# Patient Record
Sex: FEMALE | Race: WHITE | NOT HISPANIC OR LATINO | Employment: UNEMPLOYED
[De-identification: names, ages, dates, MRNs, and addresses within clinical notes are randomized per-mention and may not be internally consistent; named-entity substitution may affect disease eponyms.]

---

## 2017-06-17 ENCOUNTER — HEALTH MAINTENANCE LETTER (OUTPATIENT)
Age: 37
End: 2017-06-17

## 2018-09-19 ENCOUNTER — HOSPITAL ENCOUNTER (EMERGENCY)
Facility: CLINIC | Age: 38
Discharge: HOME | End: 2018-09-19
Attending: EMERGENCY MEDICINE | Admitting: EMERGENCY MEDICINE
Payer: COMMERCIAL

## 2018-09-19 VITALS
WEIGHT: 147 LBS | SYSTOLIC BLOOD PRESSURE: 110 MMHG | HEART RATE: 117 BPM | RESPIRATION RATE: 18 BRPM | BODY MASS INDEX: 26.04 KG/M2 | OXYGEN SATURATION: 99 % | DIASTOLIC BLOOD PRESSURE: 64 MMHG | TEMPERATURE: 98.8 F

## 2018-09-19 DIAGNOSIS — R30.0 DYSURIA: ICD-10-CM

## 2018-09-19 LAB
ALBUMIN UR-MCNC: NEGATIVE MG/DL
APPEARANCE UR: CLEAR
BILIRUB UR QL STRIP: NEGATIVE
COLOR UR AUTO: ABNORMAL
GLUCOSE UR STRIP-MCNC: NEGATIVE MG/DL
HCG UR QL: NEGATIVE
HGB UR QL STRIP: NEGATIVE
HYALINE CASTS #/AREA URNS LPF: 1 /LPF (ref 0–2)
KETONES UR STRIP-MCNC: NEGATIVE MG/DL
LEUKOCYTE ESTERASE UR QL STRIP: ABNORMAL
MUCOUS THREADS #/AREA URNS LPF: PRESENT /LPF
NITRATE UR QL: NEGATIVE
PH UR STRIP: 5.5 PH (ref 5–7)
RBC #/AREA URNS AUTO: 1 /HPF (ref 0–2)
SOURCE: ABNORMAL
SP GR UR STRIP: 1.01 (ref 1–1.03)
SQUAMOUS #/AREA URNS AUTO: 1 /HPF (ref 0–1)
UROBILINOGEN UR STRIP-MCNC: NORMAL MG/DL (ref 0–2)
WBC #/AREA URNS AUTO: <1 /HPF (ref 0–5)

## 2018-09-19 PROCEDURE — 81025 URINE PREGNANCY TEST: CPT | Performed by: EMERGENCY MEDICINE

## 2018-09-19 PROCEDURE — 81001 URINALYSIS AUTO W/SCOPE: CPT | Performed by: EMERGENCY MEDICINE

## 2018-09-19 PROCEDURE — 99282 EMERGENCY DEPT VISIT SF MDM: CPT | Mod: Z6 | Performed by: EMERGENCY MEDICINE

## 2018-09-19 PROCEDURE — 99283 EMERGENCY DEPT VISIT LOW MDM: CPT | Performed by: EMERGENCY MEDICINE

## 2018-09-19 ASSESSMENT — ENCOUNTER SYMPTOMS
FEVER: 0
FREQUENCY: 1
GASTROINTESTINAL NEGATIVE: 1
FLANK PAIN: 0
DIFFICULTY URINATING: 1
DYSURIA: 1
HEMATURIA: 0

## 2018-09-19 NOTE — ED AVS SNAPSHOT
[facility], Emergency Department    [address]    [place] [address]    Phone:  [phone]    Fax:  [phone]                                       Kamilah Goldberg   MRN: [id number]    Department:  [facility], Emergency Department   Date of Visit:  9/19/2018           Patient Information     Date Of Birth          1980        Your diagnoses for this visit were:     Dysuria        You were seen by Colton Sanches MD.        Discharge Instructions       No evidence for a urinary tract infection at this time    24 Hour Appointment Hotline       To make an appointment at any [facility] clinic, call [phone] ([phone]). If you don't have a family doctor or clinic, we will help you find one. [facility] clinics are conveniently located to serve the needs of you and your family.             Review of your medicines      Our records show that you are taking the medicines listed below. If these are incorrect, please call your family doctor or clinic.        Dose / Directions Last dose taken    calcium citrate-vitamin D 315-200 MG-UNIT Tabs per tablet   Commonly known as:  CITRACAL   Dose:  1 tablet        Take 1 tablet by mouth 6 times daily.   Refills:  0        chlorhexidine 0.12 % solution   Commonly known as:  PERIDEX   Dose:  15 mL   Quantity:  118 mL        Swish and spit 15 mLs in mouth 2 times daily   Refills:  0        citalopram 20 MG tablet   Commonly known as:  celeXA   Dose:  20 mg        Take 20 mg by mouth daily.   Refills:  0        cyclobenzaprine 10 MG tablet   Commonly known as:  FLEXERIL   Quantity:  60 tablet        One tablet at bedtime as needed for muscle spasms   Refills:  0        TYLENOL 500 MG tablet   Dose:  500-1000 mg   Generic drug:  acetaminophen        Take 500-1,000 mg by mouth every 6 hours as needed.   Refills:  0        VITAMIN D (CHOLECALCIFEROL) PO        Take by mouth daily   Refills:  0                Procedures and tests performed during  "your visit     HCG qualitative urine    UA with Microscopic      Orders Needing Specimen Collection     None      Pending Results     No orders found from 2018 to 2018.            Pending Culture Results     No orders found from 2018 to 2018.            Pending Results Instructions     If you had any lab results that were not finalized at the time of your Discharge, you can call the ED Lab Result RN at [phone]. You will be contacted by this team for any positive Lab results or changes in treatment. The nurses are available 7 days a week from 10A to 6:30P.  You can leave a message 24 hours per day and they will return your call.        Thank you for choosing [facility]       Thank you for choosing [facility] for your care. Our goal is always to provide you with excellent care. Hearing back from our patients is one way we can continue to improve our services. Please take a few minutes to complete the written survey that you may receive in the mail after you visit with us. Thank you!        [facility] Information     [employer] lets you send messages to your doctor, view your test results, renew your prescriptions, schedule appointments and more. To sign up, go to [email or web address] . Click on \"Log in\" on the left side of the screen, which will take you to the Welcome page. Then click on \"Sign up Now\" on the right side of the page.     You will be asked to enter the access code listed below, as well as some personal information. Please follow the directions to create your username and password.     Your access code is: [email or web address]  Expires: 2018 10:40 PM     Your access code will  in 90 days. If you need help or a new code, please call your [facility] clinic or [phone].        Care EveryWhere ID     This is your Care EveryWhere ID. This could be used by other organizations to access your [facility] medical records  [id number]        Equal Access to Services     OSCAR RICHMOND AH: Hadii " zuleima Coulter, quinton murdock, sushma dubois. So [facility] [phone].    ATENCIÓN: Si habla español, tiene a bowden disposición servicios gratuitos de asistencia lingüística. Llame al [phone].    We comply with applicable federal civil rights laws and [place] laws. We do not discriminate on the basis of race, color, national origin, age, disability, sex, sexual orientation, or gender identity.            After Visit Summary       This is your record. Keep this with you and show to your community pharmacist(s) and doctor(s) at your next visit.

## 2018-09-19 NOTE — ED AVS SNAPSHOT
[facility], [place], Emergency Department    [address]    [place] [address]    Phone:  [phone]    Fax:  [phone]                                       Kamilah Goldberg   MRN: [id number]    Department:  [facility], Emergency Department   Date of Visit:  9/19/2018           After Visit Summary Signature Page     I have received my discharge instructions, and my questions have been answered. I have discussed any challenges I see with this plan with the nurse or doctor.    ..........................................................................................................................................  Patient/Patient Representative Signature      ..........................................................................................................................................  Patient Representative Print Name and Relationship to Patient    ..................................................               ................................................  Date                                   Time    ..........................................................................................................................................  Reviewed by Signature/Title    ...................................................              ..............................................  Date                                               Time          22EPIC Rev 08/18
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
## 2019-04-09 ENCOUNTER — HOSPITAL ENCOUNTER (EMERGENCY)
Facility: CLINIC | Age: 39
Discharge: HOME | End: 2019-04-10
Attending: EMERGENCY MEDICINE | Admitting: EMERGENCY MEDICINE

## 2019-04-09 DIAGNOSIS — R10.12 ABDOMINAL PAIN, LEFT UPPER QUADRANT: ICD-10-CM

## 2019-04-09 PROCEDURE — 99285 EMERGENCY DEPT VISIT HI MDM: CPT | Mod: 25

## 2019-04-09 PROCEDURE — 83690 ASSAY OF LIPASE: CPT | Performed by: EMERGENCY MEDICINE

## 2019-04-09 PROCEDURE — 84702 CHORIONIC GONADOTROPIN TEST: CPT

## 2019-04-09 RX ORDER — HYDROMORPHONE HYDROCHLORIDE 1 MG/ML
0.5 INJECTION, SOLUTION INTRAMUSCULAR; INTRAVENOUS; SUBCUTANEOUS
Status: DISCONTINUED | OUTPATIENT
Start: 2019-04-09 | End: 2019-04-10 | Stop reason: HOSPADM

## 2019-04-09 RX ORDER — ONDANSETRON 2 MG/ML
4 INJECTION INTRAMUSCULAR; INTRAVENOUS ONCE
Status: COMPLETED | OUTPATIENT
Start: 2019-04-09 | End: 2019-04-10

## 2019-04-09 NOTE — ED AVS SNAPSHOT
[facility] Emergency Department  [address]  [facility] [address]  Phone:  [phone]  Fax:  [phone]                                    Kamilah Goldberg   MRN: [id number]    Department:  [facility] Emergency Department   Date of Visit:  4/9/2019           After Visit Summary Signature Page    I have received my discharge instructions, and my questions have been answered. I have discussed any challenges I see with this plan with the nurse or doctor.    ..........................................................................................................................................  Patient/Patient Representative Signature      ..........................................................................................................................................  Patient Representative Print Name and Relationship to Patient    ..................................................               ................................................  Date                                   Time    ..........................................................................................................................................  Reviewed by Signature/Title    ...................................................              ..............................................  Date                                               Time          22EPIC Rev 08/18

## 2019-04-10 ENCOUNTER — APPOINTMENT (OUTPATIENT)
Dept: CT IMAGING | Facility: CLINIC | Age: 39
End: 2019-04-10
Attending: EMERGENCY MEDICINE

## 2019-04-10 VITALS
SYSTOLIC BLOOD PRESSURE: 117 MMHG | DIASTOLIC BLOOD PRESSURE: 79 MMHG | OXYGEN SATURATION: 100 % | BODY MASS INDEX: 27.63 KG/M2 | TEMPERATURE: 98.7 F | RESPIRATION RATE: 18 BRPM | HEART RATE: 92 BPM | WEIGHT: 156 LBS

## 2019-04-10 LAB
ALBUMIN SERPL-MCNC: 3.1 G/DL (ref 3.4–5)
ALP SERPL-CCNC: 102 U/L (ref 40–150)
ALT SERPL W P-5'-P-CCNC: 20 U/L (ref 0–50)
ANION GAP SERPL CALCULATED.3IONS-SCNC: 2 MMOL/L (ref 3–14)
ANISOCYTOSIS BLD QL SMEAR: ABNORMAL
AST SERPL W P-5'-P-CCNC: 27 U/L (ref 0–45)
B-HCG FREE SERPL-ACNC: <5 IU/L
BASOPHILS # BLD AUTO: 0 10E9/L (ref 0–0.2)
BASOPHILS NFR BLD AUTO: 1 %
BILIRUB DIRECT SERPL-MCNC: <0.1 MG/DL (ref 0–0.2)
BILIRUB SERPL-MCNC: 0.2 MG/DL (ref 0.2–1.3)
BUN SERPL-MCNC: 6 MG/DL (ref 7–30)
CALCIUM SERPL-MCNC: 8.3 MG/DL (ref 8.5–10.1)
CHLORIDE SERPL-SCNC: 106 MMOL/L (ref 94–109)
CO2 SERPL-SCNC: 29 MMOL/L (ref 20–32)
CREAT SERPL-MCNC: 0.47 MG/DL (ref 0.52–1.04)
DIFFERENTIAL METHOD BLD: ABNORMAL
ELLIPTOCYTES BLD QL SMEAR: SLIGHT
EOSINOPHIL # BLD AUTO: 0.2 10E9/L (ref 0–0.7)
EOSINOPHIL NFR BLD AUTO: 3.9 %
ERYTHROCYTE [DISTWIDTH] IN BLOOD BY AUTOMATED COUNT: 19 % (ref 10–15)
GFR SERPL CREATININE-BSD FRML MDRD: >90 ML/MIN/{1.73_M2}
GLUCOSE SERPL-MCNC: 83 MG/DL (ref 70–99)
HCT VFR BLD AUTO: 27.4 % (ref 35–47)
HGB BLD-MCNC: 7.7 G/DL (ref 11.7–15.7)
HYPOCHROMIA BLD QL: PRESENT
IMM GRANULOCYTES # BLD: 0 10E9/L (ref 0–0.4)
IMM GRANULOCYTES NFR BLD: 0.2 %
LIPASE SERPL-CCNC: 162 U/L (ref 73–393)
LYMPHOCYTES # BLD AUTO: 1.4 10E9/L (ref 0.8–5.3)
LYMPHOCYTES NFR BLD AUTO: 35 %
MACROCYTES BLD QL SMEAR: PRESENT
MCH RBC QN AUTO: 17.9 PG (ref 26.5–33)
MCHC RBC AUTO-ENTMCNC: 28.1 G/DL (ref 31.5–36.5)
MCV RBC AUTO: 64 FL (ref 78–100)
MICROCYTES BLD QL SMEAR: PRESENT
MONOCYTES # BLD AUTO: 0.5 10E9/L (ref 0–1.3)
MONOCYTES NFR BLD AUTO: 11.2 %
NEUTROPHILS # BLD AUTO: 2 10E9/L (ref 1.6–8.3)
NEUTROPHILS NFR BLD AUTO: 48.7 %
NRBC # BLD AUTO: 0 10*3/UL
NRBC BLD AUTO-RTO: 0 /100
PLATELET # BLD AUTO: 470 10E9/L (ref 150–450)
PLATELET # BLD EST: ABNORMAL 10*3/UL
POTASSIUM SERPL-SCNC: 3.5 MMOL/L (ref 3.4–5.3)
PROT SERPL-MCNC: 7.7 G/DL (ref 6.8–8.8)
RBC # BLD AUTO: 4.3 10E12/L (ref 3.8–5.2)
SODIUM SERPL-SCNC: 137 MMOL/L (ref 133–144)
WBC # BLD AUTO: 4.1 10E9/L (ref 4–11)

## 2019-04-10 PROCEDURE — 85025 COMPLETE CBC W/AUTO DIFF WBC: CPT | Performed by: EMERGENCY MEDICINE

## 2019-04-10 PROCEDURE — 96361 HYDRATE IV INFUSION ADD-ON: CPT

## 2019-04-10 PROCEDURE — 80048 BASIC METABOLIC PNL TOTAL CA: CPT | Performed by: EMERGENCY MEDICINE

## 2019-04-10 PROCEDURE — 96375 TX/PRO/DX INJ NEW DRUG ADDON: CPT

## 2019-04-10 PROCEDURE — 25000128 H RX IP 250 OP 636: Performed by: EMERGENCY MEDICINE

## 2019-04-10 PROCEDURE — 96374 THER/PROPH/DIAG INJ IV PUSH: CPT

## 2019-04-10 PROCEDURE — 74177 CT ABD & PELVIS W/CONTRAST: CPT

## 2019-04-10 PROCEDURE — 25000125 ZZHC RX 250: Performed by: EMERGENCY MEDICINE

## 2019-04-10 PROCEDURE — 25000132 ZZH RX MED GY IP 250 OP 250 PS 637: Performed by: EMERGENCY MEDICINE

## 2019-04-10 PROCEDURE — 83690 ASSAY OF LIPASE: CPT | Performed by: EMERGENCY MEDICINE

## 2019-04-10 PROCEDURE — 96376 TX/PRO/DX INJ SAME DRUG ADON: CPT

## 2019-04-10 PROCEDURE — 80076 HEPATIC FUNCTION PANEL: CPT | Performed by: EMERGENCY MEDICINE

## 2019-04-10 RX ORDER — ONDANSETRON 4 MG/1
4 TABLET, ORALLY DISINTEGRATING ORAL EVERY 8 HOURS PRN
Qty: 10 TABLET | Refills: 0 | Status: SHIPPED | OUTPATIENT
Start: 2019-04-10 | End: 2019-04-13

## 2019-04-10 RX ORDER — IOPAMIDOL 755 MG/ML
500 INJECTION, SOLUTION INTRAVASCULAR ONCE
Status: COMPLETED | OUTPATIENT
Start: 2019-04-10 | End: 2019-04-10

## 2019-04-10 RX ORDER — PANTOPRAZOLE SODIUM 40 MG/1
40 TABLET, DELAYED RELEASE ORAL DAILY
Qty: 30 TABLET | Refills: 0 | Status: SHIPPED | OUTPATIENT
Start: 2019-04-10 | End: 2019-05-10

## 2019-04-10 RX ADMIN — Medication 0.5 MG: at 00:36

## 2019-04-10 RX ADMIN — SODIUM CHLORIDE 500 ML: 9 INJECTION, SOLUTION INTRAVENOUS at 00:02

## 2019-04-10 RX ADMIN — IOPAMIDOL 79 ML: 755 INJECTION, SOLUTION INTRAVENOUS at 00:17

## 2019-04-10 RX ADMIN — Medication 0.5 MG: at 00:02

## 2019-04-10 RX ADMIN — SODIUM CHLORIDE 1000 ML: 9 INJECTION, SOLUTION INTRAVENOUS at 01:31

## 2019-04-10 RX ADMIN — LIDOCAINE HYDROCHLORIDE 30 ML: 20 SOLUTION ORAL; TOPICAL at 01:31

## 2019-04-10 RX ADMIN — ONDANSETRON 4 MG: 2 INJECTION INTRAMUSCULAR; INTRAVENOUS at 00:02

## 2019-04-10 RX ADMIN — SODIUM CHLORIDE 60 ML: 9 INJECTION, SOLUTION INTRAVENOUS at 00:17

## 2019-04-10 ASSESSMENT — ENCOUNTER SYMPTOMS
HEMATURIA: 0
COUGH: 1
DYSURIA: 0
FREQUENCY: 0
BLOOD IN STOOL: 0
NAUSEA: 1
ABDOMINAL PAIN: 1
VOMITING: 0

## 2019-04-10 NOTE — ED TRIAGE NOTES
Pt in with a C/O L sided abdominal pain, and nausea onset yesterday. Pt reports hx of gastric bypass in 2011

## 2019-04-10 NOTE — ED NOTES
Pt provided with discharge paperwork and educated on recommended follow-up with PCP. Pt educated on how to manage symptoms at home and when to seek medical attention. Pt educated on how to take prescriptions for zofran and protonix. Pt voiced understanding and denied any questions at discharge.

## 2019-04-10 NOTE — ED PROVIDER NOTES
"  History     Chief Complaint:  Abdominal Pain      HPI   Kamilah Goldberg is a 38 year old female with a past medical history significant for cholecystectomy and gastric bypass surgery who presents with two days of left sided abdominal pain. Per the patients report, she has sharp left lower periumbilical tenderness that began yesterday. She mentions she is able to feel a mass in her abdomen which \"moves around\". Patient also reports that she has had congestion and a cough for the last week and a low grade fever yesterday.  She reports that these symptoms are improving.The patient reports that she felt nauseous yesterday but did not vomit. She also denies dysuria, frequency, bloody or black stool, hematuria, pelvic pain, or chest pain.   No vaginal symptoms.  No trauma.      Allergies:  Naproxen   Codeine Sulfate   Hydrocodone     Medications:    acetaminophen (TYLENOL) 500 MG tablet  calcium citrate-vitamin D (CITRACAL) 315-200 MG-UNIT TABS  chlorhexidine (CHLORHEXIDINE) 0.12 % solution  citalopram (CELEXA) 20 MG tablet  cyclobenzaprine (FLEXERIL) 10 MG tablet  VITAMIN D, CHOLECALCIFEROL, PO    Past Medical History:    Back pain   Depression   UTI (lower urinary tract infection)    Past Surgical History:    BYPASS GASTRIC CARA-EN-Y, LIVER BIOPSY, COMBINED   CHOLECYSTECTOMY   CRYOCAUTERY OF CERVIX   DISCECTOMY LUMBAR MINIMALLY INVASIVE ONE LEVEL   TUBAL LIGATION    Family History:    Migraines  CAD    Social History:  Smoking status: Never smoker  Alcohol use: Yes  Marital Status:  Single [1]       Review of Systems   HENT: Positive for congestion.    Respiratory: Positive for cough.    Cardiovascular: Negative for chest pain.   Gastrointestinal: Positive for abdominal pain and nausea. Negative for blood in stool and vomiting.   Genitourinary: Negative for dysuria, frequency, hematuria and pelvic pain.   All other systems reviewed and are negative.        Physical Exam     Patient Vitals for the past 24 hrs:   BP " Temp Temp src Pulse Resp SpO2 Weight   04/10/19 0105 -- -- -- -- -- 100 % --   04/10/19 0030 117/79 -- -- 92 18 97 % --   04/09/19 2304 123/85 98.7  F (37.1  C) Temporal 105 18 100 % 70.8 kg (156 lb)         Physical Exam  Gen: alert  HEENT: PERRL, oropharynx clear  Neck: normal ROM  CV: RRR, no murmurs  Pulm: breath sounds equal, lungs clear  Abd: Soft, bowl sounds karin, left sided abdominal tenderness, prominence at belly button that is tender. Patient uncooperative on exam so it is difficult to definitively discern location of her pain.    Back: no evidence of injury, no cva tenderness  MSK: no deformity, moves all extremities  Skin: no rash  Neuro: alert, appropriate conversation and interaction    Emergency Department Course     Imaging:  Radiographic findings were communicated with the patient who voiced understanding of the findings.    CT abdomen pelvis w contrast   IMPRESSION: No convincing cause of acute pain identified in the  abdomen or pelvis  As read by radiology     Laboratory:  CBC: WBC 4.1, HGB 7.7,   BMP: Anion gap 2, urea nitrogen 6, Creatinine 0.47, Calcium 8.3  ISTAT HCG quantitative pregnancy POCT: <5.0  Lipase: 162  Hepatic panel: Albumin 3.1, o/w WNL    Interventions:  0131: Xylocaine 30 ml   0002: Zofran 4 mg IV  0036: Dilaudid 0.5 mg IV  0131: NaCl bolus 100 ml IV    Emergency Department Course:  Past medical records, nursing notes, and vitals reviewed.  0006: I performed an exam of the patient and obtained history, as documented above.    IV inserted and blood drawn.     The patient was sent for a CT abdomen pelvis while in the emergency department, findings above.    0215: I rechecked the patient. Explained findings to the patient. Patient does not wish to give urine and would like to go home. Patient discharged home with instructions regarding supportive care, medications, and reasons to return. The importance of close follow-up was reviewed.       Impression & Plan      Medical  Decision Making:  The patient presents for left upper quadrant pain. Patient is not cooperative with history or exam. Complicated past surgical history noted. Laboratory studies unremarkable. Patient refused to give urine sample. CT of the abdomen was unremarkable. I reexamined the patient and she does appear to be more comfortable but again not cooperative with history or exam. I suspect that this may be due to gastritis given location of her pain.  Patient reports a bulge but none appreciated on my exam, though again patient not cooperative.  Patient does not endorse being able to reduce a bulge and no current exam or CT evidence of hernia.  Given location of pain, possible gastritis. Protonix and Zofran. Recommended one day follow up with primary care, discussed limitations of evaluation today from lack of Urinalysis and lack of cooperation.  Discussed possibility of missed UTI and serious infection could result.  Patient expressed understanding.  I feel she is competent to make this decision. Patient desires discharge home.     Diagnosis:    ICD-10-CM    1. Abdominal pain, left upper quadrant R10.12        Disposition:  discharged to home    Discharge Medications:     Medication List      Started    pantoprazole 40 MG EC tablet  Commonly known as:  PROTONIX  40 mg, Oral, DAILY              Atul Smith  4/9/2019   [facility] EMERGENCY DEPARTMENT    Scribe Disclosure:  I, Atul Smith, am serving as a scribe at 12:06 AM on 4/10/2019 to document services personally performed by Torrie Reyez MD based on my observations and the provider's statements to me.          Torrie Reyez MD  04/10/19 0646

## 2022-02-24 ENCOUNTER — APPOINTMENT (OUTPATIENT)
Dept: GENERAL RADIOLOGY | Facility: CLINIC | Age: 42
End: 2022-02-24
Attending: PHYSICIAN ASSISTANT
Payer: MEDICAID

## 2022-02-24 ENCOUNTER — HOSPITAL ENCOUNTER (EMERGENCY)
Facility: CLINIC | Age: 42
Discharge: HOME | End: 2022-02-24
Attending: PHYSICIAN ASSISTANT | Admitting: PHYSICIAN ASSISTANT
Payer: MEDICAID

## 2022-02-24 VITALS
WEIGHT: 137 LBS | DIASTOLIC BLOOD PRESSURE: 60 MMHG | HEART RATE: 107 BPM | TEMPERATURE: 99.1 F | SYSTOLIC BLOOD PRESSURE: 109 MMHG | RESPIRATION RATE: 21 BRPM | OXYGEN SATURATION: 98 % | BODY MASS INDEX: 24.27 KG/M2

## 2022-02-24 DIAGNOSIS — E87.6 HYPOKALEMIA: ICD-10-CM

## 2022-02-24 DIAGNOSIS — E86.0 DEHYDRATION: ICD-10-CM

## 2022-02-24 DIAGNOSIS — J18.9 PNEUMONIA OF RIGHT LOWER LOBE DUE TO INFECTIOUS ORGANISM: ICD-10-CM

## 2022-02-24 LAB
ALBUMIN SERPL-MCNC: 2.8 G/DL (ref 3.4–5)
ALP SERPL-CCNC: 81 U/L (ref 40–150)
ALT SERPL W P-5'-P-CCNC: 22 U/L (ref 0–50)
ANION GAP SERPL CALCULATED.3IONS-SCNC: 10 MMOL/L (ref 3–14)
AST SERPL W P-5'-P-CCNC: 24 U/L (ref 0–45)
BASOPHILS # BLD AUTO: 0 10E3/UL (ref 0–0.2)
BASOPHILS NFR BLD AUTO: 0 %
BILIRUB SERPL-MCNC: 0.3 MG/DL (ref 0.2–1.3)
BUN SERPL-MCNC: 11 MG/DL (ref 7–30)
CALCIUM SERPL-MCNC: 8.8 MG/DL (ref 8.5–10.1)
CHLORIDE BLD-SCNC: 95 MMOL/L (ref 94–109)
CO2 SERPL-SCNC: 26 MMOL/L (ref 20–32)
CREAT SERPL-MCNC: 0.5 MG/DL (ref 0.52–1.04)
EOSINOPHIL # BLD AUTO: 0 10E3/UL (ref 0–0.7)
EOSINOPHIL NFR BLD AUTO: 0 %
ERYTHROCYTE [DISTWIDTH] IN BLOOD BY AUTOMATED COUNT: 22.3 % (ref 10–15)
FLUAV RNA SPEC QL NAA+PROBE: NEGATIVE
FLUBV RNA RESP QL NAA+PROBE: NEGATIVE
GFR SERPL CREATININE-BSD FRML MDRD: >90 ML/MIN/1.73M2
GLUCOSE BLD-MCNC: 119 MG/DL (ref 70–99)
HCT VFR BLD AUTO: 28 % (ref 35–47)
HGB BLD-MCNC: 8.4 G/DL (ref 11.7–15.7)
HOLD SPECIMEN: NORMAL
HOLD SPECIMEN: NORMAL
IMM GRANULOCYTES # BLD: 0.1 10E3/UL
IMM GRANULOCYTES NFR BLD: 1 %
LACTATE SERPL-SCNC: 0.9 MMOL/L (ref 0.7–2)
LACTATE SERPL-SCNC: 4.8 MMOL/L (ref 0.7–2)
LYMPHOCYTES # BLD AUTO: 0.8 10E3/UL (ref 0.8–5.3)
LYMPHOCYTES NFR BLD AUTO: 7 %
MAGNESIUM SERPL-MCNC: 1.8 MG/DL (ref 1.6–2.3)
MCH RBC QN AUTO: 20.3 PG (ref 26.5–33)
MCHC RBC AUTO-ENTMCNC: 30 G/DL (ref 31.5–36.5)
MCV RBC AUTO: 68 FL (ref 78–100)
MONOCYTES # BLD AUTO: 0.6 10E3/UL (ref 0–1.3)
MONOCYTES NFR BLD AUTO: 5 %
NEUTROPHILS # BLD AUTO: 9.9 10E3/UL (ref 1.6–8.3)
NEUTROPHILS NFR BLD AUTO: 87 %
NRBC # BLD AUTO: 0 10E3/UL
NRBC BLD AUTO-RTO: 0 /100
PLATELET # BLD AUTO: 376 10E3/UL (ref 150–450)
POTASSIUM BLD-SCNC: 2.6 MMOL/L (ref 3.4–5.3)
POTASSIUM BLD-SCNC: 3.2 MMOL/L (ref 3.4–5.3)
PROT SERPL-MCNC: 8.6 G/DL (ref 6.8–8.8)
RBC # BLD AUTO: 4.13 10E6/UL (ref 3.8–5.2)
SARS-COV-2 RNA RESP QL NAA+PROBE: NEGATIVE
SODIUM SERPL-SCNC: 131 MMOL/L (ref 133–144)
WBC # BLD AUTO: 11.4 10E3/UL (ref 4–11)

## 2022-02-24 PROCEDURE — 93005 ELECTROCARDIOGRAM TRACING: CPT

## 2022-02-24 PROCEDURE — 83605 ASSAY OF LACTIC ACID: CPT | Performed by: PHYSICIAN ASSISTANT

## 2022-02-24 PROCEDURE — 87040 BLOOD CULTURE FOR BACTERIA: CPT | Performed by: PHYSICIAN ASSISTANT

## 2022-02-24 PROCEDURE — C9803 HOPD COVID-19 SPEC COLLECT: HCPCS

## 2022-02-24 PROCEDURE — 87636 SARSCOV2 & INF A&B AMP PRB: CPT | Performed by: PHYSICIAN ASSISTANT

## 2022-02-24 PROCEDURE — 85025 COMPLETE CBC W/AUTO DIFF WBC: CPT | Performed by: PHYSICIAN ASSISTANT

## 2022-02-24 PROCEDURE — 258N000003 HC RX IP 258 OP 636: Performed by: PHYSICIAN ASSISTANT

## 2022-02-24 PROCEDURE — 36415 COLL VENOUS BLD VENIPUNCTURE: CPT | Performed by: PHYSICIAN ASSISTANT

## 2022-02-24 PROCEDURE — 96365 THER/PROPH/DIAG IV INF INIT: CPT

## 2022-02-24 PROCEDURE — 99285 EMERGENCY DEPT VISIT HI MDM: CPT | Mod: 25

## 2022-02-24 PROCEDURE — 71045 X-RAY EXAM CHEST 1 VIEW: CPT

## 2022-02-24 PROCEDURE — 83735 ASSAY OF MAGNESIUM: CPT | Performed by: PHYSICIAN ASSISTANT

## 2022-02-24 PROCEDURE — 96367 TX/PROPH/DG ADDL SEQ IV INF: CPT

## 2022-02-24 PROCEDURE — 99284 EMERGENCY DEPT VISIT MOD MDM: CPT | Mod: 25 | Performed by: PHYSICIAN ASSISTANT

## 2022-02-24 PROCEDURE — 250N000013 HC RX MED GY IP 250 OP 250 PS 637: Performed by: PHYSICIAN ASSISTANT

## 2022-02-24 PROCEDURE — 80053 COMPREHEN METABOLIC PANEL: CPT | Performed by: PHYSICIAN ASSISTANT

## 2022-02-24 PROCEDURE — 93010 ELECTROCARDIOGRAM REPORT: CPT | Performed by: PHYSICIAN ASSISTANT

## 2022-02-24 PROCEDURE — 250N000011 HC RX IP 250 OP 636: Performed by: PHYSICIAN ASSISTANT

## 2022-02-24 PROCEDURE — 96361 HYDRATE IV INFUSION ADD-ON: CPT

## 2022-02-24 PROCEDURE — 84132 ASSAY OF SERUM POTASSIUM: CPT | Mod: 91 | Performed by: PHYSICIAN ASSISTANT

## 2022-02-24 RX ORDER — CEFTRIAXONE 2 G/1
2 INJECTION, POWDER, FOR SOLUTION INTRAMUSCULAR; INTRAVENOUS EVERY 24 HOURS
Status: DISCONTINUED | OUTPATIENT
Start: 2022-02-24 | End: 2022-02-24 | Stop reason: HOSPADM

## 2022-02-24 RX ORDER — POTASSIUM CHLORIDE 1500 MG/1
20 TABLET, EXTENDED RELEASE ORAL ONCE
Status: COMPLETED | OUTPATIENT
Start: 2022-02-24 | End: 2022-02-24

## 2022-02-24 RX ORDER — ACETAMINOPHEN 325 MG/1
975 TABLET ORAL ONCE
Status: COMPLETED | OUTPATIENT
Start: 2022-02-24 | End: 2022-02-24

## 2022-02-24 RX ORDER — AZITHROMYCIN 500 MG/1
500 INJECTION, POWDER, LYOPHILIZED, FOR SOLUTION INTRAVENOUS EVERY 24 HOURS
Status: COMPLETED | OUTPATIENT
Start: 2022-02-24 | End: 2022-02-24

## 2022-02-24 RX ORDER — POTASSIUM CHLORIDE 1500 MG/1
20 TABLET, EXTENDED RELEASE ORAL 2 TIMES DAILY
Qty: 10 TABLET | Refills: 0 | Status: SHIPPED | OUTPATIENT
Start: 2022-02-24 | End: 2022-03-01

## 2022-02-24 RX ORDER — SODIUM CHLORIDE 9 MG/ML
INJECTION, SOLUTION INTRAVENOUS CONTINUOUS
Status: DISCONTINUED | OUTPATIENT
Start: 2022-02-24 | End: 2022-02-24 | Stop reason: HOSPADM

## 2022-02-24 RX ORDER — AZITHROMYCIN 250 MG/1
250 TABLET, FILM COATED ORAL DAILY
Qty: 6 TABLET | Refills: 0 | Status: SHIPPED | OUTPATIENT
Start: 2022-02-24 | End: 2023-01-16

## 2022-02-24 RX ORDER — POTASSIUM CHLORIDE 1500 MG/1
40 TABLET, EXTENDED RELEASE ORAL ONCE
Status: COMPLETED | OUTPATIENT
Start: 2022-02-24 | End: 2022-02-24

## 2022-02-24 RX ADMIN — ACETAMINOPHEN 975 MG: 325 TABLET, FILM COATED ORAL at 15:09

## 2022-02-24 RX ADMIN — POTASSIUM CHLORIDE 20 MEQ: 1500 TABLET, EXTENDED RELEASE ORAL at 17:10

## 2022-02-24 RX ADMIN — CEFTRIAXONE SODIUM 2 G: 2 INJECTION, POWDER, FOR SOLUTION INTRAMUSCULAR; INTRAVENOUS at 15:35

## 2022-02-24 RX ADMIN — POTASSIUM CHLORIDE 40 MEQ: 1500 TABLET, EXTENDED RELEASE ORAL at 15:42

## 2022-02-24 RX ADMIN — SODIUM CHLORIDE 1000 ML: 9 INJECTION, SOLUTION INTRAVENOUS at 18:21

## 2022-02-24 RX ADMIN — SODIUM CHLORIDE 1000 ML: 9 INJECTION, SOLUTION INTRAVENOUS at 15:36

## 2022-02-24 RX ADMIN — AZITHROMYCIN MONOHYDRATE 500 MG: 500 INJECTION, POWDER, LYOPHILIZED, FOR SOLUTION INTRAVENOUS at 15:56

## 2022-02-24 NOTE — LETTER
February 24, 2022      To Whom It May Concern:      Kamilah Goldberg was seen in our Emergency Department today, 02/24/22.  I expect her condition to improve over the next 4-5 days.  She may return to work when improved.  Please excuse her for any time that she misses due to her current illness.  She did test negative for COVID-19.      Sincerely,            Nicolas Shelley PA-C

## 2022-02-24 NOTE — ED TRIAGE NOTES
Pt presents with concern for flu like symptoms. Pt states they started after work Sunday and pt getting worse. Pt has hx of pneumonia in August 2021 that she feels like never got better

## 2022-02-25 NOTE — DISCHARGE INSTRUCTIONS
It was a pleasure working with you today!  I hope your condition improves rapidly!     You did end up having pneumonia in your right lower lobe.  Your first dose of antibiotic was given through your IV here in the emergency department.  Please start the pill antibiotics tomorrow.  Please also take potassium starting tomorrow.  Try and drink electrolyte solution such as Gatorade and Powerade.  Try and consume a minimum of 80 ounces daily to ensure adequate hydration.  Please return to the emergency department if your symptoms are worsening in any way.  Contact the clinic tomorrow to schedule an ED follow-up appointment for Monday of this upcoming week.  Please also avoid taking your child's Adderall, as this likely contributes to your elevated heart rate.

## 2022-02-25 NOTE — ED NOTES
"This nurse attempted to re-check temp and draw a potassium level. Pt anxious and asking about plan \"cause I am not staying here\". Provider notified and going to bedside.   "

## 2022-02-25 NOTE — ED PROVIDER NOTES
History     Chief Complaint   Patient presents with     Flu Symptoms     HPI  Kamilah Goldberg is a 41 year old female who presents for evaluation of a cough.  States that she gets recurrent bronchitis in the winter months.  She did have an episode of pneumonia 8/2021 but states that she has been coughing ever since then.  Cough much worse in the past 1.5 weeks to the point that she is producing green phlegm.  Having some mid chest discomfort with coughing episodes.  Some left eye blurry vision and left otalgia.  Fever up to 103 5 days ago but no fever in the last couple days.  Noting some chills as well.  Denies any change in her taste or smell sensation.  She quit smoking 2 months ago and previously smoked 1/2 pack/day for about 6 years per her report.  She took Tylenol for her fever 5 days ago, but did not take anything today.  She has attempted treatment with OTC medications as well.  Denies any recent travel.  No ill family members.  Denies prior history of asthma or COPD.      Most Recent Immunizations   Administered Date(s) Administered     DTAP (<7y) 08/01/1987     Influenza (H1N1) 12/31/2009     Influenza (IIV3) PF 12/31/2009     MMR 07/01/1994     OPV, trivalent, live 08/01/1987     TD (ADULT, 7+) 08/28/1998     Tdap (Adacel,Boostrix) 01/19/2011             Allergies:  Allergies   Allergen Reactions     Naproxen Hives     Codeine Sulfate GI Disturbance     Hydrocodone Nausea and Vomiting       Problem List:    Patient Active Problem List    Diagnosis Date Noted     Low back pain 12/15/2009     Priority: High     Acute onset of LBP.  Rx'd Vicodin #30 12/15/09.  Will refer to [facility] if persists.  Also encouraged massage/rest/heat.    Called on 1/1/10 reporting continued pain and ran out of Vicodin two days ago--tried to come in for an appt yesterday but could not get a ride.  Massage, heat, ice, warm baths all help some but having trouble sleeping.  Suggestions for the rest of the weekend given VS ED visit  "and urged to come in next week for evaluation and [facility] referral and possible chiropractic adjustment.    Went into the ED 1/1/10 and was given similar instructions and also a small number of Vicodin.  (#12)    1/5/10:  Clinic visit:  Referred to [facility] and #30 Vicodin given to hold her over until appt.    1/20/10 requesting more Vicodin (\"forgot to ask yesterday at appt\").  [facility] appt T, 1/26.  Given #15 more Vicodin 1/20/10 and told that she will not be able to get any more Vicodin from us.  Referral made to the pain clinic.     Suspicious of drug seeking behavior.    Referred to Pain Clinic and 3/8/10 Pain Clinic wrote us a letter saying that the pt had not responded to the confirmation letter and so could not treat her until she did so.       Lower urinary tract infectious disease 10/12/2012     Priority: Medium     Diagnosis updated by automated process. Provider to review and confirm.       S/P gastric bypass 06/26/2012     Priority: Medium     CARDIOVASCULAR SCREENING; LDL GOAL LESS THAN 160 10/31/2010     Priority: Medium     Depression      Priority: Medium     On Wellbutrin 100 mg BID--started taking meds at age 16, was on Paxil for several years and then switched to Wellbutrin in 2000 -- Stable but looking for another therapist through [place]          Past Medical History:    Past Medical History:   Diagnosis Date     Back pain      Depression since age 16     UTI (lower urinary tract infection)        Past Surgical History:    Past Surgical History:   Procedure Laterality Date     BYPASS GASTRIC CARA-EN-Y, LIVER BIOPSY, COMBINED       CHOLECYSTECTOMY       CRYOCAUTERY OF CERVIX  2001 2001     DISCECTOMY LUMBAR MINIMALLY INVASIVE ONE LEVEL  5/24/2013    Procedure: DISCECTOMY LUMBAR MINIMALLY INVASIVE ONE LEVEL;  MIS Right side L5-S1 Gonzalo Lami Microdiscectomy, ;  Surgeon: Alba Anderson MD;  Location: [facility] OR     TUBAL LIGATION      2012       Family History:    Family History   Problem Relation Age of Onset "     Neurologic Disorder Mother         migraines     C.A.D. Mother         MI age 49     Neurologic Disorder Maternal Grandmother         migraines     Cancer Maternal Grandfather         ? type     Diabetes Maternal Aunt      Cerebrovascular Disease Other         Maternal Great Grandmother     C.A.D. Maternal Uncle         MI     Cancer Maternal Uncle         bladder       Social History:  Marital Status:  Single [1]  Social History     Tobacco Use     Smoking status: Never Smoker     Smokeless tobacco: Never Used   Substance Use Topics     Alcohol use: Yes     Comment: occasional     Drug use: No        Medications:    amoxicillin-clavulanate (AUGMENTIN) 875-125 MG tablet  azithromycin (ZITHROMAX) 250 MG tablet  potassium chloride ER (KLOR-CON M) 20 MEQ CR tablet  acetaminophen (TYLENOL) 500 MG tablet  calcium citrate-vitamin D (CITRACAL) 315-200 MG-UNIT TABS  chlorhexidine (CHLORHEXIDINE) 0.12 % solution  citalopram (CELEXA) 20 MG tablet  cyclobenzaprine (FLEXERIL) 10 MG tablet  VITAMIN D, CHOLECALCIFEROL, PO          Review of Systems   All other systems reviewed and are negative.      Physical Exam   BP: 139/85  Pulse: (!) 132  Temp: (!) 103  F (39.4  C)  Resp: 22  Weight: 62.1 kg (137 lb)  SpO2: 98 %      Physical Exam  Vitals and nursing note reviewed.   Constitutional:       General: She is not in acute distress.     Appearance: She is not diaphoretic.   HENT:      Head: Normocephalic and atraumatic.      Right Ear: Tympanic membrane, ear canal and external ear normal. There is no impacted cerumen.      Left Ear: Tympanic membrane, ear canal and external ear normal. There is no impacted cerumen.      Nose: Nose normal. No congestion or rhinorrhea.      Mouth/Throat:      Mouth: Mucous membranes are dry.      Pharynx: No oropharyngeal exudate or posterior oropharyngeal erythema.   Eyes:      General: No scleral icterus.        Right eye: No discharge.         Left eye: No discharge.      Extraocular  Movements: Extraocular movements intact.      Conjunctiva/sclera: Conjunctivae normal.      Pupils: Pupils are equal, round, and reactive to light.   Neck:      Thyroid: No thyromegaly.   Cardiovascular:      Rate and Rhythm: Normal rate and regular rhythm.      Heart sounds: Normal heart sounds. No murmur heard.  Pulmonary:      Effort: Pulmonary effort is normal. No respiratory distress.      Breath sounds: Normal breath sounds. No wheezing or rales.   Chest:      Chest wall: No tenderness.   Abdominal:      General: Bowel sounds are normal. There is no distension.      Palpations: Abdomen is soft. There is no mass.      Tenderness: There is no abdominal tenderness. There is no right CVA tenderness, left CVA tenderness, guarding or rebound.   Musculoskeletal:         General: No swelling, tenderness or deformity. Normal range of motion.      Cervical back: Normal range of motion and neck supple. No rigidity or tenderness.      Right lower leg: No edema.      Left lower leg: No edema.   Lymphadenopathy:      Cervical: No cervical adenopathy.   Skin:     General: Skin is warm and dry.      Capillary Refill: Capillary refill takes less than 2 seconds.      Coloration: Skin is not jaundiced.      Findings: No bruising, erythema, lesion or rash.   Neurological:      General: No focal deficit present.      Mental Status: She is alert and oriented to person, place, and time.      Cranial Nerves: No cranial nerve deficit.   Psychiatric:         Mood and Affect: Mood normal.         Behavior: Behavior normal.         Thought Content: Thought content normal.         ED Course                 Procedures              EKG Interpretation:      Interpreted by Nicolas Shelley PA-C  Time reviewed: 1600  Symptoms at time of EKG: coughing, hypokalemia   Rhythm: Sinus tachycardia at a rate of 120.  Rate: 120.  Axis: normal  Ectopy: none  Conduction: normal  ST Segments/ T Waves: No ST-T wave changes  Q Waves: none  Comparison to  prior: No old EKG available    Clinical Impression: Sinus tachycardia.  No acute ST or T wave elevation.        Critical Care time:  none     The Lactic acid level is elevated due to dehydration, at this time there is no sign of severe sepsis or septic shock.         Results for orders placed or performed during the hospital encounter of 02/24/22 (from the past 24 hour(s))   Symptomatic; Unknown Influenza A/B & SARS-CoV2 (COVID-19) Virus PCR Multiplex Nose    Specimen: Nose; Swab   Result Value Ref Range    Influenza A PCR Negative Negative    Influenza B PCR Negative Negative    SARS CoV2 PCR Negative Negative    Narrative    Testing was performed using the marine SARS-CoV-2 & Influenza A/B Assay on the marine [employer] System. This test should be ordered for the detection of SARS-CoV-2 and influenza viruses in individuals who meet clinical and/or epidemiological criteria. Test performance is unknown in asymptomatic patients. This test is for in vitro diagnostic use under the FDA EUA for laboratories certified under CLIA to perform moderate and/or high complexity testing. This test has not been FDA cleared or approved. A negative result does not rule out the presence of PCR inhibitors in the specimen or target RNA in concentration below the limit of detection for the assay. If only one viral target is positive but coinfection with multiple targets is suspected, the sample should be re-tested with another FDA cleared, approved or authorized test, if coinfection would change clinical management. [facility] are certified under the Clinical Laboratory Improvement Amendments of 1988 (CLIA-88) as  qualified to perform moderate and/or high complexity laboratory testing.   Comprehensive metabolic panel   Result Value Ref Range    Sodium 131 (L) 133 - 144 mmol/L    Potassium 2.6 (LL) 3.4 - 5.3 mmol/L    Chloride 95 94 - 109 mmol/L    Carbon Dioxide (CO2) 26 20 - 32 mmol/L    Anion Gap 10 3 - 14 mmol/L    Urea  Nitrogen 11 7 - 30 mg/dL    Creatinine 0.50 (L) 0.52 - 1.04 mg/dL    Calcium 8.8 8.5 - 10.1 mg/dL    Glucose 119 (H) 70 - 99 mg/dL    Alkaline Phosphatase 81 40 - 150 U/L    AST 24 0 - 45 U/L    ALT 22 0 - 50 U/L    Protein Total 8.6 6.8 - 8.8 g/dL    Albumin 2.8 (L) 3.4 - 5.0 g/dL    Bilirubin Total 0.3 0.2 - 1.3 mg/dL    GFR Estimate >90 >60 mL/min/1.73m2   CBC with platelets differential    Narrative    The following orders were created for panel order CBC with platelets differential.  Procedure                               Abnormality         Status                     ---------                               -----------         ------                     CBC with platelets and d...[[id number]]  Abnormal            Final result                 Please view results for these tests on the individual orders.   Lactic acid whole blood STAT   Result Value Ref Range    Lactic Acid 4.8 (HH) 0.7 - 2.0 mmol/L   CBC with platelets and differential   Result Value Ref Range    WBC Count 11.4 (H) 4.0 - 11.0 10e3/uL    RBC Count 4.13 3.80 - 5.20 10e6/uL    Hemoglobin 8.4 (L) 11.7 - 15.7 g/dL    Hematocrit 28.0 (L) 35.0 - 47.0 %    MCV 68 (L) 78 - 100 fL    MCH 20.3 (L) 26.5 - 33.0 pg    MCHC 30.0 (L) 31.5 - 36.5 g/dL    RDW 22.3 (H) 10.0 - 15.0 %    Platelet Count 376 150 - 450 10e3/uL    % Neutrophils 87 %    % Lymphocytes 7 %    % Monocytes 5 %    % Eosinophils 0 %    % Basophils 0 %    % Immature Granulocytes 1 %    NRBCs per 100 WBC 0 <1 /100    Absolute Neutrophils 9.9 (H) 1.6 - 8.3 10e3/uL    Absolute Lymphocytes 0.8 0.8 - 5.3 10e3/uL    Absolute Monocytes 0.6 0.0 - 1.3 10e3/uL    Absolute Eosinophils 0.0 0.0 - 0.7 10e3/uL    Absolute Basophils 0.0 0.0 - 0.2 10e3/uL    Absolute Immature Granulocytes 0.1 <=0.4 10e3/uL    Absolute NRBCs 0.0 10e3/uL   Magnesium   Result Value Ref Range    Magnesium 1.8 1.6 - 2.3 mg/dL   XR Chest Port 1 View    Narrative    CHEST ONE VIEW PORTABLE   2/24/2022 4:39 PM     HISTORY: Cough,  fever, right mid/lower rhonchi.    COMPARISON: None.      Impression    IMPRESSION: There are lower lobe predominant coarsened interstitial  markings with some superimposed airspace opacity in the right lower  lobe concerning for pneumonia superimposed on interstitial lung  disease or bronchiectasis. No significant pleural effusion. No  pneumothorax. Heart size normal.    SHARITA SU MD         SYSTEM ID:  [id number]   Lactic acid whole blood   Result Value Ref Range    Lactic Acid 0.9 0.7 - 2.0 mmol/L   Potassium   Result Value Ref Range    Potassium 3.2 (L) 3.4 - 5.3 mmol/L       Medications   sodium chloride (PF) 0.9% PF flush 3 mL (has no administration in time range)   sodium chloride (PF) 0.9% PF flush 3 mL (3 mLs Intravenous Not Given 2/24/22 1937)   0.9% sodium chloride BOLUS (0 mLs Intravenous Stopped 2/24/22 1821)     Followed by   0.9% sodium chloride BOLUS (0 mLs Intravenous Stopped 2/24/22 1921)     Followed by   sodium chloride 0.9% infusion (has no administration in time range)   cefTRIAXone (ROCEPHIN) 2 g vial to attach to  ml bag for ADULTS or NS 50 ml bag for PEDS (0 g Intravenous Stopped 2/24/22 1556)   acetaminophen (TYLENOL) tablet 975 mg (975 mg Oral Given 2/24/22 1509)   azithromycin (ZITHROMAX) 500 mg vial to attach to  mL bag (0 mg Intravenous Stopped 2/24/22 1702)   potassium chloride ER (KLOR-CON M) CR tablet 40 mEq (40 mEq Oral Given 2/24/22 1542)   potassium chloride ER (KLOR-CON M) CR tablet 20 mEq (20 mEq Oral Given 2/24/22 1710)       Assessments & Plan (with Medical Decision Making)     Pneumonia of right lower lobe due to infectious organism  Dehydration  Hypokalemia     41 year old female with a history of chronic cough who presents for evaluation of increasing symptoms over the last 1.5 weeks.  Productive cough of green phlegm.  Left otalgia, chest discomfort with coughing, fever, and chills.  No change in taste/smell sensation.  No recent travel or ill contacts.   Reports that she gets bronchitis at least on a yearly basis in the winter months.  No prior history of asthma or COPD.  3-pack-year smoking history with discontinuance of tobacco 2 months ago per her report.  On exam blood pressure 109/60, temperature 103, pulse 130, respiration 21, oxygen saturation 98% on room air.  Patient with a very deep sounding cough.  She has rhonchi in the right mid and lower chest area.  No wheezing.  Very dry oral mucous membranes.  Remainder the exam is otherwise reassuring.  No lower extremity edema.  Negative Homans' sign.  IV was established.  Sepsis triggers fired given her tachycardia and febrile state.  30 mL/kg fluid bolus initiated with 2 L of IV normal saline.  Blood cultures obtained.  Lactic acid level returned significantly elevated right away at 4.8.  Therefore, she was given respiratory broad-spectrum antibiotic therapy in the form of IV Rocephin and IV azithromycin while we sorted things out.    Laboratory levels returned with a mild elevation in her white blood cell count at 11,400.  Hemoglobin is low at 8.4 but she does not have any symptoms of active bleeding.  She is a gastric bypass patient.  Potassium was low at 2.6.  EKG performed without any acute ST or T wave changes.  Sinus tachycardia.  Magnesium normal at 1.8.  She was given 40 mEq of potassium followed by 20 mEq dose 2 hours later.  Sodium mildly low at 131.  Renal function normal.  LFTs normal.  Influenza and Covid swab negative.  Chest x-ray with interstitial markings increased in the right lower lobe and airspace opacities concerning for pneumonia.  Patient followed the potassium replacement protocol.  She tolerated oral potassium quite well, therefore we did not have to proceed with IV potassium.  The potassium is likely low given her lack of significant oral intake recently with her increasing illness.  A repeat potassium performed 2 hours after her last oral dose was much improved to 3.2.  Repeat lactic  "acid level was much improved at 0.9 after IV fluid rehydration.  Lactic acid level likely related to dehydration.  Blood cultures are still pending.  She does have a source for infection with a pneumonia.    Patient was very adamant about leaving the ED.  She did not want to even stay for her repeat potassium.  After discussing the situation with her in detail, she ultimately did agree to stay for the potassium recheck.  Patient continued to be tachycardic, albeit much improved.  Her last heart rate was 104 when I was last discussing her results with her.  She denies using any street drugs.  She does admit to taking her son's Adderall when she is feeling tired, and she did take it this morning.  She also states, \"my heart rate is always high when I am in the hospital.  \"\"It will be just fine when I go home.  \"Patient was not interested in an inpatient evaluation for her pneumonia.  Thankfully she is not hypoxic.  It appears that her lactic acid elevation was most likely related to dehydration.  Blood culture still pending.  We agreed to an outpatient course, but she was instructed on very strict return instructions.  We will start her on Augmentin and azithromycin therapy as an outpatient.  Potassium supplementation twice daily for the next 5 days.  Oral hydration measures reviewed.  Balanced diet discussed.  Return if symptoms worsening.  Patient was in agreement this plan was suitable for discharge.  I recommended a recheck appointment with the primary care clinic in 4 days, which is Monday.     I have reviewed the nursing notes.    I have reviewed the findings, diagnosis, plan and need for follow up with the patient.       New Prescriptions    AMOXICILLIN-CLAVULANATE (AUGMENTIN) 875-125 MG TABLET    Take 1 tablet by mouth 2 times daily    AZITHROMYCIN (ZITHROMAX) 250 MG TABLET    Take 1 tablet (250 mg) by mouth daily Take 2 tablets (500 mg) today, then take 1 tablet daily on days 2 through 5.    POTASSIUM " CHLORIDE ER (KLOR-CON M) 20 MEQ CR TABLET    Take 1 tablet (20 mEq) by mouth 2 times daily for 5 days       Final diagnoses:   Pneumonia of right lower lobe due to infectious organism   Dehydration   Hypokalemia     Disclaimer: This note consists of symbols derived from keyboarding, dictation and/or voice recognition software. As a result, there may be errors in the script that have gone undetected. Please consider this when interpreting information found in this chart.      2/24/2022   [facility] EMERGENCY DEPT     Nicolas Shelley PA-C  02/24/22 6421

## 2022-03-01 LAB — BACTERIA BLD CULT: NO GROWTH

## 2022-12-04 ENCOUNTER — HOSPITAL ENCOUNTER (EMERGENCY)
Facility: CLINIC | Age: 42
Discharge: HOME | End: 2022-12-04
Attending: FAMILY MEDICINE | Admitting: FAMILY MEDICINE
Payer: COMMERCIAL

## 2022-12-04 ENCOUNTER — APPOINTMENT (OUTPATIENT)
Dept: GENERAL RADIOLOGY | Facility: CLINIC | Age: 42
End: 2022-12-04
Attending: FAMILY MEDICINE
Payer: COMMERCIAL

## 2022-12-04 VITALS
SYSTOLIC BLOOD PRESSURE: 99 MMHG | HEART RATE: 103 BPM | BODY MASS INDEX: 24.09 KG/M2 | RESPIRATION RATE: 16 BRPM | WEIGHT: 136 LBS | TEMPERATURE: 99.9 F | OXYGEN SATURATION: 94 % | DIASTOLIC BLOOD PRESSURE: 58 MMHG

## 2022-12-04 DIAGNOSIS — J10.1 INFLUENZA A: ICD-10-CM

## 2022-12-04 DIAGNOSIS — R05.1 ACUTE COUGH: ICD-10-CM

## 2022-12-04 DIAGNOSIS — R50.9 FEVER, UNSPECIFIED FEVER CAUSE: ICD-10-CM

## 2022-12-04 LAB
ALBUMIN SERPL-MCNC: 2.9 G/DL (ref 3.4–5)
ALP SERPL-CCNC: 84 U/L (ref 40–150)
ALT SERPL W P-5'-P-CCNC: 25 U/L (ref 0–50)
ANION GAP SERPL CALCULATED.3IONS-SCNC: 7 MMOL/L (ref 3–14)
AST SERPL W P-5'-P-CCNC: 38 U/L (ref 0–45)
BASE EXCESS BLDV CALC-SCNC: 4.2 MMOL/L (ref -7.7–1.9)
BASOPHILS # BLD AUTO: 0 10E3/UL (ref 0–0.2)
BASOPHILS NFR BLD AUTO: 0 %
BILIRUB SERPL-MCNC: 0.3 MG/DL (ref 0.2–1.3)
BUN SERPL-MCNC: 12 MG/DL (ref 7–30)
CALCIUM SERPL-MCNC: 7.9 MG/DL (ref 8.5–10.1)
CHLORIDE BLD-SCNC: 96 MMOL/L (ref 94–109)
CO2 SERPL-SCNC: 26 MMOL/L (ref 20–32)
CREAT SERPL-MCNC: 0.7 MG/DL (ref 0.52–1.04)
D DIMER PPP FEU-MCNC: 3.42 UG/ML FEU (ref 0–0.5)
EOSINOPHIL # BLD AUTO: 0 10E3/UL (ref 0–0.7)
EOSINOPHIL NFR BLD AUTO: 0 %
ERYTHROCYTE [DISTWIDTH] IN BLOOD BY AUTOMATED COUNT: 18.6 % (ref 10–15)
FLUAV RNA SPEC QL NAA+PROBE: POSITIVE
FLUBV RNA RESP QL NAA+PROBE: NEGATIVE
GFR SERPL CREATININE-BSD FRML MDRD: >90 ML/MIN/1.73M2
GLUCOSE BLD-MCNC: 104 MG/DL (ref 70–99)
HCO3 BLDV-SCNC: 28 MMOL/L (ref 21–28)
HCT VFR BLD AUTO: 32.5 % (ref 35–47)
HGB BLD-MCNC: 10.2 G/DL (ref 11.7–15.7)
IMM GRANULOCYTES # BLD: 0.1 10E3/UL
IMM GRANULOCYTES NFR BLD: 1 %
LACTATE SERPL-SCNC: 1.2 MMOL/L (ref 0.7–2)
LYMPHOCYTES # BLD AUTO: 1.7 10E3/UL (ref 0.8–5.3)
LYMPHOCYTES NFR BLD AUTO: 17 %
MCH RBC QN AUTO: 22 PG (ref 26.5–33)
MCHC RBC AUTO-ENTMCNC: 31.4 G/DL (ref 31.5–36.5)
MCV RBC AUTO: 70 FL (ref 78–100)
MONOCYTES # BLD AUTO: 0.5 10E3/UL (ref 0–1.3)
MONOCYTES NFR BLD AUTO: 5 %
NEUTROPHILS # BLD AUTO: 7.6 10E3/UL (ref 1.6–8.3)
NEUTROPHILS NFR BLD AUTO: 77 %
NRBC # BLD AUTO: 0 10E3/UL
NRBC BLD AUTO-RTO: 0 /100
O2/TOTAL GAS SETTING VFR VENT: 21 %
PCO2 BLDV: 39 MM HG (ref 40–50)
PH BLDV: 7.47 [PH] (ref 7.32–7.43)
PLATELET # BLD AUTO: 276 10E3/UL (ref 150–450)
PO2 BLDV: 18 MM HG (ref 25–47)
POTASSIUM BLD-SCNC: 4 MMOL/L (ref 3.4–5.3)
PROT SERPL-MCNC: 9.2 G/DL (ref 6.8–8.8)
RBC # BLD AUTO: 4.63 10E6/UL (ref 3.8–5.2)
RSV RNA SPEC NAA+PROBE: NEGATIVE
SARS-COV-2 RNA RESP QL NAA+PROBE: NEGATIVE
SODIUM SERPL-SCNC: 129 MMOL/L (ref 133–144)
WBC # BLD AUTO: 9.9 10E3/UL (ref 4–11)

## 2022-12-04 PROCEDURE — 258N000003 HC RX IP 258 OP 636: Performed by: FAMILY MEDICINE

## 2022-12-04 PROCEDURE — 250N000013 HC RX MED GY IP 250 OP 250 PS 637: Performed by: FAMILY MEDICINE

## 2022-12-04 PROCEDURE — C9803 HOPD COVID-19 SPEC COLLECT: HCPCS

## 2022-12-04 PROCEDURE — 71045 X-RAY EXAM CHEST 1 VIEW: CPT

## 2022-12-04 PROCEDURE — 96360 HYDRATION IV INFUSION INIT: CPT

## 2022-12-04 PROCEDURE — 80053 COMPREHEN METABOLIC PANEL: CPT | Performed by: FAMILY MEDICINE

## 2022-12-04 PROCEDURE — 36415 COLL VENOUS BLD VENIPUNCTURE: CPT | Performed by: FAMILY MEDICINE

## 2022-12-04 PROCEDURE — 85379 FIBRIN DEGRADATION QUANT: CPT | Performed by: FAMILY MEDICINE

## 2022-12-04 PROCEDURE — 99284 EMERGENCY DEPT VISIT MOD MDM: CPT | Mod: CS | Performed by: FAMILY MEDICINE

## 2022-12-04 PROCEDURE — 87637 SARSCOV2&INF A&B&RSV AMP PRB: CPT | Performed by: FAMILY MEDICINE

## 2022-12-04 PROCEDURE — 99284 EMERGENCY DEPT VISIT MOD MDM: CPT | Mod: 25

## 2022-12-04 PROCEDURE — 82803 BLOOD GASES ANY COMBINATION: CPT | Performed by: FAMILY MEDICINE

## 2022-12-04 PROCEDURE — 85025 COMPLETE CBC W/AUTO DIFF WBC: CPT | Performed by: FAMILY MEDICINE

## 2022-12-04 PROCEDURE — 83605 ASSAY OF LACTIC ACID: CPT | Performed by: FAMILY MEDICINE

## 2022-12-04 PROCEDURE — 87205 SMEAR GRAM STAIN: CPT | Performed by: FAMILY MEDICINE

## 2022-12-04 PROCEDURE — 87040 BLOOD CULTURE FOR BACTERIA: CPT | Performed by: FAMILY MEDICINE

## 2022-12-04 RX ORDER — SODIUM CHLORIDE 9 MG/ML
INJECTION, SOLUTION INTRAVENOUS CONTINUOUS
Status: DISCONTINUED | OUTPATIENT
Start: 2022-12-04 | End: 2022-12-05 | Stop reason: HOSPADM

## 2022-12-04 RX ORDER — ACETAMINOPHEN 500 MG
1000 TABLET ORAL ONCE
Status: COMPLETED | OUTPATIENT
Start: 2022-12-04 | End: 2022-12-04

## 2022-12-04 RX ADMIN — SODIUM CHLORIDE 1000 ML: 9 INJECTION, SOLUTION INTRAVENOUS at 21:36

## 2022-12-04 RX ADMIN — ACETAMINOPHEN 1000 MG: 500 TABLET ORAL at 21:20

## 2022-12-04 RX ADMIN — SODIUM CHLORIDE 1000 ML: 9 INJECTION, SOLUTION INTRAVENOUS at 21:12

## 2022-12-04 ASSESSMENT — ENCOUNTER SYMPTOMS
MYALGIAS: 1
ENDOCRINE NEGATIVE: 1
COUGH: 1
VOMITING: 0
HEMATOLOGIC/LYMPHATIC NEGATIVE: 1
HEADACHES: 0
SHORTNESS OF BREATH: 1
DYSURIA: 0
FEVER: 1
FATIGUE: 1
PSYCHIATRIC NEGATIVE: 1
DIFFICULTY URINATING: 0
PALPITATIONS: 0
LIGHT-HEADEDNESS: 1
GASTROINTESTINAL NEGATIVE: 1
CARDIOVASCULAR NEGATIVE: 1
EYES NEGATIVE: 1
DIARRHEA: 0
NAUSEA: 0
CHILLS: 1

## 2022-12-04 ASSESSMENT — ACTIVITIES OF DAILY LIVING (ADL): ADLS_ACUITY_SCORE: 33

## 2022-12-05 NOTE — RESULT ENCOUNTER NOTE
[facility] Emergency Dept discharge antibiotic prescribed: None  Recommendations in treatment per [facility] ED Lab Result culture protocol

## 2022-12-05 NOTE — ED TRIAGE NOTES
Pt presents with flu-like symptoms 5 days ago.      Triage Assessment     Row Name 12/04/22 2033       Triage Assessment (Adult)    Airway WDL WDL       Respiratory WDL    Respiratory WDL WDL       Cardiac WDL    Cardiac WDL WDL               No

## 2022-12-05 NOTE — DISCHARGE INSTRUCTIONS
Please read and follow the handout(s) instructions. Return, if needed, for increased or worsening symptoms and as directed by the handout(s).    You can use the acetaminophen 1000mg every 6 hours as needed to control the fever and body aches. Increase your fluid intake. Drinking small amounts often is the best way to take in more fluids when you are feeling nauseated.

## 2022-12-05 NOTE — ED PROVIDER NOTES
History     Chief Complaint   Patient presents with     Flu Symptoms     HPI  Kamilah Goldberg is a 42 year old female who into the emergency room today secondary to concerns of feeling ill for several days.  Patient states that she started developing body aches and chills along with cough about 5 days ago.  She has had difficulty tolerating eating just because she feels so weak today.  She denies any actual nausea vomiting or diarrhea symptoms.  She states that she tends to get bronchitis at least once or twice a year.  She wonders if that is what is going on.  She admits that her cough is productive of some thick sputum at times.  She states that she is getting occasional chunks up from her lungs.  Asked what color the trunks are and she states that her great and then she has noticed occasional red chunks.  She stated to be that she is currently on no medications.  She states that she has not taken anything for fever or chills today.  She does admit to occasional sense of feeling lightheaded.    Allergies:  Allergies   Allergen Reactions     Naproxen Hives     Codeine Sulfate GI Disturbance     Hydrocodone Nausea and Vomiting       Problem List:    Patient Active Problem List    Diagnosis Date Noted     Low back pain 12/15/2009     Priority: High     Acute onset of LBP.  Rx'd Vicodin #30 12/15/09.  Will refer to [facility] if persists.  Also encouraged massage/rest/heat.    Called on 1/1/10 reporting continued pain and ran out of Vicodin two days ago--tried to come in for an appt yesterday but could not get a ride.  Massage, heat, ice, warm baths all help some but having trouble sleeping.  Suggestions for the rest of the weekend given VS ED visit and urged to come in next week for evaluation and [facility] referral and possible chiropractic adjustment.    Went into the ED 1/1/10 and was given similar instructions and also a small number of Vicodin.  (#12)    1/5/10:  Clinic visit:  Referred to [facility] and #30 Vicodin given to  "hold her over until appt.    1/20/10 requesting more Vicodin (\"forgot to ask yesterday at appt\").  [facility] appt T, 1/26.  Given #15 more Vicodin 1/20/10 and told that she will not be able to get any more Vicodin from us.  Referral made to the pain clinic.     Suspicious of drug seeking behavior.    Referred to Pain Clinic and 3/8/10 Pain Clinic wrote us a letter saying that the pt had not responded to the confirmation letter and so could not treat her until she did so.       Lower urinary tract infectious disease 10/12/2012     Priority: Medium     Diagnosis updated by automated process. Provider to review and confirm.       S/P gastric bypass 06/26/2012     Priority: Medium     CARDIOVASCULAR SCREENING; LDL GOAL LESS THAN 160 10/31/2010     Priority: Medium     Depression      Priority: Medium     On Wellbutrin 100 mg BID--started taking meds at age 16, was on Paxil for several years and then switched to Wellbutrin in 2000 -- Stable but looking for another therapist through [place]          Past Medical History:    Past Medical History:   Diagnosis Date     Back pain      Depression since age 16     UTI (lower urinary tract infection)        Past Surgical History:    Past Surgical History:   Procedure Laterality Date     BYPASS GASTRIC CARA-EN-Y, LIVER BIOPSY, COMBINED       CHOLECYSTECTOMY       CRYOCAUTERY OF CERVIX  2001 2001     DISCECTOMY LUMBAR MINIMALLY INVASIVE ONE LEVEL  5/24/2013    Procedure: DISCECTOMY LUMBAR MINIMALLY INVASIVE ONE LEVEL;  MIS Right side L5-S1 Gonzalo Lami Microdiscectomy, ;  Surgeon: Alba Anderson MD;  Location: [facility] OR     TUBAL LIGATION      2012       Family History:    Family History   Problem Relation Age of Onset     Neurologic Disorder Mother         migraines     C.A.D. Mother         MI age 49     Neurologic Disorder Maternal Grandmother         migraines     Cancer Maternal Grandfather         ? type     Diabetes Maternal Aunt      Cerebrovascular Disease Other         Maternal " Great Grandmother     POPEYE. Maternal Uncle         MI     Cancer Maternal Uncle         bladder       Social History:  Marital Status:  Single [1]  Social History     Tobacco Use     Smoking status: Never     Smokeless tobacco: Never   Substance Use Topics     Alcohol use: Yes     Comment: occasional     Drug use: No        Medications:    acetaminophen (TYLENOL) 500 MG tablet  amoxicillin-clavulanate (AUGMENTIN) 875-125 MG tablet  azithromycin (ZITHROMAX) 250 MG tablet  calcium citrate-vitamin D (CITRACAL) 315-200 MG-UNIT TABS  chlorhexidine (CHLORHEXIDINE) 0.12 % solution  citalopram (CELEXA) 20 MG tablet  cyclobenzaprine (FLEXERIL) 10 MG tablet  VITAMIN D, CHOLECALCIFEROL, PO          Review of Systems   Constitutional: Positive for chills, fatigue and fever.   HENT: Positive for congestion.    Eyes: Negative.    Respiratory: Positive for cough and shortness of breath.    Cardiovascular: Negative.  Negative for chest pain, palpitations and leg swelling.   Gastrointestinal: Negative.  Negative for diarrhea, nausea and vomiting.   Endocrine: Negative.    Genitourinary: Positive for decreased urine volume. Negative for difficulty urinating and dysuria.   Musculoskeletal: Positive for myalgias.   Skin: Negative.  Negative for rash.   Neurological: Positive for light-headedness. Negative for headaches.   Hematological: Negative.    Psychiatric/Behavioral: Negative.    All other systems reviewed and are negative.      Physical Exam   BP: 91/57  Pulse: 120  Temp: (!) 103  F (39.4  C)  Resp: 24  Weight: 61.7 kg (136 lb)  SpO2: 95 %      Physical Exam  Vitals and nursing note reviewed.   Constitutional:       General: She is in acute distress.      Appearance: She is ill-appearing and diaphoretic.      Comments: Patient is thin and almost cachectic in appearance.   HENT:      Head: Normocephalic and atraumatic.      Nose: Congestion present.      Mouth/Throat:      Mouth: Mucous membranes are dry.      Pharynx: No  oropharyngeal exudate or posterior oropharyngeal erythema.   Eyes:      General: No scleral icterus.        Right eye: No discharge.         Left eye: No discharge.      Extraocular Movements: Extraocular movements intact.      Conjunctiva/sclera: Conjunctivae normal.      Pupils: Pupils are equal, round, and reactive to light.   Cardiovascular:      Rate and Rhythm: Tachycardia present.      Pulses: Normal pulses.   Pulmonary:      Effort: Respiratory distress present.      Breath sounds: Rhonchi present. No rales.   Abdominal:      Tenderness: There is no abdominal tenderness.   Musculoskeletal:         General: No swelling or signs of injury.      Cervical back: Normal range of motion and neck supple. No rigidity or tenderness.      Right lower leg: No edema.      Left lower leg: No edema.   Skin:     General: Skin is warm.      Capillary Refill: Capillary refill takes less than 2 seconds.      Findings: No rash.      Comments: Multiple tattoos on her skin.  No signs of open wound.   Neurological:      General: No focal deficit present.      Mental Status: She is alert and oriented to person, place, and time.   Psychiatric:         Mood and Affect: Mood normal.         Behavior: Behavior normal.         ED Course                 Procedures              Critical Care time:  none               Results for orders placed or performed during the hospital encounter of 12/04/22 (from the past 24 hour(s))   Symptomatic; Yes; 11/29/2022 Influenza A/B & SARS-CoV2 (COVID-19) Virus PCR Multiplex Nose    Specimen: Nose; Swab   Result Value Ref Range    Influenza A PCR Positive (A) Negative    Influenza B PCR Negative Negative    RSV PCR Negative Negative    SARS CoV2 PCR Negative Negative    Narrative    Testing was performed using the Xpert Xpress CoV2/Flu/RSV Assay on the [employer] GeneXpert Instrument. This test should be ordered for the detection of SARS-CoV-2 and influenza viruses in individuals who meet clinical and/or  epidemiological criteria. Test performance is unknown in asymptomatic patients. This test is for in vitro diagnostic use under the FDA EUA for laboratories certified under CLIA to perform high or moderate complexity testing. This test has not been FDA cleared or approved. A negative result does not rule out the presence of PCR inhibitors in the specimen or target RNA in concentration below the limit of detection for the assay. If only one viral target is positive but coinfection with multiple targets is suspected, the sample should be re-tested with another FDA cleared, approved, or authorized test, if coinfection would change clinical management. This test was validated by the [facility]. These laboratories are certified under the Clinical Laboratory Improvement Amendments of 1988 (CLIA-88) as qualified to perform high complexity laboratory testing.   CBC with platelets differential    Narrative    The following orders were created for panel order CBC with platelets differential.  Procedure                               Abnormality         Status                     ---------                               -----------         ------                     CBC with platelets and d...[[id number]]  Abnormal            Final result                 Please view results for these tests on the individual orders.   Comprehensive metabolic panel   Result Value Ref Range    Sodium 129 (L) 133 - 144 mmol/L    Potassium 4.0 3.4 - 5.3 mmol/L    Chloride 96 94 - 109 mmol/L    Carbon Dioxide (CO2) 26 20 - 32 mmol/L    Anion Gap 7 3 - 14 mmol/L    Urea Nitrogen 12 7 - 30 mg/dL    Creatinine 0.70 0.52 - 1.04 mg/dL    Calcium 7.9 (L) 8.5 - 10.1 mg/dL    Glucose 104 (H) 70 - 99 mg/dL    Alkaline Phosphatase 84 40 - 150 U/L    AST 38 0 - 45 U/L    ALT 25 0 - 50 U/L    Protein Total 9.2 (H) 6.8 - 8.8 g/dL    Albumin 2.9 (L) 3.4 - 5.0 g/dL    Bilirubin Total 0.3 0.2 - 1.3 mg/dL    GFR Estimate >90 >60 mL/min/1.73m2    Lactic acid whole blood   Result Value Ref Range    Lactic Acid 1.2 0.7 - 2.0 mmol/L   Blood gas venous   Result Value Ref Range    pH Venous 7.47 (H) 7.32 - 7.43    pCO2 Venous 39 (L) 40 - 50 mm Hg    pO2 Venous 18 (L) 25 - 47 mm Hg    Bicarbonate Venous 28 21 - 28 mmol/L    Base Excess/Deficit (+/-) 4.2 (H) -7.7 - 1.9 mmol/L    FIO2 21    D dimer quantitative   Result Value Ref Range    D-Dimer Quantitative 3.42 (H) 0.00 - 0.50 ug/mL FEU    Narrative    This D-dimer assay is intended for use in conjunction with a clinical pretest probability assessment model to exclude pulmonary embolism (PE) and deep venous thrombosis (DVT) in outpatients suspected of PE or DVT. The cut-off value is 0.50 ug/mL FEU.   CBC with platelets and differential   Result Value Ref Range    WBC Count 9.9 4.0 - 11.0 10e3/uL    RBC Count 4.63 3.80 - 5.20 10e6/uL    Hemoglobin 10.2 (L) 11.7 - 15.7 g/dL    Hematocrit 32.5 (L) 35.0 - 47.0 %    MCV 70 (L) 78 - 100 fL    MCH 22.0 (L) 26.5 - 33.0 pg    MCHC 31.4 (L) 31.5 - 36.5 g/dL    RDW 18.6 (H) 10.0 - 15.0 %    Platelet Count 276 150 - 450 10e3/uL    % Neutrophils 77 %    % Lymphocytes 17 %    % Monocytes 5 %    % Eosinophils 0 %    % Basophils 0 %    % Immature Granulocytes 1 %    NRBCs per 100 WBC 0 <1 /100    Absolute Neutrophils 7.6 1.6 - 8.3 10e3/uL    Absolute Lymphocytes 1.7 0.8 - 5.3 10e3/uL    Absolute Monocytes 0.5 0.0 - 1.3 10e3/uL    Absolute Eosinophils 0.0 0.0 - 0.7 10e3/uL    Absolute Basophils 0.0 0.0 - 0.2 10e3/uL    Absolute Immature Granulocytes 0.1 <=0.4 10e3/uL    Absolute NRBCs 0.0 10e3/uL   XR Chest Port 1 View    Narrative    EXAM: XR CHEST PORT 1 VIEW  LOCATION: [facility]  DATE/TIME: 12/4/2022 9:34 PM    INDICATION: Fever  COMPARISON: 02/24/2022      Impression    IMPRESSION: Previously seen infiltrates have resolved. No evidence of pneumonia.       Medications   0.9% sodium chloride BOLUS (0 mLs Intravenous Stopped 12/4/22 2136)      Followed by   0.9% sodium chloride BOLUS (0 mLs Intravenous Stopped 12/4/22 2232)   acetaminophen (TYLENOL) tablet 1,000 mg (1,000 mg Oral Given 12/4/22 2120)       Assessments & Plan (with Medical Decision Making)  Patient with exam findings consistent with influenza A infection.  Chest x-ray was reassuring without evidence of pneumonia.  Patient had improvement in her symptoms during the ER stay with the IV fluids, and antipyretics given.  Once her fever improved significantly she desired to return home. Patient states that she does have a nebulizer at home and has a supply of nebulized solution that she can use as needed if she feels like she starts to wheeze.  Since her symptoms have been present for over 5 days she is not a candidate for Tamiflu initiation.  We discussed the potential risk of influenza and discussed signs and symptoms that would be of concern and when to return to the ER if noted.  I also sent her home with a handout discussing influenza infection.  We discussed the use of acetaminophen up to 2000 mg every 6 hours to help control fever and body aches.  Encourage increased fluid intake.     I have reviewed the nursing notes.    I have reviewed the findings, diagnosis, plan and need for follow up with the patient.       Final diagnoses:   Influenza A   Acute cough   Fever, unspecified fever cause       12/4/2022   [facility] EMERGENCY DEPT     Jonas Nascimento,   12/05/22 [id number]

## 2022-12-07 LAB
BACTERIA SPT CULT: NORMAL
GRAM STAIN RESULT: NORMAL

## 2022-12-07 NOTE — RESULT ENCOUNTER NOTE
Final Respiratory Aerobic Bacterial Culture report is NEGATIVE.    No treatment or change in treatment per [facility] Lab Result culture protocol.

## 2022-12-10 LAB
BACTERIA BLD CULT: NO GROWTH
BACTERIA BLD CULT: NO GROWTH

## 2023-01-01 ENCOUNTER — NURSE TRIAGE (OUTPATIENT)
Dept: NURSING | Facility: CLINIC | Age: 43
End: 2023-01-01

## 2023-01-01 ENCOUNTER — APPOINTMENT (OUTPATIENT)
Dept: GENERAL RADIOLOGY | Facility: CLINIC | Age: 43
End: 2023-01-01
Attending: EMERGENCY MEDICINE
Payer: MEDICAID

## 2023-01-01 ENCOUNTER — HOSPITAL ENCOUNTER (EMERGENCY)
Facility: CLINIC | Age: 43
Discharge: HOME | End: 2023-11-28
Attending: EMERGENCY MEDICINE | Admitting: EMERGENCY MEDICINE
Payer: MEDICAID

## 2023-01-01 VITALS
DIASTOLIC BLOOD PRESSURE: 72 MMHG | HEART RATE: 88 BPM | OXYGEN SATURATION: 98 % | TEMPERATURE: 99.8 F | SYSTOLIC BLOOD PRESSURE: 122 MMHG | RESPIRATION RATE: 16 BRPM

## 2023-01-01 DIAGNOSIS — J18.9 PNEUMONIA OF RIGHT LOWER LOBE DUE TO INFECTIOUS ORGANISM: ICD-10-CM

## 2023-01-01 LAB
ANION GAP SERPL CALCULATED.3IONS-SCNC: 7 MMOL/L (ref 7–15)
BASOPHILS # BLD AUTO: 0 10E3/UL (ref 0–0.2)
BASOPHILS NFR BLD AUTO: 1 %
BUN SERPL-MCNC: 6.9 MG/DL (ref 6–20)
CALCIUM SERPL-MCNC: 8 MG/DL (ref 8.6–10)
CHLORIDE SERPL-SCNC: 102 MMOL/L (ref 98–107)
CREAT SERPL-MCNC: 0.49 MG/DL (ref 0.51–0.95)
DEPRECATED HCO3 PLAS-SCNC: 28 MMOL/L (ref 22–29)
EGFRCR SERPLBLD CKD-EPI 2021: >90 ML/MIN/1.73M2
EOSINOPHIL # BLD AUTO: 0 10E3/UL (ref 0–0.7)
EOSINOPHIL NFR BLD AUTO: 1 %
ERYTHROCYTE [DISTWIDTH] IN BLOOD BY AUTOMATED COUNT: 17.3 % (ref 10–15)
FLUAV RNA SPEC QL NAA+PROBE: NEGATIVE
FLUBV RNA RESP QL NAA+PROBE: NEGATIVE
GLUCOSE SERPL-MCNC: 78 MG/DL (ref 70–99)
HCT VFR BLD AUTO: 31.7 % (ref 35–47)
HGB BLD-MCNC: 10.2 G/DL (ref 11.7–15.7)
IMM GRANULOCYTES # BLD: 0 10E3/UL
IMM GRANULOCYTES NFR BLD: 1 %
LYMPHOCYTES # BLD AUTO: 0.8 10E3/UL (ref 0.8–5.3)
LYMPHOCYTES NFR BLD AUTO: 13 %
MCH RBC QN AUTO: 25.2 PG (ref 26.5–33)
MCHC RBC AUTO-ENTMCNC: 32.2 G/DL (ref 31.5–36.5)
MCV RBC AUTO: 78 FL (ref 78–100)
MONOCYTES # BLD AUTO: 0.4 10E3/UL (ref 0–1.3)
MONOCYTES NFR BLD AUTO: 6 %
NEUTROPHILS # BLD AUTO: 5 10E3/UL (ref 1.6–8.3)
NEUTROPHILS NFR BLD AUTO: 78 %
NRBC # BLD AUTO: 0 10E3/UL
NRBC BLD AUTO-RTO: 0 /100
PLATELET # BLD AUTO: 191 10E3/UL (ref 150–450)
POTASSIUM SERPL-SCNC: 3.5 MMOL/L (ref 3.4–5.3)
RBC # BLD AUTO: 4.05 10E6/UL (ref 3.8–5.2)
RSV RNA SPEC NAA+PROBE: NEGATIVE
SARS-COV-2 RNA RESP QL NAA+PROBE: NEGATIVE
SODIUM SERPL-SCNC: 137 MMOL/L (ref 135–145)
WBC # BLD AUTO: 6.3 10E3/UL (ref 4–11)

## 2023-01-01 PROCEDURE — 99284 EMERGENCY DEPT VISIT MOD MDM: CPT | Mod: 25 | Performed by: EMERGENCY MEDICINE

## 2023-01-01 PROCEDURE — 71046 X-RAY EXAM CHEST 2 VIEWS: CPT

## 2023-01-01 PROCEDURE — 87637 SARSCOV2&INF A&B&RSV AMP PRB: CPT | Performed by: EMERGENCY MEDICINE

## 2023-01-01 PROCEDURE — 80048 BASIC METABOLIC PNL TOTAL CA: CPT | Performed by: EMERGENCY MEDICINE

## 2023-01-01 PROCEDURE — 36415 COLL VENOUS BLD VENIPUNCTURE: CPT | Performed by: EMERGENCY MEDICINE

## 2023-01-01 PROCEDURE — 85025 COMPLETE CBC W/AUTO DIFF WBC: CPT | Performed by: EMERGENCY MEDICINE

## 2023-01-01 PROCEDURE — 99283 EMERGENCY DEPT VISIT LOW MDM: CPT | Performed by: EMERGENCY MEDICINE

## 2023-01-01 RX ORDER — AZITHROMYCIN 250 MG/1
TABLET, FILM COATED ORAL
Qty: 6 TABLET | Refills: 0 | Status: SHIPPED | OUTPATIENT
Start: 2023-01-01 | End: 2023-01-01

## 2023-01-01 RX ORDER — CEFDINIR 300 MG/1
300 CAPSULE ORAL 2 TIMES DAILY
Qty: 20 CAPSULE | Refills: 0 | Status: SHIPPED | OUTPATIENT
Start: 2023-01-01 | End: 2023-01-01

## 2023-01-01 ASSESSMENT — ACTIVITIES OF DAILY LIVING (ADL): ADLS_ACUITY_SCORE: 35

## 2023-01-02 NOTE — TELEPHONE ENCOUNTER
"Kamilah reports persistent symptoms after being diagnosed with Influenza ~1 mo ago:    - Coughing a lot  - Shortness of breath  - Chest pain    During the call, Kamilah is speaking rapidly in phrases.   She is breathing rapidly.   She uses profanity repeatedly.    At times she sounds to be away from the phone.  Another person states, \"She can't talk right now. She's in a lot of pain.\"  Kamilah comes back on the phone.    ER advised  Kamilah states that she was in the ER yesterday and they told her to \"Go home and take Ibuprofen\"    Call ended, (by caller)    Elizabeth Bains RN  [facility]      Reason for Disposition    Nursing judgment    Additional Information    Negative: SEVERE difficulty breathing (e.g., struggling for each breath, speaks in single words)    Negative: [1] Breathing stopped AND [2] hasn't returned    Protocols used: NO GUIDELINE OR REFERENCE [id number], BREATHING DIFFICULTY-A-AH      "

## 2023-01-16 ENCOUNTER — HOSPITAL ENCOUNTER (EMERGENCY)
Facility: CLINIC | Age: 43
Discharge: ANOTHER HOSPITAL | End: 2023-01-17
Attending: PHYSICIAN ASSISTANT | Admitting: PHYSICIAN ASSISTANT
Payer: COMMERCIAL

## 2023-01-16 ENCOUNTER — APPOINTMENT (OUTPATIENT)
Dept: CT IMAGING | Facility: CLINIC | Age: 43
End: 2023-01-16
Attending: PHYSICIAN ASSISTANT
Payer: COMMERCIAL

## 2023-01-16 DIAGNOSIS — J90 PLEURAL EFFUSION: ICD-10-CM

## 2023-01-16 DIAGNOSIS — D64.9 ANEMIA: ICD-10-CM

## 2023-01-16 DIAGNOSIS — R05.9 COUGH: ICD-10-CM

## 2023-01-16 DIAGNOSIS — R79.89 ABNORMAL LFTS: ICD-10-CM

## 2023-01-16 DIAGNOSIS — E87.6 HYPOKALEMIA: ICD-10-CM

## 2023-01-16 LAB
ABO/RH(D): NORMAL
ALBUMIN SERPL-MCNC: 1.5 G/DL (ref 3.4–5)
ALBUMIN UR-MCNC: NEGATIVE MG/DL
ALP SERPL-CCNC: 55 U/L (ref 40–150)
ALT SERPL W P-5'-P-CCNC: 61 U/L (ref 0–50)
ANION GAP SERPL CALCULATED.3IONS-SCNC: 7 MMOL/L (ref 3–14)
ANTIBODY SCREEN: NEGATIVE
APPEARANCE UR: CLEAR
AST SERPL W P-5'-P-CCNC: 98 U/L (ref 0–45)
BACTERIA #/AREA URNS HPF: ABNORMAL /HPF
BASOPHILS # BLD AUTO: 0 10E3/UL (ref 0–0.2)
BASOPHILS NFR BLD AUTO: 1 %
BILIRUB SERPL-MCNC: 0.1 MG/DL (ref 0.2–1.3)
BILIRUB UR QL STRIP: NEGATIVE
BLD PROD TYP BPU: NORMAL
BLD PROD TYP BPU: NORMAL
BLOOD COMPONENT TYPE: NORMAL
BLOOD COMPONENT TYPE: NORMAL
BUN SERPL-MCNC: 12 MG/DL (ref 7–30)
CALCIUM SERPL-MCNC: 7.8 MG/DL (ref 8.5–10.1)
CHLORIDE BLD-SCNC: 96 MMOL/L (ref 94–109)
CO2 SERPL-SCNC: 29 MMOL/L (ref 20–32)
CODING SYSTEM: NORMAL
CODING SYSTEM: NORMAL
COLOR UR AUTO: YELLOW
CREAT SERPL-MCNC: 0.4 MG/DL (ref 0.52–1.04)
CROSSMATCH: NORMAL
CROSSMATCH: NORMAL
D DIMER PPP FEU-MCNC: 8.5 UG/ML FEU (ref 0–0.5)
EOSINOPHIL # BLD AUTO: 0 10E3/UL (ref 0–0.7)
EOSINOPHIL NFR BLD AUTO: 1 %
ERYTHROCYTE [DISTWIDTH] IN BLOOD BY AUTOMATED COUNT: 18.5 % (ref 10–15)
ETHANOL SERPL-MCNC: <0.01 G/DL
FLUAV RNA SPEC QL NAA+PROBE: NEGATIVE
FLUBV RNA RESP QL NAA+PROBE: NEGATIVE
GFR SERPL CREATININE-BSD FRML MDRD: >90 ML/MIN/1.73M2
GLUCOSE BLD-MCNC: 104 MG/DL (ref 70–99)
GLUCOSE UR STRIP-MCNC: NEGATIVE MG/DL
HCT VFR BLD AUTO: 20.6 % (ref 35–47)
HEMOCCULT STL QL: NEGATIVE
HGB BLD-MCNC: 6.5 G/DL (ref 11.7–15.7)
HGB UR QL STRIP: NEGATIVE
IMM GRANULOCYTES # BLD: 0 10E3/UL
IMM GRANULOCYTES NFR BLD: 0 %
ISSUE DATE AND TIME: NORMAL
KETONES UR STRIP-MCNC: NEGATIVE MG/DL
LACTATE SERPL-SCNC: 1.1 MMOL/L (ref 0.7–2)
LEUKOCYTE ESTERASE UR QL STRIP: ABNORMAL
LYMPHOCYTES # BLD AUTO: 1 10E3/UL (ref 0.8–5.3)
LYMPHOCYTES NFR BLD AUTO: 19 %
MAGNESIUM SERPL-MCNC: 1.9 MG/DL (ref 1.6–2.3)
MCH RBC QN AUTO: 23 PG (ref 26.5–33)
MCHC RBC AUTO-ENTMCNC: 31.6 G/DL (ref 31.5–36.5)
MCV RBC AUTO: 73 FL (ref 78–100)
MONOCYTES # BLD AUTO: 0.5 10E3/UL (ref 0–1.3)
MONOCYTES NFR BLD AUTO: 10 %
MUCOUS THREADS #/AREA URNS LPF: PRESENT /LPF
NEUTROPHILS # BLD AUTO: 3.5 10E3/UL (ref 1.6–8.3)
NEUTROPHILS NFR BLD AUTO: 69 %
NITRATE UR QL: NEGATIVE
NRBC # BLD AUTO: 0 10E3/UL
NRBC BLD AUTO-RTO: 0 /100
NT-PROBNP SERPL-MCNC: 236 PG/ML (ref 0–450)
PH UR STRIP: 6 [PH] (ref 5–7)
PLATELET # BLD AUTO: 571 10E3/UL (ref 150–450)
POTASSIUM BLD-SCNC: 2.8 MMOL/L (ref 3.4–5.3)
PROT SERPL-MCNC: 6.9 G/DL (ref 6.8–8.8)
RBC # BLD AUTO: 2.83 10E6/UL (ref 3.8–5.2)
RBC URINE: 0 /HPF
RSV RNA SPEC NAA+PROBE: NEGATIVE
SARS-COV-2 RNA RESP QL NAA+PROBE: NEGATIVE
SODIUM SERPL-SCNC: 132 MMOL/L (ref 133–144)
SP GR UR STRIP: <=1.005 (ref 1–1.03)
SPECIMEN EXPIRATION DATE: NORMAL
SQUAMOUS EPITHELIAL: 3 /HPF
TROPONIN I SERPL HS-MCNC: 5 NG/L
UNIT ABO/RH: NORMAL
UNIT ABO/RH: NORMAL
UNIT NUMBER: NORMAL
UNIT NUMBER: NORMAL
UNIT STATUS: NORMAL
UNIT STATUS: NORMAL
UNIT TYPE ISBT: 6200
UNIT TYPE ISBT: 6200
UROBILINOGEN UR STRIP-MCNC: NORMAL MG/DL
WBC # BLD AUTO: 5.1 10E3/UL (ref 4–11)
WBC URINE: <1 /HPF

## 2023-01-16 PROCEDURE — 96365 THER/PROPH/DIAG IV INF INIT: CPT | Performed by: PHYSICIAN ASSISTANT

## 2023-01-16 PROCEDURE — C9803 HOPD COVID-19 SPEC COLLECT: HCPCS | Performed by: PHYSICIAN ASSISTANT

## 2023-01-16 PROCEDURE — 36430 TRANSFUSION BLD/BLD COMPNT: CPT | Performed by: PHYSICIAN ASSISTANT

## 2023-01-16 PROCEDURE — 83735 ASSAY OF MAGNESIUM: CPT | Performed by: PHYSICIAN ASSISTANT

## 2023-01-16 PROCEDURE — 81001 URINALYSIS AUTO W/SCOPE: CPT | Performed by: PHYSICIAN ASSISTANT

## 2023-01-16 PROCEDURE — 250N000009 HC RX 250: Performed by: PHYSICIAN ASSISTANT

## 2023-01-16 PROCEDURE — 93005 ELECTROCARDIOGRAM TRACING: CPT | Performed by: PHYSICIAN ASSISTANT

## 2023-01-16 PROCEDURE — 86901 BLOOD TYPING SEROLOGIC RH(D): CPT | Performed by: PHYSICIAN ASSISTANT

## 2023-01-16 PROCEDURE — 86920 COMPATIBILITY TEST SPIN: CPT | Performed by: PHYSICIAN ASSISTANT

## 2023-01-16 PROCEDURE — 85025 COMPLETE CBC W/AUTO DIFF WBC: CPT | Performed by: PHYSICIAN ASSISTANT

## 2023-01-16 PROCEDURE — 82272 OCCULT BLD FECES 1-3 TESTS: CPT | Performed by: PHYSICIAN ASSISTANT

## 2023-01-16 PROCEDURE — 71275 CT ANGIOGRAPHY CHEST: CPT

## 2023-01-16 PROCEDURE — 93010 ELECTROCARDIOGRAM REPORT: CPT | Performed by: PHYSICIAN ASSISTANT

## 2023-01-16 PROCEDURE — 250N000011 HC RX IP 250 OP 636: Performed by: PHYSICIAN ASSISTANT

## 2023-01-16 PROCEDURE — 85379 FIBRIN DEGRADATION QUANT: CPT | Performed by: PHYSICIAN ASSISTANT

## 2023-01-16 PROCEDURE — 82077 ASSAY SPEC XCP UR&BREATH IA: CPT | Performed by: PHYSICIAN ASSISTANT

## 2023-01-16 PROCEDURE — 84484 ASSAY OF TROPONIN QUANT: CPT | Performed by: PHYSICIAN ASSISTANT

## 2023-01-16 PROCEDURE — 99291 CRITICAL CARE FIRST HOUR: CPT | Mod: 25,CS | Performed by: PHYSICIAN ASSISTANT

## 2023-01-16 PROCEDURE — 83605 ASSAY OF LACTIC ACID: CPT | Performed by: PHYSICIAN ASSISTANT

## 2023-01-16 PROCEDURE — 80053 COMPREHEN METABOLIC PANEL: CPT | Performed by: PHYSICIAN ASSISTANT

## 2023-01-16 PROCEDURE — 80306 DRUG TEST PRSMV INSTRMNT: CPT | Performed by: PHYSICIAN ASSISTANT

## 2023-01-16 PROCEDURE — 99285 EMERGENCY DEPT VISIT HI MDM: CPT | Mod: CS | Performed by: PHYSICIAN ASSISTANT

## 2023-01-16 PROCEDURE — 96361 HYDRATE IV INFUSION ADD-ON: CPT | Performed by: PHYSICIAN ASSISTANT

## 2023-01-16 PROCEDURE — 83880 ASSAY OF NATRIURETIC PEPTIDE: CPT | Performed by: PHYSICIAN ASSISTANT

## 2023-01-16 PROCEDURE — 36415 COLL VENOUS BLD VENIPUNCTURE: CPT | Performed by: PHYSICIAN ASSISTANT

## 2023-01-16 PROCEDURE — 87637 SARSCOV2&INF A&B&RSV AMP PRB: CPT | Performed by: PHYSICIAN ASSISTANT

## 2023-01-16 RX ORDER — IOPAMIDOL 755 MG/ML
500 INJECTION, SOLUTION INTRAVASCULAR ONCE
Status: COMPLETED | OUTPATIENT
Start: 2023-01-16 | End: 2023-01-16

## 2023-01-16 RX ORDER — POTASSIUM CHLORIDE 7.45 MG/ML
10 INJECTION INTRAVENOUS
Status: DISCONTINUED | OUTPATIENT
Start: 2023-01-16 | End: 2023-01-17

## 2023-01-16 RX ORDER — AMPICILLIN AND SULBACTAM 2; 1 G/1; G/1
3 INJECTION, POWDER, FOR SOLUTION INTRAMUSCULAR; INTRAVENOUS ONCE
Status: COMPLETED | OUTPATIENT
Start: 2023-01-16 | End: 2023-01-17

## 2023-01-16 RX ORDER — FENTANYL 12.5 UG/1
12 PATCH TRANSDERMAL
Status: DISCONTINUED | OUTPATIENT
Start: 2023-01-16 | End: 2023-01-16

## 2023-01-16 RX ORDER — IBUPROFEN 200 MG
800 TABLET ORAL 2 TIMES DAILY
Status: ON HOLD | COMMUNITY
End: 2023-01-27

## 2023-01-16 RX ADMIN — AMPICILLIN SODIUM AND SULBACTAM SODIUM 3 G: 2; 1 INJECTION, POWDER, FOR SOLUTION INTRAMUSCULAR; INTRAVENOUS at 23:30

## 2023-01-16 RX ADMIN — SODIUM CHLORIDE 60 ML: 9 INJECTION, SOLUTION INTRAVENOUS at 22:17

## 2023-01-16 RX ADMIN — POTASSIUM CHLORIDE 10 MEQ: 7.46 INJECTION, SOLUTION INTRAVENOUS at 22:27

## 2023-01-16 RX ADMIN — IOPAMIDOL 60 ML: 755 INJECTION, SOLUTION INTRAVENOUS at 22:18

## 2023-01-16 RX ADMIN — POTASSIUM CHLORIDE 10 MEQ: 7.46 INJECTION, SOLUTION INTRAVENOUS at 23:36

## 2023-01-16 ASSESSMENT — ACTIVITIES OF DAILY LIVING (ADL)
ADLS_ACUITY_SCORE: 35
ADLS_ACUITY_SCORE: 35

## 2023-01-17 ENCOUNTER — HOSPITAL ENCOUNTER (INPATIENT)
Facility: CLINIC | Age: 43
LOS: 10 days | Discharge: HOME | End: 2023-01-27
Attending: INTERNAL MEDICINE | Admitting: STUDENT IN AN ORGANIZED HEALTH CARE EDUCATION/TRAINING PROGRAM
Payer: COMMERCIAL

## 2023-01-17 ENCOUNTER — APPOINTMENT (OUTPATIENT)
Dept: ULTRASOUND IMAGING | Facility: CLINIC | Age: 43
End: 2023-01-17
Attending: EMERGENCY MEDICINE
Payer: COMMERCIAL

## 2023-01-17 ENCOUNTER — APPOINTMENT (OUTPATIENT)
Dept: GENERAL RADIOLOGY | Facility: CLINIC | Age: 43
End: 2023-01-17
Attending: EMERGENCY MEDICINE
Payer: COMMERCIAL

## 2023-01-17 VITALS
RESPIRATION RATE: 20 BRPM | HEART RATE: 97 BPM | WEIGHT: 125.5 LBS | DIASTOLIC BLOOD PRESSURE: 67 MMHG | BODY MASS INDEX: 22.23 KG/M2 | OXYGEN SATURATION: 96 % | TEMPERATURE: 97.7 F | SYSTOLIC BLOOD PRESSURE: 112 MMHG

## 2023-01-17 DIAGNOSIS — K59.03 DRUG-INDUCED CONSTIPATION: ICD-10-CM

## 2023-01-17 DIAGNOSIS — I82.90 ACUTE DEEP VEIN THROMBOSIS (DVT) OF NON-EXTREMITY VEIN: ICD-10-CM

## 2023-01-17 DIAGNOSIS — J18.9 COMMUNITY ACQUIRED PNEUMONIA OF RIGHT LOWER LOBE OF LUNG: ICD-10-CM

## 2023-01-17 DIAGNOSIS — J86.0: ICD-10-CM

## 2023-01-17 DIAGNOSIS — R21 RASH: Primary | ICD-10-CM

## 2023-01-17 DIAGNOSIS — J86.9 EMPYEMA (H): ICD-10-CM

## 2023-01-17 DIAGNOSIS — D50.8 IRON DEFICIENCY ANEMIA SECONDARY TO INADEQUATE DIETARY IRON INTAKE: ICD-10-CM

## 2023-01-17 LAB
AMPHETAMINES UR QL: NOT DETECTED
APPEARANCE FLD: ABNORMAL
BARBITURATES UR QL SCN: NOT DETECTED
BASOPHILS # BLD AUTO: 0 10E3/UL (ref 0–0.2)
BASOPHILS NFR BLD AUTO: 1 %
BASOPHILS NFR FLD MANUAL: 1 %
BENZODIAZ UR QL SCN: NOT DETECTED
BUPRENORPHINE UR QL: NOT DETECTED
CANNABINOIDS UR QL: NOT DETECTED
CELL COUNT BODY FLUID SOURCE: ABNORMAL
COCAINE UR QL SCN: NOT DETECTED
COLOR FLD: YELLOW
D-METHAMPHET UR QL: NOT DETECTED
EOSINOPHIL # BLD AUTO: 0.1 10E3/UL (ref 0–0.7)
EOSINOPHIL NFR BLD AUTO: 1 %
ERYTHROCYTE [DISTWIDTH] IN BLOOD BY AUTOMATED COUNT: 19 % (ref 10–15)
HCT VFR BLD AUTO: 25.5 % (ref 35–47)
HGB BLD-MCNC: 8 G/DL (ref 11.7–15.7)
HOLD SPECIMEN: NORMAL
IMM GRANULOCYTES # BLD: 0 10E3/UL
IMM GRANULOCYTES NFR BLD: 1 %
INR PPP: 1.41 (ref 0.85–1.15)
LACTATE SERPL-SCNC: 0.8 MMOL/L (ref 0.7–2)
LDH SERPL L TO P-CCNC: 263 U/L (ref 0–250)
LYMPHOCYTES # BLD AUTO: 0.7 10E3/UL (ref 0.8–5.3)
LYMPHOCYTES NFR BLD AUTO: 13 %
LYMPHOCYTES NFR FLD MANUAL: 16 %
MCH RBC QN AUTO: 23.5 PG (ref 26.5–33)
MCHC RBC AUTO-ENTMCNC: 31.4 G/DL (ref 31.5–36.5)
MCV RBC AUTO: 75 FL (ref 78–100)
METHADONE UR QL SCN: NOT DETECTED
MONOCYTES # BLD AUTO: 0.5 10E3/UL (ref 0–1.3)
MONOCYTES NFR BLD AUTO: 10 %
MONOS+MACROS NFR FLD MANUAL: 5 %
NEUTROPHILS # BLD AUTO: 4 10E3/UL (ref 1.6–8.3)
NEUTROPHILS NFR BLD AUTO: 74 %
NEUTS BAND NFR FLD MANUAL: 78 %
NRBC # BLD AUTO: 0 10E3/UL
NRBC BLD AUTO-RTO: 0 /100
OPIATES UR QL SCN: NOT DETECTED
OXYCODONE UR QL SCN: NOT DETECTED
PCP UR QL SCN: NOT DETECTED
PLATELET # BLD AUTO: 518 10E3/UL (ref 150–450)
POTASSIUM BLD-SCNC: 2.9 MMOL/L (ref 3.4–5.3)
POTASSIUM SERPL-SCNC: 4.2 MMOL/L (ref 3.4–5.3)
PROPOXYPH UR QL: NOT DETECTED
PROT SERPL-MCNC: 6.3 G/DL (ref 6.4–8.3)
RBC # BLD AUTO: 3.41 10E6/UL (ref 3.8–5.2)
TRICYCLICS UR QL SCN: NOT DETECTED
WBC # BLD AUTO: 5.3 10E3/UL (ref 4–11)
WBC # FLD AUTO: 5760 /UL

## 2023-01-17 PROCEDURE — 84157 ASSAY OF PROTEIN OTHER: CPT | Performed by: EMERGENCY MEDICINE

## 2023-01-17 PROCEDURE — 88305 TISSUE EXAM BY PATHOLOGIST: CPT | Mod: TC | Performed by: EMERGENCY MEDICINE

## 2023-01-17 PROCEDURE — 84132 ASSAY OF SERUM POTASSIUM: CPT | Performed by: EMERGENCY MEDICINE

## 2023-01-17 PROCEDURE — 96361 HYDRATE IV INFUSION ADD-ON: CPT | Mod: 59 | Performed by: PHYSICIAN ASSISTANT

## 2023-01-17 PROCEDURE — 250N000011 HC RX IP 250 OP 636: Performed by: EMERGENCY MEDICINE

## 2023-01-17 PROCEDURE — 36415 COLL VENOUS BLD VENIPUNCTURE: CPT | Performed by: FAMILY MEDICINE

## 2023-01-17 PROCEDURE — 87077 CULTURE AEROBIC IDENTIFY: CPT | Performed by: EMERGENCY MEDICINE

## 2023-01-17 PROCEDURE — 250N000013 HC RX MED GY IP 250 OP 250 PS 637: Performed by: FAMILY MEDICINE

## 2023-01-17 PROCEDURE — P9016 RBC LEUKOCYTES REDUCED: HCPCS | Performed by: PHYSICIAN ASSISTANT

## 2023-01-17 PROCEDURE — 87205 SMEAR GRAM STAIN: CPT | Performed by: EMERGENCY MEDICINE

## 2023-01-17 PROCEDURE — 83615 LACTATE (LD) (LDH) ENZYME: CPT | Performed by: EMERGENCY MEDICINE

## 2023-01-17 PROCEDURE — 96375 TX/PRO/DX INJ NEW DRUG ADDON: CPT | Performed by: PHYSICIAN ASSISTANT

## 2023-01-17 PROCEDURE — 36415 COLL VENOUS BLD VENIPUNCTURE: CPT | Performed by: EMERGENCY MEDICINE

## 2023-01-17 PROCEDURE — 89050 BODY FLUID CELL COUNT: CPT | Performed by: EMERGENCY MEDICINE

## 2023-01-17 PROCEDURE — 85025 COMPLETE CBC W/AUTO DIFF WBC: CPT | Performed by: FAMILY MEDICINE

## 2023-01-17 PROCEDURE — 85610 PROTHROMBIN TIME: CPT | Performed by: EMERGENCY MEDICINE

## 2023-01-17 PROCEDURE — 87040 BLOOD CULTURE FOR BACTERIA: CPT | Performed by: FAMILY MEDICINE

## 2023-01-17 PROCEDURE — 250N000009 HC RX 250: Performed by: EMERGENCY MEDICINE

## 2023-01-17 PROCEDURE — 99223 1ST HOSP IP/OBS HIGH 75: CPT | Mod: AI | Performed by: STUDENT IN AN ORGANIZED HEALTH CARE EDUCATION/TRAINING PROGRAM

## 2023-01-17 PROCEDURE — 272N000706 US THORACENTESIS

## 2023-01-17 PROCEDURE — 83605 ASSAY OF LACTIC ACID: CPT | Performed by: FAMILY MEDICINE

## 2023-01-17 PROCEDURE — 84132 ASSAY OF SERUM POTASSIUM: CPT | Performed by: FAMILY MEDICINE

## 2023-01-17 PROCEDURE — 96366 THER/PROPH/DIAG IV INF ADDON: CPT | Mod: 59 | Performed by: PHYSICIAN ASSISTANT

## 2023-01-17 PROCEDURE — 250N000013 HC RX MED GY IP 250 OP 250 PS 637: Performed by: EMERGENCY MEDICINE

## 2023-01-17 PROCEDURE — 88305 TISSUE EXAM BY PATHOLOGIST: CPT | Mod: 26

## 2023-01-17 PROCEDURE — 84155 ASSAY OF PROTEIN SERUM: CPT | Performed by: EMERGENCY MEDICINE

## 2023-01-17 PROCEDURE — 999N000065 XR CHEST 1 VIEW

## 2023-01-17 PROCEDURE — 88112 CYTOPATH CELL ENHANCE TECH: CPT | Mod: 26

## 2023-01-17 PROCEDURE — 258N000003 HC RX IP 258 OP 636: Performed by: FAMILY MEDICINE

## 2023-01-17 PROCEDURE — 120N000001 HC R&B MED SURG/OB

## 2023-01-17 PROCEDURE — 82945 GLUCOSE OTHER FLUID: CPT | Performed by: EMERGENCY MEDICINE

## 2023-01-17 RX ORDER — ONDANSETRON 4 MG/1
4 TABLET, ORALLY DISINTEGRATING ORAL EVERY 6 HOURS PRN
Status: DISCONTINUED | OUTPATIENT
Start: 2023-01-17 | End: 2023-01-20

## 2023-01-17 RX ORDER — OXYCODONE HYDROCHLORIDE 5 MG/1
5 TABLET ORAL EVERY 4 HOURS PRN
Status: DISCONTINUED | OUTPATIENT
Start: 2023-01-17 | End: 2023-01-22

## 2023-01-17 RX ORDER — ACETAMINOPHEN 500 MG
1000 TABLET ORAL EVERY 6 HOURS PRN
Status: DISCONTINUED | OUTPATIENT
Start: 2023-01-17 | End: 2023-01-22

## 2023-01-17 RX ORDER — LORAZEPAM 2 MG/ML
1 INJECTION INTRAMUSCULAR ONCE
Status: COMPLETED | OUTPATIENT
Start: 2023-01-17 | End: 2023-01-17

## 2023-01-17 RX ORDER — NALOXONE HYDROCHLORIDE 0.4 MG/ML
0.4 INJECTION, SOLUTION INTRAMUSCULAR; INTRAVENOUS; SUBCUTANEOUS
Status: DISCONTINUED | OUTPATIENT
Start: 2023-01-17 | End: 2023-01-22

## 2023-01-17 RX ORDER — ACETAMINOPHEN 325 MG/1
650 TABLET ORAL ONCE
Status: COMPLETED | OUTPATIENT
Start: 2023-01-17 | End: 2023-01-17

## 2023-01-17 RX ORDER — NALOXONE HYDROCHLORIDE 0.4 MG/ML
0.2 INJECTION, SOLUTION INTRAMUSCULAR; INTRAVENOUS; SUBCUTANEOUS
Status: DISCONTINUED | OUTPATIENT
Start: 2023-01-17 | End: 2023-01-22

## 2023-01-17 RX ORDER — HYDROMORPHONE HYDROCHLORIDE 1 MG/ML
0.5 INJECTION, SOLUTION INTRAMUSCULAR; INTRAVENOUS; SUBCUTANEOUS
Status: DISCONTINUED | OUTPATIENT
Start: 2023-01-17 | End: 2023-01-17 | Stop reason: HOSPADM

## 2023-01-17 RX ORDER — POTASSIUM CHLORIDE 1500 MG/1
20 TABLET, EXTENDED RELEASE ORAL ONCE
Status: COMPLETED | OUTPATIENT
Start: 2023-01-17 | End: 2023-01-17

## 2023-01-17 RX ORDER — AMPICILLIN AND SULBACTAM 2; 1 G/1; G/1
3 INJECTION, POWDER, FOR SOLUTION INTRAMUSCULAR; INTRAVENOUS EVERY 6 HOURS
Status: DISCONTINUED | OUTPATIENT
Start: 2023-01-18 | End: 2023-01-18

## 2023-01-17 RX ORDER — LIDOCAINE 40 MG/G
CREAM TOPICAL
Status: DISCONTINUED | OUTPATIENT
Start: 2023-01-17 | End: 2023-01-20

## 2023-01-17 RX ORDER — ACETAMINOPHEN 500 MG
1000 TABLET ORAL ONCE
Status: COMPLETED | OUTPATIENT
Start: 2023-01-17 | End: 2023-01-17

## 2023-01-17 RX ORDER — AMPICILLIN AND SULBACTAM 2; 1 G/1; G/1
3 INJECTION, POWDER, FOR SOLUTION INTRAMUSCULAR; INTRAVENOUS EVERY 6 HOURS
Status: DISCONTINUED | OUTPATIENT
Start: 2023-01-17 | End: 2023-01-17 | Stop reason: HOSPADM

## 2023-01-17 RX ORDER — LIDOCAINE HYDROCHLORIDE 10 MG/ML
10 INJECTION, SOLUTION EPIDURAL; INFILTRATION; INTRACAUDAL; PERINEURAL ONCE
Status: COMPLETED | OUTPATIENT
Start: 2023-01-17 | End: 2023-01-17

## 2023-01-17 RX ORDER — ONDANSETRON 2 MG/ML
4 INJECTION INTRAMUSCULAR; INTRAVENOUS EVERY 6 HOURS PRN
Status: DISCONTINUED | OUTPATIENT
Start: 2023-01-17 | End: 2023-01-20

## 2023-01-17 RX ORDER — POTASSIUM CHLORIDE 1.5 G/1.58G
40 POWDER, FOR SOLUTION ORAL 2 TIMES DAILY
Status: DISCONTINUED | OUTPATIENT
Start: 2023-01-17 | End: 2023-01-17 | Stop reason: HOSPADM

## 2023-01-17 RX ADMIN — AMPICILLIN SODIUM AND SULBACTAM SODIUM 3 G: 2; 1 INJECTION, POWDER, FOR SOLUTION INTRAMUSCULAR; INTRAVENOUS at 14:01

## 2023-01-17 RX ADMIN — HYDROMORPHONE HYDROCHLORIDE 0.5 MG: 1 INJECTION, SOLUTION INTRAMUSCULAR; INTRAVENOUS; SUBCUTANEOUS at 14:15

## 2023-01-17 RX ADMIN — ACETAMINOPHEN 1000 MG: 500 TABLET ORAL at 04:13

## 2023-01-17 RX ADMIN — ACETAMINOPHEN 650 MG: 325 TABLET, FILM COATED ORAL at 18:18

## 2023-01-17 RX ADMIN — AMPICILLIN SODIUM AND SULBACTAM SODIUM 3 G: 2; 1 INJECTION, POWDER, FOR SOLUTION INTRAMUSCULAR; INTRAVENOUS at 08:39

## 2023-01-17 RX ADMIN — POTASSIUM CHLORIDE FOR ORAL SOLUTION 40 MEQ: 1.5 POWDER, FOR SOLUTION ORAL at 09:57

## 2023-01-17 RX ADMIN — SODIUM CHLORIDE 1000 ML: 9 INJECTION, SOLUTION INTRAVENOUS at 04:16

## 2023-01-17 RX ADMIN — LIDOCAINE HYDROCHLORIDE 5 ML: 10 INJECTION, SOLUTION EPIDURAL; INFILTRATION; INTRACAUDAL; PERINEURAL at 14:45

## 2023-01-17 RX ADMIN — LORAZEPAM 1 MG: 2 INJECTION INTRAMUSCULAR; INTRAVENOUS at 14:17

## 2023-01-17 RX ADMIN — POTASSIUM CHLORIDE 20 MEQ: 1500 TABLET, EXTENDED RELEASE ORAL at 02:48

## 2023-01-17 RX ADMIN — POTASSIUM CHLORIDE FOR ORAL SOLUTION 40 MEQ: 1.5 POWDER, FOR SOLUTION ORAL at 20:31

## 2023-01-17 RX ADMIN — AMPICILLIN SODIUM AND SULBACTAM SODIUM 3 G: 2; 1 INJECTION, POWDER, FOR SOLUTION INTRAMUSCULAR; INTRAVENOUS at 19:53

## 2023-01-17 ASSESSMENT — ACTIVITIES OF DAILY LIVING (ADL)
ADLS_ACUITY_SCORE: 35

## 2023-01-17 NOTE — ED PROVIDER NOTES
History     Chief Complaint   Patient presents with     Chest Pain     Cough     Headache     Fatigue     Shortness of Breath     HPI  Kamilah Goldberg is a 42 year old female who presents for evaluation of symptoms ongoing for the past 5-6 weeks.  She reports a cough productive of chunky/slimy/brown mucus off and on.  Also reports generalized chest discomfort and pain with a deep breath.  Dyspnea and dyspnea on exertion.  Nausea without vomiting.  Headache and she just feels weak.  She does not smoke currently, but smoked about 2 cigarettes/day on average for years.  Denies any prior history of asthma.  Has not experienced any fevers or chills.  Denies any lower extremity edema or calf discomfort.  She is here with her significant other.  She denies any hematochezia, melena, hematemesis.  She does have a history of gastric bypass.  Has not taken any medication to treat her symptoms.  This is her first medical evaluation for this concern.  She denies any chest trauma in the last 6-8 weeks.  No MVA.  She has not fallen.  Nothing has fallen on her chest.        Allergies:  Allergies   Allergen Reactions     Naproxen Hives     Codeine Sulfate GI Disturbance     Hydrocodone Nausea and Vomiting       Problem List:    Patient Active Problem List    Diagnosis Date Noted     Low back pain 12/15/2009     Priority: High     Acute onset of LBP.  Rx'd Vicodin #30 12/15/09.  Will refer to [facility] if persists.  Also encouraged massage/rest/heat.    Called on 1/1/10 reporting continued pain and ran out of Vicodin two days ago--tried to come in for an appt yesterday but could not get a ride.  Massage, heat, ice, warm baths all help some but having trouble sleeping.  Suggestions for the rest of the weekend given VS ED visit and urged to come in next week for evaluation and [facility] referral and possible chiropractic adjustment.    Went into the ED 1/1/10 and was given similar instructions and also a small number of Vicodin.   "(#12)    1/5/10:  Clinic visit:  Referred to [facility] and #30 Vicodin given to hold her over until appt.    1/20/10 requesting more Vicodin (\"forgot to ask yesterday at appt\").  [facility] appt T, 1/26.  Given #15 more Vicodin 1/20/10 and told that she will not be able to get any more Vicodin from us.  Referral made to the pain clinic.     Suspicious of drug seeking behavior.    Referred to Pain Clinic and 3/8/10 Pain Clinic wrote us a letter saying that the pt had not responded to the confirmation letter and so could not treat her until she did so.       Lower urinary tract infectious disease 10/12/2012     Priority: Medium     Diagnosis updated by automated process. Provider to review and confirm.       S/P gastric bypass 06/26/2012     Priority: Medium     CARDIOVASCULAR SCREENING; LDL GOAL LESS THAN 160 10/31/2010     Priority: Medium     Depression      Priority: Medium     On Wellbutrin 100 mg BID--started taking meds at age 16, was on Paxil for several years and then switched to Wellbutrin in 2000 -- Stable but looking for another therapist through [place]          Past Medical History:    Past Medical History:   Diagnosis Date     Back pain      Depression since age 16     UTI (lower urinary tract infection)        Past Surgical History:    Past Surgical History:   Procedure Laterality Date     BYPASS GASTRIC CARA-EN-Y, LIVER BIOPSY, COMBINED       CHOLECYSTECTOMY       CRYOCAUTERY OF CERVIX  2001 2001     DISCECTOMY LUMBAR MINIMALLY INVASIVE ONE LEVEL  5/24/2013    Procedure: DISCECTOMY LUMBAR MINIMALLY INVASIVE ONE LEVEL;  MIS Right side L5-S1 Gonzalo Lami Microdiscectomy, ;  Surgeon: Alba Anderson MD;  Location: [facility] OR     TUBAL LIGATION      2012       Family History:    Family History   Problem Relation Age of Onset     Neurologic Disorder Mother         migraines     C.A.D. Mother         MI age 49     Neurologic Disorder Maternal Grandmother         migraines     Cancer Maternal Grandfather         ? type     " Diabetes Maternal Aunt      Cerebrovascular Disease Other         Maternal Great Grandmother     JOSE EDUARDO.AKalinaD. Maternal Uncle         MI     Cancer Maternal Uncle         bladder       Social History:  Marital Status:  Single [1]  Social History     Tobacco Use     Smoking status: Never     Smokeless tobacco: Never   Substance Use Topics     Alcohol use: Yes     Comment: occasional     Drug use: No        Medications:    ibuprofen (ADVIL/MOTRIN) 200 MG tablet          Review of Systems   All other systems reviewed and are negative.      Physical Exam   BP: 104/62  Pulse: 106  Temp: 97.9  F (36.6  C)  Resp: 20  Weight: 56.9 kg (125 lb 8 oz)  SpO2: 95 %      Physical Exam  Vitals and nursing note reviewed.   Constitutional:       General: She is not in acute distress.     Appearance: She is not diaphoretic.   HENT:      Head: Normocephalic and atraumatic.      Right Ear: External ear normal.      Left Ear: External ear normal.      Nose: Nose normal.      Mouth/Throat:      Mouth: Mucous membranes are moist.      Pharynx: Oropharynx is clear. No pharyngeal swelling or oropharyngeal exudate.   Eyes:      General: No scleral icterus.        Right eye: No discharge.         Left eye: No discharge.      Conjunctiva/sclera: Conjunctivae normal.      Pupils: Pupils are equal, round, and reactive to light.   Neck:      Thyroid: No thyromegaly.   Cardiovascular:      Rate and Rhythm: Normal rate and regular rhythm.      Heart sounds: Normal heart sounds. No murmur heard.  Pulmonary:      Effort: Pulmonary effort is normal. No tachypnea or respiratory distress.      Breath sounds: Examination of the left-middle field reveals rales. Examination of the left-lower field reveals rales. Rales present. No wheezing or rhonchi.   Chest:      Chest wall: No mass, deformity, tenderness, crepitus or edema. There is no dullness to percussion.   Abdominal:      General: Bowel sounds are normal. There is no distension.      Palpations: Abdomen is  soft. There is no mass.      Tenderness: There is no abdominal tenderness. There is no guarding or rebound.   Genitourinary:     Rectum: Guaiac result negative.   Musculoskeletal:         General: No tenderness or deformity. Normal range of motion.      Cervical back: Normal range of motion and neck supple.      Right lower leg: No tenderness. No edema.      Left lower leg: No tenderness. No edema.      Comments: Negative Homans' sign.   Lymphadenopathy:      Cervical: No cervical adenopathy.   Skin:     General: Skin is warm and dry.      Capillary Refill: Capillary refill takes less than 2 seconds.      Findings: No erythema or rash.   Neurological:      General: No focal deficit present.      Mental Status: She is alert and oriented to person, place, and time.      Cranial Nerves: No cranial nerve deficit.      Motor: No weakness.   Psychiatric:         Mood and Affect: Mood normal.         Behavior: Behavior normal.         Thought Content: Thought content normal.         ED Course                 Procedures              EKG Interpretation:      Interpreted by Nicolas Shelley PA-C  Symptoms at time of EKG: dyspnea on exertion.   Rhythm: normal sinus   Rate: normal  Axis: normal  Ectopy: none  Conduction: normal  ST Segments/ T Waves: No ST-T wave changes  Q Waves: none  Comparison to prior: No old EKG available    Clinical Impression: normal EKG          Critical Care time:  none               Results for orders placed or performed during the hospital encounter of 01/16/23 (from the past 24 hour(s))   Symptomatic Influenza A/B & SARS-CoV2 (COVID-19) Virus PCR Multiplex Nose    Specimen: Nose; Swab   Result Value Ref Range    Influenza A PCR Negative Negative    Influenza B PCR Negative Negative    RSV PCR Negative Negative    SARS CoV2 PCR Negative Negative    Narrative    Testing was performed using the Xpert Xpress CoV2/Flu/RSV Assay on the Cepheid GeneXpert Instrument. This test should be ordered for the  detection of SARS-CoV-2 and influenza viruses in individuals who meet clinical and/or epidemiological criteria. Test performance is unknown in asymptomatic patients. This test is for in vitro diagnostic use under the FDA EUA for laboratories certified under CLIA to perform high or moderate complexity testing. This test has not been FDA cleared or approved. A negative result does not rule out the presence of PCR inhibitors in the specimen or target RNA in concentration below the limit of detection for the assay. If only one viral target is positive but coinfection with multiple targets is suspected, the sample should be re-tested with another FDA cleared, approved, or authorized test, if coinfection would change clinical management. This test was validated by the [facility]. These laboratories are certified under the Clinical Laboratory Improvement Amendments of 1988 (CLIA-88) as qualified to perform high complexity laboratory testing.   CBC with platelets differential    Narrative    The following orders were created for panel order CBC with platelets differential.  Procedure                               Abnormality         Status                     ---------                               -----------         ------                     CBC with platelets and d...[[id number]]  Abnormal            Final result                 Please view results for these tests on the individual orders.   D dimer quantitative   Result Value Ref Range    D-Dimer Quantitative 8.50 (H) 0.00 - 0.50 ug/mL FEU    Narrative    This D-dimer assay is intended for use in conjunction with a clinical pretest probability assessment model to exclude pulmonary embolism (PE) and deep venous thrombosis (DVT) in outpatients suspected of PE or DVT. The cut-off value is 0.50 ug/mL FEU.   Comprehensive metabolic panel   Result Value Ref Range    Sodium 132 (L) 133 - 144 mmol/L    Potassium 2.8 (L) 3.4 - 5.3 mmol/L    Chloride 96 94 -  109 mmol/L    Carbon Dioxide (CO2) 29 20 - 32 mmol/L    Anion Gap 7 3 - 14 mmol/L    Urea Nitrogen 12 7 - 30 mg/dL    Creatinine 0.40 (L) 0.52 - 1.04 mg/dL    Calcium 7.8 (L) 8.5 - 10.1 mg/dL    Glucose 104 (H) 70 - 99 mg/dL    Alkaline Phosphatase 55 40 - 150 U/L    AST 98 (H) 0 - 45 U/L    ALT 61 (H) 0 - 50 U/L    Protein Total 6.9 6.8 - 8.8 g/dL    Albumin 1.5 (L) 3.4 - 5.0 g/dL    Bilirubin Total 0.1 (L) 0.2 - 1.3 mg/dL    GFR Estimate >90 >60 mL/min/1.73m2   Lactic acid whole blood   Result Value Ref Range    Lactic Acid 1.1 0.7 - 2.0 mmol/L   Troponin I   Result Value Ref Range    Troponin I High Sensitivity 5 <54 ng/L   Nt probnp inpatient (BNP)   Result Value Ref Range    N terminal Pro BNP Inpatient 236 0 - 450 pg/mL   Ethyl Alcohol Level   Result Value Ref Range    Alcohol ethyl <0.01 <=0.01 g/dL   CBC with platelets and differential   Result Value Ref Range    WBC Count 5.1 4.0 - 11.0 10e3/uL    RBC Count 2.83 (L) 3.80 - 5.20 10e6/uL    Hemoglobin 6.5 (LL) 11.7 - 15.7 g/dL    Hematocrit 20.6 (L) 35.0 - 47.0 %    MCV 73 (L) 78 - 100 fL    MCH 23.0 (L) 26.5 - 33.0 pg    MCHC 31.6 31.5 - 36.5 g/dL    RDW 18.5 (H) 10.0 - 15.0 %    Platelet Count 571 (H) 150 - 450 10e3/uL    % Neutrophils 69 %    % Lymphocytes 19 %    % Monocytes 10 %    % Eosinophils 1 %    % Basophils 1 %    % Immature Granulocytes 0 %    NRBCs per 100 WBC 0 <1 /100    Absolute Neutrophils 3.5 1.6 - 8.3 10e3/uL    Absolute Lymphocytes 1.0 0.8 - 5.3 10e3/uL    Absolute Monocytes 0.5 0.0 - 1.3 10e3/uL    Absolute Eosinophils 0.0 0.0 - 0.7 10e3/uL    Absolute Basophils 0.0 0.0 - 0.2 10e3/uL    Absolute Immature Granulocytes 0.0 <=0.4 10e3/uL    Absolute NRBCs 0.0 10e3/uL   Magnesium   Result Value Ref Range    Magnesium 1.9 1.6 - 2.3 mg/dL   ABO/Rh type and screen    Narrative    The following orders were created for panel order ABO/Rh type and screen.  Procedure                               Abnormality         Status                      ---------                               -----------         ------                     Adult Type and Screen[[id number]]                            In process                   Please view results for these tests on the individual orders.   UA with Microscopic reflex to Culture    Specimen: Urine, Clean Catch   Result Value Ref Range    Color Urine Yellow Colorless, Straw, Light Yellow, Yellow    Appearance Urine Clear Clear    Glucose Urine Negative Negative mg/dL    Bilirubin Urine Negative Negative    Ketones Urine Negative Negative mg/dL    Specific Gravity Urine <=1.005 1.003 - 1.035    Blood Urine Negative Negative    pH Urine 6.0 5.0 - 7.0    Protein Albumin Urine Negative Negative mg/dL    Urobilinogen Urine Normal Normal, 2.0 mg/dL    Nitrite Urine Negative Negative    Leukocyte Esterase Urine Small (A) Negative    Bacteria Urine Few (A) None Seen /HPF    Mucus Urine Present (A) None Seen /LPF    RBC Urine 0 <=2 /HPF    WBC Urine <1 <=5 /HPF    Squamous Epithelials Urine 3 (H) <=1 /HPF    Narrative    Urine Culture not indicated   Occult blood stool   Result Value Ref Range    Occult Blood Negative Negative   CT Chest Pulmonary Embolism w Contrast    Narrative    EXAM: CT CHEST PULMONARY EMBOLISM W CONTRAST  LOCATION: [facility]  DATE/TIME: 1/16/2023 10:24 PM    INDICATION: Chest pain, dyspnea, left-sided rales, elevated D-dimer.  COMPARISON: None.  TECHNIQUE: CT chest pulmonary angiogram during arterial phase injection of IV contrast. Multiplanar reformats and MIP reconstructions were performed. Dose reduction techniques were used.   CONTRAST: Isovue 370 60    FINDINGS:  ANGIOGRAM CHEST: Pulmonary arteries are normal caliber and negative for pulmonary emboli. Thoracic aorta is negative for dissection. No CT evidence of right heart strain.    LUNGS AND PLEURA: There is a large loculated right pleural effusion and associated atelectasis. Mild left lower lobe infiltrate. No  pneumothorax.    MEDIASTINUM/AXILLAE: There are enlarged axillary lymph nodes bilaterally. A left axillary node measures 1.8 x 1.3 cm.    CORONARY ARTERY CALCIFICATION: Mild.    UPPER ABDOMEN: There is no acute upper abdominal abnormality.    MUSCULOSKELETAL: Normal.      Impression    IMPRESSION:  1.  There is no pulmonary embolus, aortic aneurysm or dissection.  2.  Large loculated right pleural effusion and associated atelectasis.  3.  Mild left lower lobe infiltrate may be pneumonia.  4.  Bilateral axillary lymph node enlargement.       Medications   potassium chloride 10 mEq in 100 mL sterile water infusion (10 mEq Intravenous New Bag 1/16/23 3169)   ampicillin-sulbactam (UNASYN) 3 g vial to attach to  mL bag (3 g Intravenous New Bag 1/16/23 2166)   iopamidol (ISOVUE-370) solution 500 mL (60 mLs Intravenous Given 1/16/23 7203)   Sodium Chloride 0.9 % bag 100mL for CT scan (60 mLs Intravenous Given 1/16/23 2217)       Assessments & Plan (with Medical Decision Making)     Pleural effusion  Anemia  Hypokalemia  Abnormal LFTs  Cough     42 year old female presents for evaluation of a productive cough for the past 5-6 weeks associated with chest discomfort, nausea, dyspnea, dyspnea on exertion, generalized headache, and generalized weakness.  This is her first medical evaluation for this concern.  No fevers.  Denies any abdominal pain.  See HPI above for details.  No recent history of chest trauma.  No injury.  She does not take anticoagulant medication.  On exam blood pressure 104/62, temperature 97.9, pulse 106, respiration 20, oxygen saturation 95% on room air.  Patient is in no acute distress.  No acute skin rashes.  ENT exam negative.  She does have Rales in the left mid/inferior posterior.  No rhonchi.  Good breath sounds otherwise heard throughout.  Abdomen soft and nontender.  No lower extremity edema.  No calf tenderness.  Negative Homans' sign.  EKG without acute ST or T wave elevation.  No  arrhythmia.  Interpreted by myself.  IV was established.  Laboratory levels display low hemoglobin at 6.5.  Microcytic hypochromic anemia.  White blood cell count stable at 5100 with normal differential.  Comprehensive metabolic panel with hypokalemia down to 2.8.  Magnesium normal.  Renal function stable.  Minor LFT elevation with an AST of 98 and ALT of 61.  Total bilirubin on the lower end of normal at 0.1.  D-dimer elevated at 8.5.  Lactic acid, troponin, BNP normal.  EtOH undetectable.  Urinalysis with small leukocyte esterase but no significant WBC or RBC with microscopic exam.  CT PE study with no pulmonary embolus.  Large loculated right pleural effusion and associated atelectasis.  Mid left lower lobe infiltrate may represent underlying pneumonia.  Discussed with the patient the results.  She once again denies any chest trauma.  She does not have any evidence to suggest chest trauma.  Her hemoglobin has dropped about 3.5 g/dL over the past 5-6 weeks.  Negative stool occult.  She does not have any symptoms of bleeding.  Patient did have gastric bypass.  She denies a history of GI bleed.  We discussed potential risks with blood transfusion.  She wants to proceed.  2 units of packed RBCs ordered.  Potassium replacement initiated via IV.  EKG without acute ST or T wave changes.  I am concerned about possible empyema with the CT findings and loculated pleural effusion.  Antibiotic coverage provided with Unasyn IV.  Influenza, COVID, and RSV swab negative.  I consulted with our general surgeon, Dr. Smith, and he did not feel comfortable keeping this patient in our facility.  We did not have any open beds in the [facility] system.  We contacted [facility] and there were no open beds.  We are still looking for bed options.  At shift change, Dr. Mcdonald has kindly agreed to accept this patient's care.  Please see his note for further details.     I have reviewed the nursing notes.    I  have reviewed the findings, diagnosis, plan and need for follow up with the patient.       Medical Decision Making  The patient presented with a problem that is an acute illness with systemic symptoms.    The patient's evaluation involved:  ordering and review of 3+ test(s) (see separate area of note for details)    The patient's management involved prescription drug management, drug therapy requiring intensive monitoring and a decision regarding hospitalization.        New Prescriptions    No medications on file       Final diagnoses:   Pleural effusion - possible empyema   Anemia   Hypokalemia   Abnormal LFTs   Cough     Disclaimer: This note consists of symbols derived from keyboarding, dictation and/or voice recognition software. As a result, there may be errors in the script that have gone undetected. Please consider this when interpreting information found in this chart.      1/16/2023   [facility] EMERGENCY DEPT     Nicolas Shelley PA-C  01/17/23 0009

## 2023-01-17 NOTE — ED PROVIDER NOTES
Patient was signed out to me at change of shift by Syed Nelson, please see his note for details on initial presentation and initial work-up  Patient was signed out to me that this is a 42-year-old female who has had symptoms of a cough, chest pain, shortness of breath, dyspnea on exertion and fatigue for the past 6 weeks.  On work-up here today they found she had a very large right pleural effusion and with those symptoms they were concerned about a possible empyema.  Patient's hemoglobin also was low at 6.5 but were not sure exactly where the hemoglobin drop came from, her stool guaiac was negative.  Patient was ordered 2 units of blood.  Patient was given IV Unasyn and patient was also found to be hypokalemic and started on the IV potassium replacement protocol  During my shift the following issues did not happen  1.  She refused any more IV potassium as he says it hurt too much.  I had nursing change the protocol over to the oral formulation since she cannot take oral and is still RN managed.  Repeat potassium though this morning only shows improvement from 2.8-2.9.  We will need to continue to monitor this and continue oral replacement  2.  Patient received 1 unit of blood but right when that unit finished, patient started getting hot and spiked a temp.  Patient had no signs of a rash or difficulty breathing or any other signs of a possible reaction.  I am not sure if this was from the blood or if this could be more from the patient just getting sicker from this empyema.  Patient was given Tylenol and fever did go away.  Patient's blood pressures though have trended down.  Patient was given another liter bolus that she had only received 1 L in total and blood pressures have remained stable.  I did not give the second unit of the blood because when I recheck the hemoglobin this morning it is now up to 8.  Patient will be signed over at change of shift trying to find bed placement for this patient.     Regan Mcdonald  MD [place]  01/17/23 0725

## 2023-01-17 NOTE — ED PROVIDER NOTES
"42 yr old female  6 weeks cough, cp, sob, brown, fatigue.  Large right pleural effusion, loculated.  Concern for empyema - unasyn ordered.  Surgery here recommended transfer.  Hg low as well - it was 6.5 on admission to the ER then up to 8.0 after one unit.. Stool guiac neg. - patient received one unit of PRBC.  After first unit, shivering and concern for transfusion reaction.  Did not give second unit.  Hg now 8.  K was low at 2.8 - on K replacement protocol but during the night refused iv K.  Oral K given instead but only 20 MEQ.  An additional 40 meq of K given orally and on re-check it is 4.2    Bps soft - 2 L of fluids given.  Lactate negative. Wbc 5.3    Awaiting bed for transfer.   Still no beds per [facility].  Yesterday tried [facility] and the [facility] system.   CT showed:  IMPRESSION:  1.  There is no pulmonary embolus, aortic aneurysm or dissection.  2.  Large loculated right pleural effusion and associated atelectasis.  3.  Mild left lower lobe infiltrate may be pneumonia.  4.  Bilateral axillary lymph node enlargement.  inr 1.42    D/w Dr. Burden for thoracentesis.  Discussed  Risks and benefits with her and she wanted to proceed.  Apparently it was difficult to get much out but he was able to get 60 mls out and it looked \"infected\".  Labs ordered.  Patient on unasyn q 6 hrs.         A: large right pleural effusion concerning for empyema - 60 mls withdrawn on thoracentesis, unasyn given.       Anemia - one unit of prbcs given      Hypokalemia - repleted    P:  Transfer to [facility]        D/w hospitalist Dr. Nobles who accepts the patient in [facility] to [facility]            Atul Chong MD  01/17/23 4143    "

## 2023-01-17 NOTE — ED NOTES
After completing the first unit of blood pt developed a fever. Dr Mcdonald updated. Dr Mcdonald requested that we hold off on the second unit of blood until further notice. Pt updated.

## 2023-01-18 LAB
ANION GAP SERPL CALCULATED.3IONS-SCNC: 7 MMOL/L (ref 7–15)
BUN SERPL-MCNC: 3.5 MG/DL (ref 6–20)
CALCIUM SERPL-MCNC: 8 MG/DL (ref 8.6–10)
CHLORIDE SERPL-SCNC: 100 MMOL/L (ref 98–107)
CREAT SERPL-MCNC: 0.39 MG/DL (ref 0.51–0.95)
DEPRECATED HCO3 PLAS-SCNC: 24 MMOL/L (ref 22–29)
ERYTHROCYTE [DISTWIDTH] IN BLOOD BY AUTOMATED COUNT: 19.2 % (ref 10–15)
GFR SERPL CREATININE-BSD FRML MDRD: >90 ML/MIN/1.73M2
GLUCOSE BODY FLUID SOURCE: NORMAL
GLUCOSE FLD-MCNC: <2 MG/DL
GLUCOSE SERPL-MCNC: 80 MG/DL (ref 70–99)
HCT VFR BLD AUTO: 23.4 % (ref 35–47)
HGB BLD-MCNC: 7.6 G/DL (ref 11.7–15.7)
LD BODY BODY FLUID SOURCE: NORMAL
LDH FLD L TO P-CCNC: 1318 U/L
MCH RBC QN AUTO: 23.8 PG (ref 26.5–33)
MCHC RBC AUTO-ENTMCNC: 32.5 G/DL (ref 31.5–36.5)
MCV RBC AUTO: 73 FL (ref 78–100)
PLATELET # BLD AUTO: 543 10E3/UL (ref 150–450)
POTASSIUM SERPL-SCNC: 4.1 MMOL/L (ref 3.4–5.3)
PROT FLD-MCNC: 5.6 G/DL
PROTEIN BODY FLUID SOURCE: NORMAL
RBC # BLD AUTO: 3.2 10E6/UL (ref 3.8–5.2)
SODIUM SERPL-SCNC: 131 MMOL/L (ref 136–145)
WBC # BLD AUTO: 4.6 10E3/UL (ref 4–11)

## 2023-01-18 PROCEDURE — 250N000011 HC RX IP 250 OP 636: Performed by: STUDENT IN AN ORGANIZED HEALTH CARE EDUCATION/TRAINING PROGRAM

## 2023-01-18 PROCEDURE — 258N000003 HC RX IP 258 OP 636: Performed by: INTERNAL MEDICINE

## 2023-01-18 PROCEDURE — 80048 BASIC METABOLIC PNL TOTAL CA: CPT | Performed by: STUDENT IN AN ORGANIZED HEALTH CARE EDUCATION/TRAINING PROGRAM

## 2023-01-18 PROCEDURE — 36415 COLL VENOUS BLD VENIPUNCTURE: CPT | Performed by: STUDENT IN AN ORGANIZED HEALTH CARE EDUCATION/TRAINING PROGRAM

## 2023-01-18 PROCEDURE — 250N000011 HC RX IP 250 OP 636: Performed by: INTERNAL MEDICINE

## 2023-01-18 PROCEDURE — 258N000003 HC RX IP 258 OP 636: Performed by: HOSPITALIST

## 2023-01-18 PROCEDURE — 120N000001 HC R&B MED SURG/OB

## 2023-01-18 PROCEDURE — 85027 COMPLETE CBC AUTOMATED: CPT | Performed by: STUDENT IN AN ORGANIZED HEALTH CARE EDUCATION/TRAINING PROGRAM

## 2023-01-18 PROCEDURE — 99254 IP/OBS CNSLTJ NEW/EST MOD 60: CPT | Performed by: INTERNAL MEDICINE

## 2023-01-18 PROCEDURE — 99232 SBSQ HOSP IP/OBS MODERATE 35: CPT | Performed by: HOSPITALIST

## 2023-01-18 PROCEDURE — 250N000013 HC RX MED GY IP 250 OP 250 PS 637: Performed by: STUDENT IN AN ORGANIZED HEALTH CARE EDUCATION/TRAINING PROGRAM

## 2023-01-18 RX ORDER — CEFTRIAXONE 2 G/1
2 INJECTION, POWDER, FOR SOLUTION INTRAMUSCULAR; INTRAVENOUS EVERY 24 HOURS
Status: DISCONTINUED | OUTPATIENT
Start: 2023-01-18 | End: 2023-01-27 | Stop reason: HOSPADM

## 2023-01-18 RX ORDER — SODIUM CHLORIDE 9 MG/ML
INJECTION, SOLUTION INTRAVENOUS CONTINUOUS
Status: DISCONTINUED | OUTPATIENT
Start: 2023-01-18 | End: 2023-01-20

## 2023-01-18 RX ADMIN — AMPICILLIN SODIUM AND SULBACTAM SODIUM 3 G: 2; 1 INJECTION, POWDER, FOR SOLUTION INTRAMUSCULAR; INTRAVENOUS at 08:53

## 2023-01-18 RX ADMIN — OXYCODONE HYDROCHLORIDE 5 MG: 5 TABLET ORAL at 10:45

## 2023-01-18 RX ADMIN — OXYCODONE HYDROCHLORIDE 5 MG: 5 TABLET ORAL at 23:41

## 2023-01-18 RX ADMIN — ACETAMINOPHEN 1000 MG: 500 TABLET ORAL at 10:45

## 2023-01-18 RX ADMIN — ACETAMINOPHEN 1000 MG: 500 TABLET ORAL at 17:09

## 2023-01-18 RX ADMIN — OXYCODONE HYDROCHLORIDE 5 MG: 5 TABLET ORAL at 17:09

## 2023-01-18 RX ADMIN — CEFTRIAXONE SODIUM 2 G: 2 INJECTION, POWDER, FOR SOLUTION INTRAMUSCULAR; INTRAVENOUS at 14:31

## 2023-01-18 RX ADMIN — SODIUM CHLORIDE 500 ML: 9 INJECTION, SOLUTION INTRAVENOUS at 07:37

## 2023-01-18 RX ADMIN — ACETAMINOPHEN 1000 MG: 500 TABLET ORAL at 23:41

## 2023-01-18 RX ADMIN — SODIUM CHLORIDE: 9 INJECTION, SOLUTION INTRAVENOUS at 14:33

## 2023-01-18 RX ADMIN — Medication 1250 MG: at 15:18

## 2023-01-18 ASSESSMENT — ACTIVITIES OF DAILY LIVING (ADL)
ADLS_ACUITY_SCORE: 20
ADLS_ACUITY_SCORE: 37
ADLS_ACUITY_SCORE: 20
DRESSING/BATHING_DIFFICULTY: NO
FALL_HISTORY_WITHIN_LAST_SIX_MONTHS: NO
DIFFICULTY_EATING/SWALLOWING: NO
ADLS_ACUITY_SCORE: 37
ADLS_ACUITY_SCORE: 20
ADLS_ACUITY_SCORE: 20
CHANGE_IN_FUNCTIONAL_STATUS_SINCE_ONSET_OF_CURRENT_ILLNESS/INJURY: NO
ADLS_ACUITY_SCORE: 37
CONCENTRATING,_REMEMBERING_OR_MAKING_DECISIONS_DIFFICULTY: NO
DOING_ERRANDS_INDEPENDENTLY_DIFFICULTY: NO
ADLS_ACUITY_SCORE: 20
ADLS_ACUITY_SCORE: 37
WEAR_GLASSES_OR_BLIND: NO
ADLS_ACUITY_SCORE: 20
TOILETING_ISSUES: NO
ADLS_ACUITY_SCORE: 20
WALKING_OR_CLIMBING_STAIRS_DIFFICULTY: NO
ADLS_ACUITY_SCORE: 37

## 2023-01-18 NOTE — CONSULTS
Consult Date: 01/18/2023    INFECTIOUS DISEASE CONSULTATION    LOCATION:  Room 821.    REFERRING PHYSICIAN:  Landon Marin MD.    IMPRESSION:  1.  A 42-year-old female with a several-week illness of shortness of breath, cough, feverish symptoms, found to have a large loculated right pleural effusion and empyema, aspiration of the fluid just coming back now with Streptococcus pneumoniae, so has classic community-acquired pneumonia-related empyema of a prior untreated pneumonia.  2.  Chronic anemia.  3.  Depression.    RECOMMENDATIONS:  1.  Dr. Murcia to see.  Plan is VATS intervention and agree with this.  2.  Change antibiotics to vancomycin and ceftriaxone until sensitivities back.  Unasyn not reliably covering this organism.  3.  Amount and type of antibiotics depends on how well we were able to get drained.    HISTORY OF PRESENT ILLNESS:  This 42-year-old female is seen in consultation due to a loculated right pleural effusion with strep pneumonia infection.  The patient has been feeling poorly for about 6 weeks.  Never really noticed any particular initial impressive illness, more of a gradual onset problem, but it has been ongoing to the point of 20-pound weight loss, malaise, fatigue, shaking chills, sweats.  At presentation, has an obvious loculated pleural effusion on the right and empyema.  Aspiration was grossly purulent and culture now growing Streptococcus pneumoniae.    PAST MEDICAL HISTORY:  MRSA listed in the chart from 2007.  No recent infections.  Otherwise without major medical problems.    ALLERGIES:  NO ANTIMICROBIAL ALLERGIES.    MEDICATIONS:  As listed.    REVIEW OF SYSTEMS:  Largely as listed above.  No particular other focal symptoms.  Feels miserable.  Is quite happy currently because she is n.p.o.    SOCIAL AND FAMILY HISTORY:  No recent travels or exposures.  Has the MRSA history as noted.  No relevant family history.    PHYSICAL EXAMINATION:    GENERAL:  The patient appears her stated  age, does not look particularly toxic or ill.  Is quite unhappy with being n.p.o., does not want to talk much.  VITAL SIGNS:  Currently include earlier temperature of 101, tachycardic at 116.  HEENT:  No thrush or oropharyngeal lesions.  Pupils reactive.  NECK:  Supple and nontender.  HEART:  Regular rhythm, tachycardic.  LUNGS:  Decreased breath sounds on the right.  ABDOMEN:  Nontender.  EXTREMITIES:  No rash or skin lesions.    LABORATORY DATA:  Aspiration of the pleural fluid looks infected and cultures now coming back with Streptococcus pneumoniae and thus diagnostic of empyema.    Thank you very much for the consultation.  I will follow the patient with you.    Ravinder Duran MD        D: 2023   T: 2023   MT: [id number]    Name:     NATASHA CASEY  MRN:      [id number]        Account:      [id number]   :      1980           Consult Date: 2023     Document: [id number]

## 2023-01-18 NOTE — PROGRESS NOTES
[facility]    Medicine Progress Note - Hospitalist Service    Date of Admission:  1/17/2023    Assessment & Plan   Kamilah Goldberg is a 42 year old female admitted on 1/17/2023. She presents with fever / cough / SOB.      Large loculated right pleural effusion and associated atelectasis.  Community acquired pneumonia of right lower lobe of lung  Empyema (H) ??  Presents with 6-week history of shortness of breath/cough/fevers.  She also reports of over 20 pound weight loss over the past several weeks.  Denies any IV drug use. On admission labs are pertinent for a hemoglobin of 8.0, WBC is within normal limits.  CT chest with no pulmonary embolus, aortic aneurysm or dissection. Large loculated right pleural effusion and associated atelectasis. Mild left lower lobe infiltrate may be pneumonia. Bilateral axillary lymph node enlargement.  She is status post thoracentesis at outside hospital, 60 mL was removed and fluid did appear to be purulent.  Currently hemodynamically stable, satting well on room air and in no respiratory distress at this time.  - IV Unasyn, switched to vancomycin/ceftriaxone given step pneumo in culture 1/18  - Infectious disease appreciated  -Thoracic surgery appreciated-- recommends R VATS possible thoracotomy decortication on 1/19 (if OR available)  - NPO at midnight  - Thoracentesis with strep pneumoniae in pleural fluid  - IVF  - PRN oxycodone and tylenol for pain     Acute on chronic anemia  Iron deficiency anemia  Patient was given 1 unit of RBCs at outside hospital for hemoglobin 6.5.  Repeat hemoglobin of 8.0.  No current evidence of active bleeding at this time.  -CBC daily  - may need additional unit of blood after VATS    Hyponatremia  Na near 130 at baseline (consistent over past year)  - BMP daily    Hypokalemia  Repleted on admit     Low back pain  Treat symptomatically as needed     Depression  Stable, follow as an outpatient       Diet: Regular Diet  "Adult  NPO per Anesthesia Guidelines for Procedure/Surgery Except for: Meds    DVT Prophylaxis: Pneumatic Compression Devices  Roy Catheter: Not present  Lines: None     Cardiac Monitoring: None  Code Status: Full Code      Clinically Significant Risk Factors Present on Admission        # Hypokalemia: Lowest K = 2.8 mmol/L in last 2 days, will replace as needed        # Coagulation Defect: INR = 1.41 (Ref range: 0.85 - 1.15) and/or PTT = N/A, will monitor for bleeding                 Disposition Plan      Expected Discharge Date: 01/21/2023                  Fadumo Sadler  [facility]  Securely message with [employer] (more info)  Text page via [facility] Paging/Directory   ______________________________________________________________________    Interval History   Patient seen and examined. She is complaining of pain with deep breaths and any movement. She wants something to eat and drink (had been NPO this morning). States it is hard to cough because she hasn't had anything to eat or drink. Annoyed when I try to explain care plan to her, as she has \"heard it all before\". Discussed plan with RN.    Physical Exam   Vital Signs: Temp: 98.6  F (37  C) Temp src: Oral BP: 93/60 Pulse: 81   Resp: 16 SpO2: 97 % O2 Device: None (Room air)    Weight: 129 lbs 0 oz    Constitutional: Awake, alert, cooperative, annoyed  Respiratory: clear on left, decreased on right  Cardiovascular: Regular rhythm, HR near 100, normal S1 and S2, and no murmur noted  GI: Normal bowel sounds, soft, non-distended, non-tender  Skin/Integumen: No rashes, no cyanosis, no edema  Other:     Medical Decision Making       35 MINUTES SPENT BY ME on the date of service doing chart review, history, exam, documentation & further activities per the note.      Data     I have personally reviewed the following data over the past 24 hrs:    4.6  \   7.6 (L)   / 543 (H)     131 (L) 100 3.5 (L) /  80   4.1 24 0.39 (L) \     "   Imaging results reviewed over the past 24 hrs:   No results found for this or any previous visit (from the past 24 hour(s)).

## 2023-01-18 NOTE — PHARMACY-ADMISSION MEDICATION HISTORY
Pharmacy Medication History  Admission medication history interview status for the 1/17/2023 admission is complete. See EPIC admission navigator for prior to admission medications     Location of Interview: Patient room  Medication history sources: Patient        Additional medication history information:   Most recent Ibuprofen prior to [facility] ED visit    Medication reconciliation completed by provider prior to medication history? No    Time spent in this activity: 10 minutes    Prior to Admission medications    Medication Sig Last Dose Taking? Auth Provider Long Term End Date   ibuprofen (ADVIL/MOTRIN) 200 MG tablet Take 800 mg by mouth 2 times daily 1/15/2023 Yes Reported, Patient         The information provided in this note is only as accurate as the sources available at the time of update(s)

## 2023-01-18 NOTE — H&P
[facility]    History and Physical - Hospitalist Service       Date of Admission:  1/17/2023    Assessment & Plan      Kamilah Goldberg is a 42 year old female admitted on 1/17/2023. She presents with fever / cough / SOB.       Large loculated right pleural effusion and associated atelectasis.    Community acquired pneumonia of right lower lobe of lung    Empyema (H) ??    Assessment: Presents with 6-week history of shortness of breath/cough/fevers.  She also reports of over 20 pound weight loss over the past several weeks.  Denies any IV drug use. On admission labs are pertinent for a hemoglobin of 8.0, WBC is within normal limits.  CT chest with no pulmonary embolus, aortic aneurysm or dissection. Large loculated right pleural effusion and associated atelectasis. Mild left lower lobe infiltrate may be pneumonia. Bilateral axillary lymph node enlargement.  She is status post thoracentesis at outside hospital, 60 mL was removed and fluid did appear to be purulent.  Currently hemodynamically stable, satting well on room air and in no respiratory distress at this time.    Plan:   -Admit inpatient  -IV Unasyn  -Infectious disease consultation  -Thoracic surgery consultation  -Follow results of thoracentesis  -Follow vitals/temp  -Oximetry  -N.p.o. at midnight    Acute on chronic anemia  Iron deficiency anemia  Assessment: Patient was given 1 unit of RBCs at outside hospital for hemoglobin 6.5.  Repeat hemoglobin of 8.0.  No current evidence of active bleeding at this time.  Plan:  -CBC daily  -Watch for evidence of acute blood loss      Low back pain    Assessment/Plan: Treat symptomatically as needed      Depression    Assessment/Plan: Stable, follow as an outpatient         Diet: Combination Diet Low Saturated Fat Na <2400mg Diet, No Caffeine Diet  DVT Prophylaxis: Pneumatic Compression Devices  Ory Catheter: Not present  Lines: None     Cardiac Monitoring: None  Code Status: Full  Code    Clinically Significant Risk Factors Present on Admission        # Hypokalemia: Lowest K = 2.8 mmol/L in last 2 days, will replace as needed        # Coagulation Defect: INR = 1.41 (Ref range: 0.85 - 1.15) and/or PTT = N/A, will monitor for bleeding                 Disposition Plan      Expected Discharge Date: 01/19/2023                  Landon Marin MD  Hospitalist Service  [facility]  Securely message with [employer] (more info)  Text page via [facility] Paging/Directory     ______________________________________________________________________    Chief Complaint     Cough/shortness of breath/fever    History is obtained from the patient    History of Present Illness      Kamilah Goldberg is a 42 year old female with past medical history of iron deficiency anemia, low back pain, major depression who presents for evaluation of 6 weeks history of shortness of breath/cough/fever.    Patient ports that for the past 6 weeks, she is at ongoing symptoms of cough that is productive of slimy brown mucus  along with intermittent blood, generalized chest discomfort and dyspnea with exertion.  She has some nausea but no vomiting.  She reports a fever of 103  F over the past several days.  She denies any recent travel, no exposures to any respiratory illnesses or sick contacts.  She is a former smoker, she denies any IV drug abuse.  She has no bloody stools, no diarrhea.  She has a history of gastric bypass.  Denies any recent chest wall trauma.  She has no prior history of asthma/COPD or recurrent pulmonary infections.  She otherwise does report that she has lost over 20 pounds over the past several weeks.  This is mainly due to poor appetite and dyspnea with exertion.  She denies any PND/orthopnea, no calf pain or leg swelling.  No recent surgeries or periods of prolonged immobility.  She otherwise has no other complaints this time.  At baseline she is not on supplemental oxygen      Past Medical  History    Past Medical History:   Diagnosis Date     Back pain      Depression since age 16    On Wellbutrin 100 mg BID--started taking meds at age 16, was on Paxil for several years and then switched to Wellbutrin in [id number]     UTI (lower urinary tract infection)     X 3 as of 12/8/09       Past Surgical History   Past Surgical History:   Procedure Laterality Date     BYPASS GASTRIC CARA-EN-Y, LIVER BIOPSY, COMBINED       CHOLECYSTECTOMY       CRYOCAUTERY OF CERVIX  2001 2001     DISCECTOMY LUMBAR MINIMALLY INVASIVE ONE LEVEL  5/24/2013    Procedure: DISCECTOMY LUMBAR MINIMALLY INVASIVE ONE LEVEL;  MIS Right side L5-S1 Gonzalo Lami Microdiscectomy, ;  Surgeon: Alba Anderson MD;  Location: [facility] OR     TUBAL LIGATION      2012       Prior to Admission Medications   Prior to Admission Medications   Prescriptions Last Dose Informant Patient Reported? Taking?   ibuprofen (ADVIL/MOTRIN) 200 MG tablet 1/15/2023 Self Yes Yes   Sig: Take 800 mg by mouth 2 times daily      Facility-Administered Medications: None        Review of Systems    The 10 point Review of Systems is negative other than noted in the HPI or here.     Social History   I have reviewed this patient's social history and updated it with pertinent information if needed.  Social History     Tobacco Use     Smoking status: Never     Smokeless tobacco: Never   Substance Use Topics     Alcohol use: Yes     Comment: occasional     Drug use: No       Family History   I have reviewed this patient's family history and updated it with pertinent information if needed.  Family History   Problem Relation Age of Onset     Neurologic Disorder Mother         migraines     C.A.D. Mother         MI age 49     Neurologic Disorder Maternal Grandmother         migraines     Cancer Maternal Grandfather         ? type     Diabetes Maternal Aunt      Cerebrovascular Disease Other         Maternal Great Grandmother     C.A.D. Maternal Uncle         MI     Cancer Maternal Uncle          bladder       Allergies   Allergies   Allergen Reactions     Naproxen Hives     Codeine Sulfate GI Disturbance     Hydrocodone Nausea and Vomiting        Physical Exam   Vital Signs: Temp: 97.7  F (36.5  C) Temp src: Oral BP: 94/63 Pulse: 83   Resp: 16 SpO2: 100 % O2 Device: None (Room air)    Weight: 0 lbs 0 oz    Constitutional: awake, alert, cooperative, no apparent distress.   Eyes: Lids and lashes normal, pupils equal, round and reactive to light   ENT: Normocephalic, without obvious abnormality, atraumatic, sinuses nontender on palpation   Hematologic / Lymphatic: no cervical lymphadenopathy   Respiratory: Pulmonary effort is normal. No tachypnea or respiratory distress.  Breath sounds: Examination of the left-middle field reveals rales. Examination of the left-lower field reveals rales. Rales present. No wheezing or rhonchi.   Cardiovascular: RRR with no m/r/g   GI: Normal bowel sounds, soft, non-distended, non-tender. Skin: normal skin color, texture, turgor   Musculoskeletal: There is no redness, warmth, or swelling of the joints. Full range of motion noted.   Neurologic: Awake, alert, oriented to name, place and time. Cranial nerves II-XII are grossly intact. Motor is 5 out of 5 bilaterally. Sensory is intact.   Neuropsychiatric: normal mood and affect    ----------------------------------------------------------------------------------------------------------------------------------    Medical Decision Making       75 MINUTES SPENT BY ME on the date of service doing chart review, history, exam, documentation & further activities per the note.      Data     I have personally reviewed the following data over the past 24 hrs:    5.3  \   8.0 (L)   / 518 (H)     N/A N/A N/A /  N/A   4.2 N/A N/A \       ALT: N/A AST: N/A AP: N/A TBILI: N/A   ALB: N/A TOT PROTEIN: 6.3 (L) LIPASE: N/A       Procal: N/A CRP: N/A Lactic Acid: 0.8       INR:  1.41 (H) PTT:  N/A   D-dimer:  N/A Fibrinogen:  N/A        Ferritin:  N/A % Retic:  N/A LDH:  263 (H)       Imaging results reviewed over the past 24 hrs:   Recent Results (from the past 24 hour(s))   XR Chest 1 View    Narrative    CHEST ONE VIEW January 17, 2023 3:15 PM     HISTORY: Large right pleural fluid collection.    COMPARISON: CT chest dated 1/16/2023.    FINDINGS: There is a large loculated appearing right pleural fluid  collection with adjacent compressive atelectasis in the right lung.  Left lung is grossly clear. No pneumothorax is identified in either  hemithorax. Heart is not completely seen due to the right basilar  opacity from the pleural fluid collection and atelectasis. Right  basilar infiltrate is not excluded. No acute osseous fracture.       Impression    IMPRESSION: Large loculated appearing right pleural fluid collection  is again noted with no evidence for pneumothorax.    NAVARRO BURKETT MD         SYSTEM ID:  [id number]   US Thoracentesis    Narrative    US THORACENTESIS   1/17/2023 3:19 PM     HISTORY: Large right pleural fluid collection.    COMPARISON: CT chest dated 1/16/2023.    FINDINGS:  There is a loculated complex right hemithorax fluid  collection.    PROCEDURE:  The risks and benefits including bleeding, infection and  pneumothorax were discussed and the patient wished to continue.   Written informed consent was obtained from the patient prior to the  procedure. Appropriate time-out procedure was followed. Initial  ultrasound images demonstrate a large complex fluid collection in the  right hemithorax. Using sonographic guidance an appropriate skin  puncture site was selected.  The skin was then prepped, draped and  anesthetized in usual sterile fashion utilizing approximately 5 mL of  1% lidocaine. An 5.0 Fr One-Step Centesis Catheter was placed into the  right, left thoracic cavity over a posterior rib using the included  needle. Approximately 70 mL of cloudy yellowish fluid was then  aspirated. I could not aspirate more fluid as  the tube appeared to be  plugged even with manipulations. The large amount of pleural fluid  remains on postprocedural imaging. Postprocedural chest x-ray  demonstrated no evidence for pneumothorax. A large right pleural fluid  collection persists on postprocedural chest x-ray. The patient  tolerated the procedure well and there were no immediate  complications.     PHYSICIAN:  Dr. Navarro Burkett  ASSISTANTS:  Ultrasound technologists  PROCEDURE:  Ultrasound guided right thoracentesis  FINDINGS:  Large complex right pleural fluid collection is noted.  Cloudy yellowish fluid was aspirated. The pleural fluid collection  persists post thoracentesis.  ESTIMATED BLOOD LOSS:  Less than 5 mL.  SPECIMENS REMOVED:  Approximately 70 mL of cloudy yellowish fluid.  POST OP DIAGNOSIS:  Successful diagnostic thoracentesis right  hemithorax. Therapeutic thoracentesis was unsuccessful due to the  loculations of the fluid. Fluid was sent to the laboratory for  appropriate evaluation and final diagnosis is awaiting final  pathologic and laboratory evaluation.      Impression    IMPRESSION: Technically successful ultrasound-guided thoracentesis. 70  mL of cloudy yellowish fluid were aspirated and sent to lab for  appropriate analysis.    NAVARRO BURKETT MD         SYSTEM ID:  [id number]     Most Recent 3 CBC's:  Recent Labs   Lab Test 01/17/23  0641 01/16/23 2050 12/04/22 2111   WBC 5.3 5.1 9.9   HGB 8.0* 6.5* 10.2*   MCV 75* 73* 70*   * 571* 276     Most Recent 3 BMP's:  Recent Labs   Lab Test 01/17/23  1231 01/17/23  0641 01/16/23 2050 12/04/22 2111 02/24/22 2030 02/24/22  1458   NA  --   --  132* 129*  --  131*   POTASSIUM 4.2 2.9* 2.8* 4.0   < > 2.6*   CHLORIDE  --   --  96 96  --  95   CO2  --   --  29 26  --  26   BUN  --   --  12 12  --  11   CR  --   --  0.40* 0.70  --  0.50*   ANIONGAP  --   --  7 7  --  10   MAUREEN  --   --  7.8* 7.9*  --  8.8   GLC  --   --  104* 104*  --  119*    < > = values in this interval not  displayed.     Most Recent 2 LFT's:  Recent Labs   Lab Test 01/16/23 2050 12/04/22  2111   AST 98* 38   ALT 61* 25   ALKPHOS 55 84   BILITOTAL 0.1* 0.3     Most Recent 3 INR's:  Recent Labs   Lab Test 01/17/23  1231   INR 1.41*     Most Recent 3 Troponin's:No lab results found.  Most Recent 3 BNP's:  Recent Labs   Lab Test 01/16/23 2050   [id number] 236

## 2023-01-18 NOTE — PROGRESS NOTES
THORACIC SURGERY    43 yo woman  Months of respiratory illness    Admitted with large right pleural effusion  Thoracentesis  Low glucose, exudate  Gram stain - so far   4+WBC    Reviewed findings    parapneumonic effusion/ empyema    Options of treatment discussed    Recommend r VATS possible right thoracotomy decortication    Options, operation, goals and risks discussed    Will try to add to OR schedule tomorrow    OLI PONCE MD [facility] ONCOLOGY THORACIC SURGERY  CELL:  [phone]  OFFICE: [phone]

## 2023-01-18 NOTE — CONSULTS
ID consult dictated IMP 1 41 yo female several weeks ill, R empyema    REC unasun covering await cxs and Thoracic plan

## 2023-01-18 NOTE — PROVIDER NOTIFICATION
MD Notification    Notified Person: MD    Notified Person Name: Dr. Sadler     Notification Date/Time: 1/18/23 0708    Notification Interaction: Web Based Paging    Purpose of Notification: Pt with soft bps all night. Last recheck 82/55. Please advise    Orders Received:    Comments:

## 2023-01-18 NOTE — PROVIDER NOTIFICATION
MD Notification    Notified Person: MD    Notified Person Name: Renee    Notification Date/Time: 1/18/23    Notification Interaction: Paged/telephone    Purpose of Notification: Positive culture results from pleural fluid. Streptococcus Pneumoniae.     Orders Received: MD entered new orders. See new antibiotic orders.     Comments:

## 2023-01-18 NOTE — PROVIDER NOTIFICATION
MD Notification    Notified Person: MD    Notified Person Name:     Notification Date/Time: 1/17/23 9165    Notification Interaction: [employer]    Purpose of Notification:  Pt is a direct admit, arrived from [facility]. No current orders.    Orders Received: orders placed    Comments:

## 2023-01-18 NOTE — PHARMACY-VANCOMYCIN DOSING SERVICE
Pharmacy Vancomycin Initial Note  Date of Service 2023  Patient's  1980  42 year old, female    Indication: Empyema    Current estimated CrCl = Estimated Creatinine Clearance: 177.1 mL/min (A) (based on SCr of 0.39 mg/dL (L)).    Creatinine for last 3 days  2023:  8:50 PM Creatinine 0.40 mg/dL  2023:  7:21 AM Creatinine 0.39 mg/dL    Recent Vancomycin Level(s) for last 3 days  No results found for requested labs within last 72 hours.      Vancomycin IV Administrations (past 72 hours)      No vancomycin orders with administrations in past 72 hours.                Nephrotoxins and other renal medications (From now, onward)    None          Contrast Orders - past 72 hours (72h ago, onward)    None          [employer] Prediction of Planned Initial Vancomycin Regimen  Loading dose: N/A  Regimen: 1250 mg IV every 12 hours.  Start time: 14:09 on 2023  Exposure target: AUC24 (range)400-600 mg/L.hr   AUC24,ss: 462 mg/L.hr  Probability of AUC24 > 400: 65 %  Ctrough,ss: 12 mg/L  Probability of Ctrough,ss > 20: 17 %  Probability of nephrotoxicity (Lodise BRIA ): 7 %        Plan:  1. Start vancomycin  1250 mg IV q12h.   2. Vancomycin monitoring method: AUC  3. Vancomycin therapeutic monitoring goal: 400-600 mg*h/L  4. Pharmacy will check vancomycin levels as appropriate in 1-3 Days.    5. Serum creatinine levels will be ordered daily for the first week of therapy and at least twice weekly for subsequent weeks.      Florina Merino RPH

## 2023-01-18 NOTE — PLAN OF CARE
Goal Outcome Evaluation:       Shift Note: [id number]  VSS on RA ex soft bp, will page MD about soft BP (82/55). Pt denies pain, nausea. Aox4. New PIV placed during shift, SL with int abx. No bm during shift. Pt ind In room. Denies dizziness, DAS at times. Right back band aid from thoracentesis CDI. Pt NPO since midnight. Thoracic surgery consult placed. Discharge pending.

## 2023-01-19 ENCOUNTER — ANESTHESIA EVENT (OUTPATIENT)
Dept: SURGERY | Facility: CLINIC | Age: 43
End: 2023-01-19
Payer: COMMERCIAL

## 2023-01-19 LAB
ABO/RH(D): NORMAL
ANION GAP SERPL CALCULATED.3IONS-SCNC: 10 MMOL/L (ref 7–15)
ANTIBODY SCREEN: NEGATIVE
BUN SERPL-MCNC: 3.2 MG/DL (ref 6–20)
CALCIUM SERPL-MCNC: 7.9 MG/DL (ref 8.6–10)
CHLORIDE SERPL-SCNC: 101 MMOL/L (ref 98–107)
CREAT SERPL-MCNC: 0.4 MG/DL (ref 0.51–0.95)
DEPRECATED HCO3 PLAS-SCNC: 26 MMOL/L (ref 22–29)
ERYTHROCYTE [DISTWIDTH] IN BLOOD BY AUTOMATED COUNT: 19.4 % (ref 10–15)
GFR SERPL CREATININE-BSD FRML MDRD: >90 ML/MIN/1.73M2
GLUCOSE SERPL-MCNC: 79 MG/DL (ref 70–99)
HCG UR QL: NEGATIVE
HCT VFR BLD AUTO: 27.4 % (ref 35–47)
HGB BLD-MCNC: 8.5 G/DL (ref 11.7–15.7)
INR PPP: 1.27 (ref 0.85–1.15)
MAGNESIUM SERPL-MCNC: 1.5 MG/DL (ref 1.7–2.3)
MAGNESIUM SERPL-MCNC: 2.3 MG/DL (ref 1.7–2.3)
MCH RBC QN AUTO: 23.7 PG (ref 26.5–33)
MCHC RBC AUTO-ENTMCNC: 31 G/DL (ref 31.5–36.5)
MCV RBC AUTO: 76 FL (ref 78–100)
PATH REPORT.COMMENTS IMP SPEC: NORMAL
PATH REPORT.FINAL DX SPEC: NORMAL
PATH REPORT.GROSS SPEC: NORMAL
PATH REPORT.MICROSCOPIC SPEC OTHER STN: NORMAL
PATH REPORT.RELEVANT HX SPEC: NORMAL
PHOSPHATE SERPL-MCNC: 3.9 MG/DL (ref 2.5–4.5)
PLATELET # BLD AUTO: 572 10E3/UL (ref 150–450)
POTASSIUM SERPL-SCNC: 3.9 MMOL/L (ref 3.4–5.3)
RBC # BLD AUTO: 3.59 10E6/UL (ref 3.8–5.2)
SODIUM SERPL-SCNC: 137 MMOL/L (ref 136–145)
SPECIMEN EXPIRATION DATE: NORMAL
WBC # BLD AUTO: 3.6 10E3/UL (ref 4–11)

## 2023-01-19 PROCEDURE — 85610 PROTHROMBIN TIME: CPT | Performed by: ANESTHESIOLOGY

## 2023-01-19 PROCEDURE — 80048 BASIC METABOLIC PNL TOTAL CA: CPT | Performed by: HOSPITALIST

## 2023-01-19 PROCEDURE — 258N000003 HC RX IP 258 OP 636: Performed by: INTERNAL MEDICINE

## 2023-01-19 PROCEDURE — 81025 URINE PREGNANCY TEST: CPT | Performed by: ANESTHESIOLOGY

## 2023-01-19 PROCEDURE — 94640 AIRWAY INHALATION TREATMENT: CPT

## 2023-01-19 PROCEDURE — 999N000040 HC STATISTIC CONSULT NO CHARGE VASC ACCESS

## 2023-01-19 PROCEDURE — 99232 SBSQ HOSP IP/OBS MODERATE 35: CPT | Performed by: INTERNAL MEDICINE

## 2023-01-19 PROCEDURE — 250N000011 HC RX IP 250 OP 636: Performed by: HOSPITALIST

## 2023-01-19 PROCEDURE — 999N000157 HC STATISTIC RCP TIME EA 10 MIN

## 2023-01-19 PROCEDURE — 120N000001 HC R&B MED SURG/OB

## 2023-01-19 PROCEDURE — 250N000013 HC RX MED GY IP 250 OP 250 PS 637: Performed by: STUDENT IN AN ORGANIZED HEALTH CARE EDUCATION/TRAINING PROGRAM

## 2023-01-19 PROCEDURE — 85027 COMPLETE CBC AUTOMATED: CPT | Performed by: HOSPITALIST

## 2023-01-19 PROCEDURE — 86901 BLOOD TYPING SEROLOGIC RH(D): CPT | Performed by: ANESTHESIOLOGY

## 2023-01-19 PROCEDURE — 999N000127 HC STATISTIC PERIPHERAL IV START W US GUIDANCE

## 2023-01-19 PROCEDURE — 250N000011 HC RX IP 250 OP 636: Performed by: INTERNAL MEDICINE

## 2023-01-19 PROCEDURE — 36415 COLL VENOUS BLD VENIPUNCTURE: CPT | Performed by: ANESTHESIOLOGY

## 2023-01-19 PROCEDURE — 36415 COLL VENOUS BLD VENIPUNCTURE: CPT | Performed by: HOSPITALIST

## 2023-01-19 PROCEDURE — 258N000003 HC RX IP 258 OP 636: Performed by: HOSPITALIST

## 2023-01-19 PROCEDURE — 99232 SBSQ HOSP IP/OBS MODERATE 35: CPT | Performed by: HOSPITALIST

## 2023-01-19 PROCEDURE — 250N000009 HC RX 250: Performed by: HOSPITALIST

## 2023-01-19 PROCEDURE — 84100 ASSAY OF PHOSPHORUS: CPT | Performed by: HOSPITALIST

## 2023-01-19 PROCEDURE — 83735 ASSAY OF MAGNESIUM: CPT | Performed by: HOSPITALIST

## 2023-01-19 RX ORDER — MAGNESIUM SULFATE HEPTAHYDRATE 40 MG/ML
4 INJECTION, SOLUTION INTRAVENOUS ONCE
Status: COMPLETED | OUTPATIENT
Start: 2023-01-19 | End: 2023-01-19

## 2023-01-19 RX ORDER — ALBUTEROL SULFATE 0.83 MG/ML
2.5 SOLUTION RESPIRATORY (INHALATION)
Status: DISCONTINUED | OUTPATIENT
Start: 2023-01-19 | End: 2023-01-27 | Stop reason: HOSPADM

## 2023-01-19 RX ADMIN — ACETAMINOPHEN 1000 MG: 500 TABLET ORAL at 13:12

## 2023-01-19 RX ADMIN — Medication 1250 MG: at 02:28

## 2023-01-19 RX ADMIN — ACETAMINOPHEN 1000 MG: 500 TABLET ORAL at 19:49

## 2023-01-19 RX ADMIN — SODIUM CHLORIDE: 9 INJECTION, SOLUTION INTRAVENOUS at 23:58

## 2023-01-19 RX ADMIN — OXYCODONE HYDROCHLORIDE 5 MG: 5 TABLET ORAL at 05:44

## 2023-01-19 RX ADMIN — CEFTRIAXONE SODIUM 2 G: 2 INJECTION, POWDER, FOR SOLUTION INTRAMUSCULAR; INTRAVENOUS at 13:12

## 2023-01-19 RX ADMIN — OXYCODONE HYDROCHLORIDE 5 MG: 5 TABLET ORAL at 19:49

## 2023-01-19 RX ADMIN — OXYCODONE HYDROCHLORIDE 5 MG: 5 TABLET ORAL at 23:55

## 2023-01-19 RX ADMIN — OXYCODONE HYDROCHLORIDE 5 MG: 5 TABLET ORAL at 14:34

## 2023-01-19 RX ADMIN — MAGNESIUM SULFATE HEPTAHYDRATE 4 G: 40 INJECTION, SOLUTION INTRAVENOUS at 09:06

## 2023-01-19 RX ADMIN — ALBUTEROL SULFATE 2.5 MG: 2.5 SOLUTION RESPIRATORY (INHALATION) at 14:45

## 2023-01-19 RX ADMIN — Medication 1250 MG: at 14:38

## 2023-01-19 RX ADMIN — OXYCODONE HYDROCHLORIDE 5 MG: 5 TABLET ORAL at 09:49

## 2023-01-19 RX ADMIN — ACETAMINOPHEN 1000 MG: 500 TABLET ORAL at 05:44

## 2023-01-19 ASSESSMENT — ACTIVITIES OF DAILY LIVING (ADL)
ADLS_ACUITY_SCORE: 20
ADLS_ACUITY_SCORE: 22
ADLS_ACUITY_SCORE: 20
ADLS_ACUITY_SCORE: 20

## 2023-01-19 NOTE — PLAN OF CARE
Hypotensive this AM. BP improved after fluid bolus but still soft. TMAX 100.3. All other VSS on room air. PRN tylenol and oxycodone given x2 for c/o sharp pain to R side/back. Provided some relief. Thora site to R upper back, dressing CDI. Nonproductive cough. DAS. LS coarse on R. ID following. Positive culture on pleural fluid (see note). Antibiotics changed to IV rocephin and vanco. Thoracic surgery recommending R VATS and possible thoracotomy. Plan for tentative surgery tomorrow. NPO at midnight. Patient transferred to [facility] floor at about 1815. Report given to VICENTA Maxwell.

## 2023-01-19 NOTE — PROGRESS NOTES
[facility]    Medicine Progress Note - Hospitalist Service    Date of Admission:  1/17/2023    Assessment & Plan   Kamilah Goldberg is a 42 year old female admitted on 1/17/2023. She presents with fever / cough / SOB.      Large loculated right pleural effusion and associated atelectasis.  Community acquired pneumonia of right lower lobe of lung  Empyema  Presents with 6-week history of shortness of breath/cough/fevers.  She also reports of over 20 pound weight loss over the past several weeks.  Denies any IV drug use. On admission labs are pertinent for a hemoglobin of 8.0, WBC is within normal limits. CT chest with no pulmonary embolus, aortic aneurysm or dissection. Large loculated right pleural effusion and associated atelectasis. Mild left lower lobe infiltrate may be pneumonia. Bilateral axillary lymph node enlargement.  1/17 Underwent thoracentesis at outside hospital, 60 mL was removed and fluid did appear to be purulent. Growing strep pneumoniae.   - IV Unasyn, switched to vancomycin/ceftriaxone given step pneumo in culture 1/18, follow up pleural fluid culture sensitivities  - Infectious disease appreciated  -Thoracic surgery appreciated-- recommends R VATS possible thoracotomy decortication on 1/20  - NPO at midnight  - IVF to continue (with frequent NPO)  - PRN oxycodone and tylenol for pain     Acute on chronic anemia  Iron deficiency anemia  Patient was given 1 unit of RBCs at outside hospital for hemoglobin 6.5.  Repeat hemoglobin of 8.0.  No current evidence of active bleeding at this time.  - CBC daily  - may need additional unit of blood after VATS    Hyponatremia, chronic  Na near 130 at baseline (consistent over past year)  - BMP daily    Hypokalemia  Hypophosphatemia  Hypomagnesemia  Replete per protocol     Low back pain  Treat symptomatically as needed     Depression  Stable, follow as an outpatient       Diet: NPO for Medical/Clinical Reasons Except for:  Meds  Regular Diet Adult    DVT Prophylaxis: Pneumatic Compression Devices  Roy Catheter: Not present  Lines: None     Cardiac Monitoring: None  Code Status: Full Code      Clinically Significant Risk Factors            # Hypomagnesemia: Lowest Mg = 1.5 mg/dL in last 2 days, will replace as needed                      Disposition Plan      Expected Discharge Date: 01/22/2023,  3:00 PM                Fadumo Sadler  Hospitalist Service  [facility]  Securely message with [employer] (more info)  Text page via [employer] Paging/Directory   ______________________________________________________________________    Interval History   Patient seen and examined. NPO again this morning, waiting to find out if space on the OR schedule (later found out, procedure will be tomorrow 1/20). Has pain with breathing, coughing, moving. Pain meds help. IV blew on left arm while getting magnesium this morning. Discussed care plan with RN    Physical Exam   Vital Signs: Temp: 98.1  F (36.7  C) Temp src: Oral BP: 100/62 Pulse: 86   Resp: 16 SpO2: 91 % O2 Device: None (Room air)    Weight: 129 lbs 0 oz    Constitutional: Awake, alert, cooperative, annoyed  Respiratory: clear on left, decreased on right  Cardiovascular: Regular rhythm, HR near 90, normal S1 and S2, and no murmur noted  GI: Normal bowel sounds, soft, non-distended, non-tender  Skin/Integumen: No rashes, no cyanosis, no edema  Other:     Medical Decision Making       35 MINUTES SPENT BY ME on the date of service doing chart review, history, exam, documentation & further activities per the note.      Data     I have personally reviewed the following data over the past 24 hrs:    3.6 (L)  \   8.5 (L)   / 572 (H)     137 101 3.2 (L) /  79   3.9 26 0.40 (L) \       INR:  1.27 (H) PTT:  N/A   D-dimer:  N/A Fibrinogen:  N/A       Imaging results reviewed over the past 24 hrs:   No results found for this or any previous visit (from the past 24 hour(s)).

## 2023-01-19 NOTE — PLAN OF CARE
A/Ox4. VSS max temp of 99.4. LS clear. +bs, +flatus, -bm. Voiding adequately. Managing pain with prn oxycodone and tylenol. Up independently. NPO since midnight. IVF infusing. Intermittent abx. Plan for OR today with Dr. Murcia.

## 2023-01-19 NOTE — PLAN OF CARE
Goal Outcome Evaluation:    Pt arrived to unit ~1815. A&Ox4, slightly lethargic from pain meds given prior to transfer to unit. C/o R sided back pain, controlled w/PRN tylenol & oxy. VSS ex hypotensive, on RA. DAS. LS dim w/posterior course crackles. Up independent. Continent of B&B. PIV infusing NS @75 ml/hr, int abx. R upper back dressing CDI. Tolerates regular diet, NPO after MN for R VATS & possible R thoracotomy tomorrow with Dr. Murcia.

## 2023-01-19 NOTE — PROGRESS NOTES
"[facility]  Infectious Disease Progress Note          Assessment and Plan:   IMPRESSION:  1.  A 42-year-old female with a several-week illness of shortness of breath, cough, feverish symptoms, found to have a large loculated right pleural effusion and empyema, aspiration of the fluid just coming back now with Streptococcus pneumoniae, so has classic community-acquired pneumonia-related empyema of a prior untreated pneumonia.  2.  Chronic anemia.  3.  Depression.     RECOMMENDATIONS:  1.  Dr. Murcia  Plan is VATS intervention today  2.  Change antibiotics to vancomycin and ceftriaxone until sensitivities back.  If sens discontinue vanco  3.  Amount and type of antibiotics depends on how well we were able to get drained. But if fully sens and well drained po likely eventual OK        Interval History:   no new complaints T down strep pneumo in cx BC neg               Medications:       cefTRIAXone  2 g Intravenous Q24H     magnesium sulfate  4 g Intravenous Once     sodium chloride (PF)  3 mL Intracatheter Q8H     vancomycin  1,250 mg Intravenous Q12H                  Physical Exam:   Blood pressure 100/62, pulse 86, temperature 98.1  F (36.7  C), temperature source Oral, resp. rate 16, height 1.6 m (5' 3\"), weight 58.5 kg (129 lb), last menstrual period 12/01/2022, SpO2 91 %.  Wt Readings from Last 2 Encounters:   01/18/23 58.5 kg (129 lb)   01/16/23 56.9 kg (125 lb 8 oz)     Vital Signs with Ranges  Temp:  [97.1  F (36.2  C)-99.4  F (37.4  C)] 98.1  F (36.7  C)  Pulse:  [81-96] 86  Resp:  [14-20] 16  BP: ()/(60-81) 100/62  SpO2:  [91 %-99 %] 91 %    Constitutional: Awake, alert, cooperative, no apparent distress   Lungs: Clear to auscultation bilaterally, no crackles or wheezing   Cardiovascular: Regular rate and rhythm, normal S1 and S2, and no murmur noted   Abdomen: Normal bowel sounds, soft, non-distended, non-tender   Skin: No rashes, no cyanosis, no edema   Other:           Data: "   All microbiology laboratory data reviewed.  Recent Labs   Lab Test 01/19/23  0533 01/18/23  0721 01/17/23  0641   WBC 3.6* 4.6 5.3   HGB 8.5* 7.6* 8.0*   HCT 27.4* 23.4* 25.5*   MCV 76* 73* 75*   * 543* 518*     Recent Labs   Lab Test 01/19/23  0533 01/18/23  0721 01/16/23 2050   CR 0.40* 0.39* 0.40*     No lab results found.  No lab results found.    Invalid input(s): UC

## 2023-01-20 ENCOUNTER — ANESTHESIA (OUTPATIENT)
Dept: SURGERY | Facility: CLINIC | Age: 43
End: 2023-01-20
Payer: COMMERCIAL

## 2023-01-20 ENCOUNTER — APPOINTMENT (OUTPATIENT)
Dept: GENERAL RADIOLOGY | Facility: CLINIC | Age: 43
End: 2023-01-20
Attending: THORACIC SURGERY (CARDIOTHORACIC VASCULAR SURGERY)
Payer: COMMERCIAL

## 2023-01-20 LAB
ANION GAP SERPL CALCULATED.3IONS-SCNC: 9 MMOL/L (ref 7–15)
BUN SERPL-MCNC: 3.3 MG/DL (ref 6–20)
CALCIUM SERPL-MCNC: 7.4 MG/DL (ref 8.6–10)
CHLORIDE SERPL-SCNC: 102 MMOL/L (ref 98–107)
CREAT SERPL-MCNC: 0.36 MG/DL (ref 0.51–0.95)
DEPRECATED HCO3 PLAS-SCNC: 24 MMOL/L (ref 22–29)
ERYTHROCYTE [DISTWIDTH] IN BLOOD BY AUTOMATED COUNT: 19.7 % (ref 10–15)
GFR SERPL CREATININE-BSD FRML MDRD: >90 ML/MIN/1.73M2
GLUCOSE SERPL-MCNC: 86 MG/DL (ref 70–99)
GRAM STAIN RESULT: NORMAL
GRAM STAIN RESULT: NORMAL
HCT VFR BLD AUTO: 25.9 % (ref 35–47)
HGB BLD-MCNC: 8 G/DL (ref 11.7–15.7)
KOH PREPARATION: NORMAL
KOH PREPARATION: NORMAL
MAGNESIUM SERPL-MCNC: 2 MG/DL (ref 1.7–2.3)
MCH RBC QN AUTO: 23.5 PG (ref 26.5–33)
MCHC RBC AUTO-ENTMCNC: 30.9 G/DL (ref 31.5–36.5)
MCV RBC AUTO: 76 FL (ref 78–100)
PHOSPHATE SERPL-MCNC: 3.5 MG/DL (ref 2.5–4.5)
PLATELET # BLD AUTO: 554 10E3/UL (ref 150–450)
POTASSIUM SERPL-SCNC: 3.6 MMOL/L (ref 3.4–5.3)
RBC # BLD AUTO: 3.41 10E6/UL (ref 3.8–5.2)
SODIUM SERPL-SCNC: 135 MMOL/L (ref 136–145)
VANCOMYCIN SERPL-MCNC: 7.6 UG/ML
WBC # BLD AUTO: 3.2 10E3/UL (ref 4–11)

## 2023-01-20 PROCEDURE — 258N000003 HC RX IP 258 OP 636: Performed by: NURSE ANESTHETIST, CERTIFIED REGISTERED

## 2023-01-20 PROCEDURE — 258N000003 HC RX IP 258 OP 636: Performed by: INTERNAL MEDICINE

## 2023-01-20 PROCEDURE — 80048 BASIC METABOLIC PNL TOTAL CA: CPT | Performed by: HOSPITALIST

## 2023-01-20 PROCEDURE — 250N000009 HC RX 250: Performed by: THORACIC SURGERY (CARDIOTHORACIC VASCULAR SURGERY)

## 2023-01-20 PROCEDURE — 710N000009 HC RECOVERY PHASE 1, LEVEL 1, PER MIN: Performed by: THORACIC SURGERY (CARDIOTHORACIC VASCULAR SURGERY)

## 2023-01-20 PROCEDURE — 0BNK4ZZ RELEASE RIGHT LUNG, PERCUTANEOUS ENDOSCOPIC APPROACH: ICD-10-PCS | Performed by: THORACIC SURGERY (CARDIOTHORACIC VASCULAR SURGERY)

## 2023-01-20 PROCEDURE — 99232 SBSQ HOSP IP/OBS MODERATE 35: CPT | Performed by: HOSPITALIST

## 2023-01-20 PROCEDURE — 0W9930Z DRAINAGE OF RIGHT PLEURAL CAVITY WITH DRAINAGE DEVICE, PERCUTANEOUS APPROACH: ICD-10-PCS | Performed by: THORACIC SURGERY (CARDIOTHORACIC VASCULAR SURGERY)

## 2023-01-20 PROCEDURE — 250N000009 HC RX 250: Performed by: NURSE ANESTHETIST, CERTIFIED REGISTERED

## 2023-01-20 PROCEDURE — 999N000141 HC STATISTIC PRE-PROCEDURE NURSING ASSESSMENT: Performed by: THORACIC SURGERY (CARDIOTHORACIC VASCULAR SURGERY)

## 2023-01-20 PROCEDURE — 370N000017 HC ANESTHESIA TECHNICAL FEE, PER MIN: Performed by: THORACIC SURGERY (CARDIOTHORACIC VASCULAR SURGERY)

## 2023-01-20 PROCEDURE — 0BBN4ZZ EXCISION OF RIGHT PLEURA, PERCUTANEOUS ENDOSCOPIC APPROACH: ICD-10-PCS | Performed by: THORACIC SURGERY (CARDIOTHORACIC VASCULAR SURGERY)

## 2023-01-20 PROCEDURE — 71045 X-RAY EXAM CHEST 1 VIEW: CPT

## 2023-01-20 PROCEDURE — 272N000001 HC OR GENERAL SUPPLY STERILE: Performed by: THORACIC SURGERY (CARDIOTHORACIC VASCULAR SURGERY)

## 2023-01-20 PROCEDURE — 360N000077 HC SURGERY LEVEL 4, PER MIN: Performed by: THORACIC SURGERY (CARDIOTHORACIC VASCULAR SURGERY)

## 2023-01-20 PROCEDURE — 83735 ASSAY OF MAGNESIUM: CPT | Performed by: HOSPITALIST

## 2023-01-20 PROCEDURE — 250N000025 HC SEVOFLURANE, PER MIN: Performed by: THORACIC SURGERY (CARDIOTHORACIC VASCULAR SURGERY)

## 2023-01-20 PROCEDURE — 120N000001 HC R&B MED SURG/OB

## 2023-01-20 PROCEDURE — 84100 ASSAY OF PHOSPHORUS: CPT | Performed by: HOSPITALIST

## 2023-01-20 PROCEDURE — 87101 SKIN FUNGI CULTURE: CPT | Performed by: THORACIC SURGERY (CARDIOTHORACIC VASCULAR SURGERY)

## 2023-01-20 PROCEDURE — 258N000003 HC RX IP 258 OP 636: Performed by: PHYSICIAN ASSISTANT

## 2023-01-20 PROCEDURE — 250N000011 HC RX IP 250 OP 636: Performed by: INTERNAL MEDICINE

## 2023-01-20 PROCEDURE — 258N000003 HC RX IP 258 OP 636: Performed by: ANESTHESIOLOGY

## 2023-01-20 PROCEDURE — 250N000011 HC RX IP 250 OP 636: Performed by: ANESTHESIOLOGY

## 2023-01-20 PROCEDURE — 250N000011 HC RX IP 250 OP 636: Performed by: NURSE ANESTHETIST, CERTIFIED REGISTERED

## 2023-01-20 PROCEDURE — 87205 SMEAR GRAM STAIN: CPT | Performed by: THORACIC SURGERY (CARDIOTHORACIC VASCULAR SURGERY)

## 2023-01-20 PROCEDURE — 85027 COMPLETE CBC AUTOMATED: CPT | Performed by: HOSPITALIST

## 2023-01-20 PROCEDURE — 36415 COLL VENOUS BLD VENIPUNCTURE: CPT | Performed by: HOSPITALIST

## 2023-01-20 PROCEDURE — 87176 TISSUE HOMOGENIZATION CULTR: CPT | Performed by: THORACIC SURGERY (CARDIOTHORACIC VASCULAR SURGERY)

## 2023-01-20 PROCEDURE — 87075 CULTR BACTERIA EXCEPT BLOOD: CPT | Performed by: THORACIC SURGERY (CARDIOTHORACIC VASCULAR SURGERY)

## 2023-01-20 PROCEDURE — P9045 ALBUMIN (HUMAN), 5%, 250 ML: HCPCS | Performed by: ANESTHESIOLOGY

## 2023-01-20 PROCEDURE — 87210 SMEAR WET MOUNT SALINE/INK: CPT | Performed by: THORACIC SURGERY (CARDIOTHORACIC VASCULAR SURGERY)

## 2023-01-20 PROCEDURE — 80202 ASSAY OF VANCOMYCIN: CPT

## 2023-01-20 PROCEDURE — 36415 COLL VENOUS BLD VENIPUNCTURE: CPT

## 2023-01-20 PROCEDURE — 88307 TISSUE EXAM BY PATHOLOGIST: CPT | Mod: TC | Performed by: THORACIC SURGERY (CARDIOTHORACIC VASCULAR SURGERY)

## 2023-01-20 PROCEDURE — 250N000013 HC RX MED GY IP 250 OP 250 PS 637: Performed by: PHYSICIAN ASSISTANT

## 2023-01-20 PROCEDURE — 250N000013 HC RX MED GY IP 250 OP 250 PS 637: Performed by: STUDENT IN AN ORGANIZED HEALTH CARE EDUCATION/TRAINING PROGRAM

## 2023-01-20 PROCEDURE — P9045 ALBUMIN (HUMAN), 5%, 250 ML: HCPCS | Performed by: NURSE ANESTHETIST, CERTIFIED REGISTERED

## 2023-01-20 PROCEDURE — 250N000011 HC RX IP 250 OP 636: Performed by: THORACIC SURGERY (CARDIOTHORACIC VASCULAR SURGERY)

## 2023-01-20 RX ORDER — POLYETHYLENE GLYCOL 3350 17 G/17G
17 POWDER, FOR SOLUTION ORAL DAILY
Status: DISCONTINUED | OUTPATIENT
Start: 2023-01-21 | End: 2023-01-27 | Stop reason: HOSPADM

## 2023-01-20 RX ORDER — SODIUM CHLORIDE, SODIUM LACTATE, POTASSIUM CHLORIDE, CALCIUM CHLORIDE 600; 310; 30; 20 MG/100ML; MG/100ML; MG/100ML; MG/100ML
INJECTION, SOLUTION INTRAVENOUS CONTINUOUS
Status: DISCONTINUED | OUTPATIENT
Start: 2023-01-20 | End: 2023-01-20 | Stop reason: HOSPADM

## 2023-01-20 RX ORDER — DEXAMETHASONE SODIUM PHOSPHATE 4 MG/ML
INJECTION, SOLUTION INTRA-ARTICULAR; INTRALESIONAL; INTRAMUSCULAR; INTRAVENOUS; SOFT TISSUE PRN
Status: DISCONTINUED | OUTPATIENT
Start: 2023-01-20 | End: 2023-01-20

## 2023-01-20 RX ORDER — MAGNESIUM HYDROXIDE 1200 MG/15ML
LIQUID ORAL PRN
Status: DISCONTINUED | OUTPATIENT
Start: 2023-01-20 | End: 2023-01-20 | Stop reason: HOSPADM

## 2023-01-20 RX ORDER — LIDOCAINE 40 MG/G
CREAM TOPICAL
Status: DISCONTINUED | OUTPATIENT
Start: 2023-01-20 | End: 2023-01-22

## 2023-01-20 RX ORDER — ONDANSETRON 2 MG/ML
4 INJECTION INTRAMUSCULAR; INTRAVENOUS EVERY 6 HOURS PRN
Status: DISCONTINUED | OUTPATIENT
Start: 2023-01-20 | End: 2023-01-27 | Stop reason: HOSPADM

## 2023-01-20 RX ORDER — HYDROMORPHONE HCL IN WATER/PF 6 MG/30 ML
0.2 PATIENT CONTROLLED ANALGESIA SYRINGE INTRAVENOUS EVERY 5 MIN PRN
Status: DISCONTINUED | OUTPATIENT
Start: 2023-01-20 | End: 2023-01-20 | Stop reason: HOSPADM

## 2023-01-20 RX ORDER — ACETAMINOPHEN 325 MG/1
975 TABLET ORAL EVERY 8 HOURS
Status: DISPENSED | OUTPATIENT
Start: 2023-01-20 | End: 2023-01-23

## 2023-01-20 RX ORDER — ONDANSETRON 4 MG/1
4 TABLET, ORALLY DISINTEGRATING ORAL EVERY 6 HOURS PRN
Status: DISCONTINUED | OUTPATIENT
Start: 2023-01-20 | End: 2023-01-27 | Stop reason: HOSPADM

## 2023-01-20 RX ORDER — FENTANYL CITRATE 50 UG/ML
25 INJECTION, SOLUTION INTRAMUSCULAR; INTRAVENOUS EVERY 5 MIN PRN
Status: DISCONTINUED | OUTPATIENT
Start: 2023-01-20 | End: 2023-01-20 | Stop reason: HOSPADM

## 2023-01-20 RX ORDER — AMOXICILLIN 250 MG
1 CAPSULE ORAL 2 TIMES DAILY
Status: DISCONTINUED | OUTPATIENT
Start: 2023-01-20 | End: 2023-01-23

## 2023-01-20 RX ORDER — PROPOFOL 10 MG/ML
INJECTION, EMULSION INTRAVENOUS CONTINUOUS PRN
Status: DISCONTINUED | OUTPATIENT
Start: 2023-01-20 | End: 2023-01-20

## 2023-01-20 RX ORDER — BUPIVACAINE HYDROCHLORIDE 5 MG/ML
INJECTION, SOLUTION PERINEURAL PRN
Status: DISCONTINUED | OUTPATIENT
Start: 2023-01-20 | End: 2023-01-20 | Stop reason: HOSPADM

## 2023-01-20 RX ORDER — CEFAZOLIN SODIUM/WATER 2 G/20 ML
2 SYRINGE (ML) INTRAVENOUS SEE ADMIN INSTRUCTIONS
Status: DISCONTINUED | OUTPATIENT
Start: 2023-01-20 | End: 2023-01-20 | Stop reason: HOSPADM

## 2023-01-20 RX ORDER — ONDANSETRON 2 MG/ML
4 INJECTION INTRAMUSCULAR; INTRAVENOUS EVERY 30 MIN PRN
Status: DISCONTINUED | OUTPATIENT
Start: 2023-01-20 | End: 2023-01-20 | Stop reason: HOSPADM

## 2023-01-20 RX ORDER — ONDANSETRON 4 MG/1
4 TABLET, ORALLY DISINTEGRATING ORAL EVERY 30 MIN PRN
Status: DISCONTINUED | OUTPATIENT
Start: 2023-01-20 | End: 2023-01-20 | Stop reason: HOSPADM

## 2023-01-20 RX ORDER — PROPOFOL 10 MG/ML
INJECTION, EMULSION INTRAVENOUS PRN
Status: DISCONTINUED | OUTPATIENT
Start: 2023-01-20 | End: 2023-01-20

## 2023-01-20 RX ORDER — ONDANSETRON 2 MG/ML
INJECTION INTRAMUSCULAR; INTRAVENOUS PRN
Status: DISCONTINUED | OUTPATIENT
Start: 2023-01-20 | End: 2023-01-20

## 2023-01-20 RX ORDER — HYDROMORPHONE HCL IN WATER/PF 6 MG/30 ML
0.2 PATIENT CONTROLLED ANALGESIA SYRINGE INTRAVENOUS
Status: DISCONTINUED | OUTPATIENT
Start: 2023-01-20 | End: 2023-01-27 | Stop reason: HOSPADM

## 2023-01-20 RX ORDER — PROCHLORPERAZINE MALEATE 10 MG
10 TABLET ORAL EVERY 6 HOURS PRN
Status: DISCONTINUED | OUTPATIENT
Start: 2023-01-20 | End: 2023-01-27 | Stop reason: HOSPADM

## 2023-01-20 RX ORDER — HYDROMORPHONE HYDROCHLORIDE 4 MG/1
4 TABLET ORAL
Status: DISCONTINUED | OUTPATIENT
Start: 2023-01-20 | End: 2023-01-27 | Stop reason: HOSPADM

## 2023-01-20 RX ORDER — VECURONIUM BROMIDE 1 MG/ML
INJECTION, POWDER, LYOPHILIZED, FOR SOLUTION INTRAVENOUS PRN
Status: DISCONTINUED | OUTPATIENT
Start: 2023-01-20 | End: 2023-01-20

## 2023-01-20 RX ORDER — LIDOCAINE HYDROCHLORIDE 20 MG/ML
INJECTION, SOLUTION INFILTRATION; PERINEURAL PRN
Status: DISCONTINUED | OUTPATIENT
Start: 2023-01-20 | End: 2023-01-20

## 2023-01-20 RX ORDER — ACETAMINOPHEN 325 MG/1
650 TABLET ORAL EVERY 4 HOURS PRN
Status: DISCONTINUED | OUTPATIENT
Start: 2023-01-23 | End: 2023-01-20

## 2023-01-20 RX ORDER — FENTANYL CITRATE 50 UG/ML
50 INJECTION, SOLUTION INTRAMUSCULAR; INTRAVENOUS EVERY 5 MIN PRN
Status: DISCONTINUED | OUTPATIENT
Start: 2023-01-20 | End: 2023-01-20 | Stop reason: HOSPADM

## 2023-01-20 RX ORDER — CEFAZOLIN SODIUM/WATER 2 G/20 ML
2 SYRINGE (ML) INTRAVENOUS
Status: COMPLETED | OUTPATIENT
Start: 2023-01-20 | End: 2023-01-20

## 2023-01-20 RX ORDER — HYDROMORPHONE HYDROCHLORIDE 2 MG/1
2 TABLET ORAL
Status: DISCONTINUED | OUTPATIENT
Start: 2023-01-20 | End: 2023-01-27 | Stop reason: HOSPADM

## 2023-01-20 RX ORDER — SODIUM CHLORIDE 9 MG/ML
INJECTION, SOLUTION INTRAVENOUS CONTINUOUS
Status: DISCONTINUED | OUTPATIENT
Start: 2023-01-20 | End: 2023-01-22

## 2023-01-20 RX ORDER — GINSENG 100 MG
CAPSULE ORAL
Status: DISCONTINUED | OUTPATIENT
Start: 2023-01-20 | End: 2023-01-27 | Stop reason: HOSPADM

## 2023-01-20 RX ORDER — HYDROMORPHONE HCL IN WATER/PF 6 MG/30 ML
0.4 PATIENT CONTROLLED ANALGESIA SYRINGE INTRAVENOUS EVERY 5 MIN PRN
Status: DISCONTINUED | OUTPATIENT
Start: 2023-01-20 | End: 2023-01-20 | Stop reason: HOSPADM

## 2023-01-20 RX ORDER — HYDROMORPHONE HCL IN WATER/PF 6 MG/30 ML
0.4 PATIENT CONTROLLED ANALGESIA SYRINGE INTRAVENOUS
Status: DISCONTINUED | OUTPATIENT
Start: 2023-01-20 | End: 2023-01-27 | Stop reason: HOSPADM

## 2023-01-20 RX ORDER — BISACODYL 10 MG
10 SUPPOSITORY, RECTAL RECTAL DAILY PRN
Status: DISCONTINUED | OUTPATIENT
Start: 2023-01-20 | End: 2023-01-27 | Stop reason: HOSPADM

## 2023-01-20 RX ORDER — IBUPROFEN 600 MG/1
600 TABLET, FILM COATED ORAL EVERY 6 HOURS PRN
Status: DISCONTINUED | OUTPATIENT
Start: 2023-01-20 | End: 2023-01-27

## 2023-01-20 RX ORDER — FENTANYL CITRATE 50 UG/ML
INJECTION, SOLUTION INTRAMUSCULAR; INTRAVENOUS PRN
Status: DISCONTINUED | OUTPATIENT
Start: 2023-01-20 | End: 2023-01-20

## 2023-01-20 RX ORDER — FAMOTIDINE 20 MG/1
20 TABLET, FILM COATED ORAL 2 TIMES DAILY
Status: DISCONTINUED | OUTPATIENT
Start: 2023-01-20 | End: 2023-01-27 | Stop reason: HOSPADM

## 2023-01-20 RX ORDER — CALCIUM CARBONATE 500 MG/1
500 TABLET, CHEWABLE ORAL 4 TIMES DAILY PRN
Status: DISCONTINUED | OUTPATIENT
Start: 2023-01-20 | End: 2023-01-27 | Stop reason: HOSPADM

## 2023-01-20 RX ADMIN — SODIUM CHLORIDE, POTASSIUM CHLORIDE, SODIUM LACTATE AND CALCIUM CHLORIDE: 600; 310; 30; 20 INJECTION, SOLUTION INTRAVENOUS at 11:57

## 2023-01-20 RX ADMIN — PHENYLEPHRINE HYDROCHLORIDE 100 MCG: 10 INJECTION INTRAVENOUS at 14:05

## 2023-01-20 RX ADMIN — LIDOCAINE HYDROCHLORIDE 20 MG: 20 INJECTION, SOLUTION INFILTRATION; PERINEURAL at 13:21

## 2023-01-20 RX ADMIN — SODIUM CHLORIDE: 9 INJECTION, SOLUTION INTRAVENOUS at 18:11

## 2023-01-20 RX ADMIN — ACETAMINOPHEN 1000 MG: 500 TABLET ORAL at 08:34

## 2023-01-20 RX ADMIN — SENNOSIDES AND DOCUSATE SODIUM 1 TABLET: 50; 8.6 TABLET ORAL at 19:46

## 2023-01-20 RX ADMIN — ROCURONIUM BROMIDE 40 MG: 50 INJECTION, SOLUTION INTRAVENOUS at 13:21

## 2023-01-20 RX ADMIN — MIDAZOLAM 2 MG: 1 INJECTION INTRAMUSCULAR; INTRAVENOUS at 13:14

## 2023-01-20 RX ADMIN — PHENYLEPHRINE HYDROCHLORIDE 100 MCG: 10 INJECTION INTRAVENOUS at 14:54

## 2023-01-20 RX ADMIN — OXYCODONE HYDROCHLORIDE 5 MG: 5 TABLET ORAL at 23:45

## 2023-01-20 RX ADMIN — CEFTRIAXONE SODIUM 2 G: 2 INJECTION, POWDER, FOR SOLUTION INTRAMUSCULAR; INTRAVENOUS at 13:38

## 2023-01-20 RX ADMIN — Medication 1250 MG: at 01:59

## 2023-01-20 RX ADMIN — HYDROMORPHONE HYDROCHLORIDE 0.5 MG: 1 INJECTION, SOLUTION INTRAMUSCULAR; INTRAVENOUS; SUBCUTANEOUS at 15:44

## 2023-01-20 RX ADMIN — FENTANYL CITRATE 100 MCG: 50 INJECTION, SOLUTION INTRAMUSCULAR; INTRAVENOUS at 13:21

## 2023-01-20 RX ADMIN — PROPOFOL 200 MG: 10 INJECTION, EMULSION INTRAVENOUS at 13:21

## 2023-01-20 RX ADMIN — VECURONIUM BROMIDE 1 MG: 1 INJECTION, POWDER, LYOPHILIZED, FOR SOLUTION INTRAVENOUS at 14:33

## 2023-01-20 RX ADMIN — PHENYLEPHRINE HYDROCHLORIDE 0.5 MCG/KG/MIN: 10 INJECTION INTRAVENOUS at 14:22

## 2023-01-20 RX ADMIN — ALBUMIN HUMAN: 0.05 INJECTION, SOLUTION INTRAVENOUS at 14:29

## 2023-01-20 RX ADMIN — PHENYLEPHRINE HYDROCHLORIDE 200 MCG: 10 INJECTION INTRAVENOUS at 14:08

## 2023-01-20 RX ADMIN — OXYCODONE HYDROCHLORIDE 5 MG: 5 TABLET ORAL at 04:07

## 2023-01-20 RX ADMIN — ONDANSETRON 4 MG: 2 INJECTION INTRAMUSCULAR; INTRAVENOUS at 15:09

## 2023-01-20 RX ADMIN — PHENYLEPHRINE HYDROCHLORIDE 200 MCG: 10 INJECTION INTRAVENOUS at 14:20

## 2023-01-20 RX ADMIN — PHENYLEPHRINE HYDROCHLORIDE 100 MCG: 10 INJECTION INTRAVENOUS at 14:29

## 2023-01-20 RX ADMIN — PROPOFOL 25 MCG/KG/MIN: 10 INJECTION, EMULSION INTRAVENOUS at 13:33

## 2023-01-20 RX ADMIN — ROCURONIUM BROMIDE 10 MG: 50 INJECTION, SOLUTION INTRAVENOUS at 14:05

## 2023-01-20 RX ADMIN — ACETAMINOPHEN 1000 MG: 500 TABLET ORAL at 19:46

## 2023-01-20 RX ADMIN — Medication 2 G: at 13:20

## 2023-01-20 RX ADMIN — PHENYLEPHRINE HYDROCHLORIDE 200 MCG: 10 INJECTION INTRAVENOUS at 14:15

## 2023-01-20 RX ADMIN — FAMOTIDINE 20 MG: 20 TABLET ORAL at 19:47

## 2023-01-20 RX ADMIN — PHENYLEPHRINE HYDROCHLORIDE 100 MCG: 10 INJECTION INTRAVENOUS at 14:02

## 2023-01-20 RX ADMIN — PROPOFOL 30 MG: 10 INJECTION, EMULSION INTRAVENOUS at 15:15

## 2023-01-20 RX ADMIN — PHENYLEPHRINE HYDROCHLORIDE 100 MCG: 10 INJECTION INTRAVENOUS at 13:36

## 2023-01-20 RX ADMIN — HYDROMORPHONE HYDROCHLORIDE 0.5 MG: 1 INJECTION, SOLUTION INTRAMUSCULAR; INTRAVENOUS; SUBCUTANEOUS at 15:10

## 2023-01-20 RX ADMIN — ALBUMIN HUMAN 12.5 G: 0.05 INJECTION, SOLUTION INTRAVENOUS at 16:30

## 2023-01-20 RX ADMIN — PHENYLEPHRINE HYDROCHLORIDE 100 MCG: 10 INJECTION INTRAVENOUS at 13:59

## 2023-01-20 RX ADMIN — ONDANSETRON 4 MG: 2 INJECTION INTRAMUSCULAR; INTRAVENOUS at 13:36

## 2023-01-20 RX ADMIN — PHENYLEPHRINE HYDROCHLORIDE 100 MCG: 10 INJECTION INTRAVENOUS at 14:11

## 2023-01-20 RX ADMIN — PHENYLEPHRINE HYDROCHLORIDE 150 MCG: 10 INJECTION INTRAVENOUS at 14:44

## 2023-01-20 RX ADMIN — DEXAMETHASONE SODIUM PHOSPHATE 8 MG: 4 INJECTION, SOLUTION INTRA-ARTICULAR; INTRALESIONAL; INTRAMUSCULAR; INTRAVENOUS; SOFT TISSUE at 13:36

## 2023-01-20 RX ADMIN — PHENYLEPHRINE HYDROCHLORIDE 100 MCG: 10 INJECTION INTRAVENOUS at 14:35

## 2023-01-20 RX ADMIN — Medication 1250 MG: at 14:12

## 2023-01-20 RX ADMIN — IBUPROFEN 600 MG: 600 TABLET ORAL at 23:04

## 2023-01-20 RX ADMIN — PHENYLEPHRINE HYDROCHLORIDE 100 MCG: 10 INJECTION INTRAVENOUS at 13:30

## 2023-01-20 RX ADMIN — OXYCODONE HYDROCHLORIDE 5 MG: 5 TABLET ORAL at 19:46

## 2023-01-20 RX ADMIN — SUGAMMADEX 200 MG: 100 INJECTION, SOLUTION INTRAVENOUS at 15:22

## 2023-01-20 RX ADMIN — ACETAMINOPHEN 1000 MG: 500 TABLET ORAL at 01:59

## 2023-01-20 RX ADMIN — OXYCODONE HYDROCHLORIDE 5 MG: 5 TABLET ORAL at 08:34

## 2023-01-20 ASSESSMENT — ACTIVITIES OF DAILY LIVING (ADL)
ADLS_ACUITY_SCORE: 22
ADLS_ACUITY_SCORE: 20
ADLS_ACUITY_SCORE: 25
ADLS_ACUITY_SCORE: 20
ADLS_ACUITY_SCORE: 20
ADLS_ACUITY_SCORE: 30
ADLS_ACUITY_SCORE: 20

## 2023-01-20 ASSESSMENT — ENCOUNTER SYMPTOMS: SEIZURES: 0

## 2023-01-20 ASSESSMENT — LIFESTYLE VARIABLES: TOBACCO_USE: 0

## 2023-01-20 NOTE — PROGRESS NOTES
[facility]    Medicine Progress Note - Hospitalist Service    Date of Admission:  1/17/2023    Assessment & Plan   Kamilah Goldberg is a 42 year old female admitted on 1/17/2023. She presents with fever / cough / SOB.      Large loculated right pleural effusion and associated atelectasis.  Community acquired pneumonia of right lower lobe of lung  Right Empyema s/p VATS  Presents with 6-week history of shortness of breath/cough/fevers.  She also reports of over 20 pound weight loss over the past several weeks.  Denies any IV drug use. On admission labs are pertinent for a hemoglobin of 8.0, WBC is within normal limits. CT chest with no pulmonary embolus, aortic aneurysm or dissection. Large loculated right pleural effusion and associated atelectasis. Mild left lower lobe infiltrate may be pneumonia. Bilateral axillary lymph node enlargement.  1/17 Underwent thoracentesis at outside hospital, 60 mL was removed and fluid did appear to be purulent. Growing strep pneumoniae sensitive to ceftriaxone.   * 1/20 R VATS with total decortication right lung and parietal pleurectomy. EBL 100ml.   - IV Unasyn initially, then vanc/rocephin, now just rocephin  - Infectious disease appreciated  -Thoracic surgery appreciated, chest tube management per them  - follow up OR cultures 1/20  - IVF  - On-Q pain pump placed during procedure  - PRN oxycodone and tylenol for pain     Acute on chronic anemia  Iron deficiency anemia  Patient was given 1 unit of RBCs at outside hospital for hemoglobin 6.5.  Repeat hemoglobin of 8.0.  EBL 100ml during VATS  - CBC in AM    Hyponatremia, chronic  Na near 130 at baseline (consistent over past year)  - BMP daily    Hypokalemia  Hypophosphatemia  Hypomagnesemia  Replete per protocol    Thrombocytosis  Secondary to inflammation. Monitor with CBC     Low back pain  Treat symptomatically as needed     Depression  Stable, follow as an outpatient    Right neck discomfort  Noted  since early AM 1/20. Tender to touch just above clavicle--no fluctuance or induration noted.  - Check right neck duplex US to rule out clot       Diet:      DVT Prophylaxis: Pneumatic Compression Devices  Roy Catheter: Not present  Lines: None     Cardiac Monitoring: ACTIVE order. Indication: Procedural area  Code Status: Full Code      Clinically Significant Risk Factors            # Hypomagnesemia: Lowest Mg = 1.5 mg/dL in last 2 days, will replace as needed                      Disposition Plan      Expected Discharge Date: 01/23/2023,  3:00 PM    Destination: home  Discharge Comments: 1/19 OR tomorrow.          Fadumo Sadler  Hospitalist Service  [facility]  Securely message with [employer] (more info)  Text page via [employer] Paging/Directory   ______________________________________________________________________    Interval History   Patient seen and examined. NPO this morning, in a better mood knowing that OR is planned for early afternoon. Has some right neck discomfort that started this morning. Tender to light touch just above clavicle. Still with chest discomfort with breathing. Did not sleep well overnight. Issues with IVs infiltrating yesterday, seemed better today.    Physical Exam   Vital Signs: Temp: 98.8  F (37.1  C) Temp src: Temporal BP: 97/64 Pulse: 90   Resp: 14 SpO2: 100 % O2 Device: Nasal cannula Oxygen Delivery: 2 LPM  Weight: 136 lbs 10.96 oz    Constitutional: Awake, alert, cooperative  Respiratory: clear on left, decreased on right  Cardiovascular: Regular rhythm, HR near 90, normal S1 and S2, and no murmur noted  GI: Normal bowel sounds, soft, non-distended, non-tender  Skin/Integumen: No rashes, no cyanosis, no edema  Other: Right neck just above clavicle closer to midline is tender to touch, no induration/erythema noted    Medical Decision Making       35 MINUTES SPENT BY ME on the date of service doing chart review, history, exam, documentation & further  activities per the note.      Data     I have personally reviewed the following data over the past 24 hrs:    3.2 (L)  \   8.0 (L)   / 554 (H)     135 (L) 102 3.3 (L) /  86   3.6 24 0.36 (L) \       Imaging results reviewed over the past 24 hrs:   No results found for this or any previous visit (from the past 24 hour(s)).

## 2023-01-20 NOTE — PROVIDER NOTIFICATION
MD Notification    Notified Person: MD    Notified Person Name: Fadumo Sadler    Notification Date/Time: 1/19/23 @1514    Notification Interaction: [employer] webpage    Purpose of Notification: Pt had IV extravasation, please place the order set.     Orders Received: MD placed the extravasation order set.        Patient Name (Optional- Will Render 'the Patient' If Blank): lindsey gupta Mohs Case Number: [id number] Date Of Previous Biopsy (Optional): 11/10/22 Previous Accession (Optional): [id number] Biopsy Photograph Reviewed: Yes (report and photograph incongruent) Consent Type: Consent 1 (Standard) Eye Shield Used: No Initial Size Of Lesion: 1 X Size Of Lesion In Cm (Optional): 1.1 Primary Defect Length In Cm (Final Defect Size - Required For Flaps/Grafts): 1.3 Repair Type: Complex Repair Oculoplastic Surgeon Procedure Text (A): After obtaining clear surgical margins the patient was sent to oculoplastics for surgical repair.  The patient understands they will receive post-surgical care and follow-up from the referring physician's office. Otolaryngologist Procedure Text (A): After obtaining clear surgical margins the patient was sent to otolaryngology for surgical repair.  The patient understands they will receive post-surgical care and follow-up from the referring physician's office. Plastic Surgeon Procedure Text (A): After obtaining clear surgical margins the patient was sent to plastics for surgical repair.  The patient understands they will receive post-surgical care and follow-up from the referring physician's office. Mid-Level Procedure Text (A): After obtaining clear surgical margins the patient was sent to a mid-level provider for surgical repair.  The patient understands they will receive post-surgical care and follow-up from the mid-level provider. Provider Procedure Text (A): After obtaining clear surgical margins the defect was repaired by another provider. Asc Procedure Text (A): After obtaining clear surgical margins the patient was sent to an ASC for surgical repair.  The patient understands they will receive post-surgical care and follow-up from the ASC physician. Simple / Intermediate / Complex Repair - Final Wound Length In Cm: 0.8 Suturegard Retention Suture: 2-0 Nylon Retention Suture Bite Size: 3 mm Length To Time In Minutes Device Was In Place: 10 Complex Requirements: Extensive Undermining Performed?: Yes Undermining Type: Entire Wound Debridement Text: The wound edges were debrided prior to proceeding with the closure to facilitate wound healing. Helical Rim Text: The closure involved the helical rim. Vermilion Border Text: The closure involved the vermilion border. Nostril Rim Text: The closure involved the nostril rim. Retention Suture Text: Retention sutures were placed to support the closure and prevent dehiscence. Secondary Defect Length In Cm (Required For Flaps): 0 Location Indication Override (Is Already Calculated Based On Selected Body Location): Area L Area H Indication Text: Tumors in this location are included in Area H (eyelids, eyebrows, nose, lips, chin, ear, pre-auricular, post-auricular, temple, genitalia, hands, feet, ankles and areola).  Tissue conservation is critical in these anatomic locations. Area M Indication Text: Tumors in this location are included in Area M (cheek, forehead, scalp, neck, jawline and pretibial skin).  Mohs surgery is indicated for tumors in these anatomic locations. Area L Indication Text: Tumors in this location are included in Area L (trunk and extremities).  Mohs surgery is indicated for larger tumors, or tumors with aggressive histologic features, in these anatomic locations. Tumor Debulked?: not debulked Depth Of Tumor Invasion (For Histology): tumor not visualized Perineural Invasion (For Histology - Be Specific If Possible): absent Special Stains Stage 1 - Results: Base On Clearance Noted Above Stage 2: Additional Anesthesia Type: 1% lidocaine with epinephrine Staging Info: By selecting yes to the question above you will include information on AJCC 8 tumor staging in your Mohs note. Information on tumor staging will be automatically added for SCCs on the head and neck. AJCC 8 includes tumor size, tumor depth, perineural involvement and bone invasion. Tumor Depth: Less than 6mm from granular layer and no invasion beyond the subcutaneous fat Was The Patient On Physician Recommended Anticoagulation Therapy?: Please Select the Appropriate Response Medical Necessity Statement: Based on my medical judgement, Mohs surgery is the most appropriate treatment for this cancer compared to other treatments. Alternatives Discussed Intro (Do Not Add Period): I discussed alternative treatments to Mohs surgery and specifically discussed the risks and benefits of Consent 1/Introductory Paragraph: The rationale for Mohs was explained to the patient and consent was obtained. The risks, benefits and alternatives to therapy were discussed in detail. Specifically, the risks of infection, scarring, bleeding, prolonged wound healing, incomplete removal, allergy to anesthesia, nerve injury and recurrence were addressed. Prior to the procedure, the treatment site was clearly identified and confirmed by the patient. All components of Universal Protocol/PAUSE Rule completed. Consent 2/Introductory Paragraph: Mohs surgery was explained to the patient and consent was obtained. The risks, benefits and alternatives to therapy were discussed in detail. Specifically, the risks of infection, scarring, bleeding, prolonged wound healing, incomplete removal, allergy to anesthesia, nerve injury and recurrence were addressed. Prior to the procedure, the treatment site was clearly identified and confirmed by the patient. All components of Universal Protocol/PAUSE Rule completed. Consent 3/Introductory Paragraph: I gave the patient a chance to ask questions they had about the procedure.  Following this I explained the Mohs procedure and consent was obtained. The risks, benefits and alternatives to therapy were discussed in detail. Specifically, the risks of infection, scarring, bleeding, prolonged wound healing, incomplete removal, allergy to anesthesia, nerve injury and recurrence were addressed. Prior to the procedure, the treatment site was clearly identified and confirmed by the patient. All components of Universal Protocol/PAUSE Rule completed. Consent (Temporal Branch)/Introductory Paragraph: The rationale for Mohs was explained to the patient and consent was obtained. The risks, benefits and alternatives to therapy were discussed in detail. Specifically, the risks of damage to the temporal branch of the facial nerve, infection, scarring, bleeding, prolonged wound healing, incomplete removal, allergy to anesthesia, and recurrence were addressed. Prior to the procedure, the treatment site was clearly identified and confirmed by the patient. All components of Universal Protocol/PAUSE Rule completed. Consent (Marginal Mandibular)/Introductory Paragraph: The rationale for Mohs was explained to the patient and consent was obtained. The risks, benefits and alternatives to therapy were discussed in detail. Specifically, the risks of damage to the marginal mandibular branch of the facial nerve, infection, scarring, bleeding, prolonged wound healing, incomplete removal, allergy to anesthesia, and recurrence were addressed. Prior to the procedure, the treatment site was clearly identified and confirmed by the patient. All components of Universal Protocol/PAUSE Rule completed. Consent (Spinal Accessory)/Introductory Paragraph: The rationale for Mohs was explained to the patient and consent was obtained. The risks, benefits and alternatives to therapy were discussed in detail. Specifically, the risks of damage to the spinal accessory nerve, infection, scarring, bleeding, prolonged wound healing, incomplete removal, allergy to anesthesia, and recurrence were addressed. Prior to the procedure, the treatment site was clearly identified and confirmed by the patient. All components of Universal Protocol/PAUSE Rule completed. Consent (Near Eyelid Margin)/Introductory Paragraph: The rationale for Mohs was explained to the patient and consent was obtained. The risks, benefits and alternatives to therapy were discussed in detail. Specifically, the risks of ectropion or eyelid deformity, infection, scarring, bleeding, prolonged wound healing, incomplete removal, allergy to anesthesia, nerve injury and recurrence were addressed. Prior to the procedure, the treatment site was clearly identified and confirmed by the patient. All components of Universal Protocol/PAUSE Rule completed. Consent (Ear)/Introductory Paragraph: The rationale for Mohs was explained to the patient and consent was obtained. The risks, benefits and alternatives to therapy were discussed in detail. Specifically, the risks of ear deformity, infection, scarring, bleeding, prolonged wound healing, incomplete removal, allergy to anesthesia, nerve injury and recurrence were addressed. Prior to the procedure, the treatment site was clearly identified and confirmed by the patient. All components of Universal Protocol/PAUSE Rule completed. Consent (Nose)/Introductory Paragraph: The rationale for Mohs was explained to the patient and consent was obtained. The risks, benefits and alternatives to therapy were discussed in detail. Specifically, the risks of nasal deformity, changes in the flow of air through the nose, infection, scarring, bleeding, prolonged wound healing, incomplete removal, allergy to anesthesia, nerve injury and recurrence were addressed. Prior to the procedure, the treatment site was clearly identified and confirmed by the patient. All components of Universal Protocol/PAUSE Rule completed. Consent (Lip)/Introductory Paragraph: The rationale for Mohs was explained to the patient and consent was obtained. The risks, benefits and alternatives to therapy were discussed in detail. Specifically, the risks of lip deformity, changes in the oral aperture, infection, scarring, bleeding, prolonged wound healing, incomplete removal, allergy to anesthesia, nerve injury and recurrence were addressed. Prior to the procedure, the treatment site was clearly identified and confirmed by the patient. All components of Universal Protocol/PAUSE Rule completed. Consent (Scalp)/Introductory Paragraph: The rationale for Mohs was explained to the patient and consent was obtained. The risks, benefits and alternatives to therapy were discussed in detail. Specifically, the risks of changes in hair growth pattern secondary to repair, infection, scarring, bleeding, prolonged wound healing, incomplete removal, allergy to anesthesia, nerve injury and recurrence were addressed. Prior to the procedure, the treatment site was clearly identified and confirmed by the patient. All components of Universal Protocol/PAUSE Rule completed. Detail Level: Detailed Postop Diagnosis: same Anesthesia Volume In Cc: 3 Hemostasis: Electrocautery Estimated Blood Loss (Cc): minimal Brow Lift Text: A midfrontal incision was made medially to the defect to allow access to the tissues just superior to the left eyebrow. Following careful dissection inferiorly in a supraperiosteal plane to the level of the left eyebrow, several 3-0 monocryl sutures were used to resuspend the eyebrow orbicularis oculi muscular unit to the superior frontal bone periosteum. This resulted in an appropriate reapproximation of static eyebrow symmetry and correction of the left brow ptosis. Deep Sutures: 4-0 Monocryl Epidermal Sutures: 4-0 Prolene Epidermal Closure: horizontal mattress Suturegard Intro: Intraoperative tissue expansion was performed, utilizing the SUTUREGARD device, in order to reduce wound tension. Suturegard Body: The suture ends were repeatedly re-tightened and re-clamped to achieve the desired tissue expansion. Hemigard Intro: Due to skin fragility and wound tension, it was decided to use HEMIGARD adhesive retention suture devices to permit a linear closure. The skin was cleaned and dried for a 6cm distance away from the wound. Excessive hair, if present, was removed to allow for adhesion. Hemigard Postcare Instructions: The HEMIGARD strips are to remain completely dry for at least 5-7 days. Donor Site Anesthesia Type: same as repair anesthesia Epidermal Closure Graft Donor Site (Optional): simple interrupted Graft Donor Site Bandage (Optional-Leave Blank If You Don't Want In Note): Steri-strips and a pressure bandage were applied to the donor site. Closure 2 Information: This tab is for additional flaps and grafts, including complex repair and grafts and complex repair and flaps. You can also specify a different location for the additional defect, if the location is the same you do not need to select a new one. We will insert the automated text for the repair you select below just as we do for solitary flaps and grafts. Please note that at this time if you select a location with a different insurance zone you will need to override the ICD10 and CPT if appropriate. Closure 3 Information: This tab is for additional flaps and grafts above and beyond our usual structured repairs.  Please note if you enter information here it will not currently bill and you will need to add the billing information manually. Wound Care: EltaMD Silvergel Dressing: steri-strips and pressure dressing Wound Care (No Sutures): Petrolatum Dressing (No Sutures): dry sterile dressing Unna Boot Text: An Unna boot was placed to help immobilize the limb and facilitate more rapid healing. Home Suture Removal Text: Patient was provided instructions on removing sutures and will remove their sutures at home.  If they have any questions or difficulties they will call the office. Post-Care Instructions: I reviewed with the patient in detail post-care instructions. Patient is not to engage in any heavy lifting, exercise, or swimming for the next 14 days. Should the patient develop any fevers, chills, bleeding, severe pain patient will contact the office immediately. Pain Refusal Text: I offered to prescribe pain medication but the patient refused to take this medication. Mauc Instructions: By selecting yes to the question below the MAUC number will be added into the note.  This will be calculated automatically based on the diagnosis chosen, the size entered, the body zone selected (H,M,L) and the specific indications you chose. You will also have the option to override the Mohs AUC if you disagree with the automatically calculated number and this option is found in the Case Summary tab. Where Do You Want The Question To Include Opioid Counseling Located?: Case Summary Tab Eye Protection Verbiage: Before proceeding with the stage, a plastic scleral shield was inserted. The globe was anesthetized with a few drops of 1% lidocaine with 1:100,000 epinephrine. Then, an appropriate sized scleral shield was chosen and coated with lacrilube ointment. The shield was gently inserted and left in place for the duration of each stage. After the stage was completed, the shield was gently removed. Mohs Method Verbiage: An incision at a 45 degree angle following the standard Mohs approach was done and the specimen was harvested as a microscopic controlled layer. Surgeon/Pathologist Verbiage (Will Incorporate Name Of Surgeon From Intro If Not Blank): operated in two distinct and integrated capacities as the surgeon and pathologist. Mohs Histo Method Verbiage: Each section was then chromacoded and processed in the Mohs lab using the Mohs protocol and submitted for frozen section. Subsequent Stages Histo Method Verbiage: Using a similar technique to that described above, a thin layer of tissue was removed from all areas where tumor was visible on the previous stage.  The tissue was again oriented, mapped, dyed, and processed as above. Mohs Rapid Report Verbiage: The area of clinically evident tumor was marked with skin marking ink and appropriately hatched.  The initial incision was made following the Mohs approach through the skin.  The specimen was taken to the lab, divided into the necessary number of pieces, chromacoded and processed according to the Mohs protocol.  This was repeated in successive stages until a tumor free defect was achieved. Complex Repair Preamble Text (Leave Blank If You Do Not Want): Extensive wide undermining was performed. Intermediate Repair Preamble Text (Leave Blank If You Do Not Want): Undermining was performed with blunt dissection. Non-Graft Cartilage Fenestration Text: The cartilage was fenestrated with a 2mm punch biopsy to help facilitate healing. Graft Cartilage Fenestration Text: The cartilage was fenestrated with a 2mm punch biopsy to help facilitate graft survival and healing. Secondary Intention Text (Leave Blank If You Do Not Want): The defect will heal with secondary intention. No Repair - Repaired With Adjacent Surgical Defect Text (Leave Blank If You Do Not Want): After obtaining clear surgical margins the defect was repaired concurrently with another surgical defect which was in close approximation. Adjacent Tissue Transfer Text: The defect edges were debeveled with a #15 scalpel blade.  Given the location of the defect and the proximity to free margins an adjacent tissue transfer was deemed most appropriate.  Using a sterile surgical marker, an appropriate flap was drawn incorporating the defect and placing the expected incisions within the relaxed skin tension lines where possible.    The area thus outlined was incised deep to adipose tissue with a #15 scalpel blade.  The skin margins were undermined to an appropriate distance in all directions utilizing iris scissors. Advancement Flap (Single) Text: The defect edges were debeveled with a #15 scalpel blade.  Given the location of the defect and the proximity to free margins a single advancement flap was deemed most appropriate.  Using a sterile surgical marker, an appropriate advancement flap was drawn incorporating the defect and placing the expected incisions within the relaxed skin tension lines where possible.    The area thus outlined was incised deep to adipose tissue with a #15 scalpel blade.  The skin margins were undermined to an appropriate distance in all directions utilizing iris scissors. Advancement Flap (Double) Text: The defect edges were debeveled with a #15 scalpel blade.  Given the location of the defect and the proximity to free margins a double advancement flap was deemed most appropriate.  Using a sterile surgical marker, the appropriate advancement flaps were drawn incorporating the defect and placing the expected incisions within the relaxed skin tension lines where possible.    The area thus outlined was incised deep to adipose tissue with a #15 scalpel blade.  The skin margins were undermined to an appropriate distance in all directions utilizing iris scissors. Burow's Advancement Flap Text: The defect edges were debeveled with a #15 scalpel blade.  Given the location of the defect and the proximity to free margins a Burow's advancement flap was deemed most appropriate.  Using a sterile surgical marker, the appropriate advancement flap was drawn incorporating the defect and placing the expected incisions within the relaxed skin tension lines where possible.    The area thus outlined was incised deep to adipose tissue with a #15 scalpel blade.  The skin margins were undermined to an appropriate distance in all directions utilizing iris scissors. Chonodrocutaneous Helical Advancement Flap Text: The defect edges were debeveled with a #15 scalpel blade.  Given the location of the defect and the proximity to free margins a chondrocutaneous helical advancement flap was deemed most appropriate.  Using a sterile surgical marker, the appropriate advancement flap was drawn incorporating the defect and placing the expected incisions within the relaxed skin tension lines where possible.    The area thus outlined was incised deep to adipose tissue with a #15 scalpel blade.  The skin margins were undermined to an appropriate distance in all directions utilizing iris scissors. Crescentic Advancement Flap Text: The defect edges were debeveled with a #15 scalpel blade.  Given the location of the defect and the proximity to free margins a crescentic advancement flap was deemed most appropriate.  Using a sterile surgical marker, the appropriate advancement flap was drawn incorporating the defect and placing the expected incisions within the relaxed skin tension lines where possible.    The area thus outlined was incised deep to adipose tissue with a #15 scalpel blade.  The skin margins were undermined to an appropriate distance in all directions utilizing iris scissors. A-T Advancement Flap Text: The defect edges were debeveled with a #15 scalpel blade.  Given the location of the defect, shape of the defect and the proximity to free margins an A-T advancement flap was deemed most appropriate.  Using a sterile surgical marker, an appropriate advancement flap was drawn incorporating the defect and placing the expected incisions within the relaxed skin tension lines where possible.    The area thus outlined was incised deep to adipose tissue with a #15 scalpel blade.  The skin margins were undermined to an appropriate distance in all directions utilizing iris scissors. O-T Advancement Flap Text: The defect edges were debeveled with a #15 scalpel blade.  Given the location of the defect, shape of the defect and the proximity to free margins an O-T advancement flap was deemed most appropriate.  Using a sterile surgical marker, an appropriate advancement flap was drawn incorporating the defect and placing the expected incisions within the relaxed skin tension lines where possible.    The area thus outlined was incised deep to adipose tissue with a #15 scalpel blade.  The skin margins were undermined to an appropriate distance in all directions utilizing iris scissors. O-L Flap Text: The defect edges were debeveled with a #15 scalpel blade.  Given the location of the defect, shape of the defect and the proximity to free margins an O-L flap was deemed most appropriate.  Using a sterile surgical marker, an appropriate advancement flap was drawn incorporating the defect and placing the expected incisions within the relaxed skin tension lines where possible.    The area thus outlined was incised deep to adipose tissue with a #15 scalpel blade.  The skin margins were undermined to an appropriate distance in all directions utilizing iris scissors. O-Z Flap Text: The defect edges were debeveled with a #15 scalpel blade.  Given the location of the defect, shape of the defect and the proximity to free margins an O-Z flap was deemed most appropriate.  Using a sterile surgical marker, an appropriate transposition flap was drawn incorporating the defect and placing the expected incisions within the relaxed skin tension lines where possible. The area thus outlined was incised deep to adipose tissue with a #15 scalpel blade.  The skin margins were undermined to an appropriate distance in all directions utilizing iris scissors. Double O-Z Flap Text: The defect edges were debeveled with a #15 scalpel blade.  Given the location of the defect, shape of the defect and the proximity to free margins a Double O-Z flap was deemed most appropriate.  Using a sterile surgical marker, an appropriate transposition flap was drawn incorporating the defect and placing the expected incisions within the relaxed skin tension lines where possible. The area thus outlined was incised deep to adipose tissue with a #15 scalpel blade.  The skin margins were undermined to an appropriate distance in all directions utilizing iris scissors. V-Y Flap Text: The defect edges were debeveled with a #15 scalpel blade.  Given the location of the defect, shape of the defect and the proximity to free margins a V-Y flap was deemed most appropriate.  Using a sterile surgical marker, an appropriate advancement flap was drawn incorporating the defect and placing the expected incisions within the relaxed skin tension lines where possible.    The area thus outlined was incised deep to adipose tissue with a #15 scalpel blade.  The skin margins were undermined to an appropriate distance in all directions utilizing iris scissors. Advancement-Rotation Flap Text: The defect edges were debeveled with a #15 scalpel blade.  Given the location of the defect, shape of the defect and the proximity to free margins an advancement-rotation flap was deemed most appropriate.  Using a sterile surgical marker, an appropriate flap was drawn incorporating the defect and placing the expected incisions within the relaxed skin tension lines where possible. The area thus outlined was incised deep to adipose tissue with a #15 scalpel blade.  The skin margins were undermined to an appropriate distance in all directions utilizing iris scissors. Mercedes Flap Text: The defect edges were debeveled with a #15 scalpel blade.  Given the location of the defect, shape of the defect and the proximity to free margins a Mercedes flap was deemed most appropriate.  Using a sterile surgical marker, an appropriate advancement flap was drawn incorporating the defect and placing the expected incisions within the relaxed skin tension lines where possible. The area thus outlined was incised deep to adipose tissue with a #15 scalpel blade.  The skin margins were undermined to an appropriate distance in all directions utilizing iris scissors. Modified Advancement Flap Text: The defect edges were debeveled with a #15 scalpel blade.  Given the location of the defect, shape of the defect and the proximity to free margins a modified advancement flap was deemed most appropriate.  Using a sterile surgical marker, an appropriate advancement flap was drawn incorporating the defect and placing the expected incisions within the relaxed skin tension lines where possible.    The area thus outlined was incised deep to adipose tissue with a #15 scalpel blade.  The skin margins were undermined to an appropriate distance in all directions utilizing iris scissors. Mucosal Advancement Flap Text: Given the location of the defect, shape of the defect and the proximity to free margins a mucosal advancement flap was deemed most appropriate. Incisions were made with a 15 blade scalpel in the appropriate fashion along the cutaneous vermilion border and the mucosal lip. The remaining actinically damaged mucosal tissue was excised.  The mucosal advancement flap was then elevated to the gingival sulcus with care taken to preserve the neurovascular structures and advanced into the primary defect. Care was taken to ensure that precise realignment of the vermilion border was achieved. Peng Advancement Flap Text: The defect edges were debeveled with a #15 scalpel blade.  Given the location of the defect, shape of the defect and the proximity to free margins a Peng advancement flap was deemed most appropriate.  Using a sterile surgical marker, an appropriate advancement flap was drawn incorporating the defect and placing the expected incisions within the relaxed skin tension lines where possible. The area thus outlined was incised deep to adipose tissue with a #15 scalpel blade.  The skin margins were undermined to an appropriate distance in all directions utilizing iris scissors. Hatchet Flap Text: The defect edges were debeveled with a #15 scalpel blade.  Given the location of the defect, shape of the defect and the proximity to free margins a hatchet flap was deemed most appropriate.  Using a sterile surgical marker, an appropriate hatchet flap was drawn incorporating the defect and placing the expected incisions within the relaxed skin tension lines where possible.    The area thus outlined was incised deep to adipose tissue with a #15 scalpel blade.  The skin margins were undermined to an appropriate distance in all directions utilizing iris scissors. Rotation Flap Text: The defect edges were debeveled with a #15 scalpel blade.  Given the location of the defect, shape of the defect and the proximity to free margins a rotation flap was deemed most appropriate.  Using a sterile surgical marker, an appropriate rotation flap was drawn incorporating the defect and placing the expected incisions within the relaxed skin tension lines where possible.    The area thus outlined was incised deep to adipose tissue with a #15 scalpel blade.  The skin margins were undermined to an appropriate distance in all directions utilizing iris scissors. Spiral Flap Text: The defect edges were debeveled with a #15 scalpel blade.  Given the location of the defect, shape of the defect and the proximity to free margins a spiral flap was deemed most appropriate.  Using a sterile surgical marker, an appropriate rotation flap was drawn incorporating the defect and placing the expected incisions within the relaxed skin tension lines where possible. The area thus outlined was incised deep to adipose tissue with a #15 scalpel blade.  The skin margins were undermined to an appropriate distance in all directions utilizing iris scissors. Staged Advancement Flap Text: The defect edges were debeveled with a #15 scalpel blade.  Given the location of the defect, shape of the defect and the proximity to free margins a staged advancement flap was deemed most appropriate.  Using a sterile surgical marker, an appropriate advancement flap was drawn incorporating the defect and placing the expected incisions within the relaxed skin tension lines where possible. The area thus outlined was incised deep to adipose tissue with a #15 scalpel blade.  The skin margins were undermined to an appropriate distance in all directions utilizing iris scissors. Star Wedge Flap Text: The defect edges were debeveled with a #15 scalpel blade.  Given the location of the defect, shape of the defect and the proximity to free margins a star wedge flap was deemed most appropriate.  Using a sterile surgical marker, an appropriate rotation flap was drawn incorporating the defect and placing the expected incisions within the relaxed skin tension lines where possible. The area thus outlined was incised deep to adipose tissue with a #15 scalpel blade.  The skin margins were undermined to an appropriate distance in all directions utilizing iris scissors. Transposition Flap Text: The defect edges were debeveled with a #15 scalpel blade.  Given the location of the defect and the proximity to free margins a transposition flap was deemed most appropriate.  Using a sterile surgical marker, an appropriate transposition flap was drawn incorporating the defect.    The area thus outlined was incised deep to adipose tissue with a #15 scalpel blade.  The skin margins were undermined to an appropriate distance in all directions utilizing iris scissors. Muscle Hinge Flap Text: The defect edges were debeveled with a #15 scalpel blade.  Given the size, depth and location of the defect and the proximity to free margins a muscle hinge flap was deemed most appropriate.  Using a sterile surgical marker, an appropriate hinge flap was drawn incorporating the defect. The area thus outlined was incised with a #15 scalpel blade.  The skin margins were undermined to an appropriate distance in all directions utilizing iris scissors. Mustarde Flap Text: The defect edges were debeveled with a #15 scalpel blade.  Given the size, depth and location of the defect and the proximity to free margins a Mustarde flap was deemed most appropriate.  Using a sterile surgical marker, an appropriate flap was drawn incorporating the defect. The area thus outlined was incised with a #15 scalpel blade.  The skin margins were undermined to an appropriate distance in all directions utilizing iris scissors. Nasal Turnover Hinge Flap Text: The defect edges were debeveled with a #15 scalpel blade.  Given the size, depth, location of the defect and the defect being full thickness a nasal turnover hinge flap was deemed most appropriate.  Using a sterile surgical marker, an appropriate hinge flap was drawn incorporating the defect. The area thus outlined was incised with a #15 scalpel blade. The flap was designed to recreate the nasal mucosal lining and the alar rim. The skin margins were undermined to an appropriate distance in all directions utilizing iris scissors. Nasalis-Muscle-Based Myocutaneous Island Pedicle Flap Text: Using a #15 blade, an incision was made around the donor flap to the level of the nasalis muscle. Wide lateral undermining was then performed in both the subcutaneous plane above the nasalis muscle, and in a submuscular plane just above periosteum. This allowed the formation of a free nasalis muscle axial pedicle (based on the angular artery) which was still attached to the actual cutaneous flap, increasing its mobility and vascular viability. Hemostasis was obtained with pinpoint electrocoagulation. The flap was mobilized into position and the pivotal anchor points positioned and stabilized with buried interrupted sutures. Subcutaneous and dermal tissues were closed in a multilayered fashion with sutures. Tissue redundancies were excised, and the epidermal edges were apposed without significant tension and sutured with sutures. Orbicularis Oris Muscle Flap Text: The defect edges were debeveled with a #15 scalpel blade.  Given that the defect affected the competency of the oral sphincter an orbicularis oris muscle flap was deemed most appropriate to restore this competency and normal muscle function.  Using a sterile surgical marker, an appropriate flap was drawn incorporating the defect. The area thus outlined was incised with a #15 scalpel blade. Melolabial Transposition Flap Text: The defect edges were debeveled with a #15 scalpel blade.  Given the location of the defect and the proximity to free margins a melolabial flap was deemed most appropriate.  Using a sterile surgical marker, an appropriate melolabial transposition flap was drawn incorporating the defect.    The area thus outlined was incised deep to adipose tissue with a #15 scalpel blade.  The skin margins were undermined to an appropriate distance in all directions utilizing iris scissors. Rhombic Flap Text: The defect edges were debeveled with a #15 scalpel blade.  Given the location of the defect and the proximity to free margins a rhombic flap was deemed most appropriate.  Using a sterile surgical marker, an appropriate rhombic flap was drawn incorporating the defect.    The area thus outlined was incised deep to adipose tissue with a #15 scalpel blade.  The skin margins were undermined to an appropriate distance in all directions utilizing iris scissors. Rhomboid Transposition Flap Text: The defect edges were debeveled with a #15 scalpel blade.  Given the location of the defect and the proximity to free margins a rhomboid transposition flap was deemed most appropriate.  Using a sterile surgical marker, an appropriate rhomboid flap was drawn incorporating the defect.    The area thus outlined was incised deep to adipose tissue with a #15 scalpel blade.  The skin margins were undermined to an appropriate distance in all directions utilizing iris scissors. Bi-Rhombic Flap Text: The defect edges were debeveled with a #15 scalpel blade.  Given the location of the defect and the proximity to free margins a bi-rhombic flap was deemed most appropriate.  Using a sterile surgical marker, an appropriate rhombic flap was drawn incorporating the defect. The area thus outlined was incised deep to adipose tissue with a #15 scalpel blade.  The skin margins were undermined to an appropriate distance in all directions utilizing iris scissors. Helical Rim Advancement Flap Text: The defect edges were debeveled with a #15 blade scalpel.  Given the location of the defect and the proximity to free margins (helical rim) a double helical rim advancement flap was deemed most appropriate.  Using a sterile surgical marker, the appropriate advancement flaps were drawn incorporating the defect and placing the expected incisions between the helical rim and antihelix where possible.  The area thus outlined was incised through and through with a #15 scalpel blade.  With a skin hook and iris scissors, the flaps were gently and sharply undermined and freed up. Bilateral Helical Rim Advancement Flap Text: The defect edges were debeveled with a #15 blade scalpel.  Given the location of the defect and the proximity to free margins (helical rim) a bilateral helical rim advancement flap was deemed most appropriate.  Using a sterile surgical marker, the appropriate advancement flaps were drawn incorporating the defect and placing the expected incisions between the helical rim and antihelix where possible.  The area thus outlined was incised through and through with a #15 scalpel blade.  With a skin hook and iris scissors, the flaps were gently and sharply undermined and freed up. Ear Star Wedge Flap Text: The defect edges were debeveled with a #15 blade scalpel.  Given the location of the defect and the proximity to free margins (helical rim) an ear star wedge flap was deemed most appropriate.  Using a sterile surgical marker, the appropriate flap was drawn incorporating the defect and placing the expected incisions between the helical rim and antihelix where possible.  The area thus outlined was incised through and through with a #15 scalpel blade. Banner Transposition Flap Text: The defect edges were debeveled with a #15 scalpel blade.  Given the location of the defect and the proximity to free margins a Banner transposition flap was deemed most appropriate.  Using a sterile surgical marker, an appropriate flap drawn around the defect. The area thus outlined was incised deep to adipose tissue with a #15 scalpel blade.  The skin margins were undermined to an appropriate distance in all directions utilizing iris scissors. Bilobed Flap Text: The defect edges were debeveled with a #15 scalpel blade.  Given the location of the defect and the proximity to free margins a bilobe flap was deemed most appropriate.  Using a sterile surgical marker, an appropriate bilobe flap drawn around the defect.    The area thus outlined was incised deep to adipose tissue with a #15 scalpel blade.  The skin margins were undermined to an appropriate distance in all directions utilizing iris scissors. Bilobed Transposition Flap Text: The defect edges were debeveled with a #15 scalpel blade.  Given the location of the defect and the proximity to free margins a bilobed transposition flap was deemed most appropriate.  Using a sterile surgical marker, an appropriate bilobe flap drawn around the defect.    The area thus outlined was incised deep to adipose tissue with a #15 scalpel blade.  The skin margins were undermined to an appropriate distance in all directions utilizing iris scissors. Trilobed Flap Text: The defect edges were debeveled with a #15 scalpel blade.  Given the location of the defect and the proximity to free margins a trilobed flap was deemed most appropriate.  Using a sterile surgical marker, an appropriate trilobed flap drawn around the defect.    The area thus outlined was incised deep to adipose tissue with a #15 scalpel blade.  The skin margins were undermined to an appropriate distance in all directions utilizing iris scissors. Dorsal Nasal Flap Text: The defect edges were debeveled with a #15 scalpel blade.  Given the location of the defect and the proximity to free margins a dorsal nasal flap was deemed most appropriate.  Using a sterile surgical marker, an appropriate dorsal nasal flap was drawn around the defect.    The area thus outlined was incised deep to adipose tissue with a #15 scalpel blade.  The skin margins were undermined to an appropriate distance in all directions utilizing iris scissors. Island Pedicle Flap Text: The defect edges were debeveled with a #15 scalpel blade.  Given the location of the defect, shape of the defect and the proximity to free margins an island pedicle advancement flap was deemed most appropriate.  Using a sterile surgical marker, an appropriate advancement flap was drawn incorporating the defect, outlining the appropriate donor tissue and placing the expected incisions within the relaxed skin tension lines where possible.    The area thus outlined was incised deep to adipose tissue with a #15 scalpel blade.  The skin margins were undermined to an appropriate distance in all directions around the primary defect and laterally outward around the island pedicle utilizing iris scissors.  There was minimal undermining beneath the pedicle flap. Island Pedicle Flap With Canthal Suspension Text: The defect edges were debeveled with a #15 scalpel blade.  Given the location of the defect, shape of the defect and the proximity to free margins an island pedicle advancement flap was deemed most appropriate.  Using a sterile surgical marker, an appropriate advancement flap was drawn incorporating the defect, outlining the appropriate donor tissue and placing the expected incisions within the relaxed skin tension lines where possible. The area thus outlined was incised deep to adipose tissue with a #15 scalpel blade.  The skin margins were undermined to an appropriate distance in all directions around the primary defect and laterally outward around the island pedicle utilizing iris scissors.  There was minimal undermining beneath the pedicle flap. A suspension suture was placed in the canthal tendon to prevent tension and prevent ectropion. Alar Island Pedicle Flap Text: The defect edges were debeveled with a #15 scalpel blade.  Given the location of the defect, shape of the defect and the proximity to the alar rim an island pedicle advancement flap was deemed most appropriate.  Using a sterile surgical marker, an appropriate advancement flap was drawn incorporating the defect, outlining the appropriate donor tissue and placing the expected incisions within the nasal ala running parallel to the alar rim. The area thus outlined was incised with a #15 scalpel blade.  The skin margins were undermined minimally to an appropriate distance in all directions around the primary defect and laterally outward around the island pedicle utilizing iris scissors.  There was minimal undermining beneath the pedicle flap. Double Island Pedicle Flap Text: The defect edges were debeveled with a #15 scalpel blade.  Given the location of the defect, shape of the defect and the proximity to free margins a double island pedicle advancement flap was deemed most appropriate.  Using a sterile surgical marker, an appropriate advancement flap was drawn incorporating the defect, outlining the appropriate donor tissue and placing the expected incisions within the relaxed skin tension lines where possible.    The area thus outlined was incised deep to adipose tissue with a #15 scalpel blade.  The skin margins were undermined to an appropriate distance in all directions around the primary defect and laterally outward around the island pedicle utilizing iris scissors.  There was minimal undermining beneath the pedicle flap. Island Pedicle Flap-Requiring Vessel Identification Text: The defect edges were debeveled with a #15 scalpel blade.  Given the location of the defect, shape of the defect and the proximity to free margins an island pedicle advancement flap was deemed most appropriate.  Using a sterile surgical marker, an appropriate advancement flap was drawn, based on the axial vessel mentioned above, incorporating the defect, outlining the appropriate donor tissue and placing the expected incisions within the relaxed skin tension lines where possible.    The area thus outlined was incised deep to adipose tissue with a #15 scalpel blade.  The skin margins were undermined to an appropriate distance in all directions around the primary defect and laterally outward around the island pedicle utilizing iris scissors.  There was minimal undermining beneath the pedicle flap. Keystone Flap Text: The defect edges were debeveled with a #15 scalpel blade.  Given the location of the defect, shape of the defect a keystone flap was deemed most appropriate.  Using a sterile surgical marker, an appropriate keystone flap was drawn incorporating the defect, outlining the appropriate donor tissue and placing the expected incisions within the relaxed skin tension lines where possible. The area thus outlined was incised deep to adipose tissue with a #15 scalpel blade.  The skin margins were undermined to an appropriate distance in all directions around the primary defect and laterally outward around the flap utilizing iris scissors. O-T Plasty Text: The defect edges were debeveled with a #15 scalpel blade.  Given the location of the defect, shape of the defect and the proximity to free margins an O-T plasty was deemed most appropriate.  Using a sterile surgical marker, an appropriate O-T plasty was drawn incorporating the defect and placing the expected incisions within the relaxed skin tension lines where possible.    The area thus outlined was incised deep to adipose tissue with a #15 scalpel blade.  The skin margins were undermined to an appropriate distance in all directions utilizing iris scissors. O-Z Plasty Text: The defect edges were debeveled with a #15 scalpel blade.  Given the location of the defect, shape of the defect and the proximity to free margins an O-Z plasty (double transposition flap) was deemed most appropriate.  Using a sterile surgical marker, the appropriate transposition flaps were drawn incorporating the defect and placing the expected incisions within the relaxed skin tension lines where possible.    The area thus outlined was incised deep to adipose tissue with a #15 scalpel blade.  The skin margins were undermined to an appropriate distance in all directions utilizing iris scissors.  Hemostasis was achieved with electrocautery.  The flaps were then transposed into place, one clockwise and the other counterclockwise, and anchored with interrupted buried subcutaneous sutures. Double O-Z Plasty Text: The defect edges were debeveled with a #15 scalpel blade.  Given the location of the defect, shape of the defect and the proximity to free margins a Double O-Z plasty (double transposition flap) was deemed most appropriate.  Using a sterile surgical marker, the appropriate transposition flaps were drawn incorporating the defect and placing the expected incisions within the relaxed skin tension lines where possible. The area thus outlined was incised deep to adipose tissue with a #15 scalpel blade.  The skin margins were undermined to an appropriate distance in all directions utilizing iris scissors.  Hemostasis was achieved with electrocautery.  The flaps were then transposed into place, one clockwise and the other counterclockwise, and anchored with interrupted buried subcutaneous sutures. V-Y Plasty Text: The defect edges were debeveled with a #15 scalpel blade.  Given the location of the defect, shape of the defect and the proximity to free margins an V-Y advancement flap was deemed most appropriate.  Using a sterile surgical marker, an appropriate advancement flap was drawn incorporating the defect and placing the expected incisions within the relaxed skin tension lines where possible.    The area thus outlined was incised deep to adipose tissue with a #15 scalpel blade.  The skin margins were undermined to an appropriate distance in all directions utilizing iris scissors. H Plasty Text: Given the location of the defect, shape of the defect and the proximity to free margins a H-plasty was deemed most appropriate for repair.  Using a sterile surgical marker, the appropriate advancement arms of the H-plasty were drawn incorporating the defect and placing the expected incisions within the relaxed skin tension lines where possible. The area thus outlined was incised deep to adipose tissue with a #15 scalpel blade. The skin margins were undermined to an appropriate distance in all directions utilizing iris scissors.  The opposing advancement arms were then advanced into place in opposite direction and anchored with interrupted buried subcutaneous sutures. W Plasty Text: The lesion was extirpated to the level of the fat with a #15 scalpel blade.  Given the location of the defect, shape of the defect and the proximity to free margins a W-plasty was deemed most appropriate for repair.  Using a sterile surgical marker, the appropriate transposition arms of the W-plasty were drawn incorporating the defect and placing the expected incisions within the relaxed skin tension lines where possible.    The area thus outlined was incised deep to adipose tissue with a #15 scalpel blade.  The skin margins were undermined to an appropriate distance in all directions utilizing iris scissors.  The opposing transposition arms were then transposed into place in opposite direction and anchored with interrupted buried subcutaneous sutures. Z Plasty Text: The lesion was extirpated to the level of the fat with a #15 scalpel blade.  Given the location of the defect, shape of the defect and the proximity to free margins a Z-plasty was deemed most appropriate for repair.  Using a sterile surgical marker, the appropriate transposition arms of the Z-plasty were drawn incorporating the defect and placing the expected incisions within the relaxed skin tension lines where possible.    The area thus outlined was incised deep to adipose tissue with a #15 scalpel blade.  The skin margins were undermined to an appropriate distance in all directions utilizing iris scissors.  The opposing transposition arms were then transposed into place in opposite direction and anchored with interrupted buried subcutaneous sutures. Zygomaticofacial Flap Text: Given the location of the defect, shape of the defect and the proximity to free margins a zygomaticofacial flap was deemed most appropriate for repair.  Using a sterile surgical marker, the appropriate flap was drawn incorporating the defect and placing the expected incisions within the relaxed skin tension lines where possible. The area thus outlined was incised deep to adipose tissue with a #15 scalpel blade with preservation of a vascular pedicle.  The skin margins were undermined to an appropriate distance in all directions utilizing iris scissors.  The flap was then placed into the defect and anchored with interrupted buried subcutaneous sutures. Cheek Interpolation Flap Text: A decision was made to reconstruct the defect utilizing an interpolation axial flap and a staged reconstruction.  A telfa template was made of the defect.  This telfa template was then used to outline the Cheek Interpolation flap.  The donor area for the pedicle flap was then injected with anesthesia.  The flap was excised through the skin and subcutaneous tissue down to the layer of the underlying musculature.  The interpolation flap was carefully excised within this deep plane to maintain its blood supply.  The edges of the donor site were undermined.   The donor site was closed in a primary fashion.  The pedicle was then rotated into position and sutured.  Once the tube was sutured into place, adequate blood supply was confirmed with blanching and refill.  The pedicle was then wrapped with xeroform gauze and dressed appropriately with a telfa and gauze bandage to ensure continued blood supply and protect the attached pedicle. Cheek-To-Nose Interpolation Flap Text: A decision was made to reconstruct the defect utilizing an interpolation axial flap and a staged reconstruction.  A telfa template was made of the defect.  This telfa template was then used to outline the Cheek-To-Nose Interpolation flap.  The donor area for the pedicle flap was then injected with anesthesia.  The flap was excised through the skin and subcutaneous tissue down to the layer of the underlying musculature.  The interpolation flap was carefully excised within this deep plane to maintain its blood supply.  The edges of the donor site were undermined.   The donor site was closed in a primary fashion.  The pedicle was then rotated into position and sutured.  Once the tube was sutured into place, adequate blood supply was confirmed with blanching and refill.  The pedicle was then wrapped with xeroform gauze and dressed appropriately with a telfa and gauze bandage to ensure continued blood supply and protect the attached pedicle. Interpolation Flap Text: A decision was made to reconstruct the defect utilizing an interpolation axial flap and a staged reconstruction.  A telfa template was made of the defect.  This telfa template was then used to outline the interpolation flap.  The donor area for the pedicle flap was then injected with anesthesia.  The flap was excised through the skin and subcutaneous tissue down to the layer of the underlying musculature.  The interpolation flap was carefully excised within this deep plane to maintain its blood supply.  The edges of the donor site were undermined.   The donor site was closed in a primary fashion.  The pedicle was then rotated into position and sutured.  Once the tube was sutured into place, adequate blood supply was confirmed with blanching and refill.  The pedicle was then wrapped with xeroform gauze and dressed appropriately with a telfa and gauze bandage to ensure continued blood supply and protect the attached pedicle. Melolabial Interpolation Flap Text: A decision was made to reconstruct the defect utilizing an interpolation axial flap and a staged reconstruction.  A telfa template was made of the defect.  This telfa template was then used to outline the melolabial interpolation flap.  The donor area for the pedicle flap was then injected with anesthesia.  The flap was excised through the skin and subcutaneous tissue down to the layer of the underlying musculature.  The pedicle flap was carefully excised within this deep plane to maintain its blood supply.  The edges of the donor site were undermined.   The donor site was closed in a primary fashion.  The pedicle was then rotated into position and sutured.  Once the tube was sutured into place, adequate blood supply was confirmed with blanching and refill.  The pedicle was then wrapped with xeroform gauze and dressed appropriately with a telfa and gauze bandage to ensure continued blood supply and protect the attached pedicle. Mastoid Interpolation Flap Text: A decision was made to reconstruct the defect utilizing an interpolation axial flap and a staged reconstruction.  A telfa template was made of the defect.  This telfa template was then used to outline the mastoid interpolation flap.  The donor area for the pedicle flap was then injected with anesthesia.  The flap was excised through the skin and subcutaneous tissue down to the layer of the underlying musculature.  The pedicle flap was carefully excised within this deep plane to maintain its blood supply.  The edges of the donor site were undermined.   The donor site was closed in a primary fashion.  The pedicle was then rotated into position and sutured.  Once the tube was sutured into place, adequate blood supply was confirmed with blanching and refill.  The pedicle was then wrapped with xeroform gauze and dressed appropriately with a telfa and gauze bandage to ensure continued blood supply and protect the attached pedicle. Posterior Auricular Interpolation Flap Text: A decision was made to reconstruct the defect utilizing an interpolation axial flap and a staged reconstruction.  A telfa template was made of the defect.  This telfa template was then used to outline the posterior auricular interpolation flap.  The donor area for the pedicle flap was then injected with anesthesia.  The flap was excised through the skin and subcutaneous tissue down to the layer of the underlying musculature.  The pedicle flap was carefully excised within this deep plane to maintain its blood supply.  The edges of the donor site were undermined.   The donor site was closed in a primary fashion.  The pedicle was then rotated into position and sutured.  Once the tube was sutured into place, adequate blood supply was confirmed with blanching and refill.  The pedicle was then wrapped with xeroform gauze and dressed appropriately with a telfa and gauze bandage to ensure continued blood supply and protect the attached pedicle. Paramedian Forehead Flap Text: A decision was made to reconstruct the defect utilizing an interpolation axial flap and a staged reconstruction.  A telfa template was made of the defect.  This telfa template was then used to outline the paramedian forehead pedicle flap.  The donor area for the pedicle flap was then injected with anesthesia.  The flap was excised through the skin and subcutaneous tissue down to the layer of the underlying musculature.  The pedicle flap was carefully excised within this deep plane to maintain its blood supply.  The edges of the donor site were undermined.   The donor site was closed in a primary fashion.  The pedicle was then rotated into position and sutured.  Once the tube was sutured into place, adequate blood supply was confirmed with blanching and refill.  The pedicle was then wrapped with xeroform gauze and dressed appropriately with a telfa and gauze bandage to ensure continued blood supply and protect the attached pedicle. Abbe Flap (Upper To Lower Lip) Text: The defect of the lower lip was assessed and measured.  Given the location and size of the defect, an Abbe flap was deemed most appropriate.  Using a sterile surgical marker, an appropriate Abbe flap was measured and drawn on the upper lip. Local anesthesia was then infiltrated.  A scalpel was then used to incise the upper lip through and through the skin, vermilion, muscle and mucosa, leaving the flap pedicled on the opposite side.  The flap was then rotated and transferred to the lower lip defect.  The flap was then sutured into place with a three layer technique, closing the orbicularis oris muscle layer with subcutaneous buried sutures, followed by a mucosal layer and an epidermal layer. Abbe Flap (Lower To Upper Lip) Text: The defect of the upper lip was assessed and measured.  Given the location and size of the defect, an Abbe flap was deemed most appropriate.  Using a sterile surgical marker, an appropriate Abbe flap was measured and drawn on the lower lip. Local anesthesia was then infiltrated. A scalpel was then used to incise the upper lip through and through the skin, vermilion, muscle and mucosa, leaving the flap pedicled on the opposite side.  The flap was then rotated and transferred to the lower lip defect.  The flap was then sutured into place with a three layer technique, closing the orbicularis oris muscle layer with subcutaneous buried sutures, followed by a mucosal layer and an epidermal layer. Estlander Flap (Upper To Lower Lip) Text: The defect of the lower lip was assessed and measured.  Given the location and size of the defect, an Estlander flap was deemed most appropriate.  Using a sterile surgical marker, an appropriate Estlander flap was measured and drawn on the upper lip. Local anesthesia was then infiltrated. A scalpel was then used to incise the lateral aspect of the flap, through skin, muscle and mucosa, leaving the flap pedicled medially.  The flap was then rotated and positioned to fill the lower lip defect.  The flap was then sutured into place with a three layer technique, closing the orbicularis oris muscle layer with subcutaneous buried sutures, followed by a mucosal layer and an epidermal layer. Cheiloplasty (Less Than 50%) Text: A decision was made to reconstruct the defect with a  cheiloplasty.  The defect was undermined extensively.  Additional orbicularis oris muscle was excised with a 15 blade scalpel.  The defect was converted into a full thickness wedge, of less than 50% of the vertical height of the lip, to facilite a better cosmetic result.  Small vessels were then tied off with 5-0 monocyrl. The orbicularis oris, superficial fascia, adipose and dermis were then reapproximated.  After the deeper layers were approximated the epidermis was reapproximated with particular care given to realign the vermilion border. Cheiloplasty (Complex) Text: A decision was made to reconstruct the defect with a  cheiloplasty.  The defect was undermined extensively.  Additional orbicularis oris muscle was excised with a 15 blade scalpel.  The defect was converted into a full thickness wedge to facilite a better cosmetic result.  Small vessels were then tied off with 5-0 monocyrl. The orbicularis oris, superficial fascia, adipose and dermis were then reapproximated.  After the deeper layers were approximated the epidermis was reapproximated with particular care given to realign the vermilion border. Ear Wedge Repair Text: A wedge excision was completed by carrying down an excision through the full thickness of the ear and cartilage with an inward facing Burow's triangle. The wound was then closed in a layered fashion. Full Thickness Lip Wedge Repair (Flap) Text: Given the location of the defect and the proximity to free margins a full thickness wedge repair was deemed most appropriate.  Using a sterile surgical marker, the appropriate repair was drawn incorporating the defect and placing the expected incisions perpendicular to the vermilion border.  The vermilion border was also meticulously outlined to ensure appropriate reapproximation during the repair.  The area thus outlined was incised through and through with a #15 scalpel blade.  The muscularis and dermis were reaproximated with deep sutures following hemostasis. Care was taken to realign the vermilion border before proceeding with the superficial closure.  Once the vermilion was realigned the superfical and mucosal closure was finished. Ftsg Text: The defect edges were debeveled with a #15 scalpel blade.  Given the location of the defect, shape of the defect and the proximity to free margins a full thickness skin graft was deemed most appropriate.  Using a sterile surgical marker, the primary defect shape was transferred to the donor site. The area thus outlined was incised deep to adipose tissue with a #15 scalpel blade.  The harvested graft was then trimmed of adipose tissue until only dermis and epidermis was left.  The skin margins of the secondary defect were undermined to an appropriate distance in all directions utilizing iris scissors.  The secondary defect was closed with interrupted buried subcutaneous sutures.  The skin edges were then re-apposed with running  sutures.  The skin graft was then placed in the primary defect and oriented appropriately. Split-Thickness Skin Graft Text: The defect edges were debeveled with a #15 scalpel blade.  Given the location of the defect, shape of the defect and the proximity to free margins a split thickness skin graft was deemed most appropriate.  Using a sterile surgical marker, the primary defect shape was transferred to the donor site. The split thickness graft was then harvested.  The skin graft was then placed in the primary defect and oriented appropriately. Burow's Graft Text: The defect edges were debeveled with a #15 scalpel blade.  Given the location of the defect, shape of the defect, the proximity to free margins and the presence of a standing cone deformity a Burow's skin graft was deemed most appropriate. The standing cone was removed and this tissue was then trimmed to the shape of the primary defect. The adipose tissue was also removed until only dermis and epidermis were left.  The skin margins of the secondary defect were undermined to an appropriate distance in all directions utilizing iris scissors.  The secondary defect was closed with interrupted buried subcutaneous sutures.  The skin edges were then re-apposed with running  sutures.  The skin graft was then placed in the primary defect and oriented appropriately. Cartilage Graft Text: The defect edges were debeveled with a #15 scalpel blade.  Given the location of the defect, shape of the defect, the fact the defect involved a full thickness cartilage defect a cartilage graft was deemed most appropriate.  An appropriate donor site was identified, cleansed, and anesthetized. The cartilage graft was then harvested and transferred to the recipient site, oriented appropriately and then sutured into place.  The secondary defect was then repaired using a primary closure. Composite Graft Text: The defect edges were debeveled with a #15 scalpel blade.  Given the location of the defect, shape of the defect, the proximity to free margins and the fact the defect was full thickness a composite graft was deemed most appropriate.  The defect was outline and then transferred to the donor site.  A full thickness graft was then excised from the donor site. The graft was then placed in the primary defect, oriented appropriately and then sutured into place.  The secondary defect was then repaired using a primary closure. Epidermal Autograft Text: The defect edges were debeveled with a #15 scalpel blade.  Given the location of the defect, shape of the defect and the proximity to free margins an epidermal autograft was deemed most appropriate.  Using a sterile surgical marker, the primary defect shape was transferred to the donor site. The epidermal graft was then harvested.  The skin graft was then placed in the primary defect and oriented appropriately. Dermal Autograft Text: The defect edges were debeveled with a #15 scalpel blade.  Given the location of the defect, shape of the defect and the proximity to free margins a dermal autograft was deemed most appropriate.  Using a sterile surgical marker, the primary defect shape was transferred to the donor site. The area thus outlined was incised deep to adipose tissue with a #15 scalpel blade.  The harvested graft was then trimmed of adipose and epidermal tissue until only dermis was left.  The skin graft was then placed in the primary defect and oriented appropriately. Skin Substitute Text: The defect edges were debeveled with a #15 scalpel blade.  Given the location of the defect, shape of the defect and the proximity to free margins a skin substitute graft was deemed most appropriate.  The graft material was trimmed to fit the size of the defect. The graft was then placed in the primary defect and oriented appropriately. Tissue Cultured Epidermal Autograft Text: The defect edges were debeveled with a #15 scalpel blade.  Given the location of the defect, shape of the defect and the proximity to free margins a tissue cultured epidermal autograft was deemed most appropriate.  The graft was then trimmed to fit the size of the defect.  The graft was then placed in the primary defect and oriented appropriately. Xenograft Text: The defect edges were debeveled with a #15 scalpel blade.  Given the location of the defect, shape of the defect and the proximity to free margins a xenograft was deemed most appropriate.  The graft was then trimmed to fit the size of the defect.  The graft was then placed in the primary defect and oriented appropriately. Purse String (Simple) Text: Given the location of the defect and the characteristics of the surrounding skin a purse string closure was deemed most appropriate.  Undermining was performed circumferentially around the surgical defect.  A purse string suture was then placed and tightened. Purse String (Intermediate) Text: Given the location of the defect and the characteristics of the surrounding skin a purse string intermediate closure was deemed most appropriate.  Undermining was performed circumferentially around the surgical defect.  A purse string suture was then placed and tightened. Partial Purse String (Simple) Text: Given the location of the defect and the characteristics of the surrounding skin a simple purse string closure was deemed most appropriate.  Undermining was performed circumferentially around the surgical defect.  A purse string suture was then placed and tightened. Wound tension only allowed a partial closure of the circular defect. Partial Purse String (Intermediate) Text: Given the location of the defect and the characteristics of the surrounding skin an intermediate purse string closure was deemed most appropriate.  Undermining was performed circumferentially around the surgical defect.  A purse string suture was then placed and tightened. Wound tension only allowed a partial closure of the circular defect. Localized Dermabrasion With Wire Brush Text: The patient was draped in routine manner.  Localized dermabrasion using 3 x 17 mm wire brush was performed in routine manner to papillary dermis. This spot dermabrasion is being performed to complete skin cancer reconstruction. It also will eliminate the other sun damaged precancerous cells that are known to be part of the regional effect of a lifetime's worth of sun exposure. This localized dermabrasion is therapeutic and should not be considered cosmetic in any regard. Tarsorrhaphy Text: A tarsorrhaphy was performed using Frost sutures. Complex Repair And Flap Additional Text (Will Appearing After The Standard Complex Repair Text): The complex repair was not sufficient to completely close the primary defect. The remaining additional defect was repaired with the flap mentioned below. Complex Repair And Graft Additional Text (Will Appearing After The Standard Complex Repair Text): The complex repair was not sufficient to completely close the primary defect. The remaining additional defect was repaired with the graft mentioned below. Manual Repair Warning Statement: We plan on removing the manually selected variable below in favor of our much easier automatic structured text blocks found in the previous tab. We decided to do this to help make the flow better and give you the full power of structured data. Manual selection is never going to be ideal in our platform and I would encourage you to avoid using manual selection from this point on, especially since I will be sunsetting this feature. It is important that you do one of two things with the customized text below. First, you can save all of the text in a word file so you can have it for future reference. Second, transfer the text to the appropriate area in the Library tab. Lastly, if there is a flap or graft type which we do not have you need to let us know right away so I can add it in before the variable is hidden. No need to panic, we plan to give you roughly 6 months to make the change. Same Histology In Subsequent Stages Text: The pattern and morphology of the tumor is as described in the first stage. No Residual Tumor Seen Histology Text: There were no malignant cells seen in the sections examined. Inflammation Suggestive Of Cancer Camouflage Histology Text: There was a dense lymphocytic infiltrate which prevented adequate histologic evaluation of adjacent structures. Bcc Histology Text: There were numerous aggregates of basaloid cells. Bcc Infiltrative Histology Text: There were numerous aggregates of basaloid cells demonstrating an infiltrative pattern. Mart-1 - Positive Histology Text: MART-1 staining demonstrates areas of higher density and clustering of melanocytes with Pagetoid spread upwards within the epidermis. The surgical margins are positive for tumor cells. Mart-1 - Negative Histology Text: MART-1 staining demonstrates a normal density and pattern of melanocytes along the dermal-epidermal junction. The surgical margins are negative for tumor cells. Information: Selecting Yes will display possible errors in your note based on the variables you have selected. This validation is only offered as a suggestion for you. PLEASE NOTE THAT THE VALIDATION TEXT WILL BE REMOVED WHEN YOU FINALIZE YOUR NOTE. IF YOU WANT TO FAX A PRELIMINARY NOTE YOU WILL NEED TO TOGGLE THIS TO 'NO' IF YOU DO NOT WANT IT IN YOUR FAXED NOTE.

## 2023-01-20 NOTE — PLAN OF CARE
Goal Outcome Evaluation:    A&Ox4. VSS ex hypotensive, on RA. DAS. C/o R sided back pain, controlled w/PRN oxy & tylenol. Up independent. Continent of B&B. New PIV placed in R AC had extravasation this afternoon, see IV charting. 2nd new PIV infusing NS @75 ml/hr, int abx. Replaced magnesium. R upper back dressing CDI. NPO at [facility] for R VATS & possible R thoracotomy around noon tomorrow with Dr. Murcia.

## 2023-01-20 NOTE — PLAN OF CARE
[id number]  Orientation: A/Ox4   Vitals: VSS on RA   Mobility: pt ind in room  Diet: regular diet; NPO food at 0500, liquids at 1000  GI/: continent BB; no BM  Pain: pt received PRN oxy x2 and tylenol x1 for pain  Drains/Devices: PIV x1 infusing NS @75mL/hr  Skin: R upper back puncture site, bandaid in place CDI

## 2023-01-20 NOTE — BRIEF OP NOTE
[facility]    Brief Operative Note    Pre-operative diagnosis: Empyema of pleura (H) [J86.9]  Post-operative diagnosis right empyema    Procedure: Procedure(s):  RIGHT THORACOTOMY,  TOTAL DECORTICATION OF THE RIGHT LUNG AND PARIETAL PLEURECTOMY  Surgeon: Surgeon(s) and Role:     * Tani Murcia MD - Primary     * Sierra Henry PA-C - Assisting  Anesthesia: General   Estimated Blood Loss: 100 mL from 1/20/2023  1:14 PM to 1/20/2023  3:44 PM      Drains: One 28 straight chest tube to right anterior apex, one 28 angled to right base, one 28 straight chest tube to right posterior apex  Specimens:   ID Type Source Tests Collected by Time Destination   1 : right parietal pluera Tissue Pleural Cavity, Right SURGICAL PATHOLOGY EXAM Tani Murcia MD 1/20/2023  2:00 PM    A : pleural debris Tissue Pleural Cavity, Right ANAEROBIC BACTERIAL CULTURE ROUTINE, GRAM STAIN, KOH PREP, FUNGAL OR YEAST CULTURE ROUTINE, AEROBIC BACTERIAL CULTURE ROUTINE Tani Murcia MD 1/20/2023  1:50 PM      Findings:   Markedly thickened parietal pleura, no rosy pus, no abscess, good re-expansion right lung at conclusion of procedure  Complications: None.  Implants: * No implants in log *

## 2023-01-20 NOTE — ANESTHESIA CARE TRANSFER NOTE
Patient: Kamilah Goldberg    Procedure: Procedure(s):  RIGHT VIDEO ASSISTED THORACOSCOPY,  RIGHT THORACOTOMY, DECORTICATION       Diagnosis: Empyema of pleura (H) [J86.9]  Diagnosis Additional Information: No value filed.    Anesthesia Type:   No value filed.     Note:    Oropharynx: oropharynx clear of all foreign objects and spontaneously breathing  Level of Consciousness: drowsy  Oxygen Supplementation: face mask  Level of Supplemental Oxygen (L/min / FiO2): 8  Independent Airway: airway patency satisfactory and stable  Dentition: dentition unchanged  Vital Signs Stable: post-procedure vital signs reviewed and stable  Report to RN Given: handoff report given  Patient transferred to: PACU  Comments: Neuromuscular blockade reversed with sugammadex, spontaneous respirations, adequate tidal volumes, followed commands to voice, oropharynx suctioned with soft flexible catheter, extubated atraumatically, extubated with suction, airway patent after extubation.  Oxygen via facemask at 8 liters per minute to PACU. Oxygen tubing connected to wall O2 in PACU, SpO2, NiBP, and EKG monitors and alarms on and functioning, report on patient's clinical status given to PACU RN, RN questions answered.     Handoff Report: Identifed the Patient, Identified the Reponsible Provider, Reviewed the pertinent medical history, Discussed the surgical course, Reviewed Intra-OP anesthesia mangement and issues during anesthesia, Set expectations for post-procedure period and Allowed opportunity for questions and acknowledgement of understanding      Vitals:  Vitals Value Taken Time   /70 01/20/23 1209   Temp     Pulse 75 01/20/23 1209   Resp 16 01/20/23 1209   SpO2 95 % 01/20/23 1209       Electronically Signed By: VEE Edmondson CRNA  January 20, 2023  12:16 PM

## 2023-01-20 NOTE — PROGRESS NOTES
"[facility]  Infectious Disease Progress Note          Assessment and Plan:   IMPRESSION:  1.  A 42-year-old female with a several-week illness of shortness of breath, cough, feverish symptoms, found to have a large loculated right pleural effusion and empyema, aspiration of the fluid just coming back now with Streptococcus pneumoniae, so has classic community-acquired pneumonia-related empyema of a prior untreated pneumonia.  2.  Chronic anemia.  3.  Depression.     RECOMMENDATIONS:  1.  Dr. Murcia  Plan is VATS intervention today  2.  Based on BETHANY ceftriaxone sensitive alone, can stop vanco   3.  Amount and type of antibiotics depends on how well we were able to get drained. But if fully sens and well drained po likely eventual OK        Interval History:   In OR today did not examine               Medications:       ceFAZolin  2 g Intravenous Pre-Op/Pre-procedure x 1 dose     ceFAZolin  2 g Intravenous See Admin Instructions     [Auto Hold] cefTRIAXone  2 g Intravenous Q24H     [Auto Hold] sodium chloride (PF)  3 mL Intracatheter Q8H                  Physical Exam:   Blood pressure 111/70, pulse 75, temperature 98.8  F (37.1  C), temperature source Oral, resp. rate 16, height 1.6 m (5' 3\"), weight 62 kg (136 lb 11 oz), last menstrual period 12/01/2022, SpO2 95 %.  Wt Readings from Last 2 Encounters:   01/20/23 62 kg (136 lb 11 oz)   01/16/23 56.9 kg (125 lb 8 oz)     Vital Signs with Ranges  Temp:  [97.6  F (36.4  C)-98.8  F (37.1  C)] 98.8  F (37.1  C)  Pulse:  [] 75  Resp:  [16] 16  BP: ()/(50-70) 111/70  SpO2:  [93 %-99 %] 95 %         Data:   All microbiology laboratory data reviewed.  Recent Labs   Lab Test 01/20/23  0602 01/19/23  0533 01/18/23  0721   WBC 3.2* 3.6* 4.6   HGB 8.0* 8.5* 7.6*   HCT 25.9* 27.4* 23.4*   MCV 76* 76* 73*   * 572* 543*     Recent Labs   Lab Test 01/20/23  0602 01/19/23  0533 01/18/23  0721   CR 0.36* 0.40* 0.39*     No lab results " found.  No lab results found.    Invalid input(s): ADILIA

## 2023-01-20 NOTE — ANESTHESIA PREPROCEDURE EVALUATION
Anesthesia Pre-Procedure Evaluation    Patient: Kamilah Goldberg   MRN: [id number] : 1980        Procedure : Procedure(s):  RIGHT VIDEO ASSISTED THORACOSCOPY,  RIGHT THORACOTOMY, DECORTICATION          Past Medical History:   Diagnosis Date     Back pain      Depression since age 16    On Wellbutrin 100 mg BID--started taking meds at age 16, was on Paxil for several years and then switched to Wellbutrin in [id number]     UTI (lower urinary tract infection)     X 3 as of 09      Past Surgical History:   Procedure Laterality Date     BYPASS GASTRIC CARA-EN-Y, LIVER BIOPSY, COMBINED       CHOLECYSTECTOMY       CRYOCAUTERY OF CERVIX  2001     DISCECTOMY LUMBAR MINIMALLY INVASIVE ONE LEVEL  2013    Procedure: DISCECTOMY LUMBAR MINIMALLY INVASIVE ONE LEVEL;  MIS Right side L5-S1 Gonzalo Lami Microdiscectomy, ;  Surgeon: Alba Anderson MD;  Location: [facility] OR     TUBAL LIGATION            Allergies   Allergen Reactions     Naproxen Hives     Codeine Sulfate GI Disturbance     Hydrocodone Nausea and Vomiting      Social History     Tobacco Use     Smoking status: Never     Smokeless tobacco: Never   Substance Use Topics     Alcohol use: Yes     Comment: occasional      Wt Readings from Last 1 Encounters:   23 62 kg (136 lb 11 oz)        Anesthesia Evaluation   Pt has had prior anesthetic.     No history of anesthetic complications       ROS/MED HX  ENT/Pulmonary:     (+) recent URI,  (-) tobacco use and sleep apnea   Neurologic:    (-) no seizures and no CVA   Cardiovascular:    (-) hypertension   METS/Exercise Tolerance:     Hematologic:       Musculoskeletal:       GI/Hepatic:    (-) GERD and liver disease   Renal/Genitourinary:    (-) renal disease   Endo:    (-) Type II DM and thyroid disease   Psychiatric/Substance Use:     (+) psychiatric history depression     Infectious Disease:       Malignancy:       Other:      (+) , H/O Chronic Pain,        Physical Exam    Airway         Mallampati: II   TM distance: > 3 FB   Neck ROM: full   Mouth opening: > 3 cm    Respiratory Devices and Support         Dental       (+) Minor Abnormalities - some fillings, tiny chips      Cardiovascular   cardiovascular exam normal          Pulmonary   pulmonary exam normal                OUTSIDE LABS:  CBC:   Lab Results   Component Value Date    WBC 3.2 (L) 01/20/2023    WBC 3.6 (L) 01/19/2023    HGB 8.0 (L) 01/20/2023    HGB 8.5 (L) 01/19/2023    HCT 25.9 (L) 01/20/2023    HCT 27.4 (L) 01/19/2023     (H) 01/20/2023     (H) 01/19/2023     BMP:   Lab Results   Component Value Date     (L) 01/20/2023     01/19/2023    POTASSIUM 3.6 01/20/2023    POTASSIUM 3.9 01/19/2023    CHLORIDE 102 01/20/2023    CHLORIDE 101 01/19/2023    CO2 24 01/20/2023    CO2 26 01/19/2023    BUN 3.3 (L) 01/20/2023    BUN 3.2 (L) 01/19/2023    CR 0.36 (L) 01/20/2023    CR 0.40 (L) 01/19/2023    GLC 86 01/20/2023    GLC 79 01/19/2023     COAGS:   Lab Results   Component Value Date    INR 1.27 (H) 01/19/2023     POC:   Lab Results   Component Value Date    HCG Negative 01/19/2023     HEPATIC:   Lab Results   Component Value Date    ALBUMIN 1.5 (L) 01/16/2023    PROTTOTAL 6.3 (L) 01/17/2023    ALT 61 (H) 01/16/2023    AST 98 (H) 01/16/2023    ALKPHOS 55 01/16/2023    BILITOTAL 0.1 (L) 01/16/2023     OTHER:   Lab Results   Component Value Date    LACT 0.8 01/17/2023    MAUREEN 7.4 (L) 01/20/2023    PHOS 3.5 01/20/2023    MAG 2.0 01/20/2023    LIPASE 162 04/10/2019       Anesthesia Plan    ASA Status:  2   NPO Status:  NPO Appropriate    Anesthesia Type: General.     - Airway: ETT   Induction: Intravenous.   Maintenance: Balanced.   Techniques and Equipment:     - Airway: Double lumen ETT, Fiberoptic Bronchoscope         Consents    Anesthesia Plan(s) and associated risks, benefits, and realistic alternatives discussed. Questions answered and patient/representative(s) expressed understanding.    - Discussed:     -  Discussed with:  Patient         Postoperative Care    Pain management: Multi-modal analgesia.   PONV prophylaxis: Ondansetron (or other 5HT-3), Dexamethasone or Solumedrol, Background Propofol Infusion     Comments:                Ramesh Aponte MD

## 2023-01-20 NOTE — PROGRESS NOTES
Pt had requested that writer call her aunt Noemy once surgery was done.  Writer called Noemy to let her know pt is in the PACU and will be back to her room within an hour or so.

## 2023-01-20 NOTE — ANESTHESIA CARE TRANSFER NOTE
Patient: Kamilah Goldberg    Procedure: Procedure(s):  RIGHT THORACOTOMY,  TOTAL DECORTICATION OF THE RIGHT LUNG AND PARIETAL PLEURECTOMY       Diagnosis: Empyema of pleura (H) [J86.9]  Diagnosis Additional Information: No value filed.    Anesthesia Type:   General     Note:    Oropharynx: oropharynx clear of all foreign objects and spontaneously breathing  Level of Consciousness: drowsy  Oxygen Supplementation: face mask  Level of Supplemental Oxygen (L/min / FiO2): 8  Independent Airway: airway patency satisfactory and stable  Dentition: dentition unchanged  Vital Signs Stable: post-procedure vital signs reviewed and stable  Report to RN Given: handoff report given  Patient transferred to: PACU  Comments: Neuromuscular blockade reversed with sugammadex, spontaneous respirations, adequate tidal volumes, followed commands to voice, oropharynx suctioned with soft flexible catheter, extubated atraumatically, extubated with suction, airway patent after extubation.  Oxygen via facemask at 8 liters per minute to PACU. Oxygen tubing connected to wall O2 in PACU, SpO2, NiBP, and EKG monitors and alarms on and functioning, report on patient's clinical status given to PACU RN, RN questions answered.     Handoff Report: Identifed the Patient, Identified the Reponsible Provider, Reviewed the pertinent medical history, Discussed the surgical course, Reviewed Intra-OP anesthesia mangement and issues during anesthesia, Set expectations for post-procedure period and Allowed opportunity for questions and acknowledgement of understanding      Vitals:  Vitals Value Taken Time   BP 96/63 01/20/23 1549   Temp     Pulse 93 01/20/23 1550   Resp 14 01/20/23 1550   SpO2 84 % 01/20/23 1550   Vitals shown include unvalidated device data.    Electronically Signed By: VEE Edmondson CRNA  January 20, 2023  3:52 PM

## 2023-01-20 NOTE — PROGRESS NOTES
3 body piercing pieces in plastic bag, labeled and placed in Chart. Patient has visualized this and is comfortable with this.

## 2023-01-20 NOTE — OP NOTE
Procedure Date: 01/20/2023    SURGEON:  Tani Murcia MD    ASSISTANT:  Sierra Henry PA-C    PREOPERATIVE DIAGNOSIS:  Right empyema.    POSTOPERATIVE DIAGNOSIS:  Right empyema.    PROCEDURE:    1.  Right video-assisted thoracoscopy.  2.  Right thoracotomy.  3.  Total decortication, right lung.  4.  Parietal pleurectomy.    ANESTHESIA:  General with double-lumen endotracheal tube.    INDICATIONS:  A 42-year-old woman with a week's history of respiratory illness.  She developed an empyema. Based on the findings and surgical drainage is indicated with decortication and parietal pleurectomy.    DESCRIPTION OF PROCEDURE:  The patient was brought to the operating room and placed in supine position.  General anesthesia with double endotracheal tube.  The patient was placed in the left lateral decubitus position.  The right chest was prepared and draped in usual fashion using ChloraPrep and ventilation of the right lung was discontinued.  A 1 cm thoracoscopic incision was made at the level of the 8th intercostal space.  The pleural space entered.  A large amount of somewhat purulent fluid, by exploration thoracoscopic decortication is not an option.  A posterolateral thoracotomy was made, sparing the serratus anterior muscle.  Pleural space entered in the sixth intercostal space, preserving the integrity of the rib.  The parietal pleura was thickened and there was elevated from the undersurface of the rib superiorly and inferiorly.  Then, the pleural space was entered.  The lung was freed up circumferentially and the surface of diaphragm was freed up also.  Then, the lung was dissected down to the hilum.  The fissures were entered.  Then, the parietal pleurectomy was done excising all of the thickened pleura.  Some of this was submitted for permanent section.  The pleural debris was sent for stains and cultures.  Then, a total decortication of the lung was performed, removing all the visceral pleural thickening  entrapping the lung.  At the completion of the procedure there is an excellent reexpansion of all 3 lobes.  Hemostasis was excellent.  The pleural cavity was thoroughly irrigated with multiple liters of normal saline.  Then, through separate stab wounds, two 28 straight chest tubes were placed anteriorly and posteriorly towards the apex and 28 right angle chest tube was placed over the base posteriorly.  On-Q catheter was placed and 30 mL of Marcaine 0.5% without epinephrine was injected as costal blocks.  Incision was closed with pericostal suture Vicryl #1, running #1 Vicryl muscular layer, running 2-0 Vicryl for subcutaneous tissue and the skin closed with Insorb staples.    ESTIMATED BLOOD LOSS:  100 mL    COUNTS:  Needle, sponge count is correct.    Sierra Henry PA-C, was the first assistant.  Her role as first assistant was essential and necessary in accomplishing the steps of the procedure as described above, providing exposure, retraction and handling of the scope.    Tani Murcia MD        D: 2023   T: 2023   MT: DONTA    Name:     NATASHA CASEY  MRN:      [id number]        Account:        [id number]   :      1980           Procedure Date: 2023     Document: [id number]

## 2023-01-21 ENCOUNTER — APPOINTMENT (OUTPATIENT)
Dept: ULTRASOUND IMAGING | Facility: CLINIC | Age: 43
End: 2023-01-21
Attending: HOSPITALIST
Payer: COMMERCIAL

## 2023-01-21 ENCOUNTER — APPOINTMENT (OUTPATIENT)
Dept: GENERAL RADIOLOGY | Facility: CLINIC | Age: 43
End: 2023-01-21
Attending: PHYSICIAN ASSISTANT
Payer: COMMERCIAL

## 2023-01-21 LAB
ANION GAP SERPL CALCULATED.3IONS-SCNC: 10 MMOL/L (ref 7–15)
BUN SERPL-MCNC: 5.4 MG/DL (ref 6–20)
CALCIUM SERPL-MCNC: 7.8 MG/DL (ref 8.6–10)
CHLORIDE SERPL-SCNC: 101 MMOL/L (ref 98–107)
CREAT SERPL-MCNC: 0.33 MG/DL (ref 0.51–0.95)
DEPRECATED HCO3 PLAS-SCNC: 23 MMOL/L (ref 22–29)
ERYTHROCYTE [DISTWIDTH] IN BLOOD BY AUTOMATED COUNT: 19.9 % (ref 10–15)
ERYTHROCYTE [DISTWIDTH] IN BLOOD BY AUTOMATED COUNT: 20.1 % (ref 10–15)
GFR SERPL CREATININE-BSD FRML MDRD: >90 ML/MIN/1.73M2
GLUCOSE BLDC GLUCOMTR-MCNC: 144 MG/DL (ref 70–99)
GLUCOSE SERPL-MCNC: 240 MG/DL (ref 70–99)
HCT VFR BLD AUTO: 25.8 % (ref 35–47)
HCT VFR BLD AUTO: 26.7 % (ref 35–47)
HGB BLD-MCNC: 7.9 G/DL (ref 11.7–15.7)
HGB BLD-MCNC: 8.4 G/DL (ref 11.7–15.7)
HOLD SPECIMEN: NORMAL
MAGNESIUM SERPL-MCNC: 1.8 MG/DL (ref 1.7–2.3)
MCH RBC QN AUTO: 23.4 PG (ref 26.5–33)
MCH RBC QN AUTO: 24.1 PG (ref 26.5–33)
MCHC RBC AUTO-ENTMCNC: 30.6 G/DL (ref 31.5–36.5)
MCHC RBC AUTO-ENTMCNC: 31.5 G/DL (ref 31.5–36.5)
MCV RBC AUTO: 77 FL (ref 78–100)
MCV RBC AUTO: 77 FL (ref 78–100)
PLATELET # BLD AUTO: 569 10E3/UL (ref 150–450)
PLATELET # BLD AUTO: 595 10E3/UL (ref 150–450)
POTASSIUM SERPL-SCNC: 3.7 MMOL/L (ref 3.4–5.3)
RADIOLOGIST FLAGS: ABNORMAL
RBC # BLD AUTO: 3.37 10E6/UL (ref 3.8–5.2)
RBC # BLD AUTO: 3.49 10E6/UL (ref 3.8–5.2)
SODIUM SERPL-SCNC: 134 MMOL/L (ref 136–145)
UFH PPP CHRO-ACNC: 0.1 IU/ML
WBC # BLD AUTO: 5.9 10E3/UL (ref 4–11)
WBC # BLD AUTO: 8.1 10E3/UL (ref 4–11)

## 2023-01-21 PROCEDURE — 99232 SBSQ HOSP IP/OBS MODERATE 35: CPT | Performed by: HOSPITALIST

## 2023-01-21 PROCEDURE — 71045 X-RAY EXAM CHEST 1 VIEW: CPT

## 2023-01-21 PROCEDURE — 80048 BASIC METABOLIC PNL TOTAL CA: CPT | Performed by: HOSPITALIST

## 2023-01-21 PROCEDURE — 250N000011 HC RX IP 250 OP 636: Performed by: HOSPITALIST

## 2023-01-21 PROCEDURE — 250N000013 HC RX MED GY IP 250 OP 250 PS 637: Performed by: PHYSICIAN ASSISTANT

## 2023-01-21 PROCEDURE — 120N000001 HC R&B MED SURG/OB

## 2023-01-21 PROCEDURE — 83735 ASSAY OF MAGNESIUM: CPT | Performed by: HOSPITALIST

## 2023-01-21 PROCEDURE — 85520 HEPARIN ASSAY: CPT | Performed by: HOSPITALIST

## 2023-01-21 PROCEDURE — 250N000013 HC RX MED GY IP 250 OP 250 PS 637: Performed by: STUDENT IN AN ORGANIZED HEALTH CARE EDUCATION/TRAINING PROGRAM

## 2023-01-21 PROCEDURE — 36415 COLL VENOUS BLD VENIPUNCTURE: CPT | Performed by: HOSPITALIST

## 2023-01-21 PROCEDURE — 250N000011 HC RX IP 250 OP 636: Performed by: INTERNAL MEDICINE

## 2023-01-21 PROCEDURE — 85027 COMPLETE CBC AUTOMATED: CPT | Performed by: HOSPITALIST

## 2023-01-21 PROCEDURE — 93971 EXTREMITY STUDY: CPT | Mod: RT

## 2023-01-21 RX ORDER — HEPARIN SODIUM 10000 [USP'U]/100ML
0-5000 INJECTION, SOLUTION INTRAVENOUS CONTINUOUS
Status: DISPENSED | OUTPATIENT
Start: 2023-01-21 | End: 2023-01-27

## 2023-01-21 RX ADMIN — POLYETHYLENE GLYCOL 3350 17 G: 17 POWDER, FOR SOLUTION ORAL at 10:08

## 2023-01-21 RX ADMIN — OXYCODONE HYDROCHLORIDE 5 MG: 5 TABLET ORAL at 12:03

## 2023-01-21 RX ADMIN — SENNOSIDES AND DOCUSATE SODIUM 1 TABLET: 50; 8.6 TABLET ORAL at 21:28

## 2023-01-21 RX ADMIN — OXYCODONE HYDROCHLORIDE 5 MG: 5 TABLET ORAL at 21:28

## 2023-01-21 RX ADMIN — OXYCODONE HYDROCHLORIDE 5 MG: 5 TABLET ORAL at 16:38

## 2023-01-21 RX ADMIN — OXYCODONE HYDROCHLORIDE 5 MG: 5 TABLET ORAL at 03:48

## 2023-01-21 RX ADMIN — SENNOSIDES AND DOCUSATE SODIUM 1 TABLET: 50; 8.6 TABLET ORAL at 08:14

## 2023-01-21 RX ADMIN — ACETAMINOPHEN 975 MG: 325 TABLET, FILM COATED ORAL at 11:49

## 2023-01-21 RX ADMIN — FAMOTIDINE 20 MG: 20 TABLET ORAL at 08:14

## 2023-01-21 RX ADMIN — OXYCODONE HYDROCHLORIDE 5 MG: 5 TABLET ORAL at 08:21

## 2023-01-21 RX ADMIN — IBUPROFEN 600 MG: 600 TABLET ORAL at 06:07

## 2023-01-21 RX ADMIN — ACETAMINOPHEN 975 MG: 325 TABLET, FILM COATED ORAL at 03:48

## 2023-01-21 RX ADMIN — FAMOTIDINE 20 MG: 20 TABLET ORAL at 21:28

## 2023-01-21 RX ADMIN — HYDROMORPHONE HYDROCHLORIDE 2 MG: 2 TABLET ORAL at 02:14

## 2023-01-21 RX ADMIN — ACETAMINOPHEN 1000 MG: 500 TABLET ORAL at 18:52

## 2023-01-21 RX ADMIN — CEFTRIAXONE SODIUM 2 G: 2 INJECTION, POWDER, FOR SOLUTION INTRAMUSCULAR; INTRAVENOUS at 14:39

## 2023-01-21 RX ADMIN — HEPARIN SODIUM 1050 UNITS/HR: 10000 INJECTION, SOLUTION INTRAVENOUS at 11:46

## 2023-01-21 ASSESSMENT — ACTIVITIES OF DAILY LIVING (ADL)
ADLS_ACUITY_SCORE: 25

## 2023-01-21 NOTE — PROGRESS NOTES
THORACIC SURGERY POD # 1    Doing well  AVSS on RA  Good U/O  No air leak  No bleeding  Serous CT output  CXR looks good.  r pleural space well drained    CT suction  resp care ++  Ambulate  Antibiotics as per ID    OLI PONCE MD [facility] ONCOLOGY THORACIC SURGERY  CELL:  [phone]  OFFICE: [phone]

## 2023-01-21 NOTE — ANESTHESIA POSTPROCEDURE EVALUATION
Patient: Kamilah Goldberg    Procedure: Procedure(s):  RIGHT THORACOTOMY,  TOTAL DECORTICATION OF THE RIGHT LUNG AND PARIETAL PLEURECTOMY       Anesthesia Type:  General    Note:  Disposition: Inpatient   Postop Pain Control: Uneventful            Sign Out: Well controlled pain   PONV: No   Neuro/Psych: Uneventful            Sign Out: Acceptable/Baseline neuro status   Airway/Respiratory: Uneventful            Sign Out: Acceptable/Baseline resp. status   CV/Hemodynamics: Uneventful            Sign Out: Acceptable CV status; No obvious hypovolemia; No obvious fluid overload   Other NRE: NONE   DID A NON-ROUTINE EVENT OCCUR? No           Last vitals:  Vitals Value Taken Time   BP 93/69 01/20/23 1710   Temp 37.1  C (98.8  F) 01/20/23 1700   Pulse 91 01/20/23 1711   Resp 13 01/20/23 1711   SpO2 98 % 01/20/23 1718   Vitals shown include unvalidated device data.    Electronically Signed By: Dl Beaulieu MD  January 20, 2023  6:19 PM

## 2023-01-21 NOTE — PROGRESS NOTES
[facility]    Medicine Progress Note - Hospitalist Service    Date of Admission:  1/17/2023    Assessment & Plan   Kamilah Goldberg is a 42 year old female admitted on 1/17/2023. She presents with fever / cough / SOB.      Large loculated right pleural effusion and associated atelectasis.  Community acquired pneumonia of right lower lobe of lung  Right Empyema s/p VATS  Presents with 6-week history of shortness of breath/cough/fevers.  She also reports of over 20 pound weight loss over the past several weeks.  Denies any IV drug use. On admission labs are pertinent for a hemoglobin of 8.0, WBC is within normal limits. CT chest with no pulmonary embolus, aortic aneurysm or dissection. Large loculated right pleural effusion and associated atelectasis. Mild left lower lobe infiltrate may be pneumonia. Bilateral axillary lymph node enlargement.  1/17 Underwent thoracentesis at outside hospital, 60 mL was removed and fluid did appear to be purulent. Growing strep pneumoniae sensitive to ceftriaxone.   * 1/20 R VATS with total decortication right lung and parietal pleurectomy. EBL 100ml.   - IV Unasyn initially, then vanc/rocephin, now just rocephin  - Infectious disease appreciated  -Thoracic surgery appreciated, chest tube management per them  - follow up OR cultures from 1/20  - IVF  - On-Q pain pump placed during procedure  - PRN oxycodone and tylenol for pain  - encourage IS     Acute on chronic anemia  Iron deficiency anemia  Patient was given 1 unit of RBCs at outside hospital for hemoglobin 6.5.  Repeat hemoglobin of 8.0.  EBL 100ml during VATS  - CBC in AM and hgb q8h (given on heparin gtt)    Right internal jugular DVT  Noted since early AM 1/20. Tender to touch just above clavicle--no fluctuance or induration noted.   * 1/21 US right neck: small non-occlusive DVT in right IJ  - heparin gtt without bolus  - monitor hgb q8h    Hyponatremia, chronic  Na near 130 at baseline (consistent  over past year)  - BMP daily    Hypokalemia  Hypophosphatemia  Hypomagnesemia  Replete per protocol    Thrombocytosis  Secondary to inflammation. Monitor with CBC     Low back pain  Treat symptomatically as needed     Depression  Stable, follow as an outpatient       Diet: Advance Diet as Tolerated: Regular Diet Adult    DVT Prophylaxis: Pneumatic Compression Devices  Roy Catheter: Not present  Lines: None     Cardiac Monitoring: None  Code Status: Full Code      Clinically Significant Risk Factors                                 Disposition Plan     Expected Discharge Date: 01/23/2023,  3:00 PM    Destination: home  Discharge Comments: 1/19 OR tomorrow.          Fadumo Sadler  Hospitalist Service  [facility]  Securely message with [employer] (more info)  Text page via [employer] Paging/Directory   ______________________________________________________________________    Interval History   Patient seen and examined. She had ultrasound this morning which showed non-occlusive DVT in right internal jugular explaining her pain. Discussed with thoracic, will trial heparin gtt with close monitoring (no bolus). Discussed result of ultrasound with patient and further plan of care. She has pain, but it is different than before. She seems to be doing well and I answered a few questions she had about her current medications. Encouraged IS    Physical Exam   Vital Signs: Temp: 97.5  F (36.4  C) Temp src: Oral BP: 100/72 Pulse: 72   Resp: 22 SpO2: 99 % O2 Device: None (Room air) Oxygen Delivery: 2 LPM  Weight: 130 lbs 11.72 oz    Constitutional: Awake, alert, cooperative  Respiratory: clear on left, crackles at right base, taking small breaths due to discomfort. Incision right upper back, CT drains right lower lung  Cardiovascular: Regular rate and rhythm, normal S1 and S2, and no murmur noted  GI: Normal bowel sounds, soft, non-distended, non-tender  Skin/Integumen: No rashes, no cyanosis, no  edema  Other: Remains tender at right internal jugular.    Medical Decision Making       45 MINUTES SPENT BY ME on the date of service doing chart review, history, exam, documentation & further activities per the note.      Data     I have personally reviewed the following data over the past 24 hrs:    8.1  \   7.9 (L)   / 595 (H)     134 (L) 101 5.4 (L) /  144 (H)   3.7 23 0.33 (L) \       Imaging results reviewed over the past 24 hrs:   Recent Results (from the past 24 hour(s))   XR Chest Port 1 View    Narrative    EXAM: XR CHEST PORT 1 VIEW  LOCATION: [facility]  DATE/TIME: 1/21/2023 5:57 AM    INDICATION: s p right thoracotomy, decortication right lung  COMPARISON: 1/17/2023.    FINDINGS: Interval postoperative changes in the right hemithorax with placement of chest tubes. There is no pneumothorax. Decrease in the loculated right pleural effusion. Mild right basilar infiltrate. There is mild left basilar probable atelectasis.   Left lungs otherwise clear. The heart size is normal. Subcutaneous emphysema over the right chest wall.      Impression    IMPRESSION: No pneumothorax.   US Upper Extremity Venous Duplex Right   Result Value    Radiologist flags New diagnosis of pulmonary embolism (AA)    Narrative    EXAM: US UPPER EXTREMITY VENOUS DUPLEX RIGHT  LOCATION: [facility]  DATE/TIME: 1/21/2023 9:32 AM    INDICATION: right neck pain discomfort (evaluate IVC)  COMPARISON: None.  TECHNIQUE: Venous Duplex ultrasound of the right upper extremity with (when possible) and without compression, augmentation, and duplex. Color flow and spectral Doppler with waveform analysis performed.    FINDINGS: Ultrasound includes evaluation of the internal jugular vein, innominate vein, subclavian vein, axillary vein, and brachial vein. The superficial cephalic and basilic veins were also evaluated where seen.     RIGHT: A small nonocclusive deep vein thrombosis in the internal  jugular vein, image 8:1. The remaining deep venous structures are patent. Superficial thrombophlebitis of the cephalic vein from the distal arm through the antecubital fossa. The total   length of the superficial thrombophlebitis was not measured.      Impression    IMPRESSION:  1.  A small nonocclusive deep vein thrombosis in the internal jugular vein.  2.  Superficial thrombophlebitis of the cephalic vein.      [Critical Result: New diagnosis of pulmonary embolism]    Finding was identified on 1/21/2023 10:28 AM.     VICENTA Cole was contacted by me on 1/21/2023 10:28 AM and verbalized understanding of the critical result.

## 2023-01-21 NOTE — PLAN OF CARE
Goal Outcome Evaluation:       C  A&O x's 4. VSS on 2 L O2 and Capno. Lungs sounds -clear. Bowel Sounds - normoactive, last BM 1/19/23. Urine Output - adequate, due to void with bedpan. Incisions - 3 Chest tubes, CDI with dressing, rt back thora site, CDI covered with dressing. Ambulation - Assist of 1 with gait belt, ind before thora.. Diet - clear liquids. Pain controlled by - PRN oxy and Tylenol  Thoracotomy 1/20/22, back from PACU at 1730.

## 2023-01-21 NOTE — PROVIDER NOTIFICATION
MD Notification    Notified Person: MD    Notified Person Name: Fadumo Sadler    Notification Date/Time:1/21/23 - 1025    Notification Interaction: [employer] message and page sent    Purpose of Notification:   Delivered - 10:25 am  Rec'd call from radiologist, Dr. Townsend. Pt has a DVT in her right internal jugular vein.      Orders Received:    Comments:

## 2023-01-21 NOTE — PLAN OF CARE
Goal Outcome Evaluation:    Orientations: A/Ox4  Vitals/Pain: Occasional soft BP's, all other VSS. Initially on 2L NC post surgery, weaned off to RA in the morning. Surgical site pain, managed with PRN Ibuprofen, tylenol, oxy and single dose of dilaudid  Tele: SR  Lines/Drains: L forearm PIV and R hand PIV SL. 3 R chest tubes to -20 continuous suction CDI, sanguinous output and 2 On-Q catheters.   Skin/Wounds: R back thoracotomy site CDI. R forearm extravasation, 24 hr documentation completed at 2200 d/t pt being in surgery  GI/: Adequate UO via bedpan, BM-, BS+. Tolerated clears well overnight, advanced to regular diet it AM- tolerating well.   Labs: Mag protocol recheck in AM  Ambulation/Assist: Bedrest, pt refused getting out of bed due to pain.   Sleep Quality: Poor, awake majority of the night  Plan: Encourage pulmonary toileting as tolerated, pain management, up in chair with meals today.

## 2023-01-22 ENCOUNTER — APPOINTMENT (OUTPATIENT)
Dept: GENERAL RADIOLOGY | Facility: CLINIC | Age: 43
End: 2023-01-22
Attending: PHYSICIAN ASSISTANT
Payer: COMMERCIAL

## 2023-01-22 LAB
ANION GAP SERPL CALCULATED.3IONS-SCNC: 8 MMOL/L (ref 7–15)
BACTERIA BLD CULT: NO GROWTH
BUN SERPL-MCNC: 4.3 MG/DL (ref 6–20)
CALCIUM SERPL-MCNC: 8.2 MG/DL (ref 8.6–10)
CHLORIDE SERPL-SCNC: 99 MMOL/L (ref 98–107)
CREAT SERPL-MCNC: 0.44 MG/DL (ref 0.51–0.95)
DEPRECATED HCO3 PLAS-SCNC: 27 MMOL/L (ref 22–29)
ERYTHROCYTE [DISTWIDTH] IN BLOOD BY AUTOMATED COUNT: 20.6 % (ref 10–15)
GFR SERPL CREATININE-BSD FRML MDRD: >90 ML/MIN/1.73M2
GLUCOSE SERPL-MCNC: 74 MG/DL (ref 70–99)
HCT VFR BLD AUTO: 26.2 % (ref 35–47)
HGB BLD-MCNC: 8.1 G/DL (ref 11.7–15.7)
HGB BLD-MCNC: 8.9 G/DL (ref 11.7–15.7)
MAGNESIUM SERPL-MCNC: 1.7 MG/DL (ref 1.7–2.3)
MCH RBC QN AUTO: 23.4 PG (ref 26.5–33)
MCHC RBC AUTO-ENTMCNC: 30.9 G/DL (ref 31.5–36.5)
MCV RBC AUTO: 76 FL (ref 78–100)
PHOSPHATE SERPL-MCNC: 3.4 MG/DL (ref 2.5–4.5)
PLATELET # BLD AUTO: 735 10E3/UL (ref 150–450)
POTASSIUM SERPL-SCNC: 3.7 MMOL/L (ref 3.4–5.3)
RBC # BLD AUTO: 3.46 10E6/UL (ref 3.8–5.2)
SODIUM SERPL-SCNC: 134 MMOL/L (ref 136–145)
UFH PPP CHRO-ACNC: 0.17 IU/ML
UFH PPP CHRO-ACNC: 0.33 IU/ML
UFH PPP CHRO-ACNC: 0.51 IU/ML
WBC # BLD AUTO: 6.3 10E3/UL (ref 4–11)

## 2023-01-22 PROCEDURE — 250N000013 HC RX MED GY IP 250 OP 250 PS 637: Performed by: PHYSICIAN ASSISTANT

## 2023-01-22 PROCEDURE — 85520 HEPARIN ASSAY: CPT | Performed by: HOSPITALIST

## 2023-01-22 PROCEDURE — 85018 HEMOGLOBIN: CPT | Performed by: HOSPITALIST

## 2023-01-22 PROCEDURE — 36415 COLL VENOUS BLD VENIPUNCTURE: CPT | Performed by: HOSPITALIST

## 2023-01-22 PROCEDURE — 83735 ASSAY OF MAGNESIUM: CPT | Performed by: HOSPITALIST

## 2023-01-22 PROCEDURE — 99232 SBSQ HOSP IP/OBS MODERATE 35: CPT | Performed by: HOSPITALIST

## 2023-01-22 PROCEDURE — 250N000011 HC RX IP 250 OP 636: Performed by: PHYSICIAN ASSISTANT

## 2023-01-22 PROCEDURE — 71045 X-RAY EXAM CHEST 1 VIEW: CPT

## 2023-01-22 PROCEDURE — 85520 HEPARIN ASSAY: CPT | Performed by: INTERNAL MEDICINE

## 2023-01-22 PROCEDURE — 80048 BASIC METABOLIC PNL TOTAL CA: CPT | Performed by: HOSPITALIST

## 2023-01-22 PROCEDURE — 250N000011 HC RX IP 250 OP 636: Performed by: HOSPITALIST

## 2023-01-22 PROCEDURE — 250N000013 HC RX MED GY IP 250 OP 250 PS 637: Performed by: STUDENT IN AN ORGANIZED HEALTH CARE EDUCATION/TRAINING PROGRAM

## 2023-01-22 PROCEDURE — 36415 COLL VENOUS BLD VENIPUNCTURE: CPT | Performed by: INTERNAL MEDICINE

## 2023-01-22 PROCEDURE — 120N000001 HC R&B MED SURG/OB

## 2023-01-22 PROCEDURE — 84100 ASSAY OF PHOSPHORUS: CPT | Performed by: HOSPITALIST

## 2023-01-22 RX ORDER — LIDOCAINE 40 MG/G
CREAM TOPICAL
Status: DISCONTINUED | OUTPATIENT
Start: 2023-01-22 | End: 2023-01-27 | Stop reason: HOSPADM

## 2023-01-22 RX ORDER — NITROGLYCERIN 0.4 MG/1
0.4 TABLET SUBLINGUAL EVERY 5 MIN PRN
Status: DISCONTINUED | OUTPATIENT
Start: 2023-01-22 | End: 2023-01-27 | Stop reason: HOSPADM

## 2023-01-22 RX ADMIN — POLYETHYLENE GLYCOL 3350 17 G: 17 POWDER, FOR SOLUTION ORAL at 09:42

## 2023-01-22 RX ADMIN — HYDROMORPHONE HYDROCHLORIDE 4 MG: 4 TABLET ORAL at 13:32

## 2023-01-22 RX ADMIN — ACETAMINOPHEN 975 MG: 325 TABLET, FILM COATED ORAL at 12:50

## 2023-01-22 RX ADMIN — SENNOSIDES AND DOCUSATE SODIUM 1 TABLET: 50; 8.6 TABLET ORAL at 09:42

## 2023-01-22 RX ADMIN — FAMOTIDINE 20 MG: 20 TABLET ORAL at 09:28

## 2023-01-22 RX ADMIN — HYDROMORPHONE HYDROCHLORIDE 4 MG: 4 TABLET ORAL at 04:03

## 2023-01-22 RX ADMIN — HEPARIN SODIUM 1650 UNITS/HR: 10000 INJECTION, SOLUTION INTRAVENOUS at 22:39

## 2023-01-22 RX ADMIN — ACETAMINOPHEN 975 MG: 325 TABLET, FILM COATED ORAL at 04:04

## 2023-01-22 RX ADMIN — FAMOTIDINE 20 MG: 20 TABLET ORAL at 20:10

## 2023-01-22 RX ADMIN — OXYCODONE HYDROCHLORIDE 5 MG: 5 TABLET ORAL at 01:34

## 2023-01-22 RX ADMIN — HYDROMORPHONE HYDROCHLORIDE 4 MG: 4 TABLET ORAL at 22:35

## 2023-01-22 RX ADMIN — SENNOSIDES AND DOCUSATE SODIUM 1 TABLET: 50; 8.6 TABLET ORAL at 20:10

## 2023-01-22 RX ADMIN — HYDROMORPHONE HYDROCHLORIDE 4 MG: 4 TABLET ORAL at 17:38

## 2023-01-22 RX ADMIN — CEFTRIAXONE SODIUM 2 G: 2 INJECTION, POWDER, FOR SOLUTION INTRAMUSCULAR; INTRAVENOUS at 13:37

## 2023-01-22 RX ADMIN — HEPARIN SODIUM 1650 UNITS/HR: 10000 INJECTION, SOLUTION INTRAVENOUS at 07:02

## 2023-01-22 RX ADMIN — HYDROMORPHONE HYDROCHLORIDE 4 MG: 4 TABLET ORAL at 09:28

## 2023-01-22 RX ADMIN — ACETAMINOPHEN 975 MG: 325 TABLET, FILM COATED ORAL at 20:10

## 2023-01-22 ASSESSMENT — ACTIVITIES OF DAILY LIVING (ADL)
ADLS_ACUITY_SCORE: 27
ADLS_ACUITY_SCORE: 25
ADLS_ACUITY_SCORE: 25
ADLS_ACUITY_SCORE: 27
ADLS_ACUITY_SCORE: 25
ADLS_ACUITY_SCORE: 28
ADLS_ACUITY_SCORE: 25
ADLS_ACUITY_SCORE: 27
ADLS_ACUITY_SCORE: 25
ADLS_ACUITY_SCORE: 28
ADLS_ACUITY_SCORE: 27
ADLS_ACUITY_SCORE: 28

## 2023-01-22 NOTE — PROGRESS NOTES
[facility]    Medicine Progress Note - Hospitalist Service    Date of Admission:  1/17/2023    Assessment & Plan   Kamilah Goldberg is a 42 year old female admitted on 1/17/2023. She presents with fever / cough / SOB.      Large loculated right pleural effusion and associated atelectasis.  Community acquired pneumonia of right lower lobe of lung  Right Empyema s/p VATS  Presents with 6-week history of shortness of breath/cough/fevers.  She also reports of over 20 pound weight loss over the past several weeks.  Denies any IV drug use. On admission labs are pertinent for a hemoglobin of 8.0, WBC is within normal limits. CT chest with no pulmonary embolus, aortic aneurysm or dissection. Large loculated right pleural effusion and associated atelectasis. Mild left lower lobe infiltrate may be pneumonia. Bilateral axillary lymph node enlargement.  1/17 Underwent thoracentesis at outside hospital, 60 mL was removed and fluid did appear to be purulent. Growing strep pneumoniae sensitive to ceftriaxone.   * 1/20 R VATS with total decortication right lung and parietal pleurectomy. EBL 100ml.   - IV Unasyn initially, then vanc/rocephin, now just rocephin  - Infectious disease appreciated  - Thoracic surgery appreciated, chest tube management per them  - follow up OR cultures from 1/20 (so far without growth)  - On-Q pain pump  - PRN oxycodone and tylenol for pain  - encourage IS, ambulation     Acute on chronic anemia  Iron deficiency anemia  Patient was given 1 unit of RBCs at outside hospital for hemoglobin 6.5.  Repeat hemoglobin of 8.0.  EBL 100ml during VATS  - CBC in AM    Right internal jugular DVT  Noted since early AM 1/20. Tender to touch just above clavicle--no fluctuance or induration noted.   * 1/21 US right neck: small non-occlusive DVT in right IJ  - heparin gtt without bolus, Hgb stable near 8  - improving discomfort on 1/22  - pharmacy liaison consult for DOAC    Hyponatremia,  chronic  Na near 130 at baseline (consistent over past year)  - BMP daily    Hypokalemia  Hypophosphatemia  Hypomagnesemia  Replete per protocol    Thrombocytosis  Secondary to inflammation. Monitor with CBC     Low back pain  Treat symptomatically as needed     Depression  Stable, follow as an outpatient       Diet: Advance Diet as Tolerated: Regular Diet Adult    DVT Prophylaxis: Pneumatic Compression Devices  Roy Catheter: Not present  Lines: None     Cardiac Monitoring: ACTIVE order. Indication: s/p thoracotomy  Code Status: Full Code      Clinically Significant Risk Factors                                 Disposition Plan      Expected Discharge Date: 01/25/2023,  3:00 PM    Destination: home  Discharge Comments: pending timing of chest tube removal          Fadumo Sadler  Hospitalist Service  [facility]  Securely message with [employer] (more info)  Text page via [facility] [facility] Paging/Directory   ______________________________________________________________________    Interval History   Patient seen and examined. She is up in the chair. Looks like she is feeling pretty good. She is a little woozy currently having just gotten pain medication and gotten poor sleep last night. Encouraged her to go for walks, she seems determined today. Asks good questions about her care plan. Hopeful for discharge later this week once chest tubes removed.    Physical Exam   Vital Signs: Temp: 99.5  F (37.5  C) Temp src: Oral BP: 97/60 Pulse: 115   Resp: 18 SpO2: 98 % O2 Device: None (Room air)    Weight: 130 lbs 11.72 oz    Constitutional: Awake, alert, cooperative  Respiratory: clear on left, decreased on right with some crackles, better inspiratory effort. Incision right upper back, CT drains right lower lung  Cardiovascular: Regular rate and rhythm, normal S1 and S2, and no murmur noted  GI: Normal bowel sounds, soft, non-distended, non-tender  Skin/Integumen: No rashes, no cyanosis, no  edema  Other:    Medical Decision Making       35 MINUTES SPENT BY ME on the date of service doing chart review, history, exam, documentation & further activities per the note.      Data     I have personally reviewed the following data over the past 24 hrs:    6.3  \   8.1 (L)   / 735 (H)     134 (L) 99 4.3 (L) /  74   3.7 27 0.44 (L) \       Imaging results reviewed over the past 24 hrs:   Recent Results (from the past 24 hour(s))   XR Chest Port 1 View    Narrative    EXAM: XR CHEST PORT 1 VIEW  LOCATION: [facility]  DATE/TIME: 1/22/2023 5:50 AM    INDICATION: s p right thoracotomy, decortication right lung  COMPARISON: 01/21/2013 at 5:10 AM      Impression    IMPRESSION: Two right apically directed and single right basilar chest tube, stable.     No pneumothorax identified. Small moderate residual pleural effusion, loculated laterally. Bibasilar airspace opacities right greater than left, likely atelectasis.    Stable normal cardiomediastinal silhouette. Unchanged emphysema right chest wall.

## 2023-01-22 NOTE — PROGRESS NOTES
THORACIC SURGERY POD #2    Doing well  AVSS on RA    Decreasing CT output  No air leak    CXR r pleural space well drained    Strep pneumonia on pleural fluid    CT suction  resp care ++  Ambulate  Antibiotics as per ID    Discussed    OLI PONCE MD [facility] ONCOLOGY THORACIC SURGERY  CELL:  [phone]  OFFICE: [phone]

## 2023-01-22 NOTE — PLAN OF CARE
Goal Outcome Evaluation:      Orientations: A/Ox4  Vitals/Pain: VSS on RA. R side pain at incision site managed w/ tylenol and Dilaudid  Lines/Drains: Heparin running at 1650 units/hr in L forearm PIV. R hand PIV removed due to pain.   Skin/Wounds: Chest tube site CDI. Thoracotomy dressing removed, site open to air. On-Q pumps CDI  GI/: Adequate UO via commode x3, BM- passing gas  Labs: HepXa at 0100 0.17, protocol increased dose by 300 units, new rate 1650 units/hr from 1350--recheck 0800.   Ambulation/Assist: A1 GB  Sleep Quality: Poor, awake majority of the night  Plan: Encourage mobility --increase number of walks, up with meals. Manage pain.

## 2023-01-22 NOTE — PLAN OF CARE
Goal Outcome Evaluation:        Orientations: A/Ox4  Vitals/Pain: VSS on RA. R side pain at incision site managed w/ tylenol and Dilaudid  Lines/Drains: Heparin running at 1650 units/hr in L forearm PIV. 1st Xa within desired. 2nd scheduled.   Pulm: Freq demos of IS & flutter valve. Encouragement provided. Pt verbalizes importance.  Skin/Wounds: Chest tube site CDI. site open to air. On-Q pumps CDI. Small section crepitus palpated encircling site.  GI/: Adequate UO via commode x3, 0 BM- passing lg amt gas  Ambulation/Assist: A1 GB. Gait slow, steady. Up in chair most of day.   Plan: Chest tubes may be removed Tues/Wed. Increase ambulation/mobility. Pt verbalizes understanding.

## 2023-01-22 NOTE — PLAN OF CARE
Goal Outcome Evaluation:       A&O x's 4. VSS on RA. Lungs sounds -clear except crackles in RLL. Bowel Sounds - normoactive, last BM 1/19/23, passing flatus. Urine Output - adequate, ambulates to commode. Incisions - 3 Chest tubes, CDI with dressing, rt back thora site, CDI covered with dressing. Ambulation - Assist of 1 with gait belt. Diet - regular, tolerating well. Pain controlled by - PRN oxy and Tylenol  Ultrasound today showed DVT in jugular, started heparin at 1050 ml/hr, increased to 130 after lab done, due to run again at 0038.

## 2023-01-23 ENCOUNTER — APPOINTMENT (OUTPATIENT)
Dept: GENERAL RADIOLOGY | Facility: CLINIC | Age: 43
End: 2023-01-23
Attending: PHYSICIAN ASSISTANT
Payer: COMMERCIAL

## 2023-01-23 LAB
ANION GAP SERPL CALCULATED.3IONS-SCNC: 9 MMOL/L (ref 7–15)
BUN SERPL-MCNC: 6.5 MG/DL (ref 6–20)
CALCIUM SERPL-MCNC: 8.1 MG/DL (ref 8.6–10)
CHLORIDE SERPL-SCNC: 94 MMOL/L (ref 98–107)
CREAT SERPL-MCNC: 0.39 MG/DL (ref 0.51–0.95)
DEPRECATED HCO3 PLAS-SCNC: 28 MMOL/L (ref 22–29)
ERYTHROCYTE [DISTWIDTH] IN BLOOD BY AUTOMATED COUNT: 20 % (ref 10–15)
GFR SERPL CREATININE-BSD FRML MDRD: >90 ML/MIN/1.73M2
GLUCOSE SERPL-MCNC: 87 MG/DL (ref 70–99)
HCT VFR BLD AUTO: 27.3 % (ref 35–47)
HGB BLD-MCNC: 8.3 G/DL (ref 11.7–15.7)
MCH RBC QN AUTO: 23.3 PG (ref 26.5–33)
MCHC RBC AUTO-ENTMCNC: 30.4 G/DL (ref 31.5–36.5)
MCV RBC AUTO: 77 FL (ref 78–100)
PLATELET # BLD AUTO: 654 10E3/UL (ref 150–450)
POTASSIUM SERPL-SCNC: 3.9 MMOL/L (ref 3.4–5.3)
RBC # BLD AUTO: 3.56 10E6/UL (ref 3.8–5.2)
SODIUM SERPL-SCNC: 131 MMOL/L (ref 136–145)
UFH PPP CHRO-ACNC: 0.52 IU/ML
UFH PPP CHRO-ACNC: 0.63 IU/ML
WBC # BLD AUTO: 4.5 10E3/UL (ref 4–11)

## 2023-01-23 PROCEDURE — 85027 COMPLETE CBC AUTOMATED: CPT | Performed by: HOSPITALIST

## 2023-01-23 PROCEDURE — 250N000011 HC RX IP 250 OP 636: Performed by: HOSPITALIST

## 2023-01-23 PROCEDURE — 99232 SBSQ HOSP IP/OBS MODERATE 35: CPT | Performed by: HOSPITALIST

## 2023-01-23 PROCEDURE — 80048 BASIC METABOLIC PNL TOTAL CA: CPT | Performed by: HOSPITALIST

## 2023-01-23 PROCEDURE — 250N000011 HC RX IP 250 OP 636: Performed by: PHYSICIAN ASSISTANT

## 2023-01-23 PROCEDURE — 120N000001 HC R&B MED SURG/OB

## 2023-01-23 PROCEDURE — 85520 HEPARIN ASSAY: CPT | Performed by: INTERNAL MEDICINE

## 2023-01-23 PROCEDURE — 85520 HEPARIN ASSAY: CPT | Performed by: HOSPITALIST

## 2023-01-23 PROCEDURE — 36415 COLL VENOUS BLD VENIPUNCTURE: CPT | Performed by: HOSPITALIST

## 2023-01-23 PROCEDURE — 71045 X-RAY EXAM CHEST 1 VIEW: CPT

## 2023-01-23 PROCEDURE — 250N000013 HC RX MED GY IP 250 OP 250 PS 637: Performed by: PHYSICIAN ASSISTANT

## 2023-01-23 PROCEDURE — 99233 SBSQ HOSP IP/OBS HIGH 50: CPT | Performed by: INTERNAL MEDICINE

## 2023-01-23 PROCEDURE — 250N000013 HC RX MED GY IP 250 OP 250 PS 637: Performed by: HOSPITALIST

## 2023-01-23 RX ORDER — ACETAMINOPHEN 650 MG/1
650 SUPPOSITORY RECTAL EVERY 4 HOURS PRN
Status: DISCONTINUED | OUTPATIENT
Start: 2023-01-23 | End: 2023-01-27 | Stop reason: HOSPADM

## 2023-01-23 RX ORDER — HYDROXYZINE HYDROCHLORIDE 25 MG/1
25 TABLET, FILM COATED ORAL EVERY 6 HOURS PRN
Status: DISCONTINUED | OUTPATIENT
Start: 2023-01-23 | End: 2023-01-27 | Stop reason: HOSPADM

## 2023-01-23 RX ORDER — NALOXONE HYDROCHLORIDE 0.4 MG/ML
0.4 INJECTION, SOLUTION INTRAMUSCULAR; INTRAVENOUS; SUBCUTANEOUS
Status: DISCONTINUED | OUTPATIENT
Start: 2023-01-23 | End: 2023-01-27 | Stop reason: HOSPADM

## 2023-01-23 RX ORDER — NALOXONE HYDROCHLORIDE 0.4 MG/ML
0.2 INJECTION, SOLUTION INTRAMUSCULAR; INTRAVENOUS; SUBCUTANEOUS
Status: DISCONTINUED | OUTPATIENT
Start: 2023-01-23 | End: 2023-01-27 | Stop reason: HOSPADM

## 2023-01-23 RX ORDER — ACETAMINOPHEN 325 MG/1
650 TABLET ORAL EVERY 4 HOURS PRN
Status: DISCONTINUED | OUTPATIENT
Start: 2023-01-23 | End: 2023-01-27 | Stop reason: HOSPADM

## 2023-01-23 RX ORDER — AMOXICILLIN 250 MG
1-2 CAPSULE ORAL 2 TIMES DAILY
Status: DISCONTINUED | OUTPATIENT
Start: 2023-01-23 | End: 2023-01-27 | Stop reason: HOSPADM

## 2023-01-23 RX ADMIN — IBUPROFEN 600 MG: 600 TABLET ORAL at 23:33

## 2023-01-23 RX ADMIN — SENNOSIDES AND DOCUSATE SODIUM 2 TABLET: 50; 8.6 TABLET ORAL at 08:40

## 2023-01-23 RX ADMIN — ACETAMINOPHEN 975 MG: 325 TABLET, FILM COATED ORAL at 13:06

## 2023-01-23 RX ADMIN — ACETAMINOPHEN 975 MG: 325 TABLET, FILM COATED ORAL at 05:44

## 2023-01-23 RX ADMIN — HYDROMORPHONE HYDROCHLORIDE 4 MG: 4 TABLET ORAL at 23:06

## 2023-01-23 RX ADMIN — HYDROMORPHONE HYDROCHLORIDE 4 MG: 4 TABLET ORAL at 05:45

## 2023-01-23 RX ADMIN — SENNOSIDES AND DOCUSATE SODIUM 2 TABLET: 50; 8.6 TABLET ORAL at 20:20

## 2023-01-23 RX ADMIN — HEPARIN SODIUM 1650 UNITS/HR: 10000 INJECTION, SOLUTION INTRAVENOUS at 13:07

## 2023-01-23 RX ADMIN — HYDROMORPHONE HYDROCHLORIDE 2 MG: 2 TABLET ORAL at 17:57

## 2023-01-23 RX ADMIN — CEFTRIAXONE SODIUM 2 G: 2 INJECTION, POWDER, FOR SOLUTION INTRAMUSCULAR; INTRAVENOUS at 13:09

## 2023-01-23 RX ADMIN — HYDROMORPHONE HYDROCHLORIDE 2 MG: 2 TABLET ORAL at 12:58

## 2023-01-23 RX ADMIN — POLYETHYLENE GLYCOL 3350 17 G: 17 POWDER, FOR SOLUTION ORAL at 08:38

## 2023-01-23 RX ADMIN — FAMOTIDINE 20 MG: 20 TABLET ORAL at 08:40

## 2023-01-23 RX ADMIN — HYDROXYZINE HYDROCHLORIDE 25 MG: 25 TABLET, FILM COATED ORAL at 12:58

## 2023-01-23 RX ADMIN — FAMOTIDINE 20 MG: 20 TABLET ORAL at 20:20

## 2023-01-23 ASSESSMENT — ACTIVITIES OF DAILY LIVING (ADL)
ADLS_ACUITY_SCORE: 24
ADLS_ACUITY_SCORE: 28
ADLS_ACUITY_SCORE: 24
ADLS_ACUITY_SCORE: 28
ADLS_ACUITY_SCORE: 24
ADLS_ACUITY_SCORE: 24
ADLS_ACUITY_SCORE: 28
ADLS_ACUITY_SCORE: 24
ADLS_ACUITY_SCORE: 23
ADLS_ACUITY_SCORE: 23
ADLS_ACUITY_SCORE: 28
ADLS_ACUITY_SCORE: 28

## 2023-01-23 NOTE — PROGRESS NOTES
"Thoracic Surgery POD #3:  /66 (BP Location: Right arm)   Pulse 114   Temp 98.1  F (36.7  C) (Oral)   Resp 15   Ht 1.6 m (5' 3\")   Wt 59.3 kg (130 lb 11.7 oz)   LMP 12/01/2022 (Exact Date)   SpO2 96%   BMI 23.16 kg/m    CXR: right pleural space well-cleared  CT: 175 ml over 24 hours    S: Doing well- walked 8 times yesterday- using IS. Discussed CXR and chest tubes.  O: Inc: dry, steris intact  CT: no air leak with cough, no bleeding  P: Keep chest tubes in 2-3 more days and then OK to start Eliquis  Rocephin   IS, ambulate    Sierra Henry PA-C with Dr. Tani Murcia  [facility]  Cell [phone]          "

## 2023-01-23 NOTE — PROGRESS NOTES
"[facility]  Infectious Disease Progress Note          Assessment and Plan:   IMPRESSION:  1.  A 42-year-old female with a several-week illness of shortness of breath, cough, feverish symptoms, found to have a large loculated right pleural effusion and empyema, aspiration of the fluid just coming back now with Streptococcus pneumoniae, so has classic community-acquired pneumonia-related empyema of a prior untreated pneumonia.  2.  Chronic anemia.  3.  Depression.     RECOMMENDATIONS:  1.  S/P VATS   2.  Based on BETHANY ceftriaxone sensitive so continue IV ceftriaxone while admitted.   3.  once ready for discharge can be switched to PO Augmentin for 3 weeks     I will sign off please call if ques         Interval History:   Starting to feel better   Sore from the surgery           Medications:       acetaminophen  975 mg Oral Q8H     cefTRIAXone  2 g Intravenous Q24H     famotidine  20 mg Oral BID    Or     famotidine  20 mg Intravenous BID     polyethylene glycol  17 g Oral Daily     senna-docusate  1-2 tablet Oral BID     sodium chloride (PF)  3 mL Intracatheter Q8H                  Physical Exam:   Blood pressure 117/66, pulse 114, temperature 98.1  F (36.7  C), temperature source Oral, resp. rate 15, height 1.6 m (5' 3\"), weight 59.3 kg (130 lb 11.7 oz), last menstrual period 12/01/2022, SpO2 96 %.  Wt Readings from Last 2 Encounters:   01/21/23 59.3 kg (130 lb 11.7 oz)   01/16/23 56.9 kg (125 lb 8 oz)     Vital Signs with Ranges  Temp:  [98  F (36.7  C)-98.3  F (36.8  C)] 98.1  F (36.7  C)  Pulse:  [] 114  Resp:  [14-18] 15  BP: ()/(64-71) 117/66  SpO2:  [96 %-100 %] 96 %    Constitutional: Awake, alert, cooperative, no apparent distress   Lungs: Clear chest tubes in place    Cardiovascular: Regular rate and rhythm, normal S1 and S2, and no murmur noted   Abdomen: Normal bowel sounds, soft, non-distended, non-tender   Skin: No rashes, no cyanosis, no edema   Other:                " Data:   All microbiology laboratory data reviewed.  Recent Labs   Lab Test 01/23/23  0617 01/22/23  0930 01/22/23  0121 01/21/23  1212   WBC 4.5 6.3  --  8.1   HGB 8.3* 8.1* 8.9* 7.9*   HCT 27.3* 26.2*  --  25.8*   MCV 77* 76*  --  77*   * 735*  --  595*     Recent Labs   Lab Test 01/23/23  0617 01/22/23  0930 01/21/23  0558   CR 0.39* 0.44* 0.33*     No lab results found.  No lab results found.    Invalid input(s): UC

## 2023-01-23 NOTE — PLAN OF CARE
Pt A&O X4, VSS on RA. Lungs clear. R thora site covered/CDI. On-Q pumps taped and infusing. 3 CT to suction w/ minimal output. Ambulating SBA, up in chair most of day. Tolerating regular diet. Up to bathroom for adequate UO. BS+, BM-, passing gas- miralax and senna given. PIV L w/ hep gtt running at 1650units/hr, recheck in AM. +1 LE edema on feet/ankles. Dilaudid and hydroxyzine given X1 per provider recommendation for pain.

## 2023-01-23 NOTE — PLAN OF CARE
Goal Outcome Evaluation:     Orientations: A/Ox4  Vitals/Pain: VSS on RA. R side pain at incision site managed w/ tylenol and Dilaudid  Lines/Drains: Heparin running at 1650 units/hr in L forearm PIV.  Skin/Wounds: Chest tube site CDI. Thoracotomy incision open to air. On-Q pumps CDI  GI/: Adequate UO via commode, BM- passing gas  Labs: HepXa at 0600 0.52 (within range), recheck in AM.   Ambulation/Assist: A1 GB, walk x2 in the evening  Sleep Quality: Good, asleep majority of the night  Plan: Potentially remove a chest tube in AM. Encourage mobility and manage pain.

## 2023-01-23 NOTE — PLAN OF CARE
[id number]: Pt A&O X4, VSS on RA. R Chest tube site CDI, minimal output during shift. Incisions approximated and covered w/ steri-strips. Incisional pain managed w/ PRN dilaudid PO X1. Hep gtt infusing at 1650 units/hr, 2000 hep Xa recheck. Ambulating SBA X2 in caputo, up in chair most of shift. Tolerating regular diet. BS+, BM-, passing gas frequently. Electrolyte recheck in AM.

## 2023-01-23 NOTE — PROGRESS NOTES
[facility]    Medicine Progress Note - Hospitalist Service    Date of Admission:  1/17/2023    Assessment & Plan   Kamilah Goldberg is a 42 year old female admitted on 1/17/2023. She presents with fever / cough / SOB.      Large loculated right pleural effusion and associated atelectasis.  Community acquired pneumonia of right lower lobe of lung  Right Empyema s/p VATS  Presents with 6-week history of shortness of breath/cough/fevers.  She also reports of over 20 pound weight loss over the past several weeks.  Denies any IV drug use. On admission labs are pertinent for a hemoglobin of 8.0, WBC is within normal limits. CT chest with no pulmonary embolus, aortic aneurysm or dissection. Large loculated right pleural effusion and associated atelectasis. Mild left lower lobe infiltrate may be pneumonia. Bilateral axillary lymph node enlargement.  1/17 Underwent thoracentesis at outside hospital, 60 mL was removed and fluid did appear to be purulent. Growing strep pneumoniae sensitive to ceftriaxone. Initially on IV unasyn, then vanc/rocephin.  * 1/20 R VATS with total decortication right lung and parietal pleurectomy. EBL 100ml.   - Continue IV rocephin while admitted, then change to PO augmentin for 3 weeks on discharge  - Infectious disease appreciated  - Thoracic surgery appreciated, chest tube management per them  - follow up OR cultures from 1/20 (so far without growth)  - On-Q pain pump  - PRN dilaudid and tylenol for pain  - added PRN hydroxyzine for adjuvant pain/itching  - encourage IS, ambulation     Acute on chronic anemia  Iron deficiency anemia  Patient was given 1 unit of RBCs at outside hospital for hemoglobin 6.5.  Repeat hemoglobin of 8.0. EBL 100ml during VATS  - hgb stable near 8    Right internal jugular DVT  Noted since early AM 1/20. Tender to touch just above clavicle--no fluctuance or induration noted.   * 1/21 US right neck: small non-occlusive DVT in right IJ  - heparin  gtt without bolus, Hgb stable near 8  - improving discomfort at right neck  - pharmacy liaison consult for DOAC, 3$ per month---plan to switch to eliquis BID once chest tubes removed    Hyponatremia, chronic  Na near 130 at baseline (consistent over past year)  - encourage oral intake  - BMP daily    Hypokalemia  Hypophosphatemia  Hypomagnesemia  Replete per protocol    Thrombocytosis  Secondary to inflammation/infection. Monitor with CBC     Low back pain  Treat symptomatically as needed     Depression  Stable, follow as an outpatient    Constipation  Senna-docusate BID and miralax daily  - encouraged PRN suppository if no BM by afternoon 1/23     Diet: Advance Diet as Tolerated: Regular Diet Adult    DVT Prophylaxis: Pneumatic Compression Devices  Roy Catheter: Not present  Lines: None     Cardiac Monitoring: ACTIVE order. Indication: s/p thoracotomy  Code Status: Full Code      Clinically Significant Risk Factors                                 Disposition Plan     Expected Discharge Date: 01/25/2023,  3:00 PM    Destination: home  Discharge Comments: pending timing of chest tube removal          Fadumo Sadler  Hospitalist Service  [facility]  Securely message with [employer] (more info)  Text page via [employer] Paging/Directory   ______________________________________________________________________    Interval History   Patient seen and examined. She is sitting in chair again. A little woozy after morning pain meds again today. Reports some itching from tape/surgical incisions, wants to shower but knows she cannot. Seems quite sleepy from 4mg dilaudid, hoping to switch up to lower dose dilaudid with maybe hydroxyzine for adjuvant pain.     Physical Exam   Vital Signs: Temp: 98.1  F (36.7  C) Temp src: Oral BP: 117/66 Pulse: 114   Resp: 15 SpO2: 96 % O2 Device: None (Room air)    Weight: 130 lbs 11.72 oz    Constitutional: Awake, alert, cooperative  Respiratory: clear on left, decreased on  right up to mid lung with some crackles, good inspiratory effort. Incision right upper back, CT drains right lower lung  Cardiovascular: Regular rate and rhythm, normal S1 and S2, and no murmur noted  GI: Normal bowel sounds, soft, non-distended, non-tender  Skin/Integumen: No rashes, no cyanosis, trace to +1 lower extremity edema  Other:    Medical Decision Making       35 MINUTES SPENT BY ME on the date of service doing chart review, history, exam, documentation & further activities per the note.      Data     I have personally reviewed the following data over the past 24 hrs:    4.5  \   8.3 (L)   / 654 (H)     131 (L) 94 (L) 6.5 /  87   3.9 28 0.39 (L) \       Imaging results reviewed over the past 24 hrs:   Recent Results (from the past 24 hour(s))   XR Chest Port 1 View    Narrative    EXAM: XR CHEST PORT 1 VIEW  LOCATION: [facility]  DATE/TIME: 1/23/2023 5:35 AM    INDICATION: s p right thoracotomy, decortication right lung  COMPARISON: 01/22/2023.      Impression    IMPRESSION: Stable postoperative changes of the right hemithorax. Dual apical and single right basilar chest tubes remain in position. No pneumothorax visible. No change in pleural fluid/thickening on the right with atelectasis of the mid and lower right   lung. Mild atelectasis of the left base. Stable cardiac silhouette.

## 2023-01-23 NOTE — CONSULTS
Patient has [facility] Medical Assistance.    Xarelto/Eliquis:  $3/mo.     Randi Irvin  Pharmacy Technician/Liaison, Discharge Pharmacy   [phone] (voice or text)  [email or web address]

## 2023-01-24 ENCOUNTER — APPOINTMENT (OUTPATIENT)
Dept: GENERAL RADIOLOGY | Facility: CLINIC | Age: 43
End: 2023-01-24
Attending: PHYSICIAN ASSISTANT
Payer: COMMERCIAL

## 2023-01-24 LAB
ANION GAP SERPL CALCULATED.3IONS-SCNC: 8 MMOL/L (ref 7–15)
BUN SERPL-MCNC: 5.7 MG/DL (ref 6–20)
CALCIUM SERPL-MCNC: 8.1 MG/DL (ref 8.6–10)
CHLORIDE SERPL-SCNC: 97 MMOL/L (ref 98–107)
CREAT SERPL-MCNC: 0.34 MG/DL (ref 0.51–0.95)
DEPRECATED HCO3 PLAS-SCNC: 31 MMOL/L (ref 22–29)
ERYTHROCYTE [DISTWIDTH] IN BLOOD BY AUTOMATED COUNT: 19.9 % (ref 10–15)
GFR SERPL CREATININE-BSD FRML MDRD: >90 ML/MIN/1.73M2
GLUCOSE SERPL-MCNC: 90 MG/DL (ref 70–99)
HCT VFR BLD AUTO: 24.2 % (ref 35–47)
HGB BLD-MCNC: 7.4 G/DL (ref 11.7–15.7)
LACTATE SERPL-SCNC: 1.4 MMOL/L (ref 0.7–2)
MAGNESIUM SERPL-MCNC: 1.9 MG/DL (ref 1.7–2.3)
MCH RBC QN AUTO: 23.5 PG (ref 26.5–33)
MCHC RBC AUTO-ENTMCNC: 30.6 G/DL (ref 31.5–36.5)
MCV RBC AUTO: 77 FL (ref 78–100)
PATH REPORT.COMMENTS IMP SPEC: NORMAL
PATH REPORT.FINAL DX SPEC: NORMAL
PATH REPORT.GROSS SPEC: NORMAL
PATH REPORT.MICROSCOPIC SPEC OTHER STN: NORMAL
PATH REPORT.RELEVANT HX SPEC: NORMAL
PHOSPHATE SERPL-MCNC: 3.8 MG/DL (ref 2.5–4.5)
PHOTO IMAGE: NORMAL
PLATELET # BLD AUTO: 534 10E3/UL (ref 150–450)
POTASSIUM SERPL-SCNC: 3.8 MMOL/L (ref 3.4–5.3)
RBC # BLD AUTO: 3.15 10E6/UL (ref 3.8–5.2)
SODIUM SERPL-SCNC: 136 MMOL/L (ref 136–145)
UFH PPP CHRO-ACNC: 0.4 IU/ML
UFH PPP CHRO-ACNC: 0.73 IU/ML
WBC # BLD AUTO: 3.9 10E3/UL (ref 4–11)

## 2023-01-24 PROCEDURE — 71045 X-RAY EXAM CHEST 1 VIEW: CPT

## 2023-01-24 PROCEDURE — 83735 ASSAY OF MAGNESIUM: CPT | Performed by: HOSPITALIST

## 2023-01-24 PROCEDURE — 250N000013 HC RX MED GY IP 250 OP 250 PS 637: Performed by: PHYSICIAN ASSISTANT

## 2023-01-24 PROCEDURE — 36415 COLL VENOUS BLD VENIPUNCTURE: CPT | Performed by: INTERNAL MEDICINE

## 2023-01-24 PROCEDURE — 80048 BASIC METABOLIC PNL TOTAL CA: CPT | Performed by: HOSPITALIST

## 2023-01-24 PROCEDURE — 85520 HEPARIN ASSAY: CPT | Performed by: INTERNAL MEDICINE

## 2023-01-24 PROCEDURE — 85520 HEPARIN ASSAY: CPT | Performed by: HOSPITALIST

## 2023-01-24 PROCEDURE — 84100 ASSAY OF PHOSPHORUS: CPT | Performed by: HOSPITALIST

## 2023-01-24 PROCEDURE — 250N000013 HC RX MED GY IP 250 OP 250 PS 637: Performed by: INTERNAL MEDICINE

## 2023-01-24 PROCEDURE — 36415 COLL VENOUS BLD VENIPUNCTURE: CPT | Performed by: HOSPITALIST

## 2023-01-24 PROCEDURE — 85027 COMPLETE CBC AUTOMATED: CPT | Performed by: HOSPITALIST

## 2023-01-24 PROCEDURE — 99233 SBSQ HOSP IP/OBS HIGH 50: CPT | Performed by: INTERNAL MEDICINE

## 2023-01-24 PROCEDURE — 250N000011 HC RX IP 250 OP 636: Performed by: PHYSICIAN ASSISTANT

## 2023-01-24 PROCEDURE — 120N000001 HC R&B MED SURG/OB

## 2023-01-24 PROCEDURE — 87040 BLOOD CULTURE FOR BACTERIA: CPT | Performed by: INTERNAL MEDICINE

## 2023-01-24 PROCEDURE — 83605 ASSAY OF LACTIC ACID: CPT | Performed by: INTERNAL MEDICINE

## 2023-01-24 PROCEDURE — 88307 TISSUE EXAM BY PATHOLOGIST: CPT | Mod: 26 | Performed by: PATHOLOGY

## 2023-01-24 PROCEDURE — 250N000011 HC RX IP 250 OP 636: Performed by: HOSPITALIST

## 2023-01-24 PROCEDURE — 250N000013 HC RX MED GY IP 250 OP 250 PS 637: Performed by: HOSPITALIST

## 2023-01-24 RX ADMIN — FAMOTIDINE 20 MG: 20 TABLET ORAL at 21:35

## 2023-01-24 RX ADMIN — ACETAMINOPHEN 650 MG: 325 TABLET, FILM COATED ORAL at 10:02

## 2023-01-24 RX ADMIN — HEPARIN SODIUM 1550 UNITS/HR: 10000 INJECTION, SOLUTION INTRAVENOUS at 21:50

## 2023-01-24 RX ADMIN — HEPARIN SODIUM 1650 UNITS/HR: 10000 INJECTION, SOLUTION INTRAVENOUS at 04:00

## 2023-01-24 RX ADMIN — HYDROMORPHONE HYDROCHLORIDE 4 MG: 4 TABLET ORAL at 13:07

## 2023-01-24 RX ADMIN — HYDROMORPHONE HYDROCHLORIDE 4 MG: 4 TABLET ORAL at 10:02

## 2023-01-24 RX ADMIN — FAMOTIDINE 20 MG: 20 TABLET ORAL at 09:54

## 2023-01-24 RX ADMIN — HYDROMORPHONE HYDROCHLORIDE 4 MG: 4 TABLET ORAL at 04:04

## 2023-01-24 RX ADMIN — CEFTRIAXONE SODIUM 2 G: 2 INJECTION, POWDER, FOR SOLUTION INTRAMUSCULAR; INTRAVENOUS at 14:47

## 2023-01-24 RX ADMIN — HYDROMORPHONE HYDROCHLORIDE 4 MG: 4 TABLET ORAL at 21:37

## 2023-01-24 RX ADMIN — ACETAMINOPHEN 650 MG: 325 TABLET, FILM COATED ORAL at 18:52

## 2023-01-24 RX ADMIN — ACETAMINOPHEN 650 MG: 325 TABLET, FILM COATED ORAL at 00:05

## 2023-01-24 RX ADMIN — HYDROMORPHONE HYDROCHLORIDE 4 MG: 4 TABLET ORAL at 17:38

## 2023-01-24 RX ADMIN — POLYETHYLENE GLYCOL 3350 17 G: 17 POWDER, FOR SOLUTION ORAL at 09:54

## 2023-01-24 RX ADMIN — SENNOSIDES AND DOCUSATE SODIUM 2 TABLET: 50; 8.6 TABLET ORAL at 09:54

## 2023-01-24 RX ADMIN — SENNOSIDES AND DOCUSATE SODIUM 2 TABLET: 50; 8.6 TABLET ORAL at 21:35

## 2023-01-24 ASSESSMENT — ACTIVITIES OF DAILY LIVING (ADL)
ADLS_ACUITY_SCORE: 23

## 2023-01-24 NOTE — PROGRESS NOTES
[facility]    Hospitalist Progress Note    Assessment & Plan   Kamilah OLIVER Goldberg is a 42 year old female with PMHx of back pain and depression who was admitted on 1/17/2023 from [facility] ED for evaluation of fever, cough and shortness of breath secondary to a R sided empyema.      Right Empyema s/p VATS on 1/20/23  Right lower low CAP dt strep pneumoniae  * Presented to ED with 6wk hx of cough, shortness of breath and fever with associated 20 lb wt loss. No hx of IV drug use.   * On presentation, was afebrile and VSS. O2 sats stable on RA. WBC nl. CT chest showed a large loculated right pleural effusion with associated atelectasis and a mild left lower lobe infiltrate as well as bilateral axillary lymph node enlargement. No PE. In ED, she underwent a thoracentesis while in the ED with 70ml of cloudy yellowish fluid removed. Initially treated with Unasyn, then changed to Vanco and ceftriaxone on admission. Cultures ultimately grew step pneumoniae. Abx narrowed to ceftriaxone alone per ID recs.   * Seen by thoracic surgery this stay and underwent a R sided VATS with total decortication right lung and parietal pleurectomy per Dr. Murcia on 1/20/23.   -- routine postop cares per thoracic surgery, including chest tube mgmt and postop pain control  -- cont IV ceftriaxone while hospitalized, then transition to 3wk course of Augmentin at discharge  -- had isolated fever on 1/24, repeat blood cultures drawn and are pending; intraop cultures remain neg  -- O2 sats stable on RA, cont pulmonary toilet with IS/OOB     Acute on chronic normocytic anemia  Iron deficiency anemia  * Hgb 6.5 on presentation. Was transfused 1U PRBCs in ED. Hgb stable at 8.  * Minimal EBL with VATS (reported as 100ml)  -- hgb stable at 7-8  -- monitor CBC daily    Thrombocytosis: Improving  * Secondary to inflammation/infection.   * Platelet count peaked at 735 on 1/22 and now trending down.   -- monitor  CBC     Right internal jugular DVT  * On 1/20, noted to have tenderness to touch above the clavicle. No fluctuance/induration.   * US R neck on 1/21 showed a small non-occlusive DVT in right internal jugular.  * Was started on a heparin gtt. Hgb stable at 8. Neck discomfort improved.   -- cont heparin gtt for now  -- pharmacy liaison consult for DOAC, $3 per month---plan to switch to eliquis BID once chest tubes removed and stable hgb     Chronic hyponatremia  Hypokalemia  Hypophosphatemia  Hypomagnesemia  * Na near 130 at baseline (consistent over past year).  -- Na stable, replete other lytes as needed per protocols  -- encourage po intake     Low back pain  * Treat symptomatically as needed     Depression  * Chronic and stable. No longer on meds.      Constipation  * Senna-docusate BID and miralax daily, also has suppositories available    FEN: no IVFs, lytes stable, regular diet  DVT Prophylaxis: PCDs, heparin gtt as above  Code Status: Full Code    Disposition: Anticipate discharge home pending removal of chest tubes, stable hgb and no recurrence of fever -- suspect 2-3d still.    Mara Price, DO    Medical Decision Making       -------------------------- BILLING ON TIME ------------------------------------------------------------------------------------------------------------  55 MINUTES SPENT BY ME on the date of service doing chart review, history, exam, documentation & further activities per the note.           Interval History   Overnight events noted -- had fever with TMax 102. Blood cultures ordered. Fever resolved after a dose of Tylenol. O2 sats remain stable. Seen this afternoon. Sleeping comfortably, easily wakes to name. A little groggy but no specific complaints at present. 2 chest tubes removed earlier today, only 1 remains. Feeling more comfortable. No sob/cough. No abd pain/n/v. Small BM earlier today.    -Data reviewed today: I reviewed all new labs and imaging results over the  last 24 hours. I personally reviewed no images or EKG's today.    Physical Exam   Temp: 97.7  F (36.5  C) Temp src: Oral BP: 103/66 Pulse: 102   Resp: 20 SpO2: 99 % O2 Device: None (Room air)    Vitals:    01/20/23 0516 01/21/23 0546 01/24/23 0600   Weight: 62 kg (136 lb 11 oz) 59.3 kg (130 lb 11.7 oz) 60 kg (132 lb 4.8 oz)     Vital Signs with Ranges  Temp:  [97.5  F (36.4  C)-102.4  F (39.1  C)] 97.7  F (36.5  C)  Pulse:  [] 102  Resp:  [16-20] 20  BP: ()/(55-72) 103/66  SpO2:  [90 %-99 %] 99 %  I/O last 3 completed shifts:  In: 400 [P.O.:400]  Out: 625 [Urine:400; Chest Tube:225]    Constitutional: Resting comfortably, alert and conversing appropriately, NAD  Respiratory: CTA thru anterior fields, no wheeze/rales/rhonchi, no increased work of breathing  Cardiovascular: HRRR, no MGR, no LE edema  GI: S, NT, ND, +BS  Skin/Integumen: warm/dry  Other:      Medications     bupivacaine 0.5% in ON-Q  pump 550 mL (01/20/23 1823)     heparin 1,550 Units/hr (01/24/23 0745)       cefTRIAXone  2 g Intravenous Q24H     famotidine  20 mg Oral BID    Or     famotidine  20 mg Intravenous BID     polyethylene glycol  17 g Oral Daily     senna-docusate  1-2 tablet Oral BID     sodium chloride (PF)  3 mL Intracatheter Q8H       Data   Recent Labs   Lab 01/24/23  0608 01/23/23  0617 01/22/23  0930 01/20/23  0602 01/19/23  0533   WBC 3.9* 4.5 6.3   < > 3.6*   HGB 7.4* 8.3* 8.1*   < > 8.5*   MCV 77* 77* 76*   < > 76*   * 654* 735*   < > 572*   INR  --   --   --   --  1.27*    131* 134*   < > 137   POTASSIUM 3.8 3.9 3.7   < > 3.9   CHLORIDE 97* 94* 99   < > 101   CO2 31* 28 27   < > 26   BUN 5.7* 6.5 4.3*   < > 3.2*   CR 0.34* 0.39* 0.44*   < > 0.40*   ANIONGAP 8 9 8   < > 10   MAUREEN 8.1* 8.1* 8.2*   < > 7.9*   GLC 90 87 74   < > 79    < > = values in this interval not displayed.       Recent Results (from the past 24 hour(s))   XR Chest Port 1 View    Narrative    EXAM: XR CHEST PORT 1 VIEW  LOCATION: [facility]  DATE/TIME: 1/24/2023 6:11 AM    INDICATION: s p right thoracotomy, decortication right lung  COMPARISON: 01/23/2023      Impression    IMPRESSION: Heart size within normal limits. Minimal left basilar atelectasis. Similar right basilar opacification with chest tubes in place. No visible residual pneumothorax. Small amount of peripheral pleural fluid on the right.

## 2023-01-24 NOTE — PLAN OF CARE
A/Ox4. Beginning of shift patient was febrile t-max of 102.4, and tachycardic (130's). MD notified. Please see provider notification.Temp is down to 97.5 after tylenol was given. LS coarse in upper lobes bilaterally. Using IS independently. Productive cough. Chest tubes x3 with serosanguinous output. No crepitus. Chest tube 1 has an intermittent air leak with cough. On-q infusing, sensors taped clamps open. +bs, +flatus, +bm. Voiding adequately. Managing pain with prn oxycodone, and ibuprofen. Hep gtt infusing at 1650units/hr.    Addendum 0640- Heparin was out of range. Gtt is currently paused, and dose needs to be subtracted by 100. New rate will be 1550units/hr and to be restarted at 0740.

## 2023-01-24 NOTE — PLAN OF CARE
Summary: [id number] 1/23/23  Orientation: A/Ox4  Activity Level: Ax1/ stand by  Fall Risk: yes  Behavior & Aggression Tool Color: green  Pain Management: PRN Dilaudid given  ABNL VS/O2: VSS  ABNL Lab/BG: n/a  Diet: reg  Bowel/Bladder: continent  Drains/Devices: L PIV and subcu cath  Tests/Procedures for next shift: n/a  Anticipated DC date: TBD  Other Important Info:

## 2023-01-24 NOTE — PROGRESS NOTES
"Thoracic Surgery POD #4:  /66   Pulse 102   Temp 97.7  F (36.5  C) (Oral)   Resp 20   Ht 1.6 m (5' 3\")   Wt 60 kg (132 lb 4.8 oz)   LMP 12/01/2022 (Exact Date)   SpO2 99%   BMI 23.44 kg/m    CXR: no PTX, three chest tubes in good position  CT: serous output, 225 ml over 24 hours, no air leak seen but patient sleeping    Febrile to 102.4 at midnight    S: Sleeping so did not awaken.  O: CTs: no air leak seen at rest  P: Cont CT suction, Pulm toilet  COnsider removing CTs starting tomorrow and then can start Eliquis once all three chest tubes out    Sierra Henry PA-C with Dr. [facility]  Cell [phone]    Addendum:    CTs: no air leak with multiple coughs  Patient went on a long walk just now- not SOB.   CTs: anterior and posterior apical chest tubes DCed without complication.  Occlusive dressing applied.  PLAN: Probable removal of final basilar chest tube tomorrow    Sierra Henry PA-C with Dr. Tani Murcia  [facility]  Cell [phone]          "

## 2023-01-24 NOTE — PROVIDER NOTIFICATION
MD Notification    Notified Person: MD    Notified Person Name: Dr. Turner    Notification Date/Time: 1/23/23 at 2354    Notification Interaction: amcom    Purpose of Notification: Pt currently has a new temp of 102.4, also tachycardic in the 120's-130's. Scheduled Tylenol is finished, does not have prn Tylenol.    Orders Received: See new orders, and note from Dr. Turner    Comments:

## 2023-01-25 ENCOUNTER — APPOINTMENT (OUTPATIENT)
Dept: GENERAL RADIOLOGY | Facility: CLINIC | Age: 43
End: 2023-01-25
Attending: PHYSICIAN ASSISTANT
Payer: COMMERCIAL

## 2023-01-25 LAB
ABO/RH(D): ABNORMAL
ANTIBODY SCREEN: POSITIVE
ANTIBODY UNIDENTIFIED: NORMAL
BACTERIA TISS BX CULT: NO GROWTH
BLD PROD TYP BPU: NORMAL
BLD PROD TYP BPU: NORMAL
BLOOD COMPONENT TYPE: NORMAL
BLOOD COMPONENT TYPE: NORMAL
CODING SYSTEM: NORMAL
CODING SYSTEM: NORMAL
CROSSMATCH: NORMAL
CROSSMATCH: NORMAL
ERYTHROCYTE [DISTWIDTH] IN BLOOD BY AUTOMATED COUNT: 20.3 % (ref 10–15)
HCT VFR BLD AUTO: 21.6 % (ref 35–47)
HGB BLD-MCNC: 6.7 G/DL (ref 11.7–15.7)
ISSUE DATE AND TIME: NORMAL
MCH RBC QN AUTO: 23.2 PG (ref 26.5–33)
MCHC RBC AUTO-ENTMCNC: 31 G/DL (ref 31.5–36.5)
MCV RBC AUTO: 75 FL (ref 78–100)
PLATELET # BLD AUTO: 553 10E3/UL (ref 150–450)
RBC # BLD AUTO: 2.89 10E6/UL (ref 3.8–5.2)
SPECIMEN EXPIRATION DATE: ABNORMAL
SPECIMEN EXPIRATION DATE: NORMAL
UFH PPP CHRO-ACNC: 0.46 IU/ML
UNIT ABO/RH: NORMAL
UNIT ABO/RH: NORMAL
UNIT NUMBER: NORMAL
UNIT NUMBER: NORMAL
UNIT STATUS: NORMAL
UNIT STATUS: NORMAL
UNIT TYPE ISBT: 6200
UNIT TYPE ISBT: 6200
WBC # BLD AUTO: 4.2 10E3/UL (ref 4–11)

## 2023-01-25 PROCEDURE — 36415 COLL VENOUS BLD VENIPUNCTURE: CPT | Performed by: INTERNAL MEDICINE

## 2023-01-25 PROCEDURE — 999N000040 HC STATISTIC CONSULT NO CHARGE VASC ACCESS

## 2023-01-25 PROCEDURE — 85520 HEPARIN ASSAY: CPT | Performed by: THORACIC SURGERY (CARDIOTHORACIC VASCULAR SURGERY)

## 2023-01-25 PROCEDURE — 86880 COOMBS TEST DIRECT: CPT | Performed by: INTERNAL MEDICINE

## 2023-01-25 PROCEDURE — 250N000013 HC RX MED GY IP 250 OP 250 PS 637: Performed by: PHYSICIAN ASSISTANT

## 2023-01-25 PROCEDURE — 86870 RBC ANTIBODY IDENTIFICATION: CPT | Performed by: INTERNAL MEDICINE

## 2023-01-25 PROCEDURE — 250N000011 HC RX IP 250 OP 636: Performed by: PHYSICIAN ASSISTANT

## 2023-01-25 PROCEDURE — 86850 RBC ANTIBODY SCREEN: CPT | Performed by: INTERNAL MEDICINE

## 2023-01-25 PROCEDURE — 120N000001 HC R&B MED SURG/OB

## 2023-01-25 PROCEDURE — 84999 UNLISTED CHEMISTRY PROCEDURE: CPT | Performed by: INTERNAL MEDICINE

## 2023-01-25 PROCEDURE — 86860 RBC ANTIBODY ELUTION: CPT | Performed by: INTERNAL MEDICINE

## 2023-01-25 PROCEDURE — 86901 BLOOD TYPING SEROLOGIC RH(D): CPT | Performed by: INTERNAL MEDICINE

## 2023-01-25 PROCEDURE — 999N000127 HC STATISTIC PERIPHERAL IV START W US GUIDANCE

## 2023-01-25 PROCEDURE — 99233 SBSQ HOSP IP/OBS HIGH 50: CPT | Performed by: INTERNAL MEDICINE

## 2023-01-25 PROCEDURE — 250N000013 HC RX MED GY IP 250 OP 250 PS 637: Performed by: INTERNAL MEDICINE

## 2023-01-25 PROCEDURE — P9016 RBC LEUKOCYTES REDUCED: HCPCS | Performed by: INTERNAL MEDICINE

## 2023-01-25 PROCEDURE — 36415 COLL VENOUS BLD VENIPUNCTURE: CPT | Performed by: THORACIC SURGERY (CARDIOTHORACIC VASCULAR SURGERY)

## 2023-01-25 PROCEDURE — 250N000011 HC RX IP 250 OP 636: Performed by: HOSPITALIST

## 2023-01-25 PROCEDURE — 86920 COMPATIBILITY TEST SPIN: CPT | Performed by: INTERNAL MEDICINE

## 2023-01-25 PROCEDURE — 86900 BLOOD TYPING SEROLOGIC ABO: CPT | Performed by: INTERNAL MEDICINE

## 2023-01-25 PROCEDURE — 85027 COMPLETE CBC AUTOMATED: CPT | Performed by: INTERNAL MEDICINE

## 2023-01-25 PROCEDURE — 86922 COMPATIBILITY TEST ANTIGLOB: CPT | Performed by: INTERNAL MEDICINE

## 2023-01-25 PROCEDURE — 86921 COMPATIBILITY TEST INCUBATE: CPT | Performed by: INTERNAL MEDICINE

## 2023-01-25 PROCEDURE — 71045 X-RAY EXAM CHEST 1 VIEW: CPT

## 2023-01-25 RX ADMIN — CEFTRIAXONE SODIUM 2 G: 2 INJECTION, POWDER, FOR SOLUTION INTRAMUSCULAR; INTRAVENOUS at 13:01

## 2023-01-25 RX ADMIN — CALCIUM CARBONATE (ANTACID) CHEW TAB 500 MG 500 MG: 500 CHEW TAB at 13:00

## 2023-01-25 RX ADMIN — HYDROMORPHONE HYDROCHLORIDE 2 MG: 2 TABLET ORAL at 05:37

## 2023-01-25 RX ADMIN — MAGNESIUM HYDROXIDE 30 ML: 400 SUSPENSION ORAL at 03:43

## 2023-01-25 RX ADMIN — HYDROMORPHONE HYDROCHLORIDE 4 MG: 4 TABLET ORAL at 01:44

## 2023-01-25 RX ADMIN — HYDROMORPHONE HYDROCHLORIDE 2 MG: 2 TABLET ORAL at 15:52

## 2023-01-25 RX ADMIN — HYDROMORPHONE HYDROCHLORIDE 2 MG: 2 TABLET ORAL at 20:13

## 2023-01-25 RX ADMIN — FAMOTIDINE 20 MG: 20 TABLET ORAL at 19:59

## 2023-01-25 RX ADMIN — HEPARIN SODIUM 1550 UNITS/HR: 10000 INJECTION, SOLUTION INTRAVENOUS at 12:56

## 2023-01-25 RX ADMIN — ACETAMINOPHEN 650 MG: 325 TABLET, FILM COATED ORAL at 00:02

## 2023-01-25 RX ADMIN — HYDROMORPHONE HYDROCHLORIDE 2 MG: 2 TABLET ORAL at 08:43

## 2023-01-25 RX ADMIN — IBUPROFEN 600 MG: 600 TABLET ORAL at 05:37

## 2023-01-25 RX ADMIN — HEPARIN SODIUM 1550 UNITS/HR: 10000 INJECTION, SOLUTION INTRAVENOUS at 13:00

## 2023-01-25 RX ADMIN — FAMOTIDINE 20 MG: 20 TABLET ORAL at 08:43

## 2023-01-25 RX ADMIN — HYDROMORPHONE HYDROCHLORIDE 2 MG: 2 TABLET ORAL at 12:40

## 2023-01-25 ASSESSMENT — ACTIVITIES OF DAILY LIVING (ADL)
ADLS_ACUITY_SCORE: 21
ADLS_ACUITY_SCORE: 23
ADLS_ACUITY_SCORE: 21
ADLS_ACUITY_SCORE: 23
ADLS_ACUITY_SCORE: 21
ADLS_ACUITY_SCORE: 21
ADLS_ACUITY_SCORE: 23
ADLS_ACUITY_SCORE: 21

## 2023-01-25 NOTE — PLAN OF CARE
Goal Outcome Evaluation:    [id number]  POD #5 R thoracotomy   COGNITION/MENTATION: A/O x 4   VITALS: Afebrile , tachycardic low 100's  NEURO/CMS: intact  CARDIAC/TELE: n/a  RESPIRATORY: LS diminished, on RA  GI: BS+, pt. C/o constipation, MOM admin, not effective yet   : AUO per pt   PAIN: Surgical incision site pain, moderate to severe, managed w/PRN tylenol, PO dilaudid, and iburpofen  SKIN: R thoracotomy  DRAINS/LINES: 1 CT, -20 suction, no crepitus, no air leak ; onQ pump, PIV SL.   ACTIVITY: Up SBA  DIET: Regular diet     Heparin infusing at 1,500 units/hr. Hep 10a within range, next check 1/26 at 0600.

## 2023-01-25 NOTE — PROVIDER NOTIFICATION
While on cross cover this evening I was paged by RN regarding patient's new onset fever. It appears this happened last night too. New blood cultures were drawn today. Intra-op cultures have been negative. Proceed with antipyretic therapy and monitor.    Christofer Garcia MD, MPH  Internal Medicine

## 2023-01-25 NOTE — PROVIDER NOTIFICATION
"MD Notification    Notified Person: MD    Notified Person Name: Mara Price    Notification Date/Time: 1/25/23 @0753    Notification Interaction: [facility] webpage    Purpose of Notification: \"Critical lab: hgb 6.7. Did you want to order blood? Thanks.\"    Orders Received: MD ordered 1 PRBC      Addendum: Notified MD @1034 that pt has a positive screen showing allergy with the blood, so there will be a delay for it to be ready.         "

## 2023-01-25 NOTE — PROGRESS NOTES
[facility]    Hospitalist Progress Note    Assessment & Plan   Kamilah OLIVER Goldberg is a 42 year old female with PMHx of back pain and depression who was admitted on 1/17/2023 from [facility] ED for evaluation of fever, cough and shortness of breath secondary to a R sided empyema.      Right Empyema s/p VATS on 1/20/23  Right lower low CAP dt strep pneumoniae  * Presented to ED with 6wk hx of cough, shortness of breath and fever with associated 20 lb wt loss. No hx of IV drug use.   * On presentation, was afebrile and VSS. O2 sats stable on RA. WBC nl. CT chest showed a large loculated right pleural effusion with associated atelectasis and a mild left lower lobe infiltrate as well as bilateral axillary lymph node enlargement. No PE. In ED, she underwent a thoracentesis while in the ED with 70ml of cloudy yellowish fluid removed. Initially treated with Unasyn, then changed to Vanco and ceftriaxone on admission. Pleural fluid culture obtained during thoracentesis ultimately grew step pneumoniae. Abx narrowed to ceftriaxone alone per ID recs on 1/21.   * Seen by thoracic surgery this stay and underwent a R sided VATS with total decortication right lung and parietal pleurectomy per Dr. Murcia on 1/20/23.   * Had isolated fever on 1/23 PM (Tmax 102.4), repeat blood cultures drawn; intraop cultures remain neg.  * Had another isolated fever on 1/24 PM (Tmax 101.1). Lactate nl. All culture date remained neg. WBC remains stable.  -- routine postop cares per thoracic surgery including chest tube mgmt and postop pain control  -- cont IV ceftriaxone while hospitalized, then transition to 3wk course of Augmentin at discharge  -- intraop cultures from 1/20 remain neg, repeat blood cultures from 1/24 remain neg  -- if patient continues to spike fevers will ask ID to re-evaluate  -- O2 sats remain stable on RA, cont pulmonary toilet with IS/OOB     Acute on chronic normocytic anemia  Iron deficiency  anemia  * Hgb 6.5 on presentation. Was transfused 1U PRBCs in ED. Hgb stable at 8.  * Minimal EBL with VATS (reported as 100ml)  -- hgb had been stable at 7-8, dropped to 6.7 on 1/25 AM  -- will transfuse 1U PRBC today for hgb 6.7 -- recheck hgb tomorrow  -- monitor CBC daily    Thrombocytosis: Improving  * Secondary to inflammation/infection.   * Platelet count peaked at 735 on 1/22 and now trending down.   -- platelet counts stable in 500s  -- monitor CBC     Right internal jugular DVT  * On 1/20, noted to have tenderness to touch above the clavicle. No fluctuance/induration.   * US R neck on 1/21 showed a small non-occlusive DVT in right internal jugular.  * Was started on a heparin gtt. Hgb stable at 8. Neck discomfort improved.   -- cont heparin gtt for now  -- pharmacy liaison consult for DOAC, $3 per month---plan to switch to eliquis BID once chest tubes removed and stable hgb     Chronic hyponatremia  Hypokalemia  Hypophosphatemia  Hypomagnesemia  * Na near 130 at baseline (consistent over past year).  -- Na stable, replete other lytes as needed per protocols  -- encourage po intake     Low back pain  * Treat symptomatically as needed     Depression  * Chronic and stable. No longer on meds.      Constipation: Improved  * Senna-docusate BID and miralax daily, also has suppositories available    FEN: no IVFs, lytes stable, regular diet  DVT Prophylaxis: PCDs, heparin gtt as above  Code Status: Full Code    Disposition: Anticipate discharge home pending removal of chest tubes, stable hgb and no recurrence of fever -- suspect 2-3d still.    Mara Price, DO    Medical Decision Making       -------------------------- BILLING ON TIME ------------------------------------------------------------------------------------------------------------  55 MINUTES SPENT BY ME on the date of service doing chart review, history, exam, documentation & further activities per the note.           Interval History     Overnight events noted -- had recurrence fever with Tmax 101 (improved from Tmax 102 the night prior). Lactate nl. Fever improved with use of Tylenol. Seen this morning. Feeling okay. Hgb low at 6.7 and will need transfusion. Denies overt dizziness/lightheadedness. A little more fatigued. No cp/sob/cough, abd pain/n/v. ++BMs today. 1 chest tube remains in place.    -Data reviewed today: I reviewed all new labs and imaging results over the last 24 hours. I personally reviewed no images or EKG's today.    Physical Exam   Temp: 98  F (36.7  C) Temp src: Oral BP: 103/57 Pulse: 103   Resp: 18 SpO2: 99 % O2 Device: None (Room air)    Vitals:    01/21/23 0546 01/24/23 0600 01/25/23 0513   Weight: 59.3 kg (130 lb 11.7 oz) 60 kg (132 lb 4.8 oz) 60.2 kg (132 lb 11.2 oz)     Vital Signs with Ranges  Temp:  [98  F (36.7  C)-101.2  F (38.4  C)] 98  F (36.7  C)  Pulse:  [] 103  Resp:  [18] 18  BP: (100-116)/(53-74) 103/57  SpO2:  [94 %-99 %] 99 %  I/O last 3 completed shifts:  In: -   Out: 500 [Urine:400; Chest Tube:100]    Constitutional: Resting comfortably, alert and conversing appropriately, NAD  Respiratory: coarse BS in R fields, CTA on L, no wheeze/rales/rhonchi, no increased work of breathing  Cardiovascular: HRRR, no MGR, no LE edema  GI: S, NT, ND, +BS  Skin/Integumen: warm/dry  Other:    Medications     bupivacaine 0.5% in ON-Q  pump 4 mL/hr at 01/25/23 0850     heparin 1,550 Units/hr (01/25/23 0850)       cefTRIAXone  2 g Intravenous Q24H     famotidine  20 mg Oral BID    Or     famotidine  20 mg Intravenous BID     polyethylene glycol  17 g Oral Daily     senna-docusate  1-2 tablet Oral BID     sodium chloride (PF)  3 mL Intracatheter Q8H       Data   Recent Labs   Lab 01/25/23  0643 01/24/23  0608 01/23/23  0617 01/22/23  0930 01/20/23  0602 01/19/23  0533   WBC 4.2 3.9* 4.5 6.3   < > 3.6*   HGB 6.7* 7.4* 8.3* 8.1*   < > 8.5*   MCV 75* 77* 77* 76*   < > 76*   * 534* 654* 735*   < > 572*   INR  --    --   --   --   --  1.27*   NA  --  136 131* 134*   < > 137   POTASSIUM  --  3.8 3.9 3.7   < > 3.9   CHLORIDE  --  97* 94* 99   < > 101   CO2  --  31* 28 27   < > 26   BUN  --  5.7* 6.5 4.3*   < > 3.2*   CR  --  0.34* 0.39* 0.44*   < > 0.40*   ANIONGAP  --  8 9 8   < > 10   MAUREEN  --  8.1* 8.1* 8.2*   < > 7.9*   GLC  --  90 87 74   < > 79    < > = values in this interval not displayed.       Recent Results (from the past 24 hour(s))   XR Chest Port 1 View    Narrative    EXAM: XR CHEST PORT 1 VIEW  LOCATION: [facility]  DATE/TIME: 1/25/2023 5:16 AM    INDICATION: s p right thoracotomy, decortication right lung  COMPARISON: 1/24/2023      Impression    IMPRESSION: The heart is unchanged in size and contour. The 2 apical right chest tubes have been removed in the inferior right chest tube remains. There is a persistent right-sided pleural effusion which is unchanged no pneumothorax is seen on the   current exam, the remainder the study is unchanged

## 2023-01-25 NOTE — PROGRESS NOTES
"THORACIC SURGERY POD#5    S: This afternoon she is feeling okay. Fever overnight but she states she always wakes up with night sweats and chills. Pain managed and mostly from chest tube site now. Productive cough but with clear to green sputum, not as much as prior to surgery.     O: /89 (BP Location: Right arm)   Pulse 76   Temp 97.7  F (36.5  C) (Oral)   Resp 18   Ht 1.6 m (5' 3\")   Wt 60.2 kg (132 lb 11.2 oz)   LMP 12/01/2022 (Exact Date)   SpO2 96%   BMI 23.51 kg/m    Gen: Sitting up in bed, alert, answering questions appropriately  Resp: Regular, no distress, on room air  Incision: Steri-strips in place, no bleeding, no surrounding erythema or induration.    CT: Remaining CT #3 at right lung base with mostly serous output now. No air leak present. Occlusive dressing intact.    CXR: No pneumothorax, adequate pleural drainage, right CT in appropriate position.    Plan:  - Continue CT #3 to suction today given fever  - pCXR in AM  - On IV Rocephin now, if continued fevers may need to broaden/discuss with ID  - Pulm toilet/ out of bed/ ambulation  - Heparin drip for right IJ DVT, wait to start DOAC until final CT removed  - Hgb 6.7 this AM, received a unit today, recheck tomorrow    Leilani Ocasio PA-C with Dr. [facility] Thoracic Surgery  Office: [phone]  Cell: [phone]    "

## 2023-01-25 NOTE — PLAN OF CARE
A/OX4. VSS except tachy at times and spiked fever of 101.2 this evening. Regular diet tolerating well. Up with SBA. Walked the halls frequently. 2 chest tubes removed. 1 chest tube left on the right side to -20H20 suction. No air leak or crepitus. Minimal serosanguinous drainage. On Q pump infusing, clamps open sensors taped. Thoracotomy site WDL. Incisional pain managed with tylenol, dilaudid and ibuprofen. Hep gtt running at 1,550 units/ hr. IV abx continued. Sepsis protocol fired, so lactic was ordered. Lab draw pending.

## 2023-01-26 ENCOUNTER — APPOINTMENT (OUTPATIENT)
Dept: GENERAL RADIOLOGY | Facility: CLINIC | Age: 43
End: 2023-01-26
Attending: PHYSICIAN ASSISTANT
Payer: COMMERCIAL

## 2023-01-26 LAB
ANION GAP SERPL CALCULATED.3IONS-SCNC: 7 MMOL/L (ref 7–15)
BUN SERPL-MCNC: 4.1 MG/DL (ref 6–20)
CALCIUM SERPL-MCNC: 8.4 MG/DL (ref 8.6–10)
CHLORIDE SERPL-SCNC: 96 MMOL/L (ref 98–107)
CREAT SERPL-MCNC: 0.39 MG/DL (ref 0.51–0.95)
DEPRECATED HCO3 PLAS-SCNC: 30 MMOL/L (ref 22–29)
ERYTHROCYTE [DISTWIDTH] IN BLOOD BY AUTOMATED COUNT: 20.2 % (ref 10–15)
ERYTHROCYTE [DISTWIDTH] IN BLOOD BY AUTOMATED COUNT: 20.5 % (ref 10–15)
GFR SERPL CREATININE-BSD FRML MDRD: >90 ML/MIN/1.73M2
GLUCOSE SERPL-MCNC: 84 MG/DL (ref 70–99)
HCT VFR BLD AUTO: 25.8 % (ref 35–47)
HCT VFR BLD AUTO: 28.7 % (ref 35–47)
HGB BLD-MCNC: 8.1 G/DL (ref 11.7–15.7)
HGB BLD-MCNC: 9.1 G/DL (ref 11.7–15.7)
MAGNESIUM SERPL-MCNC: 1.8 MG/DL (ref 1.7–2.3)
MCH RBC QN AUTO: 23.5 PG (ref 26.5–33)
MCH RBC QN AUTO: 24 PG (ref 26.5–33)
MCHC RBC AUTO-ENTMCNC: 31.4 G/DL (ref 31.5–36.5)
MCHC RBC AUTO-ENTMCNC: 31.7 G/DL (ref 31.5–36.5)
MCV RBC AUTO: 75 FL (ref 78–100)
MCV RBC AUTO: 76 FL (ref 78–100)
PLATELET # BLD AUTO: 603 10E3/UL (ref 150–450)
PLATELET # BLD AUTO: 637 10E3/UL (ref 150–450)
POTASSIUM SERPL-SCNC: 3.8 MMOL/L (ref 3.4–5.3)
RBC # BLD AUTO: 3.45 10E6/UL (ref 3.8–5.2)
RBC # BLD AUTO: 3.79 10E6/UL (ref 3.8–5.2)
SODIUM SERPL-SCNC: 133 MMOL/L (ref 136–145)
UFH PPP CHRO-ACNC: 0.49 IU/ML
WBC # BLD AUTO: 4.1 10E3/UL (ref 4–11)
WBC # BLD AUTO: 4.7 10E3/UL (ref 4–11)

## 2023-01-26 PROCEDURE — 250N000013 HC RX MED GY IP 250 OP 250 PS 637: Performed by: INTERNAL MEDICINE

## 2023-01-26 PROCEDURE — 83735 ASSAY OF MAGNESIUM: CPT | Performed by: THORACIC SURGERY (CARDIOTHORACIC VASCULAR SURGERY)

## 2023-01-26 PROCEDURE — 36415 COLL VENOUS BLD VENIPUNCTURE: CPT | Performed by: INTERNAL MEDICINE

## 2023-01-26 PROCEDURE — 250N000011 HC RX IP 250 OP 636: Performed by: PHYSICIAN ASSISTANT

## 2023-01-26 PROCEDURE — 250N000013 HC RX MED GY IP 250 OP 250 PS 637: Performed by: PHYSICIAN ASSISTANT

## 2023-01-26 PROCEDURE — 120N000001 HC R&B MED SURG/OB

## 2023-01-26 PROCEDURE — 99233 SBSQ HOSP IP/OBS HIGH 50: CPT | Performed by: INTERNAL MEDICINE

## 2023-01-26 PROCEDURE — 85520 HEPARIN ASSAY: CPT | Performed by: THORACIC SURGERY (CARDIOTHORACIC VASCULAR SURGERY)

## 2023-01-26 PROCEDURE — 85027 COMPLETE CBC AUTOMATED: CPT | Performed by: INTERNAL MEDICINE

## 2023-01-26 PROCEDURE — 80048 BASIC METABOLIC PNL TOTAL CA: CPT | Performed by: INTERNAL MEDICINE

## 2023-01-26 PROCEDURE — 71045 X-RAY EXAM CHEST 1 VIEW: CPT

## 2023-01-26 PROCEDURE — 250N000011 HC RX IP 250 OP 636: Performed by: HOSPITALIST

## 2023-01-26 RX ORDER — MICONAZOLE NITRATE 20 MG/G
CREAM TOPICAL 2 TIMES DAILY
Status: DISCONTINUED | OUTPATIENT
Start: 2023-01-26 | End: 2023-01-27 | Stop reason: HOSPADM

## 2023-01-26 RX ORDER — DIPHENHYDRAMINE HYDROCHLORIDE, ZINC ACETATE 2; .1 G/100G; G/100G
CREAM TOPICAL 3 TIMES DAILY PRN
Status: DISCONTINUED | OUTPATIENT
Start: 2023-01-26 | End: 2023-01-27 | Stop reason: HOSPADM

## 2023-01-26 RX ADMIN — MICONAZOLE NITRATE: 20 CREAM TOPICAL at 20:09

## 2023-01-26 RX ADMIN — HEPARIN SODIUM 1550 UNITS/HR: 10000 INJECTION, SOLUTION INTRAVENOUS at 20:15

## 2023-01-26 RX ADMIN — IBUPROFEN 600 MG: 600 TABLET ORAL at 12:10

## 2023-01-26 RX ADMIN — HEPARIN SODIUM 1550 UNITS/HR: 10000 INJECTION, SOLUTION INTRAVENOUS at 04:41

## 2023-01-26 RX ADMIN — ACETAMINOPHEN 650 MG: 325 TABLET, FILM COATED ORAL at 03:10

## 2023-01-26 RX ADMIN — HYDROMORPHONE HYDROCHLORIDE 2 MG: 2 TABLET ORAL at 15:31

## 2023-01-26 RX ADMIN — FAMOTIDINE 20 MG: 20 TABLET ORAL at 09:00

## 2023-01-26 RX ADMIN — HYDROMORPHONE HYDROCHLORIDE 4 MG: 4 TABLET ORAL at 23:09

## 2023-01-26 RX ADMIN — HYDROMORPHONE HYDROCHLORIDE 2 MG: 2 TABLET ORAL at 00:16

## 2023-01-26 RX ADMIN — HYDROMORPHONE HYDROCHLORIDE 2 MG: 2 TABLET ORAL at 12:10

## 2023-01-26 RX ADMIN — CEFTRIAXONE SODIUM 2 G: 2 INJECTION, POWDER, FOR SOLUTION INTRAMUSCULAR; INTRAVENOUS at 14:04

## 2023-01-26 RX ADMIN — HYDROMORPHONE HYDROCHLORIDE 2 MG: 2 TABLET ORAL at 20:07

## 2023-01-26 RX ADMIN — FAMOTIDINE 20 MG: 20 TABLET ORAL at 20:07

## 2023-01-26 RX ADMIN — HYDROMORPHONE HYDROCHLORIDE 2 MG: 2 TABLET ORAL at 09:04

## 2023-01-26 RX ADMIN — HYDROMORPHONE HYDROCHLORIDE 2 MG: 2 TABLET ORAL at 03:10

## 2023-01-26 ASSESSMENT — ACTIVITIES OF DAILY LIVING (ADL)
ADLS_ACUITY_SCORE: 21

## 2023-01-26 NOTE — PROGRESS NOTES
"[facility]  Infectious Disease Progress Note          Assessment and Plan:   IMPRESSION:  1.  A 42-year-old female with a several-week illness of shortness of breath, cough, feverish symptoms, found to have a large loculated right pleural effusion and empyema, aspiration of the fluid just coming back now with Streptococcus pneumoniae, so has classic community-acquired pneumonia-related empyema of a prior untreated pneumonia.  2.  Chronic anemia.  3.  Depression.     RECOMMENDATIONS:  1.  S/P VATS all chest tubes out   2.  Based on BETHANY ceftriaxone sensitive so continue IV ceftriaxone while admitted.   3.  once ready for discharge can be switched to PO Augmentin for now 2 weeks   4. A rash on the abdomen appears to be fungal in nature recommend antifungal cream ( order placed)   5. Low grade fever last couple nights but no new symptoms, over all improved.         Interval History:   Starting to feel better   Sore from the surgery           Medications:       cefTRIAXone  2 g Intravenous Q24H     famotidine  20 mg Oral BID    Or     famotidine  20 mg Intravenous BID     polyethylene glycol  17 g Oral Daily     senna-docusate  1-2 tablet Oral BID     sodium chloride (PF)  3 mL Intracatheter Q8H                  Physical Exam:   Blood pressure 111/65, pulse 73, temperature 97.2  F (36.2  C), temperature source Axillary, resp. rate 18, height 1.6 m (5' 3\"), weight 60.2 kg (132 lb 11.2 oz), last menstrual period 12/01/2022, SpO2 96 %.  Wt Readings from Last 2 Encounters:   01/25/23 60.2 kg (132 lb 11.2 oz)   01/16/23 56.9 kg (125 lb 8 oz)     Vital Signs with Ranges  Temp:  [97.2  F (36.2  C)-100.1  F (37.8  C)] 97.2  F (36.2  C)  Pulse:  [] 73  Resp:  [18] 18  BP: (104-134)/(57-89) 111/65  SpO2:  [94 %-97 %] 96 %    Constitutional: Awake, alert, cooperative, no apparent distress   Lungs: Clear chest tubes in place    Cardiovascular: Regular rate and rhythm, normal S1 and S2, and no murmur " noted   Abdomen: Normal bowel sounds, soft, non-distended, non-tender   Skin: No rashes, no cyanosis, no edema   Other:                Data:   All microbiology laboratory data reviewed.  Recent Labs   Lab Test 01/26/23  0541 01/25/23  0643 01/24/23  0608   WBC 4.7 4.2 3.9*   HGB 8.1* 6.7* 7.4*   HCT 25.8* 21.6* 24.2*   MCV 75* 75* 77*   * 553* 534*     Recent Labs   Lab Test 01/26/23  0541 01/24/23  0608 01/23/23  0617   CR 0.39* 0.34* 0.39*     No lab results found.  No lab results found.    Invalid input(s): UC

## 2023-01-26 NOTE — PROGRESS NOTES
"THORACIC SURGERY POD#6    S: Feeling better this morning, each day getting easier. Pain well managed. Still with intermittent cough and less productive. No shortness of breath.    O: /65 (BP Location: Left arm)   Pulse 73   Temp 97.2  F (36.2  C) (Axillary)   Resp 18   Ht 1.6 m (5' 3\")   Wt 60.2 kg (132 lb 11.2 oz)   LMP 12/01/2022 (Exact Date)   SpO2 96%   BMI 23.51 kg/m    Gen: Sitting up in bed eating, alert  Resp: Regular, no distress, on room air  Incision: Steri-strips intact, no SSI    CT: No air leak, only 75 ml/24hr. CT removed without complication. Occlusive dressing applied.    CXR: No pneumothorax, right pleural space adequately drained.    Plan:  - Final CT removed this morning, occlusive dressing applied, keep in place for 2 days  - IV Rocephin for now  - Pulm toilet/OOB/ambulation  - Hgb improved this morning  - Heparin drip for right internal jugular DVT, okay to start DOAC tomorrow AM  - Follow up CXR and appt with us in about 2 weeks, will arrange prior to discharge    Leilani Ocasio PA-C with Dr. [facility] Thoracic Surgery  Office: [phone]  Cell: [phone]      "

## 2023-01-26 NOTE — PLAN OF CARE
Goal Outcome Evaluation:    A&Ox4. VSS ex tachycardic, on RA. Encouraging IS use. C/o incisional pain, controlled w/PRN PO dilaudid. CT to suction, dressing CDI, serosanguinous output, no crepitus or air leak. Thoracotomy site ANIA & WNL. On-q pump removed, wires intact upon removal. Up SBA+gb d/t low hgb: 6.7; delay in receiving blood this AM d/t +screen result for allergy, blood bank will call when PRBC is ready. Ambulated in hallway. +BM. AUO. L PIV infusing heparin gtt @1550 units/hr, lab recheck in AM. R PIV SL. Need to address if pt needs long term IV abx, if so will need PICC placement. Continue to monitor.

## 2023-01-26 NOTE — PROGRESS NOTES
[facility]    Hospitalist Progress Note    Assessment & Plan   Kamilah OLIVER Goldberg is a 42 year old female with PMHx of back pain and depression who was admitted on 1/17/2023 from [facility] ED for evaluation of fever, cough and shortness of breath secondary to a R sided empyema.      Right Empyema s/p VATS on 1/20/23  Right lower lobe CAP dt strep pneumoniae  * Presented to ED with 6wk hx of cough, shortness of breath and fever with associated 20 lb wt loss. No hx of IV drug use.   * On presentation, was afebrile and VSS. O2 sats stable on RA. WBC nl. CT chest showed a large loculated right pleural effusion with associated atelectasis and a mild left lower lobe infiltrate as well as bilateral axillary lymph node enlargement. No PE. In ED, she underwent a thoracentesis while in the ED with 70ml of cloudy yellowish fluid removed. Initially treated with Unasyn, then changed to Vanco and ceftriaxone on admission. Pleural fluid culture obtained during thoracentesis ultimately grew step pneumoniae. Abx narrowed to ceftriaxone alone per ID recs on 1/21.   * Seen by thoracic surgery this stay and underwent a R sided VATS with total decortication right lung and parietal pleurectomy per Dr. Murcia on 1/20/23.   * Had isolated fever on 1/23 PM (Tmax 102.4), repeat blood cultures drawn; intraop cultures remain neg.  * Had another isolated fever on 1/24 PM (Tmax 101.1). Lactate nl. All culture date remained neg. WBC remains stable.  * Had low grade temp on 1/25 PM (Tmax 100.1). Unclear if this was around the time that she received her blood transfusion.  -- routine postop cares per thoracic surgery including postop pain control; final remaining chest tube was removed on 1/26  -- cont IV ceftriaxone while hospitalized, then transition to 3wk course of Augmentin at discharge  -- intraop cultures from 1/20 remain neg, repeat blood cultures from 1/24 remain neg  -- will discuss recent night-time  fevers with ID to evaluate whether abx plan needs to be adjusted  -- O2 sats remain stable on RA, cont pulmonary toilet with IS/OOB     Acute on chronic normocytic anemia  Iron deficiency anemia  * Hgb 6.5 on presentation. Was transfused 1U PRBCs in ED. Hgb stable at 8.  * Minimal EBL with VATS (reported as 100ml)  * Hgb had been stable at 7-8. Was down to 6.7 on 1/25, transfused 1U PRBC.   -- hgb improved to 8 after transfusion yesterday   -- monitor CBC daily    Thrombocytosis  * Secondary to inflammation/infection.   * Platelet count peaked at 735 on 1/22 and now trending down.   -- platelet counts variable this stay  -- monitor CBC     Right internal jugular DVT  * On 1/20, noted to have tenderness to touch above the clavicle. No fluctuance/induration.   * US R neck on 1/21 showed a small non-occlusive DVT in right internal jugular.  * Was started on a heparin gtt. Hgb stable at 8. Neck discomfort improved.   -- cont heparin gtt today, if hgb stable in AM can transition to DOAC (Eliquis BID)  -- pharmacy liaison consult for DOAC, will cost $3 per month     Chronic hyponatremia  Hypokalemia  Hypophosphatemia  Hypomagnesemia  * Na near 130 at baseline (consistent over past year).  -- Na remains at baseline this stay, replete other lytes as needed per protocols  -- encourage po intake     Low back pain  * Treat symptomatically as needed     Depression  * Chronic and stable. No longer on meds.      Constipation: Improved  * Senna-docusate BID and miralax daily, also has suppositories available    FEN: no IVFs, lytes stable, regular diet  DVT Prophylaxis: PCDs, heparin gtt as above  Code Status: Full Code    Disposition: Anticipate discharge home pending stable hgb and no recurrence of fever -- suspect 1-2d. Discussed with thoracic surgery, will plan for follow up in 2 wks with CXR.     Mara Price, DO    Medical Decision Making       -------------------------- BILLING ON TIME  ------------------------------------------------------------------------------------------------------------  50 MINUTES SPENT BY ME on the date of service doing chart review, history, exam, documentation & further activities per the note.           Interval History    Had low grade temp overnight (Tmax 100.1), improved from night prior. Received blood transfusion overnight. Seen this morning. Final chest tube just removed. Feeling good. More perky this AM. Pain presently controlled. No sob/cough, abd pain/n/v. Tolerating po intake. ++BMs. Wondering about when she will be able to discharge.    -Data reviewed today: I reviewed all new labs and imaging results over the last 24 hours. I personally reviewed no images or EKG's today.    Physical Exam   Temp: 97.2  F (36.2  C) Temp src: Axillary BP: 111/65 Pulse: 73   Resp: 18 SpO2: 96 % O2 Device: None (Room air)    Vitals:    01/21/23 0546 01/24/23 0600 01/25/23 0513   Weight: 59.3 kg (130 lb 11.7 oz) 60 kg (132 lb 4.8 oz) 60.2 kg (132 lb 11.2 oz)     Vital Signs with Ranges  Temp:  [97.2  F (36.2  C)-100.1  F (37.8  C)] 97.2  F (36.2  C)  Pulse:  [] 73  Resp:  [18] 18  BP: (104-134)/(57-89) 111/65  SpO2:  [94 %-97 %] 96 %  I/O last 3 completed shifts:  In: 536 [P.O.:236]  Out: 370 [Urine:300; Chest Tube:70]    Constitutional: Resting comfortably, alert and conversing appropriately, NAD  Respiratory: improved BS in R fields with fewer coarse sounds today, CTA on L, no wheeze/rales/rhonchi, no increased work of breathing  Cardiovascular: HRRR, no MGR, no LE edema  GI: S, NT, ND, +BS  Skin/Integumen: warm/dry, incision along R posterior chest wall well healed  Other:    Medications     bupivacaine 0.5% in ON-Q  pump 4 mL/hr at 01/25/23 0850     heparin 1,550 Units/hr (01/26/23 0441)       cefTRIAXone  2 g Intravenous Q24H     famotidine  20 mg Oral BID    Or     famotidine  20 mg Intravenous BID     polyethylene glycol  17 g Oral Daily     senna-docusate  1-2  tablet Oral BID     sodium chloride (PF)  3 mL Intracatheter Q8H       Data   Recent Labs   Lab 01/26/23  0541 01/25/23  0643 01/24/23  0608 01/23/23  0617   WBC 4.7 4.2 3.9* 4.5   HGB 8.1* 6.7* 7.4* 8.3*   MCV 75* 75* 77* 77*   * 553* 534* 654*   *  --  136 131*   POTASSIUM 3.8  --  3.8 3.9   CHLORIDE 96*  --  97* 94*   CO2 30*  --  31* 28   BUN 4.1*  --  5.7* 6.5   CR 0.39*  --  0.34* 0.39*   ANIONGAP 7  --  8 9   MAUREEN 8.4*  --  8.1* 8.1*   GLC 84  --  90 87       Recent Results (from the past 24 hour(s))   XR Chest Port 1 View    Narrative    EXAM: XR CHEST PORT 1 VIEW  LOCATION: [facility]  DATE/TIME: 1/26/2023 5:53 AM    INDICATION: s p right thoracotomy, decortication right lung  COMPARISON: 1/25/2023      Impression    IMPRESSION: The cardiac slots unchanged size and contour. The right-sided chest tube is unchanged in position. There is persistent right pleural thickening and patchy airspace opacity in the right lung base, slightly worsened from prior exam. No   pneumothorax seen on the current exam.

## 2023-01-26 NOTE — PROVIDER NOTIFICATION
MD Notification    Notified Person: MD    Notified Person Name: cait    Notification Date/Time: 12;22    Notification Interaction: vocera    Purpose of Notification: pt has a rash on her stomach, she is requesting a cream for itching can you please order.       Orders Received: benadryl cream ordered    Comments: paged ID dr. [email or web address] on [facility]

## 2023-01-26 NOTE — PLAN OF CARE
Goal Outcome Evaluation:    1930-0730  POD # 6 Right Thoracotomy w/ total decortication     COGNITION/MENTATION: A/O x 4   NEURO/CMS: intact  ABNL. VITALS: T max 100.1   CARDIAC/TELE: Tachycardic at times  RESPIRATORY: On RA, Upper lobe Clear, lower lobes diminished. Infrequent productive cough  GI: BS+, hyperactive, reports multiple BM's today   : AUO per pt, voiding in BR  PAIN: Incisional/CT pain, managed w/PO dilaudid and tylenol.   SKIN: R thoracotomy site. Trace ankle edema.   DRAINS/LINES: CT -20 suction, no crepitus/no air leak.   ACTIVITY: Up SBA  DIET: Regular     Heparin infusing at 1500u/hr, hep 10a recheck 1/27 at 0600   antibody +, delay in obtaining blood, pt received 1u PRBC hgb 6.7 --> 8.1  Night sweats continued, baseline for pt.

## 2023-01-27 ENCOUNTER — APPOINTMENT (OUTPATIENT)
Dept: GENERAL RADIOLOGY | Facility: CLINIC | Age: 43
End: 2023-01-27
Attending: PHYSICIAN ASSISTANT
Payer: COMMERCIAL

## 2023-01-27 VITALS
HEIGHT: 63 IN | TEMPERATURE: 97.7 F | BODY MASS INDEX: 23.51 KG/M2 | SYSTOLIC BLOOD PRESSURE: 112 MMHG | WEIGHT: 132.7 LBS | DIASTOLIC BLOOD PRESSURE: 63 MMHG | HEART RATE: 101 BPM | OXYGEN SATURATION: 99 % | RESPIRATION RATE: 16 BRPM

## 2023-01-27 LAB
BACTERIA TISS BX CULT: NORMAL
ERYTHROCYTE [DISTWIDTH] IN BLOOD BY AUTOMATED COUNT: 20.5 % (ref 10–15)
HCT VFR BLD AUTO: 25.8 % (ref 35–47)
HGB BLD-MCNC: 8 G/DL (ref 11.7–15.7)
MAGNESIUM SERPL-MCNC: 1.7 MG/DL (ref 1.7–2.3)
MCH RBC QN AUTO: 23.8 PG (ref 26.5–33)
MCHC RBC AUTO-ENTMCNC: 31 G/DL (ref 31.5–36.5)
MCV RBC AUTO: 77 FL (ref 78–100)
PLATELET # BLD AUTO: 590 10E3/UL (ref 150–450)
RBC # BLD AUTO: 3.36 10E6/UL (ref 3.8–5.2)
UFH PPP CHRO-ACNC: 0.29 IU/ML
WBC # BLD AUTO: 4.3 10E3/UL (ref 4–11)

## 2023-01-27 PROCEDURE — 83735 ASSAY OF MAGNESIUM: CPT | Performed by: INTERNAL MEDICINE

## 2023-01-27 PROCEDURE — 85520 HEPARIN ASSAY: CPT | Performed by: THORACIC SURGERY (CARDIOTHORACIC VASCULAR SURGERY)

## 2023-01-27 PROCEDURE — 71045 X-RAY EXAM CHEST 1 VIEW: CPT

## 2023-01-27 PROCEDURE — 85027 COMPLETE CBC AUTOMATED: CPT | Performed by: HOSPITALIST

## 2023-01-27 PROCEDURE — 99239 HOSP IP/OBS DSCHRG MGMT >30: CPT | Performed by: INTERNAL MEDICINE

## 2023-01-27 PROCEDURE — 36415 COLL VENOUS BLD VENIPUNCTURE: CPT | Performed by: INTERNAL MEDICINE

## 2023-01-27 PROCEDURE — 250N000013 HC RX MED GY IP 250 OP 250 PS 637: Performed by: INTERNAL MEDICINE

## 2023-01-27 PROCEDURE — 250N000013 HC RX MED GY IP 250 OP 250 PS 637: Performed by: PHYSICIAN ASSISTANT

## 2023-01-27 RX ORDER — ACETAMINOPHEN 325 MG/1
650 TABLET ORAL EVERY 4 HOURS PRN
Status: ON HOLD | COMMUNITY
Start: 2023-01-27 | End: 2024-01-01

## 2023-01-27 RX ORDER — APIXABAN 5 MG (74)
KIT ORAL
Qty: 74 EACH | Refills: 0 | Status: SHIPPED | OUTPATIENT
Start: 2023-01-27 | End: 2023-02-26

## 2023-01-27 RX ORDER — FERROUS SULFATE 325(65) MG
325 TABLET ORAL
Qty: 30 TABLET | Refills: 0 | Status: SHIPPED | OUTPATIENT
Start: 2023-01-27 | End: 2023-02-26

## 2023-01-27 RX ORDER — HYDROMORPHONE HYDROCHLORIDE 2 MG/1
2-4 TABLET ORAL EVERY 6 HOURS PRN
Qty: 25 TABLET | Refills: 0 | Status: ON HOLD | OUTPATIENT
Start: 2023-01-27 | End: 2024-01-01

## 2023-01-27 RX ORDER — AMOXICILLIN 250 MG
1-2 CAPSULE ORAL 2 TIMES DAILY
Qty: 20 TABLET | Refills: 0 | Status: ON HOLD | OUTPATIENT
Start: 2023-01-27 | End: 2024-01-01

## 2023-01-27 RX ORDER — MICONAZOLE NITRATE 20 MG/G
CREAM TOPICAL 2 TIMES DAILY
Qty: 14 G | Refills: 0 | Status: SHIPPED | OUTPATIENT
Start: 2023-01-27 | End: 2023-02-03

## 2023-01-27 RX ADMIN — HYDROMORPHONE HYDROCHLORIDE 4 MG: 4 TABLET ORAL at 05:57

## 2023-01-27 RX ADMIN — HYDROMORPHONE HYDROCHLORIDE 4 MG: 4 TABLET ORAL at 09:06

## 2023-01-27 RX ADMIN — HYDROMORPHONE HYDROCHLORIDE 2 MG: 2 TABLET ORAL at 02:40

## 2023-01-27 RX ADMIN — APIXABAN 10 MG: 5 TABLET, FILM COATED ORAL at 09:03

## 2023-01-27 RX ADMIN — MICONAZOLE NITRATE: 20 CREAM TOPICAL at 07:54

## 2023-01-27 RX ADMIN — FAMOTIDINE 20 MG: 20 TABLET ORAL at 07:51

## 2023-01-27 RX ADMIN — MICONAZOLE NITRATE: 20 CREAM TOPICAL at 01:38

## 2023-01-27 ASSESSMENT — ACTIVITIES OF DAILY LIVING (ADL)
ADLS_ACUITY_SCORE: 21

## 2023-01-27 NOTE — PLAN OF CARE
Pt A&O x4, able to make needs known. VSS on RA.  Lung sounds clear. PRN dilaudid given X1 for pain. Discharge instructions discussed with pt. All belongs and medications sent with pt. Pt discharged with transport aid at 1050.

## 2023-01-27 NOTE — PROGRESS NOTES
0330h: AOx4. O2Sat 98% on RA, clear bilateral breath sound in UL, diminished in LL. CT discontinued 01/26. Tolerates Regular diet. Voiding adequately. Normoactive BS x4. Last BM 01/26. Ambulates independently.     On Heparin infusion 1550 unit(s)/hr. Hep Xa re-check at 6am.  If Hgb stable in AM will transition to Eliquis BID as per MD notes.

## 2023-01-27 NOTE — PROGRESS NOTES
AxOx4. VSS on RA. LS clear but diminished throughout. IS encouraged. Infrequent, productive coughing. Independent. Voids spontaneously, pt reported occurrence of urine and stool early this AM 1/27/2023. BS +. Umbilical rash and waistline rash, given ointment see MAR. L PIV infusing with heparin at 1550u/hr and R PIV SL. Pain managed with PRN 2mg oral dilaudid q3h, average score of 8 throughout shift. Reg diet, advance as tolerated.

## 2023-01-27 NOTE — DISCHARGE SUMMARY
[facility]    Discharge Summary  Hospitalist    Date of Admission:  1/17/2023  Date of Discharge:  1/27/2023  Discharging Provider: Mara Price DO    Discharge Diagnoses   Right Empyema s/p VATS on 1/20/23  Right lower lobe CAP dt strep pneumoniae  Acute on chronic normocytic anemia  Iron deficiency anemia  Thrombocytosis  Right internal jugular DVT  Chronic hyponatremia  Hypokalemia  Hypophosphatemia  Hypomagnesemia  Low back pain  Depression  Constipation: Improved  Rash on abd    History of Present Illness   Kamilah Goldberg is a 42 year old female with PMHx of back pain and depression who was admitted on 1/17/2023 from [facility] ED for evaluation of fever, cough and shortness of breath secondary to a R sided empyema.     Hospital Course   Kamilah Goldberg was admitted on 1/17/2023.  The following problems were addressed during her hospitalization:    Right Empyema s/p VATS on 1/20/23  Right lower lobe CAP dt strep pneumoniae  * Presented to ED with 6wk hx of cough, shortness of breath and fever with associated 20 lb wt loss. No hx of IV drug use.   * On presentation, was afebrile and VSS. O2 sats stable on RA. WBC nl. CT chest showed a large loculated right pleural effusion with associated atelectasis and a mild left lower lobe infiltrate as well as bilateral axillary lymph node enlargement. No PE. In ED, she underwent a thoracentesis while in the ED with 70ml of cloudy yellowish fluid removed. Initially treated with Unasyn, then changed to Vanco and ceftriaxone on admission. Pleural fluid culture obtained during thoracentesis ultimately grew step pneumoniae. Abx narrowed to ceftriaxone alone per ID recs on 1/21.   * Seen by thoracic surgery this stay and underwent a R sided VATS with total decortication right lung and parietal pleurectomy per Dr. Murcia on 1/20/23.   * Had isolated fever on 1/23 PM (Tmax 102.4), repeat blood cultures drawn and were neg; intraop  cultures remained neg.  * Had another isolated fever on 1/24 PM (Tmax 101.1). Lactate nl. All culture date remained neg. WBC remains stable.  * Had low grade temp on 1/25 PM (Tmax 100.1). Unclear if this was around the time that she received her blood transfusion. All culture data remained neg.  * Given recurrence of fevers, was reassessed per ID on 1/26. Advised to cont with abx plan as previously advised (IV ceftriaxone while inpatient, Augmentin x3wks at discharge)  *  Chest tubes x3 removed postop. All tubes removed as of 1/26. Postop pain well managed with oral dilaudid as needed. O2 sats stable on RA.   * Discharged on 3 wk course of Augmentin.  * Follow up with Dr. Murcia in clinic in 2 wks with repeat CXR.      Acute on chronic normocytic anemia  Iron deficiency anemia  * Hgb 6.5 on presentation. Was transfused 1U PRBCs in ED. Hgb stable at 8.  * Minimal EBL with VATS (reported as 100ml)  * Hgb had been stable at 7-8. Was down to 6.7 on 1/25, transfused 1U PRBC.   * Hgb stable at 8 at time of discharge.   * Follow up with PCP in the next week with repeat CBC for ongoing monitoring     Thrombocytosis  * Secondary to inflammation/infection.   * Platelet count peaked at 735 on 1/22 and now trending down.   * Follow up with PCP for ongoing monitoring     Right internal jugular DVT  * On 1/20, noted to have tenderness to touch above the clavicle. No fluctuance/induration.   * US R neck on 1/21 showed a small non-occlusive DVT in right internal jugular.  * Was started on a heparin gtt. Hgb stable at 8. Neck discomfort improved.   * Remained on heparin gtt during stay.   * Transitioned t DOAC (Eliquis BID) on day of discharge. Per pharm liaison DOAC will cost $3 per month    Chronic hyponatremia  Hypokalemia  Hypophosphatemia  Hypomagnesemia  * Na near 130 at baseline (consistent over past year).  * Na remained at baseline this stay, repleted other lytes as needed per protocols     Low back pain  * Treated  symptomatically as needed     Depression  * Chronic and stable. No longer on meds.      Constipation: Improved  * Cont senna/docusate prn    Rash on abd  * Developed on 1/26, mostly around umbilicus and in abd skin fold.   * Appeared fungal per ID assessment. Topical miconazole ordered and rash improved. Can cont at discharge.     Mara Price DO    Significant Results and Procedures   Procedure Date: 01/20/2023     SURGEON:  Tani Murcia MD      PREOPERATIVE DIAGNOSIS:  Right empyema.  POSTOPERATIVE DIAGNOSIS:  Right empyema.     PROCEDURE:    1.  Right video-assisted thoracoscopy.  2.  Right thoracotomy.  3.  Total decortication, right lung.  4.  Parietal pleurectomy.    Pending Results   These results will be followed up by PCP  Unresulted Labs Ordered in the Past 30 Days of this Admission     Date and Time Order Name Status Description    1/25/2023 11:48 PM Prepare red blood cells (unit) Preliminary     1/25/2023 10:16 AM Other Laboratory; [facility]; REFERENCE LABORATORY WORKUP (Laboratory Miscellaneous Order) In process     1/23/2023 11:58 PM Blood Culture Arm, Right Preliminary     1/23/2023 11:58 PM Blood Culture Peripheral Blood Preliminary     1/20/2023  1:52 PM Fungal or Yeast Culture Routine Preliminary     1/20/2023  1:52 PM Anaerobic Bacterial Culture Routine Preliminary           Code Status   Full Code       Primary Care Physician   Physician No Ref-Primary    Physical Exam   Temp: 97.7  F (36.5  C) Temp src: Oral BP: 112/63 Pulse: 101   Resp: 16 SpO2: 99 % O2 Device: None (Room air)    Vitals:    01/21/23 0546 01/24/23 0600 01/25/23 0513   Weight: 59.3 kg (130 lb 11.7 oz) 60 kg (132 lb 4.8 oz) 60.2 kg (132 lb 11.2 oz)     Vital Signs with Ranges  Temp:  [97.5  F (36.4  C)-97.8  F (36.6  C)] 97.7  F (36.5  C)  Pulse:  [] 101  Resp:  [16-18] 16  BP: (106-120)/(60-83) 112/63  SpO2:  [98 %-99 %] 99 %  I/O last 3 completed shifts:  In: 130 [P.O.:120;  I.V.:10]  Out: -     General: Resting comfortably, alert, conversive, NAD  CVS: HRRR, no MGR, no LE edema  Respiratory: improved BS in R fields with fewer coarse sounds today, CTA on L, no wheeze/rales/rhonchi, no increased work of breathing  GI: S, NT, ND, +BS  Skin: warm/dry, incision along R posterior chest wall well healed, mild erythema surrounding umbilicus and in abd skin fold  Neuro: CNs 2-12, no focal motor/sensory deficits    Discharge Disposition   Discharged to home  Condition at discharge: Stable    Consultations This Hospital Stay   INFECTIOUS DISEASES IP CONSULT  THORACIC SURGERY IP CONSULT  ----------------------------------  PHARMACY TO DOSE VANCO  VASCULAR ACCESS ADULT IP CONSULT  PHARMACY LIAISON FOR MEDICATION COVERAGE CONSULT    Time Spent on this Encounter   IMara DO, personally saw the patient today and spent greater than 30 minutes discharging this patient.    Discharge Orders      Reason for your hospital stay    Management of the infection in your lung (called an empyema), for which you required IV antibiotics and surgery to clean out the infection. You will continue oral antibiotics for the next 3 weeks. Additionally, you were found to have blood clot in your right internal jugular vein for which you were started on blood thinners.     Follow-up and recommended labs and tests     Follow up with your PCP in the next week with basic labs to check your hemoglobin  Follow up with Dr. Murcia in thoracic surgery clinic in 2 weeks with repeat chest xray.     Activity    Your activity upon discharge: activity as tolerated     Diet    Follow this diet upon discharge: Regular     Discharge Medications   Current Discharge Medication List      START taking these medications    Details   acetaminophen (TYLENOL) 325 MG tablet Take 2 tablets (650 mg) by mouth every 4 hours as needed for mild pain, fever or headaches    Associated Diagnoses: Empyema (H)      amoxicillin-clavulanate  (AUGMENTIN) 875-125 MG tablet Take 1 tablet by mouth 2 times daily for 21 days  Qty: 42 tablet, Refills: 0    Associated Diagnoses: Empyema (H); Community acquired pneumonia of right lower lobe of lung      Apixaban Starter Pack (ELIQUIS DVT/PE STARTER PACK) 5 MG TBPK Take 10 mg by mouth 2 times daily for 7 days, THEN 5 mg 2 times daily for 23 days.  Qty: 74 each, Refills: 0    Associated Diagnoses: Acute deep vein thrombosis (DVT) of non-extremity vein      ferrous sulfate (FEROSUL) 325 (65 Fe) MG tablet Take 1 tablet (325 mg) by mouth daily (with breakfast) for 30 days  Qty: 30 tablet, Refills: 0    Associated Diagnoses: Iron deficiency anemia secondary to inadequate dietary iron intake      HYDROmorphone (DILAUDID) 2 MG tablet Take 1-2 tablets (2-4 mg) by mouth every 6 hours as needed for moderate pain (4-6)  Qty: 25 tablet, Refills: 0    Associated Diagnoses: Empyema (H)      miconazole (MICATIN) 2 % external cream Apply topically 2 times daily for 7 days  Qty: 14 g, Refills: 0    Associated Diagnoses: Rash      senna-docusate (SENOKOT-S/PERICOLACE) 8.6-50 MG tablet Take 1-2 tablets by mouth 2 times daily  Qty: 20 tablet, Refills: 0    Associated Diagnoses: Drug-induced constipation         STOP taking these medications       ibuprofen (ADVIL/MOTRIN) 200 MG tablet Comments:   Reason for Stopping:             Allergies   Allergies   Allergen Reactions     Blood Transfusion Related (Informational Only) Other (See Comments)     Patient has a history of a clinically significant antibody against RBC antigens.  A delay in compatible RBCs may occur.      Naproxen Hives     Codeine Sulfate GI Disturbance     Hydrocodone Nausea and Vomiting     Data   Most Recent 3 CBC's:Recent Labs   Lab Test 01/27/23  0603 01/26/23  1059 01/26/23  0541   WBC 4.3 4.1 4.7   HGB 8.0* 9.1* 8.1*   MCV 77* 76* 75*   * 637* 603*      Most Recent 3 BMP's:  Recent Labs   Lab Test 01/26/23  0541 01/24/23  0608 01/23/23  0617   * 136  131*   POTASSIUM 3.8 3.8 3.9   CHLORIDE 96* 97* 94*   CO2 30* 31* 28   BUN 4.1* 5.7* 6.5   CR 0.39* 0.34* 0.39*   ANIONGAP 7 8 9   MAUREEN 8.4* 8.1* 8.1*   GLC 84 90 87     Most Recent 2 LFT's:  Recent Labs   Lab Test 01/16/23 2050 12/04/22  2111   AST 98* 38   ALT 61* 25   ALKPHOS 55 84   BILITOTAL 0.1* 0.3     Most Recent INR's and Anticoagulation Dosing History:  Anticoagulation Dose History     Recent Dosing and Labs Latest Ref Rng & Units 1/17/2023 1/19/2023    INR 0.85 - 1.15 1.41(H) 1.27(H)          Results for orders placed or performed during the hospital encounter of 01/17/23   US Upper Extremity Venous Duplex Right     Value    Radiologist flags New diagnosis of pulmonary embolism (AA)    Narrative    EXAM: US UPPER EXTREMITY VENOUS DUPLEX RIGHT  LOCATION: [facility]  DATE/TIME: 1/21/2023 9:32 AM    INDICATION: right neck pain discomfort (evaluate IVC)  COMPARISON: None.  TECHNIQUE: Venous Duplex ultrasound of the right upper extremity with (when possible) and without compression, augmentation, and duplex. Color flow and spectral Doppler with waveform analysis performed.    FINDINGS: Ultrasound includes evaluation of the internal jugular vein, innominate vein, subclavian vein, axillary vein, and brachial vein. The superficial cephalic and basilic veins were also evaluated where seen.     RIGHT: A small nonocclusive deep vein thrombosis in the internal jugular vein, image 8:1. The remaining deep venous structures are patent. Superficial thrombophlebitis of the cephalic vein from the distal arm through the antecubital fossa. The total   length of the superficial thrombophlebitis was not measured.      Impression    IMPRESSION:  1.  A small nonocclusive deep vein thrombosis in the internal jugular vein.  2.  Superficial thrombophlebitis of the cephalic vein.      [Critical Result: New diagnosis of pulmonary embolism]    Finding was identified on 1/21/2023 10:28 AM.     VICENTA Cole was  contacted by me on 1/21/2023 10:28 AM and verbalized understanding of the critical result.    XR Chest Port 1 View    Narrative    CHEST ONE VIEW  1/20/2023 4:18 PM     HISTORY: Postop.    COMPARISON: January 17, 2023      Impression    IMPRESSION: Marked improvement in pleural effusion with trace residual  fluid on the right, two chest tubes in place. No pneumothorax  demonstrated on this unlabeled view. Some mild persistent infiltrates  are noted on the right. Left lung clear.    ELMER GAO MD         SYSTEM ID:  [id number]   XR Chest Port 1 View    Narrative    EXAM: XR CHEST PORT 1 VIEW  LOCATION: [facility]  DATE/TIME: 1/21/2023 5:57 AM    INDICATION: s p right thoracotomy, decortication right lung  COMPARISON: 1/17/2023.    FINDINGS: Interval postoperative changes in the right hemithorax with placement of chest tubes. There is no pneumothorax. Decrease in the loculated right pleural effusion. Mild right basilar infiltrate. There is mild left basilar probable atelectasis.   Left lungs otherwise clear. The heart size is normal. Subcutaneous emphysema over the right chest wall.      Impression    IMPRESSION: No pneumothorax.   XR Chest Port 1 View    Narrative    EXAM: XR CHEST PORT 1 VIEW  LOCATION: [facility]  DATE/TIME: 1/22/2023 5:50 AM    INDICATION: s p right thoracotomy, decortication right lung  COMPARISON: 01/21/2013 at 5:10 AM      Impression    IMPRESSION: Two right apically directed and single right basilar chest tube, stable.     No pneumothorax identified. Small moderate residual pleural effusion, loculated laterally. Bibasilar airspace opacities right greater than left, likely atelectasis.    Stable normal cardiomediastinal silhouette. Unchanged emphysema right chest wall.   XR Chest Port 1 View    Narrative    EXAM: XR CHEST PORT 1 VIEW  LOCATION: [facility]  DATE/TIME: 1/23/2023 5:35 AM    INDICATION: s p right  thoracotomy, decortication right lung  COMPARISON: 01/22/2023.      Impression    IMPRESSION: Stable postoperative changes of the right hemithorax. Dual apical and single right basilar chest tubes remain in position. No pneumothorax visible. No change in pleural fluid/thickening on the right with atelectasis of the mid and lower right   lung. Mild atelectasis of the left base. Stable cardiac silhouette.   XR Chest Port 1 View    Narrative    EXAM: XR CHEST PORT 1 VIEW  LOCATION: [facility]  DATE/TIME: 1/24/2023 6:11 AM    INDICATION: s p right thoracotomy, decortication right lung  COMPARISON: 01/23/2023      Impression    IMPRESSION: Heart size within normal limits. Minimal left basilar atelectasis. Similar right basilar opacification with chest tubes in place. No visible residual pneumothorax. Small amount of peripheral pleural fluid on the right.   XR Chest Port 1 View    Narrative    EXAM: XR CHEST PORT 1 VIEW  LOCATION: [facility]  DATE/TIME: 1/25/2023 5:16 AM    INDICATION: s p right thoracotomy, decortication right lung  COMPARISON: 1/24/2023      Impression    IMPRESSION: The heart is unchanged in size and contour. The 2 apical right chest tubes have been removed in the inferior right chest tube remains. There is a persistent right-sided pleural effusion which is unchanged no pneumothorax is seen on the   current exam, the remainder the study is unchanged   XR Chest Port 1 View    Narrative    EXAM: XR CHEST PORT 1 VIEW  LOCATION: [facility]  DATE/TIME: 1/26/2023 5:53 AM    INDICATION: s p right thoracotomy, decortication right lung  COMPARISON: 1/25/2023      Impression    IMPRESSION: The cardiac slots unchanged size and contour. The right-sided chest tube is unchanged in position. There is persistent right pleural thickening and patchy airspace opacity in the right lung base, slightly worsened from prior exam. No   pneumothorax  seen on the current exam.   XR Chest Port 1 View    Narrative    EXAM: XR CHEST PORT 1 VIEW  LOCATION: [facility]  DATE/TIME: 1/27/2023 5:41 AM    INDICATION: s p right thoracotomy, decortication right lung  COMPARISON: 01/26/2023      Impression    IMPRESSION: Stable cardiomediastinal silhouette. Interval removal of right chest tube. Consolidation right mid to lower lung and small right effusion similar. Left basilar atelectasis or infiltrate. Soft tissue emphysema right chest.

## 2023-01-27 NOTE — PLAN OF CARE
A&O X4.VSS on RA.Lung Sounds clear.Bowel Sounds active 3 BM today.Voiding adequately.R thoracotomy site, wdl, CT removed today, rash on stomach miconazole ordered.Per hospitalist ok for pt to be up IND.Pain Controlled with prn dilaudid and ibuprofen. Tolerating regular diet. Heparin infusing at 1550 u/hr, next hep10a in am.

## 2023-01-27 NOTE — DISCHARGE INSTRUCTIONS
"[facility]   Discharge instructions and Follow-Up Care for Dr. Murcia' Thoracoscopy and Thoracotomy patients:    You already have a post-op chest x-ray and post-op appointment scheduled as follows:  Go to [facility] ([address] [place] [address], [address], [place] [address]) on Wednesday, February 8th at 1:25 PM check in for a 1:40 PM post-op chest x-ray.  Then go upstairs to the [facility] office in [address] at 2:00 PM the same day for your post-op appointment. You will see either Leilani Ocasio PA-C or Dr. Murcia for your post-op appointment.     You need to call to schedule your post-op chest x-ray and appointment: [place] [phone]    Patient care:  Call Dr. Murcia' office @[phone] if you experience:   *Severe chills or fever of 101 F or guzman on two occasions   *Severely increased incisional pain that cannot be relieved with rest or pain medications   *Presence of unusual incisional or chest tube site drainage that is odorous, green or yellow in color, rosy bright red blood or if your incision is warm/red/swollen   *Coughing up bright red blood or greenish-yellow secretions   *Inability to urinate or have a bowel movement   *New pain or swelling in your legs    In an emergency, call [phone] or have someone drive you to an Emergency Department    Pain Relief:  You may take ibuprofen and acetaminophen according to the directions on the medication bottle. (Common commercial names for ibuprofen at Advil or Motrin). The common commercial name for acetaminophen is Tylenol.) Typically, patients can take Tylenol up to 1000 mg every 6 hours and ibuprofen 400-600 mg every 6-8 hours. You may also have been prescribed a narcotic pain medicine. Most people get good pain relief by \"staggering\" these medications. Many patients keep a log of when they take the medications including the date, time, medication name and dose. Lastly, it is important to take these " medications with food.    No driving while on narcotic pain medicines.    Activity:  No lifting greater than 10 pounds with your operative side arm for the next 4 weeks    Wound Care:   *Please look at your incisions daily and keep them clean while they heal   *Do not apply creams, salves such as Bacitracin, or ointments on the incisions while they heal   *Steri strips (thin white pieces of tape) will be present on the incision(s) and will peel off as your incision heals-- otherwise, they will be removed at your post-op appointment   *Remove the dressing covering your chest tube site on Saturday morning 1/28/23. You may then shower. Wash the incision and chest tube sites daily with soap and water. No bathing/immersing incisions under water for two weeks or until the chest tube sites are completely healed.   *After your shower, pat the chest tube sites dry and place a dry gauze dressing (and tape) over the sites. It is normal to have some drainage from the sites for a few days. Do not be alarmed if a large amount of fluid drains (should be pink or yellow) either spontaneously or with coughing or exertion. Should this happen, just place a larger, thicker gauze dressing over the chest tube sites. In about a week, drainage should stop and a scab will form. Once drainage stops, you can stop covering the sites. Call our office if the drainage is milky or green in color or foul-smelling.    Respiratory Care:  Utilize the incentive spirometer and flutter valve/acapella (if you received one) several times in a row every few hours while awake for a few weeks after discharging home from the hospital.    Activity:  It may take a few weeks to regain your normal energy level/stamina. It is important during your recovery to get regular physical activity such as walking each day, climbing stairs as tolerated, and avoiding prolonged daytime naps    Return to Work:  Time away from work will depend on your specific situation. In  general, you will need between 4-6 weeks to fully recover from surgery. Specific dates for retuning to work can be discussed at your post-op appointment.

## 2023-01-27 NOTE — PROGRESS NOTES
THORACIC SURGERY POD#7    Ready to go home today. Understands plan for follow up on 2/8. Discussed her wound care again and put instructions in her discharge orders. Augmentin x 2 additional weeks per ID. She will call our office with any questions or concerns after discharge.     Leilani Ocasio PA-C with Dr. [facility] Thoracic Surgery  Office: [phone]  Cell: [phone]

## 2023-01-28 ENCOUNTER — PATIENT OUTREACH (OUTPATIENT)
Dept: CARE COORDINATION | Facility: CLINIC | Age: 43
End: 2023-01-28
Payer: COMMERCIAL

## 2023-01-28 NOTE — PROGRESS NOTES
[facility] offered Clinic Care Coordination to an established [facility] eligible patient and patient declined CCC at this time.    Clinic Care Coordination Contact  [facility]: Post-Discharge Note  SITUATION                                                      Admission:    Admission Date: 01/17/23   Reason for Admission: Fever, Cough, Shortness of breath secondary to a R sided empyema.  Discharge:   Discharge Date: 01/27/23  Discharge Diagnosis: Right Empyema s/p VATS on 1/20/23, Right lower lobe CAP dt strep pneumoniae    BACKGROUND                                                      Per hospital discharge summary and inpatient provider notes:    Kamilah Goldberg is a 42 year old female with past medical history of iron deficiency anemia, low back pain, major depression who presents for evaluation of 6 weeks history of shortness of breath/cough/fever.     Patient ports that for the past 6 weeks, she is at ongoing symptoms of cough that is productive of slimy brown mucus  along with intermittent blood, generalized chest discomfort and dyspnea with exertion.  She has some nausea but no vomiting.  She reports a fever of 103  F over the past several days.  She denies any recent travel, no exposures to any respiratory illnesses or sick contacts.  She is a former smoker, she denies any IV drug abuse.  She has no bloody stools, no diarrhea.  She has a history of gastric bypass.  Denies any recent chest wall trauma.  She has no prior history of asthma/COPD or recurrent pulmonary infections.  She otherwise does report that she has lost over 20 pounds over the past several weeks.  This is mainly due to poor appetite and dyspnea with exertion.  She denies any PND/orthopnea, no calf pain or leg swelling.  No recent surgeries or periods of prolonged immobility.  She otherwise has no other complaints this time.  At baseline she is not on supplemental oxygen    ASSESSMENT      Discharge Assessment  How are you doing now that you are  "home?: \"I'm good I'm just a little sore.\"  How are your symptoms? (Red Flag symptoms escalate to triage hotline per guidelines): Improved  Do you feel your condition is stable enough to be safe at home until your provider visit?: Yes  Does the patient have their discharge instructions? : Yes  Does the patient have questions regarding their discharge instructions? : No  Were you started on any new medications or were there changes to any of your previous medications? : Yes  Does the patient have all of their medications?: Yes  Do you have questions regarding any of your medications? : No  Do you have all of your needed medical supplies or equipment (DME)?  (i.e. oxygen tank, CPAP, cane, etc.): Yes  Discharge follow-up appointment scheduled within 14 calendar days? : Yes  Discharge Follow Up Appointment Date: 02/07/23  Discharge Follow Up Appointment Scheduled with?: Primary Care Provider    Post-op ([facility] CTA Only)  If the patient had a surgery or procedure, do they have any questions for a nurse?: No    PLAN                                                      Outpatient Plan:      Follow-up and recommended labs and tests  Follow up with your PCP in the next week with basic labs to check your  hemoglobin  Follow up with Dr. Murcia in thoracic surgery clinic in 2 weeks with  repeat chest xray.    Future Appointments   Date Time Provider Department Center   2/7/2023  3:40 PM Kodi Edwards PA [facility]   2/8/2023  1:20 PM [facility]         For any urgent concerns, please contact our 24 hour nurse triage line: [phone] ([phone])         LAWANDA Souza  [phone]  [facility]              "

## 2023-01-29 LAB
BACTERIA BLD CULT: NO GROWTH
BACTERIA BLD CULT: NO GROWTH

## 2023-01-30 LAB
Lab: NORMAL
PERFORMING LABORATORY: NORMAL
SCANNED LAB RESULT: NORMAL
TEST NAME: NORMAL

## 2023-02-17 LAB — BACTERIA TISS BX CULT: NO GROWTH

## 2023-03-03 LAB
BACTERIA PLR CULT: ABNORMAL
GRAM STAIN RESULT: ABNORMAL
GRAM STAIN RESULT: ABNORMAL

## 2023-11-28 NOTE — ED TRIAGE NOTES
Patient states she been sick for about a week and now coughing up some blood. Hurts to cough and her lungs hurt.

## 2023-11-28 NOTE — DISCHARGE INSTRUCTIONS
Looks like you have pneumonia on the x-ray today.  Will treat with antibiotics.  This prescription was sent to [facility] here in [place].  Return to the emergency department if worsening symptoms.  Your white blood cell count look normal.  Take Tylenol and ibuprofen.

## 2023-11-28 NOTE — ED PROVIDER NOTES
"  History     Chief Complaint   Patient presents with    Cough     HPI  Kamilah Goldberg is a 43 year old female with a history of pneumonia, empyema and thoracotomy last year who presents emergency department secondary to cough, hemoptysis, mild shortness of breath, generalized bodyaches, headache, exposure to COVID-19.  Symptoms started couple of days ago and seems to be worsening.  No vomiting.  No abdominal pain.  She has felt febrile.    Allergies:  Allergies   Allergen Reactions    Blood Transfusion Related (Informational Only) Other (See Comments)     Patient has a history of a clinically significant antibody against RBC antigens.  A delay in compatible RBCs may occur.     Naproxen Hives    Codeine Sulfate GI Disturbance    Hydrocodone Nausea and Vomiting       Problem List:    Patient Active Problem List    Diagnosis Date Noted    Low back pain 12/15/2009     Priority: High     Acute onset of LBP.  Rx'd Vicodin #30 12/15/09.  Will refer to [facility] if persists.  Also encouraged massage/rest/heat.    Called on 1/1/10 reporting continued pain and ran out of Vicodin two days ago--tried to come in for an appt yesterday but could not get a ride.  Massage, heat, ice, warm baths all help some but having trouble sleeping.  Suggestions for the rest of the weekend given VS ED visit and urged to come in next week for evaluation and [facility] referral and possible chiropractic adjustment.    Went into the ED 1/1/10 and was given similar instructions and also a small number of Vicodin.  (#12)    1/5/10:  Clinic visit:  Referred to [facility] and #30 Vicodin given to hold her over until appt.    1/20/10 requesting more Vicodin (\"forgot to ask yesterday at appt\").  [facility] appt T, 1/26.  Given #15 more Vicodin 1/20/10 and told that she will not be able to get any more Vicodin from us.  Referral made to the pain clinic.     Suspicious of drug seeking behavior.    Referred to Pain Clinic and 3/8/10 Pain Clinic wrote us a letter saying that the pt " had not responded to the confirmation letter and so could not treat her until she did so.      Empyema (H) 01/17/2023     Priority: Medium    Community acquired pneumonia of right lower lobe of lung 01/17/2023     Priority: Medium    Iron deficiency anemia secondary to inadequate dietary iron intake 08/10/2021     Priority: Medium    Lower urinary tract infectious disease 10/12/2012     Priority: Medium     Diagnosis updated by automated process. Provider to review and confirm.      Achlorhydria 10/04/2012     Priority: Medium    S/P gastric bypass 06/26/2012     Priority: Medium    Morbid obesity (H) 01/23/2012     Priority: Medium    CARDIOVASCULAR SCREENING; LDL GOAL LESS THAN 160 10/31/2010     Priority: Medium    Abnormal liver function test 07/21/2010     Priority: Medium    Anemia, unspecified 07/15/2010     Priority: Medium    Major depression, recurrent (H24) 07/15/2010     Priority: Medium     Formatting of this note might be different from the original.  See a therapist -  [facility]      Depression      Priority: Medium     On Wellbutrin 100 mg BID--started taking meds at age 16, was on Paxil for several years and then switched to Wellbutrin in 2000 -- Stable but looking for another therapist through [place]          Past Medical History:    Past Medical History:   Diagnosis Date    Back pain     Depression since age 16    UTI (lower urinary tract infection)        Past Surgical History:    Past Surgical History:   Procedure Laterality Date    BYPASS GASTRIC CARA-EN-Y, LIVER BIOPSY, COMBINED      CHOLECYSTECTOMY      CRYOCAUTERY OF CERVIX  2001 2001    DISCECTOMY LUMBAR MINIMALLY INVASIVE ONE LEVEL  5/24/2013    Procedure: DISCECTOMY LUMBAR MINIMALLY INVASIVE ONE LEVEL;  MIS Right side L5-S1 Gonzalo Lami Microdiscectomy, ;  Surgeon: Alba Anderson MD;  Location: [facility] OR    THORACOTOMY Right 1/20/2023    Procedure: RIGHT THORACOTOMY,  TOTAL DECORTICATION OF THE RIGHT LUNG AND PARIETAL  PLEURECTOMY;  Surgeon: Tani Murcia MD;  Location: [facility] OR    TUBAL LIGATION      2012       Family History:    Family History   Problem Relation Age of Onset    Neurologic Disorder Mother         migraines    C.A.D. Mother         MI age 49    Neurologic Disorder Maternal Grandmother         migraines    Cancer Maternal Grandfather         ? type    Diabetes Maternal Aunt     Cerebrovascular Disease Other         Maternal Great Grandmother    C.A.D. Maternal Uncle         MI    Cancer Maternal Uncle         bladder       Social History:  Marital Status:  Single [1]  Social History     Tobacco Use    Smoking status: Never    Smokeless tobacco: Never   Substance Use Topics    Alcohol use: Yes     Comment: occasional    Drug use: No        Medications:    acetaminophen (TYLENOL) 325 MG tablet  HYDROmorphone (DILAUDID) 2 MG tablet  senna-docusate (SENOKOT-S/PERICOLACE) 8.6-50 MG tablet          Review of Systems   All other systems reviewed and are negative.      Physical Exam   BP: 138/78  Pulse: 97  Temp: 99.8  F (37.7  C)  Resp: 18  SpO2: 99 %      Physical Exam  Vitals and nursing note reviewed.   Constitutional:       General: She is not in acute distress.     Appearance: Normal appearance. She is well-developed.      Comments: She appears tired   HENT:      Head: Normocephalic and atraumatic.      Nose: Nose normal.      Mouth/Throat:      Mouth: Mucous membranes are moist.   Eyes:      General: No scleral icterus.     Extraocular Movements: Extraocular movements intact.      Conjunctiva/sclera: Conjunctivae normal.   Cardiovascular:      Rate and Rhythm: Normal rate.   Pulmonary:      Effort: Pulmonary effort is normal. No respiratory distress.   Abdominal:      General: Abdomen is flat.   Musculoskeletal:         General: No swelling.      Cervical back: Normal range of motion and neck supple.      Right lower leg: No edema.      Left lower leg: No edema.   Skin:     General: Skin is warm and dry.       Findings: No rash.   Neurological:      General: No focal deficit present.      Mental Status: She is alert and oriented to person, place, and time.   Psychiatric:         Mood and Affect: Mood normal.         ED Course                 Procedures                  No results found for this or any previous visit (from the past 24 hour(s)).    Medications - No data to display    Assessments & Plan (with Medical Decision Making)  43-year-old female with a history of community-acquired pneumonia, empyema, thoracotomy who presents emergency department secondary to 2 days of a coughing illness with subjective fevers, generalized malaise, body aches, headache.  She also exposure to COVID.  Vital signs are reviewed.  Blood pressure is 138/78 temperature 99.8 pulse 97, oxygen saturation 98%.  Overall she appears fatigued but nontoxic.  Chest x-ray blood work and COVID/influenza swab ordered.  No wheezing on exam.  X-ray shows pneumonia on the right side.  White blood cell count is normal.  Blood pressure is 138/78 temp is 99.8 pulse of 97.  Oxygen saturation are 99%.  Viral swabs are negative.  Patient be discharged home in stable condition with antibiotics Omnicef and Z-Aime.  Return to ER precautions and fall precautions discussed.  All questions answered prior to discharge.     I have reviewed the nursing notes.    I have reviewed the findings, diagnosis, plan and need for follow up with the patient.          New Prescriptions    No medications on file       Final diagnoses:   None       11/28/2023   [facility] EMERGENCY DEPT       Atul Chong MD  11/28/23 [id number]

## 2024-01-01 ENCOUNTER — APPOINTMENT (OUTPATIENT)
Dept: ULTRASOUND IMAGING | Facility: CLINIC | Age: 44
End: 2024-01-01
Attending: PHYSICIAN ASSISTANT
Payer: MEDICAID

## 2024-01-01 ENCOUNTER — APPOINTMENT (OUTPATIENT)
Dept: GENERAL RADIOLOGY | Facility: CLINIC | Age: 44
End: 2024-01-01
Attending: STUDENT IN AN ORGANIZED HEALTH CARE EDUCATION/TRAINING PROGRAM
Payer: MEDICAID

## 2024-01-01 ENCOUNTER — APPOINTMENT (OUTPATIENT)
Dept: CT IMAGING | Facility: CLINIC | Age: 44
End: 2024-01-01
Attending: INTERNAL MEDICINE
Payer: MEDICAID

## 2024-01-01 ENCOUNTER — APPOINTMENT (OUTPATIENT)
Dept: MRI IMAGING | Facility: CLINIC | Age: 44
End: 2024-01-01
Attending: PHYSICIAN ASSISTANT
Payer: MEDICAID

## 2024-01-01 ENCOUNTER — APPOINTMENT (OUTPATIENT)
Dept: CT IMAGING | Facility: CLINIC | Age: 44
End: 2024-01-01
Payer: MEDICAID

## 2024-01-01 ENCOUNTER — APPOINTMENT (OUTPATIENT)
Dept: CARDIOLOGY | Facility: CLINIC | Age: 44
End: 2024-01-01
Attending: STUDENT IN AN ORGANIZED HEALTH CARE EDUCATION/TRAINING PROGRAM
Payer: MEDICAID

## 2024-01-01 ENCOUNTER — DOCUMENTATION ONLY (OUTPATIENT)
Dept: CARE COORDINATION | Facility: CLINIC | Age: 44
End: 2024-01-01
Payer: MEDICAID

## 2024-01-01 ENCOUNTER — APPOINTMENT (OUTPATIENT)
Dept: GENERAL RADIOLOGY | Facility: CLINIC | Age: 44
End: 2024-01-01
Attending: PHYSICIAN ASSISTANT
Payer: MEDICAID

## 2024-01-01 ENCOUNTER — HOSPITAL ENCOUNTER (INPATIENT)
Facility: CLINIC | Age: 44
LOS: 23 days | Discharge: HOSPICE | End: 2024-05-17
Attending: NEUROLOGICAL SURGERY | Admitting: STUDENT IN AN ORGANIZED HEALTH CARE EDUCATION/TRAINING PROGRAM
Payer: MEDICAID

## 2024-01-01 ENCOUNTER — APPOINTMENT (OUTPATIENT)
Dept: OCCUPATIONAL THERAPY | Facility: CLINIC | Age: 44
End: 2024-01-01
Attending: NEUROLOGICAL SURGERY
Payer: MEDICAID

## 2024-01-01 ENCOUNTER — APPOINTMENT (OUTPATIENT)
Dept: GENERAL RADIOLOGY | Facility: CLINIC | Age: 44
End: 2024-01-01
Payer: MEDICAID

## 2024-01-01 ENCOUNTER — APPOINTMENT (OUTPATIENT)
Dept: SPEECH THERAPY | Facility: CLINIC | Age: 44
End: 2024-01-01
Payer: MEDICAID

## 2024-01-01 ENCOUNTER — APPOINTMENT (OUTPATIENT)
Dept: MRI IMAGING | Facility: CLINIC | Age: 44
End: 2024-01-01
Payer: MEDICAID

## 2024-01-01 ENCOUNTER — APPOINTMENT (OUTPATIENT)
Dept: INTERVENTIONAL RADIOLOGY/VASCULAR | Facility: CLINIC | Age: 44
End: 2024-01-01
Attending: STUDENT IN AN ORGANIZED HEALTH CARE EDUCATION/TRAINING PROGRAM
Payer: MEDICAID

## 2024-01-01 ENCOUNTER — APPOINTMENT (OUTPATIENT)
Dept: NEUROLOGY | Facility: CLINIC | Age: 44
End: 2024-01-01
Attending: PHYSICIAN ASSISTANT
Payer: MEDICAID

## 2024-01-01 ENCOUNTER — APPOINTMENT (OUTPATIENT)
Dept: CT IMAGING | Facility: CLINIC | Age: 44
End: 2024-01-01
Attending: NEUROLOGICAL SURGERY
Payer: MEDICAID

## 2024-01-01 ENCOUNTER — APPOINTMENT (OUTPATIENT)
Dept: OCCUPATIONAL THERAPY | Facility: CLINIC | Age: 44
End: 2024-01-01
Payer: MEDICAID

## 2024-01-01 ENCOUNTER — HOSPITAL ENCOUNTER (INPATIENT)
Facility: CLINIC | Age: 44
LOS: 7 days | End: 2024-05-24
Attending: NEUROLOGICAL SURGERY | Admitting: INTERNAL MEDICINE
Payer: MEDICAID

## 2024-01-01 VITALS
BODY MASS INDEX: 24.95 KG/M2 | HEART RATE: 93 BPM | OXYGEN SATURATION: 97 % | WEIGHT: 146.16 LBS | TEMPERATURE: 98.6 F | HEIGHT: 64 IN | SYSTOLIC BLOOD PRESSURE: 104 MMHG | DIASTOLIC BLOOD PRESSURE: 77 MMHG | RESPIRATION RATE: 18 BRPM

## 2024-01-01 DIAGNOSIS — G93.89 BRAIN MASS: ICD-10-CM

## 2024-01-01 DIAGNOSIS — G93.89 BRAIN MASS: Primary | ICD-10-CM

## 2024-01-01 LAB
ABO/RH(D): NORMAL
ALBUMIN SERPL BCG-MCNC: 3 G/DL (ref 3.5–5.2)
ALBUMIN SERPL BCG-MCNC: 3.1 G/DL (ref 3.5–5.2)
ALBUMIN SERPL BCG-MCNC: 3.2 G/DL (ref 3.5–5.2)
ALBUMIN SERPL BCG-MCNC: 3.3 G/DL (ref 3.5–5.2)
ALBUMIN SERPL BCG-MCNC: 3.4 G/DL (ref 3.5–5.2)
ALBUMIN SERPL BCG-MCNC: 3.5 G/DL (ref 3.5–5.2)
ALBUMIN SERPL BCG-MCNC: 3.5 G/DL (ref 3.5–5.2)
ALBUMIN SERPL BCG-MCNC: 3.7 G/DL (ref 3.5–5.2)
ALBUMIN UR-MCNC: 20 MG/DL
ALP SERPL-CCNC: 60 U/L (ref 40–150)
ALP SERPL-CCNC: 60 U/L (ref 40–150)
ALP SERPL-CCNC: 63 U/L (ref 40–150)
ALP SERPL-CCNC: 64 U/L (ref 40–150)
ALP SERPL-CCNC: 64 U/L (ref 40–150)
ALP SERPL-CCNC: 66 U/L (ref 40–150)
ALP SERPL-CCNC: 66 U/L (ref 40–150)
ALP SERPL-CCNC: 67 U/L (ref 40–150)
ALP SERPL-CCNC: 68 U/L (ref 40–150)
ALP SERPL-CCNC: 68 U/L (ref 40–150)
ALP SERPL-CCNC: 69 U/L (ref 40–150)
ALP SERPL-CCNC: 69 U/L (ref 40–150)
ALP SERPL-CCNC: 70 U/L (ref 40–150)
ALP SERPL-CCNC: 70 U/L (ref 40–150)
ALP SERPL-CCNC: 71 U/L (ref 40–150)
ALP SERPL-CCNC: 72 U/L (ref 40–150)
ALP SERPL-CCNC: 74 U/L (ref 40–150)
ALP SERPL-CCNC: 98 U/L (ref 40–150)
ALT SERPL W P-5'-P-CCNC: 17 U/L (ref 0–50)
ALT SERPL W P-5'-P-CCNC: 18 U/L (ref 0–50)
ALT SERPL W P-5'-P-CCNC: 21 U/L (ref 0–50)
ALT SERPL W P-5'-P-CCNC: 23 U/L (ref 0–50)
ALT SERPL W P-5'-P-CCNC: 25 U/L (ref 0–50)
ALT SERPL W P-5'-P-CCNC: 32 U/L (ref 0–50)
ALT SERPL W P-5'-P-CCNC: 35 U/L (ref 0–50)
ALT SERPL W P-5'-P-CCNC: 38 U/L (ref 0–50)
ALT SERPL W P-5'-P-CCNC: 41 U/L (ref 0–50)
ALT SERPL W P-5'-P-CCNC: 43 U/L (ref 0–50)
ALT SERPL W P-5'-P-CCNC: 44 U/L (ref 0–50)
ALT SERPL W P-5'-P-CCNC: 45 U/L (ref 0–50)
ALT SERPL W P-5'-P-CCNC: 45 U/L (ref 0–50)
ALT SERPL W P-5'-P-CCNC: 49 U/L (ref 0–50)
ALT SERPL W P-5'-P-CCNC: 58 U/L (ref 0–50)
ALT SERPL W P-5'-P-CCNC: 62 U/L (ref 0–50)
ANION GAP SERPL CALCULATED.3IONS-SCNC: 10 MMOL/L (ref 7–15)
ANION GAP SERPL CALCULATED.3IONS-SCNC: 10 MMOL/L (ref 7–15)
ANION GAP SERPL CALCULATED.3IONS-SCNC: 11 MMOL/L (ref 7–15)
ANION GAP SERPL CALCULATED.3IONS-SCNC: 12 MMOL/L (ref 7–15)
ANION GAP SERPL CALCULATED.3IONS-SCNC: 7 MMOL/L (ref 7–15)
ANION GAP SERPL CALCULATED.3IONS-SCNC: 7 MMOL/L (ref 7–15)
ANION GAP SERPL CALCULATED.3IONS-SCNC: 8 MMOL/L (ref 7–15)
ANION GAP SERPL CALCULATED.3IONS-SCNC: 9 MMOL/L (ref 7–15)
ANTIBODY SCREEN: NEGATIVE
APPEARANCE CSF: CLEAR
APPEARANCE UR: ABNORMAL
APTT PPP: 23 SECONDS (ref 22–38)
APTT PPP: 24 SECONDS (ref 22–38)
APTT PPP: 25 SECONDS (ref 22–38)
APTT PPP: 26 SECONDS (ref 22–38)
APTT PPP: 27 SECONDS (ref 22–38)
APTT PPP: 28 SECONDS (ref 22–38)
APTT PPP: 29 SECONDS (ref 22–38)
AST SERPL W P-5'-P-CCNC: 20 U/L (ref 0–45)
AST SERPL W P-5'-P-CCNC: 21 U/L (ref 0–45)
AST SERPL W P-5'-P-CCNC: 22 U/L (ref 0–45)
AST SERPL W P-5'-P-CCNC: 22 U/L (ref 0–45)
AST SERPL W P-5'-P-CCNC: 23 U/L (ref 0–45)
AST SERPL W P-5'-P-CCNC: 23 U/L (ref 0–45)
AST SERPL W P-5'-P-CCNC: 24 U/L (ref 0–45)
AST SERPL W P-5'-P-CCNC: 26 U/L (ref 0–45)
AST SERPL W P-5'-P-CCNC: 27 U/L (ref 0–45)
AST SERPL W P-5'-P-CCNC: 28 U/L (ref 0–45)
AST SERPL W P-5'-P-CCNC: 28 U/L (ref 0–45)
AST SERPL W P-5'-P-CCNC: 30 U/L (ref 0–45)
AST SERPL W P-5'-P-CCNC: 37 U/L (ref 0–45)
AST SERPL W P-5'-P-CCNC: 40 U/L (ref 0–45)
ATRIAL RATE - MUSE: 54 BPM
ATRIAL RATE - MUSE: 62 BPM
B HENSELAE IGG TITR SER IF: NORMAL {TITER}
B HENSELAE IGM TITR SER IF: NORMAL {TITER}
BACTERIA #/AREA URNS HPF: ABNORMAL /HPF
BACTERIA BLD CULT: NO GROWTH
BACTERIA BLD CULT: NO GROWTH
BACTERIA CSF CULT: NO GROWTH
BACTERIA CSF CULT: NO GROWTH
BACTERIA CSF CULT: NORMAL
BACTERIA UR CULT: ABNORMAL
BACTERIA UR CULT: ABNORMAL
BASE EXCESS BLDV CALC-SCNC: 3.3 MMOL/L (ref -3–3)
BASE EXCESS BLDV CALC-SCNC: 7.1 MMOL/L (ref -3–3)
BASOPHILS # BLD AUTO: 0 10E3/UL (ref 0–0.2)
BASOPHILS # BLD AUTO: ABNORMAL 10*3/UL
BASOPHILS # BLD MANUAL: 0.1 10E3/UL (ref 0–0.2)
BASOPHILS NFR BLD AUTO: 0 %
BASOPHILS NFR BLD AUTO: 1 %
BASOPHILS NFR BLD AUTO: ABNORMAL %
BASOPHILS NFR BLD MANUAL: 1 %
BILIRUB DIRECT SERPL-MCNC: <0.2 MG/DL (ref 0–0.3)
BILIRUB DIRECT SERPL-MCNC: <0.2 MG/DL (ref 0–0.3)
BILIRUB SERPL-MCNC: 0.3 MG/DL
BILIRUB SERPL-MCNC: 0.4 MG/DL
BILIRUB UR QL STRIP: NEGATIVE
BUN SERPL-MCNC: 10.7 MG/DL (ref 6–20)
BUN SERPL-MCNC: 12.7 MG/DL (ref 6–20)
BUN SERPL-MCNC: 12.9 MG/DL (ref 6–20)
BUN SERPL-MCNC: 15.1 MG/DL (ref 6–20)
BUN SERPL-MCNC: 15.1 MG/DL (ref 6–20)
BUN SERPL-MCNC: 15.4 MG/DL (ref 6–20)
BUN SERPL-MCNC: 15.6 MG/DL (ref 6–20)
BUN SERPL-MCNC: 15.7 MG/DL (ref 6–20)
BUN SERPL-MCNC: 15.8 MG/DL (ref 6–20)
BUN SERPL-MCNC: 16.1 MG/DL (ref 6–20)
BUN SERPL-MCNC: 16.1 MG/DL (ref 6–20)
BUN SERPL-MCNC: 16.3 MG/DL (ref 6–20)
BUN SERPL-MCNC: 17.1 MG/DL (ref 6–20)
BUN SERPL-MCNC: 17.2 MG/DL (ref 6–20)
BUN SERPL-MCNC: 17.3 MG/DL (ref 6–20)
BUN SERPL-MCNC: 17.4 MG/DL (ref 6–20)
BUN SERPL-MCNC: 18.3 MG/DL (ref 6–20)
BUN SERPL-MCNC: 19.3 MG/DL (ref 6–20)
BUN SERPL-MCNC: 7.4 MG/DL (ref 6–20)
BUN SERPL-MCNC: 9.4 MG/DL (ref 6–20)
C GATTII+NEOFOR DNA CSF QL NAA+NON-PROBE: NEGATIVE
CALCIUM SERPL-MCNC: 8 MG/DL (ref 8.6–10)
CALCIUM SERPL-MCNC: 8.2 MG/DL (ref 8.6–10)
CALCIUM SERPL-MCNC: 8.3 MG/DL (ref 8.6–10)
CALCIUM SERPL-MCNC: 8.4 MG/DL (ref 8.6–10)
CALCIUM SERPL-MCNC: 8.4 MG/DL (ref 8.6–10)
CALCIUM SERPL-MCNC: 8.5 MG/DL (ref 8.6–10)
CALCIUM SERPL-MCNC: 8.5 MG/DL (ref 8.6–10)
CALCIUM SERPL-MCNC: 8.6 MG/DL (ref 8.6–10)
CALCIUM SERPL-MCNC: 8.7 MG/DL (ref 8.6–10)
CALCIUM SERPL-MCNC: 8.8 MG/DL (ref 8.6–10)
CALCIUM SERPL-MCNC: 8.9 MG/DL (ref 8.6–10)
CALCIUM SERPL-MCNC: 9.1 MG/DL (ref 8.6–10)
CALCIUM SERPL-MCNC: 9.1 MG/DL (ref 8.6–10)
CALCIUM SERPL-MCNC: 9.2 MG/DL (ref 8.6–10)
CALCIUM SERPL-MCNC: 9.2 MG/DL (ref 8.6–10)
CALCIUM SERPL-MCNC: 9.3 MG/DL (ref 8.6–10)
CALCIUM SERPL-MCNC: 9.3 MG/DL (ref 8.6–10)
CAOX CRY #/AREA URNS HPF: ABNORMAL /HPF
CD3 CELLS # BLD: 140 CELLS/UL (ref 603–2990)
CD3 CELLS NFR BLD: 62 % (ref 49–84)
CD3+CD4+ CELLS # BLD: 15 CELLS/UL (ref 441–2156)
CD3+CD4+ CELLS NFR BLD: 6 % (ref 28–63)
CD3+CD4+ CELLS/CD3+CD8+ CLL BLD: 0.12 % (ref 1.4–2.6)
CD3+CD8+ CELLS # BLD: 121 CELLS/UL (ref 125–1312)
CD3+CD8+ CELLS NFR BLD: 54 % (ref 10–40)
CHLORIDE SERPL-SCNC: 100 MMOL/L (ref 98–107)
CHLORIDE SERPL-SCNC: 101 MMOL/L (ref 98–107)
CHLORIDE SERPL-SCNC: 102 MMOL/L (ref 98–107)
CHLORIDE SERPL-SCNC: 103 MMOL/L (ref 98–107)
CHLORIDE SERPL-SCNC: 104 MMOL/L (ref 98–107)
CHLORIDE SERPL-SCNC: 104 MMOL/L (ref 98–107)
CHLORIDE SERPL-SCNC: 105 MMOL/L (ref 98–107)
CHLORIDE SERPL-SCNC: 105 MMOL/L (ref 98–107)
CHLORIDE SERPL-SCNC: 106 MMOL/L (ref 98–107)
CHLORIDE SERPL-SCNC: 108 MMOL/L (ref 98–107)
CHLORIDE SERPL-SCNC: 109 MMOL/L (ref 98–107)
CHLORIDE SERPL-SCNC: 109 MMOL/L (ref 98–107)
CHLORIDE SERPL-SCNC: 110 MMOL/L (ref 98–107)
CHLORIDE SERPL-SCNC: 99 MMOL/L (ref 98–107)
CMV DNA CSF QL NAA+NON-PROBE: NEGATIVE
CMV DNA SPEC NAA+PROBE-ACNC: 1010 IU/ML
CMV DNA SPEC NAA+PROBE-ACNC: ABNORMAL IU/ML
CMV DNA SPEC NAA+PROBE-ACNC: ABNORMAL IU/ML
CMV DNA SPEC NAA+PROBE-LOG#: 3 {LOG_COPIES}/ML
CMV DNA SPEC NAA+PROBE-LOG#: 4.8 {LOG_COPIES}/ML
CMV DNA SPEC NAA+PROBE-LOG#: 5 {LOG_COPIES}/ML
COLOR CSF: COLORLESS
COLOR UR AUTO: YELLOW
CREAT SERPL-MCNC: 0.34 MG/DL (ref 0.51–0.95)
CREAT SERPL-MCNC: 0.36 MG/DL (ref 0.51–0.95)
CREAT SERPL-MCNC: 0.38 MG/DL (ref 0.51–0.95)
CREAT SERPL-MCNC: 0.39 MG/DL (ref 0.51–0.95)
CREAT SERPL-MCNC: 0.39 MG/DL (ref 0.51–0.95)
CREAT SERPL-MCNC: 0.41 MG/DL (ref 0.51–0.95)
CREAT SERPL-MCNC: 0.42 MG/DL (ref 0.51–0.95)
CREAT SERPL-MCNC: 0.45 MG/DL (ref 0.51–0.95)
CREAT SERPL-MCNC: 0.46 MG/DL (ref 0.51–0.95)
CREAT SERPL-MCNC: 0.47 MG/DL (ref 0.51–0.95)
CREAT SERPL-MCNC: 0.48 MG/DL (ref 0.51–0.95)
CREAT SERPL-MCNC: 0.49 MG/DL (ref 0.51–0.95)
CREAT SERPL-MCNC: 0.54 MG/DL (ref 0.51–0.95)
CRYPTOC AG SPEC QL: NEGATIVE
CYCLE THRESHOLD (CT): 26.9
CYCLE THRESHOLD (CT): 32.2
DEPRECATED HCO3 PLAS-SCNC: 20 MMOL/L (ref 22–29)
DEPRECATED HCO3 PLAS-SCNC: 20 MMOL/L (ref 22–29)
DEPRECATED HCO3 PLAS-SCNC: 21 MMOL/L (ref 22–29)
DEPRECATED HCO3 PLAS-SCNC: 21 MMOL/L (ref 22–29)
DEPRECATED HCO3 PLAS-SCNC: 23 MMOL/L (ref 22–29)
DEPRECATED HCO3 PLAS-SCNC: 25 MMOL/L (ref 22–29)
DEPRECATED HCO3 PLAS-SCNC: 26 MMOL/L (ref 22–29)
DEPRECATED HCO3 PLAS-SCNC: 27 MMOL/L (ref 22–29)
DEPRECATED HCO3 PLAS-SCNC: 28 MMOL/L (ref 22–29)
DEPRECATED HCO3 PLAS-SCNC: 29 MMOL/L (ref 22–29)
DEPRECATED HCO3 PLAS-SCNC: 29 MMOL/L (ref 22–29)
DIASTOLIC BLOOD PRESSURE - MUSE: NORMAL MMHG
DIASTOLIC BLOOD PRESSURE - MUSE: NORMAL MMHG
E COLI K1 AG CSF QL: NEGATIVE
EBV DNA SPEC QL NAA+PROBE: DETECTED
EGFRCR SERPLBLD CKD-EPI 2021: >90 ML/MIN/1.73M2
EOSINOPHIL # BLD AUTO: 0 10E3/UL (ref 0–0.7)
EOSINOPHIL # BLD AUTO: 0.1 10E3/UL (ref 0–0.7)
EOSINOPHIL # BLD AUTO: 0.2 10E3/UL (ref 0–0.7)
EOSINOPHIL # BLD AUTO: ABNORMAL 10*3/UL
EOSINOPHIL # BLD MANUAL: 0 10E3/UL (ref 0–0.7)
EOSINOPHIL NFR BLD AUTO: 0 %
EOSINOPHIL NFR BLD AUTO: 1 %
EOSINOPHIL NFR BLD AUTO: 2 %
EOSINOPHIL NFR BLD AUTO: 2 %
EOSINOPHIL NFR BLD AUTO: 5 %
EOSINOPHIL NFR BLD AUTO: ABNORMAL %
EOSINOPHIL NFR BLD MANUAL: 0 %
ERYTHROCYTE [DISTWIDTH] IN BLOOD BY AUTOMATED COUNT: 13.2 % (ref 10–15)
ERYTHROCYTE [DISTWIDTH] IN BLOOD BY AUTOMATED COUNT: 13.3 % (ref 10–15)
ERYTHROCYTE [DISTWIDTH] IN BLOOD BY AUTOMATED COUNT: 13.4 % (ref 10–15)
ERYTHROCYTE [DISTWIDTH] IN BLOOD BY AUTOMATED COUNT: 13.5 % (ref 10–15)
ERYTHROCYTE [DISTWIDTH] IN BLOOD BY AUTOMATED COUNT: 13.7 % (ref 10–15)
ERYTHROCYTE [DISTWIDTH] IN BLOOD BY AUTOMATED COUNT: 13.8 % (ref 10–15)
ERYTHROCYTE [DISTWIDTH] IN BLOOD BY AUTOMATED COUNT: 13.9 % (ref 10–15)
ERYTHROCYTE [DISTWIDTH] IN BLOOD BY AUTOMATED COUNT: 14 % (ref 10–15)
ERYTHROCYTE [DISTWIDTH] IN BLOOD BY AUTOMATED COUNT: 14.2 % (ref 10–15)
ERYTHROCYTE [DISTWIDTH] IN BLOOD BY AUTOMATED COUNT: 14.3 % (ref 10–15)
ERYTHROCYTE [DISTWIDTH] IN BLOOD BY AUTOMATED COUNT: 14.5 % (ref 10–15)
ERYTHROCYTE [DISTWIDTH] IN BLOOD BY AUTOMATED COUNT: 14.6 % (ref 10–15)
ERYTHROCYTE [DISTWIDTH] IN BLOOD BY AUTOMATED COUNT: 14.6 % (ref 10–15)
ERYTHROCYTE [DISTWIDTH] IN BLOOD BY AUTOMATED COUNT: 14.7 % (ref 10–15)
ERYTHROCYTE [DISTWIDTH] IN BLOOD BY AUTOMATED COUNT: 14.8 % (ref 10–15)
ERYTHROCYTE [DISTWIDTH] IN BLOOD BY AUTOMATED COUNT: 15.3 % (ref 10–15)
ERYTHROCYTE [DISTWIDTH] IN BLOOD BY AUTOMATED COUNT: 15.3 % (ref 10–15)
EV RNA SPEC QL NAA+PROBE: NEGATIVE
FERRITIN SERPL-MCNC: 76 NG/ML (ref 6–175)
FIBRINOGEN PPP-MCNC: 257 MG/DL (ref 170–490)
FIBRINOGEN PPP-MCNC: 272 MG/DL (ref 170–490)
FIBRINOGEN PPP-MCNC: 283 MG/DL (ref 170–490)
FIBRINOGEN PPP-MCNC: 291 MG/DL (ref 170–490)
FIBRINOGEN PPP-MCNC: 292 MG/DL (ref 170–490)
FIBRINOGEN PPP-MCNC: 302 MG/DL (ref 170–490)
FIBRINOGEN PPP-MCNC: 302 MG/DL (ref 170–490)
FIBRINOGEN PPP-MCNC: 304 MG/DL (ref 170–490)
FIBRINOGEN PPP-MCNC: 312 MG/DL (ref 170–490)
FIBRINOGEN PPP-MCNC: 312 MG/DL (ref 170–490)
FIBRINOGEN PPP-MCNC: 320 MG/DL (ref 170–490)
FIBRINOGEN PPP-MCNC: 329 MG/DL (ref 170–490)
FIBRINOGEN PPP-MCNC: 355 MG/DL (ref 170–490)
FIBRINOGEN PPP-MCNC: 358 MG/DL (ref 170–490)
FOLATE SERPL-MCNC: 15.7 NG/ML (ref 4.6–34.8)
G6PD RBC-CCNT: 13 U/G HB
GLUCOSE BLDC GLUCOMTR-MCNC: 100 MG/DL (ref 70–99)
GLUCOSE BLDC GLUCOMTR-MCNC: 102 MG/DL (ref 70–99)
GLUCOSE BLDC GLUCOMTR-MCNC: 102 MG/DL (ref 70–99)
GLUCOSE BLDC GLUCOMTR-MCNC: 103 MG/DL (ref 70–99)
GLUCOSE BLDC GLUCOMTR-MCNC: 104 MG/DL (ref 70–99)
GLUCOSE BLDC GLUCOMTR-MCNC: 104 MG/DL (ref 70–99)
GLUCOSE BLDC GLUCOMTR-MCNC: 105 MG/DL (ref 70–99)
GLUCOSE BLDC GLUCOMTR-MCNC: 107 MG/DL (ref 70–99)
GLUCOSE BLDC GLUCOMTR-MCNC: 108 MG/DL (ref 70–99)
GLUCOSE BLDC GLUCOMTR-MCNC: 109 MG/DL (ref 70–99)
GLUCOSE BLDC GLUCOMTR-MCNC: 110 MG/DL (ref 70–99)
GLUCOSE BLDC GLUCOMTR-MCNC: 110 MG/DL (ref 70–99)
GLUCOSE BLDC GLUCOMTR-MCNC: 111 MG/DL (ref 70–99)
GLUCOSE BLDC GLUCOMTR-MCNC: 111 MG/DL (ref 70–99)
GLUCOSE BLDC GLUCOMTR-MCNC: 112 MG/DL (ref 70–99)
GLUCOSE BLDC GLUCOMTR-MCNC: 115 MG/DL (ref 70–99)
GLUCOSE BLDC GLUCOMTR-MCNC: 116 MG/DL (ref 70–99)
GLUCOSE BLDC GLUCOMTR-MCNC: 116 MG/DL (ref 70–99)
GLUCOSE BLDC GLUCOMTR-MCNC: 117 MG/DL (ref 70–99)
GLUCOSE BLDC GLUCOMTR-MCNC: 117 MG/DL (ref 70–99)
GLUCOSE BLDC GLUCOMTR-MCNC: 120 MG/DL (ref 70–99)
GLUCOSE BLDC GLUCOMTR-MCNC: 120 MG/DL (ref 70–99)
GLUCOSE BLDC GLUCOMTR-MCNC: 121 MG/DL (ref 70–99)
GLUCOSE BLDC GLUCOMTR-MCNC: 121 MG/DL (ref 70–99)
GLUCOSE BLDC GLUCOMTR-MCNC: 122 MG/DL (ref 70–99)
GLUCOSE BLDC GLUCOMTR-MCNC: 122 MG/DL (ref 70–99)
GLUCOSE BLDC GLUCOMTR-MCNC: 124 MG/DL (ref 70–99)
GLUCOSE BLDC GLUCOMTR-MCNC: 124 MG/DL (ref 70–99)
GLUCOSE BLDC GLUCOMTR-MCNC: 126 MG/DL (ref 70–99)
GLUCOSE BLDC GLUCOMTR-MCNC: 126 MG/DL (ref 70–99)
GLUCOSE BLDC GLUCOMTR-MCNC: 127 MG/DL (ref 70–99)
GLUCOSE BLDC GLUCOMTR-MCNC: 128 MG/DL (ref 70–99)
GLUCOSE BLDC GLUCOMTR-MCNC: 128 MG/DL (ref 70–99)
GLUCOSE BLDC GLUCOMTR-MCNC: 129 MG/DL (ref 70–99)
GLUCOSE BLDC GLUCOMTR-MCNC: 130 MG/DL (ref 70–99)
GLUCOSE BLDC GLUCOMTR-MCNC: 130 MG/DL (ref 70–99)
GLUCOSE BLDC GLUCOMTR-MCNC: 133 MG/DL (ref 70–99)
GLUCOSE BLDC GLUCOMTR-MCNC: 134 MG/DL (ref 70–99)
GLUCOSE BLDC GLUCOMTR-MCNC: 135 MG/DL (ref 70–99)
GLUCOSE BLDC GLUCOMTR-MCNC: 135 MG/DL (ref 70–99)
GLUCOSE BLDC GLUCOMTR-MCNC: 136 MG/DL (ref 70–99)
GLUCOSE BLDC GLUCOMTR-MCNC: 136 MG/DL (ref 70–99)
GLUCOSE BLDC GLUCOMTR-MCNC: 137 MG/DL (ref 70–99)
GLUCOSE BLDC GLUCOMTR-MCNC: 138 MG/DL (ref 70–99)
GLUCOSE BLDC GLUCOMTR-MCNC: 142 MG/DL (ref 70–99)
GLUCOSE BLDC GLUCOMTR-MCNC: 142 MG/DL (ref 70–99)
GLUCOSE BLDC GLUCOMTR-MCNC: 143 MG/DL (ref 70–99)
GLUCOSE BLDC GLUCOMTR-MCNC: 144 MG/DL (ref 70–99)
GLUCOSE BLDC GLUCOMTR-MCNC: 147 MG/DL (ref 70–99)
GLUCOSE BLDC GLUCOMTR-MCNC: 148 MG/DL (ref 70–99)
GLUCOSE BLDC GLUCOMTR-MCNC: 150 MG/DL (ref 70–99)
GLUCOSE BLDC GLUCOMTR-MCNC: 151 MG/DL (ref 70–99)
GLUCOSE BLDC GLUCOMTR-MCNC: 153 MG/DL (ref 70–99)
GLUCOSE BLDC GLUCOMTR-MCNC: 154 MG/DL (ref 70–99)
GLUCOSE BLDC GLUCOMTR-MCNC: 155 MG/DL (ref 70–99)
GLUCOSE BLDC GLUCOMTR-MCNC: 155 MG/DL (ref 70–99)
GLUCOSE BLDC GLUCOMTR-MCNC: 157 MG/DL (ref 70–99)
GLUCOSE BLDC GLUCOMTR-MCNC: 157 MG/DL (ref 70–99)
GLUCOSE BLDC GLUCOMTR-MCNC: 158 MG/DL (ref 70–99)
GLUCOSE BLDC GLUCOMTR-MCNC: 159 MG/DL (ref 70–99)
GLUCOSE BLDC GLUCOMTR-MCNC: 160 MG/DL (ref 70–99)
GLUCOSE BLDC GLUCOMTR-MCNC: 167 MG/DL (ref 70–99)
GLUCOSE BLDC GLUCOMTR-MCNC: 168 MG/DL (ref 70–99)
GLUCOSE BLDC GLUCOMTR-MCNC: 175 MG/DL (ref 70–99)
GLUCOSE BLDC GLUCOMTR-MCNC: 178 MG/DL (ref 70–99)
GLUCOSE BLDC GLUCOMTR-MCNC: 222 MG/DL (ref 70–99)
GLUCOSE BLDC GLUCOMTR-MCNC: 68 MG/DL (ref 70–99)
GLUCOSE BLDC GLUCOMTR-MCNC: 77 MG/DL (ref 70–99)
GLUCOSE BLDC GLUCOMTR-MCNC: 84 MG/DL (ref 70–99)
GLUCOSE BLDC GLUCOMTR-MCNC: 85 MG/DL (ref 70–99)
GLUCOSE BLDC GLUCOMTR-MCNC: 85 MG/DL (ref 70–99)
GLUCOSE BLDC GLUCOMTR-MCNC: 86 MG/DL (ref 70–99)
GLUCOSE BLDC GLUCOMTR-MCNC: 88 MG/DL (ref 70–99)
GLUCOSE BLDC GLUCOMTR-MCNC: 91 MG/DL (ref 70–99)
GLUCOSE BLDC GLUCOMTR-MCNC: 94 MG/DL (ref 70–99)
GLUCOSE BLDC GLUCOMTR-MCNC: 94 MG/DL (ref 70–99)
GLUCOSE BLDC GLUCOMTR-MCNC: 95 MG/DL (ref 70–99)
GLUCOSE BLDC GLUCOMTR-MCNC: 96 MG/DL (ref 70–99)
GLUCOSE BLDC GLUCOMTR-MCNC: 97 MG/DL (ref 70–99)
GLUCOSE BLDC GLUCOMTR-MCNC: 97 MG/DL (ref 70–99)
GLUCOSE BLDC GLUCOMTR-MCNC: 98 MG/DL (ref 70–99)
GLUCOSE BLDC GLUCOMTR-MCNC: 99 MG/DL (ref 70–99)
GLUCOSE CSF-MCNC: 52 MG/DL (ref 40–70)
GLUCOSE SERPL-MCNC: 104 MG/DL (ref 70–99)
GLUCOSE SERPL-MCNC: 112 MG/DL (ref 70–99)
GLUCOSE SERPL-MCNC: 112 MG/DL (ref 70–99)
GLUCOSE SERPL-MCNC: 114 MG/DL (ref 70–99)
GLUCOSE SERPL-MCNC: 114 MG/DL (ref 70–99)
GLUCOSE SERPL-MCNC: 116 MG/DL (ref 70–99)
GLUCOSE SERPL-MCNC: 117 MG/DL (ref 70–99)
GLUCOSE SERPL-MCNC: 118 MG/DL (ref 70–99)
GLUCOSE SERPL-MCNC: 123 MG/DL (ref 70–99)
GLUCOSE SERPL-MCNC: 125 MG/DL (ref 70–99)
GLUCOSE SERPL-MCNC: 127 MG/DL (ref 70–99)
GLUCOSE SERPL-MCNC: 131 MG/DL (ref 70–99)
GLUCOSE SERPL-MCNC: 133 MG/DL (ref 70–99)
GLUCOSE SERPL-MCNC: 136 MG/DL (ref 70–99)
GLUCOSE SERPL-MCNC: 142 MG/DL (ref 70–99)
GLUCOSE SERPL-MCNC: 142 MG/DL (ref 70–99)
GLUCOSE SERPL-MCNC: 143 MG/DL (ref 70–99)
GLUCOSE SERPL-MCNC: 149 MG/DL (ref 70–99)
GLUCOSE SERPL-MCNC: 149 MG/DL (ref 70–99)
GLUCOSE SERPL-MCNC: 154 MG/DL (ref 70–99)
GLUCOSE SERPL-MCNC: 176 MG/DL (ref 70–99)
GLUCOSE SERPL-MCNC: 178 MG/DL (ref 70–99)
GLUCOSE SERPL-MCNC: 86 MG/DL (ref 70–99)
GLUCOSE SERPL-MCNC: 94 MG/DL (ref 70–99)
GLUCOSE UR STRIP-MCNC: NEGATIVE MG/DL
GP B STREP DNA CSF QL NAA+NON-PROBE: NEGATIVE
GRAM STAIN RESULT: NORMAL
GRAM STAIN RESULT: NORMAL
H CAPSUL AG UR QL IA: NOT DETECTED
H CAPSUL AG UR-MCNC: NOT DETECTED NG/ML
HAEM INFLU DNA CSF QL NAA+NON-PROBE: NEGATIVE
HAV AB SER QL IA: NONREACTIVE
HBA1C MFR BLD: 5.7 %
HBV CORE AB SERPL QL IA: NONREACTIVE
HBV SURFACE AB SERPL IA-ACNC: <3.5 M[IU]/ML
HBV SURFACE AB SERPL IA-ACNC: NONREACTIVE M[IU]/ML
HCG SERPL QL: NEGATIVE
HCO3 BLDV-SCNC: 28 MMOL/L (ref 21–28)
HCO3 BLDV-SCNC: 32 MMOL/L (ref 21–28)
HCT VFR BLD AUTO: 29.4 % (ref 35–47)
HCT VFR BLD AUTO: 29.6 % (ref 35–47)
HCT VFR BLD AUTO: 29.7 % (ref 35–47)
HCT VFR BLD AUTO: 29.8 % (ref 35–47)
HCT VFR BLD AUTO: 29.9 % (ref 35–47)
HCT VFR BLD AUTO: 30.9 % (ref 35–47)
HCT VFR BLD AUTO: 31 % (ref 35–47)
HCT VFR BLD AUTO: 31.2 % (ref 35–47)
HCT VFR BLD AUTO: 31.6 % (ref 35–47)
HCT VFR BLD AUTO: 31.8 % (ref 35–47)
HCT VFR BLD AUTO: 32.1 % (ref 35–47)
HCT VFR BLD AUTO: 32.1 % (ref 35–47)
HCT VFR BLD AUTO: 32.3 % (ref 35–47)
HCT VFR BLD AUTO: 32.6 % (ref 35–47)
HCT VFR BLD AUTO: 32.7 % (ref 35–47)
HCT VFR BLD AUTO: 33.1 % (ref 35–47)
HCT VFR BLD AUTO: 33.6 % (ref 35–47)
HCT VFR BLD AUTO: 34.2 % (ref 35–47)
HCT VFR BLD AUTO: 34.3 % (ref 35–47)
HCT VFR BLD AUTO: 34.3 % (ref 35–47)
HCV AB SERPL QL IA: NONREACTIVE
HGB BLD-MCNC: 10.1 G/DL (ref 11.7–15.7)
HGB BLD-MCNC: 10.1 G/DL (ref 11.7–15.7)
HGB BLD-MCNC: 10.2 G/DL (ref 11.7–15.7)
HGB BLD-MCNC: 10.4 G/DL (ref 11.7–15.7)
HGB BLD-MCNC: 10.5 G/DL (ref 11.7–15.7)
HGB BLD-MCNC: 10.6 G/DL (ref 11.7–15.7)
HGB BLD-MCNC: 10.6 G/DL (ref 11.7–15.7)
HGB BLD-MCNC: 10.9 G/DL (ref 11.7–15.7)
HGB BLD-MCNC: 11 G/DL (ref 11.7–15.7)
HGB BLD-MCNC: 11.1 G/DL (ref 11.7–15.7)
HGB BLD-MCNC: 11.3 G/DL (ref 11.7–15.7)
HGB BLD-MCNC: 11.3 G/DL (ref 11.7–15.7)
HGB BLD-MCNC: 11.5 G/DL (ref 11.7–15.7)
HGB BLD-MCNC: 11.5 G/DL (ref 11.7–15.7)
HGB BLD-MCNC: 9.4 G/DL (ref 11.7–15.7)
HGB BLD-MCNC: 9.4 G/DL (ref 11.7–15.7)
HGB BLD-MCNC: 9.5 G/DL (ref 11.7–15.7)
HGB BLD-MCNC: 9.6 G/DL (ref 11.7–15.7)
HGB BLD-MCNC: 9.8 G/DL (ref 11.7–15.7)
HGB UR QL STRIP: NEGATIVE
HHV6 DNA CSF QL NAA+NON-PROBE: NEGATIVE
HIV1 RNA # PLAS NAA DL=20: 427 COPIES/ML
HIV1 RNA SERPL NAA+PROBE-LOG#: 2.6 {LOG_COPIES}/ML
HSV1 DNA CSF QL NAA+NON-PROBE: NEGATIVE
HSV2 DNA CSF QL NAA+NON-PROBE: NEGATIVE
IMM GRANULOCYTES # BLD: 0 10E3/UL
IMM GRANULOCYTES # BLD: 0.1 10E3/UL
IMM GRANULOCYTES # BLD: ABNORMAL 10*3/UL
IMM GRANULOCYTES NFR BLD: 0 %
IMM GRANULOCYTES NFR BLD: 1 %
IMM GRANULOCYTES NFR BLD: 2 %
IMM GRANULOCYTES NFR BLD: ABNORMAL %
INR PPP: 0.95 (ref 0.85–1.15)
INR PPP: 0.98 (ref 0.85–1.15)
INR PPP: 1 (ref 0.85–1.15)
INR PPP: 1 (ref 0.85–1.15)
INR PPP: 1.01 (ref 0.85–1.15)
INR PPP: 1.02 (ref 0.85–1.15)
INR PPP: 1.04 (ref 0.85–1.15)
INR PPP: 1.04 (ref 0.85–1.15)
INR PPP: 1.06 (ref 0.85–1.15)
INR PPP: 1.07 (ref 0.85–1.15)
INR PPP: 1.12 (ref 0.85–1.15)
INR PPP: 1.13 (ref 0.85–1.15)
INR PPP: 1.18 (ref 0.85–1.15)
INTERPRETATION ECG - MUSE: NORMAL
INTERPRETATION ECG - MUSE: NORMAL
IRON BINDING CAPACITY (ROCHE): 305 UG/DL (ref 240–430)
IRON SATN MFR SERPL: 12 % (ref 15–46)
IRON SERPL-MCNC: 37 UG/DL (ref 37–145)
JCPYV DNA SPEC QL NAA+PROBE: NOT DETECTED
KETONES UR STRIP-MCNC: NEGATIVE MG/DL
L MONOCYTOG DNA CSF QL NAA+NON-PROBE: NEGATIVE
LAB DIRECTOR COMMENTS: NORMAL
LAB DIRECTOR DISCLAIMER: NORMAL
LAB DIRECTOR INTERPRETATION: NORMAL
LAB DIRECTOR METHODOLOGY: NORMAL
LAB DIRECTOR RESULTS: NORMAL
LDH SERPL L TO P-CCNC: 220 U/L (ref 0–250)
LEUKOCYTE ESTERASE UR QL STRIP: ABNORMAL
LVEF ECHO: NORMAL
LYMPHOCYTES # BLD AUTO: 0.1 10E3/UL (ref 0.8–5.3)
LYMPHOCYTES # BLD AUTO: 0.1 10E3/UL (ref 0.8–5.3)
LYMPHOCYTES # BLD AUTO: 0.2 10E3/UL (ref 0.8–5.3)
LYMPHOCYTES # BLD AUTO: 0.3 10E3/UL (ref 0.8–5.3)
LYMPHOCYTES # BLD AUTO: 0.4 10E3/UL (ref 0.8–5.3)
LYMPHOCYTES # BLD AUTO: 0.5 10E3/UL (ref 0.8–5.3)
LYMPHOCYTES # BLD AUTO: ABNORMAL 10*3/UL
LYMPHOCYTES # BLD MANUAL: 0.5 10E3/UL (ref 0.8–5.3)
LYMPHOCYTES NFR BLD AUTO: 1 %
LYMPHOCYTES NFR BLD AUTO: 2 %
LYMPHOCYTES NFR BLD AUTO: 3 %
LYMPHOCYTES NFR BLD AUTO: 4 %
LYMPHOCYTES NFR BLD AUTO: 5 %
LYMPHOCYTES NFR BLD AUTO: 6 %
LYMPHOCYTES NFR BLD AUTO: 8 %
LYMPHOCYTES NFR BLD AUTO: ABNORMAL %
LYMPHOCYTES NFR BLD MANUAL: 8 %
Lab: NORMAL
MAGNESIUM SERPL-MCNC: 1.9 MG/DL (ref 1.7–2.3)
MAGNESIUM SERPL-MCNC: 2 MG/DL (ref 1.7–2.3)
MAGNESIUM SERPL-MCNC: 2.1 MG/DL (ref 1.7–2.3)
MAGNESIUM SERPL-MCNC: 2.2 MG/DL (ref 1.7–2.3)
MAGNESIUM SERPL-MCNC: 2.3 MG/DL (ref 1.7–2.3)
MAYO MISC RESULT: NORMAL
MCH RBC QN AUTO: 27.7 PG (ref 26.5–33)
MCH RBC QN AUTO: 28.1 PG (ref 26.5–33)
MCH RBC QN AUTO: 28.1 PG (ref 26.5–33)
MCH RBC QN AUTO: 28.4 PG (ref 26.5–33)
MCH RBC QN AUTO: 28.5 PG (ref 26.5–33)
MCH RBC QN AUTO: 28.5 PG (ref 26.5–33)
MCH RBC QN AUTO: 28.6 PG (ref 26.5–33)
MCH RBC QN AUTO: 28.7 PG (ref 26.5–33)
MCH RBC QN AUTO: 28.7 PG (ref 26.5–33)
MCH RBC QN AUTO: 28.8 PG (ref 26.5–33)
MCH RBC QN AUTO: 28.8 PG (ref 26.5–33)
MCH RBC QN AUTO: 28.9 PG (ref 26.5–33)
MCH RBC QN AUTO: 29 PG (ref 26.5–33)
MCH RBC QN AUTO: 29 PG (ref 26.5–33)
MCH RBC QN AUTO: 29.1 PG (ref 26.5–33)
MCH RBC QN AUTO: 29.1 PG (ref 26.5–33)
MCH RBC QN AUTO: 29.2 PG (ref 26.5–33)
MCH RBC QN AUTO: 29.3 PG (ref 26.5–33)
MCH RBC QN AUTO: 29.6 PG (ref 26.5–33)
MCHC RBC AUTO-ENTMCNC: 31.5 G/DL (ref 31.5–36.5)
MCHC RBC AUTO-ENTMCNC: 32 G/DL (ref 31.5–36.5)
MCHC RBC AUTO-ENTMCNC: 32 G/DL (ref 31.5–36.5)
MCHC RBC AUTO-ENTMCNC: 32.1 G/DL (ref 31.5–36.5)
MCHC RBC AUTO-ENTMCNC: 32.2 G/DL (ref 31.5–36.5)
MCHC RBC AUTO-ENTMCNC: 32.4 G/DL (ref 31.5–36.5)
MCHC RBC AUTO-ENTMCNC: 32.6 G/DL (ref 31.5–36.5)
MCHC RBC AUTO-ENTMCNC: 32.7 G/DL (ref 31.5–36.5)
MCHC RBC AUTO-ENTMCNC: 32.8 G/DL (ref 31.5–36.5)
MCHC RBC AUTO-ENTMCNC: 32.9 G/DL (ref 31.5–36.5)
MCHC RBC AUTO-ENTMCNC: 33 G/DL (ref 31.5–36.5)
MCHC RBC AUTO-ENTMCNC: 33.4 G/DL (ref 31.5–36.5)
MCHC RBC AUTO-ENTMCNC: 33.4 G/DL (ref 31.5–36.5)
MCHC RBC AUTO-ENTMCNC: 33.5 G/DL (ref 31.5–36.5)
MCHC RBC AUTO-ENTMCNC: 33.6 G/DL (ref 31.5–36.5)
MCHC RBC AUTO-ENTMCNC: 34 G/DL (ref 31.5–36.5)
MCV RBC AUTO: 86 FL (ref 78–100)
MCV RBC AUTO: 87 FL (ref 78–100)
MCV RBC AUTO: 88 FL (ref 78–100)
MCV RBC AUTO: 89 FL (ref 78–100)
MCV RBC AUTO: 91 FL (ref 78–100)
MONOCYTES # BLD AUTO: 0 10E3/UL (ref 0–1.3)
MONOCYTES # BLD AUTO: 0.1 10E3/UL (ref 0–1.3)
MONOCYTES # BLD AUTO: 0.2 10E3/UL (ref 0–1.3)
MONOCYTES # BLD AUTO: 0.3 10E3/UL (ref 0–1.3)
MONOCYTES # BLD AUTO: 0.4 10E3/UL (ref 0–1.3)
MONOCYTES # BLD AUTO: ABNORMAL 10*3/UL
MONOCYTES # BLD MANUAL: 0 10E3/UL (ref 0–1.3)
MONOCYTES NFR BLD AUTO: 0 %
MONOCYTES NFR BLD AUTO: 1 %
MONOCYTES NFR BLD AUTO: 1 %
MONOCYTES NFR BLD AUTO: 2 %
MONOCYTES NFR BLD AUTO: 3 %
MONOCYTES NFR BLD AUTO: 4 %
MONOCYTES NFR BLD AUTO: 5 %
MONOCYTES NFR BLD AUTO: 6 %
MONOCYTES NFR BLD AUTO: ABNORMAL %
MONOCYTES NFR BLD MANUAL: 0 %
MTX SERPL-SCNC: 0.07 UMOL/L
MTX SERPL-SCNC: 0.31 UMOL/L
MTX SERPL-SCNC: <0.04 UMOL/L
MUCOUS THREADS #/AREA URNS LPF: PRESENT /LPF
N MEN DNA CSF QL NAA+NON-PROBE: NEGATIVE
NEUTROPHILS # BLD AUTO: 16.4 10E3/UL (ref 1.6–8.3)
NEUTROPHILS # BLD AUTO: 2.9 10E3/UL (ref 1.6–8.3)
NEUTROPHILS # BLD AUTO: 4.5 10E3/UL (ref 1.6–8.3)
NEUTROPHILS # BLD AUTO: 4.6 10E3/UL (ref 1.6–8.3)
NEUTROPHILS # BLD AUTO: 4.6 10E3/UL (ref 1.6–8.3)
NEUTROPHILS # BLD AUTO: 4.7 10E3/UL (ref 1.6–8.3)
NEUTROPHILS # BLD AUTO: 5.5 10E3/UL (ref 1.6–8.3)
NEUTROPHILS # BLD AUTO: 6.3 10E3/UL (ref 1.6–8.3)
NEUTROPHILS # BLD AUTO: 7 10E3/UL (ref 1.6–8.3)
NEUTROPHILS # BLD AUTO: 7.2 10E3/UL (ref 1.6–8.3)
NEUTROPHILS # BLD AUTO: 7.4 10E3/UL (ref 1.6–8.3)
NEUTROPHILS # BLD AUTO: 7.6 10E3/UL (ref 1.6–8.3)
NEUTROPHILS # BLD AUTO: 8.1 10E3/UL (ref 1.6–8.3)
NEUTROPHILS # BLD AUTO: 8.4 10E3/UL (ref 1.6–8.3)
NEUTROPHILS # BLD AUTO: 8.4 10E3/UL (ref 1.6–8.3)
NEUTROPHILS # BLD AUTO: 8.8 10E3/UL (ref 1.6–8.3)
NEUTROPHILS # BLD AUTO: ABNORMAL 10*3/UL
NEUTROPHILS # BLD MANUAL: 6.1 10E3/UL (ref 1.6–8.3)
NEUTROPHILS NFR BLD AUTO: 85 %
NEUTROPHILS NFR BLD AUTO: 85 %
NEUTROPHILS NFR BLD AUTO: 87 %
NEUTROPHILS NFR BLD AUTO: 88 %
NEUTROPHILS NFR BLD AUTO: 89 %
NEUTROPHILS NFR BLD AUTO: 90 %
NEUTROPHILS NFR BLD AUTO: 91 %
NEUTROPHILS NFR BLD AUTO: 92 %
NEUTROPHILS NFR BLD AUTO: 93 %
NEUTROPHILS NFR BLD AUTO: 93 %
NEUTROPHILS NFR BLD AUTO: 94 %
NEUTROPHILS NFR BLD AUTO: 95 %
NEUTROPHILS NFR BLD AUTO: 96 %
NEUTROPHILS NFR BLD AUTO: 96 %
NEUTROPHILS NFR BLD AUTO: ABNORMAL %
NEUTROPHILS NFR BLD MANUAL: 91 %
NITRATE UR QL: POSITIVE
NRBC # BLD AUTO: 0 10E3/UL
NRBC BLD AUTO-RTO: 0 /100
O2/TOTAL GAS SETTING VFR VENT: 2 %
O2/TOTAL GAS SETTING VFR VENT: 21 %
OXYHGB MFR BLDV: 63 % (ref 70–75)
OXYHGB MFR BLDV: 72 % (ref 70–75)
P AXIS - MUSE: 12 DEGREES
P AXIS - MUSE: 37 DEGREES
PARECHOVIRUS A RNA CSF QL NAA+NON-PROBE: NEGATIVE
PATH INTERP SPEC-IMP: NORMAL
PATH REPORT.COMMENTS IMP SPEC: ABNORMAL
PATH REPORT.COMMENTS IMP SPEC: ABNORMAL
PATH REPORT.COMMENTS IMP SPEC: NORMAL
PATH REPORT.COMMENTS IMP SPEC: YES
PATH REPORT.FINAL DX SPEC: ABNORMAL
PATH REPORT.FINAL DX SPEC: NORMAL
PATH REPORT.GROSS SPEC: ABNORMAL
PATH REPORT.MICROSCOPIC SPEC OTHER STN: NORMAL
PATH REPORT.RELEVANT HX SPEC: ABNORMAL
PATH REPORT.RELEVANT HX SPEC: NORMAL
PCO2 BLDV: 40 MM HG (ref 40–50)
PCO2 BLDV: 45 MM HG (ref 40–50)
PERFORMING LABORATORY: NORMAL
PH BLDV: 7.45 [PH] (ref 7.32–7.43)
PH BLDV: 7.46 [PH] (ref 7.32–7.43)
PH UR STRIP: 5.5 [PH] (ref 5–7)
PH UR STRIP: 7 PH (ref 5–7)
PH UR STRIP: 7.5 PH (ref 5–7)
PH UR STRIP: 8 PH (ref 5–7)
PHOSPHATE SERPL-MCNC: 2.6 MG/DL (ref 2.5–4.5)
PHOSPHATE SERPL-MCNC: 2.9 MG/DL (ref 2.5–4.5)
PHOSPHATE SERPL-MCNC: 3 MG/DL (ref 2.5–4.5)
PHOSPHATE SERPL-MCNC: 3.2 MG/DL (ref 2.5–4.5)
PHOSPHATE SERPL-MCNC: 3.3 MG/DL (ref 2.5–4.5)
PHOSPHATE SERPL-MCNC: 3.3 MG/DL (ref 2.5–4.5)
PHOSPHATE SERPL-MCNC: 3.4 MG/DL (ref 2.5–4.5)
PHOSPHATE SERPL-MCNC: 3.5 MG/DL (ref 2.5–4.5)
PHOSPHATE SERPL-MCNC: 3.6 MG/DL (ref 2.5–4.5)
PHOSPHATE SERPL-MCNC: 3.7 MG/DL (ref 2.5–4.5)
PHOSPHATE SERPL-MCNC: 3.8 MG/DL (ref 2.5–4.5)
PHOSPHATE SERPL-MCNC: 4 MG/DL (ref 2.5–4.5)
PHOSPHATE SERPL-MCNC: 4.1 MG/DL (ref 2.5–4.5)
PHOSPHATE SERPL-MCNC: 4.1 MG/DL (ref 2.5–4.5)
PHOSPHATE SERPL-MCNC: 4.2 MG/DL (ref 2.5–4.5)
PHOSPHATE SERPL-MCNC: 4.6 MG/DL (ref 2.5–4.5)
PHOSPHATE SERPL-MCNC: 5 MG/DL (ref 2.5–4.5)
PLAT MORPH BLD: ABNORMAL
PLATELET # BLD AUTO: 177 10E3/UL (ref 150–450)
PLATELET # BLD AUTO: 184 10E3/UL (ref 150–450)
PLATELET # BLD AUTO: 185 10E3/UL (ref 150–450)
PLATELET # BLD AUTO: 185 10E3/UL (ref 150–450)
PLATELET # BLD AUTO: 191 10E3/UL (ref 150–450)
PLATELET # BLD AUTO: 198 10E3/UL (ref 150–450)
PLATELET # BLD AUTO: 202 10E3/UL (ref 150–450)
PLATELET # BLD AUTO: 203 10E3/UL (ref 150–450)
PLATELET # BLD AUTO: 217 10E3/UL (ref 150–450)
PLATELET # BLD AUTO: 217 10E3/UL (ref 150–450)
PLATELET # BLD AUTO: 293 10E3/UL (ref 150–450)
PLATELET # BLD AUTO: 319 10E3/UL (ref 150–450)
PLATELET # BLD AUTO: 349 10E3/UL (ref 150–450)
PLATELET # BLD AUTO: 377 10E3/UL (ref 150–450)
PLATELET # BLD AUTO: 432 10E3/UL (ref 150–450)
PLATELET # BLD AUTO: 432 10E3/UL (ref 150–450)
PLATELET # BLD AUTO: 450 10E3/UL (ref 150–450)
PLATELET # BLD AUTO: 450 10E3/UL (ref 150–450)
PLATELET # BLD AUTO: 453 10E3/UL (ref 150–450)
PLATELET # BLD AUTO: 468 10E3/UL (ref 150–450)
PLATELET # BLD AUTO: 480 10E3/UL (ref 150–450)
PLATELET # BLD AUTO: 503 10E3/UL (ref 150–450)
PLATELET # BLD AUTO: 508 10E3/UL (ref 150–450)
PLATELET # BLD AUTO: 528 10E3/UL (ref 150–450)
PO2 BLDV: 34 MM HG (ref 25–47)
PO2 BLDV: 42 MM HG (ref 25–47)
POTASSIUM SERPL-SCNC: 3.3 MMOL/L (ref 3.4–5.3)
POTASSIUM SERPL-SCNC: 3.4 MMOL/L (ref 3.4–5.3)
POTASSIUM SERPL-SCNC: 3.5 MMOL/L (ref 3.4–5.3)
POTASSIUM SERPL-SCNC: 3.6 MMOL/L (ref 3.4–5.3)
POTASSIUM SERPL-SCNC: 3.8 MMOL/L (ref 3.4–5.3)
POTASSIUM SERPL-SCNC: 3.9 MMOL/L (ref 3.4–5.3)
POTASSIUM SERPL-SCNC: 4 MMOL/L (ref 3.4–5.3)
POTASSIUM SERPL-SCNC: 4.1 MMOL/L (ref 3.4–5.3)
POTASSIUM SERPL-SCNC: 4.2 MMOL/L (ref 3.4–5.3)
POTASSIUM SERPL-SCNC: 4.5 MMOL/L (ref 3.4–5.3)
PR INTERVAL - MUSE: 112 MS
PR INTERVAL - MUSE: 116 MS
PROT CSF-MCNC: 109 MG/DL (ref 15–45)
PROT SERPL-MCNC: 5.3 G/DL (ref 6.4–8.3)
PROT SERPL-MCNC: 5.7 G/DL (ref 6.4–8.3)
PROT SERPL-MCNC: 5.8 G/DL (ref 6.4–8.3)
PROT SERPL-MCNC: 5.8 G/DL (ref 6.4–8.3)
PROT SERPL-MCNC: 5.9 G/DL (ref 6.4–8.3)
PROT SERPL-MCNC: 6.2 G/DL (ref 6.4–8.3)
PROT SERPL-MCNC: 6.3 G/DL (ref 6.4–8.3)
PROT SERPL-MCNC: 6.3 G/DL (ref 6.4–8.3)
PROT SERPL-MCNC: 6.4 G/DL (ref 6.4–8.3)
PROT SERPL-MCNC: 6.5 G/DL (ref 6.4–8.3)
PROT SERPL-MCNC: 6.8 G/DL (ref 6.4–8.3)
PROT SERPL-MCNC: 6.8 G/DL (ref 6.4–8.3)
PROT SERPL-MCNC: 7.1 G/DL (ref 6.4–8.3)
PROT SERPL-MCNC: 7.2 G/DL (ref 6.4–8.3)
PROT SERPL-MCNC: 7.6 G/DL (ref 6.4–8.3)
PROT SERPL-MCNC: 9.1 G/DL (ref 6.4–8.3)
QRS DURATION - MUSE: 86 MS
QRS DURATION - MUSE: 88 MS
QT - MUSE: 424 MS
QT - MUSE: 454 MS
QTC - MUSE: 430 MS
QTC - MUSE: 430 MS
R AXIS - MUSE: 47 DEGREES
R AXIS - MUSE: 64 DEGREES
RADIOLOGIST FLAGS: NORMAL
RBC # BLD AUTO: 3.25 10E6/UL (ref 3.8–5.2)
RBC # BLD AUTO: 3.27 10E6/UL (ref 3.8–5.2)
RBC # BLD AUTO: 3.28 10E6/UL (ref 3.8–5.2)
RBC # BLD AUTO: 3.3 10E6/UL (ref 3.8–5.2)
RBC # BLD AUTO: 3.36 10E6/UL (ref 3.8–5.2)
RBC # BLD AUTO: 3.56 10E6/UL (ref 3.8–5.2)
RBC # BLD AUTO: 3.59 10E6/UL (ref 3.8–5.2)
RBC # BLD AUTO: 3.6 10E6/UL (ref 3.8–5.2)
RBC # BLD AUTO: 3.61 10E6/UL (ref 3.8–5.2)
RBC # BLD AUTO: 3.64 10E6/UL (ref 3.8–5.2)
RBC # BLD AUTO: 3.67 10E6/UL (ref 3.8–5.2)
RBC # BLD AUTO: 3.68 10E6/UL (ref 3.8–5.2)
RBC # BLD AUTO: 3.68 10E6/UL (ref 3.8–5.2)
RBC # BLD AUTO: 3.69 10E6/UL (ref 3.8–5.2)
RBC # BLD AUTO: 3.7 10E6/UL (ref 3.8–5.2)
RBC # BLD AUTO: 3.76 10E6/UL (ref 3.8–5.2)
RBC # BLD AUTO: 3.76 10E6/UL (ref 3.8–5.2)
RBC # BLD AUTO: 3.83 10E6/UL (ref 3.8–5.2)
RBC # BLD AUTO: 3.84 10E6/UL (ref 3.8–5.2)
RBC # BLD AUTO: 3.88 10E6/UL (ref 3.8–5.2)
RBC # BLD AUTO: 3.92 10E6/UL (ref 3.8–5.2)
RBC # BLD AUTO: 3.95 10E6/UL (ref 3.8–5.2)
RBC # BLD AUTO: 3.95 10E6/UL (ref 3.8–5.2)
RBC # BLD AUTO: 3.98 10E6/UL (ref 3.8–5.2)
RBC # CSF MANUAL: 44 /UL (ref 0–2)
RBC MORPH BLD: ABNORMAL
RBC URINE: 5 /HPF
RETICS # AUTO: 0.04 10E6/UL (ref 0.03–0.1)
RETICS/RBC NFR AUTO: 1 % (ref 0.5–2)
S PNEUM DNA CSF QL NAA+NON-PROBE: NEGATIVE
SAO2 % BLDV: 64.8 % (ref 70–75)
SAO2 % BLDV: 77 % (ref 70–75)
SARS-COV-2 RNA RESP QL NAA+PROBE: POSITIVE
SARS-COV-2 RNA RESP QL NAA+PROBE: POSITIVE
SCANNED LAB RESULT: ABNORMAL
SCANNED LAB RESULT: NORMAL
SODIUM SERPL-SCNC: 135 MMOL/L (ref 135–145)
SODIUM SERPL-SCNC: 136 MMOL/L (ref 135–145)
SODIUM SERPL-SCNC: 137 MMOL/L (ref 135–145)
SODIUM SERPL-SCNC: 138 MMOL/L (ref 135–145)
SODIUM SERPL-SCNC: 139 MMOL/L (ref 135–145)
SODIUM SERPL-SCNC: 139 MMOL/L (ref 135–145)
SODIUM SERPL-SCNC: 140 MMOL/L (ref 135–145)
SODIUM SERPL-SCNC: 141 MMOL/L (ref 135–145)
SODIUM SERPL-SCNC: 141 MMOL/L (ref 135–145)
SODIUM SERPL-SCNC: 142 MMOL/L (ref 135–145)
SODIUM SERPL-SCNC: 143 MMOL/L (ref 135–145)
SP GR UR STRIP: 1.03 (ref 1–1.03)
SPECIMEN DESCRIPTION: NORMAL
SPECIMEN EXPIRATION DATE: NORMAL
SPECIMEN STATUS: NORMAL
SYSTOLIC BLOOD PRESSURE - MUSE: NORMAL MMHG
SYSTOLIC BLOOD PRESSURE - MUSE: NORMAL MMHG
T AXIS - MUSE: 40 DEGREES
T AXIS - MUSE: 63 DEGREES
T CELL COMMENT: ABNORMAL
T GONDII DNA SPEC QL NAA+PROBE: NOT DETECTED
TEST NAME: NORMAL
TROPONIN T SERPL HS-MCNC: <6 NG/L
TUBE # CSF: 3
URATE SERPL-MCNC: 3.2 MG/DL (ref 2.4–5.7)
URATE SERPL-MCNC: 3.3 MG/DL (ref 2.4–5.7)
URATE SERPL-MCNC: 3.4 MG/DL (ref 2.4–5.7)
URATE SERPL-MCNC: 3.4 MG/DL (ref 2.4–5.7)
URATE SERPL-MCNC: 3.5 MG/DL (ref 2.4–5.7)
URATE SERPL-MCNC: 3.5 MG/DL (ref 2.4–5.7)
URATE SERPL-MCNC: 3.6 MG/DL (ref 2.4–5.7)
URATE SERPL-MCNC: 3.7 MG/DL (ref 2.4–5.7)
URATE SERPL-MCNC: 3.8 MG/DL (ref 2.4–5.7)
URATE SERPL-MCNC: 4.2 MG/DL (ref 2.4–5.7)
UROBILINOGEN UR STRIP-MCNC: NORMAL MG/DL
VENTRICULAR RATE- MUSE: 54 BPM
VENTRICULAR RATE- MUSE: 62 BPM
VIT B12 SERPL-MCNC: 332 PG/ML (ref 232–1245)
VZV DNA CSF QL NAA+NON-PROBE: NEGATIVE
WBC # BLD AUTO: 16.9 10E3/UL (ref 4–11)
WBC # BLD AUTO: 3.4 10E3/UL (ref 4–11)
WBC # BLD AUTO: 4.9 10E3/UL (ref 4–11)
WBC # BLD AUTO: 5 10E3/UL (ref 4–11)
WBC # BLD AUTO: 5.2 10E3/UL (ref 4–11)
WBC # BLD AUTO: 5.3 10E3/UL (ref 4–11)
WBC # BLD AUTO: 5.4 10E3/UL (ref 4–11)
WBC # BLD AUTO: 5.4 10E3/UL (ref 4–11)
WBC # BLD AUTO: 6.3 10E3/UL (ref 4–11)
WBC # BLD AUTO: 6.7 10E3/UL (ref 4–11)
WBC # BLD AUTO: 6.7 10E3/UL (ref 4–11)
WBC # BLD AUTO: 6.9 10E3/UL (ref 4–11)
WBC # BLD AUTO: 7.1 10E3/UL (ref 4–11)
WBC # BLD AUTO: 7.5 10E3/UL (ref 4–11)
WBC # BLD AUTO: 7.9 10E3/UL (ref 4–11)
WBC # BLD AUTO: 8 10E3/UL (ref 4–11)
WBC # BLD AUTO: 8 10E3/UL (ref 4–11)
WBC # BLD AUTO: 8.5 10E3/UL (ref 4–11)
WBC # BLD AUTO: 8.9 10E3/UL (ref 4–11)
WBC # BLD AUTO: 8.9 10E3/UL (ref 4–11)
WBC # BLD AUTO: 9.5 10E3/UL (ref 4–11)
WBC # BLD AUTO: 9.7 10E3/UL (ref 4–11)
WBC # CSF MANUAL: 3 /UL (ref 0–5)
WBC URINE: 75 /HPF

## 2024-01-01 PROCEDURE — 250N000009 HC RX 250

## 2024-01-01 PROCEDURE — 80076 HEPATIC FUNCTION PANEL: CPT | Performed by: STUDENT IN AN ORGANIZED HEALTH CARE EDUCATION/TRAINING PROGRAM

## 2024-01-01 PROCEDURE — 110N000005 HC R&B HOSPICE, ACCENT

## 2024-01-01 PROCEDURE — 80048 BASIC METABOLIC PNL TOTAL CA: CPT | Performed by: HOSPITALIST

## 2024-01-01 PROCEDURE — 85027 COMPLETE CBC AUTOMATED: CPT | Performed by: STUDENT IN AN ORGANIZED HEALTH CARE EDUCATION/TRAINING PROGRAM

## 2024-01-01 PROCEDURE — 250N000012 HC RX MED GY IP 250 OP 636 PS 637: Performed by: PHYSICIAN ASSISTANT

## 2024-01-01 PROCEDURE — 80053 COMPREHEN METABOLIC PANEL: CPT

## 2024-01-01 PROCEDURE — 258N000003 HC RX IP 258 OP 636: Performed by: STUDENT IN AN ORGANIZED HEALTH CARE EDUCATION/TRAINING PROGRAM

## 2024-01-01 PROCEDURE — 85384 FIBRINOGEN ACTIVITY: CPT

## 2024-01-01 PROCEDURE — 120N000002 HC R&B MED SURG/OB UMMC

## 2024-01-01 PROCEDURE — 250N000011 HC RX IP 250 OP 636: Performed by: STUDENT IN AN ORGANIZED HEALTH CARE EDUCATION/TRAINING PROGRAM

## 2024-01-01 PROCEDURE — 250N000013 HC RX MED GY IP 250 OP 250 PS 637: Performed by: STUDENT IN AN ORGANIZED HEALTH CARE EDUCATION/TRAINING PROGRAM

## 2024-01-01 PROCEDURE — 250N000011 HC RX IP 250 OP 636

## 2024-01-01 PROCEDURE — 258N000003 HC RX IP 258 OP 636

## 2024-01-01 PROCEDURE — 85610 PROTHROMBIN TIME: CPT

## 2024-01-01 PROCEDURE — 009U3ZX DRAINAGE OF SPINAL CANAL, PERCUTANEOUS APPROACH, DIAGNOSTIC: ICD-10-PCS | Performed by: NEUROLOGICAL SURGERY

## 2024-01-01 PROCEDURE — 250N000009 HC RX 250: Performed by: STUDENT IN AN ORGANIZED HEALTH CARE EDUCATION/TRAINING PROGRAM

## 2024-01-01 PROCEDURE — 84100 ASSAY OF PHOSPHORUS: CPT

## 2024-01-01 PROCEDURE — 99207 PR APP CREDIT; MD BILLING SHARED VISIT: CPT | Performed by: PEDIATRICS

## 2024-01-01 PROCEDURE — 83735 ASSAY OF MAGNESIUM: CPT

## 2024-01-01 PROCEDURE — 87635 SARS-COV-2 COVID-19 AMP PRB: CPT | Performed by: PHYSICIAN ASSISTANT

## 2024-01-01 PROCEDURE — 97535 SELF CARE MNGMENT TRAINING: CPT | Mod: GO | Performed by: OCCUPATIONAL THERAPIST

## 2024-01-01 PROCEDURE — 87040 BLOOD CULTURE FOR BACTERIA: CPT | Performed by: PHYSICIAN ASSISTANT

## 2024-01-01 PROCEDURE — 85730 THROMBOPLASTIN TIME PARTIAL: CPT

## 2024-01-01 PROCEDURE — 88185 FLOWCYTOMETRY/TC ADD-ON: CPT

## 2024-01-01 PROCEDURE — 250N000013 HC RX MED GY IP 250 OP 250 PS 637

## 2024-01-01 PROCEDURE — 250N000009 HC RX 250: Performed by: INTERNAL MEDICINE

## 2024-01-01 PROCEDURE — 81261 IGH GENE REARRANGE AMP METH: CPT

## 2024-01-01 PROCEDURE — 86611 BARTONELLA ANTIBODY: CPT | Performed by: PHYSICIAN ASSISTANT

## 2024-01-01 PROCEDURE — 93010 ELECTROCARDIOGRAM REPORT: CPT | Performed by: INTERNAL MEDICINE

## 2024-01-01 PROCEDURE — 84550 ASSAY OF BLOOD/URIC ACID: CPT

## 2024-01-01 PROCEDURE — 62270 DX LMBR SPI PNXR: CPT

## 2024-01-01 PROCEDURE — 99232 SBSQ HOSP IP/OBS MODERATE 35: CPT | Mod: GC | Performed by: PSYCHIATRY & NEUROLOGY

## 2024-01-01 PROCEDURE — 99233 SBSQ HOSP IP/OBS HIGH 50: CPT | Mod: FS

## 2024-01-01 PROCEDURE — 74018 RADEX ABDOMEN 1 VIEW: CPT | Mod: 26 | Performed by: RADIOLOGY

## 2024-01-01 PROCEDURE — 82805 BLOOD GASES W/O2 SATURATION: CPT | Performed by: STUDENT IN AN ORGANIZED HEALTH CARE EDUCATION/TRAINING PROGRAM

## 2024-01-01 PROCEDURE — 36569 INSJ PICC 5 YR+ W/O IMAGING: CPT

## 2024-01-01 PROCEDURE — 82040 ASSAY OF SERUM ALBUMIN: CPT

## 2024-01-01 PROCEDURE — 87899 AGENT NOS ASSAY W/OPTIC: CPT | Performed by: STUDENT IN AN ORGANIZED HEALTH CARE EDUCATION/TRAINING PROGRAM

## 2024-01-01 PROCEDURE — 70553 MRI BRAIN STEM W/O & W/DYE: CPT

## 2024-01-01 PROCEDURE — 99233 SBSQ HOSP IP/OBS HIGH 50: CPT | Mod: GC | Performed by: INTERNAL MEDICINE

## 2024-01-01 PROCEDURE — 84484 ASSAY OF TROPONIN QUANT: CPT | Performed by: HOSPITALIST

## 2024-01-01 PROCEDURE — 250N000011 HC RX IP 250 OP 636: Performed by: PHYSICIAN ASSISTANT

## 2024-01-01 PROCEDURE — 94667 MNPJ CHEST WALL 1ST: CPT

## 2024-01-01 PROCEDURE — 84132 ASSAY OF SERUM POTASSIUM: CPT | Performed by: STUDENT IN AN ORGANIZED HEALTH CARE EDUCATION/TRAINING PROGRAM

## 2024-01-01 PROCEDURE — 99231 SBSQ HOSP IP/OBS SF/LOW 25: CPT | Performed by: INTERNAL MEDICINE

## 2024-01-01 PROCEDURE — 85025 COMPLETE CBC W/AUTO DIFF WBC: CPT

## 2024-01-01 PROCEDURE — 250N000011 HC RX IP 250 OP 636: Performed by: INTERNAL MEDICINE

## 2024-01-01 PROCEDURE — 999N000157 HC STATISTIC RCP TIME EA 10 MIN

## 2024-01-01 PROCEDURE — 250N000012 HC RX MED GY IP 250 OP 636 PS 637: Performed by: STUDENT IN AN ORGANIZED HEALTH CARE EDUCATION/TRAINING PROGRAM

## 2024-01-01 PROCEDURE — 74018 RADEX ABDOMEN 1 VIEW: CPT

## 2024-01-01 PROCEDURE — 250N000013 HC RX MED GY IP 250 OP 250 PS 637: Performed by: PHYSICIAN ASSISTANT

## 2024-01-01 PROCEDURE — 62328 DX LMBR SPI PNXR W/FLUOR/CT: CPT

## 2024-01-01 PROCEDURE — 36415 COLL VENOUS BLD VENIPUNCTURE: CPT

## 2024-01-01 PROCEDURE — 93306 TTE W/DOPPLER COMPLETE: CPT | Mod: 26 | Performed by: INTERNAL MEDICINE

## 2024-01-01 PROCEDURE — 86704 HEP B CORE ANTIBODY TOTAL: CPT | Performed by: STUDENT IN AN ORGANIZED HEALTH CARE EDUCATION/TRAINING PROGRAM

## 2024-01-01 PROCEDURE — 250N000013 HC RX MED GY IP 250 OP 250 PS 637: Performed by: INTERNAL MEDICINE

## 2024-01-01 PROCEDURE — 36415 COLL VENOUS BLD VENIPUNCTURE: CPT | Performed by: STUDENT IN AN ORGANIZED HEALTH CARE EDUCATION/TRAINING PROGRAM

## 2024-01-01 PROCEDURE — 255N000002 HC RX 255 OP 636: Performed by: STUDENT IN AN ORGANIZED HEALTH CARE EDUCATION/TRAINING PROGRAM

## 2024-01-01 PROCEDURE — 94668 MNPJ CHEST WALL SBSQ: CPT

## 2024-01-01 PROCEDURE — 84100 ASSAY OF PHOSPHORUS: CPT | Performed by: HOSPITALIST

## 2024-01-01 PROCEDURE — 99232 SBSQ HOSP IP/OBS MODERATE 35: CPT | Performed by: INTERNAL MEDICINE

## 2024-01-01 PROCEDURE — 99233 SBSQ HOSP IP/OBS HIGH 50: CPT | Performed by: INTERNAL MEDICINE

## 2024-01-01 PROCEDURE — 71260 CT THORAX DX C+: CPT

## 2024-01-01 PROCEDURE — 97110 THERAPEUTIC EXERCISES: CPT | Mod: GO | Performed by: OCCUPATIONAL THERAPIST

## 2024-01-01 PROCEDURE — 87116 MYCOBACTERIA CULTURE: CPT | Performed by: STUDENT IN AN ORGANIZED HEALTH CARE EDUCATION/TRAINING PROGRAM

## 2024-01-01 PROCEDURE — 80204 DRUG ASSAY METHOTREXATE: CPT | Performed by: STUDENT IN AN ORGANIZED HEALTH CARE EDUCATION/TRAINING PROGRAM

## 2024-01-01 PROCEDURE — 272N000451 HC KIT SHRLOCK 5FR POWER PICC DOUBLE LUMEN

## 2024-01-01 PROCEDURE — 99207 PR NO BILLABLE SERVICE THIS VISIT: CPT | Performed by: INTERNAL MEDICINE

## 2024-01-01 PROCEDURE — 86803 HEPATITIS C AB TEST: CPT | Performed by: STUDENT IN AN ORGANIZED HEALTH CARE EDUCATION/TRAINING PROGRAM

## 2024-01-01 PROCEDURE — 70450 CT HEAD/BRAIN W/O DYE: CPT | Mod: 26 | Performed by: RADIOLOGY

## 2024-01-01 PROCEDURE — 99418 PROLNG IP/OBS E/M EA 15 MIN: CPT | Performed by: STUDENT IN AN ORGANIZED HEALTH CARE EDUCATION/TRAINING PROGRAM

## 2024-01-01 PROCEDURE — 93926 LOWER EXTREMITY STUDY: CPT | Mod: RT

## 2024-01-01 PROCEDURE — 99232 SBSQ HOSP IP/OBS MODERATE 35: CPT | Performed by: STUDENT IN AN ORGANIZED HEALTH CARE EDUCATION/TRAINING PROGRAM

## 2024-01-01 PROCEDURE — 87385 HISTOPLASMA CAPSUL AG IA: CPT | Performed by: STUDENT IN AN ORGANIZED HEALTH CARE EDUCATION/TRAINING PROGRAM

## 2024-01-01 PROCEDURE — 74018 RADEX ABDOMEN 1 VIEW: CPT | Mod: 26 | Performed by: STUDENT IN AN ORGANIZED HEALTH CARE EDUCATION/TRAINING PROGRAM

## 2024-01-01 PROCEDURE — 99233 SBSQ HOSP IP/OBS HIGH 50: CPT | Mod: FS | Performed by: PHYSICIAN ASSISTANT

## 2024-01-01 PROCEDURE — 99497 ADVNCD CARE PLAN 30 MIN: CPT | Mod: 25 | Performed by: PHYSICIAN ASSISTANT

## 2024-01-01 PROCEDURE — 87102 FUNGUS ISOLATION CULTURE: CPT | Performed by: STUDENT IN AN ORGANIZED HEALTH CARE EDUCATION/TRAINING PROGRAM

## 2024-01-01 PROCEDURE — 93005 ELECTROCARDIOGRAM TRACING: CPT

## 2024-01-01 PROCEDURE — 82746 ASSAY OF FOLIC ACID SERUM: CPT | Performed by: STUDENT IN AN ORGANIZED HEALTH CARE EDUCATION/TRAINING PROGRAM

## 2024-01-01 PROCEDURE — 99255 IP/OBS CONSLTJ NEW/EST HI 80: CPT | Mod: GC | Performed by: INTERNAL MEDICINE

## 2024-01-01 PROCEDURE — 999N000044 HC STATISTIC CVC DRESSING CHANGE

## 2024-01-01 PROCEDURE — 258N000002 HC RX IP 258 OP 250: Performed by: STUDENT IN AN ORGANIZED HEALTH CARE EDUCATION/TRAINING PROGRAM

## 2024-01-01 PROCEDURE — 70450 CT HEAD/BRAIN W/O DYE: CPT

## 2024-01-01 PROCEDURE — 83615 LACTATE (LD) (LDH) ENZYME: CPT | Performed by: STUDENT IN AN ORGANIZED HEALTH CARE EDUCATION/TRAINING PROGRAM

## 2024-01-01 PROCEDURE — 250N000011 HC RX IP 250 OP 636: Mod: JZ | Performed by: PHYSICIAN ASSISTANT

## 2024-01-01 PROCEDURE — 71045 X-RAY EXAM CHEST 1 VIEW: CPT

## 2024-01-01 PROCEDURE — 99232 SBSQ HOSP IP/OBS MODERATE 35: CPT | Mod: GC | Performed by: INTERNAL MEDICINE

## 2024-01-01 PROCEDURE — 85045 AUTOMATED RETICULOCYTE COUNT: CPT | Performed by: STUDENT IN AN ORGANIZED HEALTH CARE EDUCATION/TRAINING PROGRAM

## 2024-01-01 PROCEDURE — 85014 HEMATOCRIT: CPT

## 2024-01-01 PROCEDURE — 93926 LOWER EXTREMITY STUDY: CPT | Mod: 26 | Performed by: RADIOLOGY

## 2024-01-01 PROCEDURE — 999N000065 XR ABDOMEN PORT 1 VIEW

## 2024-01-01 PROCEDURE — 99233 SBSQ HOSP IP/OBS HIGH 50: CPT | Performed by: PHYSICIAN ASSISTANT

## 2024-01-01 PROCEDURE — 99254 IP/OBS CNSLTJ NEW/EST MOD 60: CPT | Performed by: INTERNAL MEDICINE

## 2024-01-01 PROCEDURE — 83036 HEMOGLOBIN GLYCOSYLATED A1C: CPT | Performed by: STUDENT IN AN ORGANIZED HEALTH CARE EDUCATION/TRAINING PROGRAM

## 2024-01-01 PROCEDURE — 87798 DETECT AGENT NOS DNA AMP: CPT | Performed by: STUDENT IN AN ORGANIZED HEALTH CARE EDUCATION/TRAINING PROGRAM

## 2024-01-01 PROCEDURE — 71260 CT THORAX DX C+: CPT | Mod: 26 | Performed by: STUDENT IN AN ORGANIZED HEALTH CARE EDUCATION/TRAINING PROGRAM

## 2024-01-01 PROCEDURE — 81003 URINALYSIS AUTO W/O SCOPE: CPT | Performed by: STUDENT IN AN ORGANIZED HEALTH CARE EDUCATION/TRAINING PROGRAM

## 2024-01-01 PROCEDURE — A9585 GADOBUTROL INJECTION: HCPCS | Performed by: STUDENT IN AN ORGANIZED HEALTH CARE EDUCATION/TRAINING PROGRAM

## 2024-01-01 PROCEDURE — 250N000011 HC RX IP 250 OP 636: Mod: JZ | Performed by: STUDENT IN AN ORGANIZED HEALTH CARE EDUCATION/TRAINING PROGRAM

## 2024-01-01 PROCEDURE — 99233 SBSQ HOSP IP/OBS HIGH 50: CPT | Mod: FS | Performed by: INTERNAL MEDICINE

## 2024-01-01 PROCEDURE — 99233 SBSQ HOSP IP/OBS HIGH 50: CPT | Mod: FS | Performed by: STUDENT IN AN ORGANIZED HEALTH CARE EDUCATION/TRAINING PROGRAM

## 2024-01-01 PROCEDURE — 250N000011 HC RX IP 250 OP 636: Mod: JZ | Performed by: INTERNAL MEDICINE

## 2024-01-01 PROCEDURE — 999N000127 HC STATISTIC PERIPHERAL IV START W US GUIDANCE

## 2024-01-01 PROCEDURE — 84075 ASSAY ALKALINE PHOSPHATASE: CPT

## 2024-01-01 PROCEDURE — 99233 SBSQ HOSP IP/OBS HIGH 50: CPT | Performed by: STUDENT IN AN ORGANIZED HEALTH CARE EDUCATION/TRAINING PROGRAM

## 2024-01-01 PROCEDURE — 258N000001 HC RX 258

## 2024-01-01 PROCEDURE — 85027 COMPLETE CBC AUTOMATED: CPT

## 2024-01-01 PROCEDURE — 87449 NOS EACH ORGANISM AG IA: CPT | Performed by: STUDENT IN AN ORGANIZED HEALTH CARE EDUCATION/TRAINING PROGRAM

## 2024-01-01 PROCEDURE — 80048 BASIC METABOLIC PNL TOTAL CA: CPT

## 2024-01-01 PROCEDURE — 258N000003 HC RX IP 258 OP 636: Performed by: PHYSICIAN ASSISTANT

## 2024-01-01 PROCEDURE — 85004 AUTOMATED DIFF WBC COUNT: CPT

## 2024-01-01 PROCEDURE — 250N000009 HC RX 250: Performed by: PEDIATRICS

## 2024-01-01 PROCEDURE — 86359 T CELLS TOTAL COUNT: CPT | Performed by: STUDENT IN AN ORGANIZED HEALTH CARE EDUCATION/TRAINING PROGRAM

## 2024-01-01 PROCEDURE — 99233 SBSQ HOSP IP/OBS HIGH 50: CPT | Mod: GC | Performed by: STUDENT IN AN ORGANIZED HEALTH CARE EDUCATION/TRAINING PROGRAM

## 2024-01-01 PROCEDURE — 36415 COLL VENOUS BLD VENIPUNCTURE: CPT | Performed by: PHYSICIAN ASSISTANT

## 2024-01-01 PROCEDURE — 84703 CHORIONIC GONADOTROPIN ASSAY: CPT

## 2024-01-01 PROCEDURE — 99232 SBSQ HOSP IP/OBS MODERATE 35: CPT | Mod: 25 | Performed by: PHYSICIAN ASSISTANT

## 2024-01-01 PROCEDURE — 92610 EVALUATE SWALLOWING FUNCTION: CPT | Mod: GN

## 2024-01-01 PROCEDURE — 99231 SBSQ HOSP IP/OBS SF/LOW 25: CPT | Performed by: PEDIATRICS

## 2024-01-01 PROCEDURE — 84550 ASSAY OF BLOOD/URIC ACID: CPT | Performed by: STUDENT IN AN ORGANIZED HEALTH CARE EDUCATION/TRAINING PROGRAM

## 2024-01-01 PROCEDURE — 272N000019 HC KIT OPEN ENDED DOUBLE LUMEN

## 2024-01-01 PROCEDURE — 82248 BILIRUBIN DIRECT: CPT | Performed by: STUDENT IN AN ORGANIZED HEALTH CARE EDUCATION/TRAINING PROGRAM

## 2024-01-01 PROCEDURE — 95711 VEEG 2-12 HR UNMONITORED: CPT

## 2024-01-01 PROCEDURE — 88188 FLOWCYTOMETRY/READ 9-15: CPT | Mod: GC | Performed by: PATHOLOGY

## 2024-01-01 PROCEDURE — 94640 AIRWAY INHALATION TREATMENT: CPT | Mod: 76

## 2024-01-01 PROCEDURE — 258N000003 HC RX IP 258 OP 636: Performed by: INTERNAL MEDICINE

## 2024-01-01 PROCEDURE — 99418 PROLNG IP/OBS E/M EA 15 MIN: CPT | Performed by: INTERNAL MEDICINE

## 2024-01-01 PROCEDURE — 87483 CNS DNA AMP PROBE TYPE 12-25: CPT

## 2024-01-01 PROCEDURE — 84157 ASSAY OF PROTEIN OTHER: CPT

## 2024-01-01 PROCEDURE — 3E04305 INTRODUCTION OF OTHER ANTINEOPLASTIC INTO CENTRAL VEIN, PERCUTANEOUS APPROACH: ICD-10-PCS

## 2024-01-01 PROCEDURE — 999N000035 HC STATISTIC CODE BLUE NO ACCESS REQUIRED

## 2024-01-01 PROCEDURE — 84132 ASSAY OF SERUM POTASSIUM: CPT

## 2024-01-01 PROCEDURE — 93306 TTE W/DOPPLER COMPLETE: CPT

## 2024-01-01 PROCEDURE — 71045 X-RAY EXAM CHEST 1 VIEW: CPT | Mod: 26 | Performed by: RADIOLOGY

## 2024-01-01 PROCEDURE — 70553 MRI BRAIN STEM W/O & W/DYE: CPT | Mod: 26 | Performed by: RADIOLOGY

## 2024-01-01 PROCEDURE — 999N000147 HC STATISTIC PT IP EVAL DEFER

## 2024-01-01 PROCEDURE — 87075 CULTR BACTERIA EXCEPT BLOOD: CPT

## 2024-01-01 PROCEDURE — 00JU3ZZ INSPECTION OF SPINAL CANAL, PERCUTANEOUS APPROACH: ICD-10-PCS | Performed by: PEDIATRICS

## 2024-01-01 PROCEDURE — 999N000128 HC STATISTIC PERIPHERAL IV START W/O US GUIDANCE

## 2024-01-01 PROCEDURE — 83550 IRON BINDING TEST: CPT | Performed by: STUDENT IN AN ORGANIZED HEALTH CARE EDUCATION/TRAINING PROGRAM

## 2024-01-01 PROCEDURE — S5010 5% DEXTROSE AND 0.45% SALINE: HCPCS | Performed by: INTERNAL MEDICINE

## 2024-01-01 PROCEDURE — 85048 AUTOMATED LEUKOCYTE COUNT: CPT

## 2024-01-01 PROCEDURE — 99233 SBSQ HOSP IP/OBS HIGH 50: CPT | Mod: GC | Performed by: PSYCHIATRY & NEUROLOGY

## 2024-01-01 PROCEDURE — 99238 HOSP IP/OBS DSCHRG MGMT 30/<: CPT | Performed by: PEDIATRICS

## 2024-01-01 PROCEDURE — 92526 ORAL FUNCTION THERAPY: CPT | Mod: GN

## 2024-01-01 PROCEDURE — 70491 CT SOFT TISSUE NECK W/DYE: CPT

## 2024-01-01 PROCEDURE — 87206 SMEAR FLUORESCENT/ACID STAI: CPT | Performed by: STUDENT IN AN ORGANIZED HEALTH CARE EDUCATION/TRAINING PROGRAM

## 2024-01-01 PROCEDURE — 88108 CYTOPATH CONCENTRATE TECH: CPT | Mod: TC

## 2024-01-01 PROCEDURE — 82728 ASSAY OF FERRITIN: CPT | Performed by: STUDENT IN AN ORGANIZED HEALTH CARE EDUCATION/TRAINING PROGRAM

## 2024-01-01 PROCEDURE — 0152U NFCT DS DNA UNTRGT NGNRJ SEQ: CPT | Performed by: STUDENT IN AN ORGANIZED HEALTH CARE EDUCATION/TRAINING PROGRAM

## 2024-01-01 PROCEDURE — 99418 PROLNG IP/OBS E/M EA 15 MIN: CPT | Mod: GC | Performed by: STUDENT IN AN ORGANIZED HEALTH CARE EDUCATION/TRAINING PROGRAM

## 2024-01-01 PROCEDURE — 62328 DX LMBR SPI PNXR W/FLUOR/CT: CPT | Mod: GC | Performed by: NEUROLOGICAL SURGERY

## 2024-01-01 PROCEDURE — 70553 MRI BRAIN STEM W/O & W/DYE: CPT | Mod: 26 | Performed by: STUDENT IN AN ORGANIZED HEALTH CARE EDUCATION/TRAINING PROGRAM

## 2024-01-01 PROCEDURE — 95718 EEG PHYS/QHP 2-12 HR W/VEEG: CPT | Performed by: PSYCHIATRY & NEUROLOGY

## 2024-01-01 PROCEDURE — 88108 CYTOPATH CONCENTRATE TECH: CPT | Mod: 26 | Performed by: PATHOLOGY

## 2024-01-01 PROCEDURE — 99255 IP/OBS CONSLTJ NEW/EST HI 80: CPT | Mod: GC | Performed by: STUDENT IN AN ORGANIZED HEALTH CARE EDUCATION/TRAINING PROGRAM

## 2024-01-01 PROCEDURE — 81001 URINALYSIS AUTO W/SCOPE: CPT | Performed by: PHYSICIAN ASSISTANT

## 2024-01-01 PROCEDURE — 85007 BL SMEAR W/DIFF WBC COUNT: CPT | Performed by: STUDENT IN AN ORGANIZED HEALTH CARE EDUCATION/TRAINING PROGRAM

## 2024-01-01 PROCEDURE — 87186 SC STD MICRODIL/AGAR DIL: CPT | Performed by: PHYSICIAN ASSISTANT

## 2024-01-01 PROCEDURE — 99207 PR NO BILLABLE SERVICE THIS VISIT: CPT | Performed by: STUDENT IN AN ORGANIZED HEALTH CARE EDUCATION/TRAINING PROGRAM

## 2024-01-01 PROCEDURE — 87086 URINE CULTURE/COLONY COUNT: CPT | Performed by: PHYSICIAN ASSISTANT

## 2024-01-01 PROCEDURE — 87205 SMEAR GRAM STAIN: CPT | Performed by: STUDENT IN AN ORGANIZED HEALTH CARE EDUCATION/TRAINING PROGRAM

## 2024-01-01 PROCEDURE — 99418 PROLNG IP/OBS E/M EA 15 MIN: CPT

## 2024-01-01 PROCEDURE — 70491 CT SOFT TISSUE NECK W/DYE: CPT | Mod: 26 | Performed by: STUDENT IN AN ORGANIZED HEALTH CARE EDUCATION/TRAINING PROGRAM

## 2024-01-01 PROCEDURE — 94640 AIRWAY INHALATION TREATMENT: CPT

## 2024-01-01 PROCEDURE — 89050 BODY FLUID CELL COUNT: CPT

## 2024-01-01 PROCEDURE — 99255 IP/OBS CONSLTJ NEW/EST HI 80: CPT | Mod: GC | Performed by: PSYCHIATRY & NEUROLOGY

## 2024-01-01 PROCEDURE — 99255 IP/OBS CONSLTJ NEW/EST HI 80: CPT | Performed by: STUDENT IN AN ORGANIZED HEALTH CARE EDUCATION/TRAINING PROGRAM

## 2024-01-01 PROCEDURE — 74177 CT ABD & PELVIS W/CONTRAST: CPT | Mod: 26 | Performed by: STUDENT IN AN ORGANIZED HEALTH CARE EDUCATION/TRAINING PROGRAM

## 2024-01-01 PROCEDURE — 86360 T CELL ABSOLUTE COUNT/RATIO: CPT | Performed by: STUDENT IN AN ORGANIZED HEALTH CARE EDUCATION/TRAINING PROGRAM

## 2024-01-01 PROCEDURE — 86708 HEPATITIS A ANTIBODY: CPT | Performed by: STUDENT IN AN ORGANIZED HEALTH CARE EDUCATION/TRAINING PROGRAM

## 2024-01-01 PROCEDURE — 86706 HEP B SURFACE ANTIBODY: CPT | Performed by: STUDENT IN AN ORGANIZED HEALTH CARE EDUCATION/TRAINING PROGRAM

## 2024-01-01 PROCEDURE — G0452 MOLECULAR PATHOLOGY INTERPR: HCPCS | Mod: 26 | Performed by: PATHOLOGY

## 2024-01-01 PROCEDURE — 3E0G76Z INTRODUCTION OF NUTRITIONAL SUBSTANCE INTO UPPER GI, VIA NATURAL OR ARTIFICIAL OPENING: ICD-10-PCS | Performed by: INTERNAL MEDICINE

## 2024-01-01 PROCEDURE — 82607 VITAMIN B-12: CPT | Performed by: STUDENT IN AN ORGANIZED HEALTH CARE EDUCATION/TRAINING PROGRAM

## 2024-01-01 PROCEDURE — 82805 BLOOD GASES W/O2 SATURATION: CPT

## 2024-01-01 PROCEDURE — 97165 OT EVAL LOW COMPLEX 30 MIN: CPT | Mod: GO

## 2024-01-01 PROCEDURE — 250N000013 HC RX MED GY IP 250 OP 250 PS 637: Performed by: PEDIATRICS

## 2024-01-01 PROCEDURE — 80053 COMPREHEN METABOLIC PANEL: CPT | Performed by: HOSPITALIST

## 2024-01-01 PROCEDURE — 86900 BLOOD TYPING SEROLOGIC ABO: CPT

## 2024-01-01 PROCEDURE — 82955 ASSAY OF G6PD ENZYME: CPT | Performed by: STUDENT IN AN ORGANIZED HEALTH CARE EDUCATION/TRAINING PROGRAM

## 2024-01-01 PROCEDURE — 83735 ASSAY OF MAGNESIUM: CPT | Performed by: HOSPITALIST

## 2024-01-01 PROCEDURE — 70450 CT HEAD/BRAIN W/O DYE: CPT | Mod: 26 | Performed by: STUDENT IN AN ORGANIZED HEALTH CARE EDUCATION/TRAINING PROGRAM

## 2024-01-01 PROCEDURE — 87536 HIV-1 QUANT&REVRSE TRNSCRPJ: CPT | Performed by: STUDENT IN AN ORGANIZED HEALTH CARE EDUCATION/TRAINING PROGRAM

## 2024-01-01 PROCEDURE — 99207 EEG VIDEO 2-12 HRS UNMONITORED: CPT | Performed by: PSYCHIATRY & NEUROLOGY

## 2024-01-01 PROCEDURE — 99235 HOSP IP/OBS SAME DATE MOD 70: CPT | Mod: 4UV | Performed by: OPHTHALMOLOGY

## 2024-01-01 PROCEDURE — 82945 GLUCOSE OTHER FLUID: CPT

## 2024-01-01 RX ORDER — LORAZEPAM 0.5 MG/1
0.5 TABLET ORAL
Status: DISCONTINUED | OUTPATIENT
Start: 2024-01-01 | End: 2024-01-01

## 2024-01-01 RX ORDER — MEPERIDINE HYDROCHLORIDE 25 MG/ML
25 INJECTION INTRAMUSCULAR; INTRAVENOUS; SUBCUTANEOUS
Status: CANCELLED | OUTPATIENT
Start: 2024-01-01

## 2024-01-01 RX ORDER — LEUCOVORIN CALCIUM 350 MG/17.5ML
50 INJECTION, POWDER, LYOPHILIZED, FOR SOLUTION INTRAMUSCULAR; INTRAVENOUS ONCE
Qty: 2.5 ML | Refills: 0 | Status: COMPLETED | OUTPATIENT
Start: 2024-01-01 | End: 2024-01-01

## 2024-01-01 RX ORDER — ACETAMINOPHEN 325 MG/1
650 TABLET ORAL EVERY 4 HOURS PRN
Status: DISCONTINUED | OUTPATIENT
Start: 2024-01-01 | End: 2024-01-01 | Stop reason: HOSPADM

## 2024-01-01 RX ORDER — DEXAMETHASONE 4 MG/1
4 TABLET ORAL EVERY 12 HOURS SCHEDULED
Status: COMPLETED | OUTPATIENT
Start: 2024-01-01 | End: 2024-01-01

## 2024-01-01 RX ORDER — SULFAMETHOXAZOLE AND TRIMETHOPRIM 200; 40 MG/5ML; MG/5ML
10 SUSPENSION ORAL DAILY
Status: DISCONTINUED | OUTPATIENT
Start: 2024-01-01 | End: 2024-01-01

## 2024-01-01 RX ORDER — LIDOCAINE HYDROCHLORIDE 10 MG/ML
1-30 INJECTION, SOLUTION EPIDURAL; INFILTRATION; INTRACAUDAL; PERINEURAL
Status: COMPLETED | OUTPATIENT
Start: 2024-01-01 | End: 2024-01-01

## 2024-01-01 RX ORDER — BISACODYL 10 MG
10 SUPPOSITORY, RECTAL RECTAL DAILY PRN
Status: DISCONTINUED | OUTPATIENT
Start: 2024-01-01 | End: 2024-01-01 | Stop reason: HOSPADM

## 2024-01-01 RX ORDER — GLYCOPYRROLATE 0.2 MG/ML
0.2 INJECTION, SOLUTION INTRAMUSCULAR; INTRAVENOUS EVERY 4 HOURS PRN
Status: DISCONTINUED | OUTPATIENT
Start: 2024-01-01 | End: 2024-01-01 | Stop reason: HOSPADM

## 2024-01-01 RX ORDER — ACETYLCYSTEINE 100 MG/ML
4 SOLUTION ORAL; RESPIRATORY (INHALATION)
Status: DISCONTINUED | OUTPATIENT
Start: 2024-01-01 | End: 2024-01-01

## 2024-01-01 RX ORDER — MORPHINE SULFATE 20 MG/ML
5 SOLUTION ORAL
Status: DISCONTINUED | OUTPATIENT
Start: 2024-01-01 | End: 2024-01-01

## 2024-01-01 RX ORDER — ATORVASTATIN CALCIUM 40 MG/1
40 TABLET, FILM COATED ORAL DAILY
COMMUNITY

## 2024-01-01 RX ORDER — CARBOXYMETHYLCELLULOSE SODIUM 5 MG/ML
1-2 SOLUTION/ DROPS OPHTHALMIC
Status: DISCONTINUED | OUTPATIENT
Start: 2024-01-01 | End: 2024-01-01 | Stop reason: HOSPADM

## 2024-01-01 RX ORDER — HEPARIN SODIUM,PORCINE 10 UNIT/ML
5-20 VIAL (ML) INTRAVENOUS EVERY 24 HOURS
Status: DISCONTINUED | OUTPATIENT
Start: 2024-01-01 | End: 2024-01-01 | Stop reason: HOSPADM

## 2024-01-01 RX ORDER — ACETAMINOPHEN 325 MG/1
650 TABLET ORAL EVERY 4 HOURS PRN
Status: DISCONTINUED | OUTPATIENT
Start: 2024-01-01 | End: 2024-01-01

## 2024-01-01 RX ORDER — GLYCOPYRROLATE 0.2 MG/ML
0.2 INJECTION, SOLUTION INTRAMUSCULAR; INTRAVENOUS EVERY 4 HOURS PRN
Status: DISCONTINUED | OUTPATIENT
Start: 2024-01-01 | End: 2024-01-01

## 2024-01-01 RX ORDER — DEXTROSE MONOHYDRATE 25 G/50ML
25-50 INJECTION, SOLUTION INTRAVENOUS
Status: DISCONTINUED | OUTPATIENT
Start: 2024-01-01 | End: 2024-01-01

## 2024-01-01 RX ORDER — HEPARIN SODIUM,PORCINE 10 UNIT/ML
5-20 VIAL (ML) INTRAVENOUS
Status: DISCONTINUED | OUTPATIENT
Start: 2024-01-01 | End: 2024-05-25 | Stop reason: HOSPADM

## 2024-01-01 RX ORDER — DEXAMETHASONE SODIUM PHOSPHATE 4 MG/ML
4 INJECTION, SOLUTION INTRA-ARTICULAR; INTRALESIONAL; INTRAMUSCULAR; INTRAVENOUS; SOFT TISSUE 2 TIMES DAILY
Status: DISCONTINUED | OUTPATIENT
Start: 2024-01-01 | End: 2024-01-01

## 2024-01-01 RX ORDER — GADOBUTROL 604.72 MG/ML
0.1 INJECTION INTRAVENOUS ONCE
Status: COMPLETED | OUTPATIENT
Start: 2024-01-01 | End: 2024-01-01

## 2024-01-01 RX ORDER — DIPHENHYDRAMINE HYDROCHLORIDE 50 MG/ML
50 INJECTION INTRAMUSCULAR; INTRAVENOUS
Status: DISCONTINUED | OUTPATIENT
Start: 2024-01-01 | End: 2024-01-01

## 2024-01-01 RX ORDER — LIDOCAINE 40 MG/G
CREAM TOPICAL
Status: ACTIVE | OUTPATIENT
Start: 2024-01-01 | End: 2024-01-01

## 2024-01-01 RX ORDER — CARBOXYMETHYLCELLULOSE SODIUM 5 MG/ML
1-2 SOLUTION/ DROPS OPHTHALMIC
Status: CANCELLED | OUTPATIENT
Start: 2024-01-01

## 2024-01-01 RX ORDER — BICTEGRAVIR SODIUM, EMTRICITABINE, AND TENOFOVIR ALAFENAMIDE FUMARATE 50; 200; 25 MG/1; MG/1; MG/1
1 TABLET ORAL DAILY
COMMUNITY

## 2024-01-01 RX ORDER — VALGANCICLOVIR HYDROCHLORIDE 50 MG/ML
900 POWDER, FOR SOLUTION ORAL 2 TIMES DAILY
Qty: 504 ML | Refills: 0 | Status: DISCONTINUED | OUTPATIENT
Start: 2024-01-01 | End: 2024-01-01

## 2024-01-01 RX ORDER — CEFTRIAXONE 1 G/1
1 INJECTION, POWDER, FOR SOLUTION INTRAMUSCULAR; INTRAVENOUS EVERY 24 HOURS
Status: COMPLETED | OUTPATIENT
Start: 2024-01-01 | End: 2024-01-01

## 2024-01-01 RX ORDER — METOCLOPRAMIDE HYDROCHLORIDE 5 MG/ML
10 INJECTION INTRAMUSCULAR; INTRAVENOUS ONCE
Status: COMPLETED | OUTPATIENT
Start: 2024-01-01 | End: 2024-01-01

## 2024-01-01 RX ORDER — METHYLPREDNISOLONE SODIUM SUCCINATE 125 MG/2ML
125 INJECTION, POWDER, LYOPHILIZED, FOR SOLUTION INTRAMUSCULAR; INTRAVENOUS
Status: CANCELLED | OUTPATIENT
Start: 2024-01-01

## 2024-01-01 RX ORDER — SULFAMETHOXAZOLE AND TRIMETHOPRIM 400; 80 MG/1; MG/1
1 TABLET ORAL DAILY
COMMUNITY

## 2024-01-01 RX ORDER — EPINEPHRINE 1 MG/ML
0.3 INJECTION, SOLUTION, CONCENTRATE INTRAVENOUS EVERY 5 MIN PRN
Status: CANCELLED | OUTPATIENT
Start: 2024-01-01

## 2024-01-01 RX ORDER — ONDANSETRON 2 MG/ML
8 INJECTION INTRAMUSCULAR; INTRAVENOUS EVERY 8 HOURS PRN
Status: DISCONTINUED | OUTPATIENT
Start: 2024-01-01 | End: 2024-05-25 | Stop reason: HOSPADM

## 2024-01-01 RX ORDER — HEPARIN SODIUM,PORCINE 10 UNIT/ML
3 VIAL (ML) INTRAVENOUS
Status: DISCONTINUED | OUTPATIENT
Start: 2024-01-01 | End: 2024-01-01 | Stop reason: HOSPADM

## 2024-01-01 RX ORDER — ACETAMINOPHEN 325 MG/10.15ML
650 LIQUID ORAL ONCE
Status: COMPLETED | OUTPATIENT
Start: 2024-01-01 | End: 2024-01-01

## 2024-01-01 RX ORDER — LORAZEPAM 0.5 MG/1
.5-1 TABLET ORAL EVERY 6 HOURS PRN
Status: DISCONTINUED | OUTPATIENT
Start: 2024-01-01 | End: 2024-01-01

## 2024-01-01 RX ORDER — DEXAMETHASONE SODIUM PHOSPHATE 4 MG/ML
20 INJECTION, SOLUTION INTRA-ARTICULAR; INTRALESIONAL; INTRAMUSCULAR; INTRAVENOUS; SOFT TISSUE DAILY
Status: COMPLETED | OUTPATIENT
Start: 2024-01-01 | End: 2024-01-01

## 2024-01-01 RX ORDER — POTASSIUM CHLORIDE 29.8 MG/ML
20 INJECTION INTRAVENOUS
Status: COMPLETED | OUTPATIENT
Start: 2024-01-01 | End: 2024-01-01

## 2024-01-01 RX ORDER — SODIUM CHLORIDE 9 MG/ML
INJECTION, SOLUTION INTRAVENOUS CONTINUOUS
Status: DISCONTINUED | OUTPATIENT
Start: 2024-01-01 | End: 2024-01-01

## 2024-01-01 RX ORDER — HEPARIN SODIUM,PORCINE 10 UNIT/ML
VIAL (ML) INTRAVENOUS
Status: COMPLETED
Start: 2024-01-01 | End: 2024-01-01

## 2024-01-01 RX ORDER — ACETAMINOPHEN 325 MG/10.15ML
650 LIQUID ORAL ONCE
Qty: 20.3 ML | Refills: 0 | Status: COMPLETED | OUTPATIENT
Start: 2024-01-01 | End: 2024-01-01

## 2024-01-01 RX ORDER — LIDOCAINE HYDROCHLORIDE 20 MG/ML
5 SOLUTION OROPHARYNGEAL ONCE
Qty: 5 ML | Refills: 0 | Status: COMPLETED | OUTPATIENT
Start: 2024-01-01 | End: 2024-01-01

## 2024-01-01 RX ORDER — ACETYLCYSTEINE 100 MG/ML
4 SOLUTION ORAL; RESPIRATORY (INHALATION) EVERY 4 HOURS
Status: DISCONTINUED | OUTPATIENT
Start: 2024-01-01 | End: 2024-01-01

## 2024-01-01 RX ORDER — LORAZEPAM 2 MG/ML
0.5 INJECTION INTRAMUSCULAR EVERY 4 HOURS PRN
Status: CANCELLED | OUTPATIENT
Start: 2024-01-01

## 2024-01-01 RX ORDER — METHYLPREDNISOLONE SODIUM SUCCINATE 125 MG/2ML
125 INJECTION, POWDER, LYOPHILIZED, FOR SOLUTION INTRAMUSCULAR; INTRAVENOUS
Status: DISCONTINUED | OUTPATIENT
Start: 2024-01-01 | End: 2024-01-01

## 2024-01-01 RX ORDER — DEXAMETHASONE SODIUM PHOSPHATE 4 MG/ML
10 INJECTION, SOLUTION INTRA-ARTICULAR; INTRALESIONAL; INTRAMUSCULAR; INTRAVENOUS; SOFT TISSUE DAILY
Status: COMPLETED | OUTPATIENT
Start: 2024-01-01 | End: 2024-01-01

## 2024-01-01 RX ORDER — NALOXONE HYDROCHLORIDE 0.4 MG/ML
0.2 INJECTION, SOLUTION INTRAMUSCULAR; INTRAVENOUS; SUBCUTANEOUS
Status: DISCONTINUED | OUTPATIENT
Start: 2024-01-01 | End: 2024-01-01 | Stop reason: HOSPADM

## 2024-01-01 RX ORDER — SODIUM CHLORIDE FOR INHALATION 3 %
3 VIAL, NEBULIZER (ML) INHALATION
Status: CANCELLED | OUTPATIENT
Start: 2024-01-01

## 2024-01-01 RX ORDER — MORPHINE SULFATE 20 MG/ML
10 SOLUTION ORAL
Status: DISCONTINUED | OUTPATIENT
Start: 2024-01-01 | End: 2024-01-01 | Stop reason: HOSPADM

## 2024-01-01 RX ORDER — HEPARIN SODIUM,PORCINE 10 UNIT/ML
5-20 VIAL (ML) INTRAVENOUS
Status: DISCONTINUED | OUTPATIENT
Start: 2024-01-01 | End: 2024-01-01 | Stop reason: HOSPADM

## 2024-01-01 RX ORDER — LANOLIN ALCOHOL/MO/W.PET/CERES
3 CREAM (GRAM) TOPICAL
Status: DISCONTINUED | OUTPATIENT
Start: 2024-01-01 | End: 2024-01-01

## 2024-01-01 RX ORDER — DEXAMETHASONE 4 MG/1
8 TABLET ORAL ONCE
Qty: 2 TABLET | Refills: 0 | Status: COMPLETED | OUTPATIENT
Start: 2024-01-01 | End: 2024-01-01

## 2024-01-01 RX ORDER — MEPERIDINE HYDROCHLORIDE 25 MG/ML
25 INJECTION INTRAMUSCULAR; INTRAVENOUS; SUBCUTANEOUS
Status: DISCONTINUED | OUTPATIENT
Start: 2024-01-01 | End: 2024-01-01

## 2024-01-01 RX ORDER — ACETAMINOPHEN 650 MG/1
650 SUPPOSITORY RECTAL EVERY 6 HOURS PRN
Status: DISCONTINUED | OUTPATIENT
Start: 2024-01-01 | End: 2024-01-01

## 2024-01-01 RX ORDER — MINERAL OIL/HYDROPHIL PETROLAT
OINTMENT (GRAM) TOPICAL
Status: CANCELLED | OUTPATIENT
Start: 2024-01-01

## 2024-01-01 RX ORDER — EPINEPHRINE 1 MG/ML
0.3 INJECTION, SOLUTION, CONCENTRATE INTRAVENOUS EVERY 5 MIN PRN
Status: DISCONTINUED | OUTPATIENT
Start: 2024-01-01 | End: 2024-01-01

## 2024-01-01 RX ORDER — ATROPINE SULFATE 10 MG/ML
2 SOLUTION/ DROPS OPHTHALMIC
Status: DISCONTINUED | OUTPATIENT
Start: 2024-01-01 | End: 2024-01-01

## 2024-01-01 RX ORDER — LORAZEPAM 1 MG/1
1 TABLET ORAL
Status: DISCONTINUED | OUTPATIENT
Start: 2024-01-01 | End: 2024-01-01

## 2024-01-01 RX ORDER — ALBUTEROL SULFATE 90 UG/1
1-2 AEROSOL, METERED RESPIRATORY (INHALATION)
Status: CANCELLED
Start: 2024-01-01

## 2024-01-01 RX ORDER — MORPHINE SULFATE 4 MG/ML
3 INJECTION, SOLUTION INTRAMUSCULAR; INTRAVENOUS
Status: DISCONTINUED | OUTPATIENT
Start: 2024-01-01 | End: 2024-01-01

## 2024-01-01 RX ORDER — IPRATROPIUM BROMIDE AND ALBUTEROL SULFATE 2.5; .5 MG/3ML; MG/3ML
3 SOLUTION RESPIRATORY (INHALATION)
Status: DISCONTINUED | OUTPATIENT
Start: 2024-01-01 | End: 2024-01-01

## 2024-01-01 RX ORDER — DEXTROSE MONOHYDRATE 100 MG/ML
INJECTION, SOLUTION INTRAVENOUS CONTINUOUS PRN
Status: DISCONTINUED | OUTPATIENT
Start: 2024-01-01 | End: 2024-01-01

## 2024-01-01 RX ORDER — PROCHLORPERAZINE MALEATE 5 MG
10 TABLET ORAL EVERY 6 HOURS PRN
Status: DISCONTINUED | OUTPATIENT
Start: 2024-01-01 | End: 2024-01-01

## 2024-01-01 RX ORDER — ONDANSETRON 2 MG/ML
8 INJECTION INTRAMUSCULAR; INTRAVENOUS EVERY 8 HOURS PRN
Status: CANCELLED | OUTPATIENT
Start: 2024-01-01

## 2024-01-01 RX ORDER — CEFTRIAXONE 1 G/1
1 INJECTION, POWDER, FOR SOLUTION INTRAMUSCULAR; INTRAVENOUS EVERY 24 HOURS
Status: DISCONTINUED | OUTPATIENT
Start: 2024-01-01 | End: 2024-01-01

## 2024-01-01 RX ORDER — NALOXONE HYDROCHLORIDE 0.4 MG/ML
0.1 INJECTION, SOLUTION INTRAMUSCULAR; INTRAVENOUS; SUBCUTANEOUS
Status: DISCONTINUED | OUTPATIENT
Start: 2024-01-01 | End: 2024-01-01 | Stop reason: HOSPADM

## 2024-01-01 RX ORDER — LIDOCAINE 4 G/G
1 PATCH TOPICAL
Status: COMPLETED | OUTPATIENT
Start: 2024-01-01 | End: 2024-01-01

## 2024-01-01 RX ORDER — IOPAMIDOL 755 MG/ML
107 INJECTION, SOLUTION INTRAVASCULAR ONCE
Status: COMPLETED | OUTPATIENT
Start: 2024-01-01 | End: 2024-01-01

## 2024-01-01 RX ORDER — ACETAMINOPHEN 325 MG/1
650 TABLET ORAL ONCE
Qty: 2 TABLET | Refills: 0 | Status: DISCONTINUED | OUTPATIENT
Start: 2024-01-01 | End: 2024-01-01 | Stop reason: ALTCHOICE

## 2024-01-01 RX ORDER — MORPHINE SULFATE 20 MG/ML
10 SOLUTION ORAL EVERY 6 HOURS PRN
Status: DISCONTINUED | OUTPATIENT
Start: 2024-01-01 | End: 2024-05-25 | Stop reason: HOSPADM

## 2024-01-01 RX ORDER — LORAZEPAM 1 MG/1
1 TABLET ORAL
Status: DISCONTINUED | OUTPATIENT
Start: 2024-01-01 | End: 2024-01-01 | Stop reason: HOSPADM

## 2024-01-01 RX ORDER — SALIVA STIMULANT COMB. NO.3
2 SPRAY, NON-AEROSOL (ML) MUCOUS MEMBRANE
Status: DISCONTINUED | OUTPATIENT
Start: 2024-01-01 | End: 2024-05-25 | Stop reason: HOSPADM

## 2024-01-01 RX ORDER — IPRATROPIUM BROMIDE AND ALBUTEROL SULFATE 2.5; .5 MG/3ML; MG/3ML
3 SOLUTION RESPIRATORY (INHALATION) EVERY 4 HOURS PRN
Status: DISCONTINUED | OUTPATIENT
Start: 2024-01-01 | End: 2024-05-25 | Stop reason: HOSPADM

## 2024-01-01 RX ORDER — GUAIFENESIN 200 MG/10ML
200 LIQUID ORAL EVERY 4 HOURS PRN
Status: DISCONTINUED | OUTPATIENT
Start: 2024-01-01 | End: 2024-01-01

## 2024-01-01 RX ORDER — ATROPINE SULFATE 10 MG/ML
2 SOLUTION/ DROPS OPHTHALMIC EVERY 4 HOURS PRN
Status: DISCONTINUED | OUTPATIENT
Start: 2024-01-01 | End: 2024-05-25 | Stop reason: HOSPADM

## 2024-01-01 RX ORDER — LORAZEPAM 2 MG/ML
1 INJECTION INTRAMUSCULAR
Status: DISCONTINUED | OUTPATIENT
Start: 2024-01-01 | End: 2024-01-01

## 2024-01-01 RX ORDER — DIPHENHYDRAMINE HCL 12.5MG/5ML
50 LIQUID (ML) ORAL ONCE
Qty: 20 ML | Refills: 0 | Status: COMPLETED | OUTPATIENT
Start: 2024-01-01 | End: 2024-01-01

## 2024-01-01 RX ORDER — IPRATROPIUM BROMIDE AND ALBUTEROL SULFATE 2.5; .5 MG/3ML; MG/3ML
3 SOLUTION RESPIRATORY (INHALATION) EVERY 4 HOURS PRN
Status: DISCONTINUED | OUTPATIENT
Start: 2024-01-01 | End: 2024-01-01 | Stop reason: HOSPADM

## 2024-01-01 RX ORDER — MINERAL OIL/HYDROPHIL PETROLAT
OINTMENT (GRAM) TOPICAL
Status: DISCONTINUED | OUTPATIENT
Start: 2024-01-01 | End: 2024-01-01 | Stop reason: HOSPADM

## 2024-01-01 RX ORDER — MEPERIDINE HYDROCHLORIDE 25 MG/ML
25 INJECTION INTRAMUSCULAR; INTRAVENOUS; SUBCUTANEOUS EVERY 30 MIN PRN
Status: DISCONTINUED | OUTPATIENT
Start: 2024-01-01 | End: 2024-01-01

## 2024-01-01 RX ORDER — ALBUTEROL SULFATE 0.83 MG/ML
2.5 SOLUTION RESPIRATORY (INHALATION)
Status: DISCONTINUED | OUTPATIENT
Start: 2024-01-01 | End: 2024-01-01

## 2024-01-01 RX ORDER — ONDANSETRON 2 MG/ML
8 INJECTION INTRAMUSCULAR; INTRAVENOUS EVERY 8 HOURS PRN
Status: DISCONTINUED | OUTPATIENT
Start: 2024-01-01 | End: 2024-01-01 | Stop reason: HOSPADM

## 2024-01-01 RX ORDER — DIPHENHYDRAMINE HCL 12.5MG/5ML
50 LIQUID (ML) ORAL ONCE
Status: COMPLETED | OUTPATIENT
Start: 2024-01-01 | End: 2024-01-01

## 2024-01-01 RX ORDER — SENNOSIDES 8.6 MG
8.6 TABLET ORAL 2 TIMES DAILY PRN
Status: DISCONTINUED | OUTPATIENT
Start: 2024-01-01 | End: 2024-01-01

## 2024-01-01 RX ORDER — MORPHINE SULFATE 20 MG/ML
5 SOLUTION ORAL
Status: DISCONTINUED | OUTPATIENT
Start: 2024-01-01 | End: 2024-01-01 | Stop reason: HOSPADM

## 2024-01-01 RX ORDER — SODIUM CHLORIDE FOR INHALATION 3 %
3 VIAL, NEBULIZER (ML) INHALATION
Status: DISCONTINUED | OUTPATIENT
Start: 2024-01-01 | End: 2024-05-25 | Stop reason: HOSPADM

## 2024-01-01 RX ORDER — DIPHENHYDRAMINE HCL 25 MG
50 CAPSULE ORAL ONCE
Status: CANCELLED
Start: 2024-01-01

## 2024-01-01 RX ORDER — ACETAMINOPHEN 325 MG/1
650 TABLET ORAL EVERY 4 HOURS PRN
Status: DISCONTINUED | OUTPATIENT
Start: 2024-01-01 | End: 2024-05-25 | Stop reason: HOSPADM

## 2024-01-01 RX ORDER — DEXAMETHASONE 4 MG/1
8 TABLET ORAL DAILY
Status: DISCONTINUED | OUTPATIENT
Start: 2024-01-01 | End: 2024-01-01

## 2024-01-01 RX ORDER — GABAPENTIN 100 MG/1
100 CAPSULE ORAL 2 TIMES DAILY
COMMUNITY

## 2024-01-01 RX ORDER — IPRATROPIUM BROMIDE AND ALBUTEROL SULFATE 2.5; .5 MG/3ML; MG/3ML
3 SOLUTION RESPIRATORY (INHALATION) 3 TIMES DAILY PRN
Status: DISCONTINUED | OUTPATIENT
Start: 2024-01-01 | End: 2024-01-01

## 2024-01-01 RX ORDER — FAMOTIDINE 40 MG/5ML
20 POWDER, FOR SUSPENSION ORAL 2 TIMES DAILY
Status: DISCONTINUED | OUTPATIENT
Start: 2024-01-01 | End: 2024-01-01

## 2024-01-01 RX ORDER — DEXAMETHASONE SODIUM PHOSPHATE 4 MG/ML
20 INJECTION, SOLUTION INTRA-ARTICULAR; INTRALESIONAL; INTRAMUSCULAR; INTRAVENOUS; SOFT TISSUE DAILY
Status: DISCONTINUED | OUTPATIENT
Start: 2024-01-01 | End: 2024-01-01

## 2024-01-01 RX ORDER — ONDANSETRON 8 MG/1
16 TABLET, FILM COATED ORAL ONCE
Qty: 2 TABLET | Refills: 0 | Status: COMPLETED | OUTPATIENT
Start: 2024-01-01 | End: 2024-01-01

## 2024-01-01 RX ORDER — VALGANCICLOVIR HYDROCHLORIDE 50 MG/ML
450 POWDER, FOR SOLUTION ORAL 2 TIMES DAILY
Status: DISCONTINUED | OUTPATIENT
Start: 2024-01-01 | End: 2024-01-01

## 2024-01-01 RX ORDER — ACETAMINOPHEN 325 MG/1
650 TABLET ORAL EVERY 4 HOURS PRN
Status: CANCELLED | OUTPATIENT
Start: 2024-01-01

## 2024-01-01 RX ORDER — DEXAMETHASONE SODIUM PHOSPHATE 4 MG/ML
5 INJECTION, SOLUTION INTRA-ARTICULAR; INTRALESIONAL; INTRAMUSCULAR; INTRAVENOUS; SOFT TISSUE DAILY
Status: DISCONTINUED | OUTPATIENT
Start: 2024-01-01 | End: 2024-01-01

## 2024-01-01 RX ORDER — GADOBUTROL 604.72 MG/ML
7.5 INJECTION INTRAVENOUS ONCE
Status: COMPLETED | OUTPATIENT
Start: 2024-01-01 | End: 2024-01-01

## 2024-01-01 RX ORDER — ALBUTEROL SULFATE 0.83 MG/ML
2.5 SOLUTION RESPIRATORY (INHALATION)
Status: CANCELLED | OUTPATIENT
Start: 2024-01-01

## 2024-01-01 RX ORDER — MINERAL OIL/HYDROPHIL PETROLAT
OINTMENT (GRAM) TOPICAL
Status: DISCONTINUED | OUTPATIENT
Start: 2024-01-01 | End: 2024-05-25 | Stop reason: HOSPADM

## 2024-01-01 RX ORDER — ALBUTEROL SULFATE 90 UG/1
1-2 AEROSOL, METERED RESPIRATORY (INHALATION)
Status: DISCONTINUED | OUTPATIENT
Start: 2024-01-01 | End: 2024-01-01

## 2024-01-01 RX ORDER — BISACODYL 10 MG
10 SUPPOSITORY, RECTAL RECTAL DAILY PRN
Status: CANCELLED | OUTPATIENT
Start: 2024-01-01

## 2024-01-01 RX ORDER — MORPHINE SULFATE 20 MG/ML
10 SOLUTION ORAL
Status: CANCELLED | OUTPATIENT
Start: 2024-01-01

## 2024-01-01 RX ORDER — ONDANSETRON 4 MG/1
4 TABLET, FILM COATED ORAL EVERY 6 HOURS PRN
Status: DISCONTINUED | OUTPATIENT
Start: 2024-01-01 | End: 2024-01-01

## 2024-01-01 RX ORDER — GLYCOPYRROLATE 0.2 MG/ML
0.2 INJECTION, SOLUTION INTRAMUSCULAR; INTRAVENOUS EVERY 6 HOURS
Status: DISCONTINUED | OUTPATIENT
Start: 2024-01-01 | End: 2024-01-01

## 2024-01-01 RX ORDER — DEXAMETHASONE SODIUM PHOSPHATE 4 MG/ML
4 INJECTION, SOLUTION INTRA-ARTICULAR; INTRALESIONAL; INTRAMUSCULAR; INTRAVENOUS; SOFT TISSUE 2 TIMES DAILY
Status: COMPLETED | OUTPATIENT
Start: 2024-01-01 | End: 2024-01-01

## 2024-01-01 RX ORDER — NICOTINE POLACRILEX 4 MG
15-30 LOZENGE BUCCAL
Status: DISCONTINUED | OUTPATIENT
Start: 2024-01-01 | End: 2024-01-01

## 2024-01-01 RX ORDER — EMTRICITABINE AND TENOFOVIR DISOPROXIL FUMARATE 200; 300 MG/1; MG/1
1 TABLET, FILM COATED ORAL DAILY
Status: DISCONTINUED | OUTPATIENT
Start: 2024-01-01 | End: 2024-01-01

## 2024-01-01 RX ORDER — LORAZEPAM 2 MG/ML
.5-1 INJECTION INTRAMUSCULAR EVERY 6 HOURS PRN
Status: DISCONTINUED | OUTPATIENT
Start: 2024-01-01 | End: 2024-01-01

## 2024-01-01 RX ORDER — HEPARIN SODIUM,PORCINE 10 UNIT/ML
5-20 VIAL (ML) INTRAVENOUS
Status: CANCELLED | OUTPATIENT
Start: 2024-01-01

## 2024-01-01 RX ORDER — MORPHINE SULFATE 20 MG/ML
10 SOLUTION ORAL
Status: DISCONTINUED | OUTPATIENT
Start: 2024-01-01 | End: 2024-01-01

## 2024-01-01 RX ORDER — LINEZOLID 600 MG/1
600 TABLET, FILM COATED ORAL 2 TIMES DAILY
COMMUNITY
End: 2024-01-01

## 2024-01-01 RX ORDER — ATORVASTATIN CALCIUM 40 MG/1
40 TABLET, FILM COATED ORAL DAILY
Status: DISCONTINUED | OUTPATIENT
Start: 2024-01-01 | End: 2024-01-01

## 2024-01-01 RX ORDER — MEPERIDINE HYDROCHLORIDE 25 MG/ML
25 INJECTION INTRAMUSCULAR; INTRAVENOUS; SUBCUTANEOUS EVERY 30 MIN PRN
Status: CANCELLED | OUTPATIENT
Start: 2024-01-01

## 2024-01-01 RX ORDER — ATROPINE SULFATE 10 MG/ML
2 SOLUTION/ DROPS OPHTHALMIC EVERY 4 HOURS PRN
Status: DISCONTINUED | OUTPATIENT
Start: 2024-01-01 | End: 2024-01-01 | Stop reason: HOSPADM

## 2024-01-01 RX ORDER — SODIUM CHLORIDE FOR INHALATION 3 %
3 VIAL, NEBULIZER (ML) INHALATION
Status: DISCONTINUED | OUTPATIENT
Start: 2024-01-01 | End: 2024-01-01 | Stop reason: HOSPADM

## 2024-01-01 RX ORDER — LORAZEPAM 2 MG/ML
1 INJECTION INTRAMUSCULAR
Status: CANCELLED | OUTPATIENT
Start: 2024-01-01

## 2024-01-01 RX ORDER — GABAPENTIN 250 MG/5ML
100 SOLUTION ORAL EVERY 12 HOURS SCHEDULED
Status: DISCONTINUED | OUTPATIENT
Start: 2024-01-01 | End: 2024-01-01

## 2024-01-01 RX ORDER — LIDOCAINE HYDROCHLORIDE 20 MG/ML
JELLY TOPICAL ONCE
Status: COMPLETED | OUTPATIENT
Start: 2024-01-01 | End: 2024-01-01

## 2024-01-01 RX ORDER — BISACODYL 10 MG
10 SUPPOSITORY, RECTAL RECTAL
Status: DISCONTINUED | OUTPATIENT
Start: 2024-01-01 | End: 2024-01-01

## 2024-01-01 RX ORDER — SENNOSIDES 8.6 MG
1 TABLET ORAL 2 TIMES DAILY PRN
Status: DISCONTINUED | OUTPATIENT
Start: 2024-01-01 | End: 2024-01-01

## 2024-01-01 RX ORDER — ACETAMINOPHEN 325 MG/1
650 TABLET ORAL ONCE
Status: CANCELLED
Start: 2024-01-01

## 2024-01-01 RX ORDER — METHYLPREDNISOLONE SODIUM SUCCINATE 125 MG/2ML
125 INJECTION, POWDER, LYOPHILIZED, FOR SOLUTION INTRAMUSCULAR; INTRAVENOUS
Status: CANCELLED
Start: 2024-01-01

## 2024-01-01 RX ORDER — LORAZEPAM 1 MG/1
1 TABLET ORAL
Status: DISCONTINUED | OUTPATIENT
Start: 2024-01-01 | End: 2024-05-25 | Stop reason: HOSPADM

## 2024-01-01 RX ORDER — MORPHINE SULFATE 20 MG/ML
5 SOLUTION ORAL EVERY 4 HOURS PRN
Status: DISCONTINUED | OUTPATIENT
Start: 2024-01-01 | End: 2024-01-01

## 2024-01-01 RX ORDER — LORAZEPAM 2 MG/ML
0.5 INJECTION INTRAMUSCULAR EVERY 4 HOURS PRN
Status: DISCONTINUED | OUTPATIENT
Start: 2024-01-01 | End: 2024-01-01

## 2024-01-01 RX ORDER — DIPHENHYDRAMINE HCL 25 MG
50 CAPSULE ORAL ONCE
Qty: 2 CAPSULE | Refills: 0 | Status: DISCONTINUED | OUTPATIENT
Start: 2024-01-01 | End: 2024-01-01 | Stop reason: ALTCHOICE

## 2024-01-01 RX ORDER — HEPARIN SODIUM,PORCINE 10 UNIT/ML
5-20 VIAL (ML) INTRAVENOUS EVERY 24 HOURS
Status: CANCELLED | OUTPATIENT
Start: 2024-01-01

## 2024-01-01 RX ORDER — ACETYLCYSTEINE 100 MG/ML
4 SOLUTION ORAL; RESPIRATORY (INHALATION) 2 TIMES DAILY
Status: DISCONTINUED | OUTPATIENT
Start: 2024-01-01 | End: 2024-01-01

## 2024-01-01 RX ORDER — DEXAMETHASONE SODIUM PHOSPHATE 4 MG/ML
10 INJECTION, SOLUTION INTRA-ARTICULAR; INTRALESIONAL; INTRAMUSCULAR; INTRAVENOUS; SOFT TISSUE DAILY
Status: DISCONTINUED | OUTPATIENT
Start: 2024-01-01 | End: 2024-01-01

## 2024-01-01 RX ORDER — ATROPINE SULFATE 10 MG/ML
2 SOLUTION/ DROPS OPHTHALMIC EVERY 4 HOURS PRN
Status: DISCONTINUED | OUTPATIENT
Start: 2024-01-01 | End: 2024-01-01

## 2024-01-01 RX ORDER — MORPHINE SULFATE 4 MG/ML
3 INJECTION, SOLUTION INTRAMUSCULAR; INTRAVENOUS EVERY 6 HOURS
Status: DISCONTINUED | OUTPATIENT
Start: 2024-01-01 | End: 2024-01-01

## 2024-01-01 RX ORDER — GABAPENTIN 100 MG/1
100 CAPSULE ORAL 2 TIMES DAILY
Status: DISCONTINUED | OUTPATIENT
Start: 2024-01-01 | End: 2024-01-01

## 2024-01-01 RX ORDER — ACETAMINOPHEN 650 MG/1
650 SUPPOSITORY RECTAL EVERY 4 HOURS PRN
Status: CANCELLED | OUTPATIENT
Start: 2024-01-01

## 2024-01-01 RX ORDER — NALOXONE HYDROCHLORIDE 0.4 MG/ML
0.1 INJECTION, SOLUTION INTRAMUSCULAR; INTRAVENOUS; SUBCUTANEOUS
Status: DISCONTINUED | OUTPATIENT
Start: 2024-01-01 | End: 2024-01-01

## 2024-01-01 RX ORDER — HEPARIN SODIUM,PORCINE 10 UNIT/ML
3 VIAL (ML) INTRAVENOUS
Status: CANCELLED | OUTPATIENT
Start: 2024-01-01

## 2024-01-01 RX ORDER — ACETAMINOPHEN 650 MG/1
650 SUPPOSITORY RECTAL EVERY 4 HOURS PRN
Status: DISCONTINUED | OUTPATIENT
Start: 2024-01-01 | End: 2024-01-01

## 2024-01-01 RX ORDER — SALIVA STIMULANT COMB. NO.3
2 SPRAY, NON-AEROSOL (ML) MUCOUS MEMBRANE
Status: DISCONTINUED | OUTPATIENT
Start: 2024-01-01 | End: 2024-01-01 | Stop reason: HOSPADM

## 2024-01-01 RX ORDER — SODIUM BICARBONATE 84 MG/ML
50 INJECTION, SOLUTION INTRAVENOUS
Status: DISCONTINUED | OUTPATIENT
Start: 2024-01-01 | End: 2024-01-01

## 2024-01-01 RX ORDER — SALIVA STIMULANT COMB. NO.3
2 SPRAY, NON-AEROSOL (ML) MUCOUS MEMBRANE
Status: CANCELLED | OUTPATIENT
Start: 2024-01-01

## 2024-01-01 RX ORDER — NALOXONE HYDROCHLORIDE 0.4 MG/ML
0.2 INJECTION, SOLUTION INTRAMUSCULAR; INTRAVENOUS; SUBCUTANEOUS
Status: DISCONTINUED | OUTPATIENT
Start: 2024-01-01 | End: 2024-01-01

## 2024-01-01 RX ORDER — LIDOCAINE HYDROCHLORIDE 20 MG/ML
5 SOLUTION OROPHARYNGEAL ONCE
Status: DISCONTINUED | OUTPATIENT
Start: 2024-01-01 | End: 2024-01-01

## 2024-01-01 RX ORDER — ATROPINE SULFATE 10 MG/ML
2 SOLUTION/ DROPS OPHTHALMIC EVERY 4 HOURS PRN
Status: CANCELLED | OUTPATIENT
Start: 2024-01-01

## 2024-01-01 RX ORDER — HEPARIN SODIUM,PORCINE 10 UNIT/ML
5-20 VIAL (ML) INTRAVENOUS EVERY 24 HOURS
Status: DISCONTINUED | OUTPATIENT
Start: 2024-01-01 | End: 2024-01-01

## 2024-01-01 RX ORDER — IPRATROPIUM BROMIDE AND ALBUTEROL SULFATE 2.5; .5 MG/3ML; MG/3ML
3 SOLUTION RESPIRATORY (INHALATION) EVERY 4 HOURS PRN
Status: CANCELLED | OUTPATIENT
Start: 2024-01-01

## 2024-01-01 RX ORDER — BISACODYL 10 MG
10 SUPPOSITORY, RECTAL RECTAL DAILY PRN
Status: DISCONTINUED | OUTPATIENT
Start: 2024-01-01 | End: 2024-05-25 | Stop reason: HOSPADM

## 2024-01-01 RX ORDER — LORAZEPAM 2 MG/ML
1 INJECTION INTRAMUSCULAR
Status: DISCONTINUED | OUTPATIENT
Start: 2024-01-01 | End: 2024-05-25 | Stop reason: HOSPADM

## 2024-01-01 RX ORDER — DIPHENHYDRAMINE HYDROCHLORIDE 50 MG/ML
50 INJECTION INTRAMUSCULAR; INTRAVENOUS
Status: CANCELLED
Start: 2024-01-01

## 2024-01-01 RX ORDER — LEUCOVORIN CALCIUM 350 MG/17.5ML
25 INJECTION, POWDER, LYOPHILIZED, FOR SOLUTION INTRAMUSCULAR; INTRAVENOUS EVERY 6 HOURS
Status: DISCONTINUED | OUTPATIENT
Start: 2024-01-01 | End: 2024-01-01

## 2024-01-01 RX ORDER — HEPARIN SODIUM,PORCINE 10 UNIT/ML
3 VIAL (ML) INTRAVENOUS
Status: DISCONTINUED | OUTPATIENT
Start: 2024-01-01 | End: 2024-05-25 | Stop reason: HOSPADM

## 2024-01-01 RX ORDER — CARBOXYMETHYLCELLULOSE SODIUM 5 MG/ML
1-2 SOLUTION/ DROPS OPHTHALMIC
Status: DISCONTINUED | OUTPATIENT
Start: 2024-01-01 | End: 2024-05-25 | Stop reason: HOSPADM

## 2024-01-01 RX ORDER — ENOXAPARIN SODIUM 100 MG/ML
40 INJECTION SUBCUTANEOUS EVERY 24 HOURS
Status: DISCONTINUED | OUTPATIENT
Start: 2024-01-01 | End: 2024-01-01

## 2024-01-01 RX ORDER — GLYCOPYRROLATE 0.2 MG/ML
0.2 INJECTION, SOLUTION INTRAMUSCULAR; INTRAVENOUS EVERY 4 HOURS PRN
Status: CANCELLED | OUTPATIENT
Start: 2024-01-01

## 2024-01-01 RX ORDER — MORPHINE SULFATE 20 MG/ML
5 SOLUTION ORAL
Status: CANCELLED | OUTPATIENT
Start: 2024-01-01

## 2024-01-01 RX ORDER — LORAZEPAM 2 MG/ML
1 INJECTION INTRAMUSCULAR
Status: DISCONTINUED | OUTPATIENT
Start: 2024-01-01 | End: 2024-01-01 | Stop reason: HOSPADM

## 2024-01-01 RX ORDER — ACETAMINOPHEN 650 MG/1
650 SUPPOSITORY RECTAL EVERY 4 HOURS PRN
Status: DISCONTINUED | OUTPATIENT
Start: 2024-01-01 | End: 2024-05-25 | Stop reason: HOSPADM

## 2024-01-01 RX ORDER — LINEZOLID 600 MG/1
600 TABLET, FILM COATED ORAL 2 TIMES DAILY
Status: DISCONTINUED | OUTPATIENT
Start: 2024-01-01 | End: 2024-01-01

## 2024-01-01 RX ORDER — DEXTROSE MONOHYDRATE AND SODIUM CHLORIDE 5; .45 G/100ML; G/100ML
INJECTION, SOLUTION INTRAVENOUS CONTINUOUS
Status: DISCONTINUED | OUTPATIENT
Start: 2024-01-01 | End: 2024-01-01

## 2024-01-01 RX ORDER — ACETAMINOPHEN 650 MG/1
650 SUPPOSITORY RECTAL EVERY 4 HOURS PRN
Status: DISCONTINUED | OUTPATIENT
Start: 2024-01-01 | End: 2024-01-01 | Stop reason: HOSPADM

## 2024-01-01 RX ORDER — LORAZEPAM 1 MG/1
1 TABLET ORAL
Status: CANCELLED | OUTPATIENT
Start: 2024-01-01

## 2024-01-01 RX ADMIN — THIAMINE HCL TAB 100 MG 100 MG: 100 TAB at 21:04

## 2024-01-01 RX ADMIN — LEUCOVORIN CALCIUM 25 MG: 350 INJECTION, POWDER, LYOPHILIZED, FOR SOLUTION INTRAMUSCULAR; INTRAVENOUS at 15:02

## 2024-01-01 RX ADMIN — LIDOCAINE HYDROCHLORIDE: 20 JELLY TOPICAL at 15:08

## 2024-01-01 RX ADMIN — DEXAMETHASONE SODIUM PHOSPHATE 40 MG: 10 INJECTION INTRAMUSCULAR; INTRAVENOUS at 09:53

## 2024-01-01 RX ADMIN — INSULIN ASPART 1 UNITS: 100 INJECTION, SOLUTION INTRAVENOUS; SUBCUTANEOUS at 04:23

## 2024-01-01 RX ADMIN — GABAPENTIN 100 MG: 250 SUSPENSION ORAL at 21:07

## 2024-01-01 RX ADMIN — DOLUTEGRAVIR SODIUM 50 MG: 50 TABLET, FILM COATED ORAL at 21:58

## 2024-01-01 RX ADMIN — ENOXAPARIN SODIUM 40 MG: 40 INJECTION SUBCUTANEOUS at 13:22

## 2024-01-01 RX ADMIN — INSULIN ASPART 1 UNITS: 100 INJECTION, SOLUTION INTRAVENOUS; SUBCUTANEOUS at 16:54

## 2024-01-01 RX ADMIN — Medication 5 ML: at 10:16

## 2024-01-01 RX ADMIN — EMTRICITABINE AND TENOFOVIR DISOPROXIL FUMARATE 1 TABLET: 200; 300 TABLET, FILM COATED ORAL at 20:40

## 2024-01-01 RX ADMIN — LEUCOVORIN CALCIUM 25 MG: 350 INJECTION, POWDER, LYOPHILIZED, FOR SOLUTION INTRAMUSCULAR; INTRAVENOUS at 09:30

## 2024-01-01 RX ADMIN — DEXAMETHASONE SODIUM PHOSPHATE 4 MG: 4 INJECTION, SOLUTION INTRA-ARTICULAR; INTRALESIONAL; INTRAMUSCULAR; INTRAVENOUS; SOFT TISSUE at 09:39

## 2024-01-01 RX ADMIN — DAPSONE 100 MG: 100 TABLET ORAL at 13:37

## 2024-01-01 RX ADMIN — GABAPENTIN 100 MG: 250 SUSPENSION ORAL at 13:22

## 2024-01-01 RX ADMIN — ENOXAPARIN SODIUM 40 MG: 40 INJECTION SUBCUTANEOUS at 15:14

## 2024-01-01 RX ADMIN — FAMOTIDINE 20 MG: 40 POWDER, FOR SUSPENSION ORAL at 13:22

## 2024-01-01 RX ADMIN — THIAMINE HCL TAB 100 MG 100 MG: 100 TAB at 22:55

## 2024-01-01 RX ADMIN — FAMOTIDINE 20 MG: 40 POWDER, FOR SUSPENSION ORAL at 08:34

## 2024-01-01 RX ADMIN — DEXAMETHASONE SODIUM PHOSPHATE 4 MG: 4 INJECTION, SOLUTION INTRA-ARTICULAR; INTRALESIONAL; INTRAMUSCULAR; INTRAVENOUS; SOFT TISSUE at 21:39

## 2024-01-01 RX ADMIN — SODIUM BICARBONATE: 84 INJECTION, SOLUTION INTRAVENOUS at 05:40

## 2024-01-01 RX ADMIN — MORPHINE SULFATE 10 MG: 100 SOLUTION ORAL at 09:18

## 2024-01-01 RX ADMIN — THIAMINE HCL TAB 100 MG 100 MG: 100 TAB at 21:35

## 2024-01-01 RX ADMIN — DEXAMETHASONE 4 MG: 4 TABLET ORAL at 09:00

## 2024-01-01 RX ADMIN — ATORVASTATIN CALCIUM 40 MG: 40 TABLET, FILM COATED ORAL at 09:11

## 2024-01-01 RX ADMIN — DAPSONE 100 MG: 100 TABLET ORAL at 07:55

## 2024-01-01 RX ADMIN — INSULIN ASPART 1 UNITS: 100 INJECTION, SOLUTION INTRAVENOUS; SUBCUTANEOUS at 13:13

## 2024-01-01 RX ADMIN — SODIUM BICARBONATE: 84 INJECTION, SOLUTION INTRAVENOUS at 10:38

## 2024-01-01 RX ADMIN — MORPHINE SULFATE 3 MG: 4 INJECTION INTRAVENOUS at 18:13

## 2024-01-01 RX ADMIN — ATROPINE SULFATE 2 DROP: 10 SOLUTION/ DROPS OPHTHALMIC at 13:02

## 2024-01-01 RX ADMIN — DEXAMETHASONE 8 MG: 4 TABLET ORAL at 20:22

## 2024-01-01 RX ADMIN — Medication 5 ML: at 04:05

## 2024-01-01 RX ADMIN — Medication 5 ML: at 18:32

## 2024-01-01 RX ADMIN — GANCICLOVIR SODIUM 350 MG: 500 INJECTION, POWDER, LYOPHILIZED, FOR SOLUTION INTRAVENOUS at 09:37

## 2024-01-01 RX ADMIN — POTASSIUM CHLORIDE 20 MEQ: 29.8 INJECTION, SOLUTION INTRAVENOUS at 09:03

## 2024-01-01 RX ADMIN — THIAMINE HCL TAB 100 MG 100 MG: 100 TAB at 21:12

## 2024-01-01 RX ADMIN — SODIUM CHLORIDE SOLN NEBU 3% 3 ML: 3 NEBU SOLN at 21:27

## 2024-01-01 RX ADMIN — GLYCOPYRROLATE 0.2 MG: 0.2 INJECTION INTRAMUSCULAR; INTRAVENOUS at 13:50

## 2024-01-01 RX ADMIN — INSULIN ASPART 1 UNITS: 100 INJECTION, SOLUTION INTRAVENOUS; SUBCUTANEOUS at 01:24

## 2024-01-01 RX ADMIN — ATROPINE SULFATE 2 DROP: 10 SOLUTION/ DROPS OPHTHALMIC at 16:18

## 2024-01-01 RX ADMIN — GABAPENTIN 100 MG: 250 SUSPENSION ORAL at 20:56

## 2024-01-01 RX ADMIN — DOLUTEGRAVIR SODIUM 50 MG: 50 TABLET, FILM COATED ORAL at 23:11

## 2024-01-01 RX ADMIN — EMTRICITABINE AND TENOFOVIR DISOPROXIL FUMARATE 1 TABLET: 200; 300 TABLET, FILM COATED ORAL at 20:09

## 2024-01-01 RX ADMIN — FAMOTIDINE 20 MG: 40 POWDER, FOR SUSPENSION ORAL at 20:33

## 2024-01-01 RX ADMIN — GUAIFENESIN 200 MG: 200 SOLUTION ORAL at 19:39

## 2024-01-01 RX ADMIN — ENOXAPARIN SODIUM 40 MG: 40 INJECTION SUBCUTANEOUS at 13:37

## 2024-01-01 RX ADMIN — Medication 5 ML: at 10:48

## 2024-01-01 RX ADMIN — Medication 5 ML: at 13:34

## 2024-01-01 RX ADMIN — MORPHINE SULFATE 3 MG: 4 INJECTION INTRAVENOUS at 11:36

## 2024-01-01 RX ADMIN — FAMOTIDINE 20 MG: 40 POWDER, FOR SUSPENSION ORAL at 20:22

## 2024-01-01 RX ADMIN — MAGNESIUM HYDROXIDE 30 ML: 400 SUSPENSION ORAL at 08:45

## 2024-01-01 RX ADMIN — MORPHINE SULFATE 3 MG: 4 INJECTION INTRAVENOUS at 09:40

## 2024-01-01 RX ADMIN — GANCICLOVIR SODIUM 350 MG: 500 INJECTION, POWDER, LYOPHILIZED, FOR SOLUTION INTRAVENOUS at 08:43

## 2024-01-01 RX ADMIN — GABAPENTIN 100 MG: 250 SUSPENSION ORAL at 12:13

## 2024-01-01 RX ADMIN — ATROPINE SULFATE 2 DROP: 10 SOLUTION OPHTHALMIC at 19:27

## 2024-01-01 RX ADMIN — DOLUTEGRAVIR SODIUM 50 MG: 50 TABLET, FILM COATED ORAL at 21:38

## 2024-01-01 RX ADMIN — ENOXAPARIN SODIUM 40 MG: 40 INJECTION SUBCUTANEOUS at 15:42

## 2024-01-01 RX ADMIN — Medication 5 ML: at 04:18

## 2024-01-01 RX ADMIN — DEXAMETHASONE SODIUM PHOSPHATE 10 MG: 4 INJECTION, SOLUTION INTRA-ARTICULAR; INTRALESIONAL; INTRAMUSCULAR; INTRAVENOUS; SOFT TISSUE at 10:11

## 2024-01-01 RX ADMIN — Medication 5 ML: at 06:35

## 2024-01-01 RX ADMIN — FAMOTIDINE 20 MG: 40 POWDER, FOR SUSPENSION ORAL at 08:57

## 2024-01-01 RX ADMIN — ENOXAPARIN SODIUM 40 MG: 40 INJECTION SUBCUTANEOUS at 19:38

## 2024-01-01 RX ADMIN — THIAMINE HCL TAB 100 MG 100 MG: 100 TAB at 21:24

## 2024-01-01 RX ADMIN — DEXAMETHASONE SODIUM PHOSPHATE 10 MG: 4 INJECTION, SOLUTION INTRA-ARTICULAR; INTRALESIONAL; INTRAMUSCULAR; INTRAVENOUS; SOFT TISSUE at 09:04

## 2024-01-01 RX ADMIN — GADOBUTROL 7.1 ML: 604.72 INJECTION INTRAVENOUS at 12:12

## 2024-01-01 RX ADMIN — ACETYLCYSTEINE 4 ML: 100 SOLUTION ORAL; RESPIRATORY (INHALATION) at 08:25

## 2024-01-01 RX ADMIN — DEXTROSE MONOHYDRATE AND SODIUM CHLORIDE: 5; .9 INJECTION, SOLUTION INTRAVENOUS at 21:04

## 2024-01-01 RX ADMIN — ATORVASTATIN CALCIUM 40 MG: 40 TABLET, FILM COATED ORAL at 08:33

## 2024-01-01 RX ADMIN — DEXAMETHASONE SODIUM PHOSPHATE 20 MG: 4 INJECTION, SOLUTION INTRA-ARTICULAR; INTRALESIONAL; INTRAMUSCULAR; INTRAVENOUS; SOFT TISSUE at 08:33

## 2024-01-01 RX ADMIN — LEUCOVORIN CALCIUM 25 MG: 350 INJECTION, POWDER, LYOPHILIZED, FOR SOLUTION INTRAMUSCULAR; INTRAVENOUS at 03:01

## 2024-01-01 RX ADMIN — LEUCOVORIN CALCIUM 50 MG: 350 INJECTION, POWDER, LYOPHILIZED, FOR SOLUTION INTRAMUSCULAR; INTRAVENOUS at 20:55

## 2024-01-01 RX ADMIN — LIDOCAINE HYDROCHLORIDE 6 ML: 10 INJECTION, SOLUTION EPIDURAL; INFILTRATION; INTRACAUDAL; PERINEURAL at 10:37

## 2024-01-01 RX ADMIN — DOLUTEGRAVIR SODIUM 50 MG: 50 TABLET, FILM COATED ORAL at 22:55

## 2024-01-01 RX ADMIN — IPRATROPIUM BROMIDE AND ALBUTEROL SULFATE 3 ML: .5; 3 SOLUTION RESPIRATORY (INHALATION) at 21:09

## 2024-01-01 RX ADMIN — EMTRICITABINE AND TENOFOVIR DISOPROXIL FUMARATE 1 TABLET: 200; 300 TABLET, FILM COATED ORAL at 20:06

## 2024-01-01 RX ADMIN — ENOXAPARIN SODIUM 40 MG: 40 INJECTION SUBCUTANEOUS at 14:04

## 2024-01-01 RX ADMIN — ACETYLCYSTEINE 4 ML: 100 SOLUTION ORAL; RESPIRATORY (INHALATION) at 16:38

## 2024-01-01 RX ADMIN — INSULIN ASPART 1 UNITS: 100 INJECTION, SOLUTION INTRAVENOUS; SUBCUTANEOUS at 16:02

## 2024-01-01 RX ADMIN — GANCICLOVIR SODIUM 350 MG: 500 INJECTION, POWDER, LYOPHILIZED, FOR SOLUTION INTRAVENOUS at 20:03

## 2024-01-01 RX ADMIN — THIAMINE HCL TAB 100 MG 100 MG: 100 TAB at 22:31

## 2024-01-01 RX ADMIN — INSULIN ASPART 1 UNITS: 100 INJECTION, SOLUTION INTRAVENOUS; SUBCUTANEOUS at 16:46

## 2024-01-01 RX ADMIN — EMTRICITABINE AND TENOFOVIR DISOPROXIL FUMARATE 1 TABLET: 200; 300 TABLET, FILM COATED ORAL at 20:22

## 2024-01-01 RX ADMIN — Medication 10 ML: at 10:02

## 2024-01-01 RX ADMIN — FAMOTIDINE 20 MG: 40 POWDER, FOR SUSPENSION ORAL at 21:38

## 2024-01-01 RX ADMIN — SODIUM BICARBONATE: 84 INJECTION, SOLUTION INTRAVENOUS at 01:29

## 2024-01-01 RX ADMIN — GABAPENTIN 100 MG: 250 SUSPENSION ORAL at 08:58

## 2024-01-01 RX ADMIN — GLYCOPYRROLATE 0.2 MG: 0.2 INJECTION INTRAMUSCULAR; INTRAVENOUS at 18:13

## 2024-01-01 RX ADMIN — DEXTROSE MONOHYDRATE 25 ML: 25 INJECTION, SOLUTION INTRAVENOUS at 08:19

## 2024-01-01 RX ADMIN — GABAPENTIN 100 MG: 250 SUSPENSION ORAL at 21:12

## 2024-01-01 RX ADMIN — GLYCOPYRROLATE 0.2 MG: 0.2 INJECTION INTRAMUSCULAR; INTRAVENOUS at 18:27

## 2024-01-01 RX ADMIN — GANCICLOVIR SODIUM 350 MG: 500 INJECTION, POWDER, LYOPHILIZED, FOR SOLUTION INTRAVENOUS at 08:45

## 2024-01-01 RX ADMIN — ATORVASTATIN CALCIUM 40 MG: 40 TABLET, FILM COATED ORAL at 08:34

## 2024-01-01 RX ADMIN — FAMOTIDINE 20 MG: 40 POWDER, FOR SUSPENSION ORAL at 19:52

## 2024-01-01 RX ADMIN — Medication 50 UNITS: at 10:53

## 2024-01-01 RX ADMIN — THIAMINE HCL TAB 100 MG 100 MG: 100 TAB at 22:30

## 2024-01-01 RX ADMIN — FAMOTIDINE 20 MG: 40 POWDER, FOR SUSPENSION ORAL at 11:57

## 2024-01-01 RX ADMIN — SENNOSIDES 8.6 MG: 8.6 TABLET, FILM COATED ORAL at 16:31

## 2024-01-01 RX ADMIN — INSULIN ASPART 1 UNITS: 100 INJECTION, SOLUTION INTRAVENOUS; SUBCUTANEOUS at 17:42

## 2024-01-01 RX ADMIN — SODIUM BICARBONATE: 84 INJECTION, SOLUTION INTRAVENOUS at 17:05

## 2024-01-01 RX ADMIN — GABAPENTIN 100 MG: 250 SUSPENSION ORAL at 19:39

## 2024-01-01 RX ADMIN — Medication 5 ML: at 06:14

## 2024-01-01 RX ADMIN — ACETAMINOPHEN 650 MG: 325 SOLUTION ORAL at 15:41

## 2024-01-01 RX ADMIN — ATORVASTATIN CALCIUM 40 MG: 40 TABLET, FILM COATED ORAL at 09:09

## 2024-01-01 RX ADMIN — Medication 5 ML: at 10:17

## 2024-01-01 RX ADMIN — ACETAMINOPHEN 650 MG: 650 SUPPOSITORY RECTAL at 04:18

## 2024-01-01 RX ADMIN — GANCICLOVIR SODIUM 350 MG: 500 INJECTION, POWDER, LYOPHILIZED, FOR SOLUTION INTRAVENOUS at 20:23

## 2024-01-01 RX ADMIN — MORPHINE SULFATE 3 MG: 4 INJECTION INTRAVENOUS at 18:26

## 2024-01-01 RX ADMIN — DAPSONE 100 MG: 100 TABLET ORAL at 09:06

## 2024-01-01 RX ADMIN — Medication 5 ML: at 18:17

## 2024-01-01 RX ADMIN — IPRATROPIUM BROMIDE AND ALBUTEROL SULFATE 3 ML: .5; 3 SOLUTION RESPIRATORY (INHALATION) at 12:52

## 2024-01-01 RX ADMIN — DEXAMETHASONE SODIUM PHOSPHATE 4 MG: 4 INJECTION, SOLUTION INTRA-ARTICULAR; INTRALESIONAL; INTRAMUSCULAR; INTRAVENOUS; SOFT TISSUE at 08:03

## 2024-01-01 RX ADMIN — LEUCOVORIN CALCIUM 25 MG: 350 INJECTION, POWDER, LYOPHILIZED, FOR SOLUTION INTRAMUSCULAR; INTRAVENOUS at 21:05

## 2024-01-01 RX ADMIN — ACETAMINOPHEN 650 MG: 325 SOLUTION ORAL at 15:43

## 2024-01-01 RX ADMIN — Medication 3 MG: at 23:46

## 2024-01-01 RX ADMIN — ATORVASTATIN CALCIUM 40 MG: 40 TABLET, FILM COATED ORAL at 09:12

## 2024-01-01 RX ADMIN — DOLUTEGRAVIR SODIUM 50 MG: 50 TABLET, FILM COATED ORAL at 23:10

## 2024-01-01 RX ADMIN — GANCICLOVIR SODIUM 350 MG: 500 INJECTION, POWDER, LYOPHILIZED, FOR SOLUTION INTRAVENOUS at 09:20

## 2024-01-01 RX ADMIN — Medication 5 ML: at 16:30

## 2024-01-01 RX ADMIN — ATORVASTATIN CALCIUM 40 MG: 40 TABLET, FILM COATED ORAL at 09:00

## 2024-01-01 RX ADMIN — Medication 10 ML: at 16:42

## 2024-01-01 RX ADMIN — DEXAMETHASONE SODIUM PHOSPHATE 40 MG: 10 INJECTION INTRAMUSCULAR; INTRAVENOUS at 13:39

## 2024-01-01 RX ADMIN — GANCICLOVIR SODIUM 350 MG: 500 INJECTION, POWDER, LYOPHILIZED, FOR SOLUTION INTRAVENOUS at 09:40

## 2024-01-01 RX ADMIN — Medication 3 ML: at 03:29

## 2024-01-01 RX ADMIN — GLYCOPYRROLATE 0.2 MG: 0.2 INJECTION INTRAMUSCULAR; INTRAVENOUS at 13:37

## 2024-01-01 RX ADMIN — Medication 5 ML: at 09:05

## 2024-01-01 RX ADMIN — GLYCOPYRROLATE 0.2 MG: 0.2 INJECTION INTRAMUSCULAR; INTRAVENOUS at 00:52

## 2024-01-01 RX ADMIN — IOPAMIDOL 107 ML: 755 INJECTION, SOLUTION INTRAVENOUS at 22:47

## 2024-01-01 RX ADMIN — FAMOTIDINE 20 MG: 40 POWDER, FOR SUSPENSION ORAL at 21:07

## 2024-01-01 RX ADMIN — MORPHINE SULFATE 3 MG: 4 INJECTION INTRAVENOUS at 06:30

## 2024-01-01 RX ADMIN — MORPHINE SULFATE 3 MG: 4 INJECTION INTRAVENOUS at 00:23

## 2024-01-01 RX ADMIN — GABAPENTIN 100 MG: 250 SUSPENSION ORAL at 08:34

## 2024-01-01 RX ADMIN — VALGANCICLOVIR HYDROCHLORIDE 900 MG: 50 POWDER, FOR SOLUTION ORAL at 09:06

## 2024-01-01 RX ADMIN — LEUCOVORIN CALCIUM 25 MG: 350 INJECTION, POWDER, LYOPHILIZED, FOR SOLUTION INTRAMUSCULAR; INTRAVENOUS at 03:02

## 2024-01-01 RX ADMIN — DOLUTEGRAVIR SODIUM 50 MG: 50 TABLET, FILM COATED ORAL at 21:24

## 2024-01-01 RX ADMIN — SULFAMETHOXAZOLE AND TRIMETHOPRIM 80 MG: 200; 40 SUSPENSION ORAL at 20:30

## 2024-01-01 RX ADMIN — FAMOTIDINE 20 MG: 40 POWDER, FOR SUSPENSION ORAL at 20:08

## 2024-01-01 RX ADMIN — SULFAMETHOXAZOLE AND TRIMETHOPRIM 80 MG: 200; 40 SUSPENSION ORAL at 20:51

## 2024-01-01 RX ADMIN — MORPHINE SULFATE 10 MG: 100 SOLUTION ORAL at 19:25

## 2024-01-01 RX ADMIN — ENOXAPARIN SODIUM 40 MG: 40 INJECTION SUBCUTANEOUS at 09:10

## 2024-01-01 RX ADMIN — Medication 5 ML: at 02:24

## 2024-01-01 RX ADMIN — ACETAMINOPHEN 650 MG: 650 SUPPOSITORY RECTAL at 12:39

## 2024-01-01 RX ADMIN — FAMOTIDINE 20 MG: 40 POWDER, FOR SUSPENSION ORAL at 09:05

## 2024-01-01 RX ADMIN — DEXAMETHASONE SODIUM PHOSPHATE 40 MG: 10 INJECTION INTRAMUSCULAR; INTRAVENOUS at 14:54

## 2024-01-01 RX ADMIN — FAMOTIDINE 20 MG: 40 POWDER, FOR SUSPENSION ORAL at 12:13

## 2024-01-01 RX ADMIN — GABAPENTIN 100 MG: 250 SUSPENSION ORAL at 08:33

## 2024-01-01 RX ADMIN — MORPHINE SULFATE 3 MG: 4 INJECTION INTRAVENOUS at 00:20

## 2024-01-01 RX ADMIN — Medication 5 ML: at 13:36

## 2024-01-01 RX ADMIN — DEXAMETHASONE SODIUM PHOSPHATE 20 MG: 4 INJECTION, SOLUTION INTRA-ARTICULAR; INTRALESIONAL; INTRAMUSCULAR; INTRAVENOUS; SOFT TISSUE at 09:21

## 2024-01-01 RX ADMIN — DAPSONE 100 MG: 100 TABLET ORAL at 08:34

## 2024-01-01 RX ADMIN — DOLUTEGRAVIR SODIUM 50 MG: 50 TABLET, FILM COATED ORAL at 22:31

## 2024-01-01 RX ADMIN — INSULIN ASPART 1 UNITS: 100 INJECTION, SOLUTION INTRAVENOUS; SUBCUTANEOUS at 17:25

## 2024-01-01 RX ADMIN — LEUCOVORIN CALCIUM 25 MG: 350 INJECTION, POWDER, LYOPHILIZED, FOR SOLUTION INTRAMUSCULAR; INTRAVENOUS at 15:42

## 2024-01-01 RX ADMIN — CEFTRIAXONE SODIUM 1 G: 1 INJECTION, POWDER, FOR SOLUTION INTRAMUSCULAR; INTRAVENOUS at 22:19

## 2024-01-01 RX ADMIN — CEFTRIAXONE SODIUM 1 G: 1 INJECTION, POWDER, FOR SOLUTION INTRAMUSCULAR; INTRAVENOUS at 22:30

## 2024-01-01 RX ADMIN — DEXAMETHASONE SODIUM PHOSPHATE 4 MG: 4 INJECTION, SOLUTION INTRA-ARTICULAR; INTRALESIONAL; INTRAMUSCULAR; INTRAVENOUS; SOFT TISSUE at 19:53

## 2024-01-01 RX ADMIN — INSULIN ASPART 1 UNITS: 100 INJECTION, SOLUTION INTRAVENOUS; SUBCUTANEOUS at 20:18

## 2024-01-01 RX ADMIN — Medication 10 ML: at 12:02

## 2024-01-01 RX ADMIN — BISACODYL 10 MG: 10 SUPPOSITORY RECTAL at 12:13

## 2024-01-01 RX ADMIN — DEXAMETHASONE SODIUM PHOSPHATE 10 MG: 4 INJECTION, SOLUTION INTRA-ARTICULAR; INTRALESIONAL; INTRAMUSCULAR; INTRAVENOUS; SOFT TISSUE at 09:32

## 2024-01-01 RX ADMIN — DEXAMETHASONE 4 MG: 4 TABLET ORAL at 08:16

## 2024-01-01 RX ADMIN — POTASSIUM CHLORIDE 20 MEQ: 29.8 INJECTION, SOLUTION INTRAVENOUS at 10:50

## 2024-01-01 RX ADMIN — DAPSONE 100 MG: 100 TABLET ORAL at 09:23

## 2024-01-01 RX ADMIN — RITUXIMAB-ABBS 700 MG: 10 INJECTION, SOLUTION INTRAVENOUS at 16:32

## 2024-01-01 RX ADMIN — VALGANCICLOVIR HYDROCHLORIDE 900 MG: 50 POWDER, FOR SOLUTION ORAL at 16:31

## 2024-01-01 RX ADMIN — INSULIN ASPART 1 UNITS: 100 INJECTION, SOLUTION INTRAVENOUS; SUBCUTANEOUS at 16:41

## 2024-01-01 RX ADMIN — GABAPENTIN 100 MG: 250 SUSPENSION ORAL at 20:40

## 2024-01-01 RX ADMIN — DEXTROSE MONOHYDRATE AND SODIUM CHLORIDE: 5; .9 INJECTION, SOLUTION INTRAVENOUS at 06:48

## 2024-01-01 RX ADMIN — EMTRICITABINE AND TENOFOVIR DISOPROXIL FUMARATE 1 TABLET: 200; 300 TABLET, FILM COATED ORAL at 19:38

## 2024-01-01 RX ADMIN — ATORVASTATIN CALCIUM 40 MG: 40 TABLET, FILM COATED ORAL at 09:08

## 2024-01-01 RX ADMIN — GANCICLOVIR SODIUM 350 MG: 500 INJECTION, POWDER, LYOPHILIZED, FOR SOLUTION INTRAVENOUS at 21:34

## 2024-01-01 RX ADMIN — INSULIN ASPART 1 UNITS: 100 INJECTION, SOLUTION INTRAVENOUS; SUBCUTANEOUS at 13:10

## 2024-01-01 RX ADMIN — DAPSONE 100 MG: 100 TABLET ORAL at 08:58

## 2024-01-01 RX ADMIN — FAMOTIDINE 20 MG: 40 POWDER, FOR SUSPENSION ORAL at 09:11

## 2024-01-01 RX ADMIN — DIPHENHYDRAMINE HYDROCHLORIDE 50 MG: 25 SOLUTION ORAL at 15:43

## 2024-01-01 RX ADMIN — MORPHINE SULFATE 10 MG: 100 SOLUTION ORAL at 21:56

## 2024-01-01 RX ADMIN — THIAMINE HCL TAB 100 MG 100 MG: 100 TAB at 21:49

## 2024-01-01 RX ADMIN — FAMOTIDINE 20 MG: 40 POWDER, FOR SUSPENSION ORAL at 21:24

## 2024-01-01 RX ADMIN — LORAZEPAM 1 MG: 2 INJECTION INTRAMUSCULAR; INTRAVENOUS at 14:33

## 2024-01-01 RX ADMIN — MAGNESIUM HYDROXIDE 30 ML: 400 SUSPENSION ORAL at 00:41

## 2024-01-01 RX ADMIN — THIAMINE HCL TAB 100 MG 100 MG: 100 TAB at 20:51

## 2024-01-01 RX ADMIN — DOLUTEGRAVIR SODIUM 50 MG: 50 TABLET, FILM COATED ORAL at 22:08

## 2024-01-01 RX ADMIN — MORPHINE SULFATE 10 MG: 100 SOLUTION ORAL at 11:17

## 2024-01-01 RX ADMIN — GLYCOPYRROLATE 0.2 MG: 0.2 INJECTION INTRAMUSCULAR; INTRAVENOUS at 12:06

## 2024-01-01 RX ADMIN — INSULIN ASPART 1 UNITS: 100 INJECTION, SOLUTION INTRAVENOUS; SUBCUTANEOUS at 20:41

## 2024-01-01 RX ADMIN — GANCICLOVIR SODIUM 350 MG: 500 INJECTION, POWDER, LYOPHILIZED, FOR SOLUTION INTRAVENOUS at 20:12

## 2024-01-01 RX ADMIN — DOLUTEGRAVIR SODIUM 50 MG: 50 TABLET, FILM COATED ORAL at 21:15

## 2024-01-01 RX ADMIN — MORPHINE SULFATE 3 MG: 4 INJECTION INTRAVENOUS at 00:21

## 2024-01-01 RX ADMIN — DEXAMETHASONE SODIUM PHOSPHATE 4 MG: 4 INJECTION, SOLUTION INTRA-ARTICULAR; INTRALESIONAL; INTRAMUSCULAR; INTRAVENOUS; SOFT TISSUE at 09:11

## 2024-01-01 RX ADMIN — Medication 5 ML: at 05:02

## 2024-01-01 RX ADMIN — FAMOTIDINE 20 MG: 40 POWDER, FOR SUSPENSION ORAL at 20:34

## 2024-01-01 RX ADMIN — DOLUTEGRAVIR SODIUM 50 MG: 50 TABLET, FILM COATED ORAL at 20:51

## 2024-01-01 RX ADMIN — VALGANCICLOVIR HYDROCHLORIDE 900 MG: 50 POWDER, FOR SOLUTION ORAL at 22:31

## 2024-01-01 RX ADMIN — GLYCOPYRROLATE 0.2 MG: 0.2 INJECTION INTRAMUSCULAR; INTRAVENOUS at 06:31

## 2024-01-01 RX ADMIN — FAMOTIDINE 20 MG: 40 POWDER, FOR SUSPENSION ORAL at 19:39

## 2024-01-01 RX ADMIN — FAMOTIDINE 20 MG: 40 POWDER, FOR SUSPENSION ORAL at 08:16

## 2024-01-01 RX ADMIN — Medication 5 ML: at 12:53

## 2024-01-01 RX ADMIN — GABAPENTIN 100 MG: 250 SUSPENSION ORAL at 09:10

## 2024-01-01 RX ADMIN — MORPHINE SULFATE 10 MG: 20 SOLUTION ORAL at 11:20

## 2024-01-01 RX ADMIN — LORAZEPAM 1 MG: 2 INJECTION INTRAMUSCULAR; INTRAVENOUS at 12:01

## 2024-01-01 RX ADMIN — FAMOTIDINE 20 MG: 40 POWDER, FOR SUSPENSION ORAL at 08:33

## 2024-01-01 RX ADMIN — DAPSONE 100 MG: 100 TABLET ORAL at 08:16

## 2024-01-01 RX ADMIN — EMTRICITABINE AND TENOFOVIR DISOPROXIL FUMARATE 1 TABLET: 200; 300 TABLET, FILM COATED ORAL at 20:33

## 2024-01-01 RX ADMIN — THIAMINE HCL TAB 100 MG 100 MG: 100 TAB at 22:11

## 2024-01-01 RX ADMIN — ATORVASTATIN CALCIUM 40 MG: 40 TABLET, FILM COATED ORAL at 07:55

## 2024-01-01 RX ADMIN — GUAIFENESIN 200 MG: 200 SOLUTION ORAL at 03:47

## 2024-01-01 RX ADMIN — GABAPENTIN 100 MG: 250 SUSPENSION ORAL at 19:59

## 2024-01-01 RX ADMIN — DOLUTEGRAVIR SODIUM 50 MG: 50 TABLET, FILM COATED ORAL at 22:21

## 2024-01-01 RX ADMIN — FAMOTIDINE 20 MG: 40 POWDER, FOR SUSPENSION ORAL at 19:59

## 2024-01-01 RX ADMIN — EMTRICITABINE AND TENOFOVIR DISOPROXIL FUMARATE 1 TABLET: 200; 300 TABLET, FILM COATED ORAL at 20:56

## 2024-01-01 RX ADMIN — DEXAMETHASONE SODIUM PHOSPHATE 10 MG: 4 INJECTION, SOLUTION INTRA-ARTICULAR; INTRALESIONAL; INTRAMUSCULAR; INTRAVENOUS; SOFT TISSUE at 08:33

## 2024-01-01 RX ADMIN — DEXTROSE AND SODIUM CHLORIDE: 5; 450 INJECTION, SOLUTION INTRAVENOUS at 05:01

## 2024-01-01 RX ADMIN — GANCICLOVIR SODIUM 350 MG: 500 INJECTION, POWDER, LYOPHILIZED, FOR SOLUTION INTRAVENOUS at 09:01

## 2024-01-01 RX ADMIN — IPRATROPIUM BROMIDE AND ALBUTEROL SULFATE 3 ML: .5; 3 SOLUTION RESPIRATORY (INHALATION) at 10:07

## 2024-01-01 RX ADMIN — THIAMINE HCL TAB 100 MG 100 MG: 100 TAB at 21:41

## 2024-01-01 RX ADMIN — GADOBUTROL 7.5 ML: 604.72 INJECTION INTRAVENOUS at 12:08

## 2024-01-01 RX ADMIN — ACETAMINOPHEN 650 MG: 325 TABLET, FILM COATED ORAL at 12:28

## 2024-01-01 RX ADMIN — EMTRICITABINE AND TENOFOVIR DISOPROXIL FUMARATE 1 TABLET: 200; 300 TABLET, FILM COATED ORAL at 21:04

## 2024-01-01 RX ADMIN — GABAPENTIN 100 MG: 250 SUSPENSION ORAL at 20:34

## 2024-01-01 RX ADMIN — MORPHINE SULFATE 10 MG: 100 SOLUTION ORAL at 10:59

## 2024-01-01 RX ADMIN — DAPSONE 100 MG: 100 TABLET ORAL at 08:45

## 2024-01-01 RX ADMIN — SULFAMETHOXAZOLE AND TRIMETHOPRIM 80 MG: 200; 40 SUSPENSION ORAL at 20:56

## 2024-01-01 RX ADMIN — LEUCOVORIN CALCIUM 25 MG: 350 INJECTION, POWDER, LYOPHILIZED, FOR SOLUTION INTRAMUSCULAR; INTRAVENOUS at 08:39

## 2024-01-01 RX ADMIN — FAMOTIDINE 20 MG: 40 POWDER, FOR SUSPENSION ORAL at 21:12

## 2024-01-01 RX ADMIN — DEXAMETHASONE SODIUM PHOSPHATE 20 MG: 4 INJECTION, SOLUTION INTRA-ARTICULAR; INTRALESIONAL; INTRAMUSCULAR; INTRAVENOUS; SOFT TISSUE at 09:03

## 2024-01-01 RX ADMIN — FAMOTIDINE 20 MG: 40 POWDER, FOR SUSPENSION ORAL at 09:10

## 2024-01-01 RX ADMIN — MAGNESIUM HYDROXIDE 30 ML: 400 SUSPENSION ORAL at 00:26

## 2024-01-01 RX ADMIN — EMTRICITABINE AND TENOFOVIR DISOPROXIL FUMARATE 1 TABLET: 200; 300 TABLET, FILM COATED ORAL at 21:38

## 2024-01-01 RX ADMIN — ATORVASTATIN CALCIUM 40 MG: 40 TABLET, FILM COATED ORAL at 09:10

## 2024-01-01 RX ADMIN — ENOXAPARIN SODIUM 40 MG: 40 INJECTION SUBCUTANEOUS at 20:12

## 2024-01-01 RX ADMIN — DOLUTEGRAVIR SODIUM 50 MG: 50 TABLET, FILM COATED ORAL at 22:26

## 2024-01-01 RX ADMIN — Medication 5 ML: at 14:24

## 2024-01-01 RX ADMIN — ATORVASTATIN CALCIUM 40 MG: 40 TABLET, FILM COATED ORAL at 10:50

## 2024-01-01 RX ADMIN — GLYCOPYRROLATE 0.2 MG: 0.2 INJECTION INTRAMUSCULAR; INTRAVENOUS at 06:15

## 2024-01-01 RX ADMIN — DIPHENHYDRAMINE HYDROCHLORIDE 50 MG: 25 SOLUTION ORAL at 15:41

## 2024-01-01 RX ADMIN — EMTRICITABINE AND TENOFOVIR DISOPROXIL FUMARATE 1 TABLET: 200; 300 TABLET, FILM COATED ORAL at 20:36

## 2024-01-01 RX ADMIN — Medication 5 ML: at 00:35

## 2024-01-01 RX ADMIN — EMTRICITABINE AND TENOFOVIR DISOPROXIL FUMARATE 1 TABLET: 200; 300 TABLET, FILM COATED ORAL at 19:39

## 2024-01-01 RX ADMIN — FAMOTIDINE 20 MG: 40 POWDER, FOR SUSPENSION ORAL at 20:40

## 2024-01-01 RX ADMIN — DEXAMETHASONE 4 MG: 4 TABLET ORAL at 19:39

## 2024-01-01 RX ADMIN — THIAMINE HCL TAB 100 MG 100 MG: 100 TAB at 21:58

## 2024-01-01 RX ADMIN — EMTRICITABINE AND TENOFOVIR DISOPROXIL FUMARATE 1 TABLET: 200; 300 TABLET, FILM COATED ORAL at 20:51

## 2024-01-01 RX ADMIN — CEFTRIAXONE SODIUM 1 G: 1 INJECTION, POWDER, FOR SOLUTION INTRAMUSCULAR; INTRAVENOUS at 22:27

## 2024-01-01 RX ADMIN — DOLUTEGRAVIR SODIUM 50 MG: 50 TABLET, FILM COATED ORAL at 22:22

## 2024-01-01 RX ADMIN — GABAPENTIN 100 MG: 250 SUSPENSION ORAL at 20:33

## 2024-01-01 RX ADMIN — Medication 5 ML: at 12:17

## 2024-01-01 RX ADMIN — EMTRICITABINE AND TENOFOVIR DISOPROXIL FUMARATE 1 TABLET: 200; 300 TABLET, FILM COATED ORAL at 19:52

## 2024-01-01 RX ADMIN — GLYCOPYRROLATE 0.2 MG: 0.2 INJECTION INTRAMUSCULAR; INTRAVENOUS at 00:24

## 2024-01-01 RX ADMIN — Medication 5 ML: at 13:53

## 2024-01-01 RX ADMIN — Medication 5 ML: at 14:35

## 2024-01-01 RX ADMIN — SODIUM CHLORIDE: 9 INJECTION, SOLUTION INTRAVENOUS at 23:01

## 2024-01-01 RX ADMIN — Medication 5 ML: at 14:48

## 2024-01-01 RX ADMIN — IPRATROPIUM BROMIDE AND ALBUTEROL SULFATE 3 ML: .5; 3 SOLUTION RESPIRATORY (INHALATION) at 16:37

## 2024-01-01 RX ADMIN — LEUCOVORIN CALCIUM 25 MG: 350 INJECTION, POWDER, LYOPHILIZED, FOR SOLUTION INTRAMUSCULAR; INTRAVENOUS at 21:13

## 2024-01-01 RX ADMIN — MORPHINE SULFATE 10 MG: 20 SOLUTION ORAL at 10:55

## 2024-01-01 RX ADMIN — FAMOTIDINE 20 MG: 40 POWDER, FOR SUSPENSION ORAL at 09:00

## 2024-01-01 RX ADMIN — GABAPENTIN 100 MG: 250 SUSPENSION ORAL at 10:08

## 2024-01-01 RX ADMIN — DOLUTEGRAVIR SODIUM 50 MG: 50 TABLET, FILM COATED ORAL at 22:11

## 2024-01-01 RX ADMIN — DOLUTEGRAVIR SODIUM 50 MG: 50 TABLET, FILM COATED ORAL at 22:14

## 2024-01-01 RX ADMIN — FAMOTIDINE 20 MG: 40 POWDER, FOR SUSPENSION ORAL at 20:12

## 2024-01-01 RX ADMIN — DEXAMETHASONE 4 MG: 4 TABLET ORAL at 08:45

## 2024-01-01 RX ADMIN — GANCICLOVIR SODIUM 350 MG: 500 INJECTION, POWDER, LYOPHILIZED, FOR SOLUTION INTRAVENOUS at 08:19

## 2024-01-01 RX ADMIN — ATORVASTATIN CALCIUM 40 MG: 40 TABLET, FILM COATED ORAL at 08:16

## 2024-01-01 RX ADMIN — GLYCOPYRROLATE 0.2 MG: 0.2 INJECTION INTRAMUSCULAR; INTRAVENOUS at 14:33

## 2024-01-01 RX ADMIN — GABAPENTIN 100 MG: 250 SUSPENSION ORAL at 09:14

## 2024-01-01 RX ADMIN — LEUCOVORIN CALCIUM 25 MG: 350 INJECTION, POWDER, LYOPHILIZED, FOR SOLUTION INTRAMUSCULAR; INTRAVENOUS at 08:33

## 2024-01-01 RX ADMIN — IPRATROPIUM BROMIDE AND ALBUTEROL SULFATE 3 ML: .5; 3 SOLUTION RESPIRATORY (INHALATION) at 14:08

## 2024-01-01 RX ADMIN — DAPSONE 100 MG: 100 TABLET ORAL at 09:14

## 2024-01-01 RX ADMIN — ATORVASTATIN CALCIUM 40 MG: 40 TABLET, FILM COATED ORAL at 08:57

## 2024-01-01 RX ADMIN — ONDANSETRON HYDROCHLORIDE 16 MG: 8 TABLET, FILM COATED ORAL at 20:22

## 2024-01-01 RX ADMIN — Medication 10 ML: at 06:06

## 2024-01-01 RX ADMIN — ENOXAPARIN SODIUM 40 MG: 40 INJECTION SUBCUTANEOUS at 09:12

## 2024-01-01 RX ADMIN — DEXAMETHASONE 4 MG: 4 TABLET ORAL at 20:07

## 2024-01-01 RX ADMIN — SENNOSIDES 8.6 MG: 8.6 TABLET, FILM COATED ORAL at 16:03

## 2024-01-01 RX ADMIN — Medication 5 ML: at 12:25

## 2024-01-01 RX ADMIN — MORPHINE SULFATE 3 MG: 4 INJECTION INTRAVENOUS at 06:31

## 2024-01-01 RX ADMIN — RITUXIMAB-ABBS 700 MG: 10 INJECTION, SOLUTION INTRAVENOUS at 16:17

## 2024-01-01 RX ADMIN — GLYCOPYRROLATE 0.2 MG: 0.2 INJECTION INTRAMUSCULAR; INTRAVENOUS at 13:25

## 2024-01-01 RX ADMIN — SODIUM CHLORIDE: 9 INJECTION, SOLUTION INTRAVENOUS at 10:35

## 2024-01-01 RX ADMIN — LEUCOVORIN CALCIUM 25 MG: 350 INJECTION, POWDER, LYOPHILIZED, FOR SOLUTION INTRAMUSCULAR; INTRAVENOUS at 03:10

## 2024-01-01 RX ADMIN — MORPHINE SULFATE 3 MG: 4 INJECTION INTRAVENOUS at 12:16

## 2024-01-01 RX ADMIN — DEXAMETHASONE SODIUM PHOSPHATE 20 MG: 4 INJECTION, SOLUTION INTRA-ARTICULAR; INTRALESIONAL; INTRAMUSCULAR; INTRAVENOUS; SOFT TISSUE at 08:34

## 2024-01-01 RX ADMIN — DEXAMETHASONE SODIUM PHOSPHATE 4 MG: 4 INJECTION, SOLUTION INTRA-ARTICULAR; INTRALESIONAL; INTRAMUSCULAR; INTRAVENOUS; SOFT TISSUE at 20:36

## 2024-01-01 RX ADMIN — GABAPENTIN 100 MG: 250 SUSPENSION ORAL at 09:23

## 2024-01-01 RX ADMIN — THIAMINE HCL TAB 100 MG 100 MG: 100 TAB at 21:47

## 2024-01-01 RX ADMIN — POTASSIUM CHLORIDE 20 MEQ: 29.8 INJECTION, SOLUTION INTRAVENOUS at 12:19

## 2024-01-01 RX ADMIN — DAPSONE 100 MG: 100 TABLET ORAL at 09:00

## 2024-01-01 RX ADMIN — DAPSONE 100 MG: 100 TABLET ORAL at 09:11

## 2024-01-01 RX ADMIN — EMTRICITABINE AND TENOFOVIR DISOPROXIL FUMARATE 1 TABLET: 200; 300 TABLET, FILM COATED ORAL at 21:24

## 2024-01-01 RX ADMIN — ATROPINE SULFATE 2 DROP: 10 SOLUTION/ DROPS OPHTHALMIC at 21:56

## 2024-01-01 RX ADMIN — LIDOCAINE 1 PATCH: 4 PATCH TOPICAL at 10:44

## 2024-01-01 RX ADMIN — GUAIFENESIN 200 MG: 200 SOLUTION ORAL at 22:24

## 2024-01-01 RX ADMIN — IPRATROPIUM BROMIDE AND ALBUTEROL SULFATE 3 ML: .5; 3 SOLUTION RESPIRATORY (INHALATION) at 08:25

## 2024-01-01 RX ADMIN — ENOXAPARIN SODIUM 40 MG: 40 INJECTION SUBCUTANEOUS at 21:39

## 2024-01-01 RX ADMIN — DAPSONE 100 MG: 100 TABLET ORAL at 08:57

## 2024-01-01 RX ADMIN — POTASSIUM CHLORIDE 20 MEQ: 29.8 INJECTION, SOLUTION INTRAVENOUS at 11:00

## 2024-01-01 RX ADMIN — ENOXAPARIN SODIUM 40 MG: 40 INJECTION SUBCUTANEOUS at 14:35

## 2024-01-01 RX ADMIN — Medication 5 ML: at 06:41

## 2024-01-01 RX ADMIN — EMTRICITABINE AND TENOFOVIR DISOPROXIL FUMARATE 1 TABLET: 200; 300 TABLET, FILM COATED ORAL at 20:08

## 2024-01-01 RX ADMIN — Medication 5 ML: at 04:39

## 2024-01-01 RX ADMIN — ENOXAPARIN SODIUM 40 MG: 40 INJECTION SUBCUTANEOUS at 10:07

## 2024-01-01 RX ADMIN — MORPHINE SULFATE 3 MG: 4 INJECTION INTRAVENOUS at 06:15

## 2024-01-01 RX ADMIN — Medication 5 ML: at 14:02

## 2024-01-01 RX ADMIN — DEXAMETHASONE SODIUM PHOSPHATE 40 MG: 10 INJECTION INTRAMUSCULAR; INTRAVENOUS at 09:12

## 2024-01-01 RX ADMIN — INSULIN ASPART 1 UNITS: 100 INJECTION, SOLUTION INTRAVENOUS; SUBCUTANEOUS at 20:21

## 2024-01-01 RX ADMIN — FAMOTIDINE 20 MG: 40 POWDER, FOR SUSPENSION ORAL at 20:36

## 2024-01-01 RX ADMIN — EMTRICITABINE AND TENOFOVIR DISOPROXIL FUMARATE 1 TABLET: 200; 300 TABLET, FILM COATED ORAL at 19:59

## 2024-01-01 RX ADMIN — IPRATROPIUM BROMIDE AND ALBUTEROL SULFATE 3 ML: .5; 3 SOLUTION RESPIRATORY (INHALATION) at 09:14

## 2024-01-01 RX ADMIN — FAMOTIDINE 20 MG: 40 POWDER, FOR SUSPENSION ORAL at 22:31

## 2024-01-01 RX ADMIN — FAMOTIDINE 20 MG: 40 POWDER, FOR SUSPENSION ORAL at 09:23

## 2024-01-01 RX ADMIN — SODIUM BICARBONATE: 84 INJECTION, SOLUTION INTRAVENOUS at 19:51

## 2024-01-01 RX ADMIN — MORPHINE SULFATE 3 MG: 4 INJECTION INTRAVENOUS at 18:47

## 2024-01-01 RX ADMIN — METHOTREXATE 6.41 G: 25 INJECTION, SOLUTION INTRA-ARTERIAL; INTRAMUSCULAR; INTRATHECAL; INTRAVENOUS at 21:12

## 2024-01-01 RX ADMIN — Medication 5 ML: at 05:41

## 2024-01-01 RX ADMIN — ENOXAPARIN SODIUM 40 MG: 40 INJECTION SUBCUTANEOUS at 20:33

## 2024-01-01 RX ADMIN — SODIUM BICARBONATE: 84 INJECTION, SOLUTION INTRAVENOUS at 21:06

## 2024-01-01 RX ADMIN — ENOXAPARIN SODIUM 40 MG: 40 INJECTION SUBCUTANEOUS at 19:39

## 2024-01-01 RX ADMIN — CEFTRIAXONE SODIUM 1 G: 1 INJECTION, POWDER, FOR SOLUTION INTRAMUSCULAR; INTRAVENOUS at 00:23

## 2024-01-01 RX ADMIN — EMTRICITABINE AND TENOFOVIR DISOPROXIL FUMARATE 1 TABLET: 200; 300 TABLET, FILM COATED ORAL at 21:11

## 2024-01-01 RX ADMIN — GANCICLOVIR SODIUM 350 MG: 500 INJECTION, POWDER, LYOPHILIZED, FOR SOLUTION INTRAVENOUS at 22:24

## 2024-01-01 RX ADMIN — SODIUM BICARBONATE: 84 INJECTION, SOLUTION INTRAVENOUS at 06:14

## 2024-01-01 RX ADMIN — GABAPENTIN 100 MG: 250 SUSPENSION ORAL at 20:51

## 2024-01-01 RX ADMIN — FAMOTIDINE 20 MG: 40 POWDER, FOR SUSPENSION ORAL at 07:55

## 2024-01-01 RX ADMIN — Medication 5 ML: at 09:41

## 2024-01-01 RX ADMIN — DOLUTEGRAVIR SODIUM 50 MG: 50 TABLET, FILM COATED ORAL at 21:35

## 2024-01-01 RX ADMIN — EMTRICITABINE AND TENOFOVIR DISOPROXIL FUMARATE 1 TABLET: 200; 300 TABLET, FILM COATED ORAL at 20:29

## 2024-01-01 RX ADMIN — Medication 5 ML: at 06:25

## 2024-01-01 RX ADMIN — ATORVASTATIN CALCIUM 40 MG: 40 TABLET, FILM COATED ORAL at 08:45

## 2024-01-01 RX ADMIN — ACETYLCYSTEINE 4 ML: 100 SOLUTION ORAL; RESPIRATORY (INHALATION) at 21:09

## 2024-01-01 RX ADMIN — GUAIFENESIN 200 MG: 200 SOLUTION ORAL at 13:59

## 2024-01-01 RX ADMIN — INSULIN ASPART 1 UNITS: 100 INJECTION, SOLUTION INTRAVENOUS; SUBCUTANEOUS at 09:10

## 2024-01-01 RX ADMIN — GANCICLOVIR SODIUM 350 MG: 500 INJECTION, POWDER, LYOPHILIZED, FOR SOLUTION INTRAVENOUS at 20:24

## 2024-01-01 RX ADMIN — DEXAMETHASONE SODIUM PHOSPHATE 40 MG: 10 INJECTION, SOLUTION INTRAMUSCULAR; INTRAVENOUS at 21:44

## 2024-01-01 RX ADMIN — INSULIN ASPART 1 UNITS: 100 INJECTION, SOLUTION INTRAVENOUS; SUBCUTANEOUS at 20:12

## 2024-01-01 RX ADMIN — Medication 5 ML: at 09:35

## 2024-01-01 RX ADMIN — DOLUTEGRAVIR SODIUM 50 MG: 50 TABLET, FILM COATED ORAL at 22:16

## 2024-01-01 RX ADMIN — GABAPENTIN 100 MG: 250 SUSPENSION ORAL at 21:24

## 2024-01-01 RX ADMIN — Medication 5 ML: at 03:46

## 2024-01-01 RX ADMIN — FAMOTIDINE 20 MG: 40 POWDER, FOR SUSPENSION ORAL at 20:06

## 2024-01-01 RX ADMIN — MORPHINE SULFATE 10 MG: 20 SOLUTION ORAL at 06:05

## 2024-01-01 RX ADMIN — INSULIN ASPART 1 UNITS: 100 INJECTION, SOLUTION INTRAVENOUS; SUBCUTANEOUS at 05:06

## 2024-01-01 RX ADMIN — ATORVASTATIN CALCIUM 40 MG: 40 TABLET, FILM COATED ORAL at 09:24

## 2024-01-01 RX ADMIN — GUAIFENESIN 200 MG: 200 SOLUTION ORAL at 23:46

## 2024-01-01 RX ADMIN — GANCICLOVIR SODIUM 350 MG: 500 INJECTION, POWDER, LYOPHILIZED, FOR SOLUTION INTRAVENOUS at 19:40

## 2024-01-01 RX ADMIN — GABAPENTIN 100 MG: 250 SUSPENSION ORAL at 09:05

## 2024-01-01 RX ADMIN — GABAPENTIN 100 MG: 250 SUSPENSION ORAL at 20:30

## 2024-01-01 RX ADMIN — Medication 5 ML: at 03:25

## 2024-01-01 RX ADMIN — MORPHINE SULFATE 10 MG: 100 SOLUTION ORAL at 13:36

## 2024-01-01 RX ADMIN — ATORVASTATIN CALCIUM 40 MG: 40 TABLET, FILM COATED ORAL at 12:13

## 2024-01-01 RX ADMIN — GABAPENTIN 100 MG: 250 SUSPENSION ORAL at 09:12

## 2024-01-01 RX ADMIN — ATORVASTATIN CALCIUM 40 MG: 40 TABLET, FILM COATED ORAL at 13:22

## 2024-01-01 ASSESSMENT — ACTIVITIES OF DAILY LIVING (ADL)
ADLS_ACUITY_SCORE: 55
ADLS_ACUITY_SCORE: 70
ADLS_ACUITY_SCORE: 77
ADLS_ACUITY_SCORE: 75
ADLS_ACUITY_SCORE: 39
ADLS_ACUITY_SCORE: 55
ADLS_ACUITY_SCORE: 57
ADLS_ACUITY_SCORE: 71
ADLS_ACUITY_SCORE: 75
ADLS_ACUITY_SCORE: 73
ADLS_ACUITY_SCORE: 68
ADLS_ACUITY_SCORE: 55
ADLS_ACUITY_SCORE: 57
ADLS_ACUITY_SCORE: 75
ADLS_ACUITY_SCORE: 68
ADLS_ACUITY_SCORE: 73
ADLS_ACUITY_SCORE: 75
ADLS_ACUITY_SCORE: 57
ADLS_ACUITY_SCORE: 57
ADLS_ACUITY_SCORE: 59
ADLS_ACUITY_SCORE: 62
ADLS_ACUITY_SCORE: 73
ADLS_ACUITY_SCORE: 59
ADLS_ACUITY_SCORE: 75
ADLS_ACUITY_SCORE: 71
ADLS_ACUITY_SCORE: 67
ADLS_ACUITY_SCORE: 69
ADLS_ACUITY_SCORE: 57
ADLS_ACUITY_SCORE: 57
ADLS_ACUITY_SCORE: 60
ADLS_ACUITY_SCORE: 57
ADLS_ACUITY_SCORE: 43
ADLS_ACUITY_SCORE: 59
ADLS_ACUITY_SCORE: 57
ADLS_ACUITY_SCORE: 74
ADLS_ACUITY_SCORE: 69
ADLS_ACUITY_SCORE: 69
ADLS_ACUITY_SCORE: 49
ADLS_ACUITY_SCORE: 77
ADLS_ACUITY_SCORE: 55
ADLS_ACUITY_SCORE: 55
ADLS_ACUITY_SCORE: 71
ADLS_ACUITY_SCORE: 75
ADLS_ACUITY_SCORE: 69
ADLS_ACUITY_SCORE: 70
ADLS_ACUITY_SCORE: 55
ADLS_ACUITY_SCORE: 75
ADLS_ACUITY_SCORE: 73
ADLS_ACUITY_SCORE: 51
ADLS_ACUITY_SCORE: 73
ADLS_ACUITY_SCORE: 75
ADLS_ACUITY_SCORE: 55
ADLS_ACUITY_SCORE: 57
ADLS_ACUITY_SCORE: 68
ADLS_ACUITY_SCORE: 73
ADLS_ACUITY_SCORE: 55
ADLS_ACUITY_SCORE: 57
ADLS_ACUITY_SCORE: 59
ADLS_ACUITY_SCORE: 51
ADLS_ACUITY_SCORE: 59
ADLS_ACUITY_SCORE: 55
ADLS_ACUITY_SCORE: 71
ADLS_ACUITY_SCORE: 61
ADLS_ACUITY_SCORE: 49
ADLS_ACUITY_SCORE: 55
ADLS_ACUITY_SCORE: 73
ADLS_ACUITY_SCORE: 55
ADLS_ACUITY_SCORE: 69
ADLS_ACUITY_SCORE: 49
ADLS_ACUITY_SCORE: 55
ADLS_ACUITY_SCORE: 55
ADLS_ACUITY_SCORE: 59
ADLS_ACUITY_SCORE: 55
ADLS_ACUITY_SCORE: 71
ADLS_ACUITY_SCORE: 38
ADLS_ACUITY_SCORE: 68
ADLS_ACUITY_SCORE: 59
ADLS_ACUITY_SCORE: 43
ADLS_ACUITY_SCORE: 68
ADLS_ACUITY_SCORE: 69
ADLS_ACUITY_SCORE: 55
ADLS_ACUITY_SCORE: 57
ADLS_ACUITY_SCORE: 55
ADLS_ACUITY_SCORE: 61
ADLS_ACUITY_SCORE: 55
ADLS_ACUITY_SCORE: 73
ADLS_ACUITY_SCORE: 61
ADLS_ACUITY_SCORE: 57
ADLS_ACUITY_SCORE: 62
ADLS_ACUITY_SCORE: 57
ADLS_ACUITY_SCORE: 55
ADLS_ACUITY_SCORE: 71
ADLS_ACUITY_SCORE: 55
ADLS_ACUITY_SCORE: 59
ADLS_ACUITY_SCORE: 55
ADLS_ACUITY_SCORE: 61
ADLS_ACUITY_SCORE: 71
ADLS_ACUITY_SCORE: 62
ADLS_ACUITY_SCORE: 55
ADLS_ACUITY_SCORE: 57
ADLS_ACUITY_SCORE: 51
ADLS_ACUITY_SCORE: 49
ADLS_ACUITY_SCORE: 71
ADLS_ACUITY_SCORE: 73
ADLS_ACUITY_SCORE: 69
ADLS_ACUITY_SCORE: 55
ADLS_ACUITY_SCORE: 70
ADLS_ACUITY_SCORE: 57
ADLS_ACUITY_SCORE: 69
ADLS_ACUITY_SCORE: 57
ADLS_ACUITY_SCORE: 57
ADLS_ACUITY_SCORE: 71
ADLS_ACUITY_SCORE: 55
ADLS_ACUITY_SCORE: 57
ADLS_ACUITY_SCORE: 57
ADLS_ACUITY_SCORE: 47
ADLS_ACUITY_SCORE: 69
ADLS_ACUITY_SCORE: 71
ADLS_ACUITY_SCORE: 69
ADLS_ACUITY_SCORE: 69
ADLS_ACUITY_SCORE: 73
ADLS_ACUITY_SCORE: 55
ADLS_ACUITY_SCORE: 57
ADLS_ACUITY_SCORE: 51
ADLS_ACUITY_SCORE: 61
ADLS_ACUITY_SCORE: 57
ADLS_ACUITY_SCORE: 59
ADLS_ACUITY_SCORE: 39
ADLS_ACUITY_SCORE: 70
ADLS_ACUITY_SCORE: 60
ADLS_ACUITY_SCORE: 55
ADLS_ACUITY_SCORE: 73
ADLS_ACUITY_SCORE: 55
ADLS_ACUITY_SCORE: 39
ADLS_ACUITY_SCORE: 55
ADLS_ACUITY_SCORE: 49
ADLS_ACUITY_SCORE: 70
ADLS_ACUITY_SCORE: 57
ADLS_ACUITY_SCORE: 59
ADLS_ACUITY_SCORE: 71
ADLS_ACUITY_SCORE: 71
ADLS_ACUITY_SCORE: 75
ADLS_ACUITY_SCORE: 51
ADLS_ACUITY_SCORE: 59
ADLS_ACUITY_SCORE: 67
ADLS_ACUITY_SCORE: 57
ADLS_ACUITY_SCORE: 61
ADLS_ACUITY_SCORE: 57
ADLS_ACUITY_SCORE: 47
ADLS_ACUITY_SCORE: 55
ADLS_ACUITY_SCORE: 57
ADLS_ACUITY_SCORE: 75
ADLS_ACUITY_SCORE: 71
ADLS_ACUITY_SCORE: 71
ADLS_ACUITY_SCORE: 51
ADLS_ACUITY_SCORE: 57
ADLS_ACUITY_SCORE: 71
ADLS_ACUITY_SCORE: 75
ADLS_ACUITY_SCORE: 55
ADLS_ACUITY_SCORE: 57
ADLS_ACUITY_SCORE: 73
ADLS_ACUITY_SCORE: 73
ADLS_ACUITY_SCORE: 57
ADLS_ACUITY_SCORE: 75
ADLS_ACUITY_SCORE: 71
ADLS_ACUITY_SCORE: 61
ADLS_ACUITY_SCORE: 75
ADLS_ACUITY_SCORE: 73
ADLS_ACUITY_SCORE: 71
ADLS_ACUITY_SCORE: 59
ADLS_ACUITY_SCORE: 59
ADLS_ACUITY_SCORE: 55
ADLS_ACUITY_SCORE: 57
ADLS_ACUITY_SCORE: 73
ADLS_ACUITY_SCORE: 75
ADLS_ACUITY_SCORE: 51
ADLS_ACUITY_SCORE: 62
ADLS_ACUITY_SCORE: 71
ADLS_ACUITY_SCORE: 73
ADLS_ACUITY_SCORE: 61
ADLS_ACUITY_SCORE: 71
ADLS_ACUITY_SCORE: 75
ADLS_ACUITY_SCORE: 57
ADLS_ACUITY_SCORE: 49
ADLS_ACUITY_SCORE: 55
ADLS_ACUITY_SCORE: 57
ADLS_ACUITY_SCORE: 55
ADLS_ACUITY_SCORE: 59
ADLS_ACUITY_SCORE: 55
ADLS_ACUITY_SCORE: 69
ADLS_ACUITY_SCORE: 69
ADLS_ACUITY_SCORE: 57
ADLS_ACUITY_SCORE: 57
ADLS_ACUITY_SCORE: 75
ADLS_ACUITY_SCORE: 55
ADLS_ACUITY_SCORE: 55
ADLS_ACUITY_SCORE: 75
ADLS_ACUITY_SCORE: 51
ADLS_ACUITY_SCORE: 71
ADLS_ACUITY_SCORE: 70
ADLS_ACUITY_SCORE: 71
ADLS_ACUITY_SCORE: 51
ADLS_ACUITY_SCORE: 59
ADLS_ACUITY_SCORE: 57
ADLS_ACUITY_SCORE: 60
ADLS_ACUITY_SCORE: 59
ADLS_ACUITY_SCORE: 69
ADLS_ACUITY_SCORE: 51
ADLS_ACUITY_SCORE: 69
ADLS_ACUITY_SCORE: 57
ADLS_ACUITY_SCORE: 73
ADLS_ACUITY_SCORE: 75
ADLS_ACUITY_SCORE: 75
ADLS_ACUITY_SCORE: 55
ADLS_ACUITY_SCORE: 55
ADLS_ACUITY_SCORE: 75
ADLS_ACUITY_SCORE: 51
ADLS_ACUITY_SCORE: 59
ADLS_ACUITY_SCORE: 69
ADLS_ACUITY_SCORE: 73
ADLS_ACUITY_SCORE: 57
ADLS_ACUITY_SCORE: 68
ADLS_ACUITY_SCORE: 61
ADLS_ACUITY_SCORE: 61
ADLS_ACUITY_SCORE: 75
ADLS_ACUITY_SCORE: 55
ADLS_ACUITY_SCORE: 55
ADLS_ACUITY_SCORE: 59
ADLS_ACUITY_SCORE: 57
ADLS_ACUITY_SCORE: 57
ADLS_ACUITY_SCORE: 69
ADLS_ACUITY_SCORE: 71
ADLS_ACUITY_SCORE: 61
ADLS_ACUITY_SCORE: 73
ADLS_ACUITY_SCORE: 69
ADLS_ACUITY_SCORE: 73
ADLS_ACUITY_SCORE: 69
ADLS_ACUITY_SCORE: 75
ADLS_ACUITY_SCORE: 59
ADLS_ACUITY_SCORE: 77
ADLS_ACUITY_SCORE: 57
ADLS_ACUITY_SCORE: 73
ADLS_ACUITY_SCORE: 59
ADLS_ACUITY_SCORE: 56
ADLS_ACUITY_SCORE: 55
ADLS_ACUITY_SCORE: 59
ADLS_ACUITY_SCORE: 71
ADLS_ACUITY_SCORE: 59
ADLS_ACUITY_SCORE: 43
ADLS_ACUITY_SCORE: 73
ADLS_ACUITY_SCORE: 75
ADLS_ACUITY_SCORE: 59
ADLS_ACUITY_SCORE: 71
ADLS_ACUITY_SCORE: 57
ADLS_ACUITY_SCORE: 57
ADLS_ACUITY_SCORE: 55
ADLS_ACUITY_SCORE: 59
ADLS_ACUITY_SCORE: 69
ADLS_ACUITY_SCORE: 47
ADLS_ACUITY_SCORE: 75
ADLS_ACUITY_SCORE: 57
ADLS_ACUITY_SCORE: 73
ADLS_ACUITY_SCORE: 73
ADLS_ACUITY_SCORE: 57
ADLS_ACUITY_SCORE: 70
ADLS_ACUITY_SCORE: 57
ADLS_ACUITY_SCORE: 55
ADLS_ACUITY_SCORE: 57
ADLS_ACUITY_SCORE: 55
ADLS_ACUITY_SCORE: 59
ADLS_ACUITY_SCORE: 43
ADLS_ACUITY_SCORE: 73
ADLS_ACUITY_SCORE: 43
ADLS_ACUITY_SCORE: 71
ADLS_ACUITY_SCORE: 57
ADLS_ACUITY_SCORE: 70
ADLS_ACUITY_SCORE: 57
ADLS_ACUITY_SCORE: 57
ADLS_ACUITY_SCORE: 70
ADLS_ACUITY_SCORE: 69
ADLS_ACUITY_SCORE: 57
ADLS_ACUITY_SCORE: 73
ADLS_ACUITY_SCORE: 55
ADLS_ACUITY_SCORE: 49
ADLS_ACUITY_SCORE: 57
ADLS_ACUITY_SCORE: 55
ADLS_ACUITY_SCORE: 57
ADLS_ACUITY_SCORE: 57
ADLS_ACUITY_SCORE: 70
ADLS_ACUITY_SCORE: 71
ADLS_ACUITY_SCORE: 70
ADLS_ACUITY_SCORE: 57
ADLS_ACUITY_SCORE: 39
ADLS_ACUITY_SCORE: 47
ADLS_ACUITY_SCORE: 57
ADLS_ACUITY_SCORE: 59
ADLS_ACUITY_SCORE: 73
ADLS_ACUITY_SCORE: 68
ADLS_ACUITY_SCORE: 62
ADLS_ACUITY_SCORE: 75
ADLS_ACUITY_SCORE: 73
ADLS_ACUITY_SCORE: 57
ADLS_ACUITY_SCORE: 75
ADLS_ACUITY_SCORE: 59
ADLS_ACUITY_SCORE: 51
ADLS_ACUITY_SCORE: 73
ADLS_ACUITY_SCORE: 57
ADLS_ACUITY_SCORE: 59
ADLS_ACUITY_SCORE: 69
ADLS_ACUITY_SCORE: 55
ADLS_ACUITY_SCORE: 60
ADLS_ACUITY_SCORE: 71
ADLS_ACUITY_SCORE: 71
ADLS_ACUITY_SCORE: 59
ADLS_ACUITY_SCORE: 55
ADLS_ACUITY_SCORE: 57
ADLS_ACUITY_SCORE: 55
ADLS_ACUITY_SCORE: 68
ADLS_ACUITY_SCORE: 69
ADLS_ACUITY_SCORE: 55
ADLS_ACUITY_SCORE: 73
ADLS_ACUITY_SCORE: 59
ADLS_ACUITY_SCORE: 57
ADLS_ACUITY_SCORE: 59
ADLS_ACUITY_SCORE: 73
ADLS_ACUITY_SCORE: 57
ADLS_ACUITY_SCORE: 69
ADLS_ACUITY_SCORE: 55
ADLS_ACUITY_SCORE: 49
ADLS_ACUITY_SCORE: 62
ADLS_ACUITY_SCORE: 69
ADLS_ACUITY_SCORE: 71
ADLS_ACUITY_SCORE: 55
ADLS_ACUITY_SCORE: 73
ADLS_ACUITY_SCORE: 73
ADLS_ACUITY_SCORE: 69
ADLS_ACUITY_SCORE: 71
ADLS_ACUITY_SCORE: 69
ADLS_ACUITY_SCORE: 57
ADLS_ACUITY_SCORE: 39
ADLS_ACUITY_SCORE: 57
ADLS_ACUITY_SCORE: 56
ADLS_ACUITY_SCORE: 71
ADLS_ACUITY_SCORE: 71
ADLS_ACUITY_SCORE: 49
ADLS_ACUITY_SCORE: 75
ADLS_ACUITY_SCORE: 75
ADLS_ACUITY_SCORE: 59
ADLS_ACUITY_SCORE: 77
ADLS_ACUITY_SCORE: 57
ADLS_ACUITY_SCORE: 55
ADLS_ACUITY_SCORE: 59
ADLS_ACUITY_SCORE: 69
ADLS_ACUITY_SCORE: 69
ADLS_ACUITY_SCORE: 49
ADLS_ACUITY_SCORE: 69
ADLS_ACUITY_SCORE: 57
ADLS_ACUITY_SCORE: 59
ADLS_ACUITY_SCORE: 55
ADLS_ACUITY_SCORE: 73
ADLS_ACUITY_SCORE: 71
ADLS_ACUITY_SCORE: 57
ADLS_ACUITY_SCORE: 73
ADLS_ACUITY_SCORE: 49
ADLS_ACUITY_SCORE: 75
ADLS_ACUITY_SCORE: 57
ADLS_ACUITY_SCORE: 73
ADLS_ACUITY_SCORE: 38
ADLS_ACUITY_SCORE: 73
ADLS_ACUITY_SCORE: 71
ADLS_ACUITY_SCORE: 57
ADLS_ACUITY_SCORE: 69
ADLS_ACUITY_SCORE: 71
ADLS_ACUITY_SCORE: 69
ADLS_ACUITY_SCORE: 57
ADLS_ACUITY_SCORE: 57
ADLS_ACUITY_SCORE: 51
ADLS_ACUITY_SCORE: 62
ADLS_ACUITY_SCORE: 59
ADLS_ACUITY_SCORE: 51
DEPENDENT_IADLS:: INDEPENDENT;TRANSPORTATION
ADLS_ACUITY_SCORE: 71
ADLS_ACUITY_SCORE: 43
ADLS_ACUITY_SCORE: 69
ADLS_ACUITY_SCORE: 55
ADLS_ACUITY_SCORE: 60
ADLS_ACUITY_SCORE: 75
ADLS_ACUITY_SCORE: 69
ADLS_ACUITY_SCORE: 75
ADLS_ACUITY_SCORE: 51
ADLS_ACUITY_SCORE: 56
ADLS_ACUITY_SCORE: 73
ADLS_ACUITY_SCORE: 73
ADLS_ACUITY_SCORE: 75
ADLS_ACUITY_SCORE: 69
ADLS_ACUITY_SCORE: 69
ADLS_ACUITY_SCORE: 43
ADLS_ACUITY_SCORE: 75
ADLS_ACUITY_SCORE: 56
ADLS_ACUITY_SCORE: 57
ADLS_ACUITY_SCORE: 49
ADLS_ACUITY_SCORE: 39
ADLS_ACUITY_SCORE: 60
ADLS_ACUITY_SCORE: 69
ADLS_ACUITY_SCORE: 75
ADLS_ACUITY_SCORE: 59
ADLS_ACUITY_SCORE: 71
ADLS_ACUITY_SCORE: 55
ADLS_ACUITY_SCORE: 55
ADLS_ACUITY_SCORE: 61
ADLS_ACUITY_SCORE: 59
ADLS_ACUITY_SCORE: 57
ADLS_ACUITY_SCORE: 55
ADLS_ACUITY_SCORE: 57
ADLS_ACUITY_SCORE: 73
ADLS_ACUITY_SCORE: 75
ADLS_ACUITY_SCORE: 57
ADLS_ACUITY_SCORE: 71
ADLS_ACUITY_SCORE: 60
ADLS_ACUITY_SCORE: 57
ADLS_ACUITY_SCORE: 71
ADLS_ACUITY_SCORE: 47
ADLS_ACUITY_SCORE: 57
ADLS_ACUITY_SCORE: 57
ADLS_ACUITY_SCORE: 51
ADLS_ACUITY_SCORE: 73
ADLS_ACUITY_SCORE: 71
ADLS_ACUITY_SCORE: 55
ADLS_ACUITY_SCORE: 47
ADLS_ACUITY_SCORE: 62
ADLS_ACUITY_SCORE: 55
ADLS_ACUITY_SCORE: 77
ADLS_ACUITY_SCORE: 57
ADLS_ACUITY_SCORE: 51
ADLS_ACUITY_SCORE: 70
ADLS_ACUITY_SCORE: 55
ADLS_ACUITY_SCORE: 57
ADLS_ACUITY_SCORE: 57
ADLS_ACUITY_SCORE: 75
ADLS_ACUITY_SCORE: 69
ADLS_ACUITY_SCORE: 59
ADLS_ACUITY_SCORE: 57
ADLS_ACUITY_SCORE: 73
ADLS_ACUITY_SCORE: 61
ADLS_ACUITY_SCORE: 55
ADLS_ACUITY_SCORE: 57
ADLS_ACUITY_SCORE: 55
ADLS_ACUITY_SCORE: 75
ADLS_ACUITY_SCORE: 57
ADLS_ACUITY_SCORE: 55
ADLS_ACUITY_SCORE: 55
ADLS_ACUITY_SCORE: 51
ADLS_ACUITY_SCORE: 71
ADLS_ACUITY_SCORE: 71
ADLS_ACUITY_SCORE: 55
ADLS_ACUITY_SCORE: 73
ADLS_ACUITY_SCORE: 57
ADLS_ACUITY_SCORE: 71
ADLS_ACUITY_SCORE: 51
ADLS_ACUITY_SCORE: 75
ADLS_ACUITY_SCORE: 75
ADLS_ACUITY_SCORE: 68
ADLS_ACUITY_SCORE: 38
ADLS_ACUITY_SCORE: 75
ADLS_ACUITY_SCORE: 57
ADLS_ACUITY_SCORE: 73
ADLS_ACUITY_SCORE: 73
ADLS_ACUITY_SCORE: 61
ADLS_ACUITY_SCORE: 55
ADLS_ACUITY_SCORE: 69
ADLS_ACUITY_SCORE: 43
ADLS_ACUITY_SCORE: 69
ADLS_ACUITY_SCORE: 69
ADLS_ACUITY_SCORE: 56
ADLS_ACUITY_SCORE: 55
ADLS_ACUITY_SCORE: 49
ADLS_ACUITY_SCORE: 57
ADLS_ACUITY_SCORE: 73
ADLS_ACUITY_SCORE: 59
ADLS_ACUITY_SCORE: 75
ADLS_ACUITY_SCORE: 59
ADLS_ACUITY_SCORE: 55
ADLS_ACUITY_SCORE: 56
ADLS_ACUITY_SCORE: 59
ADLS_ACUITY_SCORE: 75
ADLS_ACUITY_SCORE: 69
ADLS_ACUITY_SCORE: 69
ADLS_ACUITY_SCORE: 70
ADLS_ACUITY_SCORE: 73
ADLS_ACUITY_SCORE: 57
ADLS_ACUITY_SCORE: 73
ADLS_ACUITY_SCORE: 69
ADLS_ACUITY_SCORE: 51
ADLS_ACUITY_SCORE: 62
ADLS_ACUITY_SCORE: 57
ADLS_ACUITY_SCORE: 77
ADLS_ACUITY_SCORE: 77
ADLS_ACUITY_SCORE: 73
ADLS_ACUITY_SCORE: 55
ADLS_ACUITY_SCORE: 51
ADLS_ACUITY_SCORE: 51
ADLS_ACUITY_SCORE: 59
ADLS_ACUITY_SCORE: 56
ADLS_ACUITY_SCORE: 55
ADLS_ACUITY_SCORE: 57
ADLS_ACUITY_SCORE: 51
ADLS_ACUITY_SCORE: 49
ADLS_ACUITY_SCORE: 56
ADLS_ACUITY_SCORE: 73
ADLS_ACUITY_SCORE: 71
ADLS_ACUITY_SCORE: 60
ADLS_ACUITY_SCORE: 55
ADLS_ACUITY_SCORE: 57
ADLS_ACUITY_SCORE: 77
ADLS_ACUITY_SCORE: 70
ADLS_ACUITY_SCORE: 59
ADLS_ACUITY_SCORE: 55
ADLS_ACUITY_SCORE: 75
ADLS_ACUITY_SCORE: 73
ADLS_ACUITY_SCORE: 75
ADLS_ACUITY_SCORE: 57
ADLS_ACUITY_SCORE: 62
ADLS_ACUITY_SCORE: 57
ADLS_ACUITY_SCORE: 57
ADLS_ACUITY_SCORE: 61
ADLS_ACUITY_SCORE: 57
ADLS_ACUITY_SCORE: 57
ADLS_ACUITY_SCORE: 73
ADLS_ACUITY_SCORE: 55
ADLS_ACUITY_SCORE: 73
ADLS_ACUITY_SCORE: 59
ADLS_ACUITY_SCORE: 56
ADLS_ACUITY_SCORE: 55
ADLS_ACUITY_SCORE: 49
ADLS_ACUITY_SCORE: 71
ADLS_ACUITY_SCORE: 56
ADLS_ACUITY_SCORE: 57
ADLS_ACUITY_SCORE: 55
ADLS_ACUITY_SCORE: 57
ADLS_ACUITY_SCORE: 59
ADLS_ACUITY_SCORE: 60
ADLS_ACUITY_SCORE: 71
ADLS_ACUITY_SCORE: 69
ADLS_ACUITY_SCORE: 71
ADLS_ACUITY_SCORE: 73
ADLS_ACUITY_SCORE: 57
ADLS_ACUITY_SCORE: 59
ADLS_ACUITY_SCORE: 61
ADLS_ACUITY_SCORE: 49
ADLS_ACUITY_SCORE: 75
ADLS_ACUITY_SCORE: 75
ADLS_ACUITY_SCORE: 62
ADLS_ACUITY_SCORE: 57
ADLS_ACUITY_SCORE: 69
ADLS_ACUITY_SCORE: 55
ADLS_ACUITY_SCORE: 57
ADLS_ACUITY_SCORE: 69
ADLS_ACUITY_SCORE: 59
ADLS_ACUITY_SCORE: 69
ADLS_ACUITY_SCORE: 61
ADLS_ACUITY_SCORE: 56
ADLS_ACUITY_SCORE: 57
ADLS_ACUITY_SCORE: 51
ADLS_ACUITY_SCORE: 71
ADLS_ACUITY_SCORE: 57
ADLS_ACUITY_SCORE: 61
ADLS_ACUITY_SCORE: 57
ADLS_ACUITY_SCORE: 73
ADLS_ACUITY_SCORE: 69
ADLS_ACUITY_SCORE: 38
ADLS_ACUITY_SCORE: 59
ADLS_ACUITY_SCORE: 57
ADLS_ACUITY_SCORE: 73
ADLS_ACUITY_SCORE: 43
ADLS_ACUITY_SCORE: 57
ADLS_ACUITY_SCORE: 39
ADLS_ACUITY_SCORE: 62
ADLS_ACUITY_SCORE: 68
ADLS_ACUITY_SCORE: 55
ADLS_ACUITY_SCORE: 56
ADLS_ACUITY_SCORE: 57
ADLS_ACUITY_SCORE: 55
ADLS_ACUITY_SCORE: 70
ADLS_ACUITY_SCORE: 57
ADLS_ACUITY_SCORE: 59
ADLS_ACUITY_SCORE: 68
ADLS_ACUITY_SCORE: 59
ADLS_ACUITY_SCORE: 57
ADLS_ACUITY_SCORE: 61
ADLS_ACUITY_SCORE: 55
ADLS_ACUITY_SCORE: 57
ADLS_ACUITY_SCORE: 51
ADLS_ACUITY_SCORE: 57
ADLS_ACUITY_SCORE: 75
ADLS_ACUITY_SCORE: 59
ADLS_ACUITY_SCORE: 71
ADLS_ACUITY_SCORE: 57
ADLS_ACUITY_SCORE: 57
ADLS_ACUITY_SCORE: 73
ADLS_ACUITY_SCORE: 57
ADLS_ACUITY_SCORE: 71
ADLS_ACUITY_SCORE: 61
ADLS_ACUITY_SCORE: 69
ADLS_ACUITY_SCORE: 67
ADLS_ACUITY_SCORE: 75
ADLS_ACUITY_SCORE: 75
ADLS_ACUITY_SCORE: 39
ADLS_ACUITY_SCORE: 59
ADLS_ACUITY_SCORE: 55
ADLS_ACUITY_SCORE: 56
ADLS_ACUITY_SCORE: 55
ADLS_ACUITY_SCORE: 69
ADLS_ACUITY_SCORE: 55
ADLS_ACUITY_SCORE: 55
ADLS_ACUITY_SCORE: 71
ADLS_ACUITY_SCORE: 57
ADLS_ACUITY_SCORE: 73
ADLS_ACUITY_SCORE: 59
ADLS_ACUITY_SCORE: 69
ADLS_ACUITY_SCORE: 51
ADLS_ACUITY_SCORE: 69
ADLS_ACUITY_SCORE: 73
ADLS_ACUITY_SCORE: 57
ADLS_ACUITY_SCORE: 75
ADLS_ACUITY_SCORE: 59
ADLS_ACUITY_SCORE: 59
ADLS_ACUITY_SCORE: 69
ADLS_ACUITY_SCORE: 77
ADLS_ACUITY_SCORE: 51
ADLS_ACUITY_SCORE: 55
ADLS_ACUITY_SCORE: 57
ADLS_ACUITY_SCORE: 75
ADLS_ACUITY_SCORE: 55
ADLS_ACUITY_SCORE: 57
ADLS_ACUITY_SCORE: 57
ADLS_ACUITY_SCORE: 55
ADLS_ACUITY_SCORE: 57
ADLS_ACUITY_SCORE: 57
ADLS_ACUITY_SCORE: 70
ADLS_ACUITY_SCORE: 61
ADLS_ACUITY_SCORE: 75
ADLS_ACUITY_SCORE: 51
ADLS_ACUITY_SCORE: 55
ADLS_ACUITY_SCORE: 70
ADLS_ACUITY_SCORE: 47
ADLS_ACUITY_SCORE: 55
ADLS_ACUITY_SCORE: 57
ADLS_ACUITY_SCORE: 57
ADLS_ACUITY_SCORE: 55
ADLS_ACUITY_SCORE: 55
ADLS_ACUITY_SCORE: 75
ADLS_ACUITY_SCORE: 56
ADLS_ACUITY_SCORE: 57
ADLS_ACUITY_SCORE: 75
ADLS_ACUITY_SCORE: 55
ADLS_ACUITY_SCORE: 57
ADLS_ACUITY_SCORE: 55
ADLS_ACUITY_SCORE: 51
ADLS_ACUITY_SCORE: 71
ADLS_ACUITY_SCORE: 68
ADLS_ACUITY_SCORE: 57
ADLS_ACUITY_SCORE: 55
ADLS_ACUITY_SCORE: 51
ADLS_ACUITY_SCORE: 69
ADLS_ACUITY_SCORE: 68

## 2024-01-01 ASSESSMENT — ENCOUNTER SYMPTOMS: FEVER: 1

## 2024-01-01 ASSESSMENT — VISUAL ACUITY: OU: NORMAL ACUITY

## 2024-04-23 NOTE — PROGRESS NOTES
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    Patient is a 43-year-old female initially admitted with strokelike symptoms with right face and arm paresthesias and right leg weakness. On further evaluation during current hospitalization, including with brain MRI, there is concern for possible CNS lymphoma instead of stroke, with a lesion involving the left midbrain and avel that is increasing in size on serial imaging (between 3/20 and 4/16/2024 images per chart review).  Patient also has a new HIV diagnosis (diagnosed in early March 2024, on Biktarvy).  Patient is admitted to the hospitalist service at [facility] with heme-onc and neurosurgery consultation as well as infectious disease.  Attempts during admission are being made for obtaining tissue to confirm diagnosis of suspected CNS lymphoma, neurosurgery at [facility] notes that patient is not a candidate for biopsy with their team due to lesion location, and LP with CSF cytology ordered did not offer confirmatory diagnosis per transferring hospitalist provider Dr. Chen.      Dr. Correia of [facility] malignancy team was on the phone call today and confirmed that treatment of a CNS lymphoma at [facility] would be with the [facility] malignancy team, but that before cancer directed therapy could be started that diagnosis needs to be confirmed with tissue biopsy as able.  Recommendation per   Bren is to discuss patient with [facility] neurosurgery team on call to see if they would consider completing biopsy of lesion noted on imaging. A biopsy with neurosurgery at [facility] would be the reason for transfer, otherwise the patient has access to the same level of care at her current location at [facility] with medicine, heme-onc, infectious disease and the neurosurgery team there as well.    Patient is not accepted for transfer at this time.  Transferring provider at [facility] Dr. Chen is going to talk to neurosurgery on-call at [facility] with assistance from SOC triage center to see if they would consider offering biopsy.  If neurosurgery at [facility] is willing to offer biopsy, then a follow-up phone call with medicine triage on-call needs to be completed to confirm patient acceptance for transfer and confirm level of care.    Nataliia Ahuja MD

## 2024-04-24 PROBLEM — G93.89 BRAIN MASS: Status: ACTIVE | Noted: 2024-01-01

## 2024-04-25 NOTE — PROGRESS NOTES
Arrived from: OSH  Belongings: clothing, slippers, black purse, plastic blue bags x2 (filled with belongings)  meds: home meds sent to pharmacy for storage  2 RN Skin Assessment Completed by: Edgard   Non-intact findings documented (yes/no/NA):   LP site ANIA/CDI  R hand edema, redness (placed on pillow)  Fingernails and toenails - dry, brittle, flaky  Old scar on back  Piercing upper right lip    Trach: Removable or Disposable Inner Cannula (check trach plate): N/A

## 2024-04-25 NOTE — PLAN OF CARE
Goal Outcome Evaluation:      Plan of Care Reviewed With: patient    Overall Patient Progress: no changeOverall Patient Progress: no change    Outcome Evaluation: Admitted 4/24 from OSH    Status: admitted evening of 4/24 from OSH, presents with right side weakness and dysarthria.  Transferred to [facility] for possible brain biopsy.  Hx: admitted to OSH 4/12 for failure to thrive, and MRI brain done.  COVID + (4/18), recent HIV diagnosis (3/2024).   Vitals: VSS/A on RA.  Continuous pulse ox on.  Neuros: Lethargic. Confused.  R facial droop. Oriented to self. To location and situation with choice.  D/o to date.W&W  Dysarthric speech, slurred, very difficult to understand.  DTA neuro exam, patient not cooperative with all questions.  Follows only some commands.  Follow commands slowly.   Failed swallow study.  Numbness to right face, RUE, RLE.  Strengths: RU 2, RL 2, CICI 3, LL 2-3.  L eye ptosis.   IV: L PIV infusing NS @ 85mL/hr  Labs/Electrolytes: K+, Mag, Phos- all in normal limits.  Lab draws ordered for am 4/26.    Resp/trach: WDL  Diet: NPO (no exceptions).  Failed bedside swalllow study.  SLP ordered for am.  Bowel status: PTA  : voiding with continence.  Purewick in place.  Skin:   1. LP site ANIA/CDI  2. R hand edema, redness (placed on pillow)  3. Fingernails and toenails - dry, brittle, flaky  4. Old scar on back  5. Piercing upper right lip  Pain: denies  Activity: A2/lift.  Turn/repo Q2hrs  Social: per report from OSH, family is aware of patient's transfer to [facility].   Plan: Oncology consult.  Neurosurgery planning for possible brain biopsy.

## 2024-04-25 NOTE — H&P
[facility]    NEUROSURGERY H&P NOTE    HPI: Kamilah Goldberg is a 43 year old female with PMHx significant for substance abuse, depression, smoking, recent COVID (4/18), and recent HIV diagnosis (3/2024, on Biktarvy) who presented to hospital with right sided weakness and dysarthria and was admitted 4/12 for failure to thrive with MRI Brain demonstrating interval increase in left midbrain and avel lesion. Patient was transferred to [facility] for consideration of brain biopsy.    Patient has difficulty with history and exam due to fatigue and significant dysarthria. Patient states that she had been weak for a about 1 week prior to admission, worse on the right, which is what brought her in to hospital. She endorses noticing difficulty speaking and swallowing during that time as well. Patient states she was mostly at her baseline prior to these events. She does endorse weakness progressively worsening over the past few days since she was first admitted. Patient denies headache, nausea, vision changes.     Of note, patient was diagnosed with HIV in March of this year and started treatment with Biktarvy on 4/17 but did not tolerate well. Patient was evaluated by Heme/Onc and Neurosurgery at [facility], and brain biopsy was not offered due to location. Oncology denies empiric high dose steroid treatment without tissue diagnosis. CSF study from [facility] was non-diagnostic.    Patient denies taking any ASA or anticoagulation. Medication history is unclear.       PAST MEDICAL HISTORY:   Past Medical History:   Diagnosis Date    Back pain     Depression since age 16    On Wellbutrin 100 mg BID--started taking meds at age 16, was on Paxil for several years and then switched to Wellbutrin in [id number]    UTI (lower urinary tract infection)     X 3 as of 12/8/09       PAST SURGICAL HISTORY:   Past Surgical History:   Procedure Laterality Date    BYPASS GASTRIC CARA-EN-Y, LIVER BIOPSY, COMBINED       CHOLECYSTECTOMY      CRYOCAUTERY OF CERVIX  2001 2001    DISCECTOMY LUMBAR MINIMALLY INVASIVE ONE LEVEL  5/24/2013    Procedure: DISCECTOMY LUMBAR MINIMALLY INVASIVE ONE LEVEL;  MIS Right side L5-S1 Gonzalo Lami Microdiscectomy, ;  Surgeon: Alba Anderson MD;  Location: [facility] OR    THORACOTOMY Right 1/20/2023    Procedure: RIGHT THORACOTOMY,  TOTAL DECORTICATION OF THE RIGHT LUNG AND PARIETAL PLEURECTOMY;  Surgeon: Tani Murcia MD;  Location: [facility] OR    TUBAL LIGATION      2012       FAMILY HISTORY:   Family History   Problem Relation Age of Onset    Neurologic Disorder Mother         migraines    C.A.D. Mother         MI age 49    Neurologic Disorder Maternal Grandmother         migraines    Cancer Maternal Grandfather         ? type    Diabetes Maternal Aunt     Cerebrovascular Disease Other         Maternal Great Grandmother    C.A.D. Maternal Uncle         MI    Cancer Maternal Uncle         bladder       SOCIAL HISTORY:   Social History     Tobacco Use    Smoking status: Never    Smokeless tobacco: Never   Substance Use Topics    Alcohol use: Yes     Comment: occasional       MEDICATIONS:  Medications Prior to Admission   Medication Sig Dispense Refill Last Dose    acetaminophen (TYLENOL) 325 MG tablet Take 2 tablets (650 mg) by mouth every 4 hours as needed for mild pain, fever or headaches       HYDROmorphone (DILAUDID) 2 MG tablet Take 1-2 tablets (2-4 mg) by mouth every 6 hours as needed for moderate pain (4-6) 25 tablet 0     senna-docusate (SENOKOT-S/PERICOLACE) 8.6-50 MG tablet Take 1-2 tablets by mouth 2 times daily 20 tablet 0        Allergies:  Allergies   Allergen Reactions    Blood Transfusion Related (Informational Only) Other (See Comments)     Patient has a history of a clinically significant antibody against RBC antigens.  A delay in compatible RBCs may occur.     Naproxen Hives    Codeine Sulfate GI Disturbance    Hydrocodone Nausea and Vomiting       ROS: 10 point ROS were all  negative except for pertinent positives noted in my HPI.    Physical exam:   Blood pressure (!) 126/93, pulse 85, temperature 97.8  F (36.6  C), temperature source Axillary, resp. rate 16, SpO2 95%.  CV: HR and BP as noted above  PULM: breathing comfortably on room air  ABD: soft, non-distended  NEUROLOGIC:  -- Awake; Alert; oriented x 3  -- Follows commands slowly  -- Significant dysarthria, dysphagia  -- No gaze preference. No apparent hemineglect.  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, left eye ptosis  -- HB III-IV on R; tongue midline  -- sensory V1-V3 reduced bilaterally  -- palate elevates symmetrically, uvula midline  -- hearing grossly intact bilat  -- Trapezii 3/5 bilaterally   -- Could not participate with dysmetric testing    Motor:  Normal bulk, no tremor  Does not give full effort during testing  BUE: delts 2/5; biceps 3/5; triceps 2/5;  4-/5  BLE: HF 2/5; KF/KE 2/5; DF/PF 4+/5      LABS:  Recent Labs   Lab 04/24/24 2050      POTASSIUM 4.2   CHLORIDE 105   CO2 20*   ANIONGAP 12   *   BUN 9.4   CR 0.46*   MAUREEN 9.3       Recent Labs   Lab 04/24/24 2050   WBC 6.7   RBC 3.95   HGB 11.3*   HCT 34.2*   MCV 87   MCH 28.6   MCHC 33.0   RDW 14.3          IMAGING:  MRI Brain w/wo 4/24  IMPRESSION:   1. Ovoid mass centered along left aspect of the upper avel and midbrain is stable to minimally increased in size since 4/15/2024. In the setting of HIV, this could be primary CNS lymphoma or opportunistic infection. Appearance atypical for demyelinating process such as progressive multifocal leukoencephalopathy.   2.  Lesion continues to cause partial effacement of the fourth ventricle and quadrigeminal plate cistern, but there is no obstructive hydrocephalus.   3.  No new abnormality elsewhere.     MRI Thoracic 4/24  IMPRESSION:   1. No focal osseous lesions.   2.  Tiny syrinx seen within the distal thoracic spinal cord at the  T11-12 level. No cord edema or expansile cord lesions evident.   3.  Degenerative disc changes in the midthoracic region with small central disc protrusions at T7-8 and T8-9. No significant central spinal canal stenosis throughout the thoracic region.   4.  Axial imaging somewhat motion compromised, patient was unable to continue exam for postcontrast imaging.     ASSESSMENT:  43 year old female with PMHx significant for substance abuse, depression, smoking, recent COVID (4/18), and recent HIV diagnosis (3/2024, on Biktarvy) who presented to hospital with right sided weakness and dysarthria and was admitted 4/12 for failure to thrive with MRI Brain demonstrating interval increase in left midbrain and avel lesion. Patient was transferred to [facility] for consideration of brain biopsy.    RECOMMENDATIONS:  Q4h neuro checks  Activity: up with assist  Q4h neuro exams   Pain control  Normotension  Normonatremia  NPO  mIVF while NPO  Electrolyte replacement protocol  Continue to monitor intake/output  Consult to Oncology  Consult to Neurology  Consult to ID  PT/OT/SLP  DVT: SCDs while in bed  Disposition: Floor      The patient was discussed with Dr. May, neurosurgery chief resident, and Dr. Champagne, neurosurgery staff, and they agree with the above.    Mack Prado MD  Neurosurgery PGY-3  I have seen this patient with the resident and formulated a plan and agree with this note.  AMP

## 2024-04-25 NOTE — CONSULTS
"  Medical Oncology  Consult Note   Date of Service: 04/25/2024    Patient: Kamilah Goldberg  MRN: [id number]  Admission Date: 4/24/2024  Hospital Day # 1    Reason for Consult: \"concern for CNS lymphoma, consideration of empiric treatment given clinical picture\"    HISTORY OF PRESENTING ILLNESS:      Kamilah Goldberg is a 43 year old year old female with recently diagnosed with HIV (3/28/24, started Biktarvy 4/17), polysubstance abuse, hx of R-sided empyema (s/p VATS and partial pleurectomy 1/2021), and recently identified midbrain/avel mass with associated neuro deficits.  Patient transferred to [facility] from [facility] on 4/24/24.       HPI limited given patient's limited verbal responses.  HPI predominantly obtained from chart and with some input from patient's friend, Otis, at bedside, and patient responding with nods/gestures.    Per outside notes, patient was admitted at OSH from 3/28-3/31 after presenting with concerns of mouth pain, and found to have thrush. HIV testing subsequently obtained and returned positive. She was treated with nystatin swish and spit.  During hospitalization, she additionally complained of dizziness/instability, prompting MRI brain to be obtained, which revealed an area of restricted diffusion and T2 hyperintensity in the L posterior pontine mesencephalic junction with local mass effect. Felt to be likely ischemic infarct (risk factors of HTN, smoking and amphetamine use).  Additional considerations included infection vs neoplasm.  CSF cytology attempted, but insufficient quantity.  Patient was discharged with outpatient follow-up, however, returned to ED prior to follow-up appointments due to generalized weakness, and difficulty caring for herself at home.  She was admitted at [facility] on 4/12.  During hospitalization, had repeat MRI brain on 4/15 with reported interval increase in size and enhancement of the expansile lesion in the L midbrain and avel, " concerning for lymphoma.   CT A/P obtained 4/16 with no lymphadenopathy, mass or acute process.  Repeat LP performed on 4/18, and CSF with no pleocytosis (only 2 WBCs), protein 98, glucose 50. Flow cytometry and cytology negative on that sample.  Reportedly, EBV PCR was obtained and positive on CSF sample, though I do not see this in outside lab records (report says EBV PCR on blood).  She was also treated for MRSA pneumonia with linezolid.    Patient had progressive clinical decline, including increasing somnolence and worsening weakness.  Repeat MRI brain on 4/23 with stable to minimally increased size of ovoid mass, continuing to cause partial effacement of 4th ventricle. MRI cervical, thoracic and lumbar spine obtained on 4/23 as well, limited by motion, but negative for focal lesions.  ID, oncology and neurosurgery involved at outside hospital. Neurosurgery felt unable to safely obtain biopsy given location of lesion. 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  Patient had progressive clinical decline with speech difficulty and increasing weakness, and ultimately, received a dose of decadron 8 mg, prior to transferring to [facility].        Today, patient seen, accompanied by friend, Otis, at bedside.  Limited information obtained.  Patient and friend Otis report that patient's symptoms of weakness, speech difficulty and swallowing difficulty began approximately 1 week ago.  Patient endorses abdominal pain x approx 1 week with some nausea, but denies any diarrhea. Denies recent fevers, weight loss or night sweats. She does report a rash on her bilateral upper arms that began approx 1 week ago.            Review of Systems:  Limited 2/2 clinical condition.    PAST MEDICAL HISTORY      Past Medical History:   Diagnosis Date    Back pain     Depression since age 16    On Wellbutrin 100 mg BID--started taking  meds at age 16, was on Paxil for several years and then switched to Wellbutrin in [id number]    UTI (lower urinary tract infection)     X 3 as of 12/8/09       PAST SURGICAL HISTORY:      Past Surgical History:   Procedure Laterality Date    BYPASS GASTRIC CARA-EN-Y, LIVER BIOPSY, COMBINED      CHOLECYSTECTOMY      CRYOCAUTERY OF CERVIX  2001 2001    DISCECTOMY LUMBAR MINIMALLY INVASIVE ONE LEVEL  5/24/2013    Procedure: DISCECTOMY LUMBAR MINIMALLY INVASIVE ONE LEVEL;  MIS Right side L5-S1 Gonzalo Lami Microdiscectomy, ;  Surgeon: Alba Anderson MD;  Location: [facility] OR    THORACOTOMY Right 1/20/2023    Procedure: RIGHT THORACOTOMY,  TOTAL DECORTICATION OF THE RIGHT LUNG AND PARIETAL PLEURECTOMY;  Surgeon: Tani Murcia MD;  Location: [facility] OR    TUBAL LIGATION      2012       FAMILY HISTORY:      Family History   Problem Relation Age of Onset    Neurologic Disorder Mother         migraines    C.A.D. Mother         MI age 49    Neurologic Disorder Maternal Grandmother         migraines    Cancer Maternal Grandfather         ? type    Diabetes Maternal Aunt     Cerebrovascular Disease Other         Maternal Great Grandmother    C.A.D. Maternal Uncle         MI    Cancer Maternal Uncle         bladder       MEDICATIONS:      Current Facility-Administered Medications   Medication Dose Route Frequency Provider Last Rate Last Admin    acetaminophen (TYLENOL) tablet 650 mg  650 mg Oral Q4H PRN Mack Prado MD        Or    acetaminophen (TYLENOL) Suppository 650 mg  650 mg Rectal Q4H PRN Mack Prado MD        [Held by provider] bictegravir-emtricitabine-tenofovir (BIKTARVY) -25 MG per tablet 1 tablet  1 tablet Oral Daily Mack Prado MD        melatonin tablet 3 mg  3 mg Oral At Bedtime PRN Mack Prado MD        ondansetron (ZOFRAN) tablet 4 mg  4 mg Oral Q6H PRN Mack Prado MD        sennosides (SENOKOT) tablet 8.6 mg  8.6 mg Oral BID PRN Mack Prado MD        sodium chloride 0.9 %  "infusion   Intravenous Continuous Mack Prado MD 85 mL/hr at 04/25/24 0037 Rate Change at 04/25/24 0037        PHYSICAL EXAM:      Blood pressure 116/79, pulse 67, temperature 97.9  F (36.6  C), temperature source Oral, resp. rate 16, height 1.626 m (5' 4\"), weight 79 kg (174 lb 2.6 oz), SpO2 100%.    General appearance: Patient is lying in bed, appears to be decortically posturing (R>L).   Neck - supple; non-tender; no appreciable lymphadenopathy  Cardio: RRR; no appreciable murmurs, rubs or gallops  Pulmonary: normal RR; no apparent use of accessory respiratory muscles  Lymphatics: no cervical or supraclavicular lymphadenopathy  Extremities: No peripheral edema or tenderness to palpation  Skin: warm and dry; faint salmon pink macular rash over bilateral upper extremities.  Neuro: Limited speech. Intermittently responsive to questions. Tracks with eyes.   R eye EOMS intact. L eye limited EOMs with upward and lateral gaze. Sensation diminished to touch on R side in all 3 divisions of CN5. Asymmetric smile, with drooping on R side. Pt did not open mouth on command.   Diminished strength in R hand/arm. Strength intact to hand squeeze on L. Diminished ankle dorsiflexion/plantarflexion on R, intact on L.       LABS:    I personally reviewed the following studies:  ROUTINE LABS (Last four results):  CBC  Recent Labs   Lab 04/24/24 2050   WBC 6.7   RBC 3.95   HGB 11.3*   HCT 34.2*   MCV 87   MCH 28.6   MCHC 33.0   RDW 14.3        CMP  Recent Labs   Lab 04/24/24 2050      POTASSIUM 4.2   CHLORIDE 105   CO2 20*   ANIONGAP 12   *   BUN 9.4   CR 0.46*   GFRESTIMATED >90   MAUREEN 9.3   MAG 2.2   PHOS 4.1       CSF on 3/29:  Protein: 74  Glucose: 55  RBC: 10  Nucleated cells: 0      CSF 4/18:  Protein: 98  Glucose: 50  RBCs: 4  Nucleated cells: 2  Flow cytometry: no malignant cells. No atypical T- or -cell populations detected.        IMAGING:   The following imaging reports were reviewed by me - "         Result Date: 3/29/2024  MRI OF THE BRAIN WITHOUT CONTRAST  FINDINGS: Examination is motion limited. No aggressive marrow lesion. Normal brain volume. No hydrocephalus. Intracranial flow voids at the base of skull appear patent. No significant inflammatory disease in the paranasal sinuses. There is an area of restricted diffusion and T2 hyperintensity in the left posterior pontomesencephalic junction there is no hemorrhage. There appears to be mild local mass effect. No significant narrowing of the fourth ventricle or cerebral aqueduct.     IMPRESSION: Motion limited examination. Area of restricted diffusion and T2 hyperintensity in the left posterior pontomesencephalic junction measuring 15 mm on axial images. No hemorrhage identified. Mild local mass effect without significant narrowing of the cerebral aqueduct or fourth ventricle. No hydrocephalus. This probably represents an ischemic infarct. Given the distribution along a perforating artery this could potentially be a manifestation of neurocandidiasis although this is not certain. The morphology and signal characteristics are not suggestive of a cerebral abscess.           3/30/2024  MRI OF THE BRAIN WITHOUT AND WITH CONTRAST COMPARISON: MRI brain without contrast March 29, 2024 FINDINGS: No aggressive marrow lesion identified. Corpus callosum normally formed. Posterior fossa structures normal position. Restricted diffusion and T2 hyperintensity in the left posterior pontine mesencephalic junction is again identified with mild local mass effect. There is some faint peripheral enhancement along this abnormality. No nodular or plaque-like leptomeningeal enhancement. No pachymeningeal enhancement. No extra-axial fluid collection.. No hydrocephalus.     IMPRESSION: Area of restricted diffusion and T2 hyperintensity in the left posterior pontine mesencephalic junction unchanged. Local mass effect is unchanged. No hydrocephalus. Some peripheral enhancement is  seen along this abnormality. This enhancement could be related to relatively subacute infarction. The enhancement pattern is not suggestive of cerebral abscess although I suppose a focal encephalitis would be possible.. No leptomeningeal or pachymeningeal enhancement. No additional abnormalities are identified.         3/29/2024 CT Chest  IMPRESSION:     1. Mild faint tree-in-bud interstitial opacities within the right lower lobe inferiorly consistent with mild nonspecific infiltrates. Right lower lobe bronchiectasis.   2.  Chronic right middle lobe linear atelectasis versus scarring with associated bronchiectasis.           MRI BRAIN W/O&W CON   DATE: 4/15/2024 7:18 PM  FINDINGS:   Image quality is degraded due to motion artifact.     PARENCHYMA:   Interval increase in size of the T2/FLAIR hyperintense expansile lesion in the left midbrain and avel which measures approximately 1.3 x 1.8 x 2.5 cm in AP, transverse, and craniocaudal dimensions, previously 0.8 x 1.0 x 1.9 cm. Enhancement associated with the lesion has also significantly increased. The remaining brain parenchyma is unremarkable. No parenchymal hemorrhage. No midline shift.     EXTRA-AXIAL SPACES:   No extra-axial collection. No extra-axial mass.     VENTRICLES:   Slight mass effect on the fourth ventricle. No hydrocephalus. No abnormal ependymal enhancement.     VESSELS:   The flow voids are normal.     ORBITS:   No significant abnormality.     PARANASAL SINUSES/MASTOID AIR CELLS:   Moderate mucosal thickening in the left maxillary sinus.     BONES:   Unremarkable.     EXTRACRANIAL SOFT TISSUES:   Unremarkable.   IMPRESSION:  Interval increase in size and enhancement of the expansile lesion within the left midbrain and avel. In the setting of HIV, this is concerning for lymphoma, although a high-grade primary glial neoplasm is also possible. Opportunistic infection is considered less likely.             4/16/24 CT ABDOMEN & PELVIS W/O ORAL W IV CON    Final Result   IMPRESSION:   1. No acute inflammatory process in the abdomen or pelvis. Normal appendix.   2. No abdominal or pelvic lymphadenopathy.   3. No bowel inflammation or bowel obstruction.   4. Moderate to large fecal burden in the colon. Correlate clinically for constipation.   5. Minor bibasilar atelectasis.           4/23/2024  MRI SPINE THORACIC W/O CON   Final Result   IMPRESSION:     1. No focal osseous lesions.   2. Tiny syrinx seen within the distal thoracic spinal cord at the T11-12 level. No cord edema or expansile cord lesions evident.   3. Degenerative disc changes in the midthoracic region with small central disc protrusions at T7-8 and T8-9. No significant central spinal canal stenosis throughout the thoracic region.   4. Axial imaging somewhat motion compromised, patient was unable to continue exam for postcontrast imaging.     4/23/2024  MRI SPINE LUMBAR W/O CON   Final Result   IMPRESSION:   1. Motion degraded examination. No central spinal canal stenosis. No focal osseous lesion.   2. Degenerative desiccation of the L5-S1 disc with moderate disc space collapse. Some mild disc bulging and disc collapse at this level results in no greater than mild bilateral neural foraminal narrowing. Neural foramen throughout the remainder the lumbar spine are patent.       MRI SPINE CERVICAL W/O CON   Final Result   IMPRESSION: Minor disc degeneration at C5-6 and C6-7, no associated stenosis of the spinal canal or neural foramina.       4/23/2024  MRI BRAIN W/O&W CON   Final Result   FINDINGS: Some of the imaging series are degraded by head motion, particularly the postcontrast sequences.     Expansile ovoid mass centered along left side of the upper avel and midbrain is again demonstrated, stable to minimally increased in size over the past 8 days. As measured obliquely in the sagittal plane (series 2/image 14), lesion and its associated edema are 1.4 x 2.4 cm (previously 1.3 x 2.5 cm). It has a  transverse diameter of 2.0 cm on series 8/image 12, previously 1.9 cm. Abnormality is T1 hypointense and mildly T2 hyperintense, and continues to enhance fairly homogeneously after contrast.     Mass effect causes continued effacement of the upper fourth ventricle and quadrigeminal plate cistern; no obstructive hydrocephalus.     No hemorrhage or recent ischemic stroke. No new brain lesion elsewhere.     Fairly extensive inflammatory membrane thickening continues in the left maxillary sinus and some of the anterior ethmoid air cells. Trace mastoid fluid on the right.     Arterial flow voids are maintained. No skull or orbital abnormality.     IMPRESSION:     1. Ovoid mass centered along left aspect of the upper avel and midbrain is stable to minimally increased in size since 4/15/2024. In the setting of HIV, this could be primary CNS lymphoma or opportunistic infection. Appearance atypical for demyelinating process such as progressive multifocal leukoencephalopathy.     2. Lesion continues to cause partial effacement of the fourth ventricle and quadrigeminal plate cistern, but there is no obstructive hydrocephalus.     3. No new abnormality elsewhere.           PATHOLOGY:     4/18/24 CSF cytology/flow cytometry  Final Diagnosis    Cerebrospinal fluid, lumbar puncture -   No malignant cells identified.  Flow cytometry:  No atypical T- or B-cell populations detected.   Electronically signed by Beni Benton MD on 4/22/2024 at  3:38 PM   Gross Description    Cerebrospinal fluid:  Received fresh is 3 cc of clear, colorless fluid.  One ThinPrep and one Diff-Quik.  ([facility])   Microscopic Description    The Cabrera-stained cytospin and ThinPrep show a low cellularity sample with background acellular debris.  The few white blood cells present consistent of small mature lymphocytes, monocytes and macrophages.  No atypical cells or blasts are seen.   Ancillary Studies    Flow cytometric immunophenotyping studies are  performed at [facility] () and interpreted by Dr. Benton.  When gating on lymphocytes (CD45+, low side scatter), T-lymphocytes comprise 23.0% of the events, while B-lymphocytes comprise 23.1% of the events.  There are also NK-cell associated markers (CD56+ CD3-) that account for approximately 11.5% of the total events.  The CD4:CD8 ratio is normal to slightly decreased (0.5).  The kappa: lambda ratio is incalculable due to a paucity of B-lymphocytes.  T-lymphocytes show normal expression of CD2, CD3, CD5 and CD7.  No aberrant T cell population expressing TCR gamma-delta is detected.  TRBC1 shows no evidence of a clonal T cell population.  B-lymphocytes show normal CD19 and CD20 expression, with no evidence of an abnormal CD5- or CD10-positive B-lymphocyte population.  These findings show mixed lymphocytes, with no atypical T-cell or monoclonal B-cell population identified. Markers performed: 15.         ASSESSMENT AND PLAN:      Kamilah Goldberg is a 43 year old female with recently diagnosed with HIV (3/28/24, started Biktarvy 4/17), polysubstance abuse, hx of R-sided empyema (s/p VATS and partial pleurectomy 1/2021), and recently identified midbrain/avel mass with associated neuro deficits.  Patient transferred to [facility] from [facility] on 4/24/24.     # L pontine/midbrain lesion  # Neuro deficits 2/2 above  # Newly diagnosed HIV  # Polysubstance abuse  Initially diagnosed as infarct on imaging obtained 3/28  Repeat imaging on 4/15 and 4/23 with slight progression of lesion, with concern for CNS lymphoma vs infection.  CSF at outside hospital with negative cytology and negative flow cytometry. Reportedly CSF positive for EBV, though I do not see this in outside records at this time (I see positive EBV PCR documented as performed on blood, not CSF).  CT C/A/P obtained at OSH with no evidence of lymphadenopathy or other masses/lesions that would be targetable for biopsy.    No  empiric chemotherapy recommended at this time. Would need further evidence suggestive/supportive of lymphoma.     Recommendations:  - Strongly recommend obtaining biopsy of lesion for diagnosis.   - Please have outside hospital push images through PACS for our review.  - If unable to obtain biopsy, recommend obtaining large volume CSF for analyses, to include flow cytometry, cytology, B cell gene rearrangement (of note, this cannot be performed at [facility] lab on CSF, but can be sent out, perhaps to [place] [facility])  - Avoid steroids if possible to improve yield of above.  However, if neuro deficits felt to be related to inflammation from lesion, could consider decadron.  - Recommend ID consult   - Recommend CT neck, chest, abdomen and pelvis, if unable to obtain outside images in PACS  - Recommend ophthalmology consult        Patient was seen with and the plan of care was discussed with attending physician Dr. Alvarez who agrees with the above history, physical exam and assessment/plan.       Krystin Harris MD  PGY-4  Hematology, Oncology, and Transplantation  [phone]

## 2024-04-25 NOTE — PLAN OF CARE
Goal Outcome Evaluation:      Plan of Care Reviewed With: other (see comments) (patient's aunt)

## 2024-04-25 NOTE — CONSULTS
Care Management Initial Consult    General Information  Assessment completed with: Other (aunt), Noemy  Type of CM/SW Visit: Initial Assessment    Primary Care Provider verified and updated as needed: No (Noemy is unaware if patient has a PCP)   Readmission within the last 30 days: no previous admission in last 30 days   Reason for Consult: discharge planning  Advance Care Planning: Advance Care Planning Reviewed: concerns discussed (Noemy states she advised Kamilah to complete previously but Kamilah never did)        Communication Assessment  Patient's communication style: spoken language (English or Bilingual)        Cognitive  Cognitive/Neuro/Behavioral: .WDL except, speech, orientation, level of consciousness  Level of Consciousness: lethargic, confused  Arousal Level: opens eyes spontaneously, arouses to voice  Orientation: disoriented to, time, other (see comments)        Speech: slurred (soft, difficult to understand)    Living Environment:   People in home: friend(s)     Current living Arrangements: apartment      Able to return to prior arrangements: other (see comments) (unsure at this time)     Family/Social Support:  Care provided by: self  Provides care for: no one, unable/limited ability to care for self     Other (specify) (Noemy states she is unable to provide physical assist or have Kamilah stay with her at discharge, she states her friend she is staying with does not provide any assist)          Description of Support System: Uninvolved    Support Assessment: Lacks adequate physical care, Lacks adequate emotional support, Lacks necessary supervision and assistance    Current Resources:   Patient receiving home care services: No     Community Resources: None  Equipment currently used at home:    Supplies currently used at home: None    Employment/Financial:  Employment Status: unemployed (per Noemy, pt lost her job a few months ago)     Financial Concerns:        Does the patient's insurance plan  have a 3 day qualifying hospital stay waiver?  No    Lifestyle & Psychosocial Needs:  Social Determinants of Health     Food Insecurity: Food Insecurity Present (4/12/2024)    Received from [facility]     Hunger Vital Sign     Worried About Running Out of Food in the Last Year: Sometimes true     Ran Out of Food in the Last Year: Sometimes true   Depression: Not on file   Housing Stability: Low Risk  (4/12/2024)    Received from [facility]     Housing Stability Vital Sign     Unable to Pay for Housing in the Last Year: No     Number of Places Lived in the Last Year: 1     In the last 12 months, was there a time when you did not have a steady place to sleep or slept in a shelter (including now)?: No   Tobacco Use: Low Risk  (4/12/2024)    Received from [facility]     Patient History     Smoking Tobacco Use: Never     Smokeless Tobacco Use: Never     Passive Exposure: Not on file   Financial Resource Strain: Not on file   Alcohol Use: Not on file   Transportation Needs: No Transportation Needs (4/12/2024)    Received from [facility]     PRAPARE - Transportation     Lack of Transportation (Medical): No     Lack of Transportation (Non-Medical): No   Physical Activity: Not on file   Interpersonal Safety: Not At Risk (4/12/2024)    Received from [facility]     Humiliation, Afraid, Rape, and Kick questionnaire     Fear of Current or Ex-Partner: No     Emotionally Abused: No     Physically Abused: No     Sexually Abused: No   Stress: Not on file   Social Connections: Not on file   Health Literacy: Not on file     Functional Status:  Prior to admission patient needed assistance:   Dependent ADLs:: Independent (up until the last few weeks)  Dependent IADLs:: Independent, Transportation (up until the last few weeks)     Mental Health Status:  Mental Health Status: Other (see comment) (THOR)       Chemical Dependency Status:  Chemical Dependency Status: Current Concern        "    Values/Beliefs:  Spiritual, Cultural Beliefs, Adventism Practices, Values that affect care: no             Additional Information:  Per nursing, patient is confused and unable to participate in assessment. Call placed to patient's aunt, Noemy, to complete initial care management assessment. Assessment limited as Noemy was unsure about some of the questions being asked.     Noemy states she considers the patient homeless. She states Kamilah has been staying on friend's house in [place] for \"a few years.\" Noemy states the patient has a 4WW she would use intermittently or as needed. She was unaware of any other assistive devices or medical equipment or supplies at baseline. Noemy states patient was previously independent with ADLs/IADLs with the exception of transportation up until a few weeks ago. Noemy states her friends provide transportation. Noemy states Kamilah came down from [place] to her house prior to admission. While at Noemy's house, Kamilah was unable to care for herself and was falling. Noemy was unable to help Kamilah up off of the floor so had to call Kamilah's 23 year old son Yuan to help. Noemy states Kamilah has no support for discharge. The friend she was staying with does not provide any assistance and Noemy is also unable to house or assist her. Noemy states patient lost her job at the [employer] a few months ago and is unsure of any income. Noemy states patient was not receiving any home or community services prior to admission that she is aware of. Noemy states patient has two sons - Yuan 23 who lives here and Lico 12 yo who lives out of state.     Patient did not have a PCP prior to admission. Discharge plan, timeframe and location are unknown at this time. Once discharge plan becomes more clear, will need to assist patient is scheduling hospital follow up/establish care with PCP.     Discharge needs are not known at this time, CM will continue to follow.    Karely Lester RN, " BSN  6A RN Care Coordinator  Ph: [phone]   Pager: [phone]

## 2024-04-25 NOTE — CONSULTS
[facility]  Consult Note - Hospitalist Service, GOLD TEAM 7  Date of Admission:  4/24/2024  Consult Requested by: Dr. 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 Medicine has been consulted for medical co-management.     Recommendations  - agree with oncology, ID, optho and neuro consult   - Suggest monitoring sugars and adding D5 to maintenance fluids if she gets hypoglycemic as she was at [facility]. 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 However this continued to worsen and repeat imaging appeared more consistent with neoplasm. Workup done at [facility] was extensive with neurosurgery, ID and oncology involved. Infectious workup notable for CMV IgM negative, CMV IgG positive suggests this is prior infection. Toxoplasma IgG negative. Serum cryptococcal antigen negative. T spot negative. The patient clinical presentation and neuroimaging is not consistent with PML. Repeat LP (4/18)-- with no pleocytosis only 2 CSF WBC, CSF protein 98, CSF glucose 50. Albuminocytological dissociation with rise in CSF protein. Meningitis encephalitis panel negative. CSF EBV positive (highly suggestive of CNS lymphoma).    Spinal tap was done, CSF culture is negative. Patient was then accepted to [facility] BY neurosurgery for biopsy. Of note prior to transfer patient was started on decadron 8 mg Q6H.  - Neurosurgery primary to decide on biopsy   - agree with oncolgoy and neurology consult     Recent diagnosis of HIV/AIDS,  "untreated  Patient was recently diagnosed with HIV on March 28, 2024 with a viral load of 1.24 million and CD4 count of 2. ID consulted at [facility] and started on biktarvy.  - agree with ID consult   - treatment of HIV per ID  - recommend starting bactrim for PJP ppx unless ID says otherwise     Stimulant use (amphetamine)  On chart review patients stimulant use first noted on a tox screen at [facility] back in 2016. On further review it appears she primarily snorts amphetamines and denies IVDU. Due to her mental status unable to gather further history.  - continue to follow for support    COVID-19 infection, not symptomatic, tested positive for/18  MRSA pneumonia  Diagnosed at [facility] with sputum cultures on 4/19. She was incidentally found to have covid as well which was felt to be incidental finding.  -On linezolid for 7 day course (4/21-4/27)    Maculopapular rash  Probably from antibiotic, improving    Dyslipidemia;  Discontinue Lipitor with poor oral intake    Hx SWETA  Has hx of SWETA. Has been treated with transfusions and IV iron in the past. I do not really see a workup as to the etiology of her SWETA but this can be deferred as she has more critical issues currently.  - colonoscopy as OP       Clinically Significant Risk Factors Present on Admission                       # Overweight: Estimated body mass index is 29.9 kg/m  as calculated from the following:    Height as of this encounter: 1.626 m (5' 4\").    Weight as of this encounter: 79 kg (174 lb 2.6 oz).              Russel Boothe DO  Hospitalist Service, [facility] TEAM 7  Securely message with [employer] (more info)  Text page via [facility] Paging/Directory   See signed in provider for up to date coverage information  ______________________________________________________________________    Chief Complaint   CNS lymphoma    Unable to obtain a history from the patient due to confusion    History of Present Illness   Kamilah Goldberg is a 43 year old female w/ a " hx of stimulant use disorder, SWETA, pontine stroke, emypema s/p vats and newly diagnosed HIV admitted on who was transferred to [facility] for neurosurgical evaluation and 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 Medicine has been consulted for medical co-management.     Per outside notes, patient was admitted at OSH from 3/28-3/31 after presenting with concerns of mouth pain, and found to have thrush. HIV testing subsequently obtained and returned positive. She was treated with nystatin swish and spit. During hospitalization, she additionally complained of dizziness/instability, prompting MRI brain to be obtained, which revealed an area of restricted diffusion and T2 hyperintensity in the L posterior pontine mesencephalic junction with local mass effect. Felt to be likely ischemic infarct (risk factors of HTN, smoking and amphetamine use). Additional considerations included infection vs neoplasm. CSF cytology attempted, but insufficient quantity. Patient was discharged with outpatient follow-up, however, returned to ED prior to follow-up appointments due to generalized weakness, and difficulty caring for herself at home. She was admitted at [facility] on 4/12. During hospitalization, had repeat MRI brain on 4/15 with reported interval increase in size and enhancement of the expansile lesion in the L midbrain and avel, concerning for lymphoma. CT A/P obtained 4/16 with no lymphadenopathy, mass or acute process.     Patient had progressive clinical decline, including increasing somnolence and worsening weakness. Repeat MRI brain on 4/23 with stable to minimally increased size of ovoid mass, continuing to cause partial effacement of 4th ventricle. It appears on 4/24 [facility] oncology was agreeable to starting steroids and gave a dose of decadron prior to being sent to [facility]. At that time neuro oncology was also recommending cervical puncture? The patient was then transferred to [facility]. On arrival here she continues  to be encephalopathic and difficult to obtain a history from.        Past Medical History    Past Medical History:   Diagnosis Date    Back pain     Depression since age 16    On Wellbutrin 100 mg BID--started taking meds at age 16, was on Paxil for several years and then switched to Wellbutrin in 2002000    UTI (lower urinary tract infection)     X 3 as of 12/8/09       Past Surgical History   Past Surgical History:   Procedure Laterality Date    BYPASS GASTRIC CARA-EN-Y, LIVER BIOPSY, COMBINED      CHOLECYSTECTOMY      CRYOCAUTERY OF CERVIX  2001 2001    DISCECTOMY LUMBAR MINIMALLY INVASIVE ONE LEVEL  5/24/2013    Procedure: DISCECTOMY LUMBAR MINIMALLY INVASIVE ONE LEVEL;  MIS Right side L5-S1 Gonzalo Lami Microdiscectomy, ;  Surgeon: Alba Anderson MD;  Location: [facility] OR    THORACOTOMY Right 1/20/2023    Procedure: RIGHT THORACOTOMY,  TOTAL DECORTICATION OF THE RIGHT LUNG AND PARIETAL PLEURECTOMY;  Surgeon: Tani Murcia MD;  Location: [facility] OR    TUBAL LIGATION      2012       Medications   I have reviewed this patient's current medications       Review of Systems    The 10 point Review of Systems is negative other than noted in the HPI or here.     Social History   I have reviewed this patient's social history and updated it with pertinent information if needed.  Social History     Tobacco Use    Smoking status: Never    Smokeless tobacco: Never   Substance Use Topics    Alcohol use: Yes     Comment: occasional    Drug use: No         Family History   I have reviewed this patient's family history and updated it with pertinent information if needed.  Family History   Problem Relation Age of Onset    Neurologic Disorder Mother         migraines    C.A.D. Mother         MI age 49    Neurologic Disorder Maternal Grandmother         migraines    Cancer Maternal Grandfather         ? type    Diabetes Maternal Aunt     Cerebrovascular Disease Other         Maternal Great Grandmother    C.A.D. Maternal Uncle          MI    Cancer Maternal Uncle         bladder         Allergies   Allergies   Allergen Reactions    Blood Transfusion Related (Informational Only) Other (See Comments)     Patient has a history of a clinically significant antibody against RBC antigens.  A delay in compatible RBCs may occur.     Naproxen Hives    Codeine Sulfate GI Disturbance    Hydrocodone Nausea and Vomiting        Physical Exam   Vital Signs: Temp: 97.5  F (36.4  C) Temp src: Oral BP: (!) 138/97 Pulse: 73   Resp: 16 SpO2: 99 % O2 Device: None (Room air)    Weight: 174 lbs 2.61 oz    General Appearance: In bed, no distress   Respiratory: breathing comfortably on room air   Cardiovascular: RRR  GI: NTND  Skin: no rashes on exposed skin   Other: Encephalopathic but able to nod yes and no,     Medical Decision Making       80 MINUTES SPENT BY ME on the date of service doing chart review, history, exam, documentation & further activities per the note.      Data     I have personally reviewed the following data over the past 24 hrs:    6.7  \   11.3 (L)   / 450     137 105 9.4 /  133 (H)   4.2 20 (L) 0.46 (L) \     INR:  0.98 PTT:  28   D-dimer:  N/A Fibrinogen:  N/A       Imaging results reviewed over the past 24 hrs:   No results found for this or any previous visit (from the past 24 hour(s)).

## 2024-04-25 NOTE — CONSULTS
"[facility]  Neurology Consultation    Patient Name:  Kamilah Goldberg  MRN:  [id number]    :  1980  Date of Service:  2024  Primary care provider:  No Ref-Primary, Physician      The neurology consultation service was asked to see Kamilah Goldberg by Dr. Octavio Titus to evaluate for \"patient with HIV AIDS with ? CNS lymphoma with multiple cranial neuropathies.\"     Assessment & Plan   Kamilah Goldberg is a 43 year old female with PMHx of recent HIV diagnosis and avel lesion thought to be an ischemic stroke (3/2024)  who presented to OSH  with weakness, diplopia, dysarthria admitted for failure to thrive found to have increasing size of lesions in left midbrain and avel lesion transferred to [facility] for consideration of brain biopsy for CNS lymphoma. Her course was also complicated by MRSA pneumonia and encephalopathy.     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 Thus far cytology and flow cytometry from the initial LP has been negative but the sensitivity is around 50% for CNS lymphoma for a single LP. Should also consider an infectious process. There have been multiple serologies sent (toxo, crypto, EBV) but these are from the serum - she may benefit from a repeat LP with additional infectious work-up from CSF. The cell count and prelim results from her LP on  is less concerning for a bacterial process but she is at risk for atypical infections. Lower suspicion for autoimmune etiology. This is a challenging diagnostic dilemma given the significant risk of biopsying the brainstem. If on repeat MRI brain from  there is extension outside of the brainstem this may make biopsy more feasible. Otherwise will need " "to weight benefit of steroid initiation without biopsy confirmation. Will recommend the following:  - Obtain MRI images from 4/23  - Consider repeat high-volume LP (20cc) for additional infectious work-up (toxo, crypto, TB, aspergillosis meningitis/encephalitis, flow cytometry, cytology)  - Agree with ID and optho consult    Thank you for allowing the neurology service to participate in the care of Kamilah Goldberg.  We will continue to follow. Please contact us with questions.      This patient was discussed with the neurology attending faculty, Dr. Brooks who agrees with the assessment and plan.    Brittney Mccabe MD  Internal Medicine, PGY-3  General Neruology Consults  04/25/2024     History   Kamilah Goldberg is a 43 year old female with PMHx of amphetamine use, tobacco use, history of discectomy L5-S1,s/p Jessica-en-Y, empyema s/p VATS with total decortication of the right lung and partial pleurectom on 1/20/23, and recent HIV diagnosis (3/2024) who presented to OSH 4/12 with right sided weakness and dysarthria admitted for failure to thrive. She was found to have increasing lesions in left midbrain and avel lesion transferred to [facility] for consideration of brain biopsy. Her course was also complicated by MRSA pneumonia and encephalopathy.     Of note patient was recently admitted 3/21-3/28 for oral thrush. She was found to HIV and started on Biktarvy. She had also endorsed dizziness and had a brain MRI completed with \"+presence of mild local mass effect in conjunction with faint post contrast enhancement, the possibility of neoplasm at the pontine mesencephalic junction is possible as cellular neoplasm such as lymphoma can cause increased density of CT as well MRI.\" She was diagnosed with an ischemic pontine stroke; infectious evaluation on LP was negative and she was recommended to have repeat brain MRI as an outpatient in 3 months.     Patient had difficulty with slurred speech, blurry vision, and " "weakness in her legs. Denied headache or difficulty swallowing. She denied fevers, night sweats, chills, cold sx, chest pain. SOB, cough, abdominal pain, nausea, diarrhea, rashes, or new joint pain.     At OSH patient underwent LP and had repeat MRI with \"Interval increase in size of the T2/FLAIR hyperintense expansile lesion in the left midbrain and avel which measures approximately 1.3 x 1.8 x 2.5 cm in AP, transverse, and craniocaudal dimensions, previously 0.8 x 1.0 x 1.9 cm. Enhancement associated with the lesion has also significantly increased.\" Patient was evaluated by heme/onc, neurosurgery, and ID. Brain biopsy was considered but not completed due to location. Steriods were considered for CNS lymphoma but deferred without tissue diagnosis. She was also treated for MRSA pneumonia with linezolid (incidnetally found to be COVID positive) and started on D50 for poor PO.    ROS  A 5-point ROS performed and negative unless documented in HPI.    PMH  Past Medical History:   Diagnosis Date    Back pain     Depression since age 16    On Wellbutrin 100 mg BID--started taking meds at age 16, was on Paxil for several years and then switched to Wellbutrin in [id number]    UTI (lower urinary tract infection)     X 3 as of 12/8/09     Past Surgical History:   Procedure Laterality Date    BYPASS GASTRIC CARA-EN-Y, LIVER BIOPSY, COMBINED      CHOLECYSTECTOMY      CRYOCAUTERY OF CERVIX  2001 2001    DISCECTOMY LUMBAR MINIMALLY INVASIVE ONE LEVEL  5/24/2013    Procedure: DISCECTOMY LUMBAR MINIMALLY INVASIVE ONE LEVEL;  MIS Right side L5-S1 Gonzalo Lami Microdiscectomy, ;  Surgeon: Alba Anderson MD;  Location: [facility] OR    THORACOTOMY Right 1/20/2023    Procedure: RIGHT THORACOTOMY,  TOTAL DECORTICATION OF THE RIGHT LUNG AND PARIETAL PLEURECTOMY;  Surgeon: Tani Murcia MD;  Location: [facility] OR    TUBAL LIGATION      2012       Home Medications   Medications Prior to Admission   Medication Sig Dispense Refill Last Dose "    atorvastatin (LIPITOR) 40 MG tablet Take 40 mg by mouth daily   4/23/2024 at am    bictegravir-emtricitabine-tenofovir (BIKTARVY) -25 MG per tablet Take 1 tablet by mouth daily   4/23/2024 at am    gabapentin (NEURONTIN) 100 MG capsule Take 100 mg by mouth 2 times daily   4/23/2024 at am    linezolid (ZYVOX) 600 MG tablet Take 600 mg by mouth 2 times daily For MRSA pneumonia - per discharge notes to end on 4/28/24 4/23/2024 at am    sulfamethoxazole-trimethoprim (BACTRIM) 400-80 MG tablet Take 1 tablet by mouth daily   4/23/2024 at 1200       Scheduled Meds  Current Facility-Administered Medications   Medication Dose Route Frequency Provider Last Rate Last Admin    atorvastatin (LIPITOR) tablet 40 mg  40 mg Oral Daily Celine Lagos PA-C        [Held by provider] bictegravir-emtricitabine-tenofovir (BIKTARVY) -25 MG per tablet 1 tablet  1 tablet Oral Daily Mack Prado MD        gabapentin (NEURONTIN) capsule 100 mg  100 mg Oral BID Celine Lagos PA-C        linezolid (ZYVOX) tablet 600 mg  600 mg Oral BID Celine Lagos PA-C           Infusion Meds  Current Facility-Administered Medications   Medication Dose Route Frequency Provider Last Rate Last Admin    sodium chloride 0.9 % infusion   Intravenous Continuous Mack Prado MD 85 mL/hr at 04/25/24 1035 New Bag at 04/25/24 1035       PRN Meds  Current Facility-Administered Medications   Medication Dose Route Frequency Provider Last Rate Last Admin    acetaminophen (TYLENOL) tablet 650 mg  650 mg Oral Q4H PRN Mack Prado MD        Or    acetaminophen (TYLENOL) Suppository 650 mg  650 mg Rectal Q4H PRN Mack Prado MD        melatonin tablet 3 mg  3 mg Oral At Bedtime PRN Mack Prado MD        ondansetron (ZOFRAN) tablet 4 mg  4 mg Oral Q6H PRN Mack Prado MD        sennosides (SENOKOT) tablet 8.6 mg  8.6 mg Oral BID PRN Mack Prado MD              Allergies  Allergies   Allergen Reactions     Blood Transfusion Related (Informational Only) Other (See Comments)     Patient has a history of a clinically significant antibody against RBC antigens.  A delay in compatible RBCs may occur.     Naproxen Hives    Codeine Sulfate GI Disturbance    Hydrocodone Nausea and Vomiting       Social History  I have reviewed this patient's social history       Physical Exam   Temp:  [97.5  F (36.4  C)-98  F (36.7  C)] 97.5  F (36.4  C)  Pulse:  [67-85] 73  Resp:  [16] 16  BP: (104-138)/(73-97) 138/97  SpO2:  [93 %-100 %] 99 %      General Exam  General:  patient lying in bed looks in pain    HEENT:  normocephalic/atraumatic  Cardio:  RRR  Pulmonary:  no respiratory distress, wheezing present  Abdomen:  soft, non-tender, non-distended  Extremities:  no edema  Skin:  intact, warm/dry     Neuro Exam  Mental Status: Lethargic, oriented x3,  speech mumblued and hypophonic  Cranial Nerves: Left side possible ptosis, right pupil>left pupil, left eye limited abduction, facial sensation intact and symmetric, facial movements symmetric, hearing not formally tested but intact to conversation, limited palate elevation, minimal tongue protrusion midline  Motor: normal muscle tone and bulk, no abnormal movements, able to  with left hand, not right, able to move lower toes, unable to follow commands to hold limbs to gravity  Reflexes:  toes down-going, no clonuc  Sensory: withdraws to painful stimuli  Coordination:  not following commands to assess  Station/Gait:  deferred     Investigations     Labs  BMP  Recent Labs   Lab 04/24/24 2050      POTASSIUM 4.2   CHLORIDE 105   CO2 20*   BUN 9.4   CR 0.46*   MAUREEN 9.3       CBC  Recent Labs   Lab 04/24/24 2050   WBC 6.7   HGB 11.3*          COAGS  Recent Labs   Lab 04/24/24 2050   INR 0.98   PTT 28       Imaging  EXAM: MRI BRAIN WITHOUT AND WITH CONTRAST     DATE: 4/23/2024 8:03 PM     CLINICAL DATA: Increased confusion.     COMPARISON: 4/15/2024     TECHNIQUE: Precontrast  sagittal T1 FLAIR; axial T1, fat sat T2, FLAIR, diffusion.  Postcontrast fat-suppressed axial T1.     CONTRAST: 7.5 ml Gadavist IV     FINDINGS: Some of the imaging series are degraded by head motion, particularly the postcontrast sequences.     Expansile ovoid mass centered along left side of the upper avel and midbrain is again demonstrated, stable to minimally increased in size over the past 8 days. As measured obliquely in the sagittal plane (series 2/image 14), lesion and its associated edema are 1.4 x 2.4 cm (previously 1.3 x 2.5 cm). It has a transverse diameter of 2.0 cm on series 8/image 12, previously 1.9 cm. Abnormality is T1 hypointense and mildly T2 hyperintense, and continues to enhance fairly homogeneously after contrast.     Mass effect causes continued effacement of the upper fourth ventricle and quadrigeminal plate cistern; no obstructive hydrocephalus.     No hemorrhage or recent ischemic stroke. No new brain lesion elsewhere.     Fairly extensive inflammatory membrane thickening continues in the left maxillary sinus and some of the anterior ethmoid air cells. Trace mastoid fluid on the right.     Arterial flow voids are maintained. No skull or orbital abnormality.
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
## 2024-04-25 NOTE — PHARMACY-ADMISSION MEDICATION HISTORY
Pharmacist Admission Medication History    Admission medication history is complete. The information provided in this note is only as accurate as the sources available at the time of the update.    Information Source(s): Facility (U/NH/) medication list/MAR and CareEverywhere/SureScripts via N/A    Pertinent Information: attempted med history x2 unable to connect with patient.  Med history primarily completed from [facility] discharge summary, surescripts data, and information in care everywhere - patient was a transfer from OSH ([facility]) and most of the medications she was on at transfer were all started during that hospitalization.     Note - aspirin 81 mg was on at [facility] but stopped upon discharge - not added to the list.  Fluconazole also stopped on discharge (was for oral thrush - per ID note resolved)    PRN meds at [facility] that were not continued on discharge were: robitussin, hydroxyzine, tums, acetaminophen, zofran. Was also on miralax BID, senna-docusate BID.     Changes made to PTA medication list:  Added:   Atorvastatin - per note review was started (with Asa 81 mg) in March post stroke (though now some concern this was actually CNS lymphoma). Of note no fill history in surescripts - pt was on this at OSH prior to discharge and per discharge note was to continue upon discharge.   Linezolid - started 4/21 through 4/28) - for tx of MRSA PNA. (Sputum cx 4/19 with MRSA).   Biktarvy - was initially started 4/10 but stopped d/t rash. Re-trialed during hospital stay at [facility] and per ID note 4/23 was tolerating it at that time.   Bactrim - for PJP ppx   Gabapentin - started at OSH  Deleted: acetaminophen (cancelled upon transfer - and this Rx was from >1 yr ago).  Dilaudid (not on at transfer and Rx from >1 yr ago), senna-docusate (discontinued upon transfer ant this OP rx was from >1 yr ago).   Changed: None    Allergies reviewed with patient and updates made in EHR: unable to  assess    Medication History Completed By: Kenan Santillan [facility] 4/25/2024 10:42 AM         Prior to Admission medications    Medication Sig Last Dose Taking? Auth Provider Long Term End Date   atorvastatin (LIPITOR) 40 MG tablet Take 40 mg by mouth daily 4/23/2024 at am Yes Unknown, Entered By History     bictegravir-emtricitabine-tenofovir (BIKTARVY) -25 MG per tablet Take 1 tablet by mouth daily 4/23/2024 at am Yes Unknown, Entered By History     gabapentin (NEURONTIN) 100 MG capsule Take 100 mg by mouth 2 times daily 4/23/2024 at am Yes Unknown, Entered By History No    linezolid (ZYVOX) 600 MG tablet Take 600 mg by mouth 2 times daily For MRSA pneumonia - per discharge notes to end on 4/28/24 4/23/2024 at am Yes Unknown, Entered By History  4/28/24   sulfamethoxazole-trimethoprim (BACTRIM) 400-80 MG tablet Take 1 tablet by mouth daily 4/23/2024 at 1200 Yes Unknown, Entered By History

## 2024-04-25 NOTE — PROVIDER NOTIFICATION
Non-urgent message sent to Neurosurgery, JOSE EDUARDO Mancia  Patient failed the bedside swallow eval.

## 2024-04-25 NOTE — PLAN OF CARE
Status: admitted evening of 4/24 from OSH, presents with right side weakness and dysarthria.  Transferred to [facility] for possible brain biopsy.  Hx: admitted to OSH 4/12 for failure to thrive, and MRI brain done.  COVID + (4/18), recent HIV diagnosis (3/2024).   Vitals: VSS/A on RA.  Continuous pulse ox on.  Neuros: Lethargic. Confused.  R facial droop. Oriented to self and hospital. To location and situation with choice. Knew month. Dysarthric speech, slurred, very difficult to understand.  DTA neuro exam, patient not cooperative with all questions.  Follows only some commands.  Follow commands slowly.   Failed swallow study.  Numbness to right face, RUE, RLE.  Strengths: RU 2, RL 2, CICI 3, LL 2-3.  L eye ptosis.   IV: L PIV infusing NS @ 85mL/hr  Labs/Electrolytes: K+, Mag, Phos- all in normal limits.  Lab draws ordered for am 4/26.    Resp/trach: WDL  Diet: NPO (no exceptions).  Failed bedside swalllow study with SLP .  Bowel status: PTA  : voiding with continence.  Purewick in place.  Skin:   1. LP site ANIA/CDI  2. R hand edema, redness (placed on pillow)  3. Fingernails and toenails - dry, brittle, flaky  4. Old scar on back  5. Piercing upper right lip  Pain: denies  Activity: A2/lift.  Turn/repo Q2hrs  Social: per report from OSH, family is aware of patient's transfer to [facility]. SO here in AM.  Plan: Oncology consult.  Neurosurgery planning for possible brain biopsy.

## 2024-04-25 NOTE — PROGRESS NOTES
"[facility], [place]     Neurosurgery Progress Note:  04/25/2024      Interval History: Exam stable. Ongoing discussions with multiple services to coordinate plan    Assessment:  43 year old female with PMHx significant for substance abuse, depression, smoking, recent COVID (4/18), and recent HIV diagnosis (3/2024, on Biktarvy) who presented to hospital with right sided weakness and dysarthria and was admitted 4/12 for failure to thrive with MRI Brain demonstrating interval increase in left midbrain and avel lesion. CNS lymphoma high on differential, although infectious etiology cannot be ruled out. The brain stem lesion is high risk for biopsy.     Clinically Significant Risk Factors Present on Admission                       # Overweight: Estimated body mass index is 29.9 kg/m  as calculated from the following:    Height as of this encounter: 1.626 m (5' 4\").    Weight as of this encounter: 79 kg (174 lb 2.6 oz).           # Compression of brain and # Cerebral edema       Plan:  Serial neuro exams  Pain control  HOB > 30 degrees  Consult ID, oncology, neurology, ID and ophthalmology  Medicine consult for transfer  SLP consult  PTA ART meds  mIVF    -----------------------------------  Octavio Titus MD  Neurosurgery resident  Pager [phone]  -----------------------------------  PULM: breathing comfortably on room air  ABD: soft, non-distended  NEUROLOGIC:  -- Awake; Alert; oriented x 3  -- Follows commands slowly  -- Significant dysarthria, dysphagia  -- No gaze preference. No apparent hemineglect.  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, left eye ptosis  -- HB III-IV on R; tongue midline  -- sensory V1-V3 reduced bilaterally  -- palate elevates symmetrically, uvula midline  -- hearing grossly intact bilat  -- Trapezii 3/5 bilaterally   -- Could not participate with dysmetric testing     Motor:  Normal bulk, no tremor  Does not " give full effort during testing  BUE: delts 2/5; biceps 3/5; triceps 2/5;  4-/5  BLE: HF 2/5; KF/KE 2/5; DF/PF 4+/5    Objective:   Temp:  [97.5  F (36.4  C)-98  F (36.7  C)] 97.5  F (36.4  C)  Pulse:  [67-85] 73  Resp:  [16] 16  BP: (104-138)/(73-97) 138/97  SpO2:  [93 %-100 %] 99 %  I/O last 3 completed shifts:  In: 72.5 [I.V.:72.5]  Out: 400 [Urine:400]        LABS:  Recent Labs   Lab 04/24/24 2050      POTASSIUM 4.2   CHLORIDE 105   CO2 20*   ANIONGAP 12   *   BUN 9.4   CR 0.46*   MAUREEN 9.3       Recent Labs   Lab 04/24/24 2050   WBC 6.7  6.7   RBC 3.95  3.95   HGB 11.3*  11.3*   HCT 34.2*  34.2*   MCV 87  87   MCH 28.6  28.6   MCHC 33.0  33.0   RDW 14.3  14.3     450       IMAGING:  No results found for this or any previous visit (from the past 24 hour(s)).    Please contact neurosurgery resident on call with questions.    Dial * * *[phone], enter [phone] when prompted.

## 2024-04-25 NOTE — PROGRESS NOTES
"   04/25/24 1030   Appointment Info   Signing Clinician's Name / Credentials (SLP) Cheyenne Meneses MA CCC-SLP   General Information   Onset of Illness/Injury or Date of Surgery 04/24/24   Referring Physician Mack Prado MD   Patient/Family Therapy Goal Statement (SLP) Pt wants code red [place] dew   Pertinent History of Current Problem Pt is a  43 year old female with PMHx significant for substance abuse, depression, smoking, recent COVID (4/18), and recent HIV diagnosis (3/2024, on Biktarvy) who presented to hospital with right sided weakness and dysarthria and was admitted 4/12 for failure to thrive with MRI Brain demonstrating interval increase in left midbrain and avel lesion (\"Expansile ovoid mass centered along left side of the upper avel and midbrain.\" Patient was transferred to [facility] for consideration of brain biopsy. Clinical swallow eval completed per MD order.   General Observations Slow to respond   Type of Evaluation   Type of Evaluation Swallow Evaluation   Oral Motor   Oral Musculature anomalies present   Structural Abnormalities none present   Mucosal Quality dry   Dentition (Oral Motor)   Dentition (Oral Motor) natural dentition   Facial Symmetry (Oral Motor)   Facial Symmetry (Oral Motor) right side impairment   Lip Function (Oral Motor)   Lip Range of Motion (Oral Motor) retraction impairment;protrusion impairment  (although difficult to assess d/t variable command following)   Tongue Function (Oral Motor)   Tongue ROM (Oral Motor) WNL   Cough/Swallow/Gag Reflex (Oral Motor)   Comment, Cough/Swallow/Gag Reflex (Oral Motor) adequate reflexive cough   Vocal Quality/Secretion Management (Oral Motor)   Vocal Quality (Oral Motor) WNL  (per friend)   Secretion Management (Oral Motor) WNL   General Swallowing Observations   Past History of Dysphagia None per pt. Pt was seen by SLP at OSH on 3/31 and regular diet/thin liquids recommended at bedside.   Current Diet/Method of Nutritional Intake " (General Swallowing Observations, NIS) NPO   Swallowing Evaluation Clinical swallow evaluation   Clinical Swallow Evaluation   Feeding Assistance dependent   Clinical Swallow Evaluation Textures Trialed thin liquids;pureed   Clinical Swallow Eval: Thin Liquid Texture Trial   Mode of Presentation, Thin Liquids spoon;cup;fed by clinician   Volume of Liquid or Food Presented ice chips, sips thin water via cup   Oral Phase of Swallow WFL   Pharyngeal Phase of Swallow intact   Diagnostic Statement No overt s/sx of aspiration   Clinical Swallow Evaluation: Puree Solid Texture Trial   Mode of Presentation, Puree spoon;fed by clinician   Volume of Puree Presented 2oz applesauce   Oral Phase, Puree WFL   Pharyngeal Phase, Puree impaired   Diagnostic Statement Coughing/choking noted   Esophageal Phase of Swallow   Patient reports or presents with symptoms of esophageal dysphagia No   Swallowing Recommendations   Diet Consistency Recommendations NPO;ice chips only   Medication Administration Recommendations, Swallowing (SLP) no oral meds   Instrumental Assessment Recommendations VFSS (videofluoroscopic swallowing study)  (will plan to complete 4/26)   General Therapy Interventions   Planned Therapy Interventions Dysphagia Treatment   Dysphagia treatment Oropharyngeal exercise training;Modified diet education;Instruction of safe swallow strategies;Compensatory strategies for swallowing  (VFSS)   Clinical Impression   Criteria for Skilled Therapeutic Interventions Met (SLP Eval) Yes, treatment indicated   SLP Diagnosis High risk for dysphagia given location of brain lesion and presentation during clinical swallow evaluation   Risks & Benefits of therapy have been explained evaluation/treatment results reviewed;care plan/treatment goals reviewed;risks/benefits reviewed;current/potential barriers reviewed;participants voiced agreement with care plan;participants included;patient;friend   Clinical Impression Comments   Clinical  swallow eval completed per MD order. Pt presents with high risk for dysphagia/aspiration given location on brain lesion as well as presentation on today's clinical swallow eval. [facility] somewhat difficult to complete d/t variable command following, but note right sided facial weakness, dysarthria, and ptosis. Pt assessed with thin liquids via cup and applesauce. Overt s/sx of aspiration with applesauce, although note pt is at increased risk for silent aspiration as well. Recommend NPO. Ensure frequent oral care is completed. OK for ice chips in moderation after oral care has been completed. Recommend orders for videoswallow study to objectively assess swallowing safety and efficiency. SLP will follow.     SLP Total Evaluation Time   Eval: oral/pharyngeal swallow function, clinical swallow Minutes ([id number]) 16   SLP Discharge Recommendation Transitional Care Facility   SLP Rationale for DC Rec Dysphagia, dysarthria   Total Session Time   Total Session Time (sum of timed and untimed services) 22

## 2024-04-25 NOTE — PROGRESS NOTES
HEME MALIGNANCY BRIEF NOTE    See formal Oncology Consult Note today for additional details    I discussed Kamilah Goldberg with the Oncology Consult Service and Dr. Octavio Titus from Neurosurgery and review records and imaging from [facility].    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 Workup including LP has been non-diagnostic. Not clear if EBV was positive from blood (as indicated in result report in Care Everywhere) or CSF as mentioned elsewhere - would be helpful to confirm what specimen was sent. Cytomorphology and flow cytometry showed no evidence of lymphoma. She has been seen by ID and Neurology as well who made several recommendations for further workup to try to solidify a diagnosis.    While CNS lymphoma is high on the differential diagnosis, there is insufficient evidence at this time to justify toxic chemotherapy like high-dose methotrexate. Whether infection or lymphoma - HIV ART can have benefit and should be prioritized. It would be ideal to hold off steroids prior to additional diagnostic workup to avoid exacerbating infection or obscuring diagnostic testing - however if there is clinical decompensation it would be reasonable based on suspicion for lymphoma. Agree with CT NCAP and Ophthalmology to look for other evidence of lymphoproliferative process or infection. Neurosurgery plans LP tomorrow with recommended testing and will include cell count differential, flow cytometry, EBV PCR which are highest priority for lymphoma workup.    Anibal Harris MD, PhD
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
## 2024-04-26 NOTE — PROGRESS NOTES
[facility]  Neurology Progress Note    Patient Name:  Kamilah Goldberg  MRN:  [id number]    :  1980  Date of Admission:  2024  Date of Service:  2024  Hospital Day: 3     Interval History/24-hour Events   Interval Events:  -Per RN: patient more encephalopathic. Bladder scan with >500cc. Preparing to cath.  -Patient responds only with name but is otherwise non-verbal and does not follow commands.      Today's Changes:  -LP planned for today.     Assessment & Plan    Kamilah Goldberg is a 43 year old female with PMHx of recent HIV diagnosis and  lesion thought to be an ischemic stroke (3/2024)  who presented to OSH  with weakness, diplopia, dysarthria admitted for failure to thrive found to have increasing size of lesions in left midbrain and avel lesion transferred to [facility] for consideration of brain biopsy for CNS lymphoma. Her course was also complicated by MRSA pneumonia and encephalopathy.     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 Thus far cytology and flow cytometry from the initial LP has been negative but the sensitivity is around 50% for CNS lymphoma for a single LP. Should also consider an infectious process. There have been multiple serologies sent (toxo, crypto, EBV) but these are from the serum - she may benefit from a repeat LP with additional infectious work-up from CSF. The cell count and prelim results from her LP on  is less concerning for a bacterial process but she is at risk for atypical infections. Lower suspicion for autoimmune etiology. This is a challenging diagnostic dilemma given the significant risk of biopsying the brainstem. Will need to weigh benefit of steroid initiation  without biopsy confirmation vs obtaining diagnostic certainty with tissue (assuming mass continues to enlarge and provide viable biopsy target). Her neurological exam currently appears stable but with waxing/waning deficits & alertness consistent with delirium and/or encephalopathy.    Recommendations:  - Repeat LP to collect at least 20cc CSF for flow cytometry & additional infectious work-up (toxo, crypto, TB, aspergillosis meningitis/encephalitis, flow cytometry, cytology) [ordered by Neurology]  - Consider empiric steroid and/or methotrexate therapy for presumptive CNS lymphoma if neurological condition worsens.    Thank you for allowing the neurology service to participate in the care of Kamilah Goldberg.  We will continue to follow. Please contact us with questions.      This patient was discussed with the neurology attending faculty, Dr. Cristofer Brooks.    Jabari Salcedo DO  Neurology Resident PGY3  04/26/2024  Neurology Pager: [phone]       Physical Exam   Temp:  [97.5  F (36.4  C)-98.2  F (36.8  C)] 98.1  F (36.7  C)  Pulse:  [73-75] 74  Resp:  [16-18] 18  BP: (126-152)/(78-97) 126/78  SpO2:  [90 %-100 %] 96 %    I/O last 3 completed shifts:  In: 887.5 [I.V.:887.5]  Out: 300 [Urine:300]     Neurologic  Mental Status:   awake, alert, makes eye contact briefly but otherwise roving gaze, only answers once to give name but otherwise non-verbal for remainder of exam; does not folllow commands.  Cranial Nerves:  visual fields intact, PERRL, EOMI with normal smooth pursuit, facial sensation intact and symmetric, facial movements symmetric, hearing not formally tested but intact to conversation, palate elevation symmetric and uvula midline, no dysarthria, shoulder shrug strong bilaterally, tongue protrusion midline. Since yesterday's exam: prior R facial droop/ptosis & L CN VI palsy apparently resolved.  Motor:  normal muscle tone and bulk, no abnormal movements, able to move all limbs spontaneously, BUE  "anti-gravity, BLE withdraws in plane of bed  Reflexes:   toes upgoing bilaterally  Sensory:   withdraws to noxious x4 extremities  Coordination:   THOR due to mental status  Station/Gait:  deferred     Investigations   Labs  BMP  Recent Labs   Lab 04/26/24  0615 04/24/24 2050    137   POTASSIUM 3.6 4.2   CHLORIDE 110* 105   CO2 23 20*   BUN 10.7 9.4   CR 0.54 0.46*   MAUREEN 9.1 9.3       Liver Panel  Recent Labs   Lab 04/24/24 2050   PROTTOTAL 9.1*   ALBUMIN 3.7   BILITOTAL 0.3   ALKPHOS 98   AST 37   ALT 25       CBC  Recent Labs   Lab 04/26/24  0615 04/24/24 2050   WBC 5.4 6.7  6.7   HGB 10.5* 11.3*  11.3*   * 450  450       COAGS  Recent Labs   Lab 04/24/24 2050   INR 0.98   PTT 28       ABG  No results for input(s): \"PH\", \"PCO2\", \"PO2\", \"HCO3\" in the last 168 hours.    CRP/ESR  No results for input(s): \"CRP\" in the last 168 hours.    Invalid input(s): \"ESR\"    CSF  No results for input(s): \"CGLU\", \"CTP\" in the last 168 hours.    Invalid input(s): \"CCSF\"    MICRO  No results for input(s): \"CULT\" in the last 168 hours.    LIPIDS  No results for input(s): \"CHOL\", \"HDL\", \"LDL\", \"TRIG\", \"CHOLHDLRATIO\" in the last 64327 hours.    A1C    No lab results found.    Imaging  I have personally reviewed most recent and pertinent labs, tests, and radiological images; relevant findings per HPI.    MRI BRAIN WITHOUT AND WITH CONTRAST     DATE: 4/23/2024 8:03 PM     CLINICAL DATA: Increased confusion.     COMPARISON: 4/15/2024     TECHNIQUE: Precontrast sagittal T1 FLAIR; axial T1, fat sat T2, FLAIR, diffusion.  Postcontrast fat-suppressed axial T1.     CONTRAST: 7.5 ml Gadavist IV     FINDINGS: Some of the imaging series are degraded by head motion, particularly the postcontrast sequences.     Expansile ovoid mass centered along left side of the upper avel and midbrain is again demonstrated, stable to minimally increased in size over the past 8 days. As measured obliquely in the sagittal plane (series 2/image " 14), lesion and its associated edema are 1.4 x 2.4 cm (previously 1.3 x 2.5 cm). It has a transverse diameter of 2.0 cm on series 8/image 12, previously 1.9 cm. Abnormality is T1 hypointense and mildly T2 hyperintense, and continues to enhance fairly homogeneously after contrast.     Mass effect causes continued effacement of the upper fourth ventricle and quadrigeminal plate cistern; no obstructive hydrocephalus.     No hemorrhage or recent ischemic stroke. No new brain lesion elsewhere.     Fairly extensive inflammatory membrane thickening continues in the left maxillary sinus and some of the anterior ethmoid air cells. Trace mastoid fluid on the right.     Arterial flow voids are maintained. No skull or orbital abnormality.

## 2024-04-26 NOTE — PROGRESS NOTES
"ID Brief Update   4/26    Spoke to [facility] microbiology regarding the EBV PCR on 4/18.    The specimen source is labeled at CSF in their internal system, but the send-out department reviewed with me and they suspect that they mis-labeled it as \"blood\" when the specimen was sent to [facility].     Thus, it seems this EBV PCR positive test is from CSF, based on my discussion with [facility] microbiology lab, reviewing the ID notes at [facility] (who note a positive EBV PCR from CSF), and the fact it was drawn at the same time as other CSF tests.    Full evaluation and note to follow.    Ramesh Van MD PharmD  Adult Infectious Disease Fellow PGY5  Pager: [phone]    "

## 2024-04-26 NOTE — PROGRESS NOTES
Status: Brain mass, Covid & HIV positive  Vitals: VSS  Neuros: lethargic, oriented to self and place, (inconsistent with commands), L eye no lateral gaze, R facial droop, slurred speech   IV: infusing D5NS 75ml/hr  Labs/Electrolytes: CT x 2 completed, redraws this AM  Resp: RA  Diet: Strict NPO  Bowel status: 04/25  : Incontinent, Purewick  Skin: R hand edema& redness, blanchable redness coccyx and back, LP site ANIA  Pain: nodded yes to pain,PRN tylenol suppository given  Activity: assist 2 lift, repo q2hrs  Plan: Video swallow study, ophthalmology consult, PT & OT

## 2024-04-26 NOTE — PLAN OF CARE
Spoke with NSG team this AM. To add voiding protocol d/t patient retaining urine, bladder scanned @ 455mL with no void in last 7 hours. To have NG placed if fail the swallowing study. Patient had PIV replaced this AM and swatted at vascular and ripped out PIV. Needing to be replaced and no no in place.

## 2024-04-26 NOTE — PLAN OF CARE
Vitals: VSS   Neuros: DTA. Follows some commands. Lethargic/Alert. Orientated to self and knew it was her birthday. Slurred speech, whispers.   R facial droop.  Right side 2, Left side 3  IV: L PIV infusing @75 ml/hr  Labs: Low blood sugar of 68 this am. Dextrose given, BS improved to 107  Resp/trach: WNL  Diet: NPO (no exceptions).  Order to place NG, page out to [facility] at 1425 to place small bore tube. Swallow study cancelled, pt too lethargic.  Bowel status: [employer] BM 4/26  : Unable to void was Cathed at 1000 for 600 ml. Order to scan bladder next at 1600  Skin: R hand edema, redness (placed on pillow) LP site CDI   Pain: No s/s   Activity: A2/lift.  Turn/repo Q2hrs  Social: Aunt at bedside this afternoon. Spoke with oncology, palliative and Medicine team  Plan: Had LP this am. Aunt would like CC arranged, Care Coordinator aware.     1450 - Pt inc. Of urine

## 2024-04-26 NOTE — CONSULTS
"Palliative Care Consultation Note  [facility]      Patient: Kamilah Goldberg  Date of Admission:  4/24/2024    Requesting Clinician / Team: MICU  Reason for consult: Goals of care, Decisional support, Patient and family support       Recommendations & Counseling     GOALS OF CARE / VISIT SUMMARY:  GOALS ARE RESTORATIVE WITHOUT LIMITS  We met patient at bedside and introduced our team as well as our role in her care. Kamilah stated that she was agreeable for us to talk to her Aunt (Noemy) regarding her medical condition as well as to her son \"if he wants to\".  During our conversation, we share with the patient that her medical team is concerned that her CNS disease is getting worst and that diagnostic LP was the safest approach now to attempt to rule out / in a diagnosis and pursue further treatment.  Later in the day, we met her pleasant Aunt Noemy at bedside. She is currently retiree but continues to work as a School , usually in the early morning and in the afternoon (she is probably more available around 0930 AM to noon).  During our conversation, we shared with her Aunt her current medical diagnosis of a chronic immune suppressive infection as well as the suspicion of CNS lymphoma. She stated that Kamilah was sent to the [facility] for the possibility of biopsy and we endorsed that many teams opinion taking care of her likely does not involve this specific intervention as the first approach considering risk versus benefit.  Aunpaddy shared with us that Kamilah has been gone through a difficult social situation (homeless, hx of addiction) and that their family had suffered many losses recently (she was crying and saying \"I have lost everyone I loved\" and that \"she looks just her mother when she got sick\"). She also stated that the patient's son - Yuan - had lost both grandparents since February and that \"he blames himself\" to not been able to help his grandfather " "(he found him down at his Grandfather's home because the dogs were barking loudly).  Milagros stated that she would like to meet with the Primary Care Provider for clarification of diagnosis and further steps on Kamilah's care, and that Monday around 0930AM would be an ok time for her to meet us.  Milagros could not clarify if there was a specific Mu-ism that Kamilah has been actively involved.    ADVANCE CARE PLANNING:  No health care directive on file. Per  informed consent policy, next of kin should be involved if patient becomes unable.  Kamilah has indicated she wants her Aunt Milagros to be emergency contact and primary surrogate decision maker. Milagros explains that she has been like another a mother figure to Kamilah and helped raise her at times. Kamilah also has an adult son Yuan who is 23 years old - milagros explains they have had a strained relationship in past but were trying to rebuild the relationship. Kamilah says Yuan can be involve in care discussions and decisions \"if he wants to\".   There is no POLST form on file, defer to patient and/or next of kin for decisions   Code status: Full Code    MEDICAL MANAGEMENT:   We are not actively managing symptoms at this time.    PSYCHOSOCIAL/SPIRITUAL SUPPORT:  Family Elisabet Goldberg (Aunt), Yuan (son)  See family stressors and loss concerns above - will ask palliative SW to meet them next week for support    Updated care coordinator on Aunpaddy's wishes for the possibility of a \"mini-conference\" for update on Monday.    Palliative Care will continue to follow. Thank you for the consult and allowing us to aid in the care of Kamilah Goldberg.    These recommendations have been discussed with Dr. Noemi Aquino.    Dwayne Montez MD  Neurocritical Care Fellow    Patient was seen with NCC Fellow Dr. Read.    Noemi Aquino MD  Securely message with [employer] (more info)  Text page via [employer] Paging/Directory       Assessment  "     Kamilah Goldberg is a 43 year old female with PMHx of recent HIV diagnosis (dx at OSH end of March) and avel lesion thought to be an ischemic stroke (3/2024) who presented to OSH 4/12 with weakness, diplopia, dysarthria admitted for failure to thrive found to have increasing size of lesions in left midbrain and avel lesion transferred to [facility] for consideration of brain biopsy for possible CNS lymphoma. At outside hospital when she had progressive mental status decline and she did get dose of IV decadron (treatment for CNS lymphoma) prior to transfer. Her course was also complicated by MRSA pneumonia and encephalopathy as well as tested positive for COVID 19 on 4/18/2024.    There is significant risk with biopsy given mass is in brainstem and also significant risk in delaying treatment because if mass continues to grow there is risk for serious clinical decline including herniation.      Primary team is neurosurgery with medicine (with ongoing discussions for possible transfer of primary care team), ID, neurology, heme/onc and palliative care consulting.    Today, the patient was seen for:  Brainstem lesion c/f CNS lymphoma  HIV with low CD4 (AIDS)  Hx of Substance Abuse Disorder (amphetamine)  Hx of MRSA pneumonia / Empyema  Support  Encounter for Palliative Care    Palliative Care Summary and HPI:   Met with Noemy Goldberg, her aunt.     I introduced our role as an extra layer of support and how we help patients and families dealing with serious, potentially life-limiting illnesses. I explained the composition of the palliative care team.  Palliative care helps patients and families navigate their care while focusing on the whole person; providing emotional, social and spiritual support  Palliative care often assists with symptom management, information sharing about what to expect from the illness, available treatment options and what effect those options may have on the disease course, and provide effective  "communication and caring support.      HPI:    Per OSH and her aunt: Patient has hx of active substance abuse with amphetamines and heroin but likely not current using it, as well as smoking. Per SW note at OSH she lives with her \"other mother and \" in [facility] but has recently been staying with her Aunt.  Per her aunt, patient is currently homeless.  She was independent with ADLs until she started to develop symptoms of gait disturbance. Emergency contact was listed as Noemy - Aunt - whom patient considers to be as a \"second mother\" per Aunt report.  Unfortunately, family had many losses recent, including patient's mother that passed due to a likely diffuse infection with CNS involvement (sepsis?).  Her aunt does confirm that patient has 2 children but unfortunately she did not have custody of them and their relationship have marked social difficulties.  Her oldest son is Yuan has been dealing with his grandparents recent death and currently he lives by himself near [facility].  Per Noemy's report, son has been deferred decisions on patient's care to her.    Today patient had diagnostic LP. She is lethargic but answers some questions. Denies pain.     Prognosis, Goals, & Planning:    Functional Status just prior to this current hospitalization:  There is not reported or documented decline in patient's function.      Prognosis, Goals, and/or Advance Care Planning:  Ongoing discussions regarding diagnosis and further management    Code Status was addressed today:   No . Ongoing diagnostic tests being done as well as ongoing discussions for treatment trial    Patient's decision making preferences: shared with support from loved ones        Patient has decision-making capacity today for complex decisions: Questionable            Coping, Meaning, & Spirituality:   Mood, coping, and/or meaning in the context of serious illness were addressed today: No    Social:   Living situation:is homeless .  Patient " has been staying on friend's house for a few years.  Important relationships/caregivers: Noemy (aunt), Yuan (son).  Occupation: Unemployed  Contributing stressors (financial, substance abuse, relationship concerns, etc.)  Relationships to be aware (two friends - one of them is Adria who should not be reached out because could be a cause of distress to her (per aunt's report)    Medications:  I have reviewed this patient's medication profile and medications from this hospitalization. Notable medications: Truvada, Tivicay, Bactrim, gabapentin, thiamine acetaminophen as needed, melatonin at bedtime, Zofran.    ROS:  Comprehensive ROS is reviewed and is negative except as here & per HPI:     Physical Exam   Vital Signs with Ranges  Temp:  [97.5  F (36.4  C)-98.2  F (36.8  C)] 98.1  F (36.7  C)  Pulse:  [73-75] 74  Resp:  [16-18] 18  BP: (126-152)/(78-97) 126/78  SpO2:  [90 %-100 %] 96 %  174 lbs 2.61 oz    PHYSICAL EXAM:  Constitutional: eyes closed but arouses to verbal commands, no distress and cooperative.  Cardiovascular: negative, PMI normal. No lifts, heaves, or thrills. RRR.  Respiratory: Comfortable, room air, good diaphragmatic excursion. Lungs clear  Psychiatric: Collaborative, calm, affect looks normal.  Abdomen: Abdomen soft, non-tender. BS normal. No masses, organomegaly  NEURO: Eyes closed, opens to verbal commands, follows commands, tracks examiner, restricted gaze to the left, pupils reactive, right facial palsy, moderate-severe dysarthria, sustain both arms antigravity but does R fall to bed quicker than LUE, asymmetric withdrawal of R>L.    Data reviewed:  Results for orders placed or performed during the hospital encounter of 04/24/24 (from the past 24 hour(s))   Glucose by meter   Result Value Ref Range    GLUCOSE BY METER POCT 94 70 - 99 mg/dL   CT Soft Tissue Neck w Contrast    Narrative    EXAM: CT SOFT TISSUE NECK W CONTRAST  4/25/2024 11:06 PM     HISTORY: Patient with HIV, and new brain  lesion ? CNS lymphoma- assess  other lesions         COMPARISON: None     TECHNIQUE: Following intravenous administration of nonionic iodinated  contrast medium, thin section helical CT images were obtained from the  skull base down to the level of the aortic arch.  Axial, coronal and  sagittal reformations were performed with 2-3 mm slice thickness  reconstruction. Images were reviewed in soft tissue, lung and bone  windows.    CONTRAST: 107ml isovue 370    FINDINGS:   No focal oral cavity, nasopharyngeal, oropharyngeal, hypopharyngeal,  or glottic mucosal space abnormality. Normal tongue base. Salivary  glands are within normal limits. Multiple dental caries.    Several bilateral nonenlarged cervical lymph nodes.    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    Normal thyroid gland.    Patent cervical vasculature; no high grade arterial stenosis.    No suspicious osseus lesion. No high grade spinal canal stenosis.    Scattered mucosal thickening of the paranasal sinuses. Clear mastoid  air cells. No periapical dental lucency. The imaged skull base,  intracranial and orbital structures are within normal limits.    Partially visualized right upper lobe dependent consolidative  opacities..      Impression    IMPRESSION: No mass or abnormally enlarged cervical lymph nodes in the  neck. Multiple dental caries. 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    I have personally reviewed the examination and initial interpretation  and I agree with the findings.    CLARY PAHM MD         SYSTEM ID:  [id number]   CT Chest/Abdomen/Pelvis w Contrast    Narrative    EXAMINATION: CT CHEST/ABDOMEN/PELVIS W CONTRAST, 4/25/2024 11:07 PM    TECHNIQUE:  Helical CT images from the thoracic inlet through the  symphysis pubis were obtained with contrast.     CONTRAST: 107ml isovue 370    COMPARISON: Chest CT 1/16/2023; CT abdomen/pelvis 4/10/2019    HISTORY: Patient with HIV, and new brain lesion ? CNS lymphoma- assess  other  lesions    FINDINGS:    Chest:   Mediastinum: Visualized thyroid gland is unremarkable. Heart size is  within normal limits. No pericardial effusion. The great vessels are  within normal limits. Prominent bilateral axillary lymph nodes,  decrease in size from prior CT from 1/16/2023. Small hiatal hernia.  Enlarged precarinal lymph node measuring 15 x 13 mm (3/49).  Subcentimeter right hilar lymph node.    Lungs: The trachea and central airways are patent. Right upper lobe  and left lower lobe dependent consolidative opacities. No pneumothorax  or pleural effusion.     Abdomen and pelvis:  Liver: Borderline enlarged measuring 16.7 cm in the craniocaudal  dimension. No mass. No intrahepatic biliary ductal dilation.    Biliary System: Gallbladder surgically absent.    Pancreas: No mass or pancreatic ductal dilation.    Adrenal glands: No mass or nodules    Spleen: Normal.    Kidneys: No suspicious mass, obstructing calculus or hydronephrosis.    Gastrointestinal tract: Gastric bypass changes. Normal caliber small  and large bowel.    Mesentery/peritoneum/retroperitoneum: No mass. No free fluid or air.    Lymph nodes: Several nonenlarged bilateral inguinal lymph nodes.    Vasculature: Patent major abdominal vasculature.    Pelvis: Urinary bladder is well-distended.  No pelvic mass.  Pelvic  phleboliths.    Bones and soft tissues: No acute osseous abnormality. Tiny  fat-containing umbilical hernia.      Impression    IMPRESSION:     1. Mildly enlarged precarinal lymph node..     2. Right upper lobe and left lower lobe confluent densities, which may  represent atelectasis; however, superimposed infection cannot be  excluded. Recommend attention on follow-up.     Glucose by meter   Result Value Ref Range    GLUCOSE BY METER POCT 91 70 - 99 mg/dL   Phosphorus   Result Value Ref Range    Phosphorus 4.2 2.5 - 4.5 mg/dL   Magnesium   Result Value Ref Range    Magnesium 2.1 1.7 - 2.3 mg/dL   Basic metabolic panel   Result Value  Ref Range    Sodium 143 135 - 145 mmol/L    Potassium 3.6 3.4 - 5.3 mmol/L    Chloride 110 (H) 98 - 107 mmol/L    Carbon Dioxide (CO2) 23 22 - 29 mmol/L    Anion Gap 10 7 - 15 mmol/L    Urea Nitrogen 10.7 6.0 - 20.0 mg/dL    Creatinine 0.54 0.51 - 0.95 mg/dL    GFR Estimate >90 >60 mL/min/1.73m2    Calcium 9.1 8.6 - 10.0 mg/dL    Glucose 94 70 - 99 mg/dL   CBC with platelets   Result Value Ref Range    WBC Count 5.4 4.0 - 11.0 10e3/uL    RBC Count 3.70 (L) 3.80 - 5.20 10e6/uL    Hemoglobin 10.5 (L) 11.7 - 15.7 g/dL    Hematocrit 32.6 (L) 35.0 - 47.0 %    MCV 88 78 - 100 fL    MCH 28.4 26.5 - 33.0 pg    MCHC 32.2 31.5 - 36.5 g/dL    RDW 14.5 10.0 - 15.0 %    Platelet Count 480 (H) 150 - 450 10e3/uL   Hepatitis B Surface Antibody   Result Value Ref Range    Hepatitis B Surface Antibody Nonreactive     Hepatitis B Surface Antibody Instrument Value <3.50 <8.5 m[IU]/mL   Hepatitis B core antibody   Result Value Ref Range    Hepatitis B Core Antibody Total Nonreactive Nonreactive   HCG qualitative   Result Value Ref Range    hCG Serum Qualitative Negative Negative   Ferritin   Result Value Ref Range    Ferritin 76 6 - 175 ng/mL   Iron and iron binding capacity   Result Value Ref Range    Iron 37 37 - 145 ug/dL    Iron Binding Capacity 305 240 - 430 ug/dL    Iron Sat Index 12 (L) 15 - 46 %   Vitamin B12   Result Value Ref Range    Vitamin B12 332 232 - 1,245 pg/mL   Cryptococcus antigen    Specimen: Arm, Right; Blood   Result Value Ref Range    Cryptococcal Antigen Negative Negative   Glucose by meter   Result Value Ref Range    GLUCOSE BY METER POCT 68 (L) 70 - 99 mg/dL   Glucose by meter   Result Value Ref Range    GLUCOSE BY METER POCT 95 70 - 99 mg/dL   Glucose by meter   Result Value Ref Range    GLUCOSE BY METER POCT 107 (H) 70 - 99 mg/dL   Cytology non gyn Tube 4   Result Value Ref Range    Final Diagnosis       Specimen A     Interpretation:       - Rare atypical cells     Adequacy:     Satisfactory for  evaluation          Comment       Please correlate with concurrent flow cytometry.       Clinical Information       New brainstem mass      Gross Description       A(A). Lumbar Puncture, :A. Lumbar Puncture, , CSF:  Received 2 ml of clear, colorless fluid, processed as 1 Pap stained cytospin and 1 Cabrera stained cytospin.               Abnormal Result? Yes (A) No    Performing Labs       The technical component of this testing was completed at [facility]     Protein total CSF:   Result Value Ref Range    Protein total .0 (H) 15.0 - 45.0 mg/dL   CSF Cell Count with Differential:    Narrative    The following orders were created for panel order CSF Cell Count with Differential:.  Procedure                               Abnormality         Status                     ---------                               -----------         ------                     Cell Count CSF[[id number]]               Abnormal            Final result                 Please view results for these tests on the individual orders.   Cell Count CSF   Result Value Ref Range    Tube Number 3     Color Colorless Colorless    Clarity Clear Clear    Total Nucleated Cells 3 0 - 5 /uL    RBC Count 44 (H) 0 - 2 /uL   Acid-Fast Bacilli Culture and Stain    Specimen: Lumbar Puncture; Cerebrospinal fluid    Narrative    The following orders were created for panel order Acid-Fast Bacilli Culture and Stain.  Procedure                               Abnormality         Status                     ---------                               -----------         ------                     Acid-Fast Bacilli Cultur...[[id number]]                      In process                   Please view results for these tests on the individual orders.   Glucose CSF:   Result Value Ref Range    Glucose CSF 52 40 - 70 mg/dL    Narrative    CSF glucose concentrations are about 60 percent of normal plasma glucose.    Cryptococcus antigen    Specimen: Lumbar Puncture; Cerebrospinal fluid   Result Value Ref Range    Cryptococcal Antigen Negative Negative    Cryptococcal titer interpretation N/A    Glucose by meter   Result Value Ref Range    GLUCOSE BY METER POCT 77 70 - 99 mg/dL   Glucose by meter   Result Value Ref Range    GLUCOSE BY METER POCT 84 70 - 99 mg/dL   Glucose by meter   Result Value Ref Range    GLUCOSE BY METER POCT 85 70 - 99 mg/dL       Medical Decision Making       Please see A&P for additional details of medical decision making.    Medical Decision Making       MANAGEMENT DISCUSSED with the following over the past 24 hours: medicine, neurosurgery, neurology   NOTE(S)/MEDICAL RECORDS REVIEWED over the past 24 hours: medicine, neurosurgery, neurology, ID , and heme/onc  Tests personally interpreted in the past 24 hours:  - HEAD CT showing brainstem mass  Tests ORDERED & REVIEWED in the past 24 hours:  - BMP  - CBC  SUPPLEMENTAL HISTORY, in addition to the patient's history, over the past 24 hours obtained from:   - Aunt milagros

## 2024-04-26 NOTE — PROGRESS NOTES
[facility]    Medicine Progress Note - Hospitalist Service, GOLD TEAM 7    Date of Admission:  4/24/2024    Assessment & Plan   Kamilah Goldberg is a 43 year old female w/ a hx of stimulant use disorder, SWETA, pontine stroke, emypema s/p vats and newly diagnosed HIV admitted on who was transferred to [facility] for neurosurgical evaluation and 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 Neurosurgery here unable to perform biopsy but high volume LP done and oncology agreeable to starting high dose steroids. Patient is now being transferred to the medicine service.     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 However this continued to worsen and repeat imaging appeared more consistent with neoplasm. Workup done at [place] was extensive with neurosurgery, ID and oncology involved. Infectious workup notable for CMV IgM negative, CMV IgG positive suggests this is prior infection. Toxoplasma IgG negative. Serum cryptococcal antigen negative. T spot negative. The patient clinical presentation and neuroimaging is not consistent with PML. Repeat LP (4/18)-- with no pleocytosis only 2 CSF WBC, CSF protein 98, CSF glucose 50. Albuminocytological dissociation with rise in CSF protein. Meningitis encephalitis panel negative. 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 Neurosurgery here unable to perform biopsy but high volume LP done. Right now ID and oncology are discussing high dose steroids but it seems we will head that route.  - Neurosurgery consulted thanks for the recs  - biopsy not possible   - follow up LP results  - Oncology consulted thanks for the recs  - per NSG ID and onc discussing high dose steroids  - ID consulted thanks for the recs  - follow-up serum CrAg, urine histo Ag, AFB blood culture,  hepatitis serology   - Follow up CSF routine cell counts, protein, glucose, gram stain + aerobic bacterial cultures, fungal culture, and AFB stain + culture along with meningitis/encephalitis panel plus cryptococcal Ag, toxoplasma PCR, and RENEA virus PCR  - Neurology consuelted  - agree with ID workup and steroids      Recent diagnosis of HIV/AIDS, untreated  Patient was recently diagnosed with HIV on March 28, 2024 with a viral load of 1.24 million and CD4 count of 2. ID consulted at [facility] and started on biktarvy.  - ID consulted thanks for the recs   - NG to be placed today to start ART with dolutegravir, emtricitabine-tenofovir   - start bactrim for PJP ppx  - Patient at risk for IRIS but will possibly be on high dose steroids so presume this will help     Stimulant use (amphetamine)  On chart review patients stimulant use first noted on a tox screen at [facility] back in 2016. On further review it appears she primarily snorts amphetamines and denies IVDU. Due to her mental status unable to gather further history.  - continue to follow for support    COVID-19 infection, not symptomatic, tested positive for/18  MRSA pneumonia  Diagnosed at [facility] with sputum cultures on 4/19. ID here feel like MRSA pneumonia not very likely and ok to stop abx   - linezolid stopped     Maculopapular rash  Probably from antibiotic, improving    Dyslipidemia;  Discontinue Lipitor with poor oral intake     Hx SWETA  Has hx of SWETA. Has been treated with transfusions and IV iron in the past. I do not really see a workup as to the etiology of her SWETA but this can be deferred as she has more critical issues currently.  - colonoscopy as OP          Diet: NPO per Anesthesia Guidelines for Procedure/Surgery Except for: No Exceptions    DVT Prophylaxis: Pneumatic Compression Devices  Roy Catheter: Not present  Lines: None     Cardiac Monitoring: None  Code Status: Full Code      Clinically Significant Risk Factors                      "    # Overweight: Estimated body mass index is 29.9 kg/m  as calculated from the following:    Height as of this encounter: 1.626 m (5' 4\").    Weight as of this encounter: 79 kg (174 lb 2.6 oz)., PRESENT ON ADMISSION            Disposition Plan     Medically Ready for Discharge: Anticipated in 5+ Days             Russel Boothe DO  Hospitalist Service, GOLD TEAM 7  [facility]  Securely message with [employer] (more info)  Text page via [employer] Paging/Directory   See signed in provider for up to date coverage information  ______________________________________________________________________    Interval History     Neurosurgery and oncology discussed. NSG unable to do biopsy so oncology agreeable to starting high dose steroids. LP was also done with further studies sent. Neuro status continues to be poor. She will get an NG today and be started on ART as  well. When I saw her today she continued to be lethargic. She nodded to some questions but couldn't really speak. Discussed cause with her family member.    Physical Exam   Vital Signs: Temp: 97.8  F (36.6  C) Temp src: Oral BP: (!) 128/94 Pulse: 65   Resp: 16 SpO2: 97 % O2 Device: None (Room air)    Weight: 174 lbs 2.61 oz    General Appearance:  In bed, mitts in place   Respiratory: breathing comfortably on room air   Cardiovascular: RRR  GI: NTND  Skin: no rashes on exposed skin   Other:  Encephalopathic     Medical Decision Making       55 MINUTES SPENT BY ME on the date of service doing chart review, history, exam, documentation & further activities per the note.      Data     I have personally reviewed the following data over the past 24 hrs:    5.4  \   10.5 (L)   / 480 (H)     143 110 (H) 10.7 /  84   3.6 23 0.54 \     ALT: N/A AST: N/A AP: N/A TBILI: N/A   ALB: N/A TOT PROTEIN: N/A LIPASE: N/A       Imaging results reviewed over the past 24 hrs:   Recent Results (from the past 24 hour(s))   CT Soft Tissue Neck w " Contrast    Narrative    EXAM: CT SOFT TISSUE NECK W CONTRAST  4/25/2024 11:06 PM     HISTORY: Patient with HIV, and new brain lesion ? CNS lymphoma- assess  other lesions         COMPARISON: None     TECHNIQUE: Following intravenous administration of nonionic iodinated  contrast medium, thin section helical CT images were obtained from the  skull base down to the level of the aortic arch.  Axial, coronal and  sagittal reformations were performed with 2-3 mm slice thickness  reconstruction. Images were reviewed in soft tissue, lung and bone  windows.    CONTRAST: 107ml isovue 370    FINDINGS:   No focal oral cavity, nasopharyngeal, oropharyngeal, hypopharyngeal,  or glottic mucosal space abnormality. Normal tongue base. Salivary  glands are within normal limits. Multiple dental caries.    Several bilateral nonenlarged cervical lymph nodes.    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    Normal thyroid gland.    Patent cervical vasculature; no high grade arterial stenosis.    No suspicious osseus lesion. No high grade spinal canal stenosis.    Scattered mucosal thickening of the paranasal sinuses. Clear mastoid  air cells. No periapical dental lucency. The imaged skull base,  intracranial and orbital structures are within normal limits.    Partially visualized right upper lobe dependent consolidative  opacities..      Impression    IMPRESSION: No mass or abnormally enlarged cervical lymph nodes in the  neck. Multiple dental caries. 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    I have personally reviewed the examination and initial interpretation  and I agree with the findings.    CLARY PHAM MD         SYSTEM ID:  [id number]   CT Chest/Abdomen/Pelvis w Contrast    Narrative    EXAMINATION: CT CHEST/ABDOMEN/PELVIS W CONTRAST, 4/25/2024 11:07 PM    TECHNIQUE:  Helical CT images from the thoracic inlet through the  symphysis pubis were obtained with contrast.     CONTRAST: 107ml isovue 370    COMPARISON: Chest CT 1/16/2023; CT  abdomen/pelvis 4/10/2019    HISTORY: Patient with HIV, and new brain lesion ? CNS lymphoma- assess  other lesions    FINDINGS:    Chest:   Mediastinum: Visualized thyroid gland is unremarkable. Heart size is  within normal limits. No pericardial effusion. The great vessels are  within normal limits. Prominent bilateral axillary lymph nodes,  decrease in size from prior CT from 1/16/2023. Small hiatal hernia.  Enlarged precarinal lymph node measuring 15 x 13 mm (3/49).  Subcentimeter right hilar lymph node.    Lungs: The trachea and central airways are patent. Right upper lobe  and left lower lobe dependent consolidative opacities. No pneumothorax  or pleural effusion.     Abdomen and pelvis:  Liver: Borderline enlarged measuring 16.7 cm in the craniocaudal  dimension. No mass. No intrahepatic biliary ductal dilation.    Biliary System: Gallbladder surgically absent.    Pancreas: No mass or pancreatic ductal dilation.    Adrenal glands: No mass or nodules    Spleen: Normal.    Kidneys: No suspicious mass, obstructing calculus or hydronephrosis.    Gastrointestinal tract: Gastric bypass changes. Normal caliber small  and large bowel.    Mesentery/peritoneum/retroperitoneum: No mass. No free fluid or air.    Lymph nodes: Several nonenlarged bilateral inguinal lymph nodes.    Vasculature: Patent major abdominal vasculature.    Pelvis: Urinary bladder is well-distended.  No pelvic mass.  Pelvic  phleboliths.    Bones and soft tissues: No acute osseous abnormality. Tiny  fat-containing umbilical hernia.      Impression    IMPRESSION:     1. Mildly enlarged precarinal lymph node..     2. Right upper lobe and left lower lobe confluent densities, which may  represent atelectasis; however, superimposed infection cannot be  excluded. Recommend attention on follow-up.

## 2024-04-26 NOTE — PROCEDURES
Small Bowel Feeding Tube Placement Assessment  Reason for Feeding Tube Placement: feeding  Cortrak Start Time: 1505   Cortrak End Time: 1515  Medicine Delivered During Procedure: lidocaine gel   Placement Successful: yes, gastric     Procedure Complications: none  Final Placement Jayesh at exit of nare 60 cm  Face to Face time with patient: 10 mins     Bridle Placement:   Reason for bridle placement: feeding tube securement   Medicine delivered during procedure: lidocaine gel   Procedure: Successful   Location of top of clip on FT: @ 61 cm marker   Condition of nose/skin at time of bridle placement: Unremarkable  Face to Face time with patient: 5 minutes.

## 2024-04-26 NOTE — PROGRESS NOTES
Attempted to place PIV in patient but patient reached for writer as if to hit writer then pulled out the IV.   Rn made aware.

## 2024-04-26 NOTE — PROGRESS NOTES
"[facility], [place]     Neurosurgery Progress Note:  04/26/2024      Interval History: Exam stably poor. Failed swallow eval. LP performed under IR and CSF studies sent. Transfer to medicine. Heme agreeable to high dose steroids- ongoing discussions    Assessment:  43 year old female with PMHx significant for substance abuse, depression, smoking, recent COVID (4/18), and recent HIV diagnosis (3/2024, on Biktarvy) who presented to hospital with right sided weakness and dysarthria and was admitted 4/12 for failure to thrive with MRI Brain demonstrating interval increase in left midbrain and avel lesion. CNS lymphoma high on differential, although infectious etiology cannot be ruled out. The brain stem lesion is high risk for biopsy.     Clinically Significant Risk Factors                         # Overweight: Estimated body mass index is 28 kg/m  as calculated from the following:    Height as of this encounter: 1.626 m (5' 4\").    Weight as of this encounter: 74 kg (163 lb 2.3 oz)., PRESENT ON ADMISSION          # Compression of brain and # Cerebral edema       Plan:  Serial neuro exams  HOB > 30 degrees  Heme considering high dose steroids  Appreciate ID, oncology, neurology, ID and ophthalmology assistance  Medicine primary  OK for SQH heparin  Neurosurgery will follow along    -----------------------------------  Octavio Titus MD  Neurosurgery resident  Pager [phone]  -----------------------------------  PULM: breathing comfortably on room air  ABD: soft, non-distended  NEUROLOGIC:  -- Awake; Alert; oriented x 3  -- Follows commands slowly  -- Significant dysarthria, dysphagia  -- No gaze preference. No apparent hemineglect.  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, left eye ptosis  -- HB III-IV on R; tongue midline  -- sensory V1-V3 reduced bilaterally  -- palate elevates symmetrically, uvula midline  -- hearing grossly " intact bilat  -- Trapezii 3/5 bilaterally   -- Could not participate with dysmetric testing     Motor:  Normal bulk, no tremor  Does not give full effort during testing  BUE: delts 2/5; biceps 3/5; triceps 2/5;  4-/5  BLE: HF 2/5; KF/KE 2/5; DF/PF 4+/5    Objective:   Temp:  [97.7  F (36.5  C)-98.2  F (36.8  C)] 98  F (36.7  C)  Pulse:  [58-75] 59  Resp:  [15-18] 16  BP: (126-152)/(77-97) 127/77  SpO2:  [90 %-100 %] 96 %  I/O last 3 completed shifts:  In: 1405.5 [I.V.:1405.5]  Out: 900 [Urine:900]        LABS:  Recent Labs   Lab 04/26/24  1526 04/26/24  1245 04/26/24  1147 04/26/24  0817 04/26/24  0615 04/25/24  2201 04/24/24  2050   NA  --   --   --   --  143  --  137   POTASSIUM  --   --   --   --  3.6  --  4.2   CHLORIDE  --   --   --   --  110*  --  105   CO2  --   --   --   --  23  --  20*   ANIONGAP  --   --   --   --  10  --  12   GLC 85 84 77   < > 94   < > 133*   BUN  --   --   --   --  10.7  --  9.4   CR  --   --   --   --  0.54  --  0.46*   MAUREEN  --   --   --   --  9.1  --  9.3    < > = values in this interval not displayed.       Recent Labs   Lab 04/26/24  0615   WBC 5.4   RBC 3.70*   HGB 10.5*   HCT 32.6*   MCV 88   MCH 28.4   MCHC 32.2   RDW 14.5   *       IMAGING:  Recent Results (from the past 24 hour(s))   CT Soft Tissue Neck w Contrast    Narrative    EXAM: CT SOFT TISSUE NECK W CONTRAST  4/25/2024 11:06 PM     HISTORY: Patient with HIV, and new brain lesion ? CNS lymphoma- assess  other lesions         COMPARISON: None     TECHNIQUE: Following intravenous administration of nonionic iodinated  contrast medium, thin section helical CT images were obtained from the  skull base down to the level of the aortic arch.  Axial, coronal and  sagittal reformations were performed with 2-3 mm slice thickness  reconstruction. Images were reviewed in soft tissue, lung and bone  windows.    CONTRAST: 107ml isovue 370    FINDINGS:   No focal oral cavity, nasopharyngeal, oropharyngeal,  hypopharyngeal,  or glottic mucosal space abnormality. Normal tongue base. Salivary  glands are within normal limits. Multiple dental caries.    Several bilateral nonenlarged cervical lymph nodes.    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    Normal thyroid gland.    Patent cervical vasculature; no high grade arterial stenosis.    No suspicious osseus lesion. No high grade spinal canal stenosis.    Scattered mucosal thickening of the paranasal sinuses. Clear mastoid  air cells. No periapical dental lucency. The imaged skull base,  intracranial and orbital structures are within normal limits.    Partially visualized right upper lobe dependent consolidative  opacities..      Impression    IMPRESSION: No mass or abnormally enlarged cervical lymph nodes in the  neck. Multiple dental caries. 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    I have personally reviewed the examination and initial interpretation  and I agree with the findings.    CLARY PHAM MD         SYSTEM ID:  [id number]   CT Chest/Abdomen/Pelvis w Contrast    Narrative    EXAMINATION: CT CHEST/ABDOMEN/PELVIS W CONTRAST, 4/25/2024 11:07 PM    TECHNIQUE:  Helical CT images from the thoracic inlet through the  symphysis pubis were obtained with contrast.     CONTRAST: 107ml isovue 370    COMPARISON: Chest CT 1/16/2023; CT abdomen/pelvis 4/10/2019    HISTORY: Patient with HIV, and new brain lesion ? CNS lymphoma- assess  other lesions    FINDINGS:    Chest:   Mediastinum: Visualized thyroid gland is unremarkable. Heart size is  within normal limits. No pericardial effusion. The great vessels are  within normal limits. Prominent bilateral axillary lymph nodes,  decrease in size from prior CT from 1/16/2023. Small hiatal hernia.  Enlarged precarinal lymph node measuring 15 x 13 mm (3/49).  Subcentimeter right hilar lymph node.    Lungs: The trachea and central airways are patent. Right upper lobe  and left lower lobe dependent consolidative opacities. No pneumothorax  or  pleural effusion.     Abdomen and pelvis:  Liver: Borderline enlarged measuring 16.7 cm in the craniocaudal  dimension. No mass. No intrahepatic biliary ductal dilation.    Biliary System: Gallbladder surgically absent.    Pancreas: No mass or pancreatic ductal dilation.    Adrenal glands: No mass or nodules    Spleen: Normal.    Kidneys: No suspicious mass, obstructing calculus or hydronephrosis.    Gastrointestinal tract: Gastric bypass changes. Normal caliber small  and large bowel.    Mesentery/peritoneum/retroperitoneum: No mass. No free fluid or air.    Lymph nodes: Several nonenlarged bilateral inguinal lymph nodes.    Vasculature: Patent major abdominal vasculature.    Pelvis: Urinary bladder is well-distended.  No pelvic mass.  Pelvic  phleboliths.    Bones and soft tissues: No acute osseous abnormality. Tiny  fat-containing umbilical hernia.      Impression    IMPRESSION:     1. Mildly enlarged precarinal lymph node..     2. Right upper lobe and left lower lobe confluent densities, which may  represent atelectasis; however, superimposed infection cannot be  excluded. Recommend attention on follow-up.         Please contact neurosurgery resident on call with questions.    Dial * * *[phone], enter [phone] when prompted.

## 2024-04-26 NOTE — PROGRESS NOTES
"CLINICAL NUTRITION SERVICES - ASSESSMENT NOTE     Nutrition Prescription    RECOMMENDATIONS FOR MDs/PROVIDERS TO ORDER:   None at this time.     Malnutrition Status:   Unable to determine due to lack of Nutrition Focused Physical Exam and lack of nutrition Hx.     Recommendations already ordered by Registered Dietitian (RD):   Emma Farms 1.4 (or equivalent) @ 55 mL/hr (1320 mL/day) to provide 1848 kcals (30 kcal/kg), 81 g protein (1.3 g/kg), 207 g CHO, 77 g fat, 20 g fiber, and 938 mL free water daily   Free water flushes of 90 mL Q 4 hrs.   - Initiate @ 10 ml/hr and advance by 10 ml q8hr as tolerated  - Do not start or advance until lytes (Mg++,K+) WNL and phos>2.0  - Ordered Thiamine   - Elevated HOB with gastric feeds     - BMP, Mag, and Phos labs already in place by provider.     Future/Additional Recommendations:   Monitor TF tolerance, intake, and weight trends.      REASON FOR ASSESSMENT   Kamilah Goldberg is a/an 44 year old female assessed by the dietitian for Provider Order - Registered Dietitian to Assess and Order TF per Medical Nutrition Therapy Protocol    PMHx   Per Medicine Progress note 4/26:   - \"hx of stimulant use disorder, SWETA, pontine stroke, emypema s/p vats and newly diagnosed HIV admitted on who was transferred to [facility] for neurosurgical evaluation and 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\"    NUTRITION HISTORY   Unable to visit with patient. Chart reviewed and spoke with bedside RN.   Spoke with bedside RN, plan for tube placement later today.     Nutrition/GI: Last BM noted today   Skin: Yossi score 12 with nutrition marked as very poor per flowsheets.     CURRENT NUTRITION ORDERS   Diet: NPO per Anesthesia Guidelines for Procedure/Surgery Except for: No Exceptions    LABS   Labs Reviewed   04/26/24 06:15   Sodium 143   Potassium 3.6   Chloride 110 (H)   Creatinine 0.54   GFR Estimate >90   Magnesium 2.1   Phosphorus 4.2   Ferritin 76   Iron 37   Iron Binding Capacity 305   Iron Sat Index 12 " "(L)   Vitamin B12 332   Hemoglobin 10.5 (L)   Platelet Count 480 (H)   MCV 88      04/26/24 06:15 04/26/24 08:17 04/26/24 08:37 04/26/24 08:56   Glucose 94      Glucose by meter POCT  68 (L) 95 107 (H)     MEDICATIONS   Medications reviewed  - Bactrim  - D5NS infusion @ 75 mL/hr (90 g dextrose, 306 kcal per day)     ANTHROPOMETRICS  Height: 162.6 cm (5' 4\")  Most Recent Weight: 79 kg (174 lb 2.6 oz)    IBW: 54.5 kg   BMI: 29.90 - Overweight BMI 25-29.9  Weight History:   Wt Readings from Last Encounters:   04/24/24 79 kg (174 lb 2.6 oz)   01/25/23 60.2 kg (132 lb 11.2 oz)   01/16/23 56.9 kg (125 lb 8 oz)   12/04/22 61.7 kg (136 lb)   02/24/22 62.1 kg (137 lb)     Dosing Weight: 61 kg - Adjusted utilizing IBW and 79 kg.     ASSESSED NUTRITION NEEDS   Estimated Energy Needs: 5505-7067 kcals/day (25 - 35 kcals/kg)  Justification: Maintenance vs Increased needs  Estimated Protein Needs: 75-90 grams protein/day (1.2 - 1.5 grams of pro/kg)  Justification: Increased needs  Estimated Fluid Needs: 9444-2995 mL/day (25 - 30 mL/kg)   Justification: Maintenance    PHYSICAL FINDINGS   See malnutrition section below.    MALNUTRITION   % Intake: Unable to assess  % Weight Loss: Unable to assess  Subcutaneous Fat Loss: Unable to assess  Muscle Loss: Unable to assess  Fluid Accumulation/Edema: None noted  Malnutrition Diagnosis: Unable to determine due to lack of Nutrition Focused Physical Exam and lack of nutrition Hx.     NUTRITION DIAGNOSIS   Inadequate oral intake related to NPO as evidenced by need for tube feeding.       INTERVENTIONS   Implementation   mWater 1.4 (or equivalent) @ 55 mL/hr (1320 mL/day) to provide 1848 kcals (30 kcal/kg), 81 g protein (1.3 g/kg), 207 g CHO, 77 g fat, 20 g fiber, and 938 mL free water daily   Free water flushes of 90 mL Q 4 hrs.   - Initiate @ 10 ml/hr and advance by 10 ml q8hr as tolerated  - Do not start or advance until lytes (Mg++,K+) WNL and phos>2.0  - Ordered Thiamine   - Elevated " HOB with gastric feeds     - BMP, Mag, and Phos labs already in place by provider.     Goals   Total avg nutritional intake to meet a minimum of 25 kcal/kg and 1.2 g PRO/kg daily (per dosing wt 61 kg).     Monitoring/Evaluation   Progress toward goals will be monitored and evaluated per protocol.  Faiza Quiroga RD, LD   Available on [place]  No longer available by pager

## 2024-04-26 NOTE — IR NOTE
Patient Name: Kamilah Goldberg  Medical Record Number: [id number]  Today's Date: 4/26/2024    Procedure: Image guided lumbar puncture  Proceduralist: Dr. Ariela Cook    Procedure Start: 1032  Procedure end: 1112  Sedation medications administered: none. Local lidocaine.      Report given to: Ana YADAV 6A  : mary    4 hours flat bedrest.     Other Notes: Pt arrived to IR room 5 from 6A. Consent reviewed. Pt denies any questions or concerns regarding procedure. Pt positioned left side down and monitored per protocol. Pt tolerated procedure without any noted complications. Pt transferred back to 6A.

## 2024-04-26 NOTE — PROGRESS NOTES
[facility]: Progress Note     Patient:  Kamilah Goldberg, Date of birth 1980, Medical record number [id number]  Date of Admission 4/24/2024  Date of Visit:  April 26, 2024         Assessment and Recommendations:   Problem List:  #HIV/AIDS, ART-naive prior to presentation   VL log 6, CD4 2  #Brainstem enhancing/T2 hyperintense CNS lesion, rapidly progressive over past 1 month  #Worsening encephalopathy/obtundation  #MRSA in sputum from 4/19  #COVID-19+ on 4/18  #Maxillary sinusitis seen on brain MRI  #Non-immune to hepatitis A or B. 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 He now has rare atypical cells on the repeat LP here, pending flow and other studies.     As outlined in prior note, most infectious studies thus far are negative and the preliminary additional studies done here are also not suggestive of infection. CSF WBC count 3 and normal glucose suggests against infection.   - Cryptococcus has essentially been ruled out with negative CSF CrAg and two negative serum CrAg. CSF not consistent.  - CMV encephalitis seems much less likely based on two negative CMV PCR on meningitis panel (though a formal CMV PCR remains pending)  - Toxoplasmosis serology is negative, though formal PCR is also pending.  - PML has not been evaluated for, though this is treated with immune reconstitution  - Tb meningitis: negative quant-gold. AFB cultures are pending. CSF not consistent.  - Endemic mycoses: again not a classic presentation and no other signs of disseminated disease (typically can see pancytopenia, elevated alk phos, splenomegaly, etc). Eval is pending.    I would emphasize that with ART, expect her  HIV/AIDs to have relatively rapid improvement in her viral load and cell counts. She is ART naive, and her long-term prognosis from a purely HIV/AIDs perspective is excellent if we can diagnosis and effectively treat this CNS lesion, though certainly recognize that the location, rapid progression, and her current neurological status is very concerning as far as her short-term prognosis.    Agree with high-dose steroids now that we have maximized diagnostics since this lesion is not amenable to brain biopsy given its location. Appreciate hematology consideration of further therapeutics including IT methotrexate. I would note that ART and immune reconstitution is a vital part treating CNS lymphoma as well.    Recommendations:  Agree with high-dose steroids empirically for CNS lymphoma  Please give DTG + TDF + FTC via NG tube for ART ASAP  Continue TMP-SMX single-strength (400/80 mg) suspension via NG tube for PJP prophylaxis  Following repeat CSF studies  Obtain sputum culture for bacterial and fungal stains/cultures if able (ordered for you)  Obtain serum/urine histo/blasto Ag (ordered for you)  Send Doctor kinetic next-gen PCR from serum (ordered for you)    The [facility] ID team will continue to follow this patient. Please feel free to call with any questions.     Discussed with Dr. Shepard, ID staff. We will continue to follow.    Ramesh Van MD PharmD  Adult Infectious Disease Fellow PGY5  Pager: [phone]     ID History:   43 year old woman with recently diagnosed HIV (1 month ago)/AIDs (CD4 2) who presented to [facility] 4/15/2024 with generalized weakness found to have brainstem T2 hyperintense CNS lesion, highest concern for CNS lymphoma, transferred here for neurosurgical evaluation  after multiple non-diagnostic lumbar punctures. Lesion is not amenable to biopsy and work-up is ongoing.        Interval History:   Afebrile.  Vital signs unremarkable. On room air.    WBC 5.4. Cr 0.5. LFTs WNL.            Current Antimicrobials:     None       Exposure History:   She lives in [place] and she has two children, 24 and 14. Her son has a cat.          Physical Exam:   Ranges for vital signs:  Temp:  [97.5  F (36.4  C)-98.2  F (36.8  C)] 98  F (36.7  C)  Pulse:  [73-75] 74  Resp:  [16] 16  BP: (132-152)/(89-97) 132/95  SpO2:  [90 %-100 %] 100 %  Exam:  GENERAL:  well-developed, eyes closed, not responsive to verbal or tactile stimulation  ENT:  Head is normocephalic, atraumatic. Oropharynx is moist without exudates or ulcers.  EYES:  Eyes have anicteric sclerae.  PERRL.  NECK:  Supple.  ABDOMEN:  Soft.  EXT: Right foot cool, left foot warm. No edema  SKIN:  No acute rashes.  Line is in place without any surrounding erythema.  NEUROLOGIC:  Grossly nonfocal.         Laboratory Data:     Creatinine   Date Value Ref Range Status   04/26/2024 0.54 0.51 - 0.95 mg/dL Final   04/24/2024 0.46 (L) 0.51 - 0.95 mg/dL Final   11/28/2023 0.49 (L) 0.51 - 0.95 mg/dL Final   01/26/2023 0.39 (L) 0.51 - 0.95 mg/dL Final   01/24/2023 0.34 (L) 0.51 - 0.95 mg/dL Final   04/10/2019 0.47 (L) 0.52 - 1.04 mg/dL Final   09/12/2014 0.56 0.52 - 1.04 mg/dL Final   01/16/2011 0.55 0.52 - 1.04 mg/dL Final     Comment:     New IDMS-traceable calibration  beginning 5/1/08     WBC   Date Value Ref Range Status   04/10/2019 4.1 4.0 - 11.0 10e9/L Final   04/30/2012 7.9 4.0 - 11.0 10e9/L Final   01/16/2011 7.2 4.0 - 11.0 10e9/L Final   12/01/2009 10.2 4.0 - 11.0 10e9/L Final     WBC Count   Date Value Ref Range Status   04/26/2024 5.4 4.0 - 11.0 10e3/uL Final   04/24/2024 6.7 4.0 - 11.0 10e3/uL Final   04/24/2024 6.7 4.0 - 11.0 10e3/uL Final   11/28/2023 6.3 4.0 - 11.0 10e3/uL Final   01/27/2023 4.3 4.0 - 11.0 10e3/uL Final     Hemoglobin   Date Value Ref Range Status   04/26/2024 10.5 (L) 11.7 - 15.7 g/dL Final   04/10/2019 7.7 (L) 11.7 - 15.7 g/dL Final     Platelet Count   Date Value Ref Range Status   04/26/2024 480 (H) 150 - 450 10e3/uL Final    04/10/2019 470 (H) 150 - 450 10e9/L Final     AST   Date Value Ref Range Status   04/24/2024 37 0 - 45 U/L Final     Comment:     Reference intervals for this test were updated on 6/12/2023 to more accurately reflect our healthy population. There may be differences in the flagging of prior results with similar values performed with this method. Interpretation of those prior results can be made in the context of the updated reference intervals.   01/16/2023 98 (H) 0 - 45 U/L Final   12/04/2022 38 0 - 45 U/L Final   02/24/2022 24 0 - 45 U/L Final   04/10/2019 27 0 - 45 U/L Final   01/16/2011 24 0 - 45 U/L Final     ALT   Date Value Ref Range Status   04/24/2024 25 0 - 50 U/L Final     Comment:     Reference intervals for this test were updated on 6/12/2023 to more accurately reflect our healthy population. There may be differences in the flagging of prior results with similar values performed with this method. Interpretation of those prior results can be made in the context of the updated reference intervals.     01/16/2023 61 (H) 0 - 50 U/L Final   12/04/2022 25 0 - 50 U/L Final   02/24/2022 22 0 - 50 U/L Final   04/10/2019 20 0 - 50 U/L Final   01/16/2011 13 0 - 50 U/L Final     Bilirubin Total   Date Value Ref Range Status   04/24/2024 0.3 <=1.2 mg/dL Final   01/16/2023 0.1 (L) 0.2 - 1.3 mg/dL Final   12/04/2022 0.3 0.2 - 1.3 mg/dL Final   02/24/2022 0.3 0.2 - 1.3 mg/dL Final   04/10/2019 0.2 0.2 - 1.3 mg/dL Final   01/16/2011 <0.1 (L) 0.2 - 1.3 mg/dL Final     Lab Results   Component Value Date     04/26/2024    BUN 10.7 04/26/2024    CO2 23 04/26/2024            Microbiology Data:   Culture data reviewed. Notable for:     Micro studies at [facility] 4/25-  CSF studies 4/26:  TNC 3 glucose 52 Protein 109  Gram stain negative.  Bacterial/fungal/AFB cx pending  CSF and serum CrAg: negative  Meningitis/encephalitis panel negative  CMV PCR:  RENEA virus PCR:  EBV PCR:  Toxoplasma PCR:   Cytology: rare atypical  cells  Flow cytometry: pending    AFB blood culture:   Urine/serum histo/blasto Ag:   Karius:   Sputum cx:     Imaging at [facility]:    CT NECK  IMPRESSION: No mass or abnormally enlarged cervical lymph nodes in the  neck. Multiple dental caries. 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    CT CAP (prelim)  1. Mildly enlarged precarinal lymph node..      2. Right upper lobe and left lower lobe confluent densities, which may  represent atelectasis; however, superimposed infection cannot be  excluded. Recommend attention on follow-up.    4/16-24 visit at [facility]:  4/19 sputum cx: light growth MRSA  Toxo IgG negative  CMV IgG positive, IgM negative  Serum Crypto Ag negative  T-spot negative  COVID + 4/18     CSF studies  Meningitis encephalitis panel: negative  Flow: negative  CSF glucose: 50  CSF protein 98  WBC 2  Bacterial Cx negative  EBV PCR positive from CSF    - Internal source says CSF, but send out to [place] was mislabeled as blood.     3/2024 at [facility]  HIV Ab positive. VL 6.09 log (approx 1,240,000 copies/mL)  CD4 count 2     Trep Ab negative  Hepatitis A IgM: negative  Hepatitis B core Ab IgM: Negative. sAg negative.  Hep C Ab: negative  Fungal blood culture negative  2x routine Bcx negative  Urine G&C: negative     CSF studies:  Flow: unable to be performed  Protein 74  WBC 0  Gram stain and bacterial culture negative       Imaging:   Imaging reviewed. Notable for:       Brain MRI 4/24  1.  Ovoid mass centered along left aspect of the upper avel and midbrain is stable to minimally increased in size since 4/15/2024. In the setting of HIV, this could be primary CNS lymphoma or opportunistic infection. Appearance atypical for demyelinating process such as progressive multifocal leukoencephalopathy.     2.  Lesion continues to cause partial effacement of the fourth ventricle and quadrigeminal plate cistern, but there is no obstructive hydrocephalus.     3.  No new abnormality elsewhere.      CT A/P 4/16     FINDINGS: Lung bases  are with minor bibasilar atelectasis left greater than right.     9 mm central hepatic low-attenuation is too small to characterize. Gallbladder is absent. There is no biliary ductal dilatation. The pancreas, spleen, adrenal glands and kidneys are normal. Abdominal aorta and iliac arteries are normal in caliber. There is no retroperitoneal or pelvic lymphadenopathy. Uterus and urinary bladder are unremarkable. Prior gastric bypass is noted.     No bowel obstruction, intra-abdominal free air or free pelvic fluid is demonstrated. Tiny fat-containing umbilical hernia is insignificant. Left periuterine varices with prominent left gonadal vein can be seen with pelvic congestion syndrome.     Osseous structures are without suspicious lesions or acute abnormality. Dextroconvex thoracal lumbar spinal curvature is visualized. The L1 vertebra is rib-bearing.      Brain MRI 4/16  IMPRESSION:   Interval increase in size and enhancement of the expansile lesion within the left midbrain and avel. In the setting of HIV, this is concerning for lymphoma, although a high-grade primary glial neoplasm is also possible. Opportunistic infection is considered less likely.      PARENCHYMA:   Interval increase in size of the T2/FLAIR hyperintense expansile lesion in the left midbrain and avel which measures approximately 1.3 x 1.8 x 2.5 cm in AP, transverse, and craniocaudal dimensions, previously 0.8 x 1.0 x 1.9 cm. Enhancement associated with the lesion has also significantly increased. The remaining brain parenchyma is unremarkable. No parenchymal hemorrhage. No midline shift.      Brain MRI 3/29  Motion limited examination. Area of restricted diffusion and T2 hyperintensity in the left posterior pontomesencephalic junction measuring 15 mm on axial images. No hemorrhage identified. Mild local mass effect without significant narrowing of the cerebral aqueduct or fourth ventricle. No hydrocephalus. This probably represents an ischemic  infarct. Given the distribution along a perforating artery this could potentially be a manifestation of neurocandidiasis although this is not certain. The morphology and signal characteristics are not suggestive of a cerebral abscess.

## 2024-04-26 NOTE — PROVIDER NOTIFICATION
Pt significantly more lethargic upon return from IR, but did not get any sedation in procedure. BG in the 70's. Neurosurgery notified via phone call.

## 2024-04-27 NOTE — PLAN OF CARE
Goal Outcome Evaluation:      Plan of Care Reviewed With: patient    Overall Patient Progress: no changeOverall Patient Progress: no change       Status: Brain mass, Covid & HIV positive     Vitals: VSS  Neuros: Alert.Difficult to assess orientation.Pt is lethargic and inconsistent with commands. L eye has no lateral gaze, R facial droop, slurred speech. Strength Left 3/5, Right 2/5  IV:  D5NS  infusion stopped,it was started for low BG yesterday. Provider okayed discontinuation as pt is taking TF and dexamethasone infusion. Will monitor BG Q4 levels.  Labs/Electrolytes: WDL  Resp: RA  Diet: Strict NPO, NG-NJ tube infusing Tube feed @ 30cc/hr from 1430, advancing by 10cc q8h to goal 55cc. Water flush 90 cc q4h  Bowel status: 04/26  : Incontinent of urine x 3, Pure-wick in place  Skin: LP site ANIA, R hand edema & redness, blanchable redness coccyx and back. New sacral mepilex  Pain: Denies, nods no to pain.  Activity: assist 2 lift, repo Q2  Plan: Continue to monitor, repo Q2, assess restraints(Mitts) Q2

## 2024-04-27 NOTE — PROGRESS NOTES
[facility]  Neurology Progress Note    Patient Name:  Kamilah Goldberg  MRN:  [id number]    :  1980  Date of Admission:  2024  Date of Service:  2024  Hospital Day: 4     Interval History/24-hour Events   Interval Events:  naeo  Started on dexamethasone 40mg yesterday per heme/onc    Today's Changes:  -continue dex  -following Csf labs  -appears more participatory on exam s/p first dex dose     Assessment & Plan    Impression  Kamilah Goldberg is a 43 year old female with new HIV diagnosis and Central nervous system lesion thought to be an ischemic stroke (3/2024)  who presented to OSH  with weakness, diplopia, dysarthria admitted for failure to thrive found to have increasing size of lesions in left midbrain and avel lesion transferred to [facility] for consideration of brain biopsy for CNS lymphoma. Hospitalization c/b complicated by MRSA pneumonia, COVID infection and encephalopathy. R hemiparesis in face, arm and leg, similar to yesterady.  L 6th nerve and L PHILLIP present.   Challenging diagnostic dilemma. Central nervous system lymphoma highest on differential, infection cannot be entirely ruled out with hx of AIDS. No biopsy site accessible at this moment. Ideally would have positive cytology/cytometry to confirm diagnosis before starting high dose methotrexate. Brain Mri showing slight expansion of known lesions . LP completed : CSF protein elevated 109.0, total Nuc 3. Appreciate ID, heme/onc and palliative care involvement.       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   -Protein 109.0, nuc cells wnl.  -toxo, TB, fungual  -crypto (neg), meningitis/encephalitis  "(negative), flow cytometry (rare polytypic B cells), cytology (rare atypical)    Thank you for involving Neurology in the care of Kamilah Goldberg.  Please do not hesitate to call with questions/concerns (consult pager [phone]).      Patient was seen and discussed with Dr. Brooks.    Petrona Edouard MD  PGY-1, Neurology  [facility]             Physical Exam   Temp:  [97.9  F (36.6  C)-98.3  F (36.8  C)] 98.3  F (36.8  C)  Pulse:  [64-87] 87  Resp:  [16] 16  BP: (117-139)/(82-98) 117/85  SpO2:  [90 %-97 %] 97 %    I/O last 3 completed shifts:  In: 170 [NG/GT:120]  Out: 415 [Urine:375; Emesis/NG output:40]     General Exam  General:  patient lying in bed without any acute distress    HEENT:  normocephalic/atraumatic  Cardio:  appears well perfused  Pulmonary:  stable on RA  Skin:  warm/dry bilateral soft mitts UE present    Neuro Exam  Mental Status:  sleepy, opens eyes to voice. Attempts to mouth some words, no sound produced. Nods yes when asked \"Are you Kamilah?\". Follows some simple commands like arm raise bilaterally   Cranial Nerves:  symmetrical facies, tongue protrusion midline, conjugate gaze at rest ,disconjugate when tracking object . Blinks to threat bilaterally .prior R facial droop/ptosis appears resolved. & L CN VI palsy improved from prior, and Left PHILLIP persistent.   Motor: no abnormal movements at rest, able to move all limbs spontaneously, BUE anti-gravity (right UE drifts to bed <5 seconds), BLE withdraws in plane of bed, did not participate in LE antigravity  Reflexes:   toes upgoing bilaterally  Sensory:   withdraws to noxious x4 extremities  Coordination:   THOR due to mental status  Station/Gait:  THOR     Investigations   Labs  BMP  Recent Labs   Lab 04/27/24  0724 04/26/24  0615 04/24/24  2050    143 137   POTASSIUM 3.5 3.6 4.2   CHLORIDE 108* 110* 105   CO2 20* 23 20*   BUN 7.4 10.7 9.4   CR 0.48* 0.54 0.46*   MAUREEN 9.1 9.1 9.3       Liver Panel  Recent Labs   Lab 04/24/24 2050 " "  PROTTOTAL 9.1*   ALBUMIN 3.7   BILITOTAL 0.3   ALKPHOS 98   AST 37   ALT 25       CBC  Recent Labs   Lab 04/27/24  0724 04/26/24  0615 04/24/24 2050   WBC 4.9 5.4 6.7  6.7   HGB 11.5* 10.5* 11.3*  11.3*   * 480* 450  450       COAGS  Recent Labs   Lab 04/24/24 2050   INR 0.98   PTT 28       ABG  No results for input(s): \"PH\", \"PCO2\", \"PO2\", \"HCO3\" in the last 168 hours.    CRP/ESR  No results for input(s): \"CRP\" in the last 168 hours.    Invalid input(s): \"ESR\"    CSF  Recent Labs   Lab 04/26/24  1116 04/26/24  1115   CGLU 52  --    CTP  --  109.0*       MICRO  No results for input(s): \"CULT\" in the last 168 hours.    LIPIDS  No results for input(s): \"CHOL\", \"HDL\", \"LDL\", \"TRIG\", \"CHOLHDLRATIO\" in the last 61162 hours.    A1C    Recent Labs   Lab Test 04/27/24  0724   A1C 5.7*       Imaging  I have personally reviewed most recent and pertinent labs, tests, and radiological images; relevant findings per HPI.    CT Chest/Abdomen/Pelvis w Contrast  Narrative: EXAMINATION: CT CHEST/ABDOMEN/PELVIS W CONTRAST, 4/25/2024 11:07 PM    TECHNIQUE:  Helical CT images from the thoracic inlet through the  symphysis pubis were obtained with contrast.     CONTRAST: 107ml isovue 370    COMPARISON: Chest CT 1/16/2023; CT abdomen/pelvis 4/10/2019    HISTORY: Patient with HIV, and new brain lesion ? CNS lymphoma- assess  other lesions    FINDINGS:    Chest:     Mild motion related artifacts.    Mediastinum: Visualized thyroid gland is unremarkable. Heart size is  within normal limits. No pericardial effusion. No central pulmonary  thromboembolism on this nondedicated study. Prominent bilateral  axillary lymph nodes, decreased in size from prior CT from 1/16/2023.  Small hiatal hernia. Enlarged precarinal lymph node measuring 15 x 13  mm (3/49). Subcentimeter right hilar lymph node (3/57). Prominent  right subpectoral node measuring 6 mm, previously 8 mm (3/33).    Lungs: The trachea and central airways are patent. " Patchy  consolidative density seen in the posterior right upper lobe abutting  the superior oblique fissure (9/88), pleural-based confluent density  in the right lower lobe measuring 1 cm (9/185). Confluent  consolidative density in the left lung base. Mild patchy groundglass  densities in the superior left lower lobe (9/152). No pneumothorax or  pleural effusion.    Abdomen and pelvis:    Mild motion related artifacts.    Liver: Too small to characterize hypodensity in the right lobe of  liver (segment 7) measuring 7 mm, not conspicuous on prior CT from  2019 (3/100). Several are too small to visualize hepatic hypodensity  in the hepatic dome (3/99). No intrahepatic biliary ductal dilatation.  Adrenal glands are within normal limits. No focal pancreatic mass or  pancreatic ductal dilatation. Spleen is not enlarged. No focal splenic  lesion.    Postsurgical changes of the stomach. No biliary ductal dilatation.  Gallbladder is surgically absent. Symmetric enhancement of the  kidneys. No focal suspicious renal lesion. Prominent parametrial  vessels. No adnexal mass. Few subcentimeter bilateral inguinal lymph  nodes. Urinary bladder is partially distended and grossly  unremarkable. Mild scoliotic curvature of the spine. Asymmetric  bulkiness of the right obturator internus with apparent increased  enhancement (3/271 and 7/64). Appendix is within normal limits. Mild  colonic diverticulosis. No high-grade bowel obstruction. No  pneumoperitoneum or significant free fluid. No bulky new pelvic or  retroperitoneal lymphadenopathy. Subcentimeter right external iliac  lymph node (5/519), similar to prior.     Bones and soft tissues: No acute hydrocephalus osseous abnormality.  Tiny fat-containing umbilical hernia.  Impression: IMPRESSION:     1. Mildly enlarged precarinal lymph node. Subcentimeter axillary lymph  nodes decreased in size compared to prior CT from 1/16/2023.  Subcentimeter right hilar lymph node. Consider  attention on follow-up    2. Right upper lobe and left lower lobe confluent densities, which may  represent atelectasis; however, superimposed infection cannot be  excluded. Right lower lobe pleural-based nodular centimeter size  density indeterminate. Recommend attention on follow-up.    3. Too small to characterize subcentimeter hypodensity in segment 7 of  the liver, indeterminate, new compared to prior CT from 4/10/2019.  Recommend attention on short-term follow-up     4. Asymmetric bulky appearing right obturator internus muscle with  apparent increased internal enhancement, indeterminate, underlying  lesion/inflammation not excluded; recommend attention on short-term  follow-up or contrast enhanced MRI pelvis as clinical desired.    5. Few prominent inguinal lymph nodes, possibly reactive; otherwise no  significantly enlarged retroperitoneal or pelvic lymph node.    [Recommend Follow Up: Hepatic hypodensity, right lung lower lobe  pleural-based nodule, asymmetric bulky right obturator internus.]    This report will be copied to the [facility] to ensure a  provider acknowledges the finding. [facility] Center is available Monday  through Friday 8am-3:30 pm.    I have personally reviewed the examination and initial interpretation  and I agree with the findings.    TONI PALACIOS MD         SYSTEM ID:  [id number]    Petrona Edouard MD   PGY-1, Neurology  [facility]

## 2024-04-27 NOTE — PROGRESS NOTES
Status: Brain mass, Covid & HIV positive    Vitals: VSS  Neuros: lethargic, oriented to self and place, (inconsistent with commands), L eye no lateral gaze, R facial droop, slurred speech. Strength Left 3/5, Right 2/5  IV: infusing D5NS 75ml/hr  Labs/Electrolytes: WNL, redraws this AM  Resp: RA  Diet: Strict NPO, NG-NJ tube inusing Tube feed @ 20cc/hr, advancing by 10cc q8h to goal 55cc. Water flush 90 cc q4h  Bowel status: 04/26  : Incontinent, Purewick in place, bladder scan at 04 am 335 ml, incontinent   Skin: R hand edema& redness, blanchable redness coccyx and back, LP site ANIA  Pain: nodded no to pain  Activity: assist 2 lift, repo q2hrs  Plan: Neuro, ID, Oncology, palliative consulted

## 2024-04-27 NOTE — PROGRESS NOTES
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    Rare atypical cells noted on cytology from 04/26 LP, but without correlating results from flow cytometry. She started 40 mg dexamethasone daily 04/26.  Based on imaging and positive EBV PCR in CSF from 04/18 (confirmed by [facility]) empiric methotrexate to be considered in coming days as response to current treatment is assessed and additional data are obtained. She now has an NGT in place and is receiving tenofovir-emtricitabine and dolutegravir.    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    Jonas Goldstein  PGY4  Hem/Onc/BMT  [facility]  [phone]      Subjective    Subjective & Interval History:    NGT placed.  She remains minimally interactive only able to open eyes briefly and inconsistently but accompanied with nods.      Objective   Physical Exam:    /85 (BP Location: Left arm)   Pulse 87   Temp 98.3  F (36.8  C) (Axillary)   Resp 16   Ht 1.626 m (5' 4\")   Wt 74 kg (163 lb 2.3 oz)   SpO2 97%   BMI 28.00 kg/m    Blood pressure 117/85, pulse 87, temperature 98.3  F (36.8  C), temperature source Axillary, resp. rate 16, height 1.626 m (5' 4\"), weight 74 kg (163 lb 2.3 oz), SpO2 97%.  Physical Exam    Sleeping and not following commands  NGT in place  Breathing comfortably  No LE " edema    Labs & Studies: I personally reviewed the following studies:  ROUTINE LABS (Last four results):  CMP  Recent Labs   Lab 04/27/24  1116 04/27/24  0925 04/27/24 0724 04/27/24  0158 04/26/24  0817 04/26/24 0615 04/25/24 2201 04/24/24 2050   NA  --   --  140  --   --  143  --  137   POTASSIUM  --   --  3.5  --   --  3.6  --  4.2   CHLORIDE  --   --  108*  --   --  110*  --  105   CO2  --   --  20*  --   --  23  --  20*   ANIONGAP  --   --  12  --   --  10  --  12   * 151* 178* 167*   < > 94   < > 133*   BUN  --   --  7.4  --   --  10.7  --  9.4   CR  --   --  0.48*  --   --  0.54  --  0.46*   GFRESTIMATED  --   --  >90  --   --  >90  --  >90   MAUREEN  --   --  9.1  --   --  9.1  --  9.3   MAG  --   --  1.9  --   --  2.1  --  2.2   PHOS  --   --  4.1  --   --  4.2  --  4.1   PROTTOTAL  --   --   --   --   --   --   --  9.1*   ALBUMIN  --   --   --   --   --   --   --  3.7   BILITOTAL  --   --   --   --   --   --   --  0.3   ALKPHOS  --   --   --   --   --   --   --  98   AST  --   --   --   --   --   --   --  37   ALT  --   --   --   --   --   --   --  25    < > = values in this interval not displayed.     CBC  Recent Labs   Lab 04/27/24 0724 04/26/24 0615 04/24/24 2050   WBC 4.9 5.4 6.7  6.7   RBC 3.92 3.70* 3.95  3.95   HGB 11.5* 10.5* 11.3*  11.3*   HCT 34.2* 32.6* 34.2*  34.2*   MCV 87 88 87  87   MCH 29.3 28.4 28.6  28.6   [facility] 33.6 32.2 33.0  33.0   RDW 14.3 14.5 14.3  14.3   * 480* 450  450     INR  Recent Labs   Lab 04/24/24 2050   INR 0.98       Medications list for reference:  Current Facility-Administered Medications   Medication Dose Route Frequency Provider Last Rate Last Admin    acetaminophen (TYLENOL) tablet 650 mg  650 mg Oral or Feeding Tube Q4H PRN Russel Boothe DO        Or    acetaminophen (TYLENOL) Suppository 650 mg  650 mg Rectal Q4H PRN Russel Boothe DO        atorvastatin (LIPITOR) tablet 40 mg  40 mg Oral or Feeding Tube Daily Russel Boohte DO   40 mg at  04/27/24 0910    dexAMETHasone (DECADRON) 40 mg in sodium chloride 0.9 % 59 mL intermittent infusion  40 mg Intravenous Q24H Russel Boothe DO        dextrose 10% infusion   Intravenous Continuous PRN Russel Boothe DO        glucose gel 15-30 g  15-30 g Oral Q15 Min PRN Octavio Titus MD        Or    dextrose 50 % injection 25-50 mL  25-50 mL Intravenous Q15 Min PRN Octavio Titus MD   25 mL at 04/26/24 0819    Or    glucagon injection 1 mg  1 mg Subcutaneous Q15 Min PRN Octavio Titus MD        dolutegravir (TIVICAY) tablet 50 mg  50 mg Oral or NG Tube Daily Ramesh Van MD   50 mg at 04/26/24 2051    emtricitabine-tenofovir (TRUVADA) 200-300 MG per tablet 1 tablet  1 tablet Oral or NG Tube Daily Ramesh Van MD   1 tablet at 04/26/24 2051    enoxaparin ANTICOAGULANT (LOVENOX) injection 40 mg  40 mg Subcutaneous Q24H Russel Boothe DO   40 mg at 04/27/24 0910    gabapentin (NEURONTIN) solution 100 mg  100 mg Oral or Feeding Tube Q12H [facility] (08/20) Russel Boothe DO   100 mg at 04/27/24 0910    insulin aspart (NovoLOG) injection (RAPID ACTING)  1-4 Units Subcutaneous Q4H Russel Boothe DO   1 Units at 04/27/24 0910    melatonin tablet 3 mg  3 mg Oral or Feeding Tube At Bedtime PRN Russel Boothe DO        ondansetron (ZOFRAN) tablet 4 mg  4 mg Oral or Feeding Tube Q6H PRN Russel Boothe DO        sennosides (SENOKOT) tablet 8.6 mg  8.6 mg Oral or Feeding Tube BID PRN Russel Boothe DO        sulfamethoxazole-trimethoprim (BACTRIM/SEPTRA) suspension 80 mg  10 mL Oral or Feeding Tube Daily Russel Boothe DO   80 mg at 04/26/24 2051    thiamine (B-1) tablet 100 mg  100 mg Oral or Feeding Tube Daily Russel Boothe DO   100 mg at 04/26/24 2147       Imaging:  XR Abdomen Port 1 View   Final Result   IMPRESSION: Feeding tube tip projects over the expected location of   proximal jejunum in this patient with history of gastric bypass.      I have personally reviewed the examination and initial interpretation   and I  agree with the findings.      TONI PALACIOS MD            SYSTEM ID:  [id number]      CT Chest/Abdomen/Pelvis w Contrast   Final Result   IMPRESSION:       1. Mildly enlarged precarinal lymph node. Subcentimeter axillary lymph   nodes decreased in size compared to prior CT from 1/16/2023.   Subcentimeter right hilar lymph node. Consider attention on follow-up      2. Right upper lobe and left lower lobe confluent densities, which may   represent atelectasis; however, superimposed infection cannot be   excluded. Right lower lobe pleural-based nodular centimeter size   density indeterminate. Recommend attention on follow-up.      3. Too small to characterize subcentimeter hypodensity in segment 7 of   the liver, indeterminate, new compared to prior CT from 4/10/2019.   Recommend attention on short-term follow-up       4. Asymmetric bulky appearing right obturator internus muscle with   apparent increased internal enhancement, indeterminate, underlying   lesion/inflammation not excluded; recommend attention on short-term   follow-up or contrast enhanced MRI pelvis as clinical desired.      5. Few prominent inguinal lymph nodes, possibly reactive; otherwise no   significantly enlarged retroperitoneal or pelvic lymph node.            [Recommend Follow Up: Hepatic hypodensity, right lung lower lobe   pleural-based nodule, asymmetric bulky right obturator internus.]      This report will be copied to the [facility] to ensure a   provider acknowledges the finding. [facility] Center is available Monday   through Friday 8am-3:30 pm.            I have personally reviewed the examination and initial interpretation   and I agree with the findings.      TONI PALACIOS MD            SYSTEM ID:  [id number]      CT Soft Tissue Neck w Contrast   Final Result   IMPRESSION: No mass or abnormally enlarged cervical lymph nodes in the   neck. Multiple dental caries. 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      I have personally  reviewed the examination and initial interpretation   and I agree with the findings.      CLARY PHAM MD            SYSTEM ID:  [id number]      XR Video Swallow with SLP or OT    (Results Pending)   IR Lumbar Puncture    (Results Pending)

## 2024-04-27 NOTE — PROGRESS NOTES
Paged provider: ANNA    Pt [phone] [phone] Kamilah Goldberg     Pt D5 is bolus is complete. I see it got discontinued in MAR,BG has been in the 130-150 range with D5 this shift.Last BG 20 mins ago was 131.Would u like to order another one?  MORIAH -140    Response: Pt should be fine since she's getting tube feeds and Steroids this afternoon.Continue to monitor

## 2024-04-27 NOTE — PROGRESS NOTES
"[facility], [place]     Neurosurgery Progress Note:  04/27/2024      Interval History: Exam with slight improvement- more alert this AM. Transfer to medicine. Heme agreeable to high dose steroids.     Assessment:  43 year old female with PMHx significant for substance abuse, depression, smoking, recent COVID (4/18), and recent HIV diagnosis (3/2024, on Biktarvy) who presented to hospital with right sided weakness and dysarthria and was admitted 4/12 for failure to thrive with MRI Brain demonstrating interval increase in left midbrain and avel lesion. CNS lymphoma high on differential, although infectious etiology cannot be ruled out. The brain stem lesion is high risk for biopsy.     Clinically Significant Risk Factors                         # Overweight: Estimated body mass index is 28 kg/m  as calculated from the following:    Height as of this encounter: 1.626 m (5' 4\").    Weight as of this encounter: 74 kg (163 lb 2.3 oz)., PRESENT ON ADMISSION          # Compression of brain and # Cerebral edema       Plan:  Serial neuro exams  HOB > 30 degrees  High dose steroids per heme  Appreciate ID, oncology, neurology, ID and ophthalmology assistance  Medicine primary  OK for SQH heparin  Please page neurosurgery if any assistance is needed  Appreciate medicines help with the patients care  Remainder cares per medicine  Neurosurgery will follow peripherally    -----------------------------------  Octavio Titus MD  Neurosurgery resident  Pager [phone]  -----------------------------------  PULM: breathing comfortably on room air  ABD: soft, non-distended  NEUROLOGIC:  -- Awake; Alert; oriented x 1  -- Follows commands slowly  -- Significant dysarthria, dysphagia  -- No gaze preference. No apparent hemineglect.  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, left eye ptosis  -- HB III-IV on R; tongue midline  -- sensory V1-V3 reduced " bilaterally  -- palate elevates symmetrically, uvula midline  -- hearing grossly intact bilat  -- Trapezii 3/5 bilaterally   -- Could not participate with dysmetric testing     Motor:  Normal bulk, no tremor  Does not give full effort during testing  RUE delt 1/5, biceps and triceps 2/5,  2/5  LUE delts 2/5; biceps 3/5; triceps 2/5;  4-/5  BLE: HF 2/5; KF/KE 2/5; DF/PF 4+/5    Objective:   Temp:  [97.8  F (36.6  C)-98  F (36.7  C)] 98  F (36.7  C)  Pulse:  [58-72] 68  Resp:  [15-17] 16  BP: (127-139)/(77-98) 128/82  SpO2:  [90 %-99 %] 90 %  I/O last 3 completed shifts:  In: 668 [I.V.:518; NG/GT:120]  Out: 975 [Urine:975]        LABS:  Recent Labs   Lab 04/27/24  0724 04/27/24  0158 04/26/24 2209 04/26/24  0817 04/26/24  0615 04/25/24  2201 04/24/24  2050     --   --   --  143  --  137   POTASSIUM 3.5  --   --   --  3.6  --  4.2   CHLORIDE 108*  --   --   --  110*  --  105   CO2 20*  --   --   --  23  --  20*   ANIONGAP 12  --   --   --  10  --  12   * 167* 102*   < > 94   < > 133*   BUN 7.4  --   --   --  10.7  --  9.4   CR 0.48*  --   --   --  0.54  --  0.46*   MAUREEN 9.1  --   --   --  9.1  --  9.3    < > = values in this interval not displayed.       Recent Labs   Lab 04/27/24 0724   WBC 4.9   RBC 3.92   HGB 11.5*   HCT 34.2*   MCV 87   MCH 29.3   MCHC 33.6   RDW 14.3   *       IMAGING:  Recent Results (from the past 24 hour(s))   XR Abdomen Port 1 View    Narrative    EXAMINATION:  XR ABDOMEN PORT 1 VIEW 4/26/2024 5:09 PM     COMPARISON: CT 4/25/2024.    HISTORY: feeding tube placement.    TECHNIQUE: Frontal view of the abdomen.    FINDINGS: Feeding tube tip projects over the proximal jejunum in  patient with history of gastric bypass seen on prior CT. No abnormally  dilated loops of bowel.  No pneumatosis or portal venous gas.        Impression    IMPRESSION: Feeding tube tip projects over the expected location of  proximal jejunum in this patient with history of gastric bypass.    I  have personally reviewed the examination and initial interpretation  and I agree with the findings.    TONI PALACIOS MD         SYSTEM ID:  [id number]       Please contact neurosurgery resident on call with questions.    Dial * * [phone], enter [phone] when prompted.

## 2024-04-27 NOTE — PROGRESS NOTES
HEME MALIGNANCY PROGRESS NOTE    Very complex situation with new HIV and CNS lesion concerning for CNS lymphoma as well as COVID-19 positive and possible MRSA infection (although ID feels is less likely). Workup including LP has been non-diagnostic. Not clear if EBV was positive from blood (as indicated in result report in Care Everywhere) or CSF as mentioned elsewhere - would be helpful to confirm what specimen was sent. Cytomorphology and flow cytometry showed no evidence of lymphoma. She has been seen by ID and Neurology as well who made several recommendations for further workup to try to solidify a diagnosis.    VS reviewed. No distress.  Labs and Imaging reviewed.    CNS lymphoma is high on the differential diagnosis and LP obtained today to try to solidify the diagnosis. We reached out to [facility] that confirmed that EBV PCR was positive from CSF on 4/17/2024 and reviewed imaging with Neuroradiology that agree with outside impression that imaging would be consistent with CNS lymphoma. It would be ideal to have positive cytomorphology and/or flow cytometry to confirm the diagnosis of CNS lymphoma, but if if there is no evidence of  infection or other etiology on workup we will consider treatment with high-dose methotrexate based on the available evidence (e.g. imaging and EBV+ PCR from CSF).    No evidence of lymphoma on CT CAP. Ophthalmology evaluation would still be indicated to look for evidence of lymphoma.    Unless Neurology or ID object we would recommend starting dexamethasone 40 mg daily now while current CSF studies are in process to try to avoid likely lymphoma progression or complications. Also agree with ID that HIV ART can have benefit and should be prioritized.     Heme Malignancy Will continue to follow.    Anibal Harris MD, PhD

## 2024-04-27 NOTE — PHARMACY-CONSULT NOTE
"Pharmacy Tube Feeding Consult    Medication reviewed for administration by feeding tube and for potential food/drug interactions.    Recommendation: Recommend changing the following medications to a liquid dosage form:   Gabapentin 100 mg capsule->Gabapentin 250 mg/5 ml solution (done for you)    Things to consider with ART:   Dolutegravir should be  from cation containing agents for two hours before and after drug administration  Consider the tube feeding formula  Recommendation: HOLD tube feeds for  two hours before and after drug administration.   Added note in admin instructions    All other medication routes changed to \"oral or feeding tube.\"    Pharmacy will continue to follow as new medications are ordered.     Fadumo Gaffney [facility] on 4/26/2024 at 10:28 PM      Sources:  BAILEY Camacho., & Amberly P. P. (2017). Enteral Administration of Twice-Daily Dolutegravir and Rilpivirine as a Part of a Triple-Therapy Regimen in a Critically Ill Patient with HIV. Journal of the International Association of Providers of AIDS Care, 16(2), 117-119. https://doi.org/10.1177/2055503763724777  "

## 2024-04-27 NOTE — PROGRESS NOTES
[facility]    Medicine Progress Note - Hospitalist Service, GOLD TEAM 7    Date of Admission:  4/24/2024    Assessment & Plan   Kamilah Goldberg is a 43 year old female w/ a hx of stimulant use disorder, SWETA, pontine stroke, emypema s/p vats and newly diagnosed HIV admitted on who was transferred to [facility] for neurosurgical evaluation and 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 Neurosurgery here unable to perform biopsy but high volume LP done and oncology agreeable to starting high dose steroids which was done 4/26. Now monitoring for response.     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 However this continued to worsen and repeat imaging appeared more consistent with neoplasm. Workup done at [place] was extensive with neurosurgery, ID and oncology involved. Infectious workup notable for CMV IgM negative, CMV IgG positive suggests this is prior infection. Toxoplasma IgG negative. Serum cryptococcal antigen negative. T spot negative. The patient clinical presentation and neuroimaging is not consistent with PML. Repeat LP (4/18)-- with no pleocytosis only 2 CSF WBC, CSF protein 98, CSF glucose 50. Albuminocytological dissociation with rise in CSF protein. Meningitis encephalitis panel negative. 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 Neurosurgery here unable to perform biopsy but high volume LP done. Right now ID and oncology are discussing high dose steroids but it seems we will head that route.  - Neurosurgery consulted thanks for the recs  - biopsy not possible   - follow up LP results  - Oncology consulted thanks for the recs  - started 40 mg decadron daily 4/26 (will move up dose by 6 hours daily to get it to morning time)  - ID consulted thanks for the recs  - follow-up serum and CSF  studies  - Neurology consuelted  - agree with ID workup and steroids      Recent diagnosis of HIV/AIDS, untreated  Patient was recently diagnosed with HIV on March 28, 2024 with a viral load of 1.24 million and CD4 count of 2. ID consulted at [facility] and started on biktarvy.  - ID consulted thanks for the recs   - NG placed ART started 4/26 with dolutegravir, emtricitabine-tenofovir   - started bactrim for PJP ppx  - Patient at risk for IRIS but will possibly be on high dose steroids so presume this will help     Stimulant use (amphetamine)  On chart review patients stimulant use first noted on a tox screen at [facility] back in 2016. On further review it appears she primarily snorts amphetamines and denies IVDU. Due to her mental status unable to gather further history.  - continue to follow for support    COVID-19 infection, not symptomatic, tested positive for/18  MRSA pneumonia  Diagnosed at [facility] with sputum cultures on 4/19. ID here feel like MRSA pneumonia not very likely and ok to stop abx   - linezolid stopped     Maculopapular rash  Probably from antibiotic, improving    Dyslipidemia;  Discontinue Lipitor with poor oral intake     Hx SWETA  Has hx of SWETA. Has been treated with transfusions and IV iron in the past. I do not really see a workup as to the etiology of her SWETA but this can be deferred as she has more critical issues currently.  - colonoscopy as OP    Risk for hyperglycemia secondary to high dose steroids   Unknown endocrine history but given the dose of steroids she is likely to get hyperglycemia especially with TF.  - Started Q4H glucose checks plus LDISS  - discussed with pharmacy hyperglycemia from decadron longer than other steroids so will consider adding long acting insulin for coverage           Diet: NPO per Anesthesia Guidelines for Procedure/Surgery Except for: No Exceptions  Adult Formula Drip Feeding: Continuous Ifbyphone Standard 1.4; Nasogastric tube; Goal Rate: Start at  "10 mL/hr and advance by 10 mL/hr Q 8 hrs until goal of 55 mL/hr is reached.; mL/hr; Do not advance tube feeding rate unless K+ is = or > 3.0, Mg+...    DVT Prophylaxis: Pneumatic Compression Devices  Roy Catheter: Not present  Lines: None     Cardiac Monitoring: None  Code Status: Full Code      Clinically Significant Risk Factors                         # Overweight: Estimated body mass index is 28 kg/m  as calculated from the following:    Height as of this encounter: 1.626 m (5' 4\").    Weight as of this encounter: 74 kg (163 lb 2.3 oz)., PRESENT ON ADMISSION            Disposition Plan     Medically Ready for Discharge: Anticipated in 5+ Days             Russel Boothe DO  Hospitalist Service, [facility] TEAM [address] [place] [address]  Securely message with gopogo (more info)  Text page via [employer] Paging/Directory   See signed in provider for up to date coverage information  ______________________________________________________________________    Interval History     Started decadron 40 mg daily last night as well as ART. Patient appeared improved on neurosurgical exam, for me she was still pretty lethargic but was nodding. I suspect if this is CNS lymphoma as we believe she will continue to improve. We are now monitoring her blood sugars closely as well.      Physical Exam   Vital Signs: Temp: 98  F (36.7  C) Temp src: Axillary BP: 128/82 Pulse: 68   Resp: 16 SpO2: 90 % O2 Device: None (Room air)    Weight: 163 lbs 2.25 oz    General Appearance:  In bed, mitts in place   Respiratory: breathing comfortably on room air   Cardiovascular: RRR  GI: NTND  Skin: no rashes on exposed skin   Other:  Encephalopathic but nodding yes and no    Medical Decision Making       55 MINUTES SPENT BY ME on the date of service doing chart review, history, exam, documentation & further activities per the note.      Data     I have personally reviewed the following data over the past 24 hrs:    4.9  \   " 11.5 (L)   / 468 (H)     140 108 (H) 7.4 /  178 (H)   3.5 20 (L) 0.48 (L) \     Ferritin:  N/A % Retic:  N/A LDH:  N/A       Imaging results reviewed over the past 24 hrs:   Recent Results (from the past 24 hour(s))   XR Abdomen Port 1 View    Narrative    EXAMINATION:  XR ABDOMEN PORT 1 VIEW 4/26/2024 5:09 PM     COMPARISON: CT 4/25/2024.    HISTORY: feeding tube placement.    TECHNIQUE: Frontal view of the abdomen.    FINDINGS: Feeding tube tip projects over the proximal jejunum in  patient with history of gastric bypass seen on prior CT. No abnormally  dilated loops of bowel.  No pneumatosis or portal venous gas.        Impression    IMPRESSION: Feeding tube tip projects over the expected location of  proximal jejunum in this patient with history of gastric bypass.    I have personally reviewed the examination and initial interpretation  and I agree with the findings.    TONI PALACIOS MD         SYSTEM ID:  [id number]

## 2024-04-28 NOTE — PLAN OF CARE
Goal Outcome Evaluation:    Status: Brain mass, Covid & HIV positive  Vitals: VSS, on RA  Neuros: lethargic, oriented to self and place, inconsistent with commands. R facial droop, slurred, soft, difficult to understand speech. Strength Left 3/5, Right 2/5  IV: PIV infusing TKO  Labs/Electrolytes: ACHS BG checks, creatinine 0.47  Diet: Strict NPO, NG-NJ tube inusing Tube feed @ 40 cc/hr, advancing by 10 mL q8h to goal 55 ml/hr. Advance feeding to 50 mL/hr at 11:30AM. Water flush 90 cc q4h  Bowel status: 04/27, incontinent   : Incontinent, Purewick in place, bladder scan at 6AM for 361 mL   Skin: LP site ANIA. R hand edema& redness, blanchable redness coccyx and back  Pain: no signs of pain present  Activity: assist 2 lift, repo Q2hr, last repo at 6AM   Plan: Continue to monitor and follow POC

## 2024-04-28 NOTE — PROGRESS NOTES
"[facility]  Neurology Progress Note    Patient Name:  Kamilah Goldberg  MRN:  [id number]    :  1980  Date of Admission:  2024  Date of Service:  2024  Hospital Day: 5     Interval History/24-hour Events   Interval Events:  -No acute events overnight.    Today's Changes:  -Ophthalmology evaluation in progress. No signs of ocular lymphoma per initial exam. They will re-examine in 1 week.     Assessment & Plan    Kamilah Goldberg is a 43 year old female with new HIV diagnosis & CNS lesion (3/2024) who presented to OSH 2024 with weakness, diplopia, dysarthria & found to have increasing size of lesions into her LEFT midbrain and avel. Transferred to [facility] for consideration of brain biopsy for CNS lymphoma on 2024. Hospitalization c/b complicated by MRSA pneumonia, COVID infection and encephalopathy.    CNS lymphoma highest on differential though infection cannot be entirely ruled out with hx of AIDS (CD4 = 2). No biopsy site accessible at this moment. Ideally would have positive cytology/cytometry to confirm diagnosis before starting high dose methotrexate. But given continued expansion of lesion per most recent MRI () and completion of repeat LP (), initial therapy with Dexamethasone initiated per Heme/Once team recommendations.    Per serial, multi-team exams, her symptoms have some waxing/waning mentation. RIGHT hemiparesis in face, arm and leg & LEFT CN6 palsy appeared resolved.    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  -Will follow Ophthalmology examinations  -Repeat MRI Brain wwo contrast in 1 week from last MRI or on Tuesday, 4/30/2024.    Neurology will continue to follow.    Thank you for involving Neurology in the care of Kamilah Goldberg.  Please do not hesitate to call with questions/concerns (consult pager [phone]).      This patient was discussed with the neurology attending faculty, Dr. Cristofer Brooks.    Jabari Salcedo DO  Neurology Resident PGY3  04/28/2024  Neurology Pager: [phone]        Physical Exam   Temp:  [97.6  F (36.4  C)-98.3  F (36.8  C)] 97.7  F (36.5  C)  Pulse:  [63-95] 63  Resp:  [14-16] 16  BP: (109-145)/(65-90) 109/75  SpO2:  [91 %-97 %] 97 %    I/O last 3 completed shifts:  In: 330 [NG/GT:270]  Out: 200 [Emesis/NG output:200]     General Exam  General:  patient lying in bed without any acute distress    HEENT:  normocephalic/atraumatic  Cardio:  appears well perfused  Pulmonary:  stable on RA  Skin:  warm/dry bilateral soft mitts UE present    Neuro Exam  Mental Status:  Lethargic, opens eyes to voice & makes eye contact. Mouth some words, no sound appreciated. Nods yes when asked \"Are you Kamilah?\" Follows some simple commands like giving thumbs up with RIGHT hand or wiggling toes but otherwise very limited cooperation/comprehension. Per RN, will answer orientation questions correctly when given options.   Cranial Nerves:  Symmetrical facies, PERRL, EOMI grossly intact (exam limited by mental status).  Motor: No abnormal movements at rest, move all limbs spontaneously in plane of bed, (exam limited by mental status).  Reflexes:   Toes upgoing bilaterally  Sensory:   Withdraws to noxious x4 extremities  Coordination:   THOR due to mental status  Station/Gait:  THOR     " "Investigations   Labs  BMP  Recent Labs   Lab 04/28/24  0621 04/27/24  0724 04/26/24 0615 04/24/24 2050    140 143 137   POTASSIUM 3.8 3.5 3.6 4.2   CHLORIDE 109* 108* 110* 105   CO2 21* 20* 23 20*   BUN 12.7 7.4 10.7 9.4   CR 0.47* 0.48* 0.54 0.46*   MAUREEN 9.2 9.1 9.1 9.3       Liver Panel  Recent Labs   Lab 04/24/24 2050   PROTTOTAL 9.1*   ALBUMIN 3.7   BILITOTAL 0.3   ALKPHOS 98   AST 37   ALT 25       CBC  Recent Labs   Lab 04/28/24  0621 04/27/24  0724 04/26/24 0615 04/24/24 2050   WBC 7.9 4.9 5.4 6.7  6.7   HGB 10.9* 11.5* 10.5* 11.3*  11.3*   * 468* 480* 450  450       COAGS  Recent Labs   Lab 04/24/24 2050   INR 0.98   PTT 28       ABG  No results for input(s): \"PH\", \"PCO2\", \"PO2\", \"HCO3\" in the last 168 hours.    CRP/ESR  No results for input(s): \"CRP\" in the last 168 hours.    Invalid input(s): \"ESR\"    CSF  Recent Labs   Lab 04/26/24  1116 04/26/24  1115   CGLU 52  --    CTP  --  109.0*       MICRO  No results for input(s): \"CULT\" in the last 168 hours.    LIPIDS  No results for input(s): \"CHOL\", \"HDL\", \"LDL\", \"TRIG\", \"CHOLHDLRATIO\" in the last 78129 hours.    A1C    Recent Labs   Lab Test 04/27/24 0724   A1C 5.7*       Imaging  I have personally reviewed most recent and pertinent labs, tests, and radiological images; relevant findings per HPI.    CT Chest/Abdomen/Pelvis w Contrast  Narrative: EXAMINATION: CT CHEST/ABDOMEN/PELVIS W CONTRAST, 4/25/2024 11:07 PM    TECHNIQUE:  Helical CT images from the thoracic inlet through the  symphysis pubis were obtained with contrast.     CONTRAST: 107ml isovue 370    COMPARISON: Chest CT 1/16/2023; CT abdomen/pelvis 4/10/2019    HISTORY: Patient with HIV, and new brain lesion ? CNS lymphoma- assess  other lesions    FINDINGS:    Chest:     Mild motion related artifacts.    Mediastinum: Visualized thyroid gland is unremarkable. Heart size is  within normal limits. No pericardial effusion. No central pulmonary  thromboembolism on this nondedicated " study. Prominent bilateral  axillary lymph nodes, decreased in size from prior CT from 1/16/2023.  Small hiatal hernia. Enlarged precarinal lymph node measuring 15 x 13  mm (3/49). Subcentimeter right hilar lymph node (3/57). Prominent  right subpectoral node measuring 6 mm, previously 8 mm (3/33).    Lungs: The trachea and central airways are patent. Patchy  consolidative density seen in the posterior right upper lobe abutting  the superior oblique fissure (9/88), pleural-based confluent density  in the right lower lobe measuring 1 cm (9/185). Confluent  consolidative density in the left lung base. Mild patchy groundglass  densities in the superior left lower lobe (9/152). No pneumothorax or  pleural effusion.    Abdomen and pelvis:    Mild motion related artifacts.    Liver: Too small to characterize hypodensity in the right lobe of  liver (segment 7) measuring 7 mm, not conspicuous on prior CT from  2019 (3/100). Several are too small to visualize hepatic hypodensity  in the hepatic dome (3/99). No intrahepatic biliary ductal dilatation.  Adrenal glands are within normal limits. No focal pancreatic mass or  pancreatic ductal dilatation. Spleen is not enlarged. No focal splenic  lesion.    Postsurgical changes of the stomach. No biliary ductal dilatation.  Gallbladder is surgically absent. Symmetric enhancement of the  kidneys. No focal suspicious renal lesion. Prominent parametrial  vessels. No adnexal mass. Few subcentimeter bilateral inguinal lymph  nodes. Urinary bladder is partially distended and grossly  unremarkable. Mild scoliotic curvature of the spine. Asymmetric  bulkiness of the right obturator internus with apparent increased  enhancement (3/271 and 7/64). Appendix is within normal limits. Mild  colonic diverticulosis. No high-grade bowel obstruction. No  pneumoperitoneum or significant free fluid. No bulky new pelvic or  retroperitoneal lymphadenopathy. Subcentimeter right external iliac  lymph node  (5/519), similar to prior.     Bones and soft tissues: No acute hydrocephalus osseous abnormality.  Tiny fat-containing umbilical hernia.  Impression: IMPRESSION:     1. Mildly enlarged precarinal lymph node. Subcentimeter axillary lymph  nodes decreased in size compared to prior CT from 1/16/2023.  Subcentimeter right hilar lymph node. Consider attention on follow-up    2. Right upper lobe and left lower lobe confluent densities, which may  represent atelectasis; however, superimposed infection cannot be  excluded. Right lower lobe pleural-based nodular centimeter size  density indeterminate. Recommend attention on follow-up.    3. Too small to characterize subcentimeter hypodensity in segment 7 of  the liver, indeterminate, new compared to prior CT from 4/10/2019.  Recommend attention on short-term follow-up     4. Asymmetric bulky appearing right obturator internus muscle with  apparent increased internal enhancement, indeterminate, underlying  lesion/inflammation not excluded; recommend attention on short-term  follow-up or contrast enhanced MRI pelvis as clinical desired.    5. Few prominent inguinal lymph nodes, possibly reactive; otherwise no  significantly enlarged retroperitoneal or pelvic lymph node.    [Recommend Follow Up: Hepatic hypodensity, right lung lower lobe  pleural-based nodule, asymmetric bulky right obturator internus.]    This report will be copied to the [facility] to ensure a  provider acknowledges the finding. Access Center is available Monday  through Friday 8am-3:30 pm.    I have personally reviewed the examination and initial interpretation  and I agree with the findings.    TONI PALACIOS MD         SYSTEM ID:  [id number]

## 2024-04-28 NOTE — PROGRESS NOTES
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    Rare atypical cells noted on cytology from 04/26 LP, but without correlating results from flow cytometry. She started 40 mg dexamethasone daily 04/26.  Based on imaging and positive EBV PCR in CSF from 04/18 (confirmed by [facility]) empiric high-dose methotrexate to be considered in coming days as response to current treatment is assessed and additional data are obtained. She now has an NGT in place and is receiving tenofovir-emtricitabine and dolutegravir.    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    Jonas Goldstein  PGY4  Hem/Onc/BMT  Formerly [facility]  [phone]      Subjective    Subjective & Interval History:      Objective   Physical Exam:    /76 (BP Location: Left arm, Patient Position: Semi-Cline's, Cuff Size: Adult Regular)   Pulse 92   Temp 98  F (36.7  C) (Axillary)   Resp 16   Ht  "1.626 m (5' 4\")   Wt 74 kg (163 lb 2.3 oz)   SpO2 99%   BMI 28.00 kg/m    Blood pressure 120/76, pulse 92, temperature 98  F (36.7  C), temperature source Axillary, resp. rate 16, height 1.626 m (5' 4\"), weight 74 kg (163 lb 2.3 oz), SpO2 99%.  Physical Exam    Simple commands are followed. Voices/mouths answers to simple questions.  NGT in place.  Breathing comfortably.  No LE edema    Labs & Studies: I personally reviewed the following studies:  ROUTINE LABS (Last four results):  CMP  Recent Labs   Lab 04/28/24  1252 04/28/24  0929 04/28/24  0621 04/28/24  0417 04/27/24  0925 04/27/24  0724 04/26/24  0817 04/26/24  0615 04/25/24  2201 04/24/24  2050   NA  --   --  141  --   --  140  --  143  --  137   POTASSIUM  --   --  3.8  --   --  3.5  --  3.6  --  4.2   CHLORIDE  --   --  109*  --   --  108*  --  110*  --  105   CO2  --   --  21*  --   --  20*  --  23  --  20*   ANIONGAP  --   --  11  --   --  12  --  10  --  12   * 137* 154* 150*   < > 178*   < > 94   < > 133*   BUN  --   --  12.7  --   --  7.4  --  10.7  --  9.4   CR  --   --  0.47*  --   --  0.48*  --  0.54  --  0.46*   GFRESTIMATED  --   --  >90  --   --  >90  --  >90  --  >90   MAUREEN  --   --  9.2  --   --  9.1  --  9.1  --  9.3   MAG  --   --  2.2  --   --  1.9  --  2.1  --  2.2   PHOS  --   --  4.0  --   --  4.1  --  4.2  --  4.1   PROTTOTAL  --   --   --   --   --   --   --   --   --  9.1*   ALBUMIN  --   --   --   --   --   --   --   --   --  3.7   BILITOTAL  --   --   --   --   --   --   --   --   --  0.3   ALKPHOS  --   --   --   --   --   --   --   --   --  98   AST  --   --   --   --   --   --   --   --   --  37   ALT  --   --   --   --   --   --   --   --   --  25    < > = values in this interval not displayed.     CBC  Recent Labs   Lab 04/28/24  0621 04/27/24  0724 04/26/24  0615 04/24/24 2050   WBC 7.9 4.9 5.4 6.7  6.7   RBC 3.68* 3.92 3.70* 3.95  3.95   HGB 10.9* 11.5* 10.5* 11.3*  11.3*   HCT 32.1* 34.2* 32.6* 34.2*  34.2* "   MCV 87 87 88 87  87   MCH 29.6 29.3 28.4 28.6  28.6   MCHC 34.0 33.6 32.2 33.0  33.0   RDW 14.5 14.3 14.5 14.3  14.3   * 468* 480* 450  450     INR  Recent Labs   Lab 04/24/24 2050   INR 0.98       Medications list for reference:  Current Facility-Administered Medications   Medication Dose Route Frequency Provider Last Rate Last Admin    acetaminophen (TYLENOL) tablet 650 mg  650 mg Oral or Feeding Tube Q4H PRN Russel Boothe DO        Or    acetaminophen (TYLENOL) Suppository 650 mg  650 mg Rectal Q4H PRN Russel Boothe DO        atorvastatin (LIPITOR) tablet 40 mg  40 mg Oral or Feeding Tube Daily Russel Boothe DO   40 mg at 04/28/24 0912    dexAMETHasone (DECADRON) 40 mg in sodium chloride 0.9 % 59 mL intermittent infusion  40 mg Intravenous Q24H Russel Boothe  mL/hr at 04/28/24 0912 40 mg at 04/28/24 0912    dextrose 10% infusion   Intravenous Continuous PRN Russel Boothe DO        glucose gel 15-30 g  15-30 g Oral Q15 Min PRN Octavio Titus MD        Or    dextrose 50 % injection 25-50 mL  25-50 mL Intravenous Q15 Min PROctavio Fernandez MD   25 mL at 04/26/24 0819    Or    glucagon injection 1 mg  1 mg Subcutaneous Q15 Min PRN Octavio Titus MD        dolutegravir (TIVICAY) tablet 50 mg  50 mg Oral or NG Tube Daily Ramesh Van MD   50 mg at 04/27/24 2226    emtricitabine-tenofovir (TRUVADA) 200-300 MG per tablet 1 tablet  1 tablet Oral or NG Tube Daily Ramesh Van MD   1 tablet at 04/27/24 2029    enoxaparin ANTICOAGULANT (LOVENOX) injection 40 mg  40 mg Subcutaneous Q24H Russel Boothe DO   40 mg at 04/28/24 0912    gabapentin (NEURONTIN) solution 100 mg  100 mg Oral or Feeding Tube Q12H [facility] (08/20) Russel Boothe DO   100 mg at 04/28/24 0912    insulin aspart (NovoLOG) injection (RAPID ACTING)  1-4 Units Subcutaneous Q4H Russel Boothe DO   1 Units at 04/28/24 0423    melatonin tablet 3 mg  3 mg Oral or Feeding Tube At Bedtime PRN Russel Boothe DO        ondansetron  (ZOFRAN) tablet 4 mg  4 mg Oral or Feeding Tube Q6H PRN Russel Boothe, DO        sennosides (SENOKOT) tablet 8.6 mg  8.6 mg Oral or Feeding Tube BID PRN Russel Boothe, DO        sulfamethoxazole-trimethoprim (BACTRIM/SEPTRA) suspension 80 mg  10 mL Oral or Feeding Tube Daily Russel Boothe, DO   80 mg at 04/27/24 2030    thiamine (B-1) tablet 100 mg  100 mg Oral or Feeding Tube Daily Russel Boothe DO   100 mg at 04/27/24 2149       Imaging:  XR Abdomen Port 1 View   Final Result   IMPRESSION: Feeding tube tip projects over the expected location of   proximal jejunum in this patient with history of gastric bypass.      I have personally reviewed the examination and initial interpretation   and I agree with the findings.      TONI PALACIOS MD            SYSTEM ID:  [id number]      CT Chest/Abdomen/Pelvis w Contrast   Final Result   IMPRESSION:       1. Mildly enlarged precarinal lymph node. Subcentimeter axillary lymph   nodes decreased in size compared to prior CT from 1/16/2023.   Subcentimeter right hilar lymph node. Consider attention on follow-up      2. Right upper lobe and left lower lobe confluent densities, which may   represent atelectasis; however, superimposed infection cannot be   excluded. Right lower lobe pleural-based nodular centimeter size   density indeterminate. Recommend attention on follow-up.      3. Too small to characterize subcentimeter hypodensity in segment 7 of   the liver, indeterminate, new compared to prior CT from 4/10/2019.   Recommend attention on short-term follow-up       4. Asymmetric bulky appearing right obturator internus muscle with   apparent increased internal enhancement, indeterminate, underlying   lesion/inflammation not excluded; recommend attention on short-term   follow-up or contrast enhanced MRI pelvis as clinical desired.      5. Few prominent inguinal lymph nodes, possibly reactive; otherwise no   significantly enlarged retroperitoneal or pelvic lymph node.             [Recommend Follow Up: Hepatic hypodensity, right lung lower lobe   pleural-based nodule, asymmetric bulky right obturator internus.]      This report will be copied to the [facility] to ensure a   provider acknowledges the finding. Access Center is available Monday   through Friday 8am-3:30 pm.            I have personally reviewed the examination and initial interpretation   and I agree with the findings.      TONI PALACIOS MD            SYSTEM ID:  [id number]      CT Soft Tissue Neck w Contrast   Final Result   IMPRESSION: No mass or abnormally enlarged cervical lymph nodes in the   neck. Multiple dental caries. 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      I have personally reviewed the examination and initial interpretation   and I agree with the findings.      CLARY PHAM MD            SYSTEM ID:  [id number]      XR Video Swallow with SLP or OT    (Results Pending)   IR Lumbar Puncture    (Results Pending)

## 2024-04-28 NOTE — CONSULTS
OPHTHALMOLOGY CONSULT NOTE  04/28/24    Patient: Kamilah Goldberg  Consulted by: Medicine  Reason for Consult: rule out ocular lymphoma    HISTORY OF PRESENTING ILLNESS:     Kamilah Goldberg is a 44 year old female with history of stimulant disorder, pontine stroke, and new diagnosis of HIV who was admitted for altered mental status and 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 However, unable to perform biopsy due to location in brainstem. Patient currently on high dose steroids. Ophthalmology was consulted to rule out any ocular involvement of lymphoma.     Patient alert and oriented to self only, unable to obtain history from patient. Per primary team and nursing team, patient with waxing and waning mental status.       Review of systems were otherwise negative except for that which has been stated above.      OCULAR/MEDICAL/SURGICAL HISTORIES:     Past Ocular History:  Last eye exam: Unknown  Prior eye surgery/laser: Unknown  Contact lens wear: Unknown  Glasses: Unknown  Eyedrops: Unknown    Family History:  Unable to obtain ocular FH    Social History:  Social History     Tobacco Use    Smoking status: Never    Smokeless tobacco: Never   Substance Use Topics    Alcohol use: Yes     Comment: occasional    Drug use: No         Past Medical History:   Diagnosis Date    Back pain     Depression since age 16    On Wellbutrin 100 mg BID--started taking meds at age 16, was on Paxil for several years and then switched to Wellbutrin in [id number]    UTI (lower urinary tract infection)     X 3 as of 12/8/09       Past Surgical History:   Procedure Laterality Date    BYPASS GASTRIC CARA-EN-Y, LIVER BIOPSY, COMBINED      CHOLECYSTECTOMY      CRYOCAUTERY OF CERVIX  2001 2001    DISCECTOMY LUMBAR MINIMALLY INVASIVE ONE LEVEL  5/24/2013    Procedure: DISCECTOMY LUMBAR MINIMALLY INVASIVE ONE LEVEL;  MIS Right side L5-S1 Gonzalo Lami Microdiscectomy, ;  Surgeon: Alba Anderson MD;  Location: [facility] OR    THORACOTOMY Right 1/20/2023     Procedure: RIGHT THORACOTOMY,  TOTAL DECORTICATION OF THE RIGHT LUNG AND PARIETAL PLEURECTOMY;  Surgeon: Tani Murcia MD;  Location: [facility] OR    TUBAL LIGATION      2012       EXAMINATION:     Base Eye Exam       Visual Acuity    At least count fingers but unable to participate due to mental status              Tonometry (Tonopen, 9:34 AM)         Right Left    Pressure 17 15              Pupils         Dark Light Shape React APD    Right 4 2 Round Brisk None    Left 4 2 Round Brisk None              Visual Fields    Unable to follow commands              Extraocular Movement    Unable to follow commands              Neuro/Psych    Aox1 to self only              Dilation       Both eyes: 2.5% Deepak Synephrine, 1.0% Mydriacyl @ 9:34 AM                  Slit Lamp and Fundus Exam       External Exam         Right Left    External Normal Normal              Slit Lamp Exam         Right Left    Lids/Lashes Normal Normal    Conjunctiva/Sclera White and quiet White and quiet    Cornea Clear Clear    Anterior Chamber Deep and quiet Deep and quiet    Iris Round and reactive Round and reactive    Lens Clear Clear    Anterior Vitreous Normal Normal              Fundus Exam         Right Left    Disc Normal Normal    C/D Ratio 0.4 0.5    Macula single cotton wool spot, around 3 diameters of venule on the inferior arcade Normal    Vessels Normal Normal    Periphery Normal Normal    Limited peripheral view as patient is unable to participate in exam.                    Labs/Studies/Imaging Performed:  CD4 - 2  CMV IgG- positive  CMV IgM negative  Toxo - negative   CMV viral load - pending   Fungal blood culture - pending  Bacterial blood culture - pending      ASSESSMENT/PLAN:     Kamilah Goldberg is a 44 year old female who presents with     # Rule out ocular lymphoma  - No evidence of ocular lymphoma on bedside exam   - No obvious evidence of anterior chamber inflammation, no posterior synechiae, pupils round and  reactive, no KP, no vitreous opacities, no subretinal lesions     # Cotton wool spot, right eye  - Single CWS in right eye, most likely in setting of acute illness  - However, given significant comorbidities, CD4 count of 2 and infectious panels pending, will repeat dilated fundus exam in 1 week to rule out early signs of fungal ocular infection     RECOMMENDATIONS:  - No acute intervention at this time  - Ophthalmology will repeat dilated exam in 1 week; please page on-call resident with any questions or concerns     It is our pleasure to participate in this patient's care and treatment. Please contact us with any further questions or concerns.      Darlene Vazquez MD  Resident Physician, PGY-2  Department of Ophthalmology

## 2024-04-28 NOTE — PLAN OF CARE
"Goal Outcome Evaluation:      Plan of Care Reviewed With: patient    Overall Patient Progress: no changeOverall Patient Progress: no change         Status: Brain mass, Covid & HIV positive     Vitals: /75 (BP Location: Left arm)   Pulse 81   Temp 96.8  F (36  C) (Axillary)   Resp 16   Ht 1.626 m (5' 4\")   Wt 74 kg (163 lb 2.3 oz)   SpO2 97%   BMI 28.00 kg/m         Neuros: Alert. Continued difficulty to assess orientation. Pt is lethargic and inconsistent with commands. L eye has no lateral gaze, R facial droop, slurred speech. Strength Left 3/5, Right 2/5  IV:  PIV SL  Labs/Electrolytes: BG Q4 ranging from 130-145 today  Resp: RA  Diet: Strict NPO, NG-NJ tube infusing Tube feed @ 50cc/hr from 1330hrs, advancing by 5cc  left q8h to goal 55cc. Water flush 90 cc q4h. Speech therapy recommended intermittent swabbing of mouth w/mouthwash. Swabbed mouth x2  Bowel status: 04/26  : Incontinent of urine x1, New pure-wick in place and perineal cares done  Skin: R hand edema & redness, LP site ANIA.   Pain: Denies, nods no to pain.  Activity: assist 2 lift, repo Q2  Plan: Pt getting transferred to  this devyn. Continue to monitor, repo Q2,  restraints mitts discontinued last night.    "

## 2024-04-28 NOTE — PROGRESS NOTES
[facility]    Medicine Progress Note - Hospitalist Service, GOLD TEAM 7    Date of Admission:  4/24/2024    Assessment & Plan   Kamilah Goldberg is a 43 year old female w/ a hx of stimulant use disorder, SWETA, pontine stroke, emypema s/p vats and newly diagnosed HIV admitted on who was transferred to [facility] for neurosurgical evaluation and 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 Neurosurgery here unable to perform biopsy but high volume LP done and oncology agreeable to starting high dose steroids. Patient is now being transferred to the medicine service.     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 However this continued to worsen and repeat imaging appeared more consistent with neoplasm. Workup done at [facility] was extensive with neurosurgery, ID and oncology involved. Infectious workup notable for CMV IgM negative, CMV IgG positive suggests this is prior infection. Toxoplasma IgG negative. Serum cryptococcal antigen negative. T spot negative. The patient clinical presentation and neuroimaging is not consistent with PML. Repeat LP (4/18)-- with no pleocytosis only 2 CSF WBC, CSF protein 98, CSF glucose 50. Albuminocytological dissociation with rise in CSF protein. Meningitis encephalitis panel negative. 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 Neurosurgery here unable to perform biopsy but high volume LP done; so far all infectious studies negative. Rare atypical cells noted on cytology from 04/26 LP, but without correlating results from flow cytometry   - Neurosurgery consulted thanks for the recs  - biopsy not possible   - follow up LP results  - Oncology consulted thanks for the recs  - per NSG ID and onc discussing high dose steroids  - ID consulted thanks for the recs  - follow-up serum  CrAg, urine histo Ag, AFB blood culture, hepatitis serology   - Follow up CSF routine cell counts, protein, glucose, gram stain + aerobic bacterial cultures, fungal culture, and AFB stain + culture along with meningitis/encephalitis panel plus cryptococcal Ag, toxoplasma PCR, and RENEA virus PCR  - Neurology consuelted thanks for the recs   - agree with ID workup and steroids   -Repeat MRI Brain wwo contrast in 1 week from last MRI or on Tuesday, 4/30/2024.   - Ophthalmology consulted thanks for the recs  - no sign of ocular lymphoma will repeat in one week      Recent diagnosis of HIV/AIDS  Patient was recently diagnosed with HIV on March 28, 2024 with a viral load of 1.24 million and CD4 count of 2. ID consulted at [facility] and started on biktarvy.  - ID consulted thanks for the recs   - continue ART with dolutegravir, emtricitabine-tenofovir   - continue bactrim for PJP ppx   - per ID would switch to atovaquone for PJP ppx if starting methotrexate  - Patient at risk for IRIS but will possibly be on high dose steroids so presume this will help     Stimulant use (amphetamine)  On chart review patients stimulant use first noted on a tox screen at [facility] back in 2016. On further review it appears she primarily snorts amphetamines and denies IVDU. Due to her mental status unable to gather further history.  - continue to follow for support    COVID-19 infection, not symptomatic, tested positive for/18  MRSA pneumonia  Diagnosed at [facility] with sputum cultures on 4/19. ID here feel like MRSA pneumonia not very likely and ok to stop abx   - linezolid stopped     Maculopapular rash  Probably from antibiotic, improving    Dyslipidemia;  Discontinue Lipitor with poor oral intake     Hx SWETA  Has hx of SWETA. Has been treated with transfusions and IV iron in the past. I do not really see a workup as to the etiology of her SWETA but this can be deferred as she has more critical issues currently.  - colonoscopy as OP         "  Diet: NPO per Anesthesia Guidelines for Procedure/Surgery Except for: No Exceptions  Adult Formula Drip Feeding: Continuous [employer] Standard 1.4; Nasogastric tube; Goal Rate: Start at 10 mL/hr and advance by 10 mL/hr Q 8 hrs until goal of 55 mL/hr is reached.; mL/hr; Do not advance tube feeding rate unless K+ is = or > 3.0, Mg+...    DVT Prophylaxis: Pneumatic Compression Devices  Roy Catheter: Not present  Lines: None     Cardiac Monitoring: None  Code Status: Full Code      Clinically Significant Risk Factors                         # Overweight: Estimated body mass index is 28 kg/m  as calculated from the following:    Height as of this encounter: 1.626 m (5' 4\").    Weight as of this encounter: 74 kg (163 lb 2.3 oz)., PRESENT ON ADMISSION            Disposition Plan     Medically Ready for Discharge: Anticipated in 5+ Days             Russel Boothe DO  Hospitalist Service, GOLD TEAM 7  [facility]  Securely message with [employer] (more info)  Text page via [employer] Paging/Directory   See signed in provider for up to date coverage information  ______________________________________________________________________    Interval History     No acute events overnight. Sugars have remained stable. She continues to be pretty encephalopathic. She was able to nod yes and do, and was trying to answer my questions but was slurred. Plan relatively unchanged today, continue steroids. Optho to see. Repeat brain MRI on the 30th.    Physical Exam   Vital Signs: Temp: 98.1  F (36.7  C) Temp src: Axillary BP: 109/75 Pulse: 63   Resp: 16 SpO2: 99 % O2 Device: None (Room air)    Weight: 163 lbs 2.25 oz    General Appearance:  In bed, mitts in place   Respiratory: breathing comfortably on room air   Cardiovascular: RRR  GI: NTND  Skin: no rashes on exposed skin   Other:  Encephalopathic     Medical Decision Making       55 MINUTES SPENT BY ME on the date of service doing chart review, " history, exam, documentation & further activities per the note.      Data     I have personally reviewed the following data over the past 24 hrs:    7.9  \   10.9 (L)   / 503 (H)     141 109 (H) 12.7 /  154 (H)   3.8 21 (L) 0.47 (L) \     TSH: N/A T4: N/A A1C: N/A       Imaging results reviewed over the past 24 hrs:   No results found for this or any previous visit (from the past 24 hour(s)).

## 2024-04-28 NOTE — PLAN OF CARE
"Orders received for PT evaluation. Per RN, ok to initiate on this date. Following assessment for command following, whiteboard with note \"flat until 330PM.\" Time spent reaching out to RN and primary MD teams regarding this being appropriate on this date or if this needed to be erased from recent LP on Friday, 4/26. Unable to clarify if pt needing to remain flat or can be mobilized up to recliner. PT will continue to check in and initiate as appropriate pending care conference and GOC.     Ernestine Etienne, PT.                         "

## 2024-04-28 NOTE — PROGRESS NOTES
"Care Management Follow Up    Length of Stay (days): 4    Expected Discharge Date:  TBD     Concerns to be Addressed: advanced care planning  Patient plan of care discussed at interdisciplinary rounds: No    Anticipated Discharge Disposition: Other (Comments) (TBD)     Anticipated Discharge Services: Other (see comment) (TBD)  Anticipated Discharge DME: Other (see comment) (TBD)    Patient/family educated on Medicare website which has current facility and service quality ratings: no  Education Provided on the Discharge Plan:  no  Patient/Family in Agreement with the Plan: unable to assess    Referrals Placed by CM/SW:  none  Private pay costs discussed: Not applicable    Additional Information:    SW consulted by MD regarding patient's NOK. Per chart review, patient's aunt (Noemy) is listed but MD heard that patient has a child that would be considered NOK.     SW attempted to meet with patient to get more information. Patient was unable to communicate or verbalize with SW during encounter. SW asked her if she could call her aunt; patient nodded yes.    SHAD called Noemy who appeared immediately frustrated on the phone. SHAD asked who patient's NOK may be; Noemy said it would be her 23 year old son, Yuan. Noemy states that Yuan is in denial regarding patient's present condition and that he'd \"pawn off\" NOK duties onto Noemy. SHAD explained that Yuan could certainly consult Noemy about decision making but the hospital would first contact Yuan since patient is unable to pursue a HCD at this time. Noemy expressed understanding and said if patient was able to appoint a HCA, she would choose Noemy. SHAD said she understood.    SHAD asked Noemy for uYan's information and Noemy provided it. Chart updated with Yuan's contact information. Noemy said she and Yuan will be at the hospital 9:30 tomorrow, 4/29 and requested a SW check in with them then. This SW will make a note for the weekday SW.    Care " management team to continue to follow.    Rosi Quintana, MSW  4/28/2024       Social Work and Care Management Department       SEARCHABLE in [facility] - search SOCIAL WORK       [facility] (0800 - 1630) Saturday and Sunday     Units: 4A Vocera, 4C Vocera, & 4E Vocera    Pager: [phone]     Units: 5A [id number] Vocera, 5A 8210-4957 Vocera , BMT SW 1 BMT SW 2, BMT SW 3 & BMT SW 4  5C Off Service 5401 - 5442  5C Off Service [id number] Pager: [phone]     Units: 6A Vocera & 6B Vocera  Pager: [phone]     Units: 6C Vocera Pager: [phone]     Units: 7A Vocera & 7B Vocera  Pager: [phone]     Units: 7C Med Surg 7401 thru 7418 and 7C Med Surg 7502 thru 7521  Pager: [phone]     Unit: [facility] ED Vocera & [facility] Obs Vocera Pager: [phone]      [facility] (5782-2119) Saturday and Sunday      Units: 5 Ortho Vocera, 5 Med Surg Vocera & WB ED Vocera Pager:[phone]     Units: 6 Med Surg Vocera, 8 Med Surg Vocera, & 10 ICU Vocera Pager: [phone]        After hours Vocera [facility] and After Hours Vocera [facility]     Saturday & Sunday (1630 - 0000)    Mon-Fri (0566-3885)     [facility] Recognized Holidays  ([id number])    Units: ALL  Pager: [phone]

## 2024-04-29 NOTE — PROGRESS NOTES
[facility]    Hematology / Oncology Progress Note  Hospital Day # 5  Date of Service (when I saw the patient): 04/29/2024     Assessment & Plan   Kamilah Goldberg is a 44 year old female who was admitted on 4/24/2024 for L avel/midbrain lesion concerning for CNS lymphoma and new HIV/AIDS diagnosis (3/28/24). PmHx of dyslipidemia, polysubstance abuse, and r-sided empyema (s/p VATS and partial pleurectomy Jan 2021). Upon review of labs and imaging from with positive EBV PCR from CSF. LP at [facility] x4/26 with rare atypical cells. Based on findings will plan with empiric HD-Methotrexate. Course further complicated by COVID positive and possible MRSA infection.     Today:  - Will anticipate starting HD-Methotrexate next 1-2 days. Awaiting PICC line placement. Will discontinue/hold bactrim and pantoprazole in anticipation of starting methotrexate. Will start famotidine 20 mg BID.   - Appreciate ID involvement; agree with discontinuing bactrim. Will plan to start dapsone or pentamidine pending G6PD results.  - Continue feeding through NGT.   - Neurology following; appreciate. Will plan to repeat MRI brain w/wo contrast to assess interval progression/improvement from previous scan done 4/23.   - Appreciate assistance from palliative care team.   - Will continue best supportive cares.     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 Previously hospitalized in March 2024 where imaging revealed restricted diffusion and T2 hyperintensity of osterior pontine mesencephalic junction with local mass effect. Felt to be likely ischemic infarct (risk factors of HTN, smoking and amphetamine use). Additional considerations included infection vs neoplasm. CSF cytology attempted, but insufficient quantity. MRI brain 4/15 showed increased in interval  of lesion of L avel and midbrain compared to previous and increase in interval again on 4/24/24 MRI. CT A/P with no lymphadenopathy, mass, or acute process. LP 4/18 non-diagnostic with negative flow cytometry and cytology but CSF EBV PCR positive (original uncertainty of blood vs. CSF PCR but able to confirm CSF by [facility]). ID, oncology, and neurosurgery consulted for concern of CNS lymphoma given context of imaging, worsening decline, and imaging. She was started on Decadron 8 mg and transferred to [facility] 4/24/24 for further workup and consideration of brain biopsy. Unable to obtain tissue biopsy given challenging location of brainstem, if repeat imaging shows expansion, then could consider. But with review of imaging with Neurosurgery, agree with outside impression of CNS lymphoma. CT CAP 4/25 showed no evidence of lymphoma. Repeat LP 4/26 with rare atypical cells on cytology but does not correlate with flow cytometry (rare polytypic B cells). LP also showing positive EBV PCR, elevated protein to 109, and negative CMV, meningitis/encephalitis panel.  Ophthalmology consulted; no evidence of ocular lymphoma.  Based on imaging and EBV + PCR from CSF will plan for HD-Methotrexate in coming 1-2 days.   - Infectious studies at [facility] thus far negative for CMV IgM negative (IgG positive suggesting previous infection), Cryptococcus, CMV encephalitis, Tb meningitis, toxoplasmosis serology. Continue to follow toxoplasmosis PCR and [facility].   - Follow CSF AFB culture (NGTD) and bacterial culture results from LP x4/26  - Continue with Dexamethasone 40 mg   - PICC line placed x4/29 in anticipation for HD-MTX in coming days.   - Repeat MRI brain w/wo contrast 1 week after previous imaging to assess interval, ordered for 4/30    #Anemia  #Thrombocytosis  Likely due to underlying suspected malignancy  - Transfuse for plts < 10k and Hgb , 7.0    Hx SWETA  Has hx of SWETA. Has been treated with transfusions and IV iron in the past.  Will defer workup at this time.     # ID Prophylaxis  - Bactrim for PJP ppx, held with initiation of methotrexate, will plan to start dapsone or pentamidine pending G6PD results.     ID  #HIV/AIDS  Recently diagnosed with HIV during admission at [facility] on 3/28/24 with viral load of 1.24 million, 6.9 log,  and CD4 count of 2. She was not started on ART, given Nystatin x2 weeks and referred to ID clinic for follow up. Prior to this, she was admitted again with ID consult for broad-infectious workup. Started Biktarvy 4/17 but stopped due concern for rash, rechallenged on 4/20 and able to tolerate. Transitioned to Dolutegravir and Truvada. Of note, patient's aunt (Noemy), and son (Yuan) are aware of diagnosis.   - Continue ART with Dolutegravir and Truvada   - Plan to repeat CD4 count and viral load circa x5/15     #COVID-19 positive  #MRSA pneumonia  Tested positive for COVID-19 4/18/24 during admission to [facility]. Patient remains asymptomatic. Will continue to monitor at this time. Previously on Linezolid. ID following; does not feel like MRSA pneumonia is likely and ok to stop Linezolid.     MISC  #Cotton wool spot, right eye  Single CWS in right eye, most likely in setting of acute illness. Found on assessment by ophthalmology x4/28. Given significant comorbidities, CD4 count of 2 and infectious panels pending, will repeat dilated fundus exam in 1 week to rule out early signs of fungal ocular infection     SOCIAL   #Medical decision maker- Confirmed with family that son (Yuan) will be patient's primary medical decision maker. Son Yuan and patient are okay with communication with aunt (Noemy) if Yuan is unavailable.   #HIV/AIDS Status - Son (Yuan) and aunt (Noemy) are aware of patient HIV status. Will plan to have conversation in coming days about history and past potential exposures.    #H/o stimulant/amphetamine use -Noted on [facility] tox screen in 2016. Patient previously reported  "snorting and declined IVDU.     Fluids/Electrolytes/Nutrition   - IVF per chemotherapy regimen  - PRN lyte replacement per standing protocol  - Regular diet as tolerated     Lines:    PPX  VTE: Lovenox  GI: Famotidine  Bowels: prn miralax and senna    Code: Full    Medically Ready for Discharge: Anticipated in 5+ Days    Disposition: Pending further workup, evaluation, tolerance of HD-methotrexate, and clinical course    I spent >90 minutes face-to-face and/or coordinating or discussing care plan. Over 50% of our time on the unit was spent counseling the patient and/or coordinating care    Patient is seen and examined by Dr. Harris and RILEY.  Assessment and plan are discussed and delivered to the patient.    Kandi Lambert PA-C  Hematology/Oncology  Pager: [phone]    Interval History   Nursing notes reviewed. No acute events overnight.    Kamilah is resting in bed with son (Yuan) and aunt (Noemy) supportive at bedside. Family had in depth conversation with medicine team regarding hospital workup. We discussed plan to start chemotherapy with methotrexate empirically given concern for CNS lymphoma. We reviewed potential toxicities, side effects, and monitoring. Family voices understanding. Kamilah shown able to communicate with blinking and some head movement/nodding. She indicated understanding with blinking when prompted to throughout conversation. Asked yes or no questions and asked to blink if \"yes\" for ROS. She denies nausea, abdominal pain, HA, chest pain, SOB, and cough.     Complete and Comprehensive review of systems review and negative other than noted here or in the HPI.     Physical Exam   Temp: 97.2  F (36.2  C) Temp src: Oral BP: 114/74 Pulse: 89   Resp: 16 SpO2: 96 % O2 Device: None (Room air)    Vitals:    04/24/24 2000 04/26/24 1534   Weight: 79 kg (174 lb 2.6 oz) 74 kg (163 lb 2.3 oz)     Vital Signs with Ranges  Temp:  [96.8  F (36  C)-98.5  F (36.9  C)] 97.2  F (36.2  C)  Pulse:  [69-92] " "89  Resp:  [16] 16  BP: (114-127)/(70-84) 114/74  SpO2:  [96 %-99 %] 96 %  I/O last 3 completed shifts:  In: 645 [NG/GT:270]  Out: -       Physical Exam:    Blood pressure 114/74, pulse 89, temperature 97.2  F (36.2  C), temperature source Oral, resp. rate 16, height 1.626 m (5' 4\"), weight 74 kg (163 lb 2.3 oz), SpO2 96%.    General: lying in bed. Nontoxic appearing. No acute distress.   HEENT: Normocephalic/ Sclera anicteric. Moist mucus membranes.   CV: RRR. Normal S1/S2. No m/r/g. No peripheral edema.   Resp: CTAB on anterior auscultation. No wheezing/crackles. Normal respiratory effort on room air. Breath sounds are equal throughout.   GI: Soft, non-tender, non-distended. Bowel sounds present and normoactive. No peritoneal signs. NGT in tact.   MSK: Warm and well-perfused. Normal tone.   Skin: Skin is clean, dry and intact. No rashes on limited exam. No jaundice. Soft mitts in place  Neuro: Opens eyes to voice and maintains eye contact. Able to communicate with blinking and nodding head. Blinks \"yes\" appropriately when asked orientation questions such as \"is this your son Yuan?\" when referring to son Yuan. Able to weakly squeeze fingers.       Medications   Current Facility-Administered Medications   Medication Dose Route Frequency Provider Last Rate Last Admin    dextrose 10% infusion   Intravenous Continuous PRN Russel Boothe DO         Current Facility-Administered Medications   Medication Dose Route Frequency Provider Last Rate Last Admin    atorvastatin (LIPITOR) tablet 40 mg  40 mg Oral or Feeding Tube Daily Russel Boothe DO   40 mg at 04/29/24 1213    dexAMETHasone (DECADRON) 40 mg in sodium chloride 0.9 % 59 mL intermittent infusion  40 mg Intravenous Q24H Russel Boothe  mL/hr at 04/29/24 0953 40 mg at 04/29/24 0953    dolutegravir (TIVICAY) tablet 50 mg  50 mg Oral or NG Tube Daily Ramesh Van MD   50 mg at 04/28/24 2214    emtricitabine-tenofovir (TRUVADA) 200-300 MG per tablet 1 " tablet  1 tablet Oral or NG Tube Daily Ramesh Van MD   1 tablet at 04/28/24 2056    enoxaparin ANTICOAGULANT (LOVENOX) injection 40 mg  40 mg Subcutaneous Q24H Kuross, Russel, DO   40 mg at 04/29/24 1007    gabapentin (NEURONTIN) solution 100 mg  100 mg Oral or Feeding Tube Q12H [facility] (08/20) Kuross, Russel, DO   100 mg at 04/29/24 1213    insulin aspart (NovoLOG) injection (RAPID ACTING)  1-4 Units Subcutaneous Q4H Kuross, Russel, DO   1 Units at 04/28/24 0423    thiamine (B-1) tablet 100 mg  100 mg Oral or Feeding Tube Daily Edmondoss, Russel, DO   100 mg at 04/28/24 2141       Data   Results for orders placed or performed during the hospital encounter of 04/24/24 (from the past 24 hour(s))   Glucose by meter   Result Value Ref Range    GLUCOSE BY METER POCT 136 (H) 70 - 99 mg/dL   Glucose by meter   Result Value Ref Range    GLUCOSE BY METER POCT 130 (H) 70 - 99 mg/dL   Glucose by meter   Result Value Ref Range    GLUCOSE BY METER POCT 142 (H) 70 - 99 mg/dL   Glucose by meter   Result Value Ref Range    GLUCOSE BY METER POCT 126 (H) 70 - 99 mg/dL   Glucose by meter   Result Value Ref Range    GLUCOSE BY METER POCT 117 (H) 70 - 99 mg/dL   Glucose by meter   Result Value Ref Range    GLUCOSE BY METER POCT 143 (H) 70 - 99 mg/dL   Phosphorus   Result Value Ref Range    Phosphorus 3.6 2.5 - 4.5 mg/dL   Magnesium   Result Value Ref Range    Magnesium 2.2 1.7 - 2.3 mg/dL   Basic metabolic panel   Result Value Ref Range    Sodium 142 135 - 145 mmol/L    Potassium 4.0 3.4 - 5.3 mmol/L    Chloride 109 (H) 98 - 107 mmol/L    Carbon Dioxide (CO2) 21 (L) 22 - 29 mmol/L    Anion Gap 12 7 - 15 mmol/L    Urea Nitrogen 17.1 6.0 - 20.0 mg/dL    Creatinine 0.49 (L) 0.51 - 0.95 mg/dL    GFR Estimate >90 >60 mL/min/1.73m2    Calcium 9.3 8.6 - 10.0 mg/dL    Glucose 149 (H) 70 - 99 mg/dL   CBC with platelets   Result Value Ref Range    WBC Count 9.7 4.0 - 11.0 10e3/uL    RBC Count 3.69 (L) 3.80 - 5.20 10e6/uL    Hemoglobin 10.5 (L) 11.7 -  15.7 g/dL    Hematocrit 32.7 (L) 35.0 - 47.0 %    MCV 89 78 - 100 fL    MCH 28.5 26.5 - 33.0 pg    MCHC 32.1 31.5 - 36.5 g/dL    RDW 14.6 10.0 - 15.0 %    Platelet Count 528 (H) 150 - 450 10e3/uL   Hepatic panel   Result Value Ref Range    Protein Total 7.6 6.4 - 8.3 g/dL    Albumin 3.5 3.5 - 5.2 g/dL    Bilirubin Total 0.3 <=1.2 mg/dL    Alkaline Phosphatase 74 40 - 150 U/L    AST 23 0 - 45 U/L    ALT 17 0 - 50 U/L    Bilirubin Direct <0.20 0.00 - 0.30 mg/dL   Glucose by meter   Result Value Ref Range    GLUCOSE BY METER POCT 128 (H) 70 - 99 mg/dL

## 2024-04-29 NOTE — PROGRESS NOTES
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 Patient has a history of stimulant use disorder, SWETA, pontine stroke, emypema s/p vats, HIV+.     I: Upon arrival to the unit patient was oriented to room, unit, and call light. Patient s height, weight, and vital signs were obtained. Allergies reviewed and allergy band applied. MD notified of patient s arrival on the unit. Adult AVS completed. Head to toe assessment completed. Full skin assessment completed with Horacio YADAV. Significant skin findings include redness in hands bilaterally. Education assessment completed. Care plan initiated.    A: Vital signs stable upon admission. Patient rates pain at N/A.    P: Continue to monitor patient s neuro status and intervene as needed. Continue with plan of care. Notify MD with any concerns or changes in patient status.

## 2024-04-29 NOTE — PROGRESS NOTES
Pt transferred to 5A with all belongings @ 4144    Report given to 5A VICENTA AVALOS, RN  [phone]

## 2024-04-29 NOTE — PROGRESS NOTES
[facility]  Neurology Progress Note    Patient Name:  Kamilah Goldberg  MRN:  [id number]    :  1980  Date of Admission:  2024  Date of Service:  2024  Hospital Day: 6     Interval History/24-hour Events   Interval Events:  -No acute events overnight.    Today's Changes:  - Plan for repeat MRI Brain to assess for radiographic progression vs improvement     Assessment & Plan    Kamilah Goldberg is a 43 year old female with new HIV diagnosis & CNS lesion (3/2024) who presented to OSH 2024 with weakness, diplopia, dysarthria & found to have increasing size of lesions into her L midbrain and avel. Transferred to [facility] for consideration of brain biopsy for CNS lymphoma on 2024. Hospitalization c/b complicated by MRSA pneumonia, COVID infection and encephalopathy.    Previous LP  without evidence of malignancy. No infectious source has been identified. Repeat CSF  with rare atypical cells, flow cytometry with 1.3% (18 events) polytypic B cells, with EBV +ve concerning for CNS lymphoma highest on differential though infection cannot be entirely ruled out with hx of AIDS (CD4 = 2). No biopsy site accessible at this moment. Ideally would have positive cytology/cytometry to confirm diagnosis before starting high dose methotrexate. But given continued expansion of lesion per most recent MRI () and completion of repeat LP (), initial therapy with Dexamethasone per Heme/Once team and plan to start methotrexate.    Per serial, multi-team exams, her symptoms have some waxing/waning mentation. Dysarthria, hypophonia,  R hemiparesis in face, arm and leg & L CN6 palsy remains. No consistent clinical improvement seen following steroids. Will be useful to to repeat MRI Brain with and without contrast to assess for radiographic improvement as that would potentially  favour lymphoma.    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  -Will follow Ophthalmology examinations  -Repeat MRI Brain wwo contrast on Tuesday, 4/30/2024.    Neurology will continue to follow.    Thank you for involving Neurology in the care of aKmilah Goldberg.  Please do not hesitate to call with questions/concerns (consult pager [phone]).      This patient was discussed with the neurology attending faculty, Dr. Cristofer Brooks.    Jabari Salcedo DO  Neurology Resident PGY3  04/29/2024  Neurology Pager: [phone]        Physical Exam   Temp:  [96.8  F (36  C)-98.5  F (36.9  C)] 97.2  F (36.2  C)  Pulse:  [69-92] 72  Resp:  [16] 16  BP: (114-127)/(70-84) 114/70  SpO2:  [96 %-99 %] 97 %    I/O last 3 completed shifts:  In: 645 [NG/GT:270]  Out: -      General Exam  General:  patient lying in bed without any acute distress    HEENT:  normocephalic/atraumatic  Cardio:  appears well perfused  Pulmonary:  stable on RA  Skin:  warm/dry bilateral soft mitts UE present    Neuro Exam  Mental Status:  Drowsy, opens eyes to voice & makes eye contact. Unable to answer orientation questions or attention and concentration questions. Mouth some words, no sound appreciated. Nods yes when asked \"Are you Kamilah?\" Follows 1 step commands like giving thumbs up with RIGHT hand or wiggling toes but otherwise very limited cooperation/comprehension.   Speech: " "Dysarthria, did not answer naming, repetition, reading tasks for speech today  Cranial Nerves: PERRLA, Unable to abduct L eye, L VI palsy and L PHILLIP present,  R lower facial droop on activation  Motor: No abnormal movements at rest, able to lift LUE and LLE without difficulty anti resistance, RUE and RLE with minimal movement no anti gravity movement seen  Reflexes:  2+ symmetric bicep, patellar, no hoffmans,  Toes upgoing bilaterally  Sensory:   Withdraws to noxious x4 extremities  Coordination:   THOR due to mental status  Station/Gait:  THOR     Investigations   Labs    04/27  Blastomyces pendng  Junior miscellaneous B cell    04/26  Cell count 3, RBC 44, Protein 109.0, colorless, G 52  Meningitis encephalitis -ve  Cryptococcal -ve  Cytology - Rare atypical cells  RENEA virus, toxo pending  Flow cytometry - 1.3% (18 events) polytypic B cells  EBV PCR detected  AFB not enough sample  Anaerobic, fungal -ve    04/18  Cells 2, RBC 4, Protein 98, Glucose 50  EBV DNA < 35  Flow cytometry  1. No malignant cells identified.  2. Flow cytometry: No atypical T- or B-cell populations detected.    BMP  Recent Labs   Lab 04/29/24  0431 04/28/24  0621 04/27/24  0724 04/26/24  0615    141 140 143   POTASSIUM 4.0 3.8 3.5 3.6   CHLORIDE 109* 109* 108* 110*   CO2 21* 21* 20* 23   BUN 17.1 12.7 7.4 10.7   CR 0.49* 0.47* 0.48* 0.54   MAUREEN 9.3 9.2 9.1 9.1       Liver Panel  Recent Labs   Lab 04/24/24 2050   PROTTOTAL 9.1*   ALBUMIN 3.7   BILITOTAL 0.3   ALKPHOS 98   AST 37   ALT 25       CBC  Recent Labs   Lab 04/29/24  0431 04/28/24  0621 04/27/24  0724 04/26/24  0615   WBC 9.7 7.9 4.9 5.4   HGB 10.5* 10.9* 11.5* 10.5*   * 503* 468* 480*       COAGS  Recent Labs   Lab 04/24/24 2050   INR 0.98   PTT 28       ABG  No results for input(s): \"PH\", \"PCO2\", \"PO2\", \"HCO3\" in the last 168 hours.    CRP/ESR  No results for input(s): \"CRP\" in the last 168 hours.    Invalid input(s): \"ESR\"    CSF  Recent Labs   Lab 04/26/24  1116 " "04/26/24  1115   CGLU 52  --    CTP  --  109.0*       MICRO  No results for input(s): \"CULT\" in the last 168 hours.    LIPIDS  No results for input(s): \"CHOL\", \"HDL\", \"LDL\", \"TRIG\", \"CHOLHDLRATIO\" in the last 82732 hours.    A1C    Recent Labs   Lab Test 04/27/24  0724   A1C 5.7*       Imaging  I have personally reviewed most recent and pertinent labs, tests, and radiological images; relevant findings per HPI.    CT Chest/Abdomen/Pelvis w Contrast  Narrative: EXAMINATION: CT CHEST/ABDOMEN/PELVIS W CONTRAST, 4/25/2024 11:07 PM    TECHNIQUE:  Helical CT images from the thoracic inlet through the  symphysis pubis were obtained with contrast.     CONTRAST: 107ml isovue 370    COMPARISON: Chest CT 1/16/2023; CT abdomen/pelvis 4/10/2019    HISTORY: Patient with HIV, and new brain lesion ? CNS lymphoma- assess  other lesions    FINDINGS:    Chest:     Mild motion related artifacts.    Mediastinum: Visualized thyroid gland is unremarkable. Heart size is  within normal limits. No pericardial effusion. No central pulmonary  thromboembolism on this nondedicated study. Prominent bilateral  axillary lymph nodes, decreased in size from prior CT from 1/16/2023.  Small hiatal hernia. Enlarged precarinal lymph node measuring 15 x 13  mm (3/49). Subcentimeter right hilar lymph node (3/57). Prominent  right subpectoral node measuring 6 mm, previously 8 mm (3/33).    Lungs: The trachea and central airways are patent. Patchy  consolidative density seen in the posterior right upper lobe abutting  the superior oblique fissure (9/88), pleural-based confluent density  in the right lower lobe measuring 1 cm (9/185). Confluent  consolidative density in the left lung base. Mild patchy groundglass  densities in the superior left lower lobe (9/152). No pneumothorax or  pleural effusion.    Abdomen and pelvis:    Mild motion related artifacts.    Liver: Too small to characterize hypodensity in the right lobe of  liver (segment 7) measuring 7 mm, " not conspicuous on prior CT from  2019 (3/100). Several are too small to visualize hepatic hypodensity  in the hepatic dome (3/99). No intrahepatic biliary ductal dilatation.  Adrenal glands are within normal limits. No focal pancreatic mass or  pancreatic ductal dilatation. Spleen is not enlarged. No focal splenic  lesion.    Postsurgical changes of the stomach. No biliary ductal dilatation.  Gallbladder is surgically absent. Symmetric enhancement of the  kidneys. No focal suspicious renal lesion. Prominent parametrial  vessels. No adnexal mass. Few subcentimeter bilateral inguinal lymph  nodes. Urinary bladder is partially distended and grossly  unremarkable. Mild scoliotic curvature of the spine. Asymmetric  bulkiness of the right obturator internus with apparent increased  enhancement (3/271 and 7/64). Appendix is within normal limits. Mild  colonic diverticulosis. No high-grade bowel obstruction. No  pneumoperitoneum or significant free fluid. No bulky new pelvic or  retroperitoneal lymphadenopathy. Subcentimeter right external iliac  lymph node (5/519), similar to prior.     Bones and soft tissues: No acute hydrocephalus osseous abnormality.  Tiny fat-containing umbilical hernia.  Impression: IMPRESSION:     1. Mildly enlarged precarinal lymph node. Subcentimeter axillary lymph  nodes decreased in size compared to prior CT from 1/16/2023.  Subcentimeter right hilar lymph node. Consider attention on follow-up    2. Right upper lobe and left lower lobe confluent densities, which may  represent atelectasis; however, superimposed infection cannot be  excluded. Right lower lobe pleural-based nodular centimeter size  density indeterminate. Recommend attention on follow-up.    3. Too small to characterize subcentimeter hypodensity in segment 7 of  the liver, indeterminate, new compared to prior CT from 4/10/2019.  Recommend attention on short-term follow-up     4. Asymmetric bulky appearing right obturator internus  muscle with  apparent increased internal enhancement, indeterminate, underlying  lesion/inflammation not excluded; recommend attention on short-term  follow-up or contrast enhanced MRI pelvis as clinical desired.    5. Few prominent inguinal lymph nodes, possibly reactive; otherwise no  significantly enlarged retroperitoneal or pelvic lymph node.    [Recommend Follow Up: Hepatic hypodensity, right lung lower lobe  pleural-based nodule, asymmetric bulky right obturator internus.]    This report will be copied to the [facility] to ensure a  provider acknowledges the finding. [facility] Center is available Monday  through Friday 8am-3:30 pm.    I have personally reviewed the examination and initial interpretation  and I agree with the findings.    TONI PALACIOS MD         SYSTEM ID:  [id number]

## 2024-04-29 NOTE — PLAN OF CARE
Goal Outcome Evaluation:      Plan of Care Reviewed With: patient    Overall Patient Progress: no change    [id number]: Patient AVSS on room air. Pt alert, difficult to assess orientation. Pt lethargic and intermittently follows commands. Pt has R facial droop. Left side is stronger than right side of body. Neuro checks q4h. Pt not in pain via rFLACC pain assessment. No nausea, no vomiting. Blood sugars q4h. Strict NPO. NG tube infusing tube feeds at goal rate of 55mL/hr, pt tolerating well. Incontinent of urine, external catheter in place. R hand with edema and redness. Repositioned q2h. Skin assessment q2h. A x 2 with lift. Continue with plan of care.

## 2024-04-29 NOTE — PROGRESS NOTES
Care Management Follow Up    Length of Stay (days): 5    Expected Discharge Date:       Concerns to be Addressed: discharge planning     Patient plan of care discussed at interdisciplinary rounds: Yes    Anticipated Discharge Disposition: Other (Comments) (TBD)     Anticipated Discharge Services: Other (see comment) (TBD)  Anticipated Discharge DME: Other (see comment) (TBD)    Patient/family educated on Medicare website which has current facility and service quality ratings: no  Education Provided on the Discharge Plan:    Patient/Family in Agreement with the Plan: unable to assess    Referrals Placed by CM/SW:    Private pay costs discussed: Not applicable    Additional Information:  SW reviewed notes and attended wounds. Per SW sticky note pt's aunt and son wanted to meet with SW to discuss NOK. Monse LEE, updated SW that she met with pt, son, and aunt this morning. She verified that pt's son, Yuan, is first contact and then Noemy, Aunt. Monse is unsure if pt will need LTC or TCU. SW has concerns for where pt will discharge after if TCU is recommended.       SW to follow and assist with any other discharge needs that may arise.  NAKIA Winn   5A Oncology beds:5220-40 & 5C  beds 5417-32 (non BMT )      Vocera:  Phone: [phone]  Pager: [phone]

## 2024-04-29 NOTE — PROCEDURES
[facility]    Double Lumen PICC Placement    Date/Time: 4/29/2024 4:37 PM    Performed by: Clari Dyer RN  Authorized by: Russel Boothe DO  Indications: vascular access      UNIVERSAL PROTOCOL   Site Marked: Yes  Prior Images Obtained and Reviewed:  Yes  Required items: Required blood products, implants, devices and special equipment available    Patient identity confirmed:  Verbally with patient and arm band  NA - No sedation, light sedation, or local anesthesia  Confirmation Checklist:  Patient's identity using two indicators and relevant allergies  Time out: Immediately prior to the procedure a time out was called    Universal Protocol: the Joint Commission Universal Protocol was followed    Preparation: Patient was prepped and draped in usual sterile fashion       ANESTHESIA    Anesthesia:  Local infiltration  Local Anesthetic:  Lidocaine 1% without epinephrine  Anesthetic Total (mL):  5      SEDATION    Patient Sedated: No        Preparation: skin prepped with ChloraPrep  Skin prep agent: skin prep agent completely dried prior to procedure  Sterile barriers: maximum sterile barriers were used: cap, mask, sterile gown, sterile gloves, and large sterile sheet  Hand hygiene: hand hygiene performed prior to central venous catheter insertion  Type of line used: PICC  Catheter type: double lumen  Lumen type: non-valved and power PICC  Lumen Identification: Purple and Red  Catheter size: 5 Fr  Brand: Bard  Placement method: venipuncture, MST, ultrasound and tip navigation system  Number of attempts: 1  Difficulty threading catheter: no  Successful placement: yes  Orientation: left    Location: basilic vein  Tip Location: SVC  Arm circumference: adults 10 cm  Extremity circumference: 31  Visible catheter length: 1  Total catheter length: 42  Dressing and securement: adhesive securement device, alcohol impregnated caps, chlorhexidine disc applied, gloves  changed prior to final dressing, glue, securement device, site cleansed, statlock and transparent securement dressing  Post procedure assessment: blood return through all ports, placement verified by 3CG technology and free fluid flow  PROCEDURE   Patient Tolerance:  Patient tolerated the procedure well with no immediate complicationsDescribe Procedure: PICC ok to use  Disposal: sharps and needle count correct at the end of procedure, needles and guidewire disposed in sharps container

## 2024-04-29 NOTE — PLAN OF CARE
Goal Outcome Evaluation:      Plan of Care Reviewed With: patient    Overall Patient Progress: no changeOverall Patient Progress: no change    8391-2713:  Pt is lethargic and inconsistent with commands.  L eye has no lateral gaze, R facial droop, slurred, garbled speech.  Neuro's checked every 4 hours, unchanged from previous from my previous assessment. AVSS on room air.  No s/sx pain, nausea, vomiting and SOB.  Infrequent non product cough.  Aspiration precautions in place.  NPO, NG-NJ tube infusing TF at goal rate 55 mL/hr.  Water flush 90 mL every 4 hours.  BG checked every 4 hours, .   Incontinent of urine, Roy placed in preparation for chemotherapy.  No BM this shift.  Assist 2 and lift.  Blanchable redness sacrum/coccyx area, applied Mepilex and repositioned every 2 hours.  LP site C/D/I, no s/sx hematoma.  R arm PIV removed, 2+ edema, NS infusing, no s/sx infiltration.  Family visited.  Plan to start chemotherapy this evening, protocol checked by 2 RNs and awaiting PICC placement.

## 2024-04-29 NOTE — PROGRESS NOTES
CLINICAL NUTRITION SERVICES - BRIEF NOTE    EVALUATION OF THE PROGRESS TOWARD GOALS   Diet: NPO    Nutrition Support:   [employer] 1.4 (or equivalent) @ 55 mL/hr (1320 mL/day) to provide 1848 kcals (30 kcal/kg), 81 g protein (1.3 g/kg), 207 g CHO, 77 g fat, 20 g fiber, and 938 mL free water daily   Free water flushes of 90 mL Q 4 hrs.     Intake: Tube feeding at goal of 55 mL/hr.      NEW FINDINGS   Visited with patient in room. Pt unable to participate in conversation. Tube feeding running at 55 mL/hr. Nutrition focused physical exam completed only of areas that are uncovered and able to visualize.     MALNUTRITION  % Intake: Decreased intake does not meet criteria - Nutrition support meeting needs  % Weight Loss: Unable to assess - unsure of scale type and large fluctuations.   Subcutaneous Fat Loss: Facial region: Mild, unable to visualize other areas  Muscle Loss: Temporal: Severe and Thoracic region (clavicle, acromium bone, deltoid, trapezius, pectoral): Severe, unable to visualize other areas.   Fluid Accumulation/Edema: Does not meet criteria  Malnutrition Diagnosis: Severe malnutrition in the context of acute on chronic illness.      INTERVENTIONS  No changes at this time.     Monitoring/Evaluation  Progress toward goals will be monitored and evaluated per protocol.     Faiza Quiroga RD, LD   Available on [employer]  No longer available by pager

## 2024-04-29 NOTE — PROGRESS NOTES
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  Hospitalist explained to family the overall clinical picture of a CNS pathology likely neoplastic (highly suspicious of CNS Lymphoma) concomitantly developed with HIV, considering clinical evidences (imaging and CSF EBV +), which prompted initial management with very high dose steroids based on multidisciplinary impressions (neurology, ID and heme/onc).  Heme/onc shared with family that discussions are going to be made regarding intent to offer intrathecal methotrexate (explained to family as a type of chemotherapy) for the treatment of the CNS lymphoma, pending ongoing discussions with Neurology and ID.  When asked about questions and concerns, Noemy shared with the team that the family has been going through a hard time with many recent losses. She asked the team what is the expectation for Kamilah's recovery with the proposed treatment and the primary care provider mentioned that they are hopeful that with the treatment she will respond clinically but that would be a long way of recovery considering the CNS location of the disease. The family was grateful for the availability and open communication offered to them.  Yuan was available for further contact per the heme/onc team for discussion on consent to initiate the treatment later today, but he mentioned that \"either him or Noemy\" would be appropriate for that.    PAST VISIT " "SUMMARY  4/26- Introduction of Palliative Care role. Details regarding Milagros as the surrogate decision maker, recent family losses, sharing diagnosis and further plans for diagnosis and management.    ADVANCE CARE PLANNING:  No health care directive on file. Per  informed consent policy, next of kin should be involved if patient becomes unable.  Kamilah has indicated she wants her Aunt Milagros to be emergency contact and primary surrogate decision maker. Milagros explains that she has been like another a mother figure to Kamilah and helped raise her at times. Kamilah also has an adult son Yuan who is 23 years old - milagros explains they have had a strained relationship in past but were trying to rebuild the relationship. Kamilah says Yuan can be involve in care discussions and decisions \"if he wants to\".   There is no POLST form on file, defer to patient and/or next of kin for decisions   Code status: Full Code    MEDICAL MANAGEMENT:   We are not actively managing symptoms at this time.    PSYCHOSOCIAL/SPIRITUAL:  Family Elisabet Goldberg (Aunt), Yuan (son)  See family stressors and loss concerns above - will ask palliative SW to meet them next week for support    Palliative Care will continue to follow. Thank you for the consult and allowing us to aid in the care of Kamilah Goldberg.    These recommendations have been discussed with Dr. Noemi Tidwell.    Patient was seen and discussed with Dwayne Montez MD  Securely message with [employer] (more info)  Text page via [employer] Paging/Directory     Noemi Tidwell MD  [facility], Palliative Care  Securely message with the [employer] Web Console (learn more here) or  Text page via [employer] Paging/Directory            Interval History:     Multidisciplinary collaboration:  Care conference today, see details above.    Patient/family narrative  Milagros (Aunt) and Yuan (Son) are the closest representatives for Ty's Care at this point. Milagros has been " representing the family for many years as a person who leads the main decisions, but she has been feeling overwhelmed with many years of this high responsibility demanding role, so she is relieved to have Yuan close to support his mother. Yuan has been feeling sick for the last couple of days as well as feeling the burden of his recent loss (Grandparent death) whom he was very close. He and his mother had a difficult relationship marked by fluctuating levels of proximity given her social situation (addiction, homeless, loss of custody). Right now, he is willing to be involved in her care and to be close for decision making but open to share those decisions with Noemy, in case it is needed.    Palliative Summary/HPI          Kamilah Goldberg is a 43 year old female with PMHx of recent HIV diagnosis (dx at OSH end of March) and avel lesion thought to be an ischemic stroke (3/2024) who presented to OSH 4/12 with weakness, diplopia, dysarthria admitted for failure to thrive found to have increasing size of lesions in left midbrain and avel lesion transferred to [facility] for consideration of brain biopsy for possible CNS lymphoma. At outside hospital when she had progressive mental status decline and she did get dose of IV decadron (treatment for CNS lymphoma) prior to transfer. Her course was also complicated by MRSA pneumonia and encephalopathy as well as tested positive for COVID 19 on 4/18/2024.     There is significant risk with biopsy given mass is in brainstem and also significant risk in delaying treatment because if mass continues to grow there is risk for serious clinical decline including herniation.       Primary team is neurosurgery with medicine (with ongoing discussions for possible transfer of primary care team), ID, neurology, heme/onc and palliative care consulting.     Today, the patient was seen for:  Brainstem lesion c/f CNS lymphoma  HIV with low CD4 (AIDS)  Hx of Substance Abuse Disorder  (amphetamine)  Hx of MRSA pneumonia / Empyema  Support  Encounter for Palliative Care          Medications:  I have reviewed this patient's medication profile and medications from this hospitalization. Notable medications: Truvada, Tivicay, Bactrim, gabapentin, thiamine acetaminophen as needed, melatonin at bedtime, Zofran.                  Review of Systems:     Besides above, an additional 10 system ROS was reviewed and is unremarkable               Physical Exam:   Temp:  [96.8  F (36  C)-98.5  F (36.9  C)] 97.2  F (36.2  C)  Pulse:  [69-92] 89  Resp:  [16] 16  BP: (114-127)/(70-84) 114/74  SpO2:  [96 %-99 %] 96 %  163 lbs 2.25 oz    Physical Exam  Constitutional: eyes open, no distress and cooperative.  Cardiovascular: negative, PMI normal. No lifts, heaves, or thrills. RRR.  Respiratory: Comfortable, room air, good diaphragmatic excursion. Lungs clear  Psychiatric: Collaborative, calm, affect looks normal.  Abdomen: Abdomen soft, non-tender. BS normal. No masses, organomegaly  NEURO: Spontaneous eyes open, follows commands with eye movement / blinking appropriately to yes/no questions, tracks examiner, restricted gaze to the left, pupils reactive, right facial palsy, anarthric, generalized weakness.               Current Problem List:   Active Problems:    Brain mass      Allergies   Allergen Reactions    Blood Transfusion Related (Informational Only) Other (See Comments)     Patient has a history of a clinically significant antibody against RBC antigens.  A delay in compatible RBCs may occur.     Naproxen Hives    Codeine Sulfate GI Disturbance    Hydrocodone Nausea and Vomiting            Data Reviewed:     Results for orders placed or performed during the hospital encounter of 04/24/24 (from the past 24 hour(s))   Glucose by meter   Result Value Ref Range    GLUCOSE BY METER POCT 130 (H) 70 - 99 mg/dL   Glucose by meter   Result Value Ref Range    GLUCOSE BY METER POCT 142 (H) 70 - 99 mg/dL   Glucose by  meter   Result Value Ref Range    GLUCOSE BY METER POCT 126 (H) 70 - 99 mg/dL   Glucose by meter   Result Value Ref Range    GLUCOSE BY METER POCT 117 (H) 70 - 99 mg/dL   Glucose by meter   Result Value Ref Range    GLUCOSE BY METER POCT 143 (H) 70 - 99 mg/dL   Phosphorus   Result Value Ref Range    Phosphorus 3.6 2.5 - 4.5 mg/dL   Magnesium   Result Value Ref Range    Magnesium 2.2 1.7 - 2.3 mg/dL   Basic metabolic panel   Result Value Ref Range    Sodium 142 135 - 145 mmol/L    Potassium 4.0 3.4 - 5.3 mmol/L    Chloride 109 (H) 98 - 107 mmol/L    Carbon Dioxide (CO2) 21 (L) 22 - 29 mmol/L    Anion Gap 12 7 - 15 mmol/L    Urea Nitrogen 17.1 6.0 - 20.0 mg/dL    Creatinine 0.49 (L) 0.51 - 0.95 mg/dL    GFR Estimate >90 >60 mL/min/1.73m2    Calcium 9.3 8.6 - 10.0 mg/dL    Glucose 149 (H) 70 - 99 mg/dL   CBC with platelets   Result Value Ref Range    WBC Count 9.7 4.0 - 11.0 10e3/uL    RBC Count 3.69 (L) 3.80 - 5.20 10e6/uL    Hemoglobin 10.5 (L) 11.7 - 15.7 g/dL    Hematocrit 32.7 (L) 35.0 - 47.0 %    MCV 89 78 - 100 fL    MCH 28.5 26.5 - 33.0 pg    MCHC 32.1 31.5 - 36.5 g/dL    RDW 14.6 10.0 - 15.0 %    Platelet Count 528 (H) 150 - 450 10e3/uL   Hepatic panel   Result Value Ref Range    Protein Total 7.6 6.4 - 8.3 g/dL    Albumin 3.5 3.5 - 5.2 g/dL    Bilirubin Total 0.3 <=1.2 mg/dL    Alkaline Phosphatase 74 40 - 150 U/L    AST 23 0 - 45 U/L    ALT 17 0 - 50 U/L    Bilirubin Direct <0.20 0.00 - 0.30 mg/dL   Glucose by meter   Result Value Ref Range    GLUCOSE BY METER POCT 128 (H) 70 - 99 mg/dL         Medical Decision Making       Please see A&P for additional details of medical decision making.        Thank you for the opportunity to continue to participate in the care of this patient and family.  Please feel free to contact on-call palliative provider with any emergent needs.  We can be reached via Securely message with the Voc[employer] Web Console (learn more here) or Text page via [facility] Paging/Directory   Physician  Attestation:   I, Noemi Tidwell MD, saw this patient and agree with Dr. Read's findings and plan of care as documented in the note.  Noemi Tidwell MD / Palliative Medicine   Medical Decision Making       MANAGEMENT DISCUSSED with the following over the past 24 hours: medicine, heme onc, ID   NOTE(S)/MEDICAL RECORDS REVIEWED over the past 24 hours: ID, nursing, medicine, neurology  Tests REVIEWED in the past 24 hours:  - BMP  - CBC  SUPPLEMENTAL HISTORY, in addition to the patient's history, over the past 24 hours obtained from:   - aunt and son

## 2024-04-29 NOTE — PROGRESS NOTES
[facility]  Transplant & Immunocompromised Infectious Disease Progress Note   Patient: Kamilah Goldberg, Date of birth 1980, Medical record number [id number].      Recommendations:     No objection from ID perspective to proceed with chemotherapy given no evidence of active uncontrolled infectious process other than HIV  Appreciate social work support in assistance in delicate situation working with family to clarify patients wishes for confidentiality regarding HIV status and notification of any at risk/exposed persons to be tested for HIV such as partner  ID is happy to support and guide these efforts as needed.  Continue Dolutegravir and Truvada  Plan to repeat CD4 and viral load circa 5/15/2024.  Ok to hold Bactrim for PJP prophylaxis, plan to start dapsone vs atovaquone or pentamidine in a few days pending G6PD  Will follow infectious disease studies in process including CSF culture, Karius testing and other serologies.    ID Will continue to follow through this hospitalization, although since ID issues appear to currently be optimally managed may not follow on a daily basis, please page with questions or if situation arises where we may be of assistance.  Case discussed with Transplant ID Staff.    Signed:  Gerson Stewart MD, 04/29/2024   Transplant Infectious Disease Fellow  Pager [phone] For more paging info see [facility]-> Infectious Disease [place] HSCT Service        Patient Summary:   44-year-old woman with recent complicated strep empyema and substance use/unstable housing situation who presented to an outside hospital with new diagnosis of AIDS.  In the outside hospital she was diagnosed with a CNS lesion and then started on ART around April 17.  She was transferred here for oncology and neurosurgical evaluation.        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 He now has rare atypical cells on the repeat LP here, pending flow and other studies.      As outlined most infectious studies thus far are negative and the preliminary additional studies done here are also not suggestive of infection. CSF WBC count 3 and normal glucose suggests against infection.   - Cryptococcus has essentially been ruled out with negative CSF CrAg and two negative serum CrAg. CSF not consistent.  - CMV encephalitis seems much less likely based negative CMV PCR   - Toxoplasmosis serology is negative, though formal PCR is pending.  - PML has been ruled out with -ve RENEA virus PCR  - Tb meningitis: negative quant-gold. AFB cultures are pending. CSF not consistent.  - Endemic mycoses: again not a classic presentation and no other signs of disseminated disease (typically can see pancytopenia, elevated alk phos, splenomegaly, etc). Eval is pending.     I would emphasize that with ART, expect her HIV/AIDs to have relatively rapid improvement in her viral load and cell counts. She is ART naive, and her long-term prognosis from a purely HIV/AIDs perspective is excellent if we can diagnosis and effectively treat this CNS lesion, though certainly recognize that the location, rapid progression, and her current neurological status is very concerning as far as her short-term prognosis.     Agree with high-dose steroids now that we have maximized diagnostics since this lesion is not amenable to brain biopsy given its location. Appreciate hematology consideration of further therapeutics including IT methotrexate. I would note that ART and immune reconstitution is a vital  part treating CNS lymphoma as well.    #Non-immune to hepatitis A or B. Hep B coreAb negative  #Homeless and and history of polysubstance use disorder  Other harm reduction labs have been completed at [facility] notably hep B/C, Syphilis, and other STI with gonorrhea and chlamydia    #COVID-19+ on 4/18  Remains in Isolation although asymptomatic    Other Infectious Disease Issues  #MRSA in sputum from 4/19  - Reason to for additional endemic disease testing: No   - Bacterial prophylaxis: None  - Pneumocystis prophylaxis: planning dapsone vs atovaquone vs pentamidine  - Viral serostatus & prophylaxis: CMV+  - Fungal prophylaxis: none  - Immunization status: Hep B non infected, non immune, Hep A non immune, largely unvaccinated since 2009 will need to be addressed once further from chemo and has had some immune reconstitution  - Gamma globulin status:No lab results found.  - Isolation status: Good hand hygiene. Contact fro MRSA in lung at OSH, Special 2/2 recent covid, immunocompromised.       Selected Labs, Micro, Imaging Results:   [facility] Workup  4/20 - HLA  -ve  4/18 - LP EBV CSF +ve  4/16 - Toxo IgG -ve, CMV IgG+  3/31- HIV RNA+ 1.2 million copies 6.9 log, CD-4 count 2/mcl  3/31/24- Genotype- no resistance  3/30/24- Syphilis Ab -ve  3/29/24- CT/NG Urine negative    [facility] Workup  4/27 Karius pending, Blasto blood and Histo Urine pending  4/26 CSF- Crypto -ve, EBV+, Toxo pending (other ID studies negative; RENEA virus, CMV, Cultures)  4/29 Hep C Pending (Hep C Ab -ve 4/1/24)         Interval History and Subjective:     Seen with neurocritical care and palliative team earlier this morning and then again subsequently.  No subjective history from patient.  In the last 24 hours she has become somewhat less responsive per discussions.  Plans for chemo or proceeding.  There was a goals of care conversation later today.  Discussed the prior course of treatment with the general infectious disease team since this is our first day  "taking over her care as chemotherapy is being planned and she is being transferred to the malignant hematology service.    Review of Symptoms:  Unable       ID HPI from 4/25/24     43 year old woman with recently diagnosed HIV (1 month ago)/AIDs (CD4 2) who presented to [facility] 4/15/2024 with generalized weakness found to have CNS lesion, highest concern for CNS lymphoma, transferred here for neurosurgical evaluation and possible biopsy     She was seen 1/2023 at [facility] for severe pneumococcal pneumonia/empyema requiring VATs decortication.      She was next seen 3/29/2024 at [facility] with oral candidiasis and then HIV was screened and positive. CD4 2. She was given nystatin for 2 weeks and referred to ID clinic for followup. Not started on ART. MRI brain at this time showed a 15mm T2 hyperintensity in the pontomesencephalic junction (initially dx as ischemic stroke).      She went back to [facility] 4/15/2024 for generalized weakness. She had a broad work-up after repeat brain MRI showed increase size of the lesion. She was started on Biktarvy ~4/17 but there were concerns for a rash though it was restarted. She was COVID+ 4/18 and grew \"light growth\" MRSA on sputum culture 4/19. She was started on linezolid.      On arrival here, she is afebrile with HR 73, BP normotensive, satting 100% on RA. WBC and PLT normal. Hgb 11.3 very slightly low. Cr 0.46. LFTs have not been done since 4/19 when they were relatively unremarkable - ALT 44, AST 56, Tbili 0.3 and most notably Alk Phos was normal at 79.      On interview today, she is relatively encephalopathic. She says she has two children, 24 and 14. Her son has a cat. She denies pain or discomfort. Says she is willing to take medicine for HIV       Physical Exam:     VITAL SIGNS:  Blood pressure 114/74, pulse 89, temperature 97.2  F (36.2  C), temperature source Oral, resp. rate 16, height 1.626 m (5' 4\"), weight 73.3 kg (161 lb 9.6 oz), SpO2 96%.   GENERAL " APPEARANCE: Not in acute distress    PHYSICAL EXAM:  Eyes:   Tracks with eyes, no discharge  Mouth, Throat:   NG tube present  Cardiovascular: No Cyanosis, JVD not elevated  Respiratory:  Not in respiratory distress, Chest expansion symmetrical, No Audible wheeze/stridor, wet crackling cough present  Gastrointestinal: Not distended, No Visible Pulsation  Musculoskeletal:  No Visible Joint Effusion, no visible fasciculations  Skin:  The exposed skin is warm and dry  Neurologic:     Higher Mental Function: Nonverbal, unable to assess   Motor: No spontaneous movement  Psychiatric: Unable to assess       Other Data:     MEDICATIONS  Current Facility-Administered Medications   Medication Dose Route Frequency Provider Last Rate Last Admin    atorvastatin (LIPITOR) tablet 40 mg  40 mg Oral or Feeding Tube Daily Russel Boothe DO   40 mg at 04/29/24 1213    dexAMETHasone (DECADRON) 40 mg in sodium chloride 0.9 % 59 mL intermittent infusion  40 mg Intravenous Q24H Russel Boothe  mL/hr at 04/29/24 0953 40 mg at 04/29/24 0953    dexAMETHasone (DECADRON) tablet 12 mg  12 mg Oral Once Jonas Goldstein MD PhD        [START ON 4/30/2024] dexAMETHasone (DECADRON) tablet 8 mg  8 mg Oral Daily Jonas Goldstein MD PhD        dolutegravir (TIVICAY) tablet 50 mg  50 mg Oral or NG Tube Daily Ramesh Van MD   50 mg at 04/28/24 2214    emtricitabine-tenofovir (TRUVADA) 200-300 MG per tablet 1 tablet  1 tablet Oral or NG Tube Daily Ramesh Van MD   1 tablet at 04/28/24 2056    enoxaparin ANTICOAGULANT (LOVENOX) injection 40 mg  40 mg Subcutaneous Q24H Russel Boothe DO   40 mg at 04/29/24 1007    famotidine (PEPCID) suspension 20 mg  20 mg Oral BID Kandi Lambert PA-C        gabapentin (NEURONTIN) solution 100 mg  100 mg Oral or Feeding Tube Q12H [facility] (08/20) Russel Boothe DO   100 mg at 04/29/24 1213    insulin aspart (NovoLOG) injection (RAPID ACTING)  1-4 Units Subcutaneous Q4H Russel Boothe DO   1 Units at  "04/28/24 0423    [START ON 5/1/2024] leucovorin calcium injection 25 mg  25 mg Intravenous Q6H Jonas Goldstein MD PhD        [START ON 4/30/2024] leucovorin calcium injection 50 mg  50 mg Intravenous Once Jonas Goldstein MD PhD        methotrexate sodium (pres-free) 6.405 g in D5W 1,000 mL infusion  3.5 g/m2 (Treatment Plan Recorded) Intravenous Once Jonas Goldstein MD PhD        NaCl 0.45 % 1,000 mL with sodium bicarbonate 150 mEq/L infusion   Intravenous Once Jonas Goldstein MD PhD        ondansetron (ZOFRAN) tablet 16 mg  16 mg Oral Once Jonas Goldstein MD PhD        thiamine (B-1) tablet 100 mg  100 mg Oral or Feeding Tube Daily Russel Boothe DO   100 mg at 04/28/24 2141       IMMUNOLOGY LABS  CD-4 Counts No results found for: \"ACD4\"  Inflammatory Markers  No lab results found.  Immune Globulin Studies   No lab results found.    GENERAL LABS  Metabolic Studies       Recent Labs   Lab Test 04/29/24  1140 04/29/24  0628 04/29/24  0431 04/28/24  0929 04/28/24  0621 04/27/24  0925 04/27/24  0724 01/26/23  0541 01/24/23  2118 01/17/23  1231 01/17/23  0641   NA  --   --  142  --  141  --  140   < >  --    < >  --    POTASSIUM  --   --  4.0  --  3.8  --  3.5   < >  --    < > 2.9*   CHLORIDE  --   --  109*  --  109*  --  108*   < >  --    < >  --    CO2  --   --  21*  --  21*  --  20*   < >  --    < >  --    ANIONGAP  --   --  12  --  11  --  12   < >  --    < >  --    BUN  --   --  17.1  --  12.7  --  7.4   < >  --    < >  --    CR  --   --  0.49*  --  0.47*  --  0.48*   < >  --    < >  --    GFRESTIMATED  --   --  >90  --  >90  --  >90   < >  --    < >  --    *   < > 149*   < > 154*   < > 178*   < >  --    < >  --    A1C  --   --   --   --   --   --  5.7*  --   --   --   --    MAUREEN  --   --  9.3  --  9.2  --  9.1   < >  --    < >  --    PHOS  --   --  3.6  --  4.0  --  4.1   < >  --    < >  --    MAG  --   --  2.2  --  2.2  --  1.9   < >  --    < >  --    LACT  --   --   --   --   --   --   --   --  1.4  --  0.8    < " > = values in this interval not displayed.     Hepatic Studies    Recent Labs   Lab Test 04/29/24  0431 04/24/24 2050 01/17/23  0641 01/17/23 0641 01/16/23 2050   BILITOTAL 0.3 0.3  --   --  0.1*   DBIL <0.20 <0.20   < >  --   --    ALKPHOS 74 98  --   --  55   PROTTOTAL 7.6 9.1*  --  6.3* 6.9   ALBUMIN 3.5 3.7  --   --  1.5*   AST 23 37  --   --  98*   ALT 17 25  --   --  61*   LDH  --   --   --  263*  --     < > = values in this interval not displayed.     Pancreatitis testing    Recent Labs   Lab Test 04/10/19  0003   LIPASE 162     Hematology Studies   Recent Labs   Lab Test 04/29/24  1359 04/29/24  0431 04/28/24  0621 04/27/24  0724 04/26/24  0615 04/24/24 2050 11/28/23  1443 02/24/22  1458 04/10/19  0003   WBC 9.5 9.7 7.9 4.9   < > 6.7  6.7 6.3   < > 4.1   ANEU  --   --   --   --   --  6.1  --   --  2.0   ANEUTAUTO 8.8*  --   --   --   --   --  5.0   < >  --    ALYM  --   --   --   --   --  0.5*  --   --  1.4   ALYMPAUTO 0.4*  --   --   --   --   --  0.8   < >  --    KYLE  --   --   --   --   --  0.0  --   --  0.5   AMONOAUTO 0.2  --   --   --   --   --  0.4   < >  --    AEOS  --   --   --   --   --  0.0  --   --  0.2   AEOSAUTO 0.0  --   --   --   --   --  0.0   < >  --    ABSBASO 0.0  --   --   --   --   --  0.0   < >  --    HGB 11.1* 10.5* 10.9* 11.5*   < > 11.3*  11.3* 10.2*   < > 7.7*   HCT 34.3* 32.7* 32.1* 34.2*   < > 34.2*  34.2* 31.7*   < > 27.4*   * 528* 503* 468*   < > 450  450 191   < > 470*    < > = values in this interval not displayed.     Urine Studies     Recent Labs   Lab Test 01/16/23  2132 09/19/18  2225   URINEPH 6.0 5.5   NITRITE Negative Negative   LEUKEST Small* Trace*   WBCU <1 <1     CSF testing     Recent Labs   Lab Test 04/26/24  1116 04/26/24  1115   CWBC  --  3   CRBC  --  44*   CGLU 52  --    CTP  --  109.0*     Medication levels    Recent Labs   Lab Test 01/20/23  1230   VANCOMYCIN 7.6     Body fluid stats    Recent Labs   Lab Test 01/17/23  1500   FCOL Yellow  "  FAPR Hazy*   [facility] 5,760   FNEU 78   FLYM 16   FMONO 5   FBAS 1   FTP 5.6       MICROBIOLOGY  Fungal testing:  No lab results found.    Invalid input(s): \"HIFUN\", \"FUNGL\"  Last Culture results   Culture   Date Value Ref Range Status   04/26/2024 No anaerobic organisms isolated after 2 days  Preliminary   04/26/2024 No growth after 2 days  Preliminary   04/26/2024 No growth after 2 days  Preliminary   04/26/2024 No growth after 3 days  Preliminary   01/24/2023 No Growth  Final   01/24/2023 No Growth  Final   01/20/2023 No anaerobic organisms isolated  Final   01/20/2023 No Growth  Final   01/20/2023 No Growth  Final   01/17/2023 2+ Streptococcus pneumoniae (A)  Final   01/17/2023 No Growth  Final   12/04/2022 No Growth  Final   12/04/2022 3+ Normal alo  Final   12/04/2022 No Growth  Final   02/24/2022 No Growth  Final     Culture Micro   Date Value Ref Range Status   04/30/2012 No growth  Final   06/30/2010 >100,000 colonies/mL Escherichia coli  Final   02/05/2010 No growth  Final   12/01/2009 No growth  Final       Last checks of Clostridioides difficile testing  No lab results found.  Infection Studies to assess Diarrhea No lab results found.    Invalid input(s): \"BIDRY\", \"BIDYD\"  Virology:  Coronavirus-19 testing    Recent Labs   Lab Test 11/28/23  1451 01/16/23 2017 12/04/22 2036 02/24/22  1456   [id number] Negative Negative Negative Negative     Respiratory virus (non-coronavirus-19) testing    Recent Labs   Lab Test 11/28/23  1451   INFZA Negative   INFZB Negative   IRSV Negative     CMV viral loads  No lab results found.  CMV resistance testing:  No lab results found.  No results found for: \"H6RES\"  No results found for: \"EBVRESINST\", \"19300\", \"EBQTC\", \"EBRES\", \"13395\", \"17757\"  BK Virus Testing   No lab results found.  Parvovirus Testing  No lab results found.    Invalid input(s): \"PRVRES\"  Adenovirus Testing  No lab results found.    Invalid input(s): \"ADENAB\", \"ADENOVIRUS\", \"ADQT\"    IMAGING  No " results found for this or any previous visit (from the past 48 hour(s)).    ATTESTATION  I have reviewed labs/blood-work that are part of this patients present encounter in addition to historical and baseline values. The specific values are recorded in Epic and I have incorporated them and my interpretation as relevant into my assessment and plan as recorded.  I have reviewed radiology reports/EKGs/and other diagnostic studies that are part of this patients present encounter, in addition to historical and baseline results.  The specific reports are available in Epic and I have incorporated them into my assessment and plan as described above. Independently interpreted diagnostic study results are described above.  This dictation was prepared in part using Dragon recognition software.  As a result errors may occur. When identified these transcription errors have been corrected.  While every attempt is made to correct errors during dictation, errors may still exist.      Signature:     Gerson Stewart MD   PGY-6 Transplant Infectious Disease Fellow

## 2024-04-30 NOTE — PLAN OF CARE
Goal Outcome Evaluation:    [id number]    VSS. Lethargic but will rouse to touch. Will intermittently follow commands. No verbal response. MD at the bedside overnight to assess. Tolerated methotrexate well. Q4 urine pH continues. Continue with POC.

## 2024-04-30 NOTE — PROGRESS NOTES
[facility]  Neurology Progress Note    Patient Name:  Kamilah Goldberg  MRN:  [id number]    :  1980  Date of Admission:  2024  Date of Service:  2024  Hospital Day: 7     Interval History/24-hour Events   Interval Events:  Kamilah was bradycardic overnight, with lowest heart rate recorded as 49. HR improved to 57-58 after patient was woken up. She was able to follow commands using her left hand and foot, but minimal response on the right, which was unchanged. No specific meds were found to be contributing. EKG  Showing new biphasic T waves in V4-V5 and sinus bradycardia. This was discussed with cardiology, who recommended repeating serial EKGs. Patient was normotensive throughout this time.     Today's Changes:  - stat CTH this AM to evaluate for possible increased ICP in the setting of bradycardia and changes on neuro exam today  - Plan for repeat MRI Brain to assess for radiographic progression vs improvement     Assessment & Plan    Kamilah Goldberg is a 43 year old female with new HIV diagnosis & CNS lesion (3/2024) who presented to OSH 2024 with weakness, diplopia, dysarthria & found to have increasing size of lesions into her L midbrain and avel. Transferred to [facility] for consideration of brain biopsy for CNS lymphoma on 2024. Hospitalization c/b complicated by MRSA pneumonia, COVID infection and encephalopathy.    Previous LP  without evidence of malignancy. No infectious source has been identified. Repeat CSF  with rare atypical cells, flow cytometry with 1.3% (18 events) polytypic B cells, with EBV +ve concerning for CNS lymphoma highest on differential though infection cannot be entirely ruled out with hx of AIDS (CD4 = 2). No biopsy site accessible at this moment. Ideally would have positive cytology/cytometry to confirm diagnosis before starting high dose methotrexate. But given continued expansion of lesion per most recent MRI  "(4/23) and completion of repeat LP (4/26), initial therapy with Dexamethasone per Heme/Once team and methotrexate was initiated on 2/29.     Per serial, multi-team exams, her symptoms have some waxing/waning mentation. Dysarthria, hypophonia,  R hemiparesis in face, arm and leg & L CN6 palsy remains. There was some concern on 4/30 exam that she may also have partial R CN6 palsy as well as decreased responsiveness to commands, but this was improved on repeat exam. In the setting of these initial changes on exam as well as bradycardia overnight, CTH was obtained, which did not show any changes. No consistent clinical improvement seen following steroids. Will be useful to to repeat MRI Brain with and without contrast to assess for radiographic improvement as that would potentially  favour lymphoma.    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  -Repeat MRI Brain wwo contrast today    Neurology will continue to follow.    Thank you for involving Neurology in the care of Kamilah Glodberg.  Please do not hesitate to call with questions/concerns (consult pager [phone]).      This patient was discussed with the neurology attending faculty, Dr. Cristofer Brooks.    Manju Gilliam  Internal Medicine PGY2        Physical Exam   Temp:  [96.6  F " "(35.9  C)-98.5  F (36.9  C)] 98  F (36.7  C)  Pulse:  [58-67] 60  Resp:  [15-18] 16  BP: (113-132)/(75-95) 127/82  SpO2:  [96 %-100 %] 100 %    I/O last 3 completed shifts:  In: 585 [NG/GT:90]  Out: 2875 [Urine:2875]     General Exam  General:  patient lying in bed without any acute distress    HEENT:  normocephalic/atraumatic  Cardio:  appears well perfused  Pulmonary:  stable on RA  Skin:  warm/dry     Neuro Exam  Mental Status:  Drowsy, opens eyes to voice & makes eye contact. Unable to answer orientation questions or attention and concentration questions. At times appears to be trying to speak. Does not respond when asked \"Are you Kamilah?\" Does not follow simple commands such as making a thumbs up, closing eyes  Speech: Dysarthria, did not answer naming, repetition, reading tasks for speech today  Cranial Nerves: PERRLA, Unable to abduct L eye, L VI palsy and L PHILLIP present,  R lower facial droop on activation  Motor: No abnormal movements at rest, did not lift any extremities on command, did withdraw LUE to pain with antigravity  Reflexes:  2+ symmetric bicep, patellar, no hoffmans,  Toes upgoing bilaterally  Sensory:   Withdraws to noxious x4 extremities  Coordination:   THOR due to mental status  Station/Gait:  THOR     Investigations   Labs    04/27  Blastomyces pendng  [place] miscellaneous B cell    04/26  Cell count 3, RBC 44, Protein 109.0, colorless, G 52  Meningitis encephalitis -ve  Cryptococcal -ve  Cytology - Rare atypical cells  RENEA virus, toxo pending  Flow cytometry - 1.3% (18 events) polytypic B cells  EBV PCR detected  AFB not enough sample  Anaerobic, fungal -ve    04/18  Cells 2, RBC 4, Protein 98, Glucose 50  EBV DNA < 35  Flow cytometry  1. No malignant cells identified.  2. Flow cytometry: No atypical T- or B-cell populations detected.    BMP  Recent Labs   Lab 04/30/24  0405 04/30/24  0001 04/29/24  0431 04/28/24  0621     141 143 142 141   POTASSIUM 4.0  3.8 4.1 4.0 3.8   CHLORIDE 106  " "106 106 109* 109*   CO2 26  27 27 21* 21*   BUN 16.1  15.7 15.1 17.1 12.7   CR 0.38*  0.36* 0.36* 0.49* 0.47*   MAUREEN 8.9  8.8 8.8 9.3 9.2       Liver Panel  Recent Labs   Lab 04/30/24  0405 04/29/24  0431 04/24/24 2050   PROTTOTAL 7.2 7.6 9.1*   ALBUMIN 3.3* 3.5 3.7   BILITOTAL 0.4 0.3 0.3   ALKPHOS 68 74 98   AST 24 23 37   ALT 21 17 25       CBC  Recent Labs   Lab 04/30/24  0405 04/29/24  1359 04/29/24  0431 04/28/24  0621   WBC 7.1 9.5 9.7 7.9   HGB 10.1* 11.1* 10.5* 10.9*   * 508* 528* 503*       COAGS  Recent Labs   Lab 04/24/24 2050   INR 0.98   PTT 28       ABG  No results for input(s): \"PH\", \"PCO2\", \"PO2\", \"HCO3\" in the last 168 hours.    CRP/ESR  No results for input(s): \"CRP\" in the last 168 hours.    Invalid input(s): \"ESR\"    CSF  Recent Labs   Lab 04/26/24  1116 04/26/24  1115   CGLU 52  --    CTP  --  109.0*       MICRO  No results for input(s): \"CULT\" in the last 168 hours.    LIPIDS  No results for input(s): \"CHOL\", \"HDL\", \"LDL\", \"TRIG\", \"CHOLHDLRATIO\" in the last 55159 hours.    A1C    Recent Labs   Lab Test 04/27/24  0724   A1C 5.7*       Imaging  I have personally reviewed most recent and pertinent labs, tests, and radiological images; relevant findings per HPI.    CT Head w/o Contrast  Narrative: CT HEAD W/O CONTRAST 4/30/2024 10:13 AM    History: 44 yr F with L pontine lesion, now with worsening exam with  bradycardia, R hemiplegia, new R CN6 palsy. Evaluate for impending  herniation, edema, raised ICP signs     Comparison: MRI brain 4/15/2024 from outside institution and outside  neck CT from 4/25/2024    Technique: Using multidetector thin collimation helical acquisition  technique, axial, coronal and sagittal CT images from the skull base  to the vertex were obtained without intravenous contrast.   (topogram) image(s) also obtained and reviewed.    Findings: Redemonstration of 2.8 x 2.5 cm relatively hyperattenuating  lesion in the posterior lateral left avel that effaces " the  quadrigeminal and partially the left ambient cistern. The most recent  MR, the lesion measures 2.1 x 1.6 cm and does not appear to efface the  cisterns to this extent. The fourth ventricle is partially involved  but is patent. The ventricular size is not enlarged, stable from MR  4/15/2024. There is no intracranial hemorrhage, mass effect, or  midline shift. Gray/white matter differentiation in both cerebral  hemispheres is preserved. The basal cisterns are otherwise clear.    The bony calvaria and the bones of the skull base are normal. Polypoid  left maxillary sinus mucosal thickening with scattered paranasal  fluid, greatest in the left sphenoid sinus. The mastoid air cells are  clear. Partially visualized right-sided nasoenteric tube.  Impression: Impression:  1. Redemonstration of left posterior lateral pontine lesion measuring  up to 2.8 cm with effacement of the adjacent cisterns. There is no  evidence of obstructive hydrocephalus. This appears similar to neck CT  from 4/25/2024, appears relatively enlarged compared to MRI from  4/15/2024, although could be due to technical differences.  2. Nonspecific left maxillary sinus polypoid mucosal thickening and  scattered paranasal sinus fluid.  3. Overall no new acute finding.    I have personally reviewed the examination and initial interpretation  and I agree with the findings.    CLARY PHAM MD         SYSTEM ID:  [id number]

## 2024-04-30 NOTE — PLAN OF CARE
Goal Outcome Evaluation:      Plan of Care Reviewed With: patient    Overall Patient Progress: no changeOverall Patient Progress: no change    [id number]:  Pt is lethargic and inconsistent with commands.  Neuro's checked every 4 hours, unchanged from previous from my previous assessment. Afebrile.  Bradycardic HR (50s).  On tele.  OVSS on room air.  No s/sx pain, distress, discomfort, nausea, vomiting and SOB.  NPO on TF with water flush 90 mL every 4 hours.   and 86.  Frequent, productive cough, garbled, notified PA-C to request RT suction and possibly collect sputum cultures.  Urine pH 7.5 at 9:06 AM.  Next urine pH due at 1700.  Repositioned q2 hours, CHG wipes done.  Head CT done.  Per pt care order unhooked from TF and [facility] for brain MRI.  Continue with plan of care.

## 2024-04-30 NOTE — PROGRESS NOTES
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 PmHx of dyslipidemia, polysubstance abuse, and r-sided empyema (s/p VATS and partial pleurectomy Jan 2021). Upon review of labs and imaging from with positive EBV PCR from CSF. LP at [facility] x4/26 with rare atypical cells. Based on findings will plan with empiric HD-Methotrexate. Course further complicated by COVID positive and possible MRSA infection.     Today:  - D2 HD-Methotrexate. Urine pH 7.5 this morning. Started ~9 pm last night, will wait for 24 hour methotrexate level and continue to monitor for clearance.   - Bradycardic overnight to 48 bpm, increased to 50-60s upon awakening. Assessed by cross cover who also discussed with cardiology; serial EKGs showing bi-phasic T waves in V4, V5 and sinus kalie, negative troponin. Cardiology did not feel like an obvious cardiac etiology and given normotensive, and no change in neurological function on exam, continued to monitor.   - Decrease in neurological function on exam this morning and consistent findings by neurology team; patient unable to squeeze fingers this morning in comparison to yesterday and overnight. Stat head CT for concern of increased ICP; no acute findings and L avel lesion similar to CT done 4/25. Will repeat MRI brain w/wo contrast this afternoon for further assessment of lesion. Appreciate assistance and input from neurology.  - Continue dexamethasone 40 mg through today then taper with 20 mg x 4 days followed by 10 mg daily x 4 days and 5 mg daily x 4 days  - Appreciate ID involvement; agree with discontinuing bactrim. Will plan to start dapsone or pentamidine pending G6PD results, pending.  -  Continue feeding through NGT.   - RT consult placed. Reported by RN, difficulty with secretion management. Could also assist with sputum culture  - Appreciate assistance from palliative care team.   - Will continue best supportive cares.     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 Previously hospitalized in March 2024 where imaging revealed restricted diffusion and T2 hyperintensity of osterior pontine mesencephalic junction with local mass effect. Felt to be likely ischemic infarct (risk factors of HTN, smoking and amphetamine use). Additional considerations included infection vs neoplasm. CSF cytology attempted, but insufficient quantity. MRI brain 4/15 showed increased in interval of lesion of L avel and midbrain compared to previous and increase in interval again on 4/24/24 MRI. CT A/P with no lymphadenopathy, mass, or acute process. LP 4/18 non-diagnostic with negative flow cytometry and cytology but CSF EBV PCR positive (original uncertainty of blood vs. CSF PCR but able to confirm CSF by [facility]). ID, oncology, and neurosurgery consulted for concern of CNS lymphoma given context of imaging, worsening decline, and imaging. She was started on Decadron 8 mg and transferred to [facility] 4/24/24 for further workup and consideration of brain biopsy. Unable to obtain tissue biopsy given challenging location of brainstem, if repeat imaging shows expansion, then could consider. But with review of imaging with Neurosurgery, agree with outside impression of CNS lymphoma. CT CAP 4/25 showed no evidence of lymphoma. Repeat LP 4/26 with rare atypical cells on cytology but does not correlate with flow cytometry (rare polytypic B cells). LP also showing positive EBV PCR, elevated protein to 109, and negative CMV, meningitis/encephalitis panel.  Ophthalmology consulted; no  evidence of ocular lymphoma.  Based on imaging and EBV + PCR from CSF will plan for HD-Methotrexate in coming 1-2 days.   - Infectious studies at [facility] thus far negative for CMV IgM negative (IgG positive suggesting previous infection), Cryptococcus, CMV encephalitis, Tb meningitis, toxoplasmosis serology. Continue to follow toxoplasmosis PCR and Karius.   - Follow CSF AFB culture (NGTD) and bacterial culture results from LP x4/26  - Continue dexamethasone 40 mg through today then taper with 20 mg x 4 days followed by 10 mg daily x 4 days and 5 mg daily x 4 days  - PICC line placed x4/29   - Repeat MRI brain w/wo contrast 1 week after previous imaging to assess interval, ordered for 4/30.   - Decrease in neurological function on exam this morning and consistent findings by neurology team; patient unable to squeeze fingers this morning in comparison to yesterday and overnight. Stat head CT for concern of increased ICP; no acute findings and L avel lesion similar to CT done 4/25. Will repeat MRI brain w/wo contrast this afternoon for further assessment of lesion. Appreciate assistance and input from neurology.      Treatment Plan: HD- Methotrexate C1D1 = 4/30/24    - Methotrexate 3.5 g/m2 D1    - Leucovorin 50 mg D2 until methotrexate level < 0.10    - mIVF .45% NS w. Sodium bicarbonate @125 ml/hr    #Anemia  #Thrombocytosis  Likely due to underlying suspected malignancy  - Transfuse for plts < 10k and Hgb , 7.0    Hx SWETA  Has hx of SWETA. Has been treated with transfusions and IV iron in the past. Will defer workup at this time.     # ID Prophylaxis  - Bactrim for PJP ppx, held with initiation of methotrexate, will plan to start dapsone or pentamidine pending G6PD results.     ID  #HIV/AIDS  Recently diagnosed with HIV during admission at [facility] on 3/28/24 with viral load of 1.24 million, 6.9 log,  and CD4 count of 2. She was not started on ART, given Nystatin x2 weeks and referred to ID clinic for  follow up. Prior to this, she was admitted again with ID consult for broad-infectious workup. Started Biktarvy 4/17 but stopped due concern for rash, rechallenged on 4/20 and able to tolerate. Transitioned to Dolutegravir and Truvada. Of note, patient's aunt (Noemy), and son (Yuan) are aware of diagnosis.   - Continue ART with Dolutegravir and Truvada   - Plan to repeat CD4 count and viral load circa x5/15     #COVID-19 positive  #MRSA pneumonia  Tested positive for COVID-19 4/18/24 during admission to [facility]. Patient remains asymptomatic. Will continue to monitor at this time. Previously on Linezolid. ID following; does not feel like MRSA pneumonia is likely and ok to stop Linezolid.     MISC  #Cotton wool spot, right eye  Single CWS in right eye, most likely in setting of acute illness. Found on assessment by ophthalmology x4/28. Given significant comorbidities, CD4 count of 2 and infectious panels pending, will repeat dilated fundus exam in 1 week to rule out early signs of fungal ocular infection     SOCIAL   #Medical decision maker  Confirmed with family that son (Yuan) will be patient's primary medical decision maker. Son Yuan and patient are okay with communication with aunt (Noemy) if Yuan is unavailable.     #HIV/AIDS Status    Son (Yuan) and aunt (Noemy) are aware of patient HIV status. Spoke with SW regarding HIV/AIDS status and if previous partners have been notified. SW to reach out to son (Yuan) or aunt (Noemy) regarding.     #H/o stimulant/amphetamine use   Noted on [facility] tox screen in 2016. Patient previously reported snorting and declined IVDU.     Fluids/Electrolytes/Nutrition   - IVF per chemotherapy regimen  - PRN lyte replacement per standing protocol  - Regular diet as tolerated     Lines: PICC    PPX  VTE: Lovenox  GI: Famotidine  Bowels: prn miralax and senna    Code: Full    Medically Ready for Discharge: Anticipated in 5+ Days    Disposition: Pending further workup,  "evaluation, tolerance of HD-methotrexate, and clinical course    I spent >45 minutes face-to-face and/or coordinating or discussing care plan. Over 50% of our time on the unit was spent counseling the patient and/or coordinating care    Patient is seen and examined by Dr. Harris and RILEY.  Assessment and plan are discussed and delivered to the patient.    Kandi Lambert PA-C  Hematology/Oncology  Pager: [phone]    Interval History   Nursing notes reviewed. Bradycardia overnight - serial EKGs showing new biphasic p waves in V4/V5 and bradycardia. Cross cover discussed with cardiology; no clear cardiac etiology, continued to monitor    Kamilah is resting in bed this morning. Opens eyes to voice. She continues to be able to communicate with blinking \"yes\" to yes/no questions, denying SOB, CP, coughing, abdominal pain, N/V. Unable to squeeze fingers this morning during neurological exam, in comparison being able to during exam yesterday. Spoke with SW regarding HIV/AIDS status and if previous partners have been notified. SW to reach out to son (Yuan) or aunt (Noemy) regarding.     Complete and Comprehensive review of systems review and negative other than noted here or in the HPI.     Physical Exam   Temp: 98  F (36.7  C) Temp src: Axillary BP: 127/82 Pulse: 60   Resp: 16 SpO2: 100 % O2 Device: None (Room air)    Vitals:    04/26/24 1534 04/29/24 1300 04/30/24 0848   Weight: 74 kg (163 lb 2.3 oz) 73.3 kg (161 lb 9.6 oz) 71 kg (156 lb 8.4 oz)     Vital Signs with Ranges  Temp:  [96.6  F (35.9  C)-98.5  F (36.9  C)] 98  F (36.7  C)  Pulse:  [58-89] 60  Resp:  [15-18] 16  BP: (113-132)/(74-95) 127/82  SpO2:  [96 %-100 %] 100 %  I/O last 3 completed shifts:  In: 585 [NG/GT:90]  Out: 2875 [Urine:2875]      Physical Exam:    Blood pressure 127/82, pulse 60, temperature 98  F (36.7  C), temperature source Axillary, resp. rate 16, height 1.626 m (5' 4\"), weight 71 kg (156 lb 8.4 oz), SpO2 100%.    General: lying in " "bed. Nontoxic appearing. No acute distress.   HEENT: Normocephalic/ Sclera anicteric. Moist mucus membranes.   CV: RRR. Normal S1/S2. No m/r/g. No peripheral edema.   Resp: CTAB on anterior auscultation. No wheezing/crackles. Normal respiratory effort on room air. Breath sounds are equal throughout.   GI: Soft, non-tender, non-distended. Bowel sounds present and normoactive. No peritoneal signs. NGT in tact.   MSK: Warm and well-perfused. Normal tone.   Skin: Skin is clean, dry and intact. No rashes on limited exam. No jaundice. Soft mitts in place  Neuro: Opens eyes to voice and maintains eye contact. Able to communicate with blinking and nodding head. Blinks \"yes\" appropriately when asked orientation questions such as \"is this your son Yuan?\" when referring to dejon Bolden. Able to weakly squeeze fingers.       Medications   Current Facility-Administered Medications   Medication Dose Route Frequency Provider Last Rate Last Admin    dextrose 10% infusion   Intravenous Continuous PRN Russel Boothe DO        NaCl 0.45 % 1,000 mL with sodium bicarbonate 100 mEq/L infusion   Intravenous Continuous Jonas Goldstein MD PhD 125 mL/hr at 04/30/24 0614 New Bag at 04/30/24 0614     Current Facility-Administered Medications   Medication Dose Route Frequency Provider Last Rate Last Admin    atorvastatin (LIPITOR) tablet 40 mg  40 mg Oral or Feeding Tube Daily Russel Boothe DO   40 mg at 04/29/24 1213    dexAMETHasone (DECADRON) 40 mg in sodium chloride 0.9 % 59 mL intermittent infusion  40 mg Intravenous Q24H Russel Boothe  mL/hr at 04/29/24 0953 40 mg at 04/29/24 0953    [Held by provider] dexAMETHasone (DECADRON) tablet 8 mg  8 mg Oral Daily Jonas Goldstein MD PhD        dolutegravir (TIVICAY) tablet 50 mg  50 mg Oral or NG Tube Daily Ramesh Van MD   50 mg at 04/29/24 1178    emtricitabine-tenofovir (TRUVADA) 200-300 MG per tablet 1 tablet  1 tablet Oral or NG Tube Daily Ramesh Van MD   1 tablet at " 04/29/24 2022    enoxaparin ANTICOAGULANT (LOVENOX) injection 40 mg  40 mg Subcutaneous Q24H Russel Boothe, DO   40 mg at 04/29/24 1007    famotidine (PEPCID) suspension 20 mg  20 mg Oral BID Kandi Lambert PA-C   20 mg at 04/29/24 2022    gabapentin (NEURONTIN) solution 100 mg  100 mg Oral or Feeding Tube Q12H [facility] (08/20) Russel Boothe DO   100 mg at 04/29/24 2056    insulin aspart (NovoLOG) injection (RAPID ACTING)  1-4 Units Subcutaneous Q4H Russel Boothe DO   1 Units at 04/30/24 0506    [START ON 5/1/2024] leucovorin calcium injection 25 mg  25 mg Intravenous Q6H Jonas Goldstein MD PhD        leucovorin calcium injection 50 mg  50 mg Intravenous Once Jonas Goldstein MD PhD        thiamine (B-1) tablet 100 mg  100 mg Oral or Feeding Tube Daily Russel Boothe DO   100 mg at 04/29/24 2251       Data   Results for orders placed or performed during the hospital encounter of 04/24/24 (from the past 24 hour(s))   Glucose by meter   Result Value Ref Range    GLUCOSE BY METER POCT 124 (H) 70 - 99 mg/dL   CBC with platelets differential    Narrative    The following orders were created for panel order CBC with platelets differential.  Procedure                               Abnormality         Status                     ---------                               -----------         ------                     CBC with platelets and d...[[id number]]  Abnormal            Final result                 Please view results for these tests on the individual orders.   HCV Antibody w/Reflex to HCV RNA   Result Value Ref Range    Hepatitis C Antibody Nonreactive Nonreactive   CBC with platelets and differential   Result Value Ref Range    WBC Count 9.5 4.0 - 11.0 10e3/uL    RBC Count 3.88 3.80 - 5.20 10e6/uL    Hemoglobin 11.1 (L) 11.7 - 15.7 g/dL    Hematocrit 34.3 (L) 35.0 - 47.0 %    MCV 88 78 - 100 fL    MCH 28.6 26.5 - 33.0 pg    MCHC 32.4 31.5 - 36.5 g/dL    RDW 14.6 10.0 - 15.0 %    Platelet Count 508 (H) 150 - 450  10e3/uL    % Neutrophils 92 %    % Lymphocytes 5 %    % Monocytes 2 %    % Eosinophils 0 %    % Basophils 0 %    % Immature Granulocytes 1 %    NRBCs per 100 WBC 0 <1 /100    Absolute Neutrophils 8.8 (H) 1.6 - 8.3 10e3/uL    Absolute Lymphocytes 0.4 (L) 0.8 - 5.3 10e3/uL    Absolute Monocytes 0.2 0.0 - 1.3 10e3/uL    Absolute Eosinophils 0.0 0.0 - 0.7 10e3/uL    Absolute Basophils 0.0 0.0 - 0.2 10e3/uL    Absolute Immature Granulocytes 0.1 <=0.4 10e3/uL    Absolute NRBCs 0.0 10e3/uL   Glucose by meter   Result Value Ref Range    GLUCOSE BY METER POCT 148 (H) 70 - 99 mg/dL   Double Lumen PICC Placement    Narrative    Clari Dyer RN     4/29/2024  4:40 PM  [facility]    Double Lumen PICC Placement    Date/Time: 4/29/2024 4:37 PM    Performed by: Clari Dyer RN  Authorized by: Russel Boothe DO  Indications: vascular access      UNIVERSAL PROTOCOL   Site Marked: Yes  Prior Images Obtained and Reviewed:  Yes  Required items: Required blood products, implants, devices and special   equipment available    Patient identity confirmed:  Verbally with patient and arm band  NA - No sedation, light sedation, or local anesthesia  Confirmation Checklist:  Patient's identity using two indicators and   relevant allergies  Time out: Immediately prior to the procedure a time out was called    Universal Protocol: the Joint Commission Universal Protocol was followed    Preparation: Patient was prepped and draped in usual sterile fashion       ANESTHESIA    Anesthesia:  Local infiltration  Local Anesthetic:  Lidocaine 1% without epinephrine  Anesthetic Total (mL):  5      SEDATION    Patient Sedated: No        Preparation: skin prepped with ChloraPrep  Skin prep agent: skin prep agent completely dried prior to procedure  Sterile barriers: maximum sterile barriers were used: cap, mask, sterile   gown, sterile gloves, and large sterile sheet  Hand  hygiene: hand hygiene performed prior to central venous catheter   insertion  Type of line used: PICC  Catheter type: double lumen  Lumen type: non-valved and power PICC  Lumen Identification: Purple and Red  Catheter size: 5 Fr  Brand: Bard  Placement method: venipuncture, MST, ultrasound and tip navigation system  Number of attempts: 1  Difficulty threading catheter: no  Successful placement: yes  Orientation: left    Location: basilic vein  Tip Location: SVC  Arm circumference: adults 10 cm  Extremity circumference: 31  Visible catheter length: 1  Total catheter length: 42  Dressing and securement: adhesive securement device, alcohol impregnated   caps, chlorhexidine disc applied, gloves changed prior to final dressing,   glue, securement device, site cleansed, statlock and transparent   securement dressing  Post procedure assessment: blood return through all ports, placement   verified by 3CG technology and free fluid flow  PROCEDURE   Patient Tolerance:  Patient tolerated the procedure well with no immediate   complicationsDescribe Procedure: PICC ok to use  Disposal: sharps and needle count correct at the end of procedure, needles   and guidewire disposed in sharps container   Glucose by meter   Result Value Ref Range    GLUCOSE BY METER POCT 138 (H) 70 - 99 mg/dL   Glucose by meter   Result Value Ref Range    GLUCOSE BY METER POCT 158 (H) 70 - 99 mg/dL   pH urine POCT   Result Value Ref Range    pH Urine 8.0 pH   EKG 12-lead, tracing only   Result Value Ref Range    Systolic Blood Pressure  mmHg    Diastolic Blood Pressure  mmHg    Ventricular Rate 62 BPM    Atrial Rate 62 BPM    GA Interval 112 ms    QRS Duration 86 ms     ms    QTc 430 ms    P Axis 37 degrees    R AXIS 64 degrees    T Axis 63 degrees    Interpretation ECG       Sinus rhythm with sinus arrhythmia  No previous ECGs available  Confirmed by fellow Samuel Walsh ([id number]) on 4/30/2024 10:24:10 AM     Basic metabolic panel   Result Value  Ref Range    Sodium 143 135 - 145 mmol/L    Potassium 4.1 3.4 - 5.3 mmol/L    Chloride 106 98 - 107 mmol/L    Carbon Dioxide (CO2) 27 22 - 29 mmol/L    Anion Gap 10 7 - 15 mmol/L    Urea Nitrogen 15.1 6.0 - 20.0 mg/dL    Creatinine 0.36 (L) 0.51 - 0.95 mg/dL    GFR Estimate >90 >60 mL/min/1.73m2    Calcium 8.8 8.6 - 10.0 mg/dL    Glucose 176 (H) 70 - 99 mg/dL   Magnesium   Result Value Ref Range    Magnesium 2.3 1.7 - 2.3 mg/dL   Phosphorus   Result Value Ref Range    Phosphorus 3.4 2.5 - 4.5 mg/dL   Troponin T, High Sensitivity   Result Value Ref Range    Troponin T, High Sensitivity <6 <=14 ng/L   pH urine POCT   Result Value Ref Range    pH Urine 7.0 pH   Phosphorus   Result Value Ref Range    Phosphorus 2.6 2.5 - 4.5 mg/dL   Magnesium   Result Value Ref Range    Magnesium 2.2 1.7 - 2.3 mg/dL   Basic metabolic panel   Result Value Ref Range    Sodium 141 135 - 145 mmol/L    Potassium 3.8 3.4 - 5.3 mmol/L    Chloride 106 98 - 107 mmol/L    Carbon Dioxide (CO2) 27 22 - 29 mmol/L    Anion Gap 8 7 - 15 mmol/L    Urea Nitrogen 15.7 6.0 - 20.0 mg/dL    Creatinine 0.36 (L) 0.51 - 0.95 mg/dL    GFR Estimate >90 >60 mL/min/1.73m2    Calcium 8.8 8.6 - 10.0 mg/dL    Glucose 143 (H) 70 - 99 mg/dL   CBC with platelets   Result Value Ref Range    WBC Count 7.1 4.0 - 11.0 10e3/uL    RBC Count 3.60 (L) 3.80 - 5.20 10e6/uL    Hemoglobin 10.1 (L) 11.7 - 15.7 g/dL    Hematocrit 30.9 (L) 35.0 - 47.0 %    MCV 86 78 - 100 fL    MCH 28.1 26.5 - 33.0 pg    MCHC 32.7 31.5 - 36.5 g/dL    RDW 14.2 10.0 - 15.0 %    Platelet Count 453 (H) 150 - 450 10e3/uL   Comprehensive metabolic panel   Result Value Ref Range    Sodium 143 135 - 145 mmol/L    Potassium 4.0 3.4 - 5.3 mmol/L    Carbon Dioxide (CO2) 26 22 - 29 mmol/L    Anion Gap 11 7 - 15 mmol/L    Urea Nitrogen 16.1 6.0 - 20.0 mg/dL    Creatinine 0.38 (L) 0.51 - 0.95 mg/dL    GFR Estimate >90 >60 mL/min/1.73m2    Calcium 8.9 8.6 - 10.0 mg/dL    Chloride 106 98 - 107 mmol/L    Glucose 142  (H) 70 - 99 mg/dL    Alkaline Phosphatase 68 40 - 150 U/L    AST 24 0 - 45 U/L    ALT 21 0 - 50 U/L    Protein Total 7.2 6.4 - 8.3 g/dL    Albumin 3.3 (L) 3.5 - 5.2 g/dL    Bilirubin Total 0.4 <=1.2 mg/dL   EKG 12-lead, tracing only   Result Value Ref Range    Systolic Blood Pressure  mmHg    Diastolic Blood Pressure  mmHg    Ventricular Rate 54 BPM    Atrial Rate 54 BPM    WI Interval 116 ms    QRS Duration 88 ms     ms    QTc 430 ms    P Axis 12 degrees    R AXIS 47 degrees    T Axis 40 degrees    Interpretation ECG       Sinus bradycardia  Nonspecific T wave abnormality  Abnormal ECG  When compared with ECG of 29-APR-2024 23:00,  T wave inversion now evident in Anterior leads  Confirmed by fellow Samuel Walsh ([id number]) on 4/30/2024 9:49:05 AM     pH urine POCT   Result Value Ref Range    pH Urine 7.5 pH   Glucose by meter   Result Value Ref Range    GLUCOSE BY METER POCT 108 (H) 70 - 99 mg/dL   pH urine POCT   Result Value Ref Range    pH Urine 7.5 pH

## 2024-04-30 NOTE — PROGRESS NOTES
[facility]  Transplant & Immunocompromised Infectious Disease Progress Note   Patient: Kamilah Goldberg, Date of birth 1980, Medical record number [id number].      Recommendations:     No objection from ID perspective to proceed with chemotherapy given no evidence of active uncontrolled infectious process other than HIV  Appreciate social work support in assistance in delicate situation working with family to clarify patients wishes for confidentiality regarding HIV status and notification of any at risk/exposed persons to be tested for HIV such as partner  ID is happy to support and guide these efforts as needed.  Continue Dolutegravir and Truvada  Plan to repeat CD4 and viral load circa 5/15/2024.  G6PD normal; would start PJP prophylaxis with dapsone 100 mg every day or atovaquone 1500 mg every day or inhaled pentamidine monthly  CMV and adenovirus noted on Karius, negative in CSF; please check CMV VL from blood, no current syndrome consistent with adenovirus; thought unlikely to represent a pathogen in this context.    ID Will continue to follow through this hospitalization, although since ID issues appear to currently be optimally managed may not follow on a daily basis, please page with questions or if situation arises where we may be of assistance.  Case discussed with Transplant ID Staff.    Signed:  Adria Sawyer MD, PhD  Transplant Infectious Diseases Attending Physician  [phone]          Patient Summary:   44-year-old woman with recent complicated strep empyema and substance use/unstable housing situation who presented to an outside hospital with new diagnosis of AIDS.  In the outside hospital she was diagnosed with a CNS lesion and then started on ART around April 17.  She was transferred here for oncology and neurosurgical evaluation.        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 He now has rare atypical cells on the repeat LP here, pending flow and other studies.      As outlined most infectious studies thus far are negative and the preliminary additional studies done here are also not suggestive of infection. CSF WBC count 3 and normal glucose suggests against infection.   - Cryptococcus has essentially been ruled out with negative CSF CrAg and two negative serum CrAg. CSF not consistent.  - CMV encephalitis seems much less likely based negative CMV PCR   - Toxoplasmosis serology is negative, though formal PCR is pending.  - PML has been ruled out with -ve RENEA virus PCR  - Tb meningitis: negative quant-gold. AFB cultures are pending. CSF not consistent.  - Endemic mycoses: again not a classic presentation and no other signs of disseminated disease (typically can see pancytopenia, elevated alk phos, splenomegaly, etc). Eval is pending.     I would emphasize that with ART, expect her HIV/AIDs to have relatively rapid improvement in her viral load and cell counts. She is ART naive, and her long-term prognosis from a purely HIV/AIDs perspective is excellent if we can diagnosis and effectively treat this CNS lesion, though certainly recognize that the location, rapid progression, and her current neurological status is very concerning as far as her short-term prognosis.     Agree with high-dose steroids now that we have maximized diagnostics since this lesion is not amenable to brain biopsy given its location. Appreciate hematology consideration of further therapeutics including IT methotrexate. I would note that  ART and immune reconstitution is a vital part treating CNS lymphoma as well.    #Non-immune to hepatitis A or B. Hep B coreAb negative  #Homeless and and history of polysubstance use disorder  Other harm reduction labs have been completed at [facility] notably hep B/C, Syphilis, and other STI with gonorrhea and chlamydia    #COVID-19+ on 4/18  Remains in Isolation although asymptomatic    Other Infectious Disease Issues  #MRSA in sputum from 4/19  - Reason to for additional endemic disease testing: No   - Bacterial prophylaxis: None  - Pneumocystis prophylaxis: planning dapsone vs atovaquone vs pentamidine  - Viral serostatus & prophylaxis: CMV+  - Fungal prophylaxis: none  - Immunization status: Hep B non infected, non immune, Hep A non immune, largely unvaccinated since 2009 will need to be addressed once further from chemo and has had some immune reconstitution  - Gamma globulin status:No lab results found.  - Isolation status: Good hand hygiene. Contact fro MRSA in lung at OSH, Special 2/2 recent covid, immunocompromised.       Selected Labs, Micro, Imaging Results:   [facility] Workup  4/20 - HLA  -ve  4/18 - LP EBV CSF +ve  4/16 - Toxo IgG -ve, CMV IgG+  3/31- HIV RNA+ 1.2 million copies 6.9 log, CD-4 count 2/mcl  3/31/24- Genotype- no resistance  3/30/24- Syphilis Ab -ve  3/29/24- CT/NG Urine negative    [facility] Workup  4/27 Karius pending, Blasto blood and Histo Urine pending  4/26 CSF- Crypto -ve, EBV+, Toxo pending (other ID studies negative; RENEA virus, CMV, Cultures)  4/29 Hep C Pending (Hep C Ab -ve 4/1/24)         Interval History and Subjective:     Neurological exam somewhat worsened today with reduced UE strength. Underwent head CT to evaluate for increased ICP, which returned without mass effect..    Review of Symptoms:  Unable       ID HPI from 4/25/24     43 year old woman with recently diagnosed HIV (1 month ago)/AIDs (CD4 2) who presented to [facility] 4/15/2024 with generalized weakness found to have  "CNS lesion, highest concern for CNS lymphoma, transferred here for neurosurgical evaluation and possible biopsy     She was seen 1/2023 at [facility] for severe pneumococcal pneumonia/empyema requiring VATs decortication.      She was next seen 3/29/2024 at [facility] with oral candidiasis and then HIV was screened and positive. CD4 2. She was given nystatin for 2 weeks and referred to ID clinic for followup. Not started on ART. MRI brain at this time showed a 15mm T2 hyperintensity in the pontomesencephalic junction (initially dx as ischemic stroke).      She went back to [facility] 4/15/2024 for generalized weakness. She had a broad work-up after repeat brain MRI showed increase size of the lesion. She was started on Biktarvy ~4/17 but there were concerns for a rash though it was restarted. She was COVID+ 4/18 and grew \"light growth\" MRSA on sputum culture 4/19. She was started on linezolid.      On arrival here, she is afebrile with HR 73, BP normotensive, satting 100% on RA. WBC and PLT normal. Hgb 11.3 very slightly low. Cr 0.46. LFTs have not been done since 4/19 when they were relatively unremarkable - ALT 44, AST 56, Tbili 0.3 and most notably Alk Phos was normal at 79.      On interview today, she is relatively encephalopathic. She says she has two children, 24 and 14. Her son has a cat. She denies pain or discomfort. Says she is willing to take medicine for HIV       Physical Exam:     VITAL SIGNS:  Blood pressure 127/82, pulse 60, temperature 98  F (36.7  C), temperature source Axillary, resp. rate 16, height 1.626 m (5' 4\"), weight 71 kg (156 lb 8.4 oz), SpO2 100%.   GENERAL APPEARANCE: Not in acute distress    PHYSICAL EXAM:  Eyes:   Tracks with eyes, no discharge  Mouth, Throat:   NG tube present  Cardiovascular: No Cyanosis, JVD not elevated  Respiratory:  Not in respiratory distress, Chest expansion symmetrical, No Audible wheeze/stridor, wet crackling cough present  Gastrointestinal: Not distended, " "No Visible Pulsation  Musculoskeletal:  No Visible Joint Effusion, no visible fasciculations  Skin:  The exposed skin is warm and dry  Neurologic:     Higher Mental Function: Nonverbal, unable to assess   Motor: No spontaneous movement  Psychiatric: Unable to assess       Other Data:     MEDICATIONS  Current Facility-Administered Medications   Medication Dose Route Frequency Provider Last Rate Last Admin    atorvastatin (LIPITOR) tablet 40 mg  40 mg Oral or Feeding Tube Daily Russel Boothe DO   40 mg at 04/29/24 1213    dexAMETHasone (DECADRON) 40 mg in sodium chloride 0.9 % 59 mL intermittent infusion  40 mg Intravenous Q24H Russel Boothe  mL/hr at 04/29/24 0953 40 mg at 04/29/24 0953    [Held by provider] dexAMETHasone (DECADRON) tablet 8 mg  8 mg Oral Daily Jonas Goldstein MD PhD        dolutegravir (TIVICAY) tablet 50 mg  50 mg Oral or NG Tube Daily Ramesh Van MD   50 mg at 04/29/24 2255    emtricitabine-tenofovir (TRUVADA) 200-300 MG per tablet 1 tablet  1 tablet Oral or NG Tube Daily Ramesh Van MD   1 tablet at 04/29/24 2022    enoxaparin ANTICOAGULANT (LOVENOX) injection 40 mg  40 mg Subcutaneous Q24H Russel Boothe DO   40 mg at 04/29/24 1007    famotidine (PEPCID) suspension 20 mg  20 mg Oral BID Kandi Lambert PA-C   20 mg at 04/29/24 2022    gabapentin (NEURONTIN) solution 100 mg  100 mg Oral or Feeding Tube Q12H [facility] (08/20) Russel Boothe DO   100 mg at 04/29/24 2056    insulin aspart (NovoLOG) injection (RAPID ACTING)  1-4 Units Subcutaneous Q4H Russel Boothe, DO   1 Units at 04/30/24 0506    [START ON 5/1/2024] leucovorin calcium injection 25 mg  25 mg Intravenous Q6H Jonas Goldstein MD PhD        leucovorin calcium injection 50 mg  50 mg Intravenous Once Jonas Goldstein MD PhD        thiamine (B-1) tablet 100 mg  100 mg Oral or Feeding Tube Daily Russel Boothe DO   100 mg at 04/29/24 2255       IMMUNOLOGY LABS  CD-4 Counts No results found for: \"ACD4\"  Inflammatory " Markers  No lab results found.  Immune Globulin Studies   No lab results found.    GENERAL LABS  Metabolic Studies       Recent Labs   Lab Test 04/30/24  0845 04/30/24  0405 04/30/24  0001 04/27/24  0925 04/27/24 0724 01/26/23  0541 01/24/23 2118 01/17/23  1231 01/17/23  0641   NA  --  143  141 143   < > 140   < >  --    < >  --    POTASSIUM  --  4.0  3.8 4.1   < > 3.5   < >  --    < > 2.9*   CHLORIDE  --  106  106 106   < > 108*   < >  --    < >  --    CO2  --  26  27 27   < > 20*   < >  --    < >  --    ANIONGAP  --  11  8 10   < > 12   < >  --    < >  --    BUN  --  16.1  15.7 15.1   < > 7.4   < >  --    < >  --    CR  --  0.38*  0.36* 0.36*   < > 0.48*   < >  --    < >  --    GFRESTIMATED  --  >90  >90 >90   < > >90   < >  --    < >  --    * 142*  143* 176*   < > 178*   < >  --    < >  --    A1C  --   --   --   --  5.7*  --   --   --   --    MAUEREN  --  8.9  8.8 8.8   < > 9.1   < >  --    < >  --    PHOS  --  2.6 3.4   < > 4.1   < >  --    < >  --    MAG  --  2.2 2.3   < > 1.9   < >  --    < >  --    LACT  --   --   --   --   --   --  1.4  --  0.8    < > = values in this interval not displayed.     Hepatic Studies    Recent Labs   Lab Test 04/30/24  0405 04/29/24  0431 04/24/24 2050 01/17/23  0641 01/17/23  0641   BILITOTAL 0.4 0.3 0.3  --   --    DBIL  --  <0.20 <0.20   < >  --    ALKPHOS 68 74 98  --   --    PROTTOTAL 7.2 7.6 9.1*  --  6.3*   ALBUMIN 3.3* 3.5 3.7  --   --    AST 24 23 37  --   --    ALT 21 17 25  --   --    LDH  --  220  --   --  263*    < > = values in this interval not displayed.     Pancreatitis testing    Recent Labs   Lab Test 04/10/19  0003   LIPASE 162     Hematology Studies   Recent Labs   Lab Test 04/30/24  0405 04/29/24  1359 04/29/24  0431 04/28/24  0621 04/26/24  0615 04/24/24  2050 11/28/23  1443 02/24/22  1458 04/10/19  0003   WBC 7.1 9.5 9.7 7.9   < > 6.7  6.7 6.3   < > 4.1   ANEU  --   --   --   --   --  6.1  --   --  2.0   ANEUTAUTO  --  8.8*  --   --   --    "--  5.0   < >  --    ALYM  --   --   --   --   --  0.5*  --   --  1.4   ALYMPAUTO  --  0.4*  --   --   --   --  0.8   < >  --    KYLE  --   --   --   --   --  0.0  --   --  0.5   AMONOAUTO  --  0.2  --   --   --   --  0.4   < >  --    AEOS  --   --   --   --   --  0.0  --   --  0.2   AEOSAUTO  --  0.0  --   --   --   --  0.0   < >  --    ABSBASO  --  0.0  --   --   --   --  0.0   < >  --    HGB 10.1* 11.1* 10.5* 10.9*   < > 11.3*  11.3* 10.2*   < > 7.7*   HCT 30.9* 34.3* 32.7* 32.1*   < > 34.2*  34.2* 31.7*   < > 27.4*   * 508* 528* 503*   < > 450  450 191   < > 470*    < > = values in this interval not displayed.     Urine Studies     Recent Labs   Lab Test 04/30/24  0906 04/30/24  0512 04/30/24  0055 04/29/24  2100 01/16/23  2132 09/19/18  2225   URINEPH 7.5 7.5 7.0 8.0 6.0 5.5   NITRITE  --   --   --   --  Negative Negative   LEUKEST  --   --   --   --  Small* Trace*   WBCU  --   --   --   --  <1 <1     CSF testing     Recent Labs   Lab Test 04/26/24  1116 04/26/24  1115   CWBC  --  3   CRBC  --  44*   CGLU 52  --    CTP  --  109.0*     Medication levels    Recent Labs   Lab Test 01/20/23  1230   VANCOMYCIN 7.6     Body fluid stats    Recent Labs   Lab Test 01/17/23  1500   FCOL Yellow   FAPR Hazy*   FWBC 5,760   FNEU 78   FLYM 16   FMONO 5   FBAS 1   FTP 5.6       MICROBIOLOGY  Fungal testing:  No lab results found.    Invalid input(s): \"HIFUN\", \"FUNGL\"  Last Culture results   Culture   Date Value Ref Range Status   04/26/2024 No anaerobic organisms isolated after 3 days  Preliminary   04/26/2024 No growth after 3 days  Preliminary   04/26/2024 No growth after 3 days  Preliminary   04/26/2024 No growth after 4 days  Preliminary   01/24/2023 No Growth  Final   01/24/2023 No Growth  Final   01/20/2023 No anaerobic organisms isolated  Final   01/20/2023 No Growth  Final   01/20/2023 No Growth  Final   01/17/2023 2+ Streptococcus pneumoniae (A)  Final   01/17/2023 No Growth  Final   12/04/2022 No Growth  " "Final   12/04/2022 3+ Normal alo  Final   12/04/2022 No Growth  Final   02/24/2022 No Growth  Final     Culture Micro   Date Value Ref Range Status   04/30/2012 No growth  Final   06/30/2010 >100,000 colonies/mL Escherichia coli  Final   02/05/2010 No growth  Final   12/01/2009 No growth  Final       Last checks of Clostridioides difficile testing  No lab results found.  Infection Studies to assess Diarrhea No lab results found.    Invalid input(s): \"BIDRY\", \"BIDYD\"  Virology:  Coronavirus-19 testing    Recent Labs   Lab Test 11/28/23  1451 01/16/23 2017 12/04/22 2036 02/24/22  1456   [id number] Negative Negative Negative Negative     Respiratory virus (non-coronavirus-19) testing    Recent Labs   Lab Test 11/28/23  1451   INFZA Negative   INFZB Negative   IRSV Negative     CMV viral loads  No lab results found.  CMV resistance testing:  No lab results found.  No results found for: \"H6RES\"  No results found for: \"EBVRESINST\", \"46123\", \"EBQTC\", \"EBRES\", \"96105\", \"04674\"  BK Virus Testing   No lab results found.  Parvovirus Testing  No lab results found.    Invalid input(s): \"PRVRES\"  Adenovirus Testing  No lab results found.    Invalid input(s): \"ADENAB\", \"ADENOVIRUS\", \"ADQT\"    IMAGING  Recent Results (from the past 48 hour(s))   CT Head w/o Contrast    Narrative    CT HEAD W/O CONTRAST 4/30/2024 10:13 AM    History: 44 yr F with L pontine lesion, now with worsening exam with  bradycardia, R hemiplegia, new R CN6 palsy. Evaluate for impending  herniation, edema, raised ICP signs     Comparison: MRI brain 4/15/2024 from outside institution and outside  neck CT from 4/25/2024    Technique: Using multidetector thin collimation helical acquisition  technique, axial, coronal and sagittal CT images from the skull base  to the vertex were obtained without intravenous contrast.   (topogram) image(s) also obtained and reviewed.    Findings: Redemonstration of 2.8 x 2.5 cm relatively hyperattenuating  lesion in the " posterior lateral left avel that effaces the  quadrigeminal and partially the left ambient cistern. The most recent  MR, the lesion measures 2.1 x 1.6 cm and does not appear to efface the  cisterns to this extent. The fourth ventricle is partially involved  but is patent. The ventricular size is not enlarged, stable from MR  4/15/2024. There is no intracranial hemorrhage, mass effect, or  midline shift. Gray/white matter differentiation in both cerebral  hemispheres is preserved. The basal cisterns are otherwise clear.    The bony calvaria and the bones of the skull base are normal. Polypoid  left maxillary sinus mucosal thickening with scattered paranasal  fluid, greatest in the left sphenoid sinus. The mastoid air cells are  clear. Partially visualized right-sided nasoenteric tube.      Impression    Impression:  1. Redemonstration of left posterior lateral pontine lesion measuring  up to 2.8 cm with effacement of the adjacent cisterns. There is no  evidence of obstructive hydrocephalus. This appears similar to neck CT  from 4/25/2024, appears relatively enlarged compared to MRI from  4/15/2024, although could be due to technical differences.  2. Nonspecific left maxillary sinus polypoid mucosal thickening and  scattered paranasal sinus fluid.  3. Overall no new acute finding.    I have personally reviewed the examination and initial interpretation  and I agree with the findings.    CLARY PHAM MD         SYSTEM ID:  [id number]       ATTESTATION  I have reviewed labs/blood-work that are part of this patients present encounter in addition to historical and baseline values. The specific values are recorded in Epic and I have incorporated them and my interpretation as relevant into my assessment and plan as recorded.  I have reviewed radiology reports/EKGs/and other diagnostic studies that are part of this patients present encounter, in addition to historical and baseline results.  The specific reports are available  in Epic and I have incorporated them into my assessment and plan as described above. Independently interpreted diagnostic study results are described above.  This dictation was prepared in part using Dragon recognition software.  As a result errors may occur. When identified these transcription errors have been corrected.  While every attempt is made to correct errors during dictation, errors may still exist.      Signature:     Adria Sawyer MD, PhD  Transplant Infectious Diseases Attending Physician  [phone]

## 2024-04-30 NOTE — PROGRESS NOTES
"/80 (BP Location: Left arm)   Pulse 67   Temp 97.6  F (36.4  C) (Oral)   Resp 15   Ht 1.626 m (5' 4\")   Wt 73.3 kg (161 lb 9.6 oz)   SpO2 96%   BMI 27.74 kg/m      [id number]    Patient HR dropped to 47-49 for about an hour OVSS, on room air, lethargic and unable to speak. Assist x2 with a lift. No signs or symptoms of pain. Infrequent congested cough. Fair urine production through thornton. PIV was removed and replaced with a PICC. Methotrexate infusing and tolerated well. Continue POC.   "

## 2024-04-30 NOTE — PROGRESS NOTES
Notified of bradycardia overnight. Patient is unresponsive at baseline and unable to communicate if she has any chest pain/lightheadedness per RN  On my interview, patient is asleep w HR of 51 and HR increases to 57-58 when I woke her up. She was able to follow my commands with squeezing left hand and moving left foot. Minimal response on the right- consistent with earlier exam. Per RN, her neuros q4h have been unchanged as well  DDx of drugs, ischemia and electrolyte reviewed.  No meds that are typical culprits of kalie  K, Mg, Phos adequate  EKG w new bi-phasic T-waves in V4,V5 and sinus kalie- discussed this with cardiology. At this stage- unclear significance of this finding in isolation or as an etiology for bradycardia. Continue to repeat serial EKGs per recs. Negative troponin. No ischemia noted in inferior leads  No HTN or neuro changes to indicate raised ICP. So, monitor closely clinically for now.     Of note, one reading of low temperature - per RN, this is an error from the thermometer as there was difficulty measuring it. Repeat measurement was normal

## 2024-04-30 NOTE — PROGRESS NOTES
Brief Hematology-Oncology Note    The Malignant Hematology team had a discussion with patient's next of kin, Yuan, about her clinic situation and next steps in management. Below is a summary.    There is high clinical suspicion that patient's current condition is due to CNS lymphoma, given the imaging findings, the CSF EBV positivity (confirmed with [facility]), and the rare atypical cells in CSF. Ideally, biopsy-supported diagnosis would be needed prior to starting chemotherapy. Tissue biopsy is unfortunately presently not a safe option.     At the same time, it is expected the patient would likely continue to deteriorate without intervention.    Therefore, addition of high-dose methotrexate to her ongoing dexamethasone was recommended. There is nonetheless a chance that the actual diagnosis differs from current suspicion.     HIV infection and AIDS are also noted, and Ms Goldberg has started ART with emtricabine, tenofovir, and dolutegravir.    Neurology and Infectious Diseases expressed no opposition to methotrexate.    Recent literature was reviewed and there is a tentative plan for rituximab in the coming days as well.     The above was reviewed with Ms Goldberg's next of kin, Yuan, who agreed to the proposed treatment after the rationale, risks, and benefits were discussed in detail.        Jonas Goldstein  PGY4  Hem/Onc/BMT  [place]  [phone]

## 2024-04-30 NOTE — PROGRESS NOTES
"   04/30/24 1527   Appointment Info   Signing Clinician's Name / Credentials (OT) Sapphire Malloy, OTR/L   Living Environment   Living Environment Comments Pt unable to give responses to questions. Per SW note, pt with limited to no support at discharge. Has been staying with a friend for the past few years.   Self-Care   Activity/Exercise/Self-Care Comment Per SW note, pt IND at baseline but has been requiring significant assist over the last few weeks. Per SW note, will not have assist at discharge.   Instrumental Activities of Daily Living (IADL)   IADL Comments Per SW note, pt IND at baseline but has been requiring significant assist over the last few weeks. Per SW note, will not have assist at discharge.   General Information   Onset of Illness/Injury or Date of Surgery 04/24/24   Referring Physician Mack Prado MD   Patient/Family Therapy Goal Statement (OT) unable to state   Additional Occupational Profile Info/Pertinent History of Current Problem Per chart: 43 year old female with new HIV diagnosis & CNS lesion (3/2024) who presented to OSH 4/12/2024 with weakness, diplopia, dysarthria & found to have increasing size of lesions into her LEFT midbrain and avel. 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 Hospitalization c/b complicated by MRSA pneumonia, COVID infection and encephalopathy.   Cognitive Status Examination   Cognitive Status Comments unable to functionall assess. Pt does provide hard blink to signal yes with answering \"are we in the hospital\" but does not follow through with this when asked name. Does track head spontaneously 2/3 attempts, minimal R eye tracking when cued to follow finger.   Visual Perception   Impact of Vision Impairment on Function (Vision) Pt with left gaze preference, limited command following impacting attention screening.   Sensory   Sensory Quick Adds unable/difficult to assess   Posture   Posture Comments Unable to test "   Strength Comprehensive (MMT)   Comment, General Manual Muscle Testing (MMT) Assessment No movements with gravity or in gravity eliminated.   Coordination   Upper Extremity Coordination Left UE impaired;Right UE impaired   Bed Mobility   Comment (Bed Mobility) DEP Per clinical reasoning   Activities of Daily Living   Additional Documentation   (At thjs time, pt is DEP for all ADLs.)   Clinical Impression   Criteria for Skilled Therapeutic Interventions Met (OT) Yes, treatment indicated   OT Diagnosis decreased engagement in ADLs/IADLs.   OT Problem List-Impairments impacting ADL problems related to;activity tolerance impaired;cognition;balance;communication;coordination;range of motion (ROM);sensation;strength;mobility;muscle tone;motor control;inability to direct their own care   Assessment of Occupational Performance 5 or more Performance Deficits   Identified Performance Deficits dressing, bathing, toileting, home mgmt, functional mobility   Planned Therapy Interventions (OT) ADL retraining;ROM;progressive activity/exercise;risk factor education;strengthening   Clinical Decision Making Complexity (OT) problem focused assessment/low complexity   Risk & Benefits of therapy have been explained evaluation/treatment results reviewed;care plan/treatment goals reviewed;risks/benefits reviewed;current/potential barriers reviewed;patient   Clinical Impression Comments Pt with limited functional movement and engagement in tracking. May benefit from ongoing skilled therapy to progress engagement in ADLs.   OT Total Evaluation Time   OT Eval, Low Complexity Minutes (19464) 7   OT Goals   Therapy Frequency (OT) 2 times/week   OT Predicted Duration/Target Date for Goal Attainment 05/28/24   OT Goals Hygiene/Grooming   OT: Hygiene/Grooming maximum assist   OT Discharge Planning   OT Plan command following with eyes/head, initiate and educate on ROM, continue to assess improvements in UE movements as able   OT Discharge  Recommendation (DC Rec) Long term care facility   OT Rationale for DC Rec At this time, unable to functionally engage in any activity. Does spontanesouly track via head turn this OT 2/3 attempts when moving from L side to R side of bed. At this time recommend LTC. Pending continued tx plan and progress, will update recs.   OT Brief overview of current status OH lift   Total Session Time   Total Session Time (sum of timed and untimed services) 7

## 2024-04-30 NOTE — PROGRESS NOTES
Nursing Focus: Chemotherapy  D: Positive blood return via PICC. Insertion site is clean/dry/intact, dressing intact with no complaints of pain.  Urine output is recorded in intake in Doc Flowsheet. Urine pH 8.0.    I: Premedications given per order (see electronic medical administration record). Dose #1 of HD methotrexate started to infuse over 4 hours. Reviewed pt teaching on chemotherapy side effects. Chemotherapy double checked per protocol by two chemotherapy competent RN's.     A: Tolerating procedure well. Denies nausea and or pain.     P: Continue to monitor urine output and symptoms of nausea. Screen for symptoms of toxicity.      Marii Morgan RN on 4/29/2024 at 11:44 PM

## 2024-05-01 NOTE — PROGRESS NOTES
"PALLIATIVE CARE PROGRESS NOTE  [facility]     Patient Name: Kamilah Goldberg  Date of Admission: 4/24/2024   Today the patient was seen for: GOC discussions, Support     Recommendations & Counseling       GOALS OF CARE:   Restorative without limits   Methotrexate was initiated on 2/29  On our visit today, patient was not able to verbalize, but was able to follow simple commands with blinking eyes appropriately.   We discussed her case with oncology today they want more time to determine if treatment will be effective or note. Pt's neurological exam has been fluctuating,  but CT of the head showed no acute pathology.  Recent MRI of the brain obtained on 4/30 showed concern for increasing lesion size.  At this point, we recommend having an update care conference along with family for further discussions regarding prognosis and goals of care.     PAST VISIT SUMMARY  See 4/29 note for last Care Conference.  4/26- Introduction of Palliative Care role. Details regarding Noemy as the surrogate decision maker, recent family losses, sharing diagnosis and further plans for diagnosis and management.    ADVANCE CARE PLANNING:  No health care directive on file. Per  informed consent policy, next of kin should be involved if patient becomes unable.  During palliative care initial consult on 4/26, Kamilah was able to speak and had capacity and stated that wants her Aunt Noemy to be her emergency contact and primary surrogate decision maker and her son, Yuan, can be involve in care discussions and decisions \"if he wants to\". Noemy explains that she has been like another a mother figure to Kamilah and helped raise her at times. Noemy also states that Kamilah and her son, Yuan, have had a strained relationship in past but were trying to rebuild the relationship.    There is no POLST form on file, defer to patient and/or next of kin for decisions   Code status: Full " Code    MEDICAL MANAGEMENT:   We are not actively managing symptoms at this time.    PSYCHOSOCIAL/SPIRITUAL:  Family Eilsabet Goldberg (Aunt), Yuan (son)  See family stressors and loss concerns above - will ask palliative SW to meet them next week for support    Palliative Care will continue to follow. Thank you for the consult and allowing us to aid in the care of Kamilah Goldberg.      Patient was seen and discussed with Dwayne Montez MD  Securely message with "[employer]" (more info)  Text page via [employer] Paging/Directory     Thank you for the opportunity to continue to participate in the care of this patient and family.  Please feel free to contact on-call palliative provider with any emergent needs.  We can be reached via Securely message with the "[employer]" Web Console (learn more here) or Text page via [employer] Paging/Directory   Physician Attestation:   I, Noemi Tidwell MD, saw this patient and agree with Dr. Read' findings and plan of care as documented in the note. Total time spent was 45 minutes spent regarding plan of care, support, goals on the date of the encounter.   Noemi Tidwell MD / Palliative Medicine            Interval History:     Multidisciplinary collaboration:  Oncology and ID ongoing discussions for further treatment. Support offered to Otis and Kamilah at bedside.    Patient/family narrative  Noemy (Aunt) and Yuan (Son) are the closest representatives for Ty's Care at this point. Noemy has been representing the family for many years as a person who leads the main decisions, but she has been feeling overwhelmed with many years of this high responsibility demanding role, so she is relieved to have Yuan close to support his mother. Yuan has been feeling sick for the last couple of days as well as feeling the burden of his recent loss (Grandparent death) whom he was very close. He and his mother had a difficult relationship marked by fluctuating levels of  proximity given her social situation (addiction, homeless, loss of custody). Right now, he is willing to be involved in her care and to be close for decision making but open to share those decisions with Noemy, in case it is needed.    Palliative Summary/HPI          Kamilah Goldberg is a 43 year old female with PMHx of recent HIV diagnosis (dx at OSH end of March) and avel lesion thought to be an ischemic stroke (3/2024) who presented to OSH 4/12 with weakness, diplopia, dysarthria admitted for failure to thrive found to have increasing size of lesions in left midbrain and avel lesion transferred to [facility] for consideration of brain biopsy for possible CNS lymphoma. At outside hospital when she had progressive mental status decline and she did get dose of IV decadron (treatment for CNS lymphoma) prior to transfer. Her course was also complicated by MRSA pneumonia and encephalopathy as well as tested positive for COVID 19 on 4/18/2024.     There is significant risk with biopsy given mass is in brainstem and also significant risk in delaying treatment because if mass continues to grow there is risk for serious clinical decline including herniation.       Primary team is neurosurgery with medicine (with ongoing discussions for possible transfer of primary care team), ID, neurology, heme/onc and palliative care consulting.     Today, the patient was seen for:  Brainstem lesion c/f CNS lymphoma  HIV with low CD4 (AIDS)  Hx of Substance Abuse Disorder (amphetamine)  Hx of MRSA pneumonia / Empyema  Support  Encounter for Palliative Care      Medications:  I have reviewed this patient's medication profile and medications from this hospitalization. Notable medications: Truvada, Tivicay, Bactrim, gabapentin, thiamine acetaminophen as needed, melatonin at bedtime, Zofran.                  Review of Systems:     Besides above, an additional 10 system ROS was reviewed and is unremarkable               Physical Exam:   Temp:   [97.1  F (36.2  C)-98.2  F (36.8  C)] 98  F (36.7  C)  Pulse:  [50-84] 77  Resp:  [16-18] 16  BP: (116-130)/(77-85) 128/85  SpO2:  [97 %-100 %] 97 %  158 lbs 4.64 oz    Physical Exam  Constitutional: eyes open, no distress and cooperative.  Cardiovascular: negative, PMI normal. No lifts, heaves, or thrills. RRR.  Respiratory: Comfortable, room air, good diaphragmatic excursion. Lungs clear  Psychiatric: Collaborative, calm, affect looks normal.  Abdomen: Abdomen soft, non-tender. BS normal. No masses, organomegaly  NEURO: Spontaneous eyes open, SNOUT and palmomental reflexes present, intermittently follows commands with blinking to yes/no questions, tracks examiner, restricted gaze to the left, pupils reactive, right facial palsy, anarthric, generalized weakness (triple flexion on right lower extremity, withdrawal on left lower extremity).  Babinski sign on right lower extremity.               Current Problem List:   Active Problems:    Brain mass      Allergies   Allergen Reactions    Blood Transfusion Related (Informational Only) Other (See Comments)     Patient has a history of a clinically significant antibody against RBC antigens.  A delay in compatible RBCs may occur.     Naproxen Hives    Codeine Sulfate GI Disturbance    Hydrocodone Nausea and Vomiting            Data Reviewed:     Results for orders placed or performed during the hospital encounter of 04/24/24 (from the past 24 hour(s))   pH urine POCT   Result Value Ref Range    pH Urine 8.0 pH   Glucose by meter   Result Value Ref Range    GLUCOSE BY METER POCT 157 (H) 70 - 99 mg/dL   Glucose by meter   Result Value Ref Range    GLUCOSE BY METER POCT 136 (H) 70 - 99 mg/dL   pH urine POCT   Result Value Ref Range    pH Urine 7.5 (A) pH   Glucose by meter   Result Value Ref Range    GLUCOSE BY METER POCT 135 (H) 70 - 99 mg/dL   Phosphorus   Result Value Ref Range    Phosphorus 4.0 2.5 - 4.5 mg/dL   Magnesium   Result Value Ref Range    Magnesium 2.3 1.7 -  2.3 mg/dL   CBC with platelets   Result Value Ref Range    WBC Count 8.5 4.0 - 11.0 10e3/uL    RBC Count 3.76 (L) 3.80 - 5.20 10e6/uL    Hemoglobin 10.9 (L) 11.7 - 15.7 g/dL    Hematocrit 32.6 (L) 35.0 - 47.0 %    MCV 87 78 - 100 fL    MCH 29.0 26.5 - 33.0 pg    MCHC 33.4 31.5 - 36.5 g/dL    RDW 14.3 10.0 - 15.0 %    Platelet Count 432 150 - 450 10e3/uL   Methotrexate level   Result Value Ref Range    Methotrexate 0.31 umol/L   Potassium   Result Value Ref Range    Potassium 4.2 3.4 - 5.3 mmol/L   CBC with Platelets & Differential    Narrative    The following orders were created for panel order CBC with Platelets & Differential.  Procedure                               Abnormality         Status                     ---------                               -----------         ------                     CBC with platelets and d...[[id number]]  Abnormal            Final result                 Please view results for these tests on the individual orders.   Comprehensive metabolic panel   Result Value Ref Range    Sodium 142 135 - 145 mmol/L    Potassium 4.2 3.4 - 5.3 mmol/L    Carbon Dioxide (CO2) 27 22 - 29 mmol/L    Anion Gap 11 7 - 15 mmol/L    Urea Nitrogen 16.3 6.0 - 20.0 mg/dL    Creatinine 0.45 (L) 0.51 - 0.95 mg/dL    GFR Estimate >90 >60 mL/min/1.73m2    Calcium 8.9 8.6 - 10.0 mg/dL    Chloride 104 98 - 107 mmol/L    Glucose 149 (H) 70 - 99 mg/dL    Alkaline Phosphatase 66 40 - 150 U/L    AST 20 0 - 45 U/L    ALT 18 0 - 50 U/L    Protein Total 6.8 6.4 - 8.3 g/dL    Albumin 3.3 (L) 3.5 - 5.2 g/dL    Bilirubin Total 0.3 <=1.2 mg/dL   CBC with platelets and differential   Result Value Ref Range    WBC Count 8.5 4.0 - 11.0 10e3/uL    RBC Count 3.76 (L) 3.80 - 5.20 10e6/uL    Hemoglobin 10.9 (L) 11.7 - 15.7 g/dL    Hematocrit 32.6 (L) 35.0 - 47.0 %    MCV 87 78 - 100 fL    MCH 29.0 26.5 - 33.0 pg    MCHC 33.4 31.5 - 36.5 g/dL    RDW 14.3 10.0 - 15.0 %    Platelet Count 432 150 - 450 10e3/uL    % Neutrophils 89 %    %  Lymphocytes 5 %    % Monocytes 5 %    % Eosinophils 0 %    % Basophils 0 %    % Immature Granulocytes 1 %    NRBCs per 100 WBC 0 <1 /100    Absolute Neutrophils 7.6 1.6 - 8.3 10e3/uL    Absolute Lymphocytes 0.4 (L) 0.8 - 5.3 10e3/uL    Absolute Monocytes 0.4 0.0 - 1.3 10e3/uL    Absolute Eosinophils 0.0 0.0 - 0.7 10e3/uL    Absolute Basophils 0.0 0.0 - 0.2 10e3/uL    Absolute Immature Granulocytes 0.0 <=0.4 10e3/uL    Absolute NRBCs 0.0 10e3/uL   pH urine POCT   Result Value Ref Range    pH Urine 8.0 pH   Glucose by meter   Result Value Ref Range    GLUCOSE BY METER POCT 117 (H) 70 - 99 mg/dL   Glucose by meter   Result Value Ref Range    GLUCOSE BY METER POCT 144 (H) 70 - 99 mg/dL   pH urine POCT   Result Value Ref Range    pH Urine 8.0 pH

## 2024-05-01 NOTE — PLAN OF CARE
Goal Outcome Evaluation:      Plan of Care Reviewed With: patient, significant other    Overall Patient Progress: no changeOverall Patient Progress: no change     [id number]:     Alert. At 0800: PERRLA. Follows some commands such as, smile, lifts up her head when applying pillow, lightly squeezes with left hand and wiggled left toes. No moment noted to her right side. Moaning at time but doesn't appear to be in pain. Not speaking.     1230 Neuro check, Kamilah didn't squeeze my hand or wiggle toes. PERRLA. She gave a small smile when asked. PA notified- states this was similar to her assessment this am. : Turn Q 2 hours.     TF at goal of 55 via NG    Roy with good UOP. Urine PH at 0900 was 8.0. MIVF with bicarb at 125 ml/hr via PICC.     LBM 4/27. Senna given via NG    HR 50's-78's on tele. NSR/Pedro.     Boyfriend Otis at bedside this am.     1600 neuro check. PERRLA. No movemement in any extremity except did dorsiflex left foot when asked.

## 2024-05-01 NOTE — PROGRESS NOTES
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 PmHx of dyslipidemia, polysubstance abuse, and r-sided empyema (s/p VATS and partial pleurectomy Jan 2021). Upon review of labs and imaging from with positive EBV PCR from CSF. LP at [facility] x4/26 with rare atypical cells. Based on findings started HD-Methotrexate 4/29/24. Course further complicated by COVID positive and possible MRSA infection.     Today:  - D3 HD-Methotrexate. Urine pH 7.5 this morning. 32 hour methotrexate level 0.31 this morning. Will continue to monitor. Upon further discussions, will likely plan on Rituximab tomorrow.   - Neurological exam similar today as to yesterday; boyfriend (Otis) supportive at bedside and patient tracking conversation with eyes. Blinks \"yes\" when referring to and asking \"Blink yes if this is this Otis\". Neuro exams varying throughout the day between nursing, neuro, and primary team; able to squeeze for RN this AM but not on my assessment this AM and afternoon. Not able to dorsiflex this morning but able to this afternoon. CT head yesterday showed no acute pathology, lesion appearing slightly larger when compared to previous scans and findings consistent with MRI brain yesterday. Will continue to monitor closely with Q4H neuro checks and assess for s/sx of herniation. Appreciate assistance and input from neurology.   - G6PD x4/29 13.0. Will start dapsone 100 mg daily. Appreciate ID assistance.    - Continue dexamethasone taper with 40 mg x 4 days (4/24-4/30) ? 20 mg x 4 days (5/1-5/4) ? 10 mg daily x 4 days ? 4 mg BID (5/5-X)  - Continue feeding through NGT.   - Appreciate assistance from palliative care team.   - Upon " further conversation with boyfriend (Otis) today, it does not appear he is aware of her HIV status. I asked about general medical history and any new/recent infections to which he did not mention HIV/AIDS status. Reached out to  to discuss with family/son regarding if previous partners have been notified.   - Will continue best supportive cares.     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 Previously hospitalized in March 2024 where imaging revealed restricted diffusion and T2 hyperintensity of osterior pontine mesencephalic junction with local mass effect. Felt to be likely ischemic infarct (risk factors of HTN, smoking and amphetamine use). Additional considerations included infection vs neoplasm. CSF cytology attempted, but insufficient quantity. MRI brain 4/15 showed increased in interval of lesion of L avel and midbrain compared to previous and increase in interval again on 4/24/24 MRI. CT A/P with no lymphadenopathy, mass, or acute process. LP 4/18 non-diagnostic with negative flow cytometry and cytology but CSF EBV PCR positive (original uncertainty of blood vs. CSF PCR but able to confirm CSF by [facility]). ID, oncology, and neurosurgery consulted for concern of CNS lymphoma given context of imaging, worsening decline, and imaging. She was started on Decadron 8 mg and transferred to [facility] 4/24/24 for further workup and consideration of brain biopsy. Unable to obtain tissue biopsy given challenging location of brainstem, if repeat imaging shows expansion, then could consider. But with review of imaging with Neurosurgery, agree with outside impression of CNS lymphoma. CT CAP 4/25 showed no evidence of lymphoma. Repeat LP 4/26 with rare atypical cells on cytology but does not correlate with flow cytometry (rare polytypic B cells). LP also showing positive EBV  "PCR, elevated protein to 109, and negative CMV, meningitis/encephalitis panel.  Ophthalmology consulted; no evidence of ocular lymphoma.  Based on imaging and EBV + PCR from CSF started HD-methotrexate on 4/29/24. Appears to be tolerating well thus far. 32 hours methotrexate level 0.31.   - Infectious studies at [facility] thus far negative for CMV IgM negative (IgG positive suggesting previous infection), Cryptococcus, CMV encephalitis, Tb meningitis, toxoplasmosis serology. Continue to follow toxoplasmosis PCR and Karius.   - Follow CSF AFB culture (NGTD) and bacterial culture results from LP x4/26  - Continue dexamethasone taper with 40 mg x 4 days (4/24-4/30) ? 20 mg x 4 days (5/1-5/4) ? 10 mg daily x 4 days ? 4 mg BID (5/5-X)  - PICC line placed x4/29         Treatment Plan: HD- Methotrexate C1D1 = 4/30/24    - Methotrexate 3.5 g/m2 D1    - Leucovorin 50 mg D2 until methotrexate level < 0.10    - mIVF .45% NS w. Sodium bicarbonate @125 ml/hr    #Decreased mental status secondary to lesion of L avel and midbrain  Presented to OSH 4/12/24 with 4/12/24 with right facial and arm paresthesias and RLE weakness originally concerning for ischemic stroke. Further imaging revealed mass of L avel and midbrain. Transferred to [facility] for consideration of biopsy, but due to location was designated at too risky. On arrival to [facility], patient on assessment by neurosurgery was A&O x 3, able to slowly follow commands, and had slowed speech. She has since progressed to not being able to move extremities, waxes/wanes ability to follow commands, and non-verbal. She can follow conversations with eye and appropriately blink \"yes\" to questions. For example, when asked \"is this your son\" when referring to son (Yuan) patient was able to blink multiple times. Notable decrease in neurological function on exam morning x4/30 and consistent findings by neurology team; patient unable to squeeze fingers in comparison to day prior and overnight. " Stat head CT for concern of increased ICP; no acute findings and L avel lesion similar to CT done 4/25. Repeat MRI brain w/wo contrast x4/30 showing increased size of known lesion of L avel and midbrain in comparison to MRI on 4/15 and 3/29. Given concern for increasing size in mass, will continue to monitor closely with Q4H neuro checks for s/sx of herniation. If new changes in exam will obtain stat CT head and if evidence of herniation, would need emergent intubation. Appreciate assistance and input from neurology, signed off 5/1 but familiar with patient and readily available for any questions and concerns.   - RT consult placed for difficulty with secretion management. Could also assist with sputum culture.     #Anemia  #Thrombocytosis  Likely due to underlying suspected malignancy  - Transfuse for plts < 10k and Hgb , 7.0    Hx SWETA  Has hx of SWETA. Has been treated with transfusions and IV iron in the past. Will defer workup at this time.     # ID Prophylaxis  - Dapsone 100 mg daily for PJP ppx     ID  #HIV/AIDS  Recently diagnosed with HIV during admission at [facility] on 3/28/24 with viral load of 1.24 million, 6.9 log,  and CD4 count of 2. She was not started on ART, given Nystatin x2 weeks and referred to ID clinic for follow up. Prior to this, she was admitted again with ID consult for broad-infectious workup. Started Biktarvy 4/17 but stopped due concern for rash, rechallenged on 4/20 and able to tolerate. Transitioned to Dolutegravir and Truvada. Of note, patient's aunt (Noemy), and son (Yuan) are aware of diagnosis.   - Continue ART with Dolutegravir and Truvada   - Plan to repeat CD4 count and viral load circa x5/15     #COVID-19 positive  #MRSA pneumonia  Tested positive for COVID-19 4/18/24 during admission to [facility]. Patient remains asymptomatic. Will continue to monitor at this time. Previously on Linezolid. ID following; does not feel like MRSA pneumonia is likely and ok to stop  Linezolid.     MISC  #Cotton wool spot, right eye  Single CWS in right eye, most likely in setting of acute illness. Found on assessment by ophthalmology x4/28. Given significant comorbidities, CD4 count of 2 and infectious panels pending, will repeat dilated fundus exam in 1 week to rule out early signs of fungal ocular infection     SOCIAL   #Medical decision maker  Confirmed with family that son (Yuan) will be patient's primary medical decision maker. Son Yuan and patient are okay with communication with aunt (Noemy) if Yuan is unavailable.     #HIV/AIDS Status    Son (Yuan) and aunt (Noemy) are aware of patient HIV status. Spoke with SW regarding HIV/AIDS status and if previous partners have been notified. SW to reach out to son (Yuan) or aunt (Noemy) regarding.   - 5/1: Upon further conversation with boyfriend (Otis) today, it does not appear he is aware of her HIV status. I asked about general medical history and any new/recent infections to which he did not mention HIV/AIDS status. Reached out to SW to discuss with family/son regarding if previous partners have been notified.     #H/o stimulant/amphetamine use   Noted on [facility] tox screen in 2016. Patient previously reported snorting and declined IVDU.     Fluids/Electrolytes/Nutrition   - IVF per chemotherapy regimen  - PRN lyte replacement per standing protocol  - Regular diet as tolerated     Lines: PICC    PPX  VTE: Lovenox  GI: Famotidine  Bowels: prn miralax and senna    Code: Full    Medically Ready for Discharge: Anticipated in 5+ Days    Disposition: Pending further workup, evaluation, tolerance of HD-methotrexate, and clinical course. Patient will require a long term care facility on discharge.     I spent >45 minutes face-to-face and/or coordinating or discussing care plan. Over 50% of our time on the unit was spent counseling the patient and/or coordinating care    Patient is seen and examined by Dr. Harris and RILEY.  Assessment and  "plan are discussed and delivered to the patient.    Kandi Lambert PA-C  Hematology/Oncology  Pager: [phone]    Interval History   Nursing notes reviewed. No acute events overnight    Kamilah is resting in bed this morning. BoyfriendOtis supportive at bedside. Opens eyes to voice and follows conversation with eyes. She continues to be able to communicate with blinking \"yes\" to yes/no questions, denying SOB, CP, coughing, abdominal pain, N/V. Blinks \"yes\" when asked if the person at beside is her boyfriend Otis. Unable to squeeze fingers this morning during neurological exam and afternoon with reassessment, but varying between nursing, neurology, and primary team. Will continue to monitor closely.     Upon further conversation with boyfriend (Otis) today, it does not appear he is aware of her HIV status. I asked about general medical history and any new/recent infections to which he did not mention HIV/AIDS status. Reached out to SW to discuss with family/son regarding if previous partners have been notified.     Complete and Comprehensive review of systems review and negative other than noted here or in the HPI.     Physical Exam   Temp: 97.2  F (36.2  C) Temp src: Axillary BP: 129/78 Pulse: 54   Resp: 16 SpO2: 100 % O2 Device: None (Room air)    Vitals:    04/26/24 1534 04/29/24 1300 04/30/24 0848   Weight: 74 kg (163 lb 2.3 oz) 73.3 kg (161 lb 9.6 oz) 71 kg (156 lb 8.4 oz)     Vital Signs with Ranges  Temp:  [97.2  F (36.2  C)-98.2  F (36.8  C)] 97.2  F (36.2  C)  Pulse:  [50-84] 54  Resp:  [16-18] 16  BP: (116-137)/(77-95) 129/78  SpO2:  [96 %-100 %] 100 %  I/O last 3 completed shifts:  In: 4530 [I.V.:2750; NG/GT:710]  Out: 3725 [Urine:3725]      Physical Exam:    Blood pressure 129/78, pulse 54, temperature 97.2  F (36.2  C), temperature source Axillary, resp. rate 16, height 1.626 m (5' 4\"), weight 71 kg (156 lb 8.4 oz), SpO2 100%.    General: lying in bed. Nontoxic appearing. No acute distress. " "  HEENT: Normocephalic/ Sclera anicteric. Moist mucus membranes.   CV: RRR. Normal S1/S2. No m/r/g. No peripheral edema.   Resp: CTAB on anterior auscultation. No wheezing/crackles. Normal respiratory effort on room air. Breath sounds are equal throughout.   GI: Soft, non-tender, non-distended. Bowel sounds present and normoactive. No peritoneal signs. NGT in tact.   MSK: Warm and well-perfused. Normal tone.   Skin: Skin is clean, dry and intact. No rashes on limited exam. No jaundice. Soft mitts in place  Neuro: Opens eyes to voice and maintains eye contact. Able to communicate with blinking and nodding head. Blinks \"yes\" appropriately when asked orientation questions such as \"is this your son Yuan?\" when referring to dejon Bolden. Able to weakly squeeze fingers.       Medications   Current Facility-Administered Medications   Medication Dose Route Frequency Provider Last Rate Last Admin    dextrose 10% infusion   Intravenous Continuous PRN Russel Boothe DO        NaCl 0.45 % 1,000 mL with sodium bicarbonate 100 mEq/L infusion   Intravenous Continuous Jonas Goldstein MD PhD 125 mL/hr at 05/01/24 0129 New Bag at 05/01/24 0129     Current Facility-Administered Medications   Medication Dose Route Frequency Provider Last Rate Last Admin    atorvastatin (LIPITOR) tablet 40 mg  40 mg Oral or Feeding Tube Daily Russel Boothe DO   40 mg at 05/01/24 0834    dexAMETHasone (DECADRON) injection 20 mg  20 mg Intravenous Daily Kandi Lambert PA-C   20 mg at 05/01/24 0834    Followed by    [START ON 5/5/2024] dexAMETHasone (DECADRON) injection 10 mg  10 mg Intravenous Daily Kandi Lambert PA-C        Followed by    [START ON 5/9/2024] dexAMETHasone (DECADRON) injection 5 mg  5 mg Intravenous Daily Kandi Lambert PA-C        dolutegravir (TIVICAY) tablet 50 mg  50 mg Oral or NG Tube Daily Ramesh Van MD   50 mg at 04/30/24 2158    emtricitabine-tenofovir (TRUVADA) 200-300 MG per tablet 1 " tablet  1 tablet Oral or NG Tube Daily Ramesh Van MD   1 tablet at 04/30/24 1959    enoxaparin ANTICOAGULANT (LOVENOX) injection 40 mg  40 mg Subcutaneous Q24H Russel Boothe DO   40 mg at 04/30/24 1322    famotidine (PEPCID) suspension 20 mg  20 mg Oral BID Kandi Lambert PA-C   20 mg at 05/01/24 0834    gabapentin (NEURONTIN) solution 100 mg  100 mg Oral or Feeding Tube Q12H Pending sale to [facility] (08/20) Russel Boothe DO   100 mg at 05/01/24 0834    insulin aspart (NovoLOG) injection (RAPID ACTING)  1-4 Units Subcutaneous Q4H Russel Boothe DO   1 Units at 04/30/24 2012    leucovorin calcium injection 25 mg  25 mg Intravenous Q6H Jonas Goldstein MD PhD   25 mg at 05/01/24 0839    thiamine (B-1) tablet 100 mg  100 mg Oral or Feeding Tube Daily Russel Boothe DO   100 mg at 04/30/24 4538       Data   Results for orders placed or performed during the hospital encounter of 04/24/24 (from the past 24 hour(s))   CT Head w/o Contrast    Narrative    CT HEAD W/O CONTRAST 4/30/2024 10:13 AM    History: 44 yr F with L pontine lesion, now with worsening exam with  bradycardia, R hemiplegia, new R CN6 palsy. Evaluate for impending  herniation, edema, raised ICP signs     Comparison: MRI brain 4/15/2024 from outside institution and outside  neck CT from 4/25/2024    Technique: Using multidetector thin collimation helical acquisition  technique, axial, coronal and sagittal CT images from the skull base  to the vertex were obtained without intravenous contrast.   (topogram) image(s) also obtained and reviewed.    Findings: Redemonstration of 2.8 x 2.5 cm relatively hyperattenuating  lesion in the posterior lateral left avel that effaces the  quadrigeminal and partially the left ambient cistern. The most recent  MR, the lesion measures 2.1 x 1.6 cm and does not appear to efface the  cisterns to this extent. The fourth ventricle is partially involved  but is patent. The ventricular size is not enlarged, stable from  MR  4/15/2024. There is no intracranial hemorrhage, mass effect, or  midline shift. Gray/white matter differentiation in both cerebral  hemispheres is preserved. The basal cisterns are otherwise clear.    The bony calvaria and the bones of the skull base are normal. Polypoid  left maxillary sinus mucosal thickening with scattered paranasal  fluid, greatest in the left sphenoid sinus. The mastoid air cells are  clear. Partially visualized right-sided nasoenteric tube.      Impression    Impression:  1. Redemonstration of left posterior lateral pontine lesion measuring  up to 2.8 cm with effacement of the adjacent cisterns. There is no  evidence of obstructive hydrocephalus. This appears similar to neck CT  from 4/25/2024, appears relatively enlarged compared to MRI from  4/15/2024, although could be due to technical differences.  2. Nonspecific left maxillary sinus polypoid mucosal thickening and  scattered paranasal sinus fluid.  3. Overall no new acute finding.    I have personally reviewed the examination and initial interpretation  and I agree with the findings.    CLARY PHAM MD         SYSTEM ID:  [id number]   MR Brain w/o & w Contrast    Narrative     MR BRAIN W/O & W CONTRAST 4/30/2024 12:09 PM    Provided History: 44 YOF new concern of CNS lymphoma. Previous imgaes  showing increasing lesion of L midbrain and avel. Please assess for  interval changes.    ICD-10:    Comparison: Outside MRI 4/15/2024, 3/29/2024 and head CT 4/30/2024    Technique: Multiplanar T1-weighted, axial FLAIR, and susceptibility  images were obtained without intravenous contrast. Following  intravenous gadolinium-based contrast administration, axial  T2-weighted, diffusion, and T1-weighted images (in multiple planes)  were obtained.    Contrast dose: gadobutrol (GADAVIST) injection 7.5 mL    Findings: Continued progression of brainstem mass centered in the left  dorsal medulla with extension to the avel, left middle cerebellar  peduncle  and left cerebral peduncle, increased in size compared with  4/15/2024 and 3/29/2024 exams, they measuring 2.5 x 2.2 x 3.3,  previously 2.1 x 1.5 x 1.9 cm on 4/15/2024. The lesion shows marked  diffusion restriction with diffuse contrast enhancement and tiny  punctate microhemorrhage internally.   There is no midline shift, or evidence of intracranial hemorrhage. The  ventricles are proportionate to the cerebral sulci.    No definite abnormality of the skull marrow signal is noted. The major  vascular intracranial flow-voids are present. The visualized portions  of paranasal sinuses, and mastoid air cells are relatively clear. The  orbits are grossly unremarkable.      Impression    Impression: Increased size of known left brain stem mass compared with  4/15/2024 and 3/29/2024 MRI exams,, imaging findings are most  concerning for central nervous system lymphoma although primary glial  neoplasm or metastatic lesion cannot be excluded.    CLARY PHAM MD         SYSTEM ID:  [id number]   Glucose by meter   Result Value Ref Range    GLUCOSE BY METER POCT 86 70 - 99 mg/dL   Glucose by meter   Result Value Ref Range    GLUCOSE BY METER POCT 121 (H) 70 - 99 mg/dL   pH urine POCT   Result Value Ref Range    pH Urine 8.0 pH   Glucose by meter   Result Value Ref Range    GLUCOSE BY METER POCT 157 (H) 70 - 99 mg/dL   Glucose by meter   Result Value Ref Range    GLUCOSE BY METER POCT 136 (H) 70 - 99 mg/dL   pH urine POCT   Result Value Ref Range    pH Urine 7.5 (A) pH   Glucose by meter   Result Value Ref Range    GLUCOSE BY METER POCT 135 (H) 70 - 99 mg/dL   Phosphorus   Result Value Ref Range    Phosphorus 4.0 2.5 - 4.5 mg/dL   Magnesium   Result Value Ref Range    Magnesium 2.3 1.7 - 2.3 mg/dL   CBC with platelets   Result Value Ref Range    WBC Count 8.5 4.0 - 11.0 10e3/uL    RBC Count 3.76 (L) 3.80 - 5.20 10e6/uL    Hemoglobin 10.9 (L) 11.7 - 15.7 g/dL    Hematocrit 32.6 (L) 35.0 - 47.0 %    MCV 87 78 - 100 fL    MCH 29.0  26.5 - 33.0 pg    MCHC 33.4 31.5 - 36.5 g/dL    RDW 14.3 10.0 - 15.0 %    Platelet Count 432 150 - 450 10e3/uL   Methotrexate level   Result Value Ref Range    Methotrexate 0.31 umol/L   Potassium   Result Value Ref Range    Potassium 4.2 3.4 - 5.3 mmol/L   CBC with Platelets & Differential    Narrative    The following orders were created for panel order CBC with Platelets & Differential.  Procedure                               Abnormality         Status                     ---------                               -----------         ------                     CBC with platelets and d...[[id number]]  Abnormal            Final result                 Please view results for these tests on the individual orders.   Comprehensive metabolic panel   Result Value Ref Range    Sodium 142 135 - 145 mmol/L    Potassium 4.2 3.4 - 5.3 mmol/L    Carbon Dioxide (CO2) 27 22 - 29 mmol/L    Anion Gap 11 7 - 15 mmol/L    Urea Nitrogen 16.3 6.0 - 20.0 mg/dL    Creatinine 0.45 (L) 0.51 - 0.95 mg/dL    GFR Estimate >90 >60 mL/min/1.73m2    Calcium 8.9 8.6 - 10.0 mg/dL    Chloride 104 98 - 107 mmol/L    Glucose 149 (H) 70 - 99 mg/dL    Alkaline Phosphatase 66 40 - 150 U/L    AST 20 0 - 45 U/L    ALT 18 0 - 50 U/L    Protein Total 6.8 6.4 - 8.3 g/dL    Albumin 3.3 (L) 3.5 - 5.2 g/dL    Bilirubin Total 0.3 <=1.2 mg/dL   CBC with platelets and differential   Result Value Ref Range    WBC Count 8.5 4.0 - 11.0 10e3/uL    RBC Count 3.76 (L) 3.80 - 5.20 10e6/uL    Hemoglobin 10.9 (L) 11.7 - 15.7 g/dL    Hematocrit 32.6 (L) 35.0 - 47.0 %    MCV 87 78 - 100 fL    MCH 29.0 26.5 - 33.0 pg    MCHC 33.4 31.5 - 36.5 g/dL    RDW 14.3 10.0 - 15.0 %    Platelet Count 432 150 - 450 10e3/uL    % Neutrophils 89 %    % Lymphocytes 5 %    % Monocytes 5 %    % Eosinophils 0 %    % Basophils 0 %    % Immature Granulocytes 1 %    NRBCs per 100 WBC 0 <1 /100    Absolute Neutrophils 7.6 1.6 - 8.3 10e3/uL    Absolute Lymphocytes 0.4 (L) 0.8 - 5.3 10e3/uL    Absolute  Monocytes 0.4 0.0 - 1.3 10e3/uL    Absolute Eosinophils 0.0 0.0 - 0.7 10e3/uL    Absolute Basophils 0.0 0.0 - 0.2 10e3/uL    Absolute Immature Granulocytes 0.0 <=0.4 10e3/uL    Absolute NRBCs 0.0 10e3/uL   pH urine POCT   Result Value Ref Range    pH Urine 8.0 pH   Glucose by meter   Result Value Ref Range    GLUCOSE BY METER POCT 117 (H) 70 - 99 mg/dL

## 2024-05-01 NOTE — PLAN OF CARE
[phone]    Day 2 HD Methotrexate. VSS, afebrile and on RA. On cardiac monitoring. Orientation difficult to assess, however pt responds by turning head when called by name, A&Ox1. Pt appears relaxed with neutral facial expressions, sleeping between cares. THOR nausea or SOB. TF running at goal rate of 55 with exception of being held for Tivicay medication. Sodium bicarb infusing at 125 ml/hr, urine ph at 1700 was 8.0, next at 0100. Adequate UOP via thornton. No BM. Turned 3x during shift, mepilex in place. BS were 121 and 157, covered with sliding scale when needed. Friend at bedside for part of shift. Continue w/ POC    Goal Outcome Evaluation:      Plan of Care Reviewed With: patient    Overall Patient Progress: no changeOverall Patient Progress: no change    Outcome Evaluation: Received first dose of leucovorin

## 2024-05-01 NOTE — PROGRESS NOTES
[facility]  Transplant & Immunocompromised Infectious Disease Progress Note   Patient: Kamilah Goldberg, Date of birth 1980, Medical record number [id number].      Recommendations:     Would prefer to start dapsone 100 mg daily for PJP prophylaxis     Will continue to explore patients wishes (though her family) regarding HIV disclosure, boyfriend Otis appears not to know about the HIV, but probably should be tested- appreciate social work support for this. I will continue to try to reach out to son johnathan who I understand is next of kin and Noemy aunt who appears to be an additional decision maker both of whom are aware of HIV status- they were unreachable today.    Continue Dolutegravir and Truvada  Plan to repeat CD4 and viral load circa 5/15/2024.  Will follow infectious disease studies in process (CMV, Endemic mycosis)    ID Will continue to follow through this hospitalization, although since ID issues appear to currently be optimally managed may not follow on a daily basis, please page with questions or if situation arises where we may be of assistance.  Case discussed with Transplant ID Staff.    Signed:  Gerson Stewart MD, 05/01/2024   Transplant Infectious Disease Fellow  Pager [phone] For more paging info see [facility]-> Infectious Disease [place] [facility] Service        Patient Summary:   44-year-old woman with recent complicated strep empyema and substance use/unstable housing situation who presented to an outside hospital with new diagnosis of AIDS.  In the outside hospital she was diagnosed with a CNS lesion and then started on ART around April 17.  She was transferred here for oncology and neurosurgical evaluation.        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 He now has rare atypical cells on the repeat LP here, as well as EBV.     As outlined most infectious studies thus far are negative and the preliminary additional studies done here are also not suggestive of infection. CSF WBC count 3 and normal glucose suggests against infection.   - Cryptococcus has essentially been ruled out with negative CSF CrAg and two negative serum CrAg. CSF not consistent.  - CMV encephalitis seems much less likely based negative CMV PCR   - Toxoplasmosis serology is negative, as is PCR  - PML has been ruled out with -ve ERNEA virus PCR  - Tb meningitis: negative quant-gold. AFB cultures are pending. CSF not consistent.  - Endemic mycoses: again not a classic presentation and no other signs of disseminated disease (typically can see pancytopenia, elevated alk phos, splenomegaly, etc). Eval is negative so far.     I would emphasize that with ART, expect her HIV/AIDs to have relatively rapid improvement in her viral load and cell counts. She is ART naive, and her long-term prognosis from a purely HIV/AIDs perspective is excellent if we can diagnosis and effectively treat this CNS lesion, though certainly recognize that the location, rapid progression, and her current neurological status is very concerning as far as her short-term prognosis.     Agree with high-dose steroids now that we have maximized diagnostics since this lesion is not amenable to brain biopsy given its location. Appreciate hematology consideration of further therapeutics including IT methotrexate. I would note that ART and immune reconstitution is a vital part treating CNS lymphoma as well.    She  should be on PJP prophylaxis, prefer dapsone.    #Non-immune to hepatitis A or B. Hep B coreAb negative  #Homeless and and history of polysubstance use disorder  # STD discussion  Other harm reduction labs have been completed at [facility] notably hep B/C, Syphilis, and other STI with gonorrhea and chlamydia    Will continue to explore patients wishes (though her family) regarding HIV disclosure, boyfriend Otis appears not to know about the HIV, but probably should be tested- appreciate social work support for this. I will continue to try to reach out to son johnathan who I understand is next of kin and Noemy aunt who appears to be an additional decision maker both of whom are aware of HIV status- they were unreachable today.    #COVID-19+ on 4/18  Remains in Isolation although asymptomatic    Other Infectious Disease Issues  #MRSA in sputum from 4/19  - Reason to for additional endemic disease testing: No   - Bacterial prophylaxis: None  - Pneumocystis prophylaxis: planning dapsone vs atovaquone vs pentamidine  - Viral serostatus & prophylaxis: CMV+  - Fungal prophylaxis: none  - Immunization status: Hep B non infected, non immune, Hep A non immune, largely unvaccinated since 2009 will need to be addressed once further from chemo and has had some immune reconstitution  - Gamma globulin status:No lab results found.  - Isolation status: Good hand hygiene. Contact fro MRSA in lung at OSH, Special 2/2 recent covid, immunocompromised.       Selected Labs, Micro, Imaging Results:   [facility] Workup  4/20 - HLA  -ve  4/18 - LP EBV CSF +ve  4/16 - Toxo IgG -ve, CMV IgG+  3/31- HIV RNA+ 1.2 million copies 6.9 log, CD-4 count 2/mcl  3/31/24- Genotype- no resistance  3/30/24- Syphilis Ab -ve  3/29/24- CT/NG Urine negative    [facility] Workup  4/27 Karius pending, Blasto blood and Histo Urine pending  4/26 CSF- Crypto -ve, EBV+, Toxo pending (other ID studies negative; RENEA virus, CMV, Cultures)  4/29 Hep C Pending (Hep C Ab -ve  "4/1/24)         Interval History and Subjective:     Her boyfriend and he is in the room.  When asked about what he knows about her care he understands that she has a brain tumor and is getting chemotherapy, that she had COVID-19 and MRSA.  He is not aware of any other infections, he is not aware what may have caused the brain tumor, he is not aware that her immune system may be low prior to arrival.    I did not disclose her HIV status to him as her wishes on this are still not yet entirely clear to me.  It seems as if he was unaware of her HIV status.  I discussed in general our major decisions today hinged on prophylactic antibiotics.    She was nonparticipatory in the history but perhaps turn her head towards me on command.    I called her son and aunt but was unable to get through to them today.    Review of Symptoms:  Unable       ID HPI from 4/25/24     43 year old woman with recently diagnosed HIV (1 month ago)/AIDs (CD4 2) who presented to [facility] 4/15/2024 with generalized weakness found to have CNS lesion, highest concern for CNS lymphoma, transferred here for neurosurgical evaluation and possible biopsy     She was seen 1/2023 at [facility] for severe pneumococcal pneumonia/empyema requiring VATs decortication.      She was next seen 3/29/2024 at [facility] with oral candidiasis and then HIV was screened and positive. CD4 2. She was given nystatin for 2 weeks and referred to ID clinic for followup. Not started on ART. MRI brain at this time showed a 15mm T2 hyperintensity in the pontomesencephalic junction (initially dx as ischemic stroke).      She went back to [facility] 4/15/2024 for generalized weakness. She had a broad work-up after repeat brain MRI showed increase size of the lesion. She was started on Biktarvy ~4/17 but there were concerns for a rash though it was restarted. She was COVID+ 4/18 and grew \"light growth\" MRSA on sputum culture 4/19. She was started on linezolid.      On " "arrival here, she is afebrile with HR 73, BP normotensive, satting 100% on RA. WBC and PLT normal. Hgb 11.3 very slightly low. Cr 0.46. LFTs have not been done since 4/19 when they were relatively unremarkable - ALT 44, AST 56, Tbili 0.3 and most notably Alk Phos was normal at 79.      On interview today, she is relatively encephalopathic. She says she has two children, 24 and 14. Her son has a cat. She denies pain or discomfort. Says she is willing to take medicine for HIV       Physical Exam:     VITAL SIGNS:  Blood pressure 129/78, pulse 56, temperature 97.1  F (36.2  C), temperature source Oral, resp. rate 16, height 1.626 m (5' 4\"), weight 71.8 kg (158 lb 4.6 oz), SpO2 100%.   GENERAL APPEARANCE: Not in acute distress    PHYSICAL EXAM:  Eyes:   Tracks with eyes, no discharge  Mouth, Throat:   NG tube present  Cardiovascular: No Cyanosis, JVD not elevated  Respiratory:  Not in respiratory distress, Chest expansion symmetrical, No Audible wheeze/stridor, wet crackling cough present  Gastrointestinal: Not distended, No Visible Pulsation  Musculoskeletal:  No Visible Joint Effusion, no visible fasciculations  Skin:  The exposed skin is warm and dry  Neurologic:     Higher Mental Function: Nonverbal, unable to assess   Motor: No spontaneous movement  Psychiatric: Unable to assess       Other Data:     MEDICATIONS  Current Facility-Administered Medications   Medication Dose Route Frequency Provider Last Rate Last Admin    atorvastatin (LIPITOR) tablet 40 mg  40 mg Oral or Feeding Tube Daily Russel Boothe, DO   40 mg at 05/01/24 0834    dexAMETHasone (DECADRON) injection 20 mg  20 mg Intravenous Daily Kandi Lambert PA-C   20 mg at 05/01/24 0834    Followed by    [START ON 5/5/2024] dexAMETHasone (DECADRON) injection 10 mg  10 mg Intravenous Daily Kandi Lambert PA-C        Followed by    [START ON 5/9/2024] dexAMETHasone (DECADRON) injection 5 mg  5 mg Intravenous Daily Fausto, " "MICHELLE Chau        dolutegravir (TIVICAY) tablet 50 mg  50 mg Oral or NG Tube Daily Ramesh Van MD   50 mg at 04/30/24 2158    emtricitabine-tenofovir (TRUVADA) 200-300 MG per tablet 1 tablet  1 tablet Oral or NG Tube Daily Ramesh Van MD   1 tablet at 04/30/24 1959    enoxaparin ANTICOAGULANT (LOVENOX) injection 40 mg  40 mg Subcutaneous Q24H Russel Boothe DO   40 mg at 04/30/24 1322    famotidine (PEPCID) suspension 20 mg  20 mg Oral BID Kandi Lambert PA-C   20 mg at 05/01/24 0834    gabapentin (NEURONTIN) solution 100 mg  100 mg Oral or Feeding Tube Q12H [facility] (08/20) Russel Boothe DO   100 mg at 05/01/24 0834    insulin aspart (NovoLOG) injection (RAPID ACTING)  1-4 Units Subcutaneous Q4H Russel Boothe DO   1 Units at 04/30/24 2012    leucovorin calcium injection 25 mg  25 mg Intravenous Q6H Jonas Goldstein MD PhD   25 mg at 05/01/24 0839    thiamine (B-1) tablet 100 mg  100 mg Oral or Feeding Tube Daily Russel Boothe DO   100 mg at 04/30/24 2158       IMMUNOLOGY LABS  CD-4 Counts No results found for: \"ACD4\"  Inflammatory Markers    Recent Labs   Lab Test 04/29/24  0819   G6PD 13.0     Immune Globulin Studies   No lab results found.    GENERAL LABS  Metabolic Studies       Recent Labs   Lab Test 05/01/24  0858 05/01/24  0453 04/30/24  0845 04/30/24  0405 04/27/24  0925 04/27/24  0724 01/26/23  0541 01/24/23  2118 01/17/23  1231 01/17/23  0641   NA  --  142  --  143  141   < > 140   < >  --    < >  --    POTASSIUM  --  4.2  4.2  --  4.0  3.8   < > 3.5   < >  --    < > 2.9*   CHLORIDE  --  104  --  106  106   < > 108*   < >  --    < >  --    CO2  --  27  --  26  27   < > 20*   < >  --    < >  --    ANIONGAP  --  11  --  11  8   < > 12   < >  --    < >  --    BUN  --  16.3  --  16.1  15.7   < > 7.4   < >  --    < >  --    CR  --  0.45*  --  0.38*  0.36*   < > 0.48*   < >  --    < >  --    GFRESTIMATED  --  >90  --  >90  >90   < > >90   < >  --    < >  --    * 149*   < " > 142*  143*   < > 178*   < >  --    < >  --    A1C  --   --   --   --   --  5.7*  --   --   --   --    MAUREEN  --  8.9  --  8.9  8.8   < > 9.1   < >  --    < >  --    PHOS  --  4.0  --  2.6   < > 4.1   < >  --    < >  --    MAG  --  2.3  --  2.2   < > 1.9   < >  --    < >  --    LACT  --   --   --   --   --   --   --  1.4  --  0.8    < > = values in this interval not displayed.     Hepatic Studies    Recent Labs   Lab Test 05/01/24 0453 04/30/24 0405 04/29/24 0431 04/24/24 2050 01/17/23  0641   BILITOTAL 0.3 0.4 0.3   < >  --    DBIL  --   --  <0.20   < >  --    ALKPHOS 66 68 74   < >  --    PROTTOTAL 6.8 7.2 7.6   < > 6.3*   ALBUMIN 3.3* 3.3* 3.5   < >  --    AST 20 24 23   < >  --    ALT 18 21 17   < >  --    LDH  --   --  220  --  263*    < > = values in this interval not displayed.     Pancreatitis testing    Recent Labs   Lab Test 04/10/19  0003   LIPASE 162     Hematology Studies   Recent Labs   Lab Test 05/01/24 0453 04/30/24 0405 04/29/24  1359 04/29/24  0431 04/26/24  0615 04/24/24 2050 02/24/22  1458 04/10/19  0003   WBC 8.5  8.5 7.1 9.5 9.7   < > 6.7  6.7   < > 4.1   ANEU  --   --   --   --   --  6.1  --  2.0   ANEUTAUTO 7.6  --  8.8*  --   --   --    < >  --    ALYM  --   --   --   --   --  0.5*  --  1.4   ALYMPAUTO 0.4*  --  0.4*  --   --   --    < >  --    KYLE  --   --   --   --   --  0.0  --  0.5   AMONOAUTO 0.4  --  0.2  --   --   --    < >  --    AEOS  --   --   --   --   --  0.0  --  0.2   AEOSAUTO 0.0  --  0.0  --   --   --    < >  --    ABSBASO 0.0  --  0.0  --   --   --    < >  --    HGB 10.9*  10.9* 10.1* 11.1* 10.5*   < > 11.3*  11.3*   < > 7.7*   HCT 32.6*  32.6* 30.9* 34.3* 32.7*   < > 34.2*  34.2*   < > 27.4*     432 453* 508* 528*   < > 450  450   < > 470*    < > = values in this interval not displayed.     Urine Studies     Recent Labs   Lab Test 05/01/24  0854 05/01/24  0131 04/30/24  1704 04/30/24  0906 04/30/24  0512 04/29/24  2100 01/16/23  2132 09/19/18  2221  "  URINEPH 8.0 7.5* 8.0 7.5 7.5   < > 6.0 5.5   NITRITE  --   --   --   --   --   --  Negative Negative   LEUKEST  --   --   --   --   --   --  Small* Trace*   WBCU  --   --   --   --   --   --  <1 <1    < > = values in this interval not displayed.     CSF testing     Recent Labs   Lab Test 04/26/24  1116 04/26/24  1115   CWBC  --  3   CRBC  --  44*   CGLU 52  --    CTP  --  109.0*     Medication levels    Recent Labs   Lab Test 01/20/23  1230   VANCOMYCIN 7.6     Body fluid stats    Recent Labs   Lab Test 01/17/23  1500   FCOL Yellow   FAPR Hazy*   FWBC 5,760   FNEU 78   FLYM 16   FMONO 5   FBAS 1   FTP 5.6       MICROBIOLOGY  Fungal testing:  No lab results found.    Invalid input(s): \"HIFUN\", \"FUNGL\"  Last Culture results   Culture   Date Value Ref Range Status   04/26/2024 No anaerobic organisms isolated after 4 days  Preliminary   04/26/2024 No growth after 4 days  Preliminary   04/26/2024 No Growth  Final   04/26/2024 No growth after 5 days  Preliminary   01/24/2023 No Growth  Final   01/24/2023 No Growth  Final   01/20/2023 No anaerobic organisms isolated  Final   01/20/2023 No Growth  Final   01/20/2023 No Growth  Final   01/17/2023 2+ Streptococcus pneumoniae (A)  Final   01/17/2023 No Growth  Final   12/04/2022 No Growth  Final   12/04/2022 3+ Normal alo  Final   12/04/2022 No Growth  Final   02/24/2022 No Growth  Final     Culture Micro   Date Value Ref Range Status   04/30/2012 No growth  Final   06/30/2010 >100,000 colonies/mL Escherichia coli  Final   02/05/2010 No growth  Final   12/01/2009 No growth  Final       Last checks of Clostridioides difficile testing  No lab results found.  Infection Studies to assess Diarrhea No lab results found.    Invalid input(s): \"BIDRY\", \"BIDYD\"  Virology:  Coronavirus-19 testing    Recent Labs   Lab Test 11/28/23  1451 01/16/23 2017 12/04/22 2036 02/24/22  1456   [id number] Negative Negative Negative Negative     Respiratory virus (non-coronavirus-19) testing  " "  Recent Labs   Lab Test 11/28/23  1451   INFZA Negative   INFZB Negative   IRSV Negative     CMV viral loads  No lab results found.  CMV resistance testing:  No lab results found.  No results found for: \"H6RES\"  No results found for: \"EBVRESINST\", \"95973\", \"EBQTC\", \"EBRES\", \"94811\", \"63878\"  BK Virus Testing   No lab results found.  Parvovirus Testing  No lab results found.    Invalid input(s): \"PRVRES\"  Adenovirus Testing  No lab results found.    Invalid input(s): \"ADENAB\", \"ADENOVIRUS\", \"ADQT\"    IMAGING  Recent Results (from the past 48 hour(s))   CT Head w/o Contrast    Narrative    CT HEAD W/O CONTRAST 4/30/2024 10:13 AM    History: 44 yr F with L pontine lesion, now with worsening exam with  bradycardia, R hemiplegia, new R CN6 palsy. Evaluate for impending  herniation, edema, raised ICP signs     Comparison: MRI brain 4/15/2024 from outside institution and outside  neck CT from 4/25/2024    Technique: Using multidetector thin collimation helical acquisition  technique, axial, coronal and sagittal CT images from the skull base  to the vertex were obtained without intravenous contrast.   (topogram) image(s) also obtained and reviewed.    Findings: Redemonstration of 2.8 x 2.5 cm relatively hyperattenuating  lesion in the posterior lateral left aevl that effaces the  quadrigeminal and partially the left ambient cistern. The most recent  MR, the lesion measures 2.1 x 1.6 cm and does not appear to efface the  cisterns to this extent. The fourth ventricle is partially involved  but is patent. The ventricular size is not enlarged, stable from MR  4/15/2024. There is no intracranial hemorrhage, mass effect, or  midline shift. Gray/white matter differentiation in both cerebral  hemispheres is preserved. The basal cisterns are otherwise clear.    The bony calvaria and the bones of the skull base are normal. Polypoid  left maxillary sinus mucosal thickening with scattered paranasal  fluid, greatest in the left " sphenoid sinus. The mastoid air cells are  clear. Partially visualized right-sided nasoenteric tube.      Impression    Impression:  1. Redemonstration of left posterior lateral pontine lesion measuring  up to 2.8 cm with effacement of the adjacent cisterns. There is no  evidence of obstructive hydrocephalus. This appears similar to neck CT  from 4/25/2024, appears relatively enlarged compared to MRI from  4/15/2024, although could be due to technical differences.  2. Nonspecific left maxillary sinus polypoid mucosal thickening and  scattered paranasal sinus fluid.  3. Overall no new acute finding.    I have personally reviewed the examination and initial interpretation  and I agree with the findings.    CLARY PHAM MD         SYSTEM ID:  [id number]   MR Brain w/o & w Contrast    Narrative     MR BRAIN W/O & W CONTRAST 4/30/2024 12:09 PM    Provided History: 44 YOF new concern of CNS lymphoma. Previous imgaes  showing increasing lesion of L midbrain and avel. Please assess for  interval changes.    ICD-10:    Comparison: Outside MRI 4/15/2024, 3/29/2024 and head CT 4/30/2024    Technique: Multiplanar T1-weighted, axial FLAIR, and susceptibility  images were obtained without intravenous contrast. Following  intravenous gadolinium-based contrast administration, axial  T2-weighted, diffusion, and T1-weighted images (in multiple planes)  were obtained.    Contrast dose: gadobutrol (GADAVIST) injection 7.5 mL    Findings: Continued progression of brainstem mass centered in the left  dorsal medulla with extension to the avel, left middle cerebellar  peduncle and left cerebral peduncle, increased in size compared with  4/15/2024 and 3/29/2024 exams, they measuring 2.5 x 2.2 x 3.3,  previously 2.1 x 1.5 x 1.9 cm on 4/15/2024. The lesion shows marked  diffusion restriction with diffuse contrast enhancement and tiny  punctate microhemorrhage internally.   There is no midline shift, or evidence of intracranial hemorrhage.  The  ventricles are proportionate to the cerebral sulci.    No definite abnormality of the skull marrow signal is noted. The major  vascular intracranial flow-voids are present. The visualized portions  of paranasal sinuses, and mastoid air cells are relatively clear. The  orbits are grossly unremarkable.      Impression    Impression: Increased size of known left brain stem mass compared with  4/15/2024 and 3/29/2024 MRI exams,, imaging findings are most  concerning for central nervous system lymphoma although primary glial  neoplasm or metastatic lesion cannot be excluded.    CLARY PHAM MD         SYSTEM ID:  [id number]       ATTESTATION  I have reviewed labs/blood-work that are part of this patients present encounter in addition to historical and baseline values. The specific values are recorded in Epic and I have incorporated them and my interpretation as relevant into my assessment and plan as recorded.  I have reviewed radiology reports/EKGs/and other diagnostic studies that are part of this patients present encounter, in addition to historical and baseline results.  The specific reports are available in Epic and I have incorporated them into my assessment and plan as described above. Independently interpreted diagnostic study results are described above.  This dictation was prepared in part using Dragon recognition software.  As a result errors may occur. When identified these transcription errors have been corrected.  While every attempt is made to correct errors during dictation, errors may still exist.      Signature:     Gerson Stewart MD   PGY-6 Transplant Infectious Disease Fellow

## 2024-05-01 NOTE — PLAN OF CARE
"Goal Outcome Evaluation:      Plan of Care Reviewed With: patient    Overall Patient Progress: no changeOverall Patient Progress: no change     /78 (BP Location: Right arm)   Pulse 54   Temp 97.2  F (36.2  C) (Axillary)   Resp 16   Ht 1.626 m (5' 4\")   Wt 71 kg (156 lb 8.4 oz)   SpO2 100%   BMI 26.87 kg/m      Neuro: intermittent somnolent and lethargic. Eyes open part of night; tracking movement. Shook head yes x1 when asked if doing ok. Did not respond to any other questions. Not following commands. BUE and BLE strength exhibited with cares.  Cardiac: Afebrile, telemetry maintained- sinus kalie. BP wdl   Respiratory: RA   GI/: UOP wdl via thornton catheter. Incontinent of bowel- No BM this shift.   Diet/appetite: NPO. TF at goal rate of 55ml/hr with FWF programmed. Denies nausea  Endocrine: Q 4 hr BG checks/insulin orders in place.  and 135. No insulin needed per parameters  Activity: repositioned q 2hrs. Lift dependent   Pain: No non verbal s/s of pain  Skin: pale. Mepilex on sacrum  Lines: Dbl lumen PICC- both lumen infusing- (red)0.45 NS/sodium Bicarb infusing at 125 ml/hr (purple) tko  Drains: NG- TF and FWF. Flushed without issue  Replacement: RN managed replacement protocol in place. No replacements needed lab rechecks ordered per parameters in place.    Rested through night. Frequent rounds completed as unclear if pt call light appropriate. Bed alarm on for safety. Continue to monitor. Notify MD of changes/concerns.        "

## 2024-05-01 NOTE — PROCEDURES
[facility]     Endovascular Surgical Neuroradiology Post-Procedure Note    Pre-Procedure Diagnosis:  Brainstem lesion  Post-Procedure Diagnosis:  Brainstem lesion    Procedure(s):   Lumbar puncture under fluoroscopic guidance    Findings:  Successful LP at L3-4 with non-elevated opening pressure    Plan:  Samples to lab, 4 hours flat bedrest    Primary Surgeon:  Dr. Moo Butt  Secondary Surgeon:  Not applicable  Secondary Surgeon Review:  None  Fellow:  Johny  Additional Assistants:  Ariela    Prior to the start of the procedure and with procedural staff participation, I verbally confirmed: the patient s identity using two indicators, relevant allergies, that the procedure was appropriate and matched the consent or emergent situation, and that the correct equipment/implants were available. Immediately prior to starting the procedure I conducted the Time Out with the procedural staff and re-confirmed the patient s name, procedure, and site/side. (The Joint Commission universal protocol was followed.)  Yes    PRU value: Not applicable    Anesthesia:  None  Medications:   Lidocaine 1% 10 ml intradermal  Puncture site:   n/a    Fluoroscopy time (minutes):  2.6  Radiation dose (mGy):   36.4     Contrast amount (mL):  Not applicable     Estimated blood loss (mL):  <5    Closure:  n/a    Disposition:  Will be followed in hospital by the Neurosurgery team.        Sedation Post-Procedure Summary    N/A    Sherry Mcclain MD  Pager:  [phone]

## 2024-05-01 NOTE — PROGRESS NOTES
[facility]  Neurology Progress Note    Patient Name:  Kamilah Goldberg  MRN:  [id number]    :  1980  Date of Admission:  2024  Date of Service:  2024  Hospital Day: 8     Interval History/24-hour Events   Interval Events:  No significant events overnight    Today's Changes:  - increase in size of brain stem mass on MRI from yesterday  - No further recommendations from neurology service; continue methotrexate, ART, and steroid taper     Assessment & Plan    Kamilah Goldberg is a 43 year old female with new HIV diagnosis & CNS lesion (3/2024) who presented to OSH 2024 with weakness, diplopia, dysarthria & found to have increasing size of lesions into her L midbrain and avel. Transferred to [facility] for consideration of brain biopsy for CNS lymphoma on 2024. Hospitalization c/b complicated by MRSA pneumonia, COVID infection and encephalopathy.    Previous LP  without evidence of malignancy. No infectious source has been identified. Repeat CSF  with rare atypical cells, flow cytometry with 1.3% (18 events) polytypic B cells, with EBV +ve concerning for CNS lymphoma highest on differential though infection cannot be entirely ruled out with hx of AIDS (CD4 = 2). No biopsy site accessible at this moment. Ideally would have positive cytology/cytometry to confirm diagnosis before starting high dose methotrexate. But given continued expansion of lesion per most recent MRI () and completion of repeat LP (), initial therapy with Dexamethasone per Heme/Once team and methotrexate was initiated on .     Per serial, multi-team exams, her symptoms have some waxing/waning mentation. Dysarthria, hypophonia,  R hemiparesis in face, arm and leg & L CN6 palsy remains. There was some concern on  exam that she may also have partial R CN6 palsy as well as decreased responsiveness to commands, but this was improved on repeat exam. No consistent clinical  "improvement seen following steroids. Repeat MRI brain was completed on 4/30 with increase in size of left brainstem mass compared to previous MRI on 4/16.     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 Recommend ongoing goals of care conversations with patient's family regarding the potential for these events. If there is concern for herniation, will require emergent intubation and possible further interventions    Neurology will sign off at this time. Thank you for involving Neurology in the care of Kamilah Goldberg.  Please do not hesitate to call with questions/concerns (consult pager [phone]).      This patient was discussed with the neurology attending faculty, Dr. Miles Martinez.    Manju Gilliam  Internal Medicine PGY2        Physical Exam   Temp:  [97.1  F (36.2  C)-98.2  F (36.8  C)] 97.1  F (36.2  C)  Pulse:  [50-84] 56  Resp:  [16-18] 16  BP: (116-137)/(77-95) 129/78  SpO2:  [96 %-100 %] 100 %    I/O last 3 completed shifts:  In: 4530 [I.V.:2750; NG/GT:710]  Out: 3725 [Urine:3725]     General Exam  General:  patient lying in bed without any acute distress    HEENT:  normocephalic/atraumatic  Cardio:  appears well perfused  Pulmonary:  stable on RA  Skin:  warm/dry     Neuro Exam  Mental Status:  Drowsy, opens " eyes to voice & makes eye contact. Unable to answer orientation questions or attention and concentration questions.  Does not follow simple commands such as making a thumbs up, closing eyes  Speech: Dysarthria, did not answer naming, repetition, reading tasks for speech today  Cranial Nerves: PERRLA, Unable to abduct L eye, L VI palsy and L PHILLIP present,  R lower facial droop on activation  Motor: No abnormal movements at rest, did not lift any extremities on command, did withdraw LUE to pain with antigravity. Withdrew bilateral lower extremities to pain  Reflexes:  2+ symmetric bicep, patellar, no hoffmans,  Toes upgoing bilaterally  Sensory:   Withdraws to noxious x4 extremities  Coordination:   THOR due to mental status  Station/Gait:  THOR     Investigations   Labs    04/26  Cell count 3, RBC 44, Protein 109.0, colorless, G 52  Meningitis encephalitis -ve  Cryptococcal -ve  Cytology - Rare atypical cells  RENEA virus, toxo pending  Flow cytometry - 1.3% (18 events) polytypic B cells  EBV PCR detected  AFB not enough sample  Anaerobic, fungal -ve    04/18  Cells 2, RBC 4, Protein 98, Glucose 50  EBV DNA < 35  Flow cytometry  1. No malignant cells identified.  2. Flow cytometry: No atypical T- or B-cell populations detected.    3/29  Cell count 0, RBC 10, Protein 74, Glucose 55  No growth on culture  Flow cytometry: insufficient viable cells for analysis    BMP  Recent Labs   Lab 05/01/24 0453 04/30/24 0405 04/30/24  0001 04/29/24  0431    143  141 143 142   POTASSIUM 4.2  4.2 4.0  3.8 4.1 4.0   CHLORIDE 104 106  106 106 109*   CO2 27 26  27 27 21*   BUN 16.3 16.1  15.7 15.1 17.1   CR 0.45* 0.38*  0.36* 0.36* 0.49*   MAUREEN 8.9 8.9  8.8 8.8 9.3       Liver Panel  Recent Labs   Lab 05/01/24 0453 04/30/24  0405 04/29/24  0431 04/24/24 2050   PROTTOTAL 6.8 7.2 7.6 9.1*   ALBUMIN 3.3* 3.3* 3.5 3.7   BILITOTAL 0.3 0.4 0.3 0.3   ALKPHOS 66 68 74 98   AST 20 24 23 37   ALT 18 21 17 25       CBC  Recent Labs  "  Lab 05/01/24  0453 04/30/24  0405 04/29/24  1359 04/29/24  0431   WBC 8.5  8.5 7.1 9.5 9.7   HGB 10.9*  10.9* 10.1* 11.1* 10.5*     432 453* 508* 528*       COAGS  Recent Labs   Lab 04/24/24  2050   INR 0.98   PTT 28       ABG  No results for input(s): \"PH\", \"PCO2\", \"PO2\", \"HCO3\" in the last 168 hours.    CRP/ESR  No results for input(s): \"CRP\" in the last 168 hours.    Invalid input(s): \"ESR\"    CSF  Recent Labs   Lab 04/26/24  1116 04/26/24  1115   CGLU 52  --    CTP  --  109.0*       MICRO  No results for input(s): \"CULT\" in the last 168 hours.    LIPIDS  No results for input(s): \"CHOL\", \"HDL\", \"LDL\", \"TRIG\", \"CHOLHDLRATIO\" in the last 72759 hours.    A1C    Recent Labs   Lab Test 04/27/24  0724   A1C 5.7*       Imaging  I have personally reviewed most recent and pertinent labs, tests, and radiological images; relevant findings per HPI.    IR Lumbar Puncture  Narrative: PROCEDURE: Lumbar Puncture using Fluoroscopy, 4/26/2024 10:40 AM    HISTORY:  brainstem lesion    COMPARISON: none    STAFF NEURORADIOLOGIST:  Moo Butt MD     FELLOW PHYSICIAN: Dr. Sherry Mcclain, Dr. Octavio Titus    SEDATION: The patient was placed on continuous monitoring. No  intravenous sedation was administered. Vital signs and sedation  monitored by nursing staff under Interventional Radiologists  supervision. The patient remained stable throughout the procedure.    SEDATION TIME: n/a    MEDICATIONS:  1. Lidocaine 1% 10 ml intradermal    TECHNIQUE: Verbal and written consent for lumbar puncture was obtained  from the patient's aunt, and benefits and risk of the procedure were  explained, including but not limited to worsening headache,  hemorrhage, infection, lower extremity pain, or nerve root injury. The  patient was sterilely prepped and draped with the patient in the prone  position, over the lower back. Under fluoroscopic guidance, the  interlaminar spaces were noted. 1% lidocaine was administered for  local " anesthetic over the L3-4 interlaminar space, and a 22 gauge 3.5  inch needle was advanced into the thecal sac under fluoroscopic  guidance.      There was initial show of clear CSF. Opening pressure was not measured  but there was a slow drip. Approximately 20 cc of CSF were collected.    The needle was removed with the stylet in place. There was no  immediate complication associated with the procedure. Samples were  sent for the requested laboratory testing.      FLUORO TIME: 2.6 low-dose pulsed.    DOSE: 36.4 uGym2  Impression: IMPRESSION: Successful lumbar puncture without immediate complication.    PLAN: Patient discharged to patient care unit in stable condition for  monitoring. Orders placed for 4 hour of bed rest with patient laying  on the back and the head of the bed flat.    Dr. Butt was present for the entire procedure.  MR Brain w/o & w Contrast  Narrative:  MR BRAIN W/O & W CONTRAST 4/30/2024 12:09 PM    Provided History: 44 YOF new concern of CNS lymphoma. Previous imgaes  showing increasing lesion of L midbrain and avel. Please assess for  interval changes.    ICD-10:    Comparison: Outside MRI 4/15/2024, 3/29/2024 and head CT 4/30/2024    Technique: Multiplanar T1-weighted, axial FLAIR, and susceptibility  images were obtained without intravenous contrast. Following  intravenous gadolinium-based contrast administration, axial  T2-weighted, diffusion, and T1-weighted images (in multiple planes)  were obtained.    Contrast dose: gadobutrol (GADAVIST) injection 7.5 mL    Findings: Continued progression of brainstem mass centered in the left  dorsal medulla with extension to the avel, left middle cerebellar  peduncle and left cerebral peduncle, increased in size compared with  4/15/2024 and 3/29/2024 exams, they measuring 2.5 x 2.2 x 3.3,  previously 2.1 x 1.5 x 1.9 cm on 4/15/2024. The lesion shows marked  diffusion restriction with diffuse contrast enhancement and tiny  punctate microhemorrhage  internally.   There is no midline shift, or evidence of intracranial hemorrhage. The  ventricles are proportionate to the cerebral sulci.    No definite abnormality of the skull marrow signal is noted. The major  vascular intracranial flow-voids are present. The visualized portions  of paranasal sinuses, and mastoid air cells are relatively clear. The  orbits are grossly unremarkable.  Impression: Impression: Increased size of known left brain stem mass compared with  4/15/2024 and 3/29/2024 MRI exams,, imaging findings are most  concerning for central nervous system lymphoma although primary glial  neoplasm or metastatic lesion cannot be excluded.    CLARY PHAM MD         SYSTEM ID:  [id number]  CT Head w/o Contrast  Narrative: CT HEAD W/O CONTRAST 4/30/2024 10:13 AM    History: 44 yr F with L pontine lesion, now with worsening exam with  bradycardia, R hemiplegia, new R CN6 palsy. Evaluate for impending  herniation, edema, raised ICP signs     Comparison: MRI brain 4/15/2024 from outside institution and outside  neck CT from 4/25/2024    Technique: Using multidetector thin collimation helical acquisition  technique, axial, coronal and sagittal CT images from the skull base  to the vertex were obtained without intravenous contrast.   (topogram) image(s) also obtained and reviewed.    Findings: Redemonstration of 2.8 x 2.5 cm relatively hyperattenuating  lesion in the posterior lateral left avel that effaces the  quadrigeminal and partially the left ambient cistern. The most recent  MR, the lesion measures 2.1 x 1.6 cm and does not appear to efface the  cisterns to this extent. The fourth ventricle is partially involved  but is patent. The ventricular size is not enlarged, stable from MR  4/15/2024. There is no intracranial hemorrhage, mass effect, or  midline shift. Gray/white matter differentiation in both cerebral  hemispheres is preserved. The basal cisterns are otherwise clear.    The bony calvaria and  the bones of the skull base are normal. Polypoid  left maxillary sinus mucosal thickening with scattered paranasal  fluid, greatest in the left sphenoid sinus. The mastoid air cells are  clear. Partially visualized right-sided nasoenteric tube.  Impression: Impression:  1. Redemonstration of left posterior lateral pontine lesion measuring  up to 2.8 cm with effacement of the adjacent cisterns. There is no  evidence of obstructive hydrocephalus. This appears similar to neck CT  from 4/25/2024, appears relatively enlarged compared to MRI from  4/15/2024, although could be due to technical differences.  2. Nonspecific left maxillary sinus polypoid mucosal thickening and  scattered paranasal sinus fluid.  3. Overall no new acute finding.    I have personally reviewed the examination and initial interpretation  and I agree with the findings.    CLARY PHAM MD         SYSTEM ID:  [id number]

## 2024-05-02 NOTE — PROGRESS NOTES
"CLINICAL NUTRITION SERVICES - REASSESSMENT NOTE     Nutrition Prescription    RECOMMENDATIONS FOR MDs/PROVIDERS TO ORDER:   None at this time.     Malnutrition Status:   Unable to determine due to lack of nutrition focused physical exam.     Recommendations already ordered by Registered Dietitian (RD):   Nutrition Support:   [employer] 1.4 (or equivalent) @ 65 mL/hr x 20 hrs on hold from 2000-0001 (1300 mL/day) to provide 1820 kcals (30 kcal/kg), 80 g protein (1.3 g/kg), 204 g CHO, 76 g fat, 20 g fiber, and 924 mL free water daily   Free water flushes of 90 mL Q 4 hrs.    Future/Additional Recommendations:   Consider transitioning to bolus vs cycled tube feedings.      EVALUATION OF THE PROGRESS TOWARD GOALS   Diet: None    Nutrition Support:   [employer] 1.4 (or equivalent) @ 55 mL/hr (1320 mL/day) to provide 1848 kcals (30 kcal/kg), 81 g protein (1.3 g/kg), 207 g CHO, 77 g fat, 20 g fiber, and 938 mL free water daily   Free water flushes of 90 mL Q 4 hrs.    Intake:    4/27 4/28 4/29 4/30 5/1 5/2 5/3   Formula (mL) 60 250  0 0   *TF at goal per RN notes, goal rate would provide 1320 mL/day. At goal since 4/29 0215.  Per flowsheets, noted tube feeding was held 4/30 2000 restarted on 5/1 0028 for Tivicay medication. Medication instructions state \"+HOLD tube feed for 2 hours before and after dug administration+\"    NEW FINDINGS   Chart reviewed. Discussed with bedside RN, no current symptoms of intolerance. Will adjust tube feeding to account for it to be held x 4 hours for Tivicay.     Nutrition/GI: Per Flowsheets, last BM noted on 5/2.   Skin: Yossi score 9 with nutrition marked as adequate per flowsheets. Trace edema noted in flowsheets.     LABS   Labs Reviewed   05/02/24 12:57 05/03/24 04:29   Sodium  138   Potassium  4.0   Creatinine  0.42 (L)   GFR Estimate  >90   Calcium  8.5 (L)   Magnesium 2.0 2.1   Phosphorus  3.2   Albumin  3.1 (L)   Hemoglobin 10.5 (L) 10.6 (L)   Platelet Count 377 349   MCV " 87 86      05/02/24 17:33 05/02/24 21:45 05/02/24 22:02 05/02/24 23:32 05/03/24 04:12 05/03/24 04:29 05/03/24 09:02   Glucose      117 (H)    Glucose by meter POCT 153 (H) 91 99 108 (H) 115 (H)  112 (H)     MEDICATIONS   Medications Reviewed  - Lipitor  - Dapsone  - Dexamethasone  - Tivicay  - Lovenox  - Famotidine  - Insulin Aspart  - Leucovorin  - Thiamine   - Senokot PRN    ANTHROPOMETRICS   Weight Trends: Weight down since admission though difficult to assess due to use of bed scale and potential for variable accuracy.   Date/Time Weight Weight Method   05/02/24 72.9 kg (160 lb 11.5 oz) Bed scale   05/01/24 71.8 kg (158 lb 4.6 oz) Bed scale   04/30/24 71 kg (156 lb 8.4 oz) Bed scale   04/29/24 73.3 kg (161 lb 9.6 oz) --   04/26/24 74 kg (163 lb 2.3 oz) --   04/24/24 79 kg (174 lb 2.6 oz) --     Wt Readings from Last Encounters:   05/02/24 72.9 kg (160 lb 11.5 oz)   01/25/23 60.2 kg (132 lb 11.2 oz)   01/16/23 56.9 kg (125 lb 8 oz)     REASSESSED NUTRITION NEEDS   Dosing Weight: 60 kg - Adjusted utilizing IBW and 72.9 kg.   Estimated Energy Needs: 1145-1206 kcals/day (30 - 35 kcals/kg )  Justification: Increased needs  Estimated Protein Needs: 70-90 grams protein/day (1.2 - 1.5 grams of pro/kg)  Justification: Increased needs  Estimated Fluid Needs: 4565-8563 mL/day (25 - 30 mL/kg)   Justification: Maintenance    MALNUTRITION   % Intake: Unable to assess - TF at goal but appears to be held for ~4 hrs daily.   % Weight Loss: Unable to assess  Subcutaneous Fat Loss: Unable to assess  Muscle Loss: Unable to assess  Fluid Accumulation/Edema: Does not meet criteria  Malnutrition Diagnosis: Unable to determine due to lack of nutrition focused physical exam.     Previous Goals   Total avg nutritional intake to meet a minimum of 25 kcal/kg and 1.2 g PRO/kg daily (per dosing wt 61 kg).   Evaluation: Unable to evaluate    Previous Nutrition Diagnosis   Inadequate oral intake related to NPO as evidenced by need for tube  feeding.  Evaluation: No change    CURRENT NUTRITION DIAGNOSIS   Inadequate oral intake related to NPO as evidenced by need for tube feeding.    INTERVENTIONS   Implementation   Nutrition Support:   [employer] 1.4 (or equivalent) @ 65 mL/hr x 20 hrs on hold from 2000-0001 (1300 mL/day) to provide 1820 kcals (30 kcal/kg), 80 g protein (1.3 g/kg), 204 g CHO, 76 g fat, 20 g fiber, and 924 mL free water daily   Free water flushes of 90 mL Q 4 hrs.    Goals   Total avg nutritional intake to meet a minimum of 30 kcal/kg and 1.2 g PRO/kg daily (per dosing wt 61 kg).     Monitoring/Evaluation   Progress toward goals will be monitored and evaluated per protocol.    Faiza Quiroga RD, LD   Available on [employer]  No longer available by pager

## 2024-05-02 NOTE — PROGRESS NOTES
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 PmHx of dyslipidemia, polysubstance abuse, and r-sided empyema (s/p VATS and partial pleurectomy Jan 2021). Upon review of labs and imaging from with positive EBV PCR from CSF. LP at [facility] x4/26 with rare atypical cells. Based on findings started HD-Methotrexate 4/29/24. Course further complicated by COVID positive and possible MRSA infection.     Today:  - D4 HD-Methotrexate and Rituximab. Received rituximab this morning without issue. Urine pH 7.5 this morning and methotrexate level 0.07 this morning. Discontinued mIVF and leucovorin.   - Will plan for a GOC discussion with family and teams on Monday.   - Neurological remains the same. Able to follow conversations with eyes and sometimes moving head and able to blink \"yes\" or \"no\" to questions when prompted. Ability to follow commands waxes/wanes; sometimes able to squeeze fingers, dorsi/plantar flexion, wiggles toes, smile. If acute changes suspected or s/sx of hydrocephalus or herniation then will obtain stat head CT and discuss with neurology. Will continue to monitor closely with Q4H neuro checks.    - Continue dexamethasone taper with 40 mg x 4 days (4/24-4/30) ? 20 mg x 4 days (5/1-5/4) ? 10 mg daily x 4 days ? 4 mg BID (5/5-X).   - SW reached out to health department regarding patient's HIV status and if able to be reported properly. Upon further discussions and health department chart review, original diagnosis was in 2014 and patient was contacted over 14 times with no response.  Unable to confirm with patient if she is was aware of status as she is non-verbal. Upon further chart review, HIV testing was " completed 8/27/24 at [facility] with office visit to follow, but encounter is not able to be reviewed and needing patient authorization; will discuss with son (Yuan) as he is her next of kin and medical decision maker. Will plan to reach out to family to further discuss. During conversation with significant other yesterday, it does not appear he is aware of her HIV status. ID had similar conversation with corresponding impression.   - Will continue best supportive cares.     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 Previously hospitalized in March 2024 where imaging revealed restricted diffusion and T2 hyperintensity of osterior pontine mesencephalic junction with local mass effect. Felt to be likely ischemic infarct (risk factors of HTN, smoking and amphetamine use). Additional considerations included infection vs neoplasm. CSF cytology attempted, but insufficient quantity. MRI brain 4/15 showed increased in interval of lesion of L avel and midbrain compared to previous and increase in interval again on 4/24/24 MRI. CT A/P with no lymphadenopathy, mass, or acute process. LP 4/18 non-diagnostic with negative flow cytometry and cytology but CSF EBV PCR positive (original uncertainty of blood vs. CSF PCR but able to confirm CSF by [facility]). ID, oncology, and neurosurgery consulted for concern of CNS lymphoma given context of imaging, worsening decline, and imaging. She was started on Decadron 8 mg and transferred to [facility] 4/24/24 for further workup and consideration of brain biopsy. Unable to obtain tissue biopsy given challenging location of brainstem, if repeat imaging shows expansion, then could consider. But with review of imaging with Neurosurgery, agree with outside impression of CNS lymphoma. CT CAP 4/25 showed no evidence of lymphoma. Repeat LP  "4/26 with rare atypical cells on cytology but does not correlate with flow cytometry (rare polytypic B cells). LP also showing positive EBV PCR, elevated protein to 109, and negative CMV, meningitis/encephalitis panel.  Ophthalmology consulted; no evidence of ocular lymphoma.  Based on imaging and EBV + PCR from CSF started HD-methotrexate on 4/29/24. Appears to be tolerating well thus far. 32 hours methotrexate level 0.31.   - Infectious studies at [facility] thus far negative for CMV IgM negative (IgG positive suggesting previous infection), Cryptococcus, CMV encephalitis, Tb meningitis, toxoplasmosis serology. Continue to follow toxoplasmosis PCR and Karius.   - Follow CSF AFB culture (NGTD) and bacterial culture results from LP x4/26  - Continue dexamethasone taper with 40 mg x 4 days (4/24-4/30) ? 20 mg x 4 days (5/1-5/4) ? 10 mg daily x 4 days ? 4 mg BID (5/5-X)  - PICC line placed x4/29         Treatment Plan: HD- Methotrexate C1D1 = 4/30/24    - Methotrexate 3.5 g/m2 D1    - Leucovorin 50 mg D2 until methotrexate level < 0.10    - mIVF .45% NS w. Sodium bicarbonate @125 ml/hr    #Decreased mental status secondary to lesion of L avel and midbrain  Presented to OSH 4/12/24 with 4/12/24 with right facial and arm paresthesias and RLE weakness originally concerning for ischemic stroke. Further imaging revealed mass of L avel and midbrain. Transferred to [facility] for consideration of biopsy, but due to location was designated at too risky. On arrival to [facility], patient on assessment by neurosurgery was A&O x 3, able to slowly follow commands, and had slowed speech. She has since progressed to not being able to move extremities, waxes/wanes ability to follow commands, and non-verbal. She can follow conversations with eye and appropriately blink \"yes\" to questions. For example, when asked \"is this your son\" when referring to son (Yuan) patient was able to blink multiple times. Notable decrease in neurological function " on exam morning x4/30 and consistent findings by neurology team; patient unable to squeeze fingers in comparison to day prior and overnight. Stat head CT for concern of increased ICP; no acute findings and L avel lesion similar to CT done 4/25. Repeat MRI brain w/wo contrast x4/30 showing increased size of known lesion of L avel and midbrain in comparison to MRI on 4/15 and 3/29. Given concern for increasing size in mass, will continue to monitor closely with Q4H neuro checks for s/sx of herniation. If new changes in exam will obtain stat CT head and if evidence of herniation, would need emergent intubation. Appreciate assistance and input from neurology, signed off 5/1 but familiar with patient and readily available for any questions and concerns.   - RT consult placed for difficulty with secretion management. Could also assist with sputum culture.     #Anemia  #Thrombocytosis  Likely due to underlying suspected malignancy  - Transfuse for plts < 10k and Hgb , 7.0    Hx SWETA  Has hx of SWETA. Has been treated with transfusions and IV iron in the past. Will defer workup at this time.     # ID Prophylaxis  - Dapsone 100 mg daily for PJP ppx     ID  #HIV/AIDS  Recently diagnosed with HIV during admission at [facility] on 3/28/24 with viral load of 1.24 million, 6.9 log,  and CD4 count of 2. She was not started on ART, given Nystatin x2 weeks and referred to ID clinic for follow up. Prior to this, she was admitted again with ID consult for broad-infectious workup. Started Biktarvy 4/17 but stopped due concern for rash, rechallenged on 4/20 and able to tolerate. Later transitioned to Dolutegravir and Truvada. SW reached out to health department 5/2 regarding patient's HIV status and if able to be reported properly. Upon further discussions and health department chart review, original diagnosis was in 2014 and patient was contacted over 14 times with no response.  Unable to confirm with patient if she is was  aware of status as she is non-verbal. Upon further chart review, HIV testing was completed 8/27/24 at [facility] with office visit to follow, but encounter is not able to be reviewed and needing patient authorization; will discuss with son (Yuan) as he is her next of kin and medical decision maker. Of note, patient's aunt (Noemy), and son (Yuan) are aware of diagnosis.   - Upon further conversation with boyfriend (Otis) today, it does not appear he is aware of her HIV status. I asked about general medical history and any new/recent infections to which he did not mention HIV/AIDS status. ID had a similar conversation with a consistent consensus. Reached out to SW to discuss with family/son regarding if previous partners have been notified.  - Continue ART with Dolutegravir and Truvada   - Plan to repeat CD4 count and viral load circa x5/15  - Please see social section below regarding information of who is aware of her status.      #COVID-19 positive  #MRSA pneumonia  Tested positive for COVID-19 4/18/24 during admission to [facility]. Patient remains asymptomatic. Will continue to monitor at this time. Previously on Linezolid. ID following; does not feel like MRSA pneumonia is likely and ok to stop Linezolid.     MISC  #Cotton wool spot, right eye  Single CWS in right eye, most likely in setting of acute illness. Found on assessment by ophthalmology x4/28. Given significant comorbidities, CD4 count of 2 and infectious panels pending, will repeat dilated fundus exam in 1 week to rule out early signs of fungal ocular infection     SOCIAL   #Medical decision maker  Confirmed with family that son (Yuan) will be patient's primary medical decision maker. Son Yuan and patient are okay with communication with aunt (Noemy) if Yuan is unavailable.     #H/o stimulant/amphetamine use   Noted on [facility] tox screen in 2016. Patient previously reported snorting and declined IVDU.     Fluids/Electrolytes/Nutrition   -  "IVF per chemotherapy regimen  - PRN lyte replacement per standing protocol  - Regular diet as tolerated     Lines: PICC    PPX  VTE: Lovenox  GI: Famotidine  Bowels: prn miralax and senna    Code: Full    Medically Ready for Discharge: Anticipated in 5+ Days    Disposition: Pending further workup, evaluation, tolerance of HD-methotrexate, and clinical course. Patient will require a long term care facility on discharge.     I spent >45 minutes face-to-face and/or coordinating or discussing care plan. Over 50% of our time on the unit was spent counseling the patient and/or coordinating care    Patient is seen and examined by Dr. Jacob and RILEY.  Assessment and plan are discussed and delivered to the patient.    Kandi Lambert PA-C  Hematology/Oncology  Pager: [phone]    Interval History   Nursing notes reviewed. No acute events overnight    Kamilah is resting in bed this morning. No acute changes in neurological exam. She was able to wiggle toes this morning, smile, blink \"yes\" to questions, and move head to follow conversations. Able to wiggle toes and smile this morning when prompted to. Will continue to monitor.      Complete and Comprehensive review of systems review and negative other than noted here or in the HPI.     Physical Exam   Temp: 98.5  F (36.9  C) Temp src: Oral BP: 132/82 Pulse: 66   Resp: 16 SpO2: 94 % O2 Device: None (Room air)    Vitals:    04/30/24 0848 05/01/24 1019 05/02/24 1152   Weight: 71 kg (156 lb 8.4 oz) 71.8 kg (158 lb 4.6 oz) 72.9 kg (160 lb 11.5 oz)     Vital Signs with Ranges  Temp:  [98  F (36.7  C)-98.7  F (37.1  C)] 98.5  F (36.9  C)  Pulse:  [58-83] 66  Resp:  [16] 16  BP: (113-132)/(67-85) 132/82  SpO2:  [94 %-97 %] 94 %  I/O last 3 completed shifts:  In: 2550 [I.V.:1500; NG/GT:390]  Out: 4650 [Urine:4650]      Physical Exam:    Blood pressure 132/82, pulse 66, temperature 98.5  F (36.9  C), temperature source Oral, resp. rate 16, height 1.626 m (5' 4\"), weight 72.9 kg (160 lb " "11.5 oz), SpO2 94%.    General: lying in bed. Nontoxic appearing. No acute distress.   HEENT: Normocephalic/ Sclera anicteric. Moist mucus membranes.   CV: RRR. Normal S1/S2. No m/r/g. No peripheral edema.   Resp: CTAB on anterior auscultation. No wheezing/crackles. Normal respiratory effort on room air. Breath sounds are equal throughout.   GI: Soft, non-tender, non-distended. Bowel sounds present and normoactive. No peritoneal signs. NGT in tact.   MSK: Warm and well-perfused. Normal tone.   Skin: Skin is clean, dry and intact. No rashes on limited exam. No jaundice. Soft mitts in place  Neuro:   Mental status: Opens eyes to voice and maintains eye contact during conversation. Able to communicate with blinking. Blinks \"yes\" appropriately when asked orientation questions such as \"is this your son Yuan?\" when referring to son Yuan. Moves head to follow conversation.   Motor: Unable to move extremities. Remainder of neuro examination waxes and wanes; sometimes able to squeeze fingers, wiggle toes, smile  Speech: Non-verbal.   HEENT: PERRLA, unable to abduct L eye. Blinks upon commands.       Medications   Current Facility-Administered Medications   Medication Dose Route Frequency Provider Last Rate Last Admin    dextrose 10% infusion   Intravenous Continuous PRN Russel Boothe DO         Current Facility-Administered Medications   Medication Dose Route Frequency Provider Last Rate Last Admin    acetaminophen (TYLENOL) oral liquid 650 mg  650 mg Oral or Feeding Tube Once Jonas Goldstein MD PhD        atorvastatin (LIPITOR) tablet 40 mg  40 mg Oral or Feeding Tube Daily Russel Boothe DO   40 mg at 05/02/24 0834    dapsone suspension 100 mg  100 mg Oral or Feeding Tube Daily Kandi Lambert PA-C   100 mg at 05/02/24 0834    dexAMETHasone (DECADRON) injection 20 mg  20 mg Intravenous Daily Kandi Lambert PA-C   20 mg at 05/02/24 0833    Followed by    [START ON 5/5/2024] dexAMETHasone " (DECADRON) injection 10 mg  10 mg Intravenous Daily Kandi Lambert PA-C        Followed by    [START ON 5/9/2024] dexAMETHasone (DECADRON) injection 4 mg  4 mg Intravenous BID Kandi Lambert PA-C        diphenhydrAMINE (BENADRYL) elixir 50 mg  50 mg Oral or Feeding Tube Once Jonas Goldstein MD PhD        dolutegravir (TIVICAY) tablet 50 mg  50 mg Oral or NG Tube Daily Ramesh Van MD   50 mg at 05/01/24 2311    emtricitabine-tenofovir (TRUVADA) 200-300 MG per tablet 1 tablet  1 tablet Oral or NG Tube Daily Ramesh Van MD   1 tablet at 05/01/24 2033    enoxaparin ANTICOAGULANT (LOVENOX) injection 40 mg  40 mg Subcutaneous Q24H Russel Boothe DO   40 mg at 05/01/24 1337    famotidine (PEPCID) suspension 20 mg  20 mg Oral BID Kandi Lambert PA-C   20 mg at 05/02/24 0834    gabapentin (NEURONTIN) solution 100 mg  100 mg Oral or Feeding Tube Q12H [facility] (08/20) Russel Boothe DO   100 mg at 05/02/24 0834    insulin aspart (NovoLOG) injection (RAPID ACTING)  1-4 Units Subcutaneous Q4H Russel Boothe DO   1 Units at 05/01/24 1602    leucovorin calcium injection 25 mg  25 mg Intravenous Q6H Jonas Goldstein MD PhD   25 mg at 05/02/24 0833    riTUXimab-abbs (TRUXIMA) 700 mg in sodium chloride 0.9 % 700 mL infusion  375 mg/m2 (Treatment Plan Recorded) Intravenous Once Jonas Goldstein MD PhD        thiamine (B-1) tablet 100 mg  100 mg Oral or Feeding Tube Daily Russel Boothe DO   100 mg at 05/01/24 2230       Data   Results for orders placed or performed during the hospital encounter of 04/24/24 (from the past 24 hour(s))   Glucose by meter   Result Value Ref Range    GLUCOSE BY METER POCT 154 (H) 70 - 99 mg/dL   pH urine POCT   Result Value Ref Range    pH Urine 8.0 pH   Glucose by meter   Result Value Ref Range    GLUCOSE BY METER POCT 129 (H) 70 - 99 mg/dL   pH urine POCT   Result Value Ref Range    pH Urine 7.5 pH   Glucose by meter   Result Value Ref Range    GLUCOSE BY METER POCT  116 (H) 70 - 99 mg/dL   Glucose by meter   Result Value Ref Range    GLUCOSE BY METER POCT 122 (H) 70 - 99 mg/dL   Phosphorus   Result Value Ref Range    Phosphorus 3.3 2.5 - 4.5 mg/dL   Magnesium   Result Value Ref Range    Magnesium 2.1 1.7 - 2.3 mg/dL   Methotrexate level   Result Value Ref Range    Methotrexate 0.07 umol/L   Comprehensive metabolic panel   Result Value Ref Range    Sodium 142 135 - 145 mmol/L    Potassium 3.5 3.4 - 5.3 mmol/L    Carbon Dioxide (CO2) 29 22 - 29 mmol/L    Anion Gap 8 7 - 15 mmol/L    Urea Nitrogen 17.2 6.0 - 20.0 mg/dL    Creatinine 0.42 (L) 0.51 - 0.95 mg/dL    GFR Estimate >90 >60 mL/min/1.73m2    Calcium 8.7 8.6 - 10.0 mg/dL    Chloride 105 98 - 107 mmol/L    Glucose 125 (H) 70 - 99 mg/dL    Alkaline Phosphatase 60 40 - 150 U/L    AST 21 0 - 45 U/L    ALT 23 0 - 50 U/L    Protein Total 6.2 (L) 6.4 - 8.3 g/dL    Albumin 3.1 (L) 3.5 - 5.2 g/dL    Bilirubin Total 0.3 <=1.2 mg/dL   Uric acid   Result Value Ref Range    Uric Acid 3.8 2.4 - 5.7 mg/dL   Glucose by meter   Result Value Ref Range    GLUCOSE BY METER POCT 103 (H) 70 - 99 mg/dL   Glucose by meter   Result Value Ref Range    GLUCOSE BY METER POCT 120 (H) 70 - 99 mg/dL   CBC with Platelets & Differential    Narrative    The following orders were created for panel order CBC with Platelets & Differential.  Procedure                               Abnormality         Status                     ---------                               -----------         ------                     CBC with platelets and d...[[id number]]  Abnormal            Final result                 Please view results for these tests on the individual orders.   Partial thromboplastin time   Result Value Ref Range    aPTT 24 22 - 38 Seconds   INR   Result Value Ref Range    INR 1.01 0.85 - 1.15   Fibrinogen activity   Result Value Ref Range    Fibrinogen Activity 257 170 - 490 mg/dL   CBC with platelets and differential   Result Value Ref Range    WBC Count 8.9  4.0 - 11.0 10e3/uL    RBC Count 3.68 (L) 3.80 - 5.20 10e6/uL    Hemoglobin 10.5 (L) 11.7 - 15.7 g/dL    Hematocrit 32.1 (L) 35.0 - 47.0 %    MCV 87 78 - 100 fL    MCH 28.5 26.5 - 33.0 pg    MCHC 32.7 31.5 - 36.5 g/dL    RDW 13.8 10.0 - 15.0 %    Platelet Count 377 150 - 450 10e3/uL    % Neutrophils 95 %    % Lymphocytes 3 %    % Monocytes 0 %    % Eosinophils 1 %    % Basophils 0 %    % Immature Granulocytes 1 %    NRBCs per 100 WBC 0 <1 /100    Absolute Neutrophils 8.4 (H) 1.6 - 8.3 10e3/uL    Absolute Lymphocytes 0.3 (L) 0.8 - 5.3 10e3/uL    Absolute Monocytes 0.0 0.0 - 1.3 10e3/uL    Absolute Eosinophils 0.1 0.0 - 0.7 10e3/uL    Absolute Basophils 0.0 0.0 - 0.2 10e3/uL    Absolute Immature Granulocytes 0.1 <=0.4 10e3/uL    Absolute NRBCs 0.0 10e3/uL

## 2024-05-02 NOTE — PROGRESS NOTES
[facility]  Transplant & Immunocompromised Infectious Disease Progress Note   Patient: Kamilah Goldberg, Date of birth 1980, Medical record number [id number].      Recommendations:     No CMV treatment given -ve CSF, recent reassuring retinal exam- repeat CMV DNA PCR of blood 5/8/24    Continue dapsone 100 mg daily for PJP prophylaxis     Continue Dolutegravir and Truvada  Plan to repeat CD4 and viral load circa 5/15/2024.    Per patients son and next of kin she told him she does NOT  want her partner kemi to know about the HIV  Discussing with [place] Dept of Health reporting and partner testing.    ID Will continue to follow through this hospitalization, although since ID issues appear to currently be optimally managed may not follow on a daily basis, please page with questions or if situation arises where we may be of assistance.  Case discussed with Transplant ID Staff.    Signed:  Gerson Stewart MD, 05/02/2024   Transplant Infectious Disease Fellow  Pager [phone] For more paging info see [facility]-> Infectious Disease [place] HSCT Service        Patient Summary:   44-year-old woman with recent complicated strep empyema and substance use/unstable housing situation who presented to an outside hospital with new diagnosis of AIDS.  In the outside hospital she was diagnosed with a CNS lesion and then started on ART around April 17.  She was transferred here for oncology and neurosurgical evaluation.        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 He now has rare atypical cells on the repeat LP here, as well as EBV.     As outlined most infectious studies thus far are negative and the preliminary additional studies done here are also not suggestive of infection. CSF WBC count 3 and normal glucose suggests against infection.   - Cryptococcus has essentially been ruled out with negative CSF CrAg and two negative serum CrAg. CSF not consistent.  - CMV encephalitis seems much less likely based negative CMV PCR   - Toxoplasmosis serology is negative, as is PCR  - PML has been ruled out with -ve RENEA virus PCR  - Tb meningitis: negative quant-gold. AFB cultures are pending. CSF not consistent.  - Endemic mycoses: again not a classic presentation and no other signs of disseminated disease (typically can see pancytopenia, elevated alk phos, splenomegaly, etc). Eval is negative so far.     I would emphasize that with ART, expect her HIV/AIDs to have relatively rapid improvement in her viral load and cell counts. She is ART naive, and her long-term prognosis from a purely HIV/AIDs perspective is excellent if we can diagnosis and effectively treat this CNS lesion, though certainly recognize that the location, rapid progression, and her current neurological status is very concerning as far as her short-term prognosis.     Agree with high-dose steroids now that we have maximized diagnostics since this lesion is not amenable to brain biopsy given its location. Appreciate hematology consideration of further therapeutics including IT methotrexate. I would note that ART and immune reconstitution is a vital part treating CNS lymphoma as well.    She should be on PJP prophylaxis, prefer dapsone.    #Non-immune to hepatitis A or B. Hep B coreAb negative  #Homeless and and history of polysubstance use disorder  # STD discussion  Other harm reduction labs have been completed at [facility]  notably hep B/C, Syphilis, and other STI with gonorrhea and chlamydia    Per patients son and next of kin she told him she does NOT  want her partner kemi to know about the HIV. Appreciate Social work support in reporting and disclosure    #COVID-19+ on 4/18  Remains in Isolation although asymptomatic    Other Infectious Disease Issues  #MRSA in sputum from 4/19  - Reason to for additional endemic disease testing: No   - Bacterial prophylaxis: None  - Pneumocystis prophylaxis: planning dapsone vs atovaquone vs pentamidine  - Viral serostatus & prophylaxis: CMV+  - Fungal prophylaxis: none  - Immunization status: Hep B non infected, non immune, Hep A non immune, largely unvaccinated since 2009 will need to be addressed once further from chemo and has had some immune reconstitution  - Gamma globulin status:No lab results found.  - Isolation status: Good hand hygiene. Contact fro MRSA in lung at OSH, Special 2/2 recent covid, immunocompromised.       Selected Labs, Micro, Imaging Results:   [facility] Workup  4/20 - HLA  -ve  4/18 - LP EBV CSF +ve  4/16 - Toxo IgG -ve, CMV IgG+  3/31- HIV RNA+ 1.2 million copies 6.9 log, CD-4 count 2/mcl  3/31/24- Genotype- no resistance  3/30/24- Syphilis Ab -ve  3/29/24- CT/NG Urine negative    [facility] Workup  4/27 Karius pending, Blasto blood and Histo Urine pending  4/26 CSF- Crypto -ve, EBV+, Toxo pending (other ID studies negative; RENEA virus, CMV, Cultures)  4/29 Hep C Pending (Hep C Ab -ve 4/1/24)         Interval History and Subjective:     No major events. CMV is positive    She was nonparticipatory in the history but perhaps turn her head towards me on command.    I called her son and answered general questions about HIV. She has apparently told him she does NOT  want her partner kemi to know about the HIV.  I called MD and discussed with the HIV hotline, waiting for call back from coordinator in the next day or so to report case and discuss partner testing.    Review of  "Symptoms:  Unable       ID HPI from 4/25/24     43 year old woman with recently diagnosed HIV (1 month ago)/AIDs (CD4 2) who presented to [facility] 4/15/2024 with generalized weakness found to have CNS lesion, highest concern for CNS lymphoma, transferred here for neurosurgical evaluation and possible biopsy     She was seen 1/2023 at [facility] for severe pneumococcal pneumonia/empyema requiring VATs decortication.      She was next seen 3/29/2024 at [facility] with oral candidiasis and then HIV was screened and positive. CD4 2. She was given nystatin for 2 weeks and referred to ID clinic for followup. Not started on ART. MRI brain at this time showed a 15mm T2 hyperintensity in the pontomesencephalic junction (initially dx as ischemic stroke).      She went back to [facility] 4/15/2024 for generalized weakness. She had a broad work-up after repeat brain MRI showed increase size of the lesion. She was started on Biktarvy ~4/17 but there were concerns for a rash though it was restarted. She was COVID+ 4/18 and grew \"light growth\" MRSA on sputum culture 4/19. She was started on linezolid.      On arrival here, she is afebrile with HR 73, BP normotensive, satting 100% on RA. WBC and PLT normal. Hgb 11.3 very slightly low. Cr 0.46. LFTs have not been done since 4/19 when they were relatively unremarkable - ALT 44, AST 56, Tbili 0.3 and most notably Alk Phos was normal at 79.      On interview today, she is relatively encephalopathic. She says she has two children, 24 and 14. Her son has a cat. She denies pain or discomfort. Says she is willing to take medicine for HIV       Physical Exam:     VITAL SIGNS:  Blood pressure 132/82, pulse 66, temperature 98.5  F (36.9  C), temperature source Oral, resp. rate 16, height 1.626 m (5' 4\"), weight 72.9 kg (160 lb 11.5 oz), SpO2 94%.   GENERAL APPEARANCE: Not in acute distress    PHYSICAL EXAM:  Eyes:   Tracks with eyes, no discharge  Mouth, Throat:   NG tube " present  Cardiovascular: No Cyanosis, JVD not elevated  Respiratory:  Not in respiratory distress, Chest expansion symmetrical, No Audible wheeze/stridor, wet crackling cough present  Gastrointestinal: Not distended, No Visible Pulsation  Musculoskeletal:  No Visible Joint Effusion, no visible fasciculations  Skin:  The exposed skin is warm and dry  Neurologic:     Higher Mental Function: Nonverbal, unable to assess   Motor: No spontaneous movement  Psychiatric: Unable to assess       Other Data:     MEDICATIONS  Current Facility-Administered Medications   Medication Dose Route Frequency Provider Last Rate Last Admin    acetaminophen (TYLENOL) oral liquid 650 mg  650 mg Oral or Feeding Tube Once Jonas Goldstein MD PhD        atorvastatin (LIPITOR) tablet 40 mg  40 mg Oral or Feeding Tube Daily Russel Boothe DO   40 mg at 05/02/24 0834    dapsone suspension 100 mg  100 mg Oral or Feeding Tube Daily Kandi Lambert PA-C   100 mg at 05/02/24 0834    dexAMETHasone (DECADRON) injection 20 mg  20 mg Intravenous Daily Kandi Lambert PA-C   20 mg at 05/02/24 0833    Followed by    [START ON 5/5/2024] dexAMETHasone (DECADRON) injection 10 mg  10 mg Intravenous Daily Kandi Lambert PA-C        Followed by    [START ON 5/9/2024] dexAMETHasone (DECADRON) injection 4 mg  4 mg Intravenous BID Kandi Lambert PA-C        diphenhydrAMINE (BENADRYL) elixir 50 mg  50 mg Oral or Feeding Tube Once Jonas Goldstein MD PhD        dolutegravir (TIVICAY) tablet 50 mg  50 mg Oral or NG Tube Daily Ramesh Van MD   50 mg at 05/01/24 2311    emtricitabine-tenofovir (TRUVADA) 200-300 MG per tablet 1 tablet  1 tablet Oral or NG Tube Daily Ramesh Van MD   1 tablet at 05/01/24 2033    enoxaparin ANTICOAGULANT (LOVENOX) injection 40 mg  40 mg Subcutaneous Q24H Russel Boothe DO   40 mg at 05/01/24 1337    famotidine (PEPCID) suspension 20 mg  20 mg Oral BID Kandi Lambert PA-C    "20 mg at 05/02/24 0834    gabapentin (NEURONTIN) solution 100 mg  100 mg Oral or Feeding Tube Q12H [facility] (08/20) Russel Boothe DO   100 mg at 05/02/24 0834    insulin aspart (NovoLOG) injection (RAPID ACTING)  1-4 Units Subcutaneous Q4H Russel Boothe DO   1 Units at 05/01/24 1602    leucovorin calcium injection 25 mg  25 mg Intravenous Q6H Jonas Goldstein MD PhD   25 mg at 05/02/24 0833    riTUXimab-abbs (TRUXIMA) 700 mg in sodium chloride 0.9 % 700 mL infusion  375 mg/m2 (Treatment Plan Recorded) Intravenous Once Jonas Goldstein MD PhD        thiamine (B-1) tablet 100 mg  100 mg Oral or Feeding Tube Daily Russel Boothe DO   100 mg at 05/01/24 2230       IMMUNOLOGY LABS  CD-4 Counts No results found for: \"ACD4\"  Inflammatory Markers    Recent Labs   Lab Test 04/29/24  0819   G6PD 13.0     Immune Globulin Studies   No lab results found.    GENERAL LABS  Metabolic Studies       Recent Labs   Lab Test 05/02/24  1257 05/02/24  1158 05/02/24  0930 05/02/24  0450 05/01/24  0858 05/01/24  0453 04/27/24  0925 04/27/24  0724 01/26/23  0541 01/24/23  2118 01/17/23  1231 01/17/23  0641   NA  --   --   --  142  --  142   < > 140   < >  --    < >  --    POTASSIUM  --   --   --  3.5  --  4.2  4.2   < > 3.5   < >  --    < > 2.9*   CHLORIDE  --   --   --  105  --  104   < > 108*   < >  --    < >  --    CO2  --   --   --  29  --  27   < > 20*   < >  --    < >  --    ANIONGAP  --   --   --  8  --  11   < > 12   < >  --    < >  --    BUN  --   --   --  17.2  --  16.3   < > 7.4   < >  --    < >  --    CR  --   --   --  0.42*  --  0.45*   < > 0.48*   < >  --    < >  --    GFRESTIMATED  --   --   --  >90  --  >90   < > >90   < >  --    < >  --    GLC  --  120*   < > 125*   < > 149*   < > 178*   < >  --    < >  --    A1C  --   --   --   --   --   --   --  5.7*  --   --   --   --    MAUREEN  --   --   --  8.7  --  8.9   < > 9.1   < >  --    < >  --    PHOS  --   --   --  3.3  --  4.0   < > 4.1   < >  --    < >  --    MAG 2.0  --   --  2.1  --  " 2.3   < > 1.9   < >  --    < >  --    LACT  --   --   --   --   --   --   --   --   --  1.4  --  0.8    < > = values in this interval not displayed.     Hepatic Studies    Recent Labs   Lab Test 05/02/24  0450 05/01/24  0453 04/30/24  0405 04/29/24  0431 04/24/24 2050 01/17/23  0641   BILITOTAL 0.3 0.3 0.4 0.3   < >  --    DBIL  --   --   --  <0.20   < >  --    ALKPHOS 60 66 68 74   < >  --    PROTTOTAL 6.2* 6.8 7.2 7.6   < > 6.3*   ALBUMIN 3.1* 3.3* 3.3* 3.5   < >  --    AST 21 20 24 23   < >  --    ALT 23 18 21 17   < >  --    LDH  --   --   --  220  --  263*    < > = values in this interval not displayed.     Pancreatitis testing    Recent Labs   Lab Test 04/10/19  0003   LIPASE 162     Hematology Studies   Recent Labs   Lab Test 05/02/24  1257 05/01/24  0453 04/30/24  0405 04/29/24  1359 04/26/24  0615 04/24/24 2050 02/24/22  1458 04/10/19  0003   WBC 8.9 8.5  8.5 7.1 9.5   < > 6.7  6.7   < > 4.1   ANEU  --   --   --   --   --  6.1  --  2.0   ANEUTAUTO 8.4* 7.6  --  8.8*  --   --    < >  --    ALYM  --   --   --   --   --  0.5*  --  1.4   ALYMPAUTO 0.3* 0.4*  --  0.4*  --   --    < >  --    KYLE  --   --   --   --   --  0.0  --  0.5   AMONOAUTO 0.0 0.4  --  0.2  --   --    < >  --    AEOS  --   --   --   --   --  0.0  --  0.2   AEOSAUTO 0.1 0.0  --  0.0  --   --    < >  --    ABSBASO 0.0 0.0  --  0.0  --   --    < >  --    HGB 10.5* 10.9*  10.9* 10.1* 11.1*   < > 11.3*  11.3*   < > 7.7*   HCT 32.1* 32.6*  32.6* 30.9* 34.3*   < > 34.2*  34.2*   < > 27.4*    432  432 453* 508*   < > 450  450   < > 470*    < > = values in this interval not displayed.     Urine Studies     Recent Labs   Lab Test 05/02/24  0027 05/01/24  1613 05/01/24  0854 05/01/24  0131 04/30/24  1704 04/29/24  2100 01/16/23  2132 09/19/18  2225   URINEPH 7.5 8.0 8.0 7.5* 8.0   < > 6.0 5.5   NITRITE  --   --   --   --   --   --  Negative Negative   LEUKEST  --   --   --   --   --   --  Small* Trace*   WBCU  --   --   --   --   --    "--  <1 <1    < > = values in this interval not displayed.     CSF testing     Recent Labs   Lab Test 04/26/24  1116 04/26/24  1115   CWBC  --  3   CRBC  --  44*   CGLU 52  --    CTP  --  109.0*     Medication levels    Recent Labs   Lab Test 01/20/23  1230   VANCOMYCIN 7.6     Body fluid stats    Recent Labs   Lab Test 01/17/23  1500   FCOL Yellow   FAPR Hazy*   FWBC 5,760   FNEU 78   FLYM 16   FMONO 5   FBAS 1   FTP 5.6       MICROBIOLOGY  Fungal testing:  No lab results found.    Invalid input(s): \"HIFUN\", \"FUNGL\"  Last Culture results   Culture   Date Value Ref Range Status   04/26/2024 No anaerobic organisms isolated after 5 days  Preliminary   04/26/2024 No growth after 5 days  Preliminary   04/26/2024 No Growth  Final   04/26/2024 No growth after 6 days  Preliminary   01/24/2023 No Growth  Final   01/24/2023 No Growth  Final   01/20/2023 No anaerobic organisms isolated  Final   01/20/2023 No Growth  Final   01/20/2023 No Growth  Final   01/17/2023 2+ Streptococcus pneumoniae (A)  Final   01/17/2023 No Growth  Final   12/04/2022 No Growth  Final   12/04/2022 3+ Normal alo  Final   12/04/2022 No Growth  Final   02/24/2022 No Growth  Final     Culture Micro   Date Value Ref Range Status   04/30/2012 No growth  Final   06/30/2010 >100,000 colonies/mL Escherichia coli  Final   02/05/2010 No growth  Final   12/01/2009 No growth  Final       Last checks of Clostridioides difficile testing  No lab results found.  Infection Studies to assess Diarrhea No lab results found.    Invalid input(s): \"BIDRY\", \"BIDYD\"  Virology:  Coronavirus-19 testing    Recent Labs   Lab Test 11/28/23  1451 01/16/23 2017 12/04/22 2036 02/24/22  1456   [id number] Negative Negative Negative Negative     Respiratory virus (non-coronavirus-19) testing    Recent Labs   Lab Test 11/28/23  1451   INFZA Negative   INFZB Negative   IRSV Negative     CMV viral loads    Recent Labs   Lab Test 05/01/24  0839   CMVRESINST 1,010*   CMVLOG 3.0     CMV " "resistance testing:  No lab results found.  No results found for: \"H6RES\"  No results found for: \"EBVRESINST\", \"67485\", \"EBQTC\", \"EBRES\", \"37015\", \"32817\"  BK Virus Testing   No lab results found.  Parvovirus Testing  No lab results found.    Invalid input(s): \"PRVRES\"  Adenovirus Testing  No lab results found.    Invalid input(s): \"ADENAB\", \"ADENOVIRUS\", \"ADQT\"    IMAGING  No results found for this or any previous visit (from the past 48 hour(s)).      ATTESTATION  I have reviewed labs/blood-work that are part of this patients present encounter in addition to historical and baseline values. The specific values are recorded in Epic and I have incorporated them and my interpretation as relevant into my assessment and plan as recorded.  I have reviewed radiology reports/EKGs/and other diagnostic studies that are part of this patients present encounter, in addition to historical and baseline results.  The specific reports are available in Epic and I have incorporated them into my assessment and plan as described above. Independently interpreted diagnostic study results are described above.  This dictation was prepared in part using Dragon recognition software.  As a result errors may occur. When identified these transcription errors have been corrected.  While every attempt is made to correct errors during dictation, errors may still exist.      Signature:     Gerson Stewart MD   PGY-6 Transplant Infectious Disease Fellow     "

## 2024-05-02 NOTE — PROGRESS NOTES
"Care Management Follow Up    Length of Stay (days): 8    Expected Discharge Date:       Concerns to be Addressed: discharge planning     Patient plan of care discussed at interdisciplinary rounds: Yes    Anticipated Discharge Disposition: LTC     Anticipated Discharge Services: Other (see comment) (TBD)  Anticipated Discharge DME: Other (see comment) (TBD)    Patient/family educated on Medicare website which has current facility and service quality ratings: no  Education Provided on the Discharge Plan:    Patient/Family in Agreement with the Plan: unable to assess    Referrals Placed by CM/SW:    Private pay costs discussed: Not applicable    Additional Information:  During round JEANNIE Duke asked SW [facility] to find out of pt's HIV status has been reported and notifying a persons lover of positive HIV. This has been reported. Was diagnosed in 2014.   SHAD called [facility] and left a VM with Elly Thakkar in the HIV reporting dept. Asking for call back about reporting and questions about notifying SO.     Elly Bijan called SW back. She looked pt up and found that pt tested positive for HIV in 2014. SHAD stated that it seemed that this was a \"new\" diagnosis. Will verify with PA. Elly said that pt may have not been notified of results. She checked charting. Their systems showed they had tried to contact pt over 14 times with no response. It also showed that pt has not had any care for HIV in the past 9 years. Elly took Shad information and said that another person will contact SHAD for more information.    Per PA pt is unable to talk. She blinks her eyes to communicate.     SHAD found note in pt's chart that had HIV Testing on 8/27/2014 at the [facility]. On 8/27/2014 a office visit with Daren Mo - Encounter for HIV counseling (Primary Dx), not able to review note. Need pt's authorization for before retrieving document.     SHAD spoke with Palliative SWMonse,  who is following pt. " "Monse let SW know that pt's HC agents her son and aunt were told during care conference on 4/29 that pt is HIV positive. The fellow was given permission from pt to tell them. This Sw is unable to enter pt's room at this time due to COVID restrictions. Monse said she would be willing to relay any messages to pt that this Sw needs to ask.     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  SW noted at the end of ID note it asks for SW to follow up with [place] Dept of Health, which SW is in the process of.     SW will reach out to pt's son HC Agent  tomorrow about LTC placement.    SW to follow and assist with any other discharge needs that may arise.  NAKIA Winn   5A Oncology beds:5220-40 & 5C  beds 5417-32 (non BMT )      Vocera:  Phone: [phone]  Pager: [phone]        "

## 2024-05-02 NOTE — PLAN OF CARE
"Goal Outcome Evaluation:    Time    /78 (BP Location: Left arm)   Pulse 66   Temp 98.7  F (37.1  C) (Axillary)   Resp 16   Ht 1.626 m (5' 4\")   Wt 71.8 kg (158 lb 4.6 oz)   SpO2 95%   BMI 27.17 kg/m      Reason for admission: HD methotrexate, c/b positive Covid and MRSA  Activity: Bedfast  Pain: THOR  Neuro: Q4 neuro check. Pt is able to raise left hands up and wiggle left foot. Unable to assess Pupil at midnight, pt declined opening eyes but was awake. Pt is non verbal.  Cardiac: bradycardic  Respiratory: no distress observed, infrequent coughing  GI/: no BM yet, thornton cath in place with GUOP, PH check 7.5  Diet: Tube feeding 55 ml per hours  Lines: DL PICC  Wounds:   Labs/imaging: please review lab in the chart      New changes this shift:     Plan:       Continue to monitor and follow POC    "

## 2024-05-02 NOTE — PROGRESS NOTES
Nursing Focus: Chemotherapy    D: Positive blood return via PICC . Insertion site is clean/dry/intact, dressing intact with no complaints of pain.  Pre infusion assessment documented in Flowsheet (if applicable).    I: Premedications given per order (see electronic medical administration record). Dose #1 of Rituxan started to infuse at 50 ml/hr w/ ramp-up's q30 min. Reviewed pt teaching on chemotherapy side effects.  Pt denies need for further teaching. Chemotherapy double checked per protocol by two chemotherapy competent RN's.   A: Tolerating infusion well. Denies nausea and or pain.   P: Continue to monitor urine output and symptoms of nausea. Screen for symptoms of toxicity.

## 2024-05-03 NOTE — CONSULTS
"The purpose of this note, including any accompanying recommendation, is advisory only concerning ethical principles and considerations related to this case. Determination of appropriate patient care decisions is the responsibility of the clinical team in consultation with patients and their decision makers and relevant institutional policy. Legal and policy questions should be addressed with Risk Management.     Brief note to conclude ethics involvement.     There is not an ethics conflict concerning patient care; there are appropriate and effective surrogates in the case the patient lacks decision making capacity. There are not concerns regarding the appropriateness of specific interventions.     Concerns regarding ethical obligations to disclosure and reporting were discussed with Yvette Peacock (social work.)     To the extent that statutory and regulatory requirements for reporting are completed, the care teams ethical obligations are also fulfilled. For specific details about regulatory compliance, it would be advisable to involve risk/ specifically.     It would be ethically problematic to make unauthorized disclosures that are inconsistent with patient choices and values to the extent they are not required by statute/regulation or a necessary disclosure for the limited purpose of enabling a surrogate to make an informed consent on the patient's behalf. It's my understanding that the patient here has specifically indicated that her informally designated surrogates may have all necessary medical information.     Ethics remains available if other issues arise.     Mike Houston JD, MPH, MA, BHUPINDER-C                                   Clinical Ethics Lead, [employer]uhn: \"Clinical Ethics Consultation Service\" or [facility] \"Ethics\" consult pager [phone].   "

## 2024-05-03 NOTE — PLAN OF CARE
"[id number]    /84   Pulse 70   Temp 97.7  F (36.5  C) (Oral)   Resp 20   Ht 1.626 m (5' 4\")   Wt 72.9 kg (160 lb 11.5 oz)   SpO2 95%   BMI 27.59 kg/m      Afebrile, VSS  Pt showed some movement and facial expression this evening.  Pt said few words, \"where is my phone?\" Not clear, but understandable, also able to hold cell phone with left hand (not using).    Chemo infused without issue, see MAR.  Medication via NG tube.         NEURO due to sever impairment THOR     RESPIRATORY: Room Air/ intermittently 2 LPM o2 used while de sating.   Lungs sound, clear, equal bilateral.     CARDIO: VSS,      GI/: Denies N/V, BS active, BM x 1, AUOP via thornton     SKIN: Intact.      ACTIVITY: Total care     PAIN: No sign of pain/ visible distress    DLA: PICC double lumen     BG: Yes     PLAN:         Continue monitoring.     * Italic, from provider's note.          Goal Outcome Evaluation:      Plan of Care Reviewed With: patient                 "

## 2024-05-03 NOTE — PROGRESS NOTES
"Care Management Follow Up    Length of Stay (days): 9    Expected Discharge Date:       Concerns to be Addressed: discharge planning     Patient plan of care discussed at interdisciplinary rounds: Yes    Anticipated Discharge Disposition: Other (Comments) (TBD)     Anticipated Discharge Services: Other (see comment) (TBD)  Anticipated Discharge DME: Other (see comment) (TBD)    Patient/family educated on Medicare website which has current facility and service quality ratings: no  Education Provided on the Discharge Plan:    Patient/Family in Agreement with the Plan: unable to assess    Referrals Placed by CM/SW:    Private pay costs discussed: Not applicable    Additional Information:  SW reviewed notes and attended rounds. The team would like a care conference with family set up for Monday to discuss discharge and next medical steps. It was also discussed to involve Ethics and Risk due to pt's unwillingness to report her HIV status to her boyfriend   Per ID note on 5/2:   \"Per patients son and next of kin she told him she does NOT want her partner kemi to know about the HIV  [facility].\"    [facility] and left  message for a return call. SHAD put in a consult for Ethics and emailed  Pietro Johnston with [employer]  SW talked with Maria from Ethics. SW was told that we have done all that is ethical by reporting to the [place] [facility] who will follow up with reporting to pt's significant other and making sure pt has health care surrogates. Maria asked if it is written in notes that pt gave permission to let son and aunt know of HIV status by blinking or was she able to verbally communicate this.      1:25 PM CHW received a call back from Raissa Thakkar with the [place] [facility]. The [place] dept contacts people who may have been exposed to HIV to let them know that they should be tested,    Disease Investigator, Edmundo Shaw, #[phone]  2:30 PM: SHAD " talked with Colin. He let SW know that pt was most likely tested for HIV when she was pregnant, which was a relief for SW. He asked if SW could find out info about pt's boyfriend, full name and address. Colin gave SW his contact number to follow up.     For LTC placement::    Per CHW work in the past the Pt's family can call the county to ask for LTC insurance for pt.and ask for Case workers numberfor hosptial to follow up.     Care Conference:   SW called pr's son TAMANNA Bolden/OLIVER and Aunt, Noemy. Noemy said Monday morning would work best for bother her and Yuan for care conference.     Care Conference scheduled for 10:30 AM. (LTC will need to be discussed at this time)  :  SW to follow and assist with any other discharge needs that may arise.  NAKIA Winn   5A Oncology beds:5220-40 & 5C  beds 5417-32 (non BMT )      Vocera:  Phone: [phone]  Pager: [phone]

## 2024-05-03 NOTE — PLAN OF CARE
Goal Outcome Evaluation:  COVID +   MRSA  Currently non- verbal. Q4H neuro assessment. Pt able to blink to answer questions. Occasionally able to nod head yes and no. Significant right sided weakness, L side stronger. THOR orientation status. NPO - meds given via feeding tube. Contin TF - tubing changed today. Bed bound - q2h turns. Assist x 2. Passing gas. Smear x 1. Roy - adequate output. Tylenol given x 1. Nausea reported - declined pain meds. Duo neb given x1 for course lung sounds. Pt still needs fungal/yeast culture and respiratory culture. Q4H BG checks. Methotrexate level 0.04. PICC - cap changed, Hep locked.     Please note TF rate change due at 0000.     Pt's significant other (SO), Moo, visited at bedside. SO noted to not be wearing mask in room or in public areas of unit/hospital. Rn educated SO on importance on mask wearing in room and in public area's due to pt's COVID positive status. S.O. given mask, unknown if he wore mask and followed instructions.     Plan of Care Reviewed With: patient    Overall Patient Progress: no changeOverall Patient Progress: no change

## 2024-05-03 NOTE — PLAN OF CARE
"Goal Outcome Evaluation:    Time    /75 (BP Location: Right arm)   Pulse 70   Temp 98  F (36.7  C) (Oral)   Resp 18   Ht 1.626 m (5' 4\")   Wt 72.9 kg (160 lb 11.5 oz)   SpO2 95%   BMI 27.59 kg/m      Reason for admission: L midbrain lesion c/f CNS Lymphoma  Activity: Bedfast  Pain: THOR  Neuro: Q4H Neuro check, pt was able to follow few commands like L hand  and wiggle left foot. Pt was too sleepy to open eyes for pupil to be check, and was uncooperative to open her eyes  Cardiac: wnl  Respiratory: no distress observed, infrequent coughing   GI/: lbm 5/2, thornton cath  Diet: Tube feeding  Lines: DL PICC  Wounds:   Labs/imaging: please review lab in the chart. Methotrexate 0.04      New changes this shift:     Plan:       Continue to monitor and follow POC    "

## 2024-05-03 NOTE — PROGRESS NOTES
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 PmHx of dyslipidemia, polysubstance abuse, and r-sided empyema (s/p VATS and partial pleurectomy Jan 2021). Upon review of labs and imaging from with positive EBV PCR from CSF. LP at [facility] x4/26 with rare atypical cells. Based on findings started HD-Methotrexate + Rituximab 4/29/24. Course further complicated by COVID positive, acute decline in neurological status, HIV/AIDS     Today:  - D5 HD-Methotrexate and Rituximab. Received rituximab yesterday. Tolerating well thus far. Will plan transferring to [facility] in coming days. If patient remains here by Monday, could consider moving D10 rituximab to D7.   - Will plan for a GOC discussion with family and teams on Monday; discussed with SHAD regarding scheduling, will plan for ~1030 am on Monday.   - Neurological remains the same. Able to follow conversations with eyes and sometimes moving head and able to blink \"yes\" or \"no\" to questions when prompted. Ability to follow commands waxes/wanes; able to squeeze fingers, dorsi/plantar flexion, wiggles toes, smile today. If acute changes suspected or s/sx of hydrocephalus or herniation then will obtain stat head CT and discuss with neurology. Will continue to monitor closely with Q4H neuro checks.    - Continue dexamethasone taper with 40 mg x 4 days (4/24-4/30) ? 20 mg x 4 days (5/1-5/4) ? 10 mg daily x 4 days ? 4 mg BID (5/5-X).   - SW reached out to health department regarding patient's HIV status and if able to be reported properly. Upon further discussions and health department chart review, original diagnosis was in 2014 and patient was contacted over 14 times with no " response.  Unable to confirm with patient if she is was aware of status as she is non-verbal. Upon further chart review, HIV testing was completed 8/27/24 at [facility] with office visit to follow, but encounter is not able to be reviewed and needing patient authorization; will discuss with son (Yuan) as he is her next of kin and medical decision maker. Will plan to reach out to family to further discuss. During conversation with significant other yesterday, it does not appear he is aware of her HIV status. ID had similar conversation with corresponding impression.   - Will continue best supportive cares.     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 Previously hospitalized in March 2024 where imaging revealed restricted diffusion and T2 hyperintensity of osterior pontine mesencephalic junction with local mass effect. Felt to be likely ischemic infarct (risk factors of HTN, smoking and amphetamine use). Additional considerations included infection vs neoplasm. CSF cytology attempted, but insufficient quantity. MRI brain 4/15 showed increased in interval of lesion of L avel and midbrain compared to previous and increase in interval again on 4/24/24 MRI. CT A/P with no lymphadenopathy, mass, or acute process. LP 4/18 non-diagnostic with negative flow cytometry and cytology but CSF EBV PCR positive (original uncertainty of blood vs. CSF PCR but able to confirm CSF by [facility]). ID, oncology, and neurosurgery consulted for concern of CNS lymphoma given context of imaging, worsening decline, and imaging. She was started on Decadron 8 mg and transferred to [facility] 4/24/24 for further workup and consideration of brain biopsy. Unable to obtain tissue biopsy given challenging location of brainstem, if repeat imaging shows expansion, then could consider. But with  review of imaging with Neurosurgery, agree with outside impression of CNS lymphoma. CT CAP 4/25 showed no evidence of lymphoma. Repeat LP 4/26 with rare atypical cells on cytology but does not correlate with flow cytometry (rare polytypic B cells). LP also showing positive EBV PCR, elevated protein to 109, and negative CMV, meningitis/encephalitis panel.  Ophthalmology consulted; no evidence of ocular lymphoma.  Based on imaging and EBV + PCR from CSF started HD-methotrexate on 4/29/24. Appears to be tolerating well thus far. 32 hours methotrexate level 0.31.   - Infectious studies at [facility] thus far negative for CMV IgM negative (IgG positive suggesting previous infection), Cryptococcus, CMV encephalitis, Tb meningitis, toxoplasmosis serology. Continue to follow toxoplasmosis PCR and Karius.   - Follow CSF AFB culture (NGTD) and bacterial culture results from LP x4/26  - Continue dexamethasone taper with 40 mg x 4 days (4/24-4/30) ? 20 mg x 4 days (5/1-5/4) ? 10 mg daily x 4 days ? 4 mg BID (5/5-X)  - PICC line placed x4/29       Treatment Plan: HD- Methotrexate + Rituximab C1D1 = 4/30/24    - Methotrexate 3.5 g/m2 D1    - Rituximab 375 mg/m2 D3    - Leucovorin 50 mg D2 until methotrexate level < 0.10    - mIVF .45% NS w. Sodium bicarbonate @125 ml/hr    #Decreased mental status secondary to lesion of L avel and midbrain  Presented to OSH 4/12/24 with 4/12/24 with right facial and arm paresthesias and RLE weakness originally concerning for ischemic stroke. Further imaging revealed mass of L avel and midbrain. Transferred to [facility] for consideration of biopsy, but due to location was designated at too risky. On arrival to [facility], patient on assessment by neurosurgery was A&O x 3, able to slowly follow commands, and had slowed speech. She has since progressed to not being able to move extremities, waxes/wanes ability to follow commands, and non-verbal. She can follow conversations with eye and appropriately blink  "\"yes\" to questions. For example, when asked \"is this your son\" when referring to son (Yuan) patient was able to blink multiple times. Notable decrease in neurological function on exam morning x4/30 and consistent findings by neurology team; patient unable to squeeze fingers in comparison to day prior and overnight. Stat head CT for concern of increased ICP; no acute findings and L avel lesion similar to CT done 4/25. Repeat MRI brain w/wo contrast x4/30 showing increased size of known lesion of L avel and midbrain in comparison to MRI on 4/15 and 3/29. Given concern for increasing size in mass, will continue to monitor closely with Q4H neuro checks for s/sx of herniation. If new changes in exam will obtain stat CT head and if evidence of herniation, would need emergent intubation. Appreciate assistance and input from neurology, signed off 5/1 but familiar with patient and readily available for any questions and concerns.   - RT consult placed for difficulty with secretion management. Could also assist with sputum culture.     #Anemia  #Thrombocytosis  Likely due to underlying suspected malignancy  - Transfuse for plts < 10k and Hgb , 7.0    Hx SWETA  Has hx of SWETA. Has been treated with transfusions and IV iron in the past. Will defer workup at this time.     # ID Prophylaxis  - Dapsone 100 mg daily for PJP ppx  - Levaquin 250 mg daily when ANC < 1.0     ID  #HIV/AIDS  Recently diagnosed with HIV during admission at [facility] on 3/28/24 with viral load of 1.24 million, 6.9 log,  and CD4 count of 2. She was not started on ART, given Nystatin x2 weeks and referred to ID clinic for follow up. Prior to this, she was admitted again with ID consult for broad-infectious workup. Started Biktarvy 4/17 but stopped due concern for rash, rechallenged on 4/20 and able to tolerate. Later transitioned to Dolutegravir and Truvada. SW reached out to health department 5/2 regarding patient's HIV status and if able to " be reported properly. Upon further discussions and health department chart review, original diagnosis was in 2014 and patient was contacted over 14 times with no response.  Unable to confirm with patient if she is was aware of status as she is non-verbal. Upon further chart review, HIV testing was completed 8/27/24 at [facility] with office visit to follow, but encounter is not able to be reviewed and needing patient authorization; will discuss with son (Yuan) as he is her next of kin and medical decision maker. Of note, patient's aunt (Noemy), and son (Yuan) are aware of diagnosis.   - Upon further conversation with boyfriend (Otis) today, it does not appear he is aware of her HIV status. I asked about general medical history and any new/recent infections to which he did not mention HIV/AIDS status. ID had a similar conversation with a consistent consensus. Reached out to SW to discuss with family/son regarding if previous partners have been notified.  - Continue ART with Dolutegravir and Truvada   - Plan to repeat CD4 count and viral load circa x5/15  - Please see social section below regarding information of who is aware of her status.      #COVID-19 positive  #MRSA pneumonia  Tested positive for COVID-19 4/18/24 during admission to [facility]. Patient remains asymptomatic. Will continue to monitor at this time. Previously on Linezolid. ID following; does not feel like MRSA pneumonia is likely and ok to stop Linezolid.     MISC  #Cotton wool spot, right eye  Single CWS in right eye, most likely in setting of acute illness. Found on assessment by ophthalmology x4/28. Given significant comorbidities, CD4 count of 2 and infectious panels pending, will repeat dilated fundus exam in 1 week to rule out early signs of fungal ocular infection     SOCIAL   #Medical decision maker  Confirmed with family that son (Yuan) will be patient's primary medical decision maker. Son Yuan and patient are okay with  "communication with aunt (Noemy) if Yuan is unavailable.     #H/o stimulant/amphetamine use   Noted on [facility] [place] tox screen in 2016. Patient previously reported snorting and declined IVDU.     Fluids/Electrolytes/Nutrition   - IVF per chemotherapy regimen  - PRN lyte replacement per standing protocol  - Regular diet as tolerated     Lines: PICC    PPX  VTE: Lovenox  GI: Famotidine  Bowels: prn miralax and senna    Code: Full    Medically Ready for Discharge: Anticipated in 5+ Days    Disposition: Pending further workup, evaluation, tolerance of HD-methotrexate, and clinical course. Patient will require a long term care facility on discharge.     I spent >45 minutes face-to-face and/or coordinating or discussing care plan. Over 50% of our time on the unit was spent counseling the patient and/or coordinating care    Patient is seen and examined by Dr. Jacob and RILEY.  Assessment and plan are discussed and delivered to the patient.    Kanid Lambert PA-C  Hematology/Oncology  Pager: [phone]    Interval History   Nursing notes reviewed. No acute events overnight    Kamilah is resting in bed this morning. Watching television. Blinks \"yes\" when asked \"are you watching TV\" and \"Is this your nurse\" when referring to. Ability to follow commands has waxed/waned over previous days but able able to squeeze fingers, dorsi/plantar flexion, wiggles toes, smile today and move head when asked to. Discussed goals of care conference with family on Monday, patient blinked \"yes\" to agreeance. Denies SOB, cough, abdominal pain, nausea, vomiting. Assured patient that we will continue to check in routinely.        Physical Exam   Temp: 97.8  F (36.6  C) Temp src: Oral BP: 125/83 Pulse: 81   Resp: 20 SpO2: 93 % O2 Device: Oxymask Oxygen Delivery: 1 LPM  Vitals:    05/01/24 1019 05/02/24 1152 05/03/24 1048   Weight: 71.8 kg (158 lb 4.6 oz) 72.9 kg (160 lb 11.5 oz) 72.5 kg (159 lb 13.3 oz)     Vital Signs with Ranges  Temp:  [97.6  F (36.4 " " C)-99  F (37.2  C)] 97.8  F (36.6  C)  Pulse:  [] 81  Resp:  [18-22] 20  BP: (109-132)/(69-84) 125/83  SpO2:  [92 %-98 %] 93 %  I/O last 3 completed shifts:  In: 45 [NG/GT:45]  Out: 3225 [Urine:3225]      Physical Exam:    Blood pressure 125/83, pulse 81, temperature 97.8  F (36.6  C), temperature source Oral, resp. rate 20, height 1.626 m (5' 4\"), weight 72.5 kg (159 lb 13.3 oz), SpO2 93%.    General: lying in bed. Nontoxic appearing. No acute distress.   HEENT: Normocephalic/ Sclera anicteric. Moist mucus membranes.   CV: RRR. Normal S1/S2. No m/r/g. No peripheral edema.   Resp: CTAB on anterior auscultation. No wheezing/crackles. Normal respiratory effort on room air. Breath sounds are equal throughout.   GI: Soft, non-tender, non-distended. Bowel sounds present and normoactive. No peritoneal signs. NGT in tact.   MSK: Warm and well-perfused. Normal tone.   Skin: Skin is clean, dry and intact. No rashes on limited exam. No jaundice. Soft mitts in place  Neuro:   Mental status: Opens eyes to voice and maintains eye contact during conversation. Able to communicate with blinking. Blinks \"yes\" appropriately when asked orientation questions such as \"is this your son Yuan?\" when referring to son Yuan. Moves head to follow conversation.   Motor: Unable to move extremities. Remainder of neuro examination waxes and wanes; able to squeeze fingers, wiggle toes, smile, move head when asked to today  Speech: Non-verbal.   HEENT: PERRLA, unable to abduct L eye. Blinks upon commands.       Medications   Current Facility-Administered Medications   Medication Dose Route Frequency Provider Last Rate Last Admin    dextrose 10% infusion   Intravenous Continuous PRN Russel Boothe DO         Current Facility-Administered Medications   Medication Dose Route Frequency Provider Last Rate Last Admin    atorvastatin (LIPITOR) tablet 40 mg  40 mg Oral or Feeding Tube Daily Russel Boothe DO   40 mg at 05/03/24 0911    dapsone " suspension 100 mg  100 mg Oral or Feeding Tube Daily Kandi Lambert PA-C   100 mg at 05/03/24 0914    dexAMETHasone (DECADRON) injection 20 mg  20 mg Intravenous Daily Kandi Lambert PA-C   20 mg at 05/03/24 0921    Followed by    [START ON 5/5/2024] dexAMETHasone (DECADRON) injection 10 mg  10 mg Intravenous Daily Kandi Lambert PA-C        Followed by    [START ON 5/9/2024] dexAMETHasone (DECADRON) injection 4 mg  4 mg Intravenous BID Kadni Lambert PA-C        dolutegravir (TIVICAY) tablet 50 mg  50 mg Oral or NG Tube Daily Ramesh Van MD   50 mg at 05/02/24 2115    emtricitabine-tenofovir (TRUVADA) 200-300 MG per tablet 1 tablet  1 tablet Oral or NG Tube Daily Ramesh Van MD   1 tablet at 05/02/24 2111    enoxaparin ANTICOAGULANT (LOVENOX) injection 40 mg  40 mg Subcutaneous Q24H Russel Boothe DO   40 mg at 05/02/24 1542    famotidine (PEPCID) suspension 20 mg  20 mg Oral BID Kandi Lambert PA-C   20 mg at 05/03/24 1157    gabapentin (NEURONTIN) solution 100 mg  100 mg Oral or Feeding Tube Q12H [facility] (08/20) Russel Boothe DO   100 mg at 05/03/24 0914    heparin lock flush 10 UNIT/ML injection 5-20 mL  5-20 mL Intracatheter Q24H Tucker Jacob MD   10 mL at 05/03/24 1202    insulin aspart (NovoLOG) injection (RAPID ACTING)  1-4 Units Subcutaneous Q4H Russel Boothe DO   1 Units at 05/02/24 1742    thiamine (B-1) tablet 100 mg  100 mg Oral or Feeding Tube Daily Russel Boothe DO   100 mg at 05/02/24 2112       Data   Results for orders placed or performed during the hospital encounter of 04/24/24 (from the past 24 hour(s))   Glucose by meter   Result Value Ref Range    GLUCOSE BY METER POCT 153 (H) 70 - 99 mg/dL   Glucose by meter   Result Value Ref Range    GLUCOSE BY METER POCT 91 70 - 99 mg/dL   Glucose by meter   Result Value Ref Range    GLUCOSE BY METER POCT 99 70 - 99 mg/dL   Glucose by meter   Result Value Ref Range    GLUCOSE BY  METER POCT 108 (H) 70 - 99 mg/dL   Glucose by meter   Result Value Ref Range    GLUCOSE BY METER POCT 115 (H) 70 - 99 mg/dL   CBC with Platelets & Differential    Narrative    The following orders were created for panel order CBC with Platelets & Differential.  Procedure                               Abnormality         Status                     ---------                               -----------         ------                     CBC with platelets and d...[[id number]]  Abnormal            Final result                 Please view results for these tests on the individual orders.   Phosphorus   Result Value Ref Range    Phosphorus 3.2 2.5 - 4.5 mg/dL   Methotrexate level   Result Value Ref Range    Methotrexate <0.04 umol/L   Comprehensive metabolic panel   Result Value Ref Range    Sodium 138 135 - 145 mmol/L    Potassium 4.0 3.4 - 5.3 mmol/L    Carbon Dioxide (CO2) 27 22 - 29 mmol/L    Anion Gap 7 7 - 15 mmol/L    Urea Nitrogen 15.4 6.0 - 20.0 mg/dL    Creatinine 0.42 (L) 0.51 - 0.95 mg/dL    GFR Estimate >90 >60 mL/min/1.73m2    Calcium 8.5 (L) 8.6 - 10.0 mg/dL    Chloride 104 98 - 107 mmol/L    Glucose 117 (H) 70 - 99 mg/dL    Alkaline Phosphatase 60 40 - 150 U/L    AST 22 0 - 45 U/L    ALT 32 0 - 50 U/L    Protein Total 6.5 6.4 - 8.3 g/dL    Albumin 3.1 (L) 3.5 - 5.2 g/dL    Bilirubin Total 0.4 <=1.2 mg/dL   Magnesium   Result Value Ref Range    Magnesium 2.1 1.7 - 2.3 mg/dL   Uric acid   Result Value Ref Range    Uric Acid 3.4 2.4 - 5.7 mg/dL   Partial thromboplastin time   Result Value Ref Range    aPTT 26 22 - 38 Seconds   INR   Result Value Ref Range    INR 1.04 0.85 - 1.15   Fibrinogen activity   Result Value Ref Range    Fibrinogen Activity 292 170 - 490 mg/dL   CBC with platelets and differential   Result Value Ref Range    WBC Count 8.5 4.0 - 11.0 10e3/uL    RBC Count 3.83 3.80 - 5.20 10e6/uL    Hemoglobin 10.6 (L) 11.7 - 15.7 g/dL    Hematocrit 33.1 (L) 35.0 - 47.0 %    MCV 86 78 - 100 fL    MCH 27.7  26.5 - 33.0 pg    MCHC 32.0 31.5 - 36.5 g/dL    RDW 13.9 10.0 - 15.0 %    Platelet Count 349 150 - 450 10e3/uL    % Neutrophils 96 %    % Lymphocytes 2 %    % Monocytes 1 %    % Eosinophils 1 %    % Basophils 0 %    % Immature Granulocytes 0 %    NRBCs per 100 WBC 0 <1 /100    Absolute Neutrophils 8.1 1.6 - 8.3 10e3/uL    Absolute Lymphocytes 0.2 (L) 0.8 - 5.3 10e3/uL    Absolute Monocytes 0.1 0.0 - 1.3 10e3/uL    Absolute Eosinophils 0.1 0.0 - 0.7 10e3/uL    Absolute Basophils 0.0 0.0 - 0.2 10e3/uL    Absolute Immature Granulocytes 0.0 <=0.4 10e3/uL    Absolute NRBCs 0.0 10e3/uL   Glucose by meter   Result Value Ref Range    GLUCOSE BY METER POCT 112 (H) 70 - 99 mg/dL   Glucose by meter   Result Value Ref Range    GLUCOSE BY METER POCT 111 (H) 70 - 99 mg/dL

## 2024-05-04 NOTE — PROGRESS NOTES
"[facility]  Department of Cardiology  Consult Note      PATIENT NAME: Faustina Rae  MRN: [id number]  TODAY'S DATE: 10/17/2022  ADMIT DATE: 10/16/2022                          CONSULT REQUESTED BY: Garrett Leonard, *    SUBJECTIVE     PRINCIPAL PROBLEM: Hypertensive urgency      REASON FOR CONSULT:  60-year-old female past medical history significant for asthma, COPD, CHF, hyperlipidemia, hypertension presents emergency room for 1 week of persistent cough.  It is nonproductive.  Not associated with fevers.  She denies chest pain or shortness of breath.  She states compliance with all medications.     HPI: 60 year old female with past history of CVA, HTN, CAD, HLD, Anxiety, COPD, DAVID, Morbid Obesity presented to ED complaining of 1 day history central chest aching pain with rads towards neck,5/10 waxing and waning own accord--pain free since presentation to ED, and dry hacking cough with post nasal drip for the past "Two to three days". Has chronic 3 pillow orthopnea and pedal edema, but no PND. Has DAVID, but does not have CPAP with her. Her BP has been 220's/110's since presentation here. Has HA and feels "Dizzy", but no vision changes. No new LOP (has residual left sided weakness from prior CVA). Patient on multiple diuretics and ARB     In ED: labs reviewed and noted below: normal CBC, normal electrolytes with stage 4 renal dysfunction (was stage 3b May 2022); BNP normal troponin mildly elevated. Influenza and COVID negative. CXR reviewed: NAPD. EKG reviewed: sinus without acute segments.      HPI:  Pt is 59 yo female with c/o chest "tightness" that lasted approximately 10 minutes yesterday. Pt states she has not had any further pain since this episode. Pt also states she has been coughing (nonproductive) and "my breath is short"; Endorses history of asthma; Pt denies loss of consciousness;     Pt has seen Dr Rosa in the past, has had stent 4 years ago.           Review of patient's " Notified MD at 4:37 AM regarding change in condition and Pupil size difference noted at 4am neuro assessment.  .      Spoke with: Ashok Miguel MD     Orders were obtained  CT of the head w/o contrast.      VSS, blood glucose check a 4am 134.     Vitals:    05/04/24 0400 05/04/24 0447 05/04/24 0500 05/04/24 0510   BP: 127/89 117/81 116/79 107/78   BP Location: Right arm  Right arm Right arm   Patient Position: Semi-Cline's  Semi-Cline's Semi-Cline's   Cuff Size: Adult Regular  Adult Regular    Pulse:       Resp:  18 18 18   Temp:       TempSrc:       SpO2: 95% 94% 94% 94%   Weight:       Height:            "allergies indicates:  No Known Allergies    Past Medical History:   Diagnosis Date    Anxiety     Arthritis     Asthma     CHF (congestive heart failure)     COPD (chronic obstructive pulmonary disease)     Hyperlipemia     Hypertension     Leaky heart valve     Obese     Obese     Obstructive sleep apnea     lost her CPAP    Pneumonia     Rheumatoid arthritis(714.0)     Stroke      Past Surgical History:   Procedure Laterality Date    CAROTID STENT      3 years ago    CHOLECYSTECTOMY      HYSTERECTOMY      SLEEVE GASTROPLASTY  02/21/2017     Social History     Tobacco Use    Smoking status: Never    Smokeless tobacco: Never   Substance Use Topics    Alcohol use: Yes     Comment: rare, about once a month    Drug use: No        REVIEW OF SYSTEMS  CONSTITUTIONAL: Negative for chills, fatigue and fever.   NEURO: No headaches, No dizziness  RESPIRATORY: +dry cough, and shortness of breath denies wheezing.    CARDIOVASCULAR: positive for chest "tightness". +leg swelling; Negative for palpitations   GI: Negative for abdominal pain, No melena, diarrhea, nausea and vomiting.   : Negative for dysuria and frequency, Negative for hematuria  MUSCULOSKELETAL: Negative for neck pain, Negative for muscle weakness, Negative for back pain     OBJECTIVE     VITAL SIGNS (Most Recent)  Temp: 98.2 °F (36.8 °C) (10/17/22 1101)  Pulse: 74 (10/17/22 1301)  Resp: (!) 24 (10/17/22 1301)  BP: (!) 184/86 (10/17/22 1301)  SpO2: 96 % (10/17/22 1301)    VENTILATION STATUS  Resp: (!) 24 (10/17/22 1301)  SpO2: 96 % (10/17/22 1301)       I & O (Last 24H):  Intake/Output Summary (Last 24 hours) at 10/17/2022 1614  Last data filed at 10/17/2022 1359  Gross per 24 hour   Intake 1620 ml   Output 1100 ml   Net 520 ml       WEIGHTS  Wt Readings from Last 3 Encounters:   10/17/22 0005 88.5 kg (195 lb 1.7 oz)   10/16/22 1623 90.7 kg (200 lb)   10/17/22 1050 88.5 kg (195 lb)   10/03/22 1108 103.9 kg (229 lb 2.7 oz)       PHYSICAL EXAM  GENERAL: obese, " middle age female, in no apparent distress alert and oriented.   HEENT: Normocephalic. Pupils normal and conjunctivae normal.    NECK: No JVD. No bruit..   CARDIAC: Regular rate and rhythm.No gallops, clicks or murmurs noted at this time.  CHEST ANATOMY: normal.   LUNGS: Clear to auscultation. No wheezing or rhonchi..   ABDOMEN: obese, Soft no masses or organomegaly.  No abdomen pulsations or bruits.  Normal bowel sounds.   URINARY: No gates catheter   EXTREMITIES: No cyanosis, clubbing or edema noted at this time., no calf tenderness bilaterally.   MUSCLE STRENGTH & TONE: No noteable weakness, atrophy or abnormal movement.       TELEMETRY: Sinus rhythm 70 heart rate  HOME MEDICATIONS:  No current facility-administered medications on file prior to encounter.     Current Outpatient Medications on File Prior to Encounter   Medication Sig Dispense Refill    albuterol (PROVENTIL/VENTOLIN HFA) 90 mcg/actuation inhaler INHALE 2 PUFFS INTO THE LUNGS FOUR TIMES DAILY 8.5 g 0    albuterol-ipratropium (DUO-NEB) 2.5 mg-0.5 mg/3 mL nebulizer solution Take 3 mLs by nebulization every 6 (six) hours as needed for Wheezing or Shortness of Breath. Rescue 75 mL 0    amLODIPine (NORVASC) 10 MG tablet Take 1 tablet (10 mg total) by mouth once daily. 30 tablet 11    atorvastatin (LIPITOR) 80 MG tablet Take 1 tablet (80 mg total) by mouth every evening. 90 tablet 1    b complex vitamins tablet Take 1 tablet by mouth once daily.      calcium citrate-vitamin D3 315-200 mg (CITRACAL+D) 315-200 mg-unit per tablet Take 1 tablet by mouth once daily.       chlorthalidone (HYGROTEN) 25 MG Tab Take 0.5 tablets (12.5 mg total) by mouth once daily. 45 tablet 1    cyanocobalamin (VITAMIN B-12) 1000 MCG tablet Take 1 tablet (1,000 mcg total) by mouth once daily. 90 tablet 3    EScitalopram oxalate (LEXAPRO) 10 MG tablet Take 1 tablet (10 mg total) by mouth once daily. 90 tablet 1    ferrous sulfate 325 (65 FE) MG EC tablet Take 1 tablet (325 mg  total) by mouth once daily. 90 tablet 3    fluticasone propionate (FLONASE) 50 mcg/actuation nasal spray 2 sprays (100 mcg total) by Each Nostril route once daily. 16 g 11    fluticasone propionate (FLOVENT HFA) 110 mcg/actuation inhaler Inhale 1 puff into the lungs 2 (two) times daily. Controller. Rinse mouth with water and spit out after use. 12 g 11    hydrALAZINE (APRESOLINE) 50 MG tablet Take 1 tablet (50 mg total) by mouth every 8 (eight) hours. 270 tablet 3    HYDROcodone-acetaminophen (NORCO) 5-325 mg per tablet Take 1 tablet by mouth every 4 (four) hours as needed for Pain. 18 tablet 0    isosorbide dinitrate (ISORDIL) 5 MG Tab TAKE 1 TABLET(5 MG) BY MOUTH THREE TIMES DAILY 90 tablet 0    meclizine (ANTIVERT) 25 mg tablet Take 1 tablet (25 mg total) by mouth 3 (three) times daily as needed for Dizziness. 20 tablet 0    metOLazone (ZAROXOLYN) 2.5 MG tablet Take 1 tablet (2.5 mg total) by mouth once daily. 30 tablet 11    metoprolol succinate (TOPROL-XL) 25 MG 24 hr tablet TAKE 2 TABLETS(50 MG) BY MOUTH EVERY  tablet 0    montelukast (SINGULAIR) 10 mg tablet Take 1 tablet (10 mg total) by mouth every evening. 90 tablet 3    multivitamin (THERAGRAN) per tablet Take 1 tablet by mouth once daily. 90 tablet 3    nystatin (MYCOSTATIN) powder Apply topically 2 (two) times daily. 60 g 2    traZODone (DESYREL) 50 MG tablet 1 tab po qhs prn for insomnia. 90 tablet 1    valsartan (DIOVAN) 80 MG tablet Take 1 tablet (80 mg total) by mouth once daily. 90 tablet 3    clopidogreL (PLAVIX) 75 mg tablet Take 1 tablet (75 mg total) by mouth once daily. 30 tablet 11    pantoprazole (PROTONIX) 40 MG tablet Take 1 tablet (40 mg total) by mouth once daily. 90 tablet 1    spironolactone (ALDACTONE) 25 MG tablet Take 1 tablet (25 mg total) by mouth once daily. 90 tablet 1    tramadol-acetaminophen 37.5-325 mg (ULTRACET) 37.5-325 mg Tab Take 1 tablet by mouth every 4 (four) hours. 20 tablet 0       SCHEDULED MEDS:    amLODIPine  10 mg Oral Daily    atorvastatin  80 mg Oral QHS    budesonide  0.5 mg Nebulization Q12H    calcium-vitamin D3  1 tablet Oral Daily    chlorhexidine  15 mL Mouth/Throat BID    cyanocobalamin  1,000 mcg Oral Daily    enoxaparin  30 mg Subcutaneous Daily    EScitalopram oxalate  10 mg Oral Daily    ferrous sulfate  1 tablet Oral Daily    fluticasone propionate  2 spray Each Nostril Daily    hydrALAZINE  50 mg Oral Q8H    isosorbide dinitrate  5 mg Oral TID    metoprolol succinate  50 mg Oral Daily    montelukast  10 mg Oral QHS    multivitamin  1 tablet Oral Daily    mupirocin   Nasal BID    pantoprazole  40 mg Oral Before breakfast       CONTINUOUS INFUSIONS:    PRN MEDS:acetaminophen, albuterol-ipratropium, cloNIDine, dextromethorphan-guaiFENesin  mg/5 ml, hydrALAZINE, HYDROcodone-acetaminophen, lorazepam, magnesium oxide, magnesium oxide, melatonin, potassium bicarbonate, potassium bicarbonate, potassium bicarbonate, potassium, sodium phosphates, potassium, sodium phosphates, potassium, sodium phosphates, traZODone    LABS AND DIAGNOSTICS     CBC LAST 3 DAYS  Recent Labs   Lab 10/16/22  1740 10/17/22  0514   WBC 7.14 5.83   RBC 4.10 3.46*   HGB 12.0 10.2*   HCT 37.3 31.6*   MCV 91 91   MCH 29.3 29.5   MCHC 32.2 32.3   RDW 14.5 14.6*    241   MPV 9.8 10.0   GRAN 68.4  4.9 66.2  3.9   LYMPH 21.4  1.5 22.3  1.3   MONO 8.1  0.6 9.4  0.6   BASO 0.02 0.02   NRBC 0 0       COAGULATION LAST 3 DAYS  Recent Labs   Lab 10/16/22  1740   LABPT 12.7   INR 1.0       CHEMISTRY LAST 3 DAYS  Recent Labs   Lab 10/16/22  1740 10/17/22  0514    138   K 4.6 4.6    107   CO2 23 24   ANIONGAP 10 7*   BUN 41* 40*   CREATININE 2.9* 2.9*   GLU 94 102   CALCIUM 9.5 8.8   ALBUMIN 3.7  --    PROT 8.0  --    ALKPHOS 61  --    ALT 10  --    AST 15  --    BILITOT 0.5  --        CARDIAC PROFILE LAST 3 DAYS  Recent Labs   Lab 10/16/22  1740 10/16/22  2344 10/17/22  0514   BNP 53  --   --    TROPONINI 0.073*  0.062* 0.042*       ENDOCRINE LAST 3 DAYS  No results for input(s): TSH, PROCAL in the last 168 hours.    LAST ARTERIAL BLOOD GAS  ABG  No results for input(s): PH, PO2, PCO2, HCO3, BE in the last 168 hours.    LAST 7 DAYS MICROBIOLOGY   Microbiology Results (last 7 days)       ** No results found for the last 168 hours. **            MOST RECENT IMAGING  Echo  · The left ventricle is normal in size with moderate concentric   hypertrophy and normal systolic function.  · The estimated ejection fraction is 60%.  · Normal left ventricular diastolic function.  · Normal right ventricular size with normal right ventricular systolic   function.  · Mild left atrial enlargement.  · Eccentric Moderate mitral regurgitation.         ECHOCARDIOGRAM RESULTS (last 5)  Results for orders placed during the hospital encounter of 10/16/22    Echo    Interpretation Summary  · The left ventricle is normal in size with moderate concentric hypertrophy and normal systolic function.  · The estimated ejection fraction is 60%.  · Normal left ventricular diastolic function.  · Normal right ventricular size with normal right ventricular systolic function.  · Mild left atrial enlargement.  · Eccentric Moderate mitral regurgitation.      Results for orders placed during the hospital encounter of 10/05/19    Echo Color Flow Doppler? Yes; Bubble Contrast? Yes    Interpretation Summary  · Concentric left ventricular hypertrophy.  · Left ventricular systolic function. The estimated ejection fraction is 60%  · No wall motion abnormalities.  · Grade I (mild) left ventricular diastolic dysfunction consistent with impaired relaxation.  · Mild tricuspid regurgitation.  · No pulmonary hypertension present.  · No intracardiac thrombi/ vegetations.  · Saline bubble study is negative.      Results for orders placed during the hospital encounter of 04/29/18    STRESS TEST REPORT      Results for orders placed during the hospital encounter of 03/07/18    2D  echo with color flow doppler    Narrative  Date of Procedure: 03/08/2018        TEST DESCRIPTION  Technical Quality: This is a technically good study.    Aorta: The aortic root is normal in size, measuring 2.6 cm at sinotubular junction and 2.4 cm at Sinuses of Valsalva. The proximal ascending aorta is normal in size, measuring 2.7 cm across.    Left Atrium: The left atrium is normal in size, measuring 3.4 cm across in the parasternal view.    Left Ventricle: The left ventricle is normal in size, with an end-diastolic diameter of 3.6 cm, and an end-systolic diameter of 2.5 cm. Wall thickness is increased, with the septum measuring 1.2 cm and the posterior wall measuring 1.4 cm across. Relative  wall thickness was increased at 0.78, and the LV mass index was 92.9 g/m2 consistent with concentric remodeling. There are no regional wall motion abnormalities. Left ventricular systolic function appears normal. Visually estimated ejection fraction is  60-65%. The LV Doppler derived stroke volume equals 89.0 ccs.    Diastolic indices: E wave velocity 0.9 m/s, E/A ratio 0.8,  msec., E/e' ratio(avg) 11. Diastolic function is normal.    Right Atrium: The right atrium is enlarged, measuring 5.5 cm in length and 2.7 cm in width in the apical view.    Right Ventricle: The right ventricle is normal in size measuring 2.7 cm at the base in the apical right ventricle-focused view. Global right ventricular systolic function appears normal. Tricuspid annular plane systolic excursion (TAPSE) is 3.0 cm. The  estimated PA systolic pressure is 29 mmHg.    Mitral Valve:  The pressure half time is 67 msec. The calculated mitral valve area is 3.28 cm2.    Tricuspid Valve:  There is trivial tricuspid regurgitation.    Pericardium: There is evidence of a small anterior pericardial effusion.    IVC: IVC is normal in size and collapses > 50% with a sniff, suggesting normal right atrial pressure of 3 mmHg.    Intracavitary: There is no  evidence of intracavity mass, thrombi, or vegetation.        CONCLUSIONS  1 - Concentric remodeling.  2 - Normal left ventricular systolic function (EF 60-65%).  3 - No wall motion abnormalities.  4 - Normal left ventricular diastolic function.  5 - Normal right ventricular systolic function .  6 - The estimated PA systolic pressure is 29 mmHg.  7 - Suggest improved LV diastolic function from Echo in 8/2016.            This document has been electronically  SIGNED BY: Ralph Kendall MD On: 03/08/2018 13:07      CURRENT/PREVIOUS VISIT EKG  Results for orders placed or performed during the hospital encounter of 10/16/22   EKG 12-lead    Collection Time: 10/16/22  5:51 PM    Narrative    Test Reason : R07.9,    Vent. Rate : 088 BPM     Atrial Rate : 088 BPM     P-R Int : 168 ms          QRS Dur : 088 ms      QT Int : 386 ms       P-R-T Axes : 067 -08 029 degrees     QTc Int : 467 ms    Normal sinus rhythm  Possible Left atrial enlargement  LVH  Abnormal ECG  When compared with ECG of 31-MAR-2022 16:26,  Criteria for Septal infarct are no longer Present    Referred By: [email or web address]   SELF           Confirmed By:            ASSESSMENT/PLAN:     Active Hospital Problems    Diagnosis    *Hypertensive urgency    MAKENNA (acute kidney injury)       ASSESSMENT & PLAN:   Hypertensive urgency  MAKENNA- Nephrology consulted      RECOMMENDATIONS:  Nephrology consulted re MAKENNA; Avoid ACEI/ARBs at this time. Continue current antihypertensive therapies with calcium channel blocker Amlodipine 10 mg po daily, beta blocker Toprol XL 50 mg po daily, and vasodilator Hydralazine 50mg po Q8hrs.Tolerate asymptomatic HTN up to -160.  Troponin trended down x 3. Will plan Nuclear stress test once kidneys recover.   3. Echocardiogram showed moderate mitral regurgitation. Ejection fraction 60%- unchanged from prior echocardiogram.    Cata Young NP  [facility]  Department of Cardiology  Date of Service: 10/17/2022        I have  personally interviewed and examined the patient, I have reviewed the Nurse Practitioner's history and physical, assessment, and plan. I agree with the findings and plan.    1.  Hypertensive urgency currently on amlodipine 10 mg p.o. q.day also on hydralazine 50 mg p.o. q.8 hours metoprolol succinate 50 mg p.o. q.day isosorbide dinitrate 5 mg p.o. t.i.d.  2.  Patient has acute kidney injury and possible accumulation of troponin and essentially is nondiagnostic troponin.  3.  Patient had cardiac catheterization and percutaneous intervention of the left anterior descending artery in 2018.  4.  At the present time patient has BUN of 40 and creatinine of 2.9 at high risk for dye induced nephropathy and patient may prematurely and upon hemodialysis.  5.  No acute ST T-wave changes on the EKG.  6.  She is currently on hydralazine 50 mg p.o. q.8 hours increase the does to adjust to her blood pressure.  7.  Further recommendations to follow , thank you for the consultation.      Joe Schumacher MD  [facility]  Department of Cardiology  Date of Service: 10/17/2022        Endy JACOBS  [facility]  Department of Cardiology  Date of Service: 10/17/2022  4:14 PM

## 2024-05-04 NOTE — PLAN OF CARE
Goal Outcome Evaluation:  COVID +   MRSA  non- verbal. Q4H neuro assessment changed from yesterday. Neuro status declining. new L eyelid droop noted, provider aware. Pt unable to turn head side to side, unable to nod head yes/no, unable to blink yes to questions, L eye unable to close completely when blinking, unable to wiggle fingers - provider updated on findings.  Significant right sided weakness, L side stronger. THOR orientation status. NPO - meds given via feeding tube. Contin TF - tubing changed today. Bed bound - q2h turns. Assist x 2. BM x 1. Roy - adequate output. Frequent production weak cough - pt unable to cough mucus up into mouth - provider aware. Duo neb given x1. Redness and sweating noted on R side of face  - provider page. Pt still needs fungal/yeast culture and respiratory culture. Q4H BG checks. PICC - hep locked.    Care conference Monday.       Plan of Care Reviewed With: patient    Overall Patient Progress: decliningOverall Patient Progress: declining

## 2024-05-04 NOTE — PLAN OF CARE
"Pt rested between cares during the night/. T/R to promote skin integrity.  Q4hrs neuro assessments pt follows commands at times and doesn't at other times THOR orientation, pt is able to nodes head\" yes or no\" Denies pain and discomfort during the night.  Has been non-verbal during the night. Noted significant right sided weakness, compared to left side.  Remains on RA. /78 (BP Location: Right arm, Patient Position: Semi-Cline's)   Pulse 76   Temp 98.2  F (36.8  C) (Oral)   Resp 18   Ht 1.626 m (5' 4\")   Wt 72.5 kg (159 lb 13.3 oz)   SpO2 94%   BMI 27.44 kg/m   remains on RA. Ryo in place with adequate urine output. Pt continues on TF infusing at 65hr/ml, per dietary goal. Q4 blood sugar checks. PICC line CDI, heparin locked after AM lab draws.  Will discharge when medically cleared.       Intake/Output Summary (Last 24 hours) at 5/4/2024 0654  Last data filed at 5/4/2024 0600  Gross per 24 hour   Intake 1650 ml   Output 1675 ml   Net -25 ml       Goal Outcome Evaluation: No Change       Plan of Care Reviewed With: patient    Overall Patient Progress: no change  "

## 2024-05-05 NOTE — PLAN OF CARE
"Pt rested between cares during the night. Ax1-2 with Q2hrs T/R to promote skin integrity. Denies pain and discomfort during the night by nodding \"no\" has been non-verbal during the night. Noted significant right sided weakness, compared to left side. Also noted a reddened area on the right knee, area marked, to monitor for spreading. Provider aware. Remains on RA, noted non-productive dry cough, PRN Robitussin given with some relief./80 (BP Location: Right arm, Patient Position: Semi-Cline's, Cuff Size: Adult Regular)   Pulse 98   Temp 97.9  F (36.6  C) (Oral)   Resp 19   Ht 1.626 m (5' 4\")   Wt 70.6 kg (155 lb 10.3 oz)   SpO2 97%   BMI 26.72 kg/m  Roy in place with adequate urine output. Pt continues on TF infusing, per dietary goal. Q4 blood sugar checks. PICC line CDI, heparin locked after AM lab draws.  No acute changes during the night. Pt will discharge when medically cleared.     Intake/Output Summary (Last 24 hours) at 5/5/2024 0623  Last data filed at 5/5/2024 0402  Gross per 24 hour   Intake 1195 ml   Output 1500 ml   Net -305 ml     Goal Outcome Evaluation: No Change       Plan of Care Reviewed With: patient    Overall Patient Progress: no change       "

## 2024-05-05 NOTE — PROGRESS NOTES
Hematology-Oncology Progress Note      IE/S  No major events overnight.    Communication is difficult, but patient does not seem to be in pain or have localizing symptoms.    O  Vitals and lab tests are stable. No fever.  Hemogram with WBC at 8.9 K/uL and Hgb 11 g/dL.  Chemistries are stable and wnl.    Simple commands are followed.  NGT in place.  Vesicular sounds bilaterally and occasional cough  Regular heart rate and rhythm  No abd masses palpated  No LE edema; distal toes are bleuer bilaterally. Knees are not warm to touch.    Peripheral blood ID NGS ([employer]) positive for E coli, CMV, and adenovirus.    CTH 05/04: no herniation or new findings.    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 Improvement has so far been minimal.  - high-dose methotrexate on day 1 04/29  - rituximab on day 4 05/02; initial plan to re-give on day 10; but patient may move to WB, so rituximab to be given again on day 8.  - MR brain for reassessment ca 05/10-11 before next cycle given biopsy of tissue has not been possible      (2) HIV/AIDS - on ART with emtricitabine, tenofovir, and dolutegravir, with ppx dapsone and, if ANC<1, levofloxacin.   - CMV on ID NGS ([employer]) and peripheral CMV quantification: Ophthalmology to repeat dilated eye exam 05/06. Otherwise no evidence of systemic complications from CMV. Local complications are difficult to assess given neuro status without eye exam and assistance from Ophthalmology   - E.coli on ID NGS ([employer]): afebrile and hemodynamically stable. CTM      (3) Blue distal toes bilaterally and skin changes on hands - without hemodynamic compromise. Livedo reticularis vs retiform purpura vs arterial process.   - TTE; if concern persists and TTE negative,  can consider arterial duplex of lower extremities   - blood gases to assess for CO2    (4) Knee discoloration not impressive on exam 5/5. No warmth, tenderness, or effusion noticeable. CTM    (5) Disposition TBD and needs to be discussed with OT, PT, and SW.      Discussed with bedside RN and Dr Jacob.      Jonas Goldstein  PGY4  Hem/Onc/BMT  Vocera  [phone]

## 2024-05-05 NOTE — INTERIM SUMMARY
Interval Note - Ophthalmology     Paged by primary team to let us know that the CMV titer came back mildly elevated. Patient is still unable to participate in exam so it is unclear if there have been any vision symptoms or vision changes since her last dilated eye exam on 4/28. Per chart review, patient has HIV/AIDS and suspicion for CNS lymphoma.     Dilated exam on 4/28 revealed a single cotton wool spot in the right eye likely related to acute illness, without signs of infiltrates or retinal necrosis. The plan has been for a repeat dilated exam for surveillance on 5/6 to further rule out ocular lymphoma or infectious process. Primary team is okay with that plan and would like our team to prioritize the exam as able tomorrow.     Neno Jaimes MD  Resident Physician, PGY-2  Department of Ophthalmology

## 2024-05-05 NOTE — PLAN OF CARE
Goal Outcome Evaluation:  COVID +   MRSA  non- verbal. Q4H neuro assessment. L eyelid droop noted. Unable to  and wiggle hands. Intermittently about to turn head side to side, intermittently able to stick out tongue and blink yes to questions. Significant right sided weakness, L side stronger. THOR orientation status. NPO - meds given via feeding tube. Contin TF - tubing changed today. Bed bound - q2h turns. Assist x 2. No BM. Roy - adequate output. Frequent production weak cough - pt unable to cough mucus up into mouth - provider aware. Duo neb given x1. Redness and warmth noted on L knee - outlined.  Pt still needs fungal/yeast culture and respiratory culture. Q4H BG checks. PICC - hep locked. Received ECHO today.    Care conference Monday.      Plan of Care Reviewed With: patient    Overall Patient Progress: no changeOverall Patient Progress: no change

## 2024-05-05 NOTE — PROGRESS NOTES
"Notified MD at 0328 regarding  Pt Right knee area appears to be reddened and warm to touch .  Pt is afebrile, marked the area. Pt denies nodding head \"no\" when asked about right knee area pain.     Paged:  BASIL Miguel MD     Orders were not obtained. Provider aware and will continue to monitor intermittently.   "

## 2024-05-06 NOTE — PROGRESS NOTES
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 PmHx of dyslipidemia, polysubstance abuse, and r-sided empyema (s/p VATS and partial pleurectomy Jan 2021). Upon review of labs and imaging from with positive EBV PCR from CSF. LP at [facility] x4/26 with rare atypical cells. Based on findings started HD-Methotrexate + Rituximab 4/29/24. Course further complicated by COVID positive, acute decline in neurological status, HIV/AIDS     Today:  - D8 HD-Methotrexate and Rituximab. Will proceed with Rituxan today.  - GOC discussions with palliative, and family. Reviewed concerns that this may be Kamilah's new baseline and the likelihood that there is significant/meaningful recovery or improvement.   - Continue steroids taper; currently on Dexamethasone 4 mg BID (5/5-X).   - Will continue best supportive cares.     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 Previously hospitalized in March 2024 where imaging revealed restricted diffusion and T2 hyperintensity of osterior pontine mesencephalic junction with local mass effect. Felt to be likely ischemic infarct (risk factors of HTN, smoking and amphetamine use). Additional considerations included infection vs neoplasm. CSF cytology attempted, but insufficient quantity. MRI brain 4/15 showed increased in interval of lesion of L avel and midbrain compared to previous and increase in interval again on 4/24/24 MRI. CT A/P with no lymphadenopathy, mass, or acute process. LP 4/18 non-diagnostic with negative flow cytometry and cytology  but CSF EBV PCR positive (original uncertainty of blood vs. CSF PCR but able to confirm CSF by [facility]). ID, oncology, and neurosurgery consulted for concern of CNS lymphoma given context of imaging, worsening decline, and imaging. She was started on Decadron 8 mg and transferred to [facility] 4/24/24 for further workup and consideration of brain biopsy. Unable to obtain tissue biopsy given challenging location of brainstem, if repeat imaging shows expansion, then could consider. But with review of imaging with Neurosurgery, agree with outside impression of CNS lymphoma. CT CAP 4/25 showed no evidence of lymphoma. Repeat LP 4/26 with rare atypical cells on cytology but does not correlate with flow cytometry (rare polytypic B cells). LP also showing positive EBV PCR, elevated protein to 109, and negative CMV, meningitis/encephalitis panel.  Ophthalmology consulted; no evidence of ocular lymphoma.  Based on imaging and EBV + PCR from CSF started HD-methotrexate on 4/29/24. Appears to be tolerating well thus far. 32 hours methotrexate level 0.31.   - Infectious studies at [facility] thus far negative for CMV IgM negative (IgG positive suggesting previous infection), Cryptococcus, CMV encephalitis, Tb meningitis, toxoplasmosis serology. Continue to follow toxoplasmosis PCR and Karius.   - Follow CSF AFB culture (NGTD) and bacterial culture results from LP x4/26  - Continue dexamethasone taper with 40 mg x 4 days (4/24-4/30) ? 20 mg x 4 days (5/1-5/4) ? 10 mg daily x 4 days ? 4 mg BID (5/5-X)  - PICC line placed x4/29       Treatment Plan: HD- Methotrexate + Rituximab C1D1 = 4/30/24    - Methotrexate 3.5 g/m2 (6.405 g) - D1, D8    - Rituximab 375 mg/m2 (700 mg) - D3    - Leucovorin 50 mg D2 until methotrexate level < 0.10    - mIVF .45% NS w. Sodium bicarbonate @125 ml/hr    #Decreased mental status secondary to lesion of L avel and midbrain  Presented to OSH 4/12/24 with 4/12/24 with right facial and arm paresthesias and RLE  "weakness originally concerning for ischemic stroke. Further imaging revealed mass of L avel and midbrain. Transferred to [facility] for consideration of biopsy, but due to location was designated at too risky. On arrival to [facility], patient on assessment by neurosurgery was A&O x 3, able to slowly follow commands, and had slowed speech. She has since progressed to not being able to move extremities, waxes/wanes ability to follow commands, and non-verbal. She can follow conversations with eye and appropriately blink \"yes\" to questions. For example, when asked \"is this your son\" when referring to son (Yuan) patient was able to blink multiple times. Notable decrease in neurological function on exam morning x4/30 and consistent findings by neurology team; patient unable to squeeze fingers in comparison to day prior and overnight. Stat head CT for concern of increased ICP; no acute findings and L avel lesion similar to CT done 4/25. Repeat MRI brain w/wo contrast x4/30 showing increased size of known lesion of L avel and midbrain in comparison to MRI on 4/15 and 3/29. Given concern for increasing size in mass, will continue to monitor closely with Q4H neuro checks for s/sx of herniation. If new changes in exam will obtain stat CT head and if evidence of herniation, would need emergent intubation. Appreciate assistance and input from neurology, signed off 5/1 but familiar with patient and readily available for any questions and concerns.   - RT consult placed for difficulty with secretion management. Could also assist with sputum culture.     #Anemia  #Thrombocytosis  Likely due to underlying suspected malignancy  - Transfuse for plts < 10k and Hgb , 7.0    Hx SWETA  Has hx of SWETA. Has been treated with transfusions and IV iron in the past. Will defer workup at this time.     # ID Prophylaxis  - Dapsone 100 mg daily for PJP ppx  - Levaquin 250 mg daily when ANC < 1.0     ID  #HIV/AIDS  Recently diagnosed with HIV during admission " at [facility] on 3/28/24 with viral load of 1.24 million, 6.9 log,  and CD4 count of 2. She was not started on ART, given Nystatin x2 weeks and referred to ID clinic for follow up. Prior to this, she was admitted again with ID consult for broad-infectious workup. Started Biktarvy 4/17 but stopped due concern for rash, rechallenged on 4/20 and able to tolerate. Later transitioned to Dolutegravir and Truvada. SW reached out to health department 5/2 regarding patient's HIV status and if able to be reported properly. Upon further discussions and health department chart review, original diagnosis was in 2014 and patient was contacted over 14 times with no response.  Unable to confirm with patient if she is was aware of status as she is non-verbal. Upon further chart review, HIV testing was completed 8/27/24 at [facility] with office visit to follow, but encounter is not able to be reviewed and needing patient authorization; will discuss with son (Yuan) as he is her next of kin and medical decision maker. Of note, patient's aunt (Noemy), and son (Yuan) are aware of diagnosis.   - Upon further conversation with boyfriend (Otis) today, it does not appear he is aware of her HIV status. I asked about general medical history and any new/recent infections to which he did not mention HIV/AIDS status. ID had a similar conversation with a consistent consensus. Reached out to SW to discuss with family/son regarding if previous partners have been notified.  - Continue ART with Dolutegravir and Truvada   - Plan to repeat CD4 count and viral load circa x5/15     #COVID-19 positive  #MRSA pneumonia  Tested positive for COVID-19 4/18/24 during admission to [facility]. Patient remains asymptomatic. Will continue to monitor at this time. Previously on Linezolid. ID following; does not feel like MRSA pneumonia is likely and ok to stop Linezolid.     MISC  #Cotton wool spot, right eye  Single CWS in right eye, most  likely in setting of acute illness. Found on assessment by ophthalmology x4/28. Given significant comorbidities, CD4 count of 2 and infectious panels pending, will repeat dilated fundus exam in 1 week to rule out early signs of fungal ocular infection     SOCIAL   #Medical decision maker  Confirmed with family that son (Yuan) will be patient's primary medical decision maker. Son Yuan and patient are okay with communication with aunt (Noemy) if Yuan is unavailable.     #H/o stimulant/amphetamine use   Noted on [facility] tox screen in 2016. Patient previously reported snorting and declined IVDU.     Fluids/Electrolytes/Nutrition   - IVF per chemotherapy regimen  - PRN lyte replacement per standing protocol  - Regular diet as tolerated     Lines: PICC    PPX  VTE: Lovenox  GI: Famotidine  Bowels: prn miralax and senna    Code: Full    Medically Ready for Discharge: Anticipated in 5+ Days    Disposition: Pending further workup, evaluation, tolerance of HD-methotrexate, and clinical course. Patient will require a long term care facility on discharge.     I spent >45 minutes face-to-face and/or coordinating or discussing care plan. Over 50% of our time on the unit was spent counseling the patient and/or coordinating care    Patient is seen and examined by Dr. Jacob and RILEY.  Assessment and plan are discussed and delivered to the patient.    Krystal Ochoa PA-C  Hematology/Oncology  Pager #[phone]    Interval History   Nursing notes reviewed. No acute events overnight. Nursing noted that her NG tube has been pulled out. Repeat abdominal xray shows NG tube still terminates in proximal jejunum.     Care conference held with SW, palliative and family regarding concern that she may not have further clinical improvement as none has been seen thus far. Discussed that we can continue treating and would plan for repeat MRI brain prior to next cycle to reassess tumor response. Discussed that the damage from her tumor that has been  "done on the brain may at this point be permanent. Family understandably tearful with this news but want to continue to give it time. All questions answered at this time.            Physical Exam   Temp: 98.2  F (36.8  C) Temp src: Axillary BP: 119/77 Pulse: 89   Resp: 18 SpO2: 95 % O2 Device: None (Room air)    Vitals:    05/03/24 1048 05/04/24 1113 05/05/24 1112   Weight: 72.5 kg (159 lb 13.3 oz) 70.6 kg (155 lb 10.3 oz) 71.1 kg (156 lb 12 oz)     Vital Signs with Ranges  Temp:  [96.8  F (36  C)-98.2  F (36.8  C)] 98.2  F (36.8  C)  Pulse:  [85-89] 89  Resp:  [18] 18  BP: (106-133)/(72-92) 119/77  SpO2:  [95 %-96 %] 95 %  I/O last 3 completed shifts:  In: 1355 [NG/GT:90]  Out: 1850 [Urine:1850]      Physical Exam:    Blood pressure 119/77, pulse 89, temperature 98.2  F (36.8  C), temperature source Axillary, resp. rate 18, height 1.626 m (5' 4\"), weight 71.1 kg (156 lb 12 oz), SpO2 95%.    General: lying in bed. Nontoxic appearing. No acute distress.   HEENT: Normocephalic/ Sclera anicteric. Moist mucus membranes. Bilateral pupil dilation from optho exam this AM   CV: RRR. Normal S1/S2. No m/r/g. No peripheral edema.   Resp: CTAB on anterior auscultation. No wheezing/crackles. Normal respiratory effort on room air. Breath sounds are equal throughout.   GI: Soft, non-tender, non-distended. Bowel sounds present and normoactive. No peritoneal signs. NGT in tact.   MSK: Warm and well-perfused. Normal tone.   Skin: Skin is clean, dry and intact. No rashes on limited exam. No jaundice. Soft mitts in place  Neuro: Non verbal, some appropriate blinking with yes/no questions       Medications   Current Facility-Administered Medications   Medication Dose Route Frequency Provider Last Rate Last Admin    dextrose 10% infusion   Intravenous Continuous PRN Kuross, Russel, DO         Current Facility-Administered Medications   Medication Dose Route Frequency Provider Last Rate Last Admin    atorvastatin (LIPITOR) tablet 40 mg  40 " mg Oral or Feeding Tube Daily Russel Boothe DO   40 mg at 05/06/24 0924    dapsone suspension 100 mg  100 mg Oral or Feeding Tube Daily Kandi Lambert PA-C   100 mg at 05/06/24 0923    dexAMETHasone (DECADRON) injection 10 mg  10 mg Intravenous Daily Kandi Lambert PA-C   10 mg at 05/06/24 0932    Followed by    [START ON 5/9/2024] dexAMETHasone (DECADRON) injection 4 mg  4 mg Intravenous BID Kandi Lambert PA-C        dolutegravir (TIVICAY) tablet 50 mg  50 mg Oral or NG Tube Daily Ramesh Van MD   50 mg at 05/05/24 2124    emtricitabine-tenofovir (TRUVADA) 200-300 MG per tablet 1 tablet  1 tablet Oral or NG Tube Daily Ramesh Van MD   1 tablet at 05/05/24 2124    enoxaparin ANTICOAGULANT (LOVENOX) injection 40 mg  40 mg Subcutaneous Q24H Russel Boothe DO   40 mg at 05/05/24 1435    famotidine (PEPCID) suspension 20 mg  20 mg Oral BID Kandi Lambert PA-C   20 mg at 05/06/24 0923    gabapentin (NEURONTIN) solution 100 mg  100 mg Oral or Feeding Tube Q12H [facility] (08/20) Russel Boothe DO   100 mg at 05/06/24 0923    heparin lock flush 10 UNIT/ML injection 5-20 mL  5-20 mL Intracatheter Q24H Tucker Jacob MD   5 mL at 05/05/24 1016    insulin aspart (NovoLOG) injection (RAPID ACTING)  1-4 Units Subcutaneous Q4H Russel Boothe DO   1 Units at 05/05/24 1725    thiamine (B-1) tablet 100 mg  100 mg Oral or Feeding Tube Daily Russel Boothe DO   100 mg at 05/05/24 2124       Data   Results for orders placed or performed during the hospital encounter of 04/24/24 (from the past 24 hour(s))   Glucose by meter   Result Value Ref Range    GLUCOSE BY METER POCT 100 (H) 70 - 99 mg/dL   Echo Complete   Result Value Ref Range    LVEF  70%     Narrative    [id number]  [id number]  [id number]  [id number]^DULCE MARIA^RE     [facility]  Echocardiography Laboratory  [address] [place] [id number]     Name: NATASHA CASEY  MRN:  [id number]  : 1980  Study Date: 2024 01:46 PM  Age: 44 yrs  Gender: Female  Patient Location: [facility]  Reason For Study: Cardiomyopathy  Ordering Physician: RE العراقي  Referring Physician: ABHINAV MEDEROS  Performed By: Tere Mccartney     BSA: 1.8 m2  Height: 64 in  Weight: 156 lb  ______________________________________________________________________________  Procedure  Complete Portable Echo Adult. Echocardiogram with two-dimensional, color and  spectral Doppler performed.  ______________________________________________________________________________  Interpretation Summary  Global and regional left ventricular function is hyperkinetic with an EF  >70%.There is cavity obliteration and with LV-Ao gradient of 18 mmHg.  Global right ventricular function is normal.  No significant valvular abnormalities present.  Estimated mean right atrial pressure is normal.  No pericardial effusion is present.  ______________________________________________________________________________  Left Ventricle  Left ventricular size is normal. Global and regional left ventricular function  is hyperkinetic with an EF >70%. Mild concentric wall thickening consistent  with left ventricular hypertrophy is present. Cavity obliteration and mid-  ventricular gradient are present. There is cavity obliteration and with LV-Ao  gradient of 18 mmHg.     Right Ventricle  The right ventricle is normal size. Global right ventricular function is  normal.     Atria  Both atria appear normal.     Mitral Valve  The mitral valve is normal.     Aortic Valve  Aortic valve is normal in structure and function.     Tricuspid Valve  The tricuspid valve is normal. Trace to mild tricuspid insufficiency is  present. The peak velocity of the tricuspid regurgitant jet is not obtainable.  Pulmonary artery systolic pressure cannot be assessed.     Pulmonic Valve  The pulmonic valve is normal.     Vessels  The aorta root is  normal. The inferior vena cava is normal. Estimated mean  right atrial pressure is normal.     Pericardium  No pericardial effusion is present.     Miscellaneous  No significant valvular abnormalities present.     Compared to Previous Study  Previous study not available for comparison.  ______________________________________________________________________________  MMode/2D Measurements & Calculations  IVSd: 1.3 cm  LVIDd: 3.5 cm  LVIDs: 2.0 cm  LVPWd: 1.4 cm  FS: 42.8 %  LV mass(C)d: 160.8 grams  LV mass(C)dI: 91.4 grams/m2  Ao root diam: 3.1 cm  asc Aorta Diam: 3.1 cm  LVOT diam: 2.1 cm  LVOT area: 3.3 cm2  Ao root diam index Ht(cm/m): 1.9  Ao root diam index BSA (cm/m2): 1.7  Asc Ao diam index BSA (cm/m2): 1.8  Asc Ao diam index Ht(cm/m): 1.9  RWT: 0.82     TAPSE: 2.1 cm     Doppler Measurements & Calculations  MV E max johnie: 64.5 cm/sec  MV A max johnie: 62.4 cm/sec  MV E/A: 1.0  MV dec time: 0.22 sec  Ao V2 max: 160.0 cm/sec  Ao max PG: 10.2 mmHg  Ao V2 mean: 109.0 cm/sec  Ao mean P.0 mmHg  Ao V2 VTI: 26.1 cm  BHAKTI(I,D): 2.9 cm2  BHAKTI(V,D): 3.0 cm2  LV V1 max P.4 mmHg  LV V1 max: 145.0 cm/sec  LV V1 VTI: 22.8 cm  SV(LVOT): 76.1 ml  SI(LVOT): 43.2 ml/m2  AV Johnie Ratio (DI): 0.91  BHAKTI Index (cm2/m2): 1.7  E/E' av.9  Lateral E/e': 5.2  Medial E/e': 6.6  RV S Johnie: 24.2 cm/sec     ______________________________________________________________________________  Report approved by: Niall Thorpe 2024 02:25 PM         Blood gas venous   Result Value Ref Range    pH Venous 7.45 (H) 7.32 - 7.43    pCO2 Venous 40 40 - 50 mm Hg    pO2 Venous 42 25 - 47 mm Hg    Bicarbonate Venous 28 21 - 28 mmol/L    Base Excess/Deficit Venous 3.3 (H) -3.0 - 3.0 mmol/L    FIO2 21     Oxyhemoglobin Venous 72 70 - 75 %    O2 Sat, Venous 77.0 (H) 70.0 - 75.0 %    Narrative    In healthy individuals, oxyhemoglobin (O2Hb) and oxygen saturation (SO2) are approximately equal. In the presence of dyshemoglobins, oxyhemoglobin can be  considerably lower than oxygen saturation.   Glucose by meter   Result Value Ref Range    GLUCOSE BY METER POCT 168 (H) 70 - 99 mg/dL   Glucose by meter   Result Value Ref Range    GLUCOSE BY METER POCT 138 (H) 70 - 99 mg/dL   Glucose by meter   Result Value Ref Range    GLUCOSE BY METER POCT 130 (H) 70 - 99 mg/dL   Phosphorus   Result Value Ref Range    Phosphorus 2.9 2.5 - 4.5 mg/dL   Comprehensive metabolic panel   Result Value Ref Range    Sodium 136 135 - 145 mmol/L    Potassium 3.9 3.4 - 5.3 mmol/L    Carbon Dioxide (CO2) 26 22 - 29 mmol/L    Anion Gap 9 7 - 15 mmol/L    Urea Nitrogen 19.3 6.0 - 20.0 mg/dL    Creatinine 0.41 (L) 0.51 - 0.95 mg/dL    GFR Estimate >90 >60 mL/min/1.73m2    Calcium 8.7 8.6 - 10.0 mg/dL    Chloride 101 98 - 107 mmol/L    Glucose 112 (H) 70 - 99 mg/dL    Alkaline Phosphatase 64 40 - 150 U/L    AST 27 0 - 45 U/L    ALT 41 0 - 50 U/L    Protein Total 6.4 6.4 - 8.3 g/dL    Albumin 3.2 (L) 3.5 - 5.2 g/dL    Bilirubin Total 0.3 <=1.2 mg/dL   Magnesium   Result Value Ref Range    Magnesium 2.0 1.7 - 2.3 mg/dL   Uric acid   Result Value Ref Range    Uric Acid 3.3 2.4 - 5.7 mg/dL   Partial thromboplastin time   Result Value Ref Range    aPTT 24 22 - 38 Seconds   INR   Result Value Ref Range    INR 0.95 0.85 - 1.15   Fibrinogen activity   Result Value Ref Range    Fibrinogen Activity 302 170 - 490 mg/dL   Glucose by meter   Result Value Ref Range    GLUCOSE BY METER POCT 104 (H) 70 - 99 mg/dL   CBC with Platelets & Differential    Narrative    The following orders were created for panel order CBC with Platelets & Differential.  Procedure                               Abnormality         Status                     ---------                               -----------         ------                     CBC with platelets and d...[[id number]]  Abnormal            Final result                 Please view results for these tests on the individual orders.   CBC with platelets and differential    Result Value Ref Range    WBC Count 8.0 4.0 - 11.0 10e3/uL    RBC Count 3.64 (L) 3.80 - 5.20 10e6/uL    Hemoglobin 10.5 (L) 11.7 - 15.7 g/dL    Hematocrit 31.8 (L) 35.0 - 47.0 %    MCV 87 78 - 100 fL    MCH 28.8 26.5 - 33.0 pg    MCHC 33.0 31.5 - 36.5 g/dL    RDW 13.3 10.0 - 15.0 %    Platelet Count 217 150 - 450 10e3/uL    % Neutrophils 93 %    % Lymphocytes 1 %    % Monocytes 3 %    % Eosinophils 1 %    % Basophils 0 %    % Immature Granulocytes 2 %    NRBCs per 100 WBC 0 <1 /100    Absolute Neutrophils 7.4 1.6 - 8.3 10e3/uL    Absolute Lymphocytes 0.1 (L) 0.8 - 5.3 10e3/uL    Absolute Monocytes 0.3 0.0 - 1.3 10e3/uL    Absolute Eosinophils 0.1 0.0 - 0.7 10e3/uL    Absolute Basophils 0.0 0.0 - 0.2 10e3/uL    Absolute Immature Granulocytes 0.1 <=0.4 10e3/uL    Absolute NRBCs 0.0 10e3/uL

## 2024-05-06 NOTE — PROGRESS NOTES
"Care Management Follow Up    Length of Stay (days): 12    Expected Discharge Date:       Concerns to be Addressed: Care Conference  Patient plan of care discussed at interdisciplinary rounds: Yes    Anticipated Discharge Disposition: Other (Comments) (TBD)     Anticipated Discharge Services: Other (see comment) (TBD)  Anticipated Discharge DME: Other (see comment) (TBD)    Patient/family educated on Medicare website which has current facility and service quality ratings: no  Education Provided on the Discharge Plan:    Patient/Family in Agreement with the Plan: unable to assess    Referrals Placed by CM/SW:    Private pay costs discussed: Not applicable    Additional Information:  Care Conference with Palliative, heme/onc, pt's son, and aunt.     Heme-onc lead the conference talking about patient's response to the chemo.  At this time there has been no improvement.  They will be doing a brain MRI to check on the size of the tumor before continuing with second dose of chemo.  They explained to family that if it is lymphoma the treatment that patient is receiving responds quickly and they should be seeing signs of improvement already.  .  Heme-onc will know more once the MRI comes in.  Heme-onc expressed to family that the tumor may have damaged the brain and there is a low chance of recovering.  Patient may now be at her baseline.    Patient's son was upset in response to the above stating, \"I am not going to put her down like a dog\".  Palliative  explained that the plan is to continue with the chemo but if after the second dose there is no response the family needs to think about patient's next step.  She explained about comfort care with hospice and or trying other treatments where patient would be poked and prodded every 2 hours.  Both teams expressed that they wanted patient's family to consider both and what patient would want.  Patient's aunt stated that this was not the news that she was expecting " and had tears in her eyes.  After the conference  asked if there was anything that she could help with. The family stated not at this time.  The family stayed in the conference room for a few minutes to talk about what they had just heard.  Social work will continue to follow patient.    SW to follow and assist with any other discharge needs that may arise.   NAKIA Winn   5A Oncology beds:5220-40 & 5C  beds 5417-32 (non BMT )      Vocera:  Phone: [phone]  Pager: [phone]

## 2024-05-06 NOTE — PROGRESS NOTES
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 However, unable to perform biopsy due to location in brainstem. Patient currently on high dose steroids. Ophthalmology was consulted to rule out any ocular involvement of lymphoma.     Patient alert and oriented to self only, unable to obtain history from patient. Per primary team and nursing team, patient with waxing and waning mental status.       Review of systems were otherwise negative except for that which has been stated above.      OCULAR/MEDICAL/SURGICAL HISTORIES:     Past Ocular History:  Last eye exam: Unknown  Prior eye surgery/laser: Unknown  Contact lens wear: Unknown  Glasses: Unknown  Eyedrops: Unknown    Family History:  Unable to obtain ocular FH    Social History:  Social History     Tobacco Use    Smoking status: Never    Smokeless tobacco: Never   Substance Use Topics    Alcohol use: Yes     Comment: occasional    Drug use: No         Past Medical History:   Diagnosis Date    Back pain     Depression since age 16    On Wellbutrin 100 mg BID--started taking meds at age 16, was on Paxil for several years and then switched to Wellbutrin in [id number]    UTI (lower urinary tract infection)     X 3 as of 12/8/09       Past Surgical History:   Procedure Laterality Date    BYPASS GASTRIC CARA-EN-Y, LIVER BIOPSY, COMBINED      CHOLECYSTECTOMY      CRYOCAUTERY OF CERVIX  01/01/2001 2001    DISCECTOMY LUMBAR MINIMALLY INVASIVE ONE LEVEL  05/24/2013    Procedure: DISCECTOMY LUMBAR MINIMALLY INVASIVE ONE LEVEL;  MIS Right side L5-S1 Gonzalo Lami Microdiscectomy, ;  Surgeon: Alba Anderson MD;  Location: [facility] OR    PICC INSERTION - DOUBLE LUMEN  Left 04/29/2024    left basilic 5 fr dl power picc 42 cm    THORACOTOMY Right 01/20/2023    Procedure: RIGHT THORACOTOMY,  TOTAL DECORTICATION OF THE RIGHT LUNG AND PARIETAL PLEURECTOMY;  Surgeon: Tani Murcia MD;  Location: [facility] OR    TUBAL LIGATION      2012       EXAMINATION:     Base Eye Exam       Visual Acuity    Unable to participate due to mental status              Tonometry (Tonopen, 10:21 AM)         Right Left    Pressure 24 21              Pupils         Pupils APD    Right PERRL None    Left PERRL None              Visual Fields    Unable to participate              Extraocular Movement    Unable to participate              Neuro/Psych       Mood/Affect: AOx0              Dilation       Both eyes: 2.5% Deepak Synephrine, 1.0% Mydriacyl @ 10:21 AM                  Slit Lamp and Fundus Exam       External Exam         Right Left    External Normal Normal              Slit Lamp Exam         Right Left    Lids/Lashes Normal Normal    Conjunctiva/Sclera White and quiet White and quiet    Cornea Clear Clear    Anterior Chamber Deep and quiet Deep and quiet    Iris Round and reactive -> Dilated Round and reactive -> Dilated    Lens Clear Clear    Anterior Vitreous Normal Normal              Fundus Exam         Right Left    Disc Normal Normal    C/D Ratio 0.4 0.5    Macula two cotton wool spots, around 3 diameters of venule each on superior and inferior arcade Normal    Vessels Normal, no angiitis or perivacular sheathing Normal    Periphery Normal; no hemorrhages or necrosis Normal                    Labs/Studies/Imaging Performed:  CD4 - 2  CMV IgG- positive  CMV IgM negative  Toxo - negative   CMV viral load - 1010 international unit(s)/ml    Fungal blood culture (collected 4/26) - no growth to date   Bacterial blood culture (collected 4/26) - no growth       ASSESSMENT/PLAN:     Kamilah Goldberg is a 44 year old female who presents with     # Cotton wool spots, right eye  - Single CWS along the  inferior arcade in right eye seen 4/28; on repeat exam 5/6, second CWS along the superior arcade; no change in size or shape of the first CWS along the inferior arcade from week prior; both CWS ~ 3 venule diameters in size.   - Differential for CWS include HIV retinopathy vs early manifestation of CMV retinitis given mildly elevated viral load vs hypertensive retinopathy given history of stimulant use; no retinal necrosis or hemorrhage, no signs of infiltrates, no angiitis. CWS are < 1/3 DD in size, which argues against infectious retinitis but cannot be ruled out at this time   - Ophthalmology will continue serial exams     # Rule out ocular lymphoma  - No evidence of ocular lymphoma on bedside exam   - No obvious evidence of anterior chamber inflammation, no posterior synechiae, pupils round and reactive, no KP, no vitreous opacities, no subretinal lesions     RECOMMENDATIONS:  - No acute intervention at this time  - Ophthalmology will repeat dilated exam in 1 week; please page on-call resident with any questions or concerns     It is our pleasure to participate in this patient's care and treatment. Please contact us with any further questions or concerns.      Darlene Vazquez MD  Resident Physician, PGY-2  Department of Ophthalmology

## 2024-05-06 NOTE — PROGRESS NOTES
"[facility]  Palliative Care Daily Progress Note       Recommendations & Counseling     GOALS OF CARE:   Restorative without limits   Care conference today with Noemy (aunt), Yuan (son), Oncology, Palliative and SW outside Kamilah's room. Kamilah remains unable to participate due to mental status and inability to communicate.  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 After 1 week of treatment, she is not showing signs of neurologic improvement. Oncology team explained that we would hope to see improvement within a few days, and without improvement at this point we are concerned for permanent neurologic damage. Even with continued treatment of underlying cancer, Kamilah is unlikely to have significant neurologic recovery (ability to meaningfully communicate, ambulate, care for herself). Family indicated understanding and asked appropriate questions.  Care pathways: Outlined options of (1) continuing with current chemotherapy regimen with plan for repeat imaging in the coming days vs (2) transitioning to comfort-focused care with aggressive symptom management and stopping life-prolonging measures including artificial nutrition. Yuan expressed desire to continue current treatment with hope that Kamilah might still manifest some partial recovery. Plan to re-address goals in the coming days after further imaging; family expressed understanding.    ADVANCE CARE PLANNING:  No health care directive on file. Per  informed consent policy, next of kin should be involved if patient becomes unable.  During palliative care initial consult on 4/26, Kamilah was able to speak and had capacity and stated that wants her Aunt Noemy to be her emergency contact and primary surrogate decision maker and her son, Yuan, can be involve in care discussions and decisions \"if he wants to\". Noemy explains that she has been like another a mother figure " to Kamilah and helped raise her at times. Noemy also states that Kamilah and her son, Yuan, have had a strained relationship in past but were trying to rebuild the relationship.    Code status: Full Code    PSYCHOSOCIAL/SPIRITUAL:  Family - supported by Noemy (aunt) and Yaun (son) . 13 year old son (lives in [place] & Kamilah doesn't have custody). Multiple recent family losses including Kamilah's mother.  [place] community: Unknown     Palliative Care will continue to follow.    Chantell Horne PA-C  Securely message with [employer] (more info)  Text page via [facility] Paging/Directory       Assessments          Kamilah Goldberg is a 43 year old female with PMHx of recent HIV diagnosis (dx at OSH end of March) and avel lesion thought to be an ischemic stroke (3/2024) who presented to OSH 4/12 with weakness, diplopia, dysarthria admitted for failure to thrive found to have increasing size of lesions in left midbrain and avel lesion transferred to [facility] for consideration of brain biopsy for possible CNS lymphoma. At outside hospital when she had progressive mental status decline and she did get dose of IV decadron (treatment for CNS lymphoma) prior to transfer. Her course was also complicated by MRSA pneumonia and encephalopathy as well as tested positive for COVID 19 on 4/18/2024. Unsafe to complete brain biopsy; now s/p methotrexate and rituximab. Palliative consulted for support and assistance with goals of care conversations.    Today, the patient was seen for:  Brainstem lesion c/f CNS lymphoma  HIV with low CD4 (AIDS)  Hx of Substance Abuse Disorder (amphetamine)  Hx of MRSA pneumonia / Empyema  Support  Encounter for Palliative Care  Goals of care            Interval History:     Chart review/discussion with unit or clinical team members:   - Plan for care conference today. Discussed with SW and Oncology    Per patient or family/caregivers today:  Kamilah is laying in bed during my visit. Her eyes are open and tracking  "but she is unable to speak or follow any commands for me.          Medications:     Reviewed           Physical Exam:   Vital Signs: Blood pressure 119/77, pulse 89, temperature 98.2  F (36.8  C), temperature source Axillary, resp. rate 18, height 1.626 m (5' 4\"), weight 71.1 kg (156 lb 12 oz), SpO2 95%.   GENERAL: laying in bed, NAD    SKIN: Warm and dry   HEENT: +dilated pupils (ophthalmology exam today)  LUNGS: non-labored   ABDOMINAL: soft, non distended, non tender  MUSKL: no gross joint deformities   NEUROLOGIC: eyes open and tracking, EOMI, nonverbal             Data Reviewed:     Reviewed recent pertinent imaging:   CTH 5/4  IMPRESSION: Unchanged left brainstem mass, better characterized on MRI  4/30/2024. Differential considerations remain unchanged; favor  lymphoma versus primary glial neoplasm. No herniation as questioned.  Stable mild ventricular dilatation.    MR brain 4/30  Impression: Increased size of known left brain stem mass compared with  4/15/2024 and 3/29/2024 MRI exams,, imaging findings are most  concerning for central nervous system lymphoma although primary glial  neoplasm or metastatic lesion cannot be excluded.    Reviewed recent labs:   CMP  Recent Labs   Lab 05/06/24  0357 05/06/24  0346 05/05/24  0855 05/05/24  0355   NA  --  136  --  136   POTASSIUM  --  3.9  --  3.9   CHLORIDE  --  101  --  100   CO2  --  26  --  27   ANIONGAP  --  9  --  9   * 112*   < > 123*   BUN  --  19.3  --  18.3   CR  --  0.41*  --  0.38*   GFRESTIMATED  --  >90  --  >90   MAUREEN  --  8.7  --  9.2   MAG  --  2.0  --  2.0   PHOS  --  2.9  --  4.0   PROTTOTAL  --  6.4  --  6.8   ALBUMIN  --  3.2*  --  3.4*   BILITOTAL  --  0.3  --  0.4   ALKPHOS  --  64  --  63   AST  --  27  --  28   ALT  --  41  --  45    < > = values in this interval not displayed.     CBC  Recent Labs   Lab 05/06/24  0448 05/05/24  0355   WBC 8.0 8.9   RBC 3.64* 3.84   HGB 10.5* 11.0*   HCT 31.8* 33.6*   MCV 87 88   MCH 28.8 28.6   MCHC " 33.0 32.7   RDW 13.3 13.4    293          Medical Decision Making       MANAGEMENT DISCUSSED with the following over the past 24 hours: Oncology   NOTE(S)/MEDICAL RECORDS REVIEWED over the past 24 hours: Oncology, Neuro, ID  Medical complexity over the past 24 hours:  - Treatment limited by SOCIAL DETERMINANTS OF HEALTH      Advance Care Planning Discussion 5/6/2024. Chantell LIN PA-C met with  surrogate decision makers/family  today at the hospital to discuss Advance Care Planning. Kamilah Goldberg does not have decisional capacity  and was not present for this discussion due to altered mental status .  Those present were informed of the voluntary nature of this discussion and wished to proceed.  The discussion included:  prognosis, goals of care, patient wishes, hospice . This discussion began at 10:40 and ended at 11:00 for a total of 20 minutes.

## 2024-05-06 NOTE — PLAN OF CARE
"Afebrile, OVSS. Per FLACC assessment, pt does not appear to be in pain. No nausea and vomiting overnight. PRN melatonin given x1 and robitussin x1 for cough with relief. No replacements needed this AM. No BMs overnight, good UOP for thornton catheter. Q2 turns throughout night, Q4 BG checks, Q4 neuro checks with no change in status. Suctioning as needed, oral cares provided. Pt still needs fungal/yeast culture and respiratory culture. Pt resting between cares.      Problem: Adult Inpatient Plan of Care  Goal: Plan of Care Review  Description: The Plan of Care Review/Shift note should be completed every shift.  The Outcome Evaluation is a brief statement about your assessment that the patient is improving, declining, or no change.  This information will be displayed automatically on your shift  note.  Outcome: Progressing  Goal: Patient-Specific Goal (Individualized)  Description: You can add care plan individualizations to a care plan. Examples of Individualization might be:  \"Parent requests to be called daily at 9am for status\", \"I have a hard time hearing out of my right ear\", or \"Do not touch me to wake me up as it startles  me\".  Outcome: Progressing  Goal: Absence of Hospital-Acquired Illness or Injury  Outcome: Progressing  Intervention: Identify and Manage Fall Risk  Recent Flowsheet Documentation  Taken 5/6/2024 0004 by Sherry Lassiter, RN  Safety Promotion/Fall Prevention:   activity supervised   safety round/check completed   assistive device/personal items within reach   lighting adjusted   clutter free environment maintained   increased rounding and observation   room near nurse's station  Taken 5/5/2024 2321 by Sherry Lassiter, RN  Safety Promotion/Fall Prevention:   activity supervised   safety round/check completed   assistive device/personal items within reach   lighting adjusted   clutter free environment maintained   increased rounding and observation   room near nurse's station  Taken 5/5/2024 " 2134 by Dallmann, Sherry, RN  Safety Promotion/Fall Prevention:   activity supervised   safety round/check completed   assistive device/personal items within reach   lighting adjusted   clutter free environment maintained   increased rounding and observation   room near nurse's station  Intervention: Prevent Skin Injury  Recent Flowsheet Documentation  Taken 5/6/2024 0340 by Sherry Lassiter RN  Body Position:   turned   right  Taken 5/6/2024 0138 by Sherry Lassiter RN  Body Position:   turned   left  Taken 5/6/2024 0004 by Sherry Lassiter RN  Body Position:   turned   right  Taken 5/5/2024 2134 by Sherry Lassiter RN  Body Position:   turned   left  Skin Protection: incontinence pads utilized  Device Skin Pressure Protection:   adhesive use limited   pressure points protected   tubing/devices free from skin contact  Intervention: Prevent and Manage VTE (Venous Thromboembolism) Risk  Recent Flowsheet Documentation  Taken 5/5/2024 2109 by Sherry Lassiter RN  VTE Prevention/Management: SCDs (sequential compression devices) on  Intervention: Prevent Infection  Recent Flowsheet Documentation  Taken 5/6/2024 0004 by Sherry Lassiter RN  Infection Prevention:   equipment surfaces disinfected   visitors restricted/screened  Taken 5/5/2024 2134 by Sherry Lassiter RN  Infection Prevention:   equipment surfaces disinfected   visitors restricted/screened  Goal: Optimal Comfort and Wellbeing  Outcome: Progressing  Goal: Readiness for Transition of Care  Outcome: Progressing     Problem: Fall Injury Risk  Goal: Absence of Fall and Fall-Related Injury  Outcome: Progressing  Intervention: Identify and Manage Contributors  Recent Flowsheet Documentation  Taken 5/6/2024 0004 by hSerry Lassiter RN  Medication Review/Management: medications reviewed  Taken 5/5/2024 2134 by Sherry Lassiter RN  Medication Review/Management: medications reviewed  Intervention: Promote Injury-Free Environment  Recent Flowsheet  Documentation  Taken 5/6/2024 0004 by Sherry Lassiter RN  Safety Promotion/Fall Prevention:   activity supervised   safety round/check completed   assistive device/personal items within reach   lighting adjusted   clutter free environment maintained   increased rounding and observation   room near nurse's station  Taken 5/5/2024 2321 by Sherry Lassiter RN  Safety Promotion/Fall Prevention:   activity supervised   safety round/check completed   assistive device/personal items within reach   lighting adjusted   clutter free environment maintained   increased rounding and observation   room near nurse's station  Taken 5/5/2024 2134 by Sherry Lassiter RN  Safety Promotion/Fall Prevention:   activity supervised   safety round/check completed   assistive device/personal items within reach   lighting adjusted   clutter free environment maintained   increased rounding and observation   room near nurse's station     Problem: Neurologic Impairment  Goal: Optimal Adjustment to Neurologic Impair  Outcome: Progressing  Goal: Optimal Safe BADL Performance  Outcome: Progressing  Goal: Effective Bowel Elimination/Continence  Outcome: Progressing  Goal: Optimal Cognitive Function  Outcome: Progressing  Goal: Effective Communication Skills  Outcome: Progressing  Intervention: Optimize Communication Skills  Recent Flowsheet Documentation  Taken 5/5/2024 2109 by Sherry Lassiter RN  Communication Enhancement Strategies:   nonverbal strategies used   extra time allowed for response  Goal: Optimal Safe IADL Performance  Outcome: Progressing  Goal: Optimal Mobility [place] and Safety  Outcome: Progressing  Goal: Optimal Movement and Motor Control  Outcome: Progressing  Goal: Optimal Eating/Swallowing without Aspiration  Outcome: Progressing  Intervention: Optimize Eating and Swallowing  Recent Flowsheet Documentation  Taken 5/6/2024 0004 by Sherry Lassiter RN  Aspiration Precautions:   oral hygiene care promoted   NPO  pending swallow screening/evaluation   respiratory status monitored   upright posture maintained  Taken 5/5/2024 2134 by Sherry Lassiter, RN  Aspiration Precautions:   oral hygiene care promoted   NPO pending swallow screening/evaluation   respiratory status monitored   upright posture maintained  Goal: Optimal Sensory Perceptual Status  Outcome: Progressing  Goal: Maintains Intimacy/Sexual Expression  Outcome: Progressing  Goal: Effective Spasticity Management  Outcome: Progressing  Intervention: Promote Spasticity Management  Recent Flowsheet Documentation  Taken 5/5/2024 2109 by Sherry Lassiter, RN  Spasticity Management: positioned with supportive device  Goal: Effective Urinary Elimination/Continence  Outcome: Progressing

## 2024-05-06 NOTE — PROGRESS NOTES
Rituximab  D: Baseline vital signs and temperature were WNL  . Blood return was positive per PICC.    I: Rituximab infusion started at 200 ml/hr. Rate adjusted according to protocol and patient tolerance. Pre-medications included tylenol 650 mg & benadryl elixir 50 mg.  A: Maximum rate of rituximab infusion tolerated was 400 ml/hr.  P: continue to monitor per poc..

## 2024-05-06 NOTE — PROVIDER NOTIFICATION
[id number]  Jesenia [phone]  [address]   Platelets 217, platelets were 450 on admission - 50% below baseline. lovenox not given. unable to reach hospitalist. Thank you!    Provider paged    Provider called back and stated it was okay to give Lovenox

## 2024-05-06 NOTE — PROVIDER NOTIFICATION
1005   Jesenia YADAV [phone]  [address]  Pt's feeding tube was been significantly moved.Last charted placement on 5/5/24 was 60cm at nare, today tube is 52 cm at nare. breathing - unlabored. lung sounds - clear/diminished.O2-WNL  Heme fellow paged.

## 2024-05-07 NOTE — PROGRESS NOTES
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 PmHx of dyslipidemia, polysubstance abuse, and r-sided empyema (s/p VATS and partial pleurectomy Jan 2021). Upon review of labs and imaging from with positive EBV PCR from CSF. LP at [facility] x4/26 with rare atypical cells. Based on findings started HD-Methotrexate + Rituximab 4/29/24. Course further complicated by COVID positive, acute decline in neurological status, HIV/AIDS     Today:  - D9 HD-Methotrexate and Rituximab. Will proceed with Rituxan today.  - RLE US to assess vascular perfusion  - Will start Valtrex for possible CMV retinitis   - Continue steroids taper; currently on Dexamethasone 4 mg BID (5/5-X).   - Will continue best supportive cares.     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 Previously hospitalized in March 2024 where imaging revealed restricted diffusion and T2 hyperintensity of osterior pontine mesencephalic junction with local mass effect. Felt to be likely ischemic infarct (risk factors of HTN, smoking and amphetamine use). Additional considerations included infection vs neoplasm. CSF cytology attempted, but insufficient quantity. MRI brain 4/15 showed increased in interval of lesion of L avel and midbrain compared to previous and increase in interval again on 4/24/24 MRI. CT A/P with no lymphadenopathy, mass, or acute process. LP 4/18 non-diagnostic with negative flow cytometry and cytology but CSF EBV PCR positive (original uncertainty of blood vs. CSF PCR but able to confirm CSF by [facility]). ID, oncology, and neurosurgery consulted for concern of CNS lymphoma given context of imaging, worsening decline, and imaging. She was started on Decadron 8 mg and transferred to [facility] 4/24/24 for further workup and consideration of brain biopsy. Unable to obtain tissue biopsy given challenging location of brainstem, if repeat imaging shows expansion, then could consider. But with review of imaging with Neurosurgery, agree with outside impression of CNS lymphoma. CT CAP 4/25 showed no evidence of lymphoma. Repeat LP 4/26 with rare atypical cells on cytology but does not correlate with flow cytometry (rare polytypic B cells). LP also showing positive EBV PCR, elevated protein to 109, and negative CMV, meningitis/encephalitis panel.  Ophthalmology consulted; no evidence of ocular lymphoma.  Based on imaging and EBV + PCR from CSF started HD-methotrexate on 4/29/24. Appears to be tolerating well thus far. 32 hours methotrexate level 0.31.   - Infectious studies at [facility] thus far negative for CMV IgM negative (IgG positive suggesting previous infection), Cryptococcus, CMV encephalitis, Tb meningitis, toxoplasmosis serology. Continue to follow toxoplasmosis PCR and Karius.   - Follow CSF AFB culture (NGTD) and bacterial culture results from LP x4/26  - Continue dexamethasone taper with 40 mg x 4 days (4/24-4/30) ? 20 mg x 4 days (5/1-5/4) ? 10 mg daily x 4 days ? 4 mg BID (5/5-X)  - PICC line placed x4/29   - Next due for C2D1 = 5/13; will plan to obtain repeat MR brain prior to proceeding to assess tumor response       Treatment Plan: HD- Methotrexate + Rituximab C1D1 = 4/30/24    - Methotrexate 3.5 g/m2 (6.405 g) - D1, D8    - Rituximab 375 mg/m2 (700 mg) - D3    - Leucovorin 50 mg D2 until methotrexate level < 0.10    - mIVF .45% NS w. Sodium bicarbonate @125 ml/hr    #Decreased mental status secondary to lesion of L avel and midbrain  Presented to OSH 4/12/24 with 4/12/24 with right facial and arm  "paresthesias and RLE weakness originally concerning for ischemic stroke. Further imaging revealed mass of L avel and midbrain. Transferred to [facility] for consideration of biopsy, but due to location was designated at too risky. On arrival to [facility], patient on assessment by neurosurgery was A&O x 3, able to slowly follow commands, and had slowed speech. She has since progressed to not being able to move extremities, waxes/wanes ability to follow commands, and non-verbal. She can follow conversations with eye and appropriately blink \"yes\" to questions. For example, when asked \"is this your son\" when referring to son (Yuan) patient was able to blink multiple times. Notable decrease in neurological function on exam morning x4/30 and consistent findings by neurology team; patient unable to squeeze fingers in comparison to day prior and overnight. Stat head CT for concern of increased ICP; no acute findings and L avel lesion similar to CT done 4/25. Repeat MRI brain w/wo contrast x4/30 showing increased size of known lesion of L avel and midbrain in comparison to MRI on 4/15 and 3/29. Given concern for increasing size in mass, will continue to monitor closely with Q4H neuro checks for s/sx of herniation. If new changes in exam will obtain stat CT head and if evidence of herniation, would need emergent intubation. Appreciate assistance and input from neurology, signed off 5/1 but familiar with patient and readily available for any questions and concerns.   - RT consult placed for difficulty with secretion management. Could also assist with sputum culture.     #Anemia  #Thrombocytosis  Likely due to underlying suspected malignancy  - Transfuse for plts < 10k and Hgb , 7.0    Hx SWETA  Has hx of SWETA. Has been treated with transfusions and IV iron in the past. Will defer workup at this time.     # ID Prophylaxis  - Dapsone 100 mg daily for PJP ppx  - Levaquin 250 mg daily when ANC < 1.0     ID  #HIV/AIDS  Recently diagnosed with " HIV during admission at [facility] on 3/28/24 with viral load of 1.24 million, 6.9 log,  and CD4 count of 2. She was not started on ART, given Nystatin x2 weeks and referred to ID clinic for follow up. Prior to this, she was admitted again with ID consult for broad-infectious workup. Started Biktarvy 4/17 but stopped due concern for rash, rechallenged on 4/20 and able to tolerate. Later transitioned to Dolutegravir and Truvada. SW reached out to health department 5/2 regarding patient's HIV status and if able to be reported properly. Upon further discussions and health department chart review, original diagnosis was in 2014 and patient was contacted over 14 times with no response.  Unable to confirm with patient if she is was aware of status as she is non-verbal. Upon further chart review, HIV testing was completed 8/27/24 at [facility] with office visit to follow, but encounter is not able to be reviewed and needing patient authorization; will discuss with son (Yuan) as he is her next of kin and medical decision maker. Of note, patient's aunt (Noemy), and son (Yuan) are aware of diagnosis.   - Upon further conversation with boyfriend (Otis) today, it does not appear he is aware of her HIV status. I asked about general medical history and any new/recent infections to which he did not mention HIV/AIDS status. ID had a similar conversation with a consistent consensus. Reached out to SW to discuss with family/son regarding if previous partners have been notified.  - Continue ART with Dolutegravir and Truvada   - Plan to repeat CD4 count and viral load circa x5/15     #COVID-19 positive  #MRSA pneumonia  Tested positive for COVID-19 4/18/24 during admission to [facility]. Patient remains asymptomatic. Will continue to monitor at this time. Previously on Linezolid. ID following; does not feel like MRSA pneumonia is likely and ok to stop Linezolid.   - Per infection prevent first day eligible for  re-testing 5/9    MISC  #Cotton wool spot, right eye  Single CWS in right eye, most likely in setting of acute illness. Found on assessment by ophthalmology x4/28. Given significant comorbidities, CD4 count of 2. Repeat ophtho exam 5/6 noted to have worsening cotton wool spots; etiologies include HIV retinitis, HTN retinitis or CMV retinitis.  - Given concern for CMV retinitis will start treatment dose Valcyte     # Concern for hypoperfusion of RLE    Patient was noted to have a RLE that was cold to touch. No cyanosis noted on toes. Capillary refill intact. RLE arterial US 5/7 with patent vasculature.   - Will monitor     SOCIAL   #Medical decision maker  Confirmed with family that son (Yuan) will be patient's primary medical decision maker. Son Yuan and patient are okay with communication with aunt (Noemy) if Yuan is unavailable.     #H/o stimulant/amphetamine use   Noted on [facility] tox screen in 2016. Patient previously reported snorting and declined IVDU.     Fluids/Electrolytes/Nutrition   - IVF per chemotherapy regimen  - PRN lyte replacement per standing protocol  - Regular diet as tolerated     Lines: PICC    PPX  VTE: Lovenox  GI: Famotidine  Bowels: prn miralax and senna    Code: Full    Medically Ready for Discharge: Anticipated in 5+ Days    Disposition: Pending further workup, evaluation, tolerance of HD-methotrexate, and clinical course. Patient will require a long term care facility on discharge.     I spent >45 minutes face-to-face and/or coordinating or discussing care plan. Over 50% of our time on the unit was spent counseling the patient and/or coordinating care    Patient is seen and examined by Dr. Jacob and RILEY.  Assessment and plan are discussed and delivered to the patient.    Krystal Ochoa PA-C  Hematology/Oncology  Pager #[phone]    Interval History   Nursing notes reviewed. No acute events overnight. Nursing noted that patient was able to say word phone as well as have some head and arm  "movement.     During my visit patient was able to intentionally blink once for yes once during our conversation. She was noted to have a cool to touch RLE, capillary refill intact, no cyanosis appreciated.            Physical Exam   Temp: 98.1  F (36.7  C) Temp src: Axillary BP: 115/78 Pulse: 68   Resp: 24 SpO2: 97 % O2 Device: None (Room air)    Vitals:    05/05/24 1112 05/06/24 1211 05/07/24 1037   Weight: 71.1 kg (156 lb 12 oz) 66.8 kg (147 lb 4.3 oz) 68.4 kg (150 lb 12.7 oz)     Vital Signs with Ranges  Temp:  [97.9  F (36.6  C)-98.7  F (37.1  C)] 98.1  F (36.7  C)  Pulse:  [] 68  Resp:  [16-24] 24  BP: (109-125)/(71-83) 115/78  SpO2:  [92 %-97 %] 97 %  I/O last 3 completed shifts:  In: 1869 [NG/GT:500]  Out: 1625 [Urine:1625]      Physical Exam:    Blood pressure 115/78, pulse 68, temperature 98.1  F (36.7  C), temperature source Axillary, resp. rate 24, height 1.626 m (5' 4\"), weight 68.4 kg (150 lb 12.7 oz), SpO2 97%.    General: lying in bed. Nontoxic appearing. No acute distress.   HEENT: Normocephalic/ Sclera anicteric. Moist mucus membranes. Bilateral pupil dilation from optho exam this AM   CV: RRR. Normal S1/S2. No m/r/g. No peripheral edema.   Resp: CTAB on anterior auscultation. No wheezing/crackles. Normal respiratory effort on room air. Breath sounds are equal throughout.   GI: Soft, non-tender, non-distended. Bowel sounds present and normoactive. No peritoneal signs. NGT in tact.   MSK: Warm and well-perfused. Normal tone. RLE cool to touch, cap refill intact and bryan, no cyanosis noted.   Skin: Skin is clean, dry and intact. No rashes on limited exam. No jaundice.    Neuro: Non verbal, some appropriate blinking with yes/no questions       Medications   Current Facility-Administered Medications   Medication Dose Route Frequency Provider Last Rate Last Admin    dextrose 10% infusion   Intravenous Continuous PRN Russel Boothe, DO         Current Facility-Administered Medications   Medication " Dose Route Frequency Provider Last Rate Last Admin    atorvastatin (LIPITOR) tablet 40 mg  40 mg Oral or Feeding Tube Daily Russel Boothe DO   40 mg at 05/07/24 0833    dapsone suspension 100 mg  100 mg Oral or Feeding Tube Daily Kandi Lambert PA-C   100 mg at 05/07/24 0834    dexAMETHasone (DECADRON) injection 10 mg  10 mg Intravenous Daily Kandi Lambert PA-C   10 mg at 05/07/24 0833    Followed by    [START ON 5/9/2024] dexAMETHasone (DECADRON) injection 4 mg  4 mg Intravenous BID Kandi Lambert PA-C        dolutegravir (TIVICAY) tablet 50 mg  50 mg Oral or NG Tube Daily Ramesh Van MD   50 mg at 05/06/24 2231    emtricitabine-tenofovir (TRUVADA) 200-300 MG per tablet 1 tablet  1 tablet Oral or NG Tube Daily Ramesh Van MD   1 tablet at 05/06/24 2033    enoxaparin ANTICOAGULANT (LOVENOX) injection 40 mg  40 mg Subcutaneous Q24H Russel Boothe DO   40 mg at 05/06/24 2033    famotidine (PEPCID) suspension 20 mg  20 mg Oral BID Kandi Lambert PA-C   20 mg at 05/07/24 0833    gabapentin (NEURONTIN) solution 100 mg  100 mg Oral or Feeding Tube Q12H [facility] (08/20) Russel Boothe DO   100 mg at 05/07/24 0833    heparin lock flush 10 UNIT/ML injection 5-20 mL  5-20 mL Intracatheter Q24H Tucker Jacob MD   5 mL at 05/05/24 1016    insulin aspart (NovoLOG) injection (RAPID ACTING)  1-4 Units Subcutaneous Q4H Russel Boothe DO   1 Units at 05/06/24 1654    potassium chloride 20 mEq in 50 mL intermittent infusion  20 mEq Intravenous Q1H Tucker Jacob MD        thiamine (B-1) tablet 100 mg  100 mg Oral or Feeding Tube Daily Russel Boothe DO   100 mg at 05/06/24 2231       Data   Results for orders placed or performed during the hospital encounter of 04/24/24 (from the past 24 hour(s))   XR Abdomen Port 1 View    Narrative    EXAMINATION: XR ABDOMEN PORT 1 VIEW 5/6/2024 11:25 AM     COMPARISON: Abdominal radiograph 4/26/2024.    HISTORY: Assess NG tube  placement.    FINDINGS: Frontal view of the abdomen. Feeding tube tip projects over  left midabdomen, presumably within the proximal jejunum in this  patient with history of gastric bypass. There is some gaseous  distention of the colon with transverse measuring approximately 7 cm  in caliber. No abnormally dilated loops of small bowel.      Impression    IMPRESSION: Feeding tube tip projects over left midabdomen, presumably  within the proximal jejunum in this patient with history of gastric  bypass. Possible colonic ileus.    I have personally reviewed the examination and initial interpretation  and I agree with the findings.    KAITLYNN BARTON MD         SYSTEM ID:  [id number]   Glucose by meter   Result Value Ref Range    GLUCOSE BY METER POCT 159 (H) 70 - 99 mg/dL   Glucose by meter   Result Value Ref Range    GLUCOSE BY METER POCT 222 (H) 70 - 99 mg/dL   Glucose by meter   Result Value Ref Range    GLUCOSE BY METER POCT 122 (H) 70 - 99 mg/dL   Glucose by meter   Result Value Ref Range    GLUCOSE BY METER POCT 109 (H) 70 - 99 mg/dL   Glucose by meter   Result Value Ref Range    GLUCOSE BY METER POCT 116 (H) 70 - 99 mg/dL   Glucose by meter   Result Value Ref Range    GLUCOSE BY METER POCT 102 (H) 70 - 99 mg/dL   CBC with Platelets & Differential    Narrative    The following orders were created for panel order CBC with Platelets & Differential.  Procedure                               Abnormality         Status                     ---------                               -----------         ------                     CBC with platelets and d...[[id number]]  Abnormal            Final result                 Please view results for these tests on the individual orders.   Phosphorus   Result Value Ref Range    Phosphorus 3.0 2.5 - 4.5 mg/dL   Comprehensive metabolic panel   Result Value Ref Range    Sodium 137 135 - 145 mmol/L    Potassium 3.4 3.4 - 5.3 mmol/L    Carbon Dioxide (CO2) 27 22 - 29 mmol/L    Anion Gap 8  7 - 15 mmol/L    Urea Nitrogen 15.8 6.0 - 20.0 mg/dL    Creatinine 0.38 (L) 0.51 - 0.95 mg/dL    GFR Estimate >90 >60 mL/min/1.73m2    Calcium 8.5 (L) 8.6 - 10.0 mg/dL    Chloride 102 98 - 107 mmol/L    Glucose 112 (H) 70 - 99 mg/dL    Alkaline Phosphatase 64 40 - 150 U/L    AST 28 0 - 45 U/L    ALT 43 0 - 50 U/L    Protein Total 6.3 (L) 6.4 - 8.3 g/dL    Albumin 3.3 (L) 3.5 - 5.2 g/dL    Bilirubin Total 0.3 <=1.2 mg/dL   Magnesium   Result Value Ref Range    Magnesium 2.0 1.7 - 2.3 mg/dL   Uric acid   Result Value Ref Range    Uric Acid 3.3 2.4 - 5.7 mg/dL   Partial thromboplastin time   Result Value Ref Range    aPTT 27 22 - 38 Seconds   INR   Result Value Ref Range    INR 1.02 0.85 - 1.15   Fibrinogen activity   Result Value Ref Range    Fibrinogen Activity 320 170 - 490 mg/dL   CBC with platelets and differential   Result Value Ref Range    WBC Count 5.2 4.0 - 11.0 10e3/uL    RBC Count 3.59 (L) 3.80 - 5.20 10e6/uL    Hemoglobin 10.1 (L) 11.7 - 15.7 g/dL    Hematocrit 31.0 (L) 35.0 - 47.0 %    MCV 86 78 - 100 fL    MCH 28.1 26.5 - 33.0 pg    MCHC 32.6 31.5 - 36.5 g/dL    RDW 13.2 10.0 - 15.0 %    Platelet Count 185 150 - 450 10e3/uL    % Neutrophils 88 %    % Lymphocytes 3 %    % Monocytes 6 %    % Eosinophils 1 %    % Basophils 0 %    % Immature Granulocytes 2 %    NRBCs per 100 WBC 0 <1 /100    Absolute Neutrophils 4.5 1.6 - 8.3 10e3/uL    Absolute Lymphocytes 0.1 (L) 0.8 - 5.3 10e3/uL    Absolute Monocytes 0.3 0.0 - 1.3 10e3/uL    Absolute Eosinophils 0.1 0.0 - 0.7 10e3/uL    Absolute Basophils 0.0 0.0 - 0.2 10e3/uL    Absolute Immature Granulocytes 0.1 <=0.4 10e3/uL    Absolute NRBCs 0.0 10e3/uL   Glucose by meter   Result Value Ref Range    GLUCOSE BY METER POCT 110 (H) 70 - 99 mg/dL

## 2024-05-07 NOTE — PROGRESS NOTES
Patient chart reviewed. Patient's most recent CD4 considers patient severely immunocompromised. Patient requires 20 full days of isolation and test-based removal of precautions with either a negative PCR or a positive PCR with a CT>35.    4/18/2024 = Day 0  5/8/2024 = Day 20    5/9/2024 = First day patient is eligible for testing to remove precautions and recover patient from infection.    Infection Prevention

## 2024-05-07 NOTE — PLAN OF CARE
Goal Outcome Evaluation:      [id number]      VSS on RA. Opens eyes spontaneously- pt is not currently speaking. PERRLA- except unable to accommodate. Right-sided weakness and facial droop. Right foot-cold to the touch this AM, arterial ultrasound completed, scan was negative. Adylitica TF running at 65ml/hr, 90ml free water flush q4 hours. NPO. NJ tube 52 at the nare, verified placement yesterday- was at 52 at that time as well. Replaced potassium. Re-check at 1630 was 4.1. Last BM was 3 days ago- will give senna, MOM available if needed later today.     [id number]  Pt is very diaphoretic. Afebrile. Fan turned on. Pt still not speaking but has been able to blink twice for yes to answer some questions if instructed.  Eyes have accommodated during last neuro check but still unable to move them side to side.

## 2024-05-07 NOTE — PLAN OF CARE
"Goal Outcome Evaluation:      Plan of Care Reviewed With: patient    Overall Patient Progress: improving Overall Patient Progress: improving    Reason for admission:  Brain mass/CNS lymphoma     VS: /76 (BP Location: Right arm, Patient Position: Left side, Cuff Size: Adult Regular)   Pulse 68   Temp 98.7  F (37.1  C) (Axillary)   Resp 18   Ht 1.626 m (5' 4\")   Wt 66.8 kg (147 lb 4.3 oz)   SpO2 92%   BMI 25.28 kg/m     Pain/Nausea: Nonverbal scoring 0, no nausea  Neuro: A&Ox2 at beginning of shift, lethargic overnight, does not track/follow object. PERRLA - slightly dilated pupils 3mm. No movement on R side except for toes, L side very weak. Intermittently follows commands.   Cardiac: NSR  Resp: Room air, frequently attempts to clear throat, but unable. Oral suction PRN. Robitussin PRN.  GI/: Roy catheter, LBM 5/4  Skin:   Diet: NPO, TF @ 65mL/hr [id number]. Hold 2 hours prior to and after administration of TIVICAY  Activity: Turn/repo q2h   LDA: DL PICC LUE, Roy catheter, NG tube  Labs: Drawn this AM, pending  Plan: Ongoing goals of care discussion, received HD methotrexate and Rituximab. C1D8. C2 to start on 5/13.            "

## 2024-05-08 NOTE — PROGRESS NOTES
Brief Palliative Care Note    Chart reviewed and events noted from the preceding days. Called Noemy to check in after care conference Monday, no answer. Palliative will continue to follow peripherally and remain available for further goals of care discussions.    Chantell Horne PA-C  [facility], Palliative Care  Securely message with the [employer] Web Console (learn more here) or  Text page via [employer] Paging/Directory

## 2024-05-08 NOTE — PROGRESS NOTES
[facility]  Transplant & Immunocompromised Infectious Disease Progress Note   Patient: Kamilah Goldberg, Date of birth 1980, Medical record number [id number].      Recommendations:     Given high viral load would treat CMV with IV GCV 5mg/kg q12h  Repeat CMV DNA PCR weekly to assess response - due 5/14, 5/21  If level becomes lower can switch back to oral VGCV 900 BID  Once undetectable x2 then can stop and monitor for recurrence  Agree with ongoing Ophthalmology re-assessment, defer to their exam findings if further workup/intravitreal may be needed or if reassuring    This level of CMV reactivation suspicious for IRIS , fevers masked by steroids  Ordered Crypto Ag blood, and AFB blood cultures to be repeated tomorrow given low CD-4 Count    Continue dapsone 100 mg daily for PJP prophylaxis     Continue Dolutegravir and Truvada  Plan to repeat CD4 and viral load circa 5/15/2024.    Per patients son and next of kin she told him she does NOT  want her partner kemi to know about the HIV  D[facility]ing with [facility] reporting and partner testing.    ID Will continue to follow through this hospitalization, although given slow expected time course of current infections may not follow on a daily basis, please page with questions or if situation arises where we may be of assistance.    Case discussed with Transplant ID Staff.    Signed:  Gerson Stewart MD, 05/08/2024   Transplant Infectious Disease Fellow  Pager [phone] For more paging info see [facility]-> Infectious Disease [place] HSCT Service        Patient Summary:   44-year-old woman with recent complicated strep empyema and substance use/unstable housing situation who presented to an outside hospital with new diagnosis of AIDS.  In the outside hospital she was diagnosed with a CNS lesion and then started on ART around April 17.  She was transferred here for oncology and neurosurgical evaluation.        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   This level of CMV reactivation suspicious for IRIS , fevers masked by steroids    Given high viral load would treat CMV with IV GCV 5mg/kg q12h. Repeat CMV DNA PCR weekly to assess response - due 5/14, 5/21. If level becomes lower can switch back to oral VGCV 900 BID. Once undetectable x2 then can stop and monitor for recurrence, there is no need for secondary ppx given her CD-4 count may recover.    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 He now has rare atypical cells on the repeat LP here, as well as EBV.     As outlined most infectious studies thus far are negative and the preliminary additional studies done here are also not suggestive of infection. CSF WBC count 3 and normal glucose suggests against infection.   - Cryptococcus has essentially been ruled out with negative CSF CrAg and two negative serum CrAg. CSF not consistent.  - CMV encephalitis seems much less likely based negative CMV PCR   - Toxoplasmosis serology is negative, as is PCR  - PML has been ruled out with -ve RENEA virus PCR  - Tb meningitis: negative quant-gold. AFB cultures are pending. CSF not consistent.  - Endemic mycoses: again not a classic presentation and no other signs of disseminated disease (typically can  see pancytopenia, elevated alk phos, splenomegaly, etc). Eval is negative so far.     I would emphasize that with ART, expect her HIV/AIDs to have relatively rapid improvement in her viral load and cell counts. She is ART naive, and her long-term prognosis from a purely HIV/AIDs perspective is excellent if we can diagnosis and effectively treat this CNS lesion, though certainly recognize that the location, rapid progression, and her current neurological status is very concerning as far as her short-term prognosis.     Agree with high-dose steroids now that we have maximized diagnostics since this lesion is not amenable to brain biopsy given its location. Appreciate hematology consideration of further therapeutics including IT methotrexate. I would note that ART and immune reconstitution is a vital part treating CNS lymphoma as well.    She should be on PJP prophylaxis, prefer dapsone.    #Non-immune to hepatitis A or B. Hep B coreAb negative  #Homeless and and history of polysubstance use disorder  # STD discussion  Other harm reduction labs have been completed at [facility] notably hep B/C, Syphilis, and other STI with gonorrhea and chlamydia    Per patients son and next of kin she told him she does NOT  want her partner kemi to know about the HIV. Appreciate Social work support in reporting and disclosure    #COVID-19+ on 4/18  Remains in Isolation although asymptomatic    Other Infectious Disease Issues  #MRSA in sputum from 4/19  - Reason to for additional endemic disease testing: No   - Bacterial prophylaxis: None  - Pneumocystis prophylaxis: planning dapsone vs atovaquone vs pentamidine  - Viral serostatus & prophylaxis: CMV+  - Fungal prophylaxis: none  - Immunization status: Hep B non infected, non immune, Hep A non immune, largely unvaccinated since 2009 will need to be addressed once further from chemo and has had some immune reconstitution  - Gamma globulin status:No lab results found.  - Isolation status: Good  "hand hygiene. Contact fro MRSA in lung at OSH, Special 2/2 recent covid, immunocompromised.       Selected Labs, Micro, Imaging Results:   [facility] Workup  4/20 - HLA  -ve  4/18 - LP EBV CSF +ve  4/16 - Toxo IgG -ve, CMV IgG+  3/31- HIV RNA+ 1.2 million copies 6.9 log, CD-4 count 2/mcl  3/31/24- Genotype- no resistance  3/30/24- Syphilis Ab -ve  3/29/24- CT/NG Urine negative    [facility] Workup  4/27 Karius pending, Blasto blood and Histo Urine pending  4/26 CSF- Crypto -ve, EBV+, Toxo pending (other ID studies negative; RENEA virus, CMV, Cultures)  4/29 Hep C Pending (Hep C Ab -ve 4/1/24)         Interval History and Subjective:     No major events. CMV is positive and climbing, statted on VGCV yesterday    She was nonparticipatory in the history but perhaps turn her head towards me on command.    Goals of care are ongoing.    Review of Symptoms:  Unable       ID HPI from 4/25/24     43 year old woman with recently diagnosed HIV (1 month ago)/AIDs (CD4 2) who presented to [facility] 4/15/2024 with generalized weakness found to have CNS lesion, highest concern for CNS lymphoma, transferred here for neurosurgical evaluation and possible biopsy     She was seen 1/2023 at [facility] for severe pneumococcal pneumonia/empyema requiring VATs decortication.      She was next seen 3/29/2024 at [facility] with oral candidiasis and then HIV was screened and positive. CD4 2. She was given nystatin for 2 weeks and referred to ID clinic for followup. Not started on ART. MRI brain at this time showed a 15mm T2 hyperintensity in the pontomesencephalic junction (initially dx as ischemic stroke).      She went back to [facility] 4/15/2024 for generalized weakness. She had a broad work-up after repeat brain MRI showed increase size of the lesion. She was started on Biktarvy ~4/17 but there were concerns for a rash though it was restarted. She was COVID+ 4/18 and grew \"light growth\" MRSA on sputum culture 4/19. She was started on " "linezolid.      On arrival here, she is afebrile with HR 73, BP normotensive, satting 100% on RA. WBC and PLT normal. Hgb 11.3 very slightly low. Cr 0.46. LFTs have not been done since 4/19 when they were relatively unremarkable - ALT 44, AST 56, Tbili 0.3 and most notably Alk Phos was normal at 79.      On interview today, she is relatively encephalopathic. She says she has two children, 24 and 14. Her son has a cat. She denies pain or discomfort. Says she is willing to take medicine for HIV       Physical Exam:     VITAL SIGNS:  Blood pressure 112/78, pulse 93, temperature 98.3  F (36.8  C), temperature source Axillary, resp. rate 20, height 1.626 m (5' 4\"), weight 68.4 kg (150 lb 12.7 oz), SpO2 97%.   GENERAL APPEARANCE: Not in acute distress    PHYSICAL EXAM:  Eyes:   Tracks with eyes, no discharge  Mouth, Throat:   NG tube present  Cardiovascular: No Cyanosis, JVD not elevated  Respiratory:  Not in respiratory distress, Chest expansion symmetrical, No Audible wheeze/stridor, wet crackling cough present  Gastrointestinal: Not distended, No Visible Pulsation  Musculoskeletal:  No Visible Joint Effusion, no visible fasciculations  Skin:  The exposed skin is warm and dry  Neurologic:     Higher Mental Function: Nonverbal, unable to assess   Motor: No spontaneous movement  Psychiatric: Unable to assess       Other Data:     MEDICATIONS  Current Facility-Administered Medications   Medication Dose Route Frequency Provider Last Rate Last Admin    atorvastatin (LIPITOR) tablet 40 mg  40 mg Oral or Feeding Tube Daily Russel Boothe DO   40 mg at 05/07/24 0833    dapsone suspension 100 mg  100 mg Oral or Feeding Tube Daily Kandi Lambert PA-C   100 mg at 05/08/24 0906    [START ON 5/9/2024] dexAMETHasone (DECADRON) injection 4 mg  4 mg Intravenous BID Kandi Lambert PA-C        dolutegravir (TIVICAY) tablet 50 mg  50 mg Oral or NG Tube Daily Ramesh Van MD   50 mg at 05/07/24 2231    " "emtricitabine-tenofovir (TRUVADA) 200-300 MG per tablet 1 tablet  1 tablet Oral or NG Tube Daily Ramesh Van MD   1 tablet at 05/07/24 1939    enoxaparin ANTICOAGULANT (LOVENOX) injection 40 mg  40 mg Subcutaneous Q24H Russel Boothe DO   40 mg at 05/07/24 1939    famotidine (PEPCID) suspension 20 mg  20 mg Oral BID Kandi Lambert PA-C   20 mg at 05/08/24 0905    gabapentin (NEURONTIN) solution 100 mg  100 mg Oral or Feeding Tube Q12H [facility] (08/20) Russel Boothe DO   100 mg at 05/08/24 0905    heparin lock flush 10 UNIT/ML injection 5-20 mL  5-20 mL Intracatheter Q24H Tucker Jacob MD   5 mL at 05/07/24 1630    insulin aspart (NovoLOG) injection (RAPID ACTING)  1-4 Units Subcutaneous Q4H Russel Boothe DO   1 Units at 05/06/24 1654    thiamine (B-1) tablet 100 mg  100 mg Oral or Feeding Tube Daily Russel Boothe DO   100 mg at 05/07/24 2231    valGANciclovir (VALCYTE) solution 900 mg  900 mg Oral BID Krystal Ochoa PA-C   900 mg at 05/08/24 0906    Followed by    [START ON 5/21/2024] valGANciclovir (VALCYTE) solution 450 mg  450 mg Oral BID Krystal Ochoa PA-C           IMMUNOLOGY LABS  CD-4 Counts No results found for: \"ACD4\"  Inflammatory Markers    Recent Labs   Lab Test 04/29/24  0819   G6PD 13.0     Immune Globulin Studies   No lab results found.    GENERAL LABS  Metabolic Studies       Recent Labs   Lab Test 05/08/24  0459 05/08/24  0453 05/07/24  1633 05/07/24  1629 05/07/24  0836 05/07/24  0332 04/27/24  0925 04/27/24  0724 01/26/23  0541 01/24/23  2118 01/17/23  1231 01/17/23  0641   NA  --  135  --   --   --  137   < > 140   < >  --    < >  --    POTASSIUM  --  3.3*  --  4.1  --  3.4   < > 3.5   < >  --    < > 2.9*   CHLORIDE  --  99  --   --   --  102   < > 108*   < >  --    < >  --    CO2  --  25  --   --   --  27   < > 20*   < >  --    < >  --    ANIONGAP  --  11  --   --   --  8   < > 12   < >  --    < >  --    BUN  --  15.7  --   --   --  15.8   < > 7.4   < >  --    " < >  --    CR  --  0.38*  --   --   --  0.38*   < > 0.48*   < >  --    < >  --    GFRESTIMATED  --  >90  --   --   --  >90   < > >90   < >  --    < >  --    GLC 96 104*   < >  --    < > 112*   < > 178*   < >  --    < >  --    A1C  --   --   --   --   --   --   --  5.7*  --   --   --   --    MAUREEN  --  8.7  --   --   --  8.5*   < > 9.1   < >  --    < >  --    PHOS  --  3.8  --   --   --  3.0   < > 4.1   < >  --    < >  --    MAG  --  2.1  --   --   --  2.0   < > 1.9   < >  --    < >  --    LACT  --   --   --   --   --   --   --   --   --  1.4  --  0.8    < > = values in this interval not displayed.     Hepatic Studies    Recent Labs   Lab Test 05/08/24 0453 05/07/24 0332 05/06/24  0346 04/30/24  0405 04/29/24  0431 04/24/24 2050 01/17/23  0641   BILITOTAL 0.3 0.3 0.3   < > 0.3   < >  --    DBIL  --   --   --   --  <0.20   < >  --    ALKPHOS 67 64 64   < > 74   < >  --    PROTTOTAL 6.3* 6.3* 6.4   < > 7.6   < > 6.3*   ALBUMIN 3.2* 3.3* 3.2*   < > 3.5   < >  --    AST 40 28 27   < > 23   < >  --    ALT 62* 43 41   < > 17   < >  --    LDH  --   --   --   --  220  --  263*    < > = values in this interval not displayed.     Pancreatitis testing    Recent Labs   Lab Test 04/10/19  0003   LIPASE 162     Hematology Studies   Recent Labs   Lab Test 05/08/24 0453 05/07/24 0332 05/06/24  0448 05/05/24  0355 04/26/24  0615 04/24/24 2050 02/24/22  1458 04/10/19  0003   WBC 5.4 5.2 8.0 8.9   < > 6.7  6.7   < > 4.1   ANEU  --   --   --   --   --  6.1  --  2.0   ANEUTAUTO 4.7 4.5 7.4 8.4*   < >  --    < >  --    ALYM  --   --   --   --   --  0.5*  --  1.4   ALYMPAUTO 0.2* 0.1* 0.1* 0.2*   < >  --    < >  --    KYLE  --   --   --   --   --  0.0  --  0.5   AMONOAUTO 0.3 0.3 0.3 0.2   < >  --    < >  --    AEOS  --   --   --   --   --  0.0  --  0.2   AEOSAUTO 0.1 0.1 0.1 0.0   < >  --    < >  --    ABSBASO 0.0 0.0 0.0 0.0   < >  --    < >  --    HGB 10.6* 10.1* 10.5* 11.0*   < > 11.3*  11.3*   < > 7.7*   HCT 32.3* 31.0* 31.8*  "33.6*   < > 34.2*  34.2*   < > 27.4*    185 217 293   < > 450  450   < > 470*    < > = values in this interval not displayed.     Urine Studies     Recent Labs   Lab Test 05/02/24  0027 05/01/24  1613 05/01/24  0854 05/01/24  0131 04/30/24  1704 04/29/24  2100 01/16/23  2132 09/19/18  2225   URINEPH 7.5 8.0 8.0 7.5* 8.0   < > 6.0 5.5   NITRITE  --   --   --   --   --   --  Negative Negative   LEUKEST  --   --   --   --   --   --  Small* Trace*   WBCU  --   --   --   --   --   --  <1 <1    < > = values in this interval not displayed.     CSF testing     Recent Labs   Lab Test 04/26/24  1116 04/26/24  1115   CWBC  --  3   CRBC  --  44*   CGLU 52  --    CTP  --  109.0*     Medication levels    Recent Labs   Lab Test 01/20/23  1230   VANCOMYCIN 7.6     Body fluid stats    Recent Labs   Lab Test 01/17/23  1500   FCOL Yellow   FAPR Hazy*   FWBC 5,760   FNEU 78   FLYM 16   FMONO 5   FBAS 1   FTP 5.6       MICROBIOLOGY  Fungal testing:  No lab results found.    Invalid input(s): \"HIFUN\", \"FUNGL\"  Last Culture results   Culture   Date Value Ref Range Status   04/26/2024 No anaerobic organisms isolated after 11 days  Preliminary   04/26/2024 No growth after 11 days  Preliminary   04/26/2024 No Growth  Final   04/26/2024 No growth after 11 days  Preliminary   01/24/2023 No Growth  Final   01/24/2023 No Growth  Final   01/20/2023 No anaerobic organisms isolated  Final   01/20/2023 No Growth  Final   01/20/2023 No Growth  Final   01/17/2023 2+ Streptococcus pneumoniae (A)  Final   01/17/2023 No Growth  Final   12/04/2022 No Growth  Final   12/04/2022 3+ Normal alo  Final   12/04/2022 No Growth  Final   02/24/2022 No Growth  Final     Culture Micro   Date Value Ref Range Status   04/30/2012 No growth  Final   06/30/2010 >100,000 colonies/mL Escherichia coli  Final   02/05/2010 No growth  Final   12/01/2009 No growth  Final       Last checks of Clostridioides difficile testing  No lab results found.  Infection Studies " "to assess Diarrhea No lab results found.    Invalid input(s): \"BIDRY\", \"BIDYD\"  Virology:  Coronavirus-19 testing    Recent Labs   Lab Test 11/28/23  1451 01/16/23 2017 12/04/22 2036 02/24/22  1456   [id number] Negative Negative Negative Negative     Respiratory virus (non-coronavirus-19) testing    Recent Labs   Lab Test 11/28/23  1451   INFZA Negative   INFZB Negative   IRSV Negative     CMV viral loads    Recent Labs   Lab Test 05/07/24  0332 05/01/24  0839   CMVRESINST 91,000* 1,010*   CMVLOG 5.0 3.0     CMV resistance testing:  No lab results found.  No results found for: \"H6RES\"  No results found for: \"EBVRESINST\", \"72765\", \"EBQTC\", \"EBRES\", \"72761\", \"23703\"  BK Virus Testing   No lab results found.  Parvovirus Testing  No lab results found.    Invalid input(s): \"PRVRES\"  Adenovirus Testing  No lab results found.    Invalid input(s): \"ADENAB\", \"ADENOVIRUS\", \"ADQT\"    IMAGING  Recent Results (from the past 48 hour(s))   XR Abdomen Port 1 View    Narrative    EXAMINATION: XR ABDOMEN PORT 1 VIEW 5/6/2024 11:25 AM     COMPARISON: Abdominal radiograph 4/26/2024.    HISTORY: Assess NG tube placement.    FINDINGS: Frontal view of the abdomen. Feeding tube tip projects over  left midabdomen, presumably within the proximal jejunum in this  patient with history of gastric bypass. There is some gaseous  distention of the colon with transverse measuring approximately 7 cm  in caliber. No abnormally dilated loops of small bowel.      Impression    IMPRESSION: Feeding tube tip projects over left midabdomen, presumably  within the proximal jejunum in this patient with history of gastric  bypass. Possible colonic ileus.    I have personally reviewed the examination and initial interpretation  and I agree with the findings.    KAITLYNN BARTON MD         SYSTEM ID:  [id number]   US Lower Extremity Arterial Duplex Right    Narrative    Exam: Duplex ultrasound of right lower extremity arteries dated  5/7/2024 11:35 AM "     Clinical information: Cold RLE; assess for vasculature etiology     Comparison: None    Technique: Grayscale (B-mode), color Doppler, and duplex spectral  Doppler ultrasound of the right lower extremity arteries. Velocity  measurements obtained with angle correction of 60 degrees or less.    Ordering provider: Krystal Ochoa PA-C    Findings:     Right lower extremity:     Common femoral artery: Velocity: 107/0 cm/sec. Waveforms: Triphasic  Profunda femoral artery: Velocity: 78/5 cm/sec. Waveforms: Triphasic  Proximal SFA: Velocity: 78/0 cm/sec. Waveforms: Triphasic  Mid SFA:Velocity:  98/0 cm/sec. Waveforms: Triphasic  Distal SFA: Velocity: 92/0 cm/sec. Waveforms: Triphasic    Popliteal artery, proximal: Velocity: 64/0 cm/sec. Waveforms:  Triphasic    PTA ankle: Velocity: 62/0 cm/sec. Waveforms: Triphasic  NAHED ankle: Velocity: 57/0 cm/sec. Waveforms: Triphasic      Impression    Impression:   Patent native arteries of the right lower extremity without  hemodynamically significant stenosis.    Guidelines:    [facility] duplex criteria for lower limb arterial  occlusive disease    Percent stenosis:     Normal (1-19%): Peak systolic velocity (cm/s): <150, End-diastolic  velocity (cm/s): <40, Velocity ratio (Vr): <1.5, Distal arterial  waveform: Triphasic    20-49%: Peak systolic velocity (cm/s): 150-200, End-diastolic velocity  (cm/s): <40, Velocity ratio (Vr): 1.5-2.0, Distal arterial waveform:  Triphasic    50-75%: Peak systolic velocity (cm/s): 200-300, End-diastolic velocity  (cm/s): <90, Velocity ratio (Vr): 2.0-3.9, Distal arterial waveform:  Poststenotic turbulence distal to stenosis, monophasic distal waveform    >75%: Peak systolic velocity (cm/s): >300, End-diastolic velocity  (cm/s): <90, Velocity ratio (Vr): >4.0, Distal arterial waveform:  Dampened distal waveform and low PSV/EDV* in the stenosis    Occlusion: Absent flow by color Doppler/pulsed Doppler spectral  analysis; length of  occlusion estimated from distance between exit and  reentry collateral arteries    *PSV = peak systolic velocity, EDV = end-diastolic velocity  http://[email or web address]/[email or web address]  html    I have personally reviewed the examination and initial interpretation  and I agree with the findings.    DARVIN SON         SYSTEM ID:  [id number]         ATTESTATION  I have reviewed labs/blood-work that are part of this patients present encounter in addition to historical and baseline values. The specific values are recorded in Epic and I have incorporated them and my interpretation as relevant into my assessment and plan as recorded.  I have reviewed radiology reports/EKGs/and other diagnostic studies that are part of this patients present encounter, in addition to historical and baseline results.  The specific reports are available in Epic and I have incorporated them into my assessment and plan as described above. Independently interpreted diagnostic study results are described above.  This dictation was prepared in part using Dragon recognition software.  As a result errors may occur. When identified these transcription errors have been corrected.  While every attempt is made to correct errors during dictation, errors may still exist.      Signature:     Gerson Stewart MD   PGY-6 Transplant Infectious Disease Fellow

## 2024-05-08 NOTE — PROGRESS NOTES
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 PmHx of dyslipidemia, polysubstance abuse, and r-sided empyema (s/p VATS and partial pleurectomy Jan 2021). Upon review of labs and imaging from with positive EBV PCR from CSF. LP at [facility] x4/26 with rare atypical cells. Based on findings started HD-Methotrexate + Rituximab 4/29/24. Course further complicated by COVID positive, acute decline in neurological status, HIV/AIDS     Today:  - D10 HD-Methotrexate and Rituximab. Next due for treatment 5/13   - Will start IV Gancyclovir for CMV; recheck CMV DNA PCR 5/14 (ordered)   - Will continue best supportive cares.     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 Previously hospitalized in March 2024 where imaging revealed restricted diffusion and T2 hyperintensity of osterior pontine mesencephalic junction with local mass effect. Felt to be likely ischemic infarct (risk factors of HTN, smoking and amphetamine use). Additional considerations included infection vs neoplasm. CSF cytology attempted, but insufficient quantity. MRI brain 4/15 showed increased in interval of lesion of L avel and midbrain compared to previous and increase in interval again on 4/24/24 MRI. CT A/P with no lymphadenopathy, mass, or acute process. LP 4/18 non-diagnostic with negative flow cytometry and cytology but CSF EBV PCR positive (original uncertainty of blood vs. CSF PCR but able to confirm CSF by [facility]). ID, oncology, and neurosurgery consulted for concern of CNS lymphoma given context of  imaging, worsening decline, and imaging. She was started on Decadron 8 mg and transferred to [facility] 4/24/24 for further workup and consideration of brain biopsy. Unable to obtain tissue biopsy given challenging location of brainstem, if repeat imaging shows expansion, then could consider. But with review of imaging with Neurosurgery, agree with outside impression of CNS lymphoma. CT CAP 4/25 showed no evidence of lymphoma. Repeat LP 4/26 with rare atypical cells on cytology but does not correlate with flow cytometry (rare polytypic B cells). LP also showing positive EBV PCR, elevated protein to 109, and negative CMV, meningitis/encephalitis panel.  Ophthalmology consulted; no evidence of ocular lymphoma.  Based on imaging and EBV + PCR from CSF started HD-methotrexate on 4/29/24. Appears to be tolerating well thus far. 32 hours methotrexate level 0.31.   - Infectious studies at [facility] thus far negative for CMV IgM negative (IgG positive suggesting previous infection), Cryptococcus, CMV encephalitis, Tb meningitis, toxoplasmosis serology, toxoplasma PCR. Karchris w/ E.Coli, CMV and human adenovirus; discussed with ID no need to treat E.Coli.   - Follow CSF AFB culture (NGTD) and bacterial culture results from LP x4/26  - Continue Dexamethasone taper currently on 4 mg BID (x5/9)   - PICC line placed x4/29   - Next due for C2D1 = 5/13; will plan to obtain repeat MR brain prior to proceeding to assess tumor response       Treatment Plan: HD- Methotrexate + Rituximab C1D1 = 4/30/24    - Methotrexate 3.5 g/m2 (6.405 g) - D1, D8    - Rituximab 375 mg/m2 (700 mg) - D3    - Leucovorin 50 mg D2 until methotrexate level < 0.10    - mIVF .45% NS w. Sodium bicarbonate @125 ml/hr    #Decreased mental status secondary to lesion of L avel and midbrain  Presented to OSH 4/12/24 with 4/12/24 with right facial and arm paresthesias and RLE weakness originally concerning for ischemic stroke. Further imaging revealed mass of L avel and  "midbrain. Transferred to [facility] for consideration of biopsy, but due to location was designated at too risky. On arrival to [facility], patient on assessment by neurosurgery was A&O x 3, able to slowly follow commands, and had slowed speech. She has since progressed to not being able to move extremities, waxes/wanes ability to follow commands, and non-verbal. She can follow conversations with eye and appropriately blink \"yes\" to questions. For example, when asked \"is this your son\" when referring to son (Yuan) patient was able to blink multiple times. Notable decrease in neurological function on exam morning x4/30 and consistent findings by neurology team; patient unable to squeeze fingers in comparison to day prior and overnight. Stat head CT for concern of increased ICP; no acute findings and L avel lesion similar to CT done 4/25. Repeat MRI brain w/wo contrast x4/30 showing increased size of known lesion of L avel and midbrain in comparison to MRI on 4/15 and 3/29. Given concern for increasing size in mass, will continue to monitor closely with Q4H neuro checks for s/sx of herniation. If new changes in exam will obtain stat CT head and if evidence of herniation, would need emergent intubation. Appreciate assistance and input from neurology, signed off 5/1 but familiar with patient and readily available for any questions and concerns.   - RT consult placed for difficulty with secretion management. Could also assist with sputum culture.     #Anemia  #Thrombocytosis  Likely due to underlying suspected malignancy  - Transfuse for plts < 10k and Hgb , 7.0    # ID Prophylaxis  - Dapsone 100 mg daily for PJP ppx  - Levaquin 250 mg daily when ANC < 1.0     ID  #HIV/AIDS  Recently diagnosed with HIV during admission at [facility] on 3/28/24 with viral load of 1.24 million, 6.9 log,  and CD4 count of 2. She was not started on ART, given Nystatin x2 weeks and referred to ID clinic for follow up. Prior to this, she " was admitted again with ID consult for broad-infectious workup. Started Biktarvy 4/17 but stopped due concern for rash, rechallenged on 4/20 and able to tolerate. Later transitioned to Dolutegravir and Truvada. SW reached out to health department 5/2 regarding patient's HIV status and if able to be reported properly. Upon further discussions and health department chart review, original diagnosis was in 2014 and patient was contacted over 14 times with no response.  Unable to confirm with patient if she is was aware of status as she is non-verbal. Upon further chart review, HIV testing was completed 8/27/24 at [facility] with office visit to follow, but encounter is not able to be reviewed and needing patient authorization; will discuss with son (uYan) as he is her next of kin and medical decision maker. Of note, patient's aunt (Noemy), and son (Yuan) are aware of diagnosis.   - Upon further conversation with boyfriend (Otis) today, it does not appear he is aware of her HIV status. I asked about general medical history and any new/recent infections to which he did not mention HIV/AIDS status. ID had a similar conversation with a consistent consensus. Reached out to SW to discuss with family/son regarding if previous partners have been notified.  - Continue ART with Dolutegravir and Truvada   - Plan to repeat CD4 count and viral load circa x5/15     #COVID-19 positive  #MRSA pneumonia  Tested positive for COVID-19 4/18/24 during admission to [facility]. Patient remains asymptomatic. Will continue to monitor at this time. Previously on Linezolid. ID following; does not feel like MRSA pneumonia is likely and ok to stop Linezolid.   - Per infection prevent first day eligible for re-testing 5/9    MISC  #Cotton wool spot, right eye  # Concern for CMV retinitis  Single CWS in right eye, most likely in setting of acute illness. Found on assessment by ophthalmology x4/28. Given significant comorbidities, CD4 count of  2. Repeat ophtho exam 5/6 noted to have worsening cotton wool spots; etiologies include HIV retinitis, HTN retinitis or CMV retinitis.  - Given concern for CMV retinitis will start treatment dose Valcyte     # Concern for hypoperfusion of RLE    Patient was noted to have a RLE that was cold to touch. No cyanosis noted on toes. Capillary refill intact. RLE arterial US 5/7 with patent vasculature.   - Will monitor     SOCIAL   #Medical decision maker  Confirmed with family that son (Yuan) will be patient's primary medical decision maker. Son Yuan and patient are okay with communication with aunt (Noemy) if Yuan is unavailable.     #H/o stimulant/amphetamine use   Noted on [facility] tox screen in 2016. Patient previously reported snorting and declined IVDU.     Fluids/Electrolytes/Nutrition   - IVF per chemotherapy regimen  - PRN lyte replacement per standing protocol  - Regular diet as tolerated     Lines: PICC    PPX  VTE: Lovenox  GI: Famotidine  Bowels: prn miralax and senna    Code: Full    Medically Ready for Discharge: Anticipated in 5+ Days    Disposition: Pending further workup, evaluation, tolerance of HD-methotrexate, and clinical course. Patient will require a long term care facility on discharge.     I spent >45 minutes face-to-face and/or coordinating or discussing care plan. Over 50% of our time on the unit was spent counseling the patient and/or coordinating care    Patient is seen and examined by Dr. Correia and RILEY.  Assessment and plan are discussed and delivered to the patient.    Krystal Ochoa PA-C  Hematology/Oncology  Pager #[phone]    Interval History   Nursing notes reviewed. No acute events overnight. Kamilah not following commands when asked during my visit.            Physical Exam   Temp: 97.7  F (36.5  C) Temp src: Oral BP: 114/78 Pulse: 93   Resp: 20 SpO2: 96 % O2 Device: None (Room air)    Vitals:    05/06/24 1211 05/07/24 1037 05/08/24 1000   Weight: 66.8 kg (147 lb 4.3 oz) 68.4 kg  "(150 lb 12.7 oz) 68.3 kg (150 lb 9.2 oz)     Vital Signs with Ranges  Temp:  [96.5  F (35.8  C)-98.3  F (36.8  C)] 97.7  F (36.5  C)  Pulse:  [66-93] 93  Resp:  [18-20] 20  BP: (106-123)/(75-85) 114/78  SpO2:  [94 %-97 %] 96 %  I/O last 3 completed shifts:  In: 2120 [I.V.:125; NG/GT:1215]  Out: 1950 [Urine:1950]      Physical Exam:    Blood pressure 114/78, pulse 93, temperature 97.7  F (36.5  C), temperature source Oral, resp. rate 20, height 1.626 m (5' 4\"), weight 68.3 kg (150 lb 9.2 oz), SpO2 96%.    General: lying in bed. Nontoxic appearing. No acute distress.   HEENT: Normocephalic/ Sclera anicteric. Moist mucus membranes.   CV: RRR. Normal S1/S2. No m/r/g. No peripheral edema.   Resp: CTAB on anterior auscultation. No wheezing/crackles. Normal respiratory effort on room air. Breath sounds are equal throughout.   GI: Soft, non-tender, non-distended. Bowel sounds present and normoactive. No peritoneal signs. NGT in tact.   MSK: Cool to touch but capillary refill intact and no cyanosis noted. Normal tone.  Skin: Skin is clean, dry and intact. No rashes on limited exam. No jaundice.    Neuro: Non verbal, some appropriate blinking with yes/no questions       Medications   Current Facility-Administered Medications   Medication Dose Route Frequency Provider Last Rate Last Admin    dextrose 10% infusion   Intravenous Continuous PRN Russel Boothe DO         Current Facility-Administered Medications   Medication Dose Route Frequency Provider Last Rate Last Admin    atorvastatin (LIPITOR) tablet 40 mg  40 mg Oral or Feeding Tube Daily Russel Boothe DO   40 mg at 05/08/24 1050    dapsone suspension 100 mg  100 mg Oral or Feeding Tube Daily Kandi Lambert PA-C   100 mg at 05/08/24 0906    [START ON 5/9/2024] dexAMETHasone (DECADRON) injection 4 mg  4 mg Intravenous BID Kandi Lambert PA-C        dolutegravir (TIVICAY) tablet 50 mg  50 mg Oral or NG Tube Daily Ramesh Van MD   50 mg at " 05/07/24 2231    emtricitabine-tenofovir (TRUVADA) 200-300 MG per tablet 1 tablet  1 tablet Oral or NG Tube Daily Ramesh Van MD   1 tablet at 05/07/24 1939    enoxaparin ANTICOAGULANT (LOVENOX) injection 40 mg  40 mg Subcutaneous Q24H Russel Boothe DO   40 mg at 05/07/24 1939    famotidine (PEPCID) suspension 20 mg  20 mg Oral BID Kandi Lambert PA-C   20 mg at 05/08/24 0905    [Held by provider] gabapentin (NEURONTIN) solution 100 mg  100 mg Oral or Feeding Tube Q12H [facility] (08/20) Russel Boothe DO   100 mg at 05/08/24 0905    ganciclovir (CYTOVENE) 350 mg in D5W 100 mL intermittent infusion  5 mg/kg Intravenous Q12H Edilma Phelan PA-C        heparin lock flush 10 UNIT/ML injection 5-20 mL  5-20 mL Intracatheter Q24H Tucker Jacob MD   5 mL at 05/07/24 1630    insulin aspart (NovoLOG) injection (RAPID ACTING)  1-4 Units Subcutaneous Q4H Russel Boothe DO   1 Units at 05/06/24 1654    thiamine (B-1) tablet 100 mg  100 mg Oral or Feeding Tube Daily Russel Boothe DO   100 mg at 05/07/24 2231       Data   Results for orders placed or performed during the hospital encounter of 04/24/24 (from the past 24 hour(s))   Potassium   Result Value Ref Range    Potassium 4.1 3.4 - 5.3 mmol/L   Glucose by meter   Result Value Ref Range    GLUCOSE BY METER POCT 121 (H) 70 - 99 mg/dL   Glucose by meter   Result Value Ref Range    GLUCOSE BY METER POCT 138 (H) 70 - 99 mg/dL   Glucose by meter   Result Value Ref Range    GLUCOSE BY METER POCT 97 70 - 99 mg/dL   Glucose by meter   Result Value Ref Range    GLUCOSE BY METER POCT 88 70 - 99 mg/dL   CBC with Platelets & Differential    Narrative    The following orders were created for panel order CBC with Platelets & Differential.  Procedure                               Abnormality         Status                     ---------                               -----------         ------                     CBC with platelets and d...[[id number]]  Abnormal             Final result                 Please view results for these tests on the individual orders.   Phosphorus   Result Value Ref Range    Phosphorus 3.8 2.5 - 4.5 mg/dL   Comprehensive metabolic panel   Result Value Ref Range    Sodium 135 135 - 145 mmol/L    Potassium 3.3 (L) 3.4 - 5.3 mmol/L    Carbon Dioxide (CO2) 25 22 - 29 mmol/L    Anion Gap 11 7 - 15 mmol/L    Urea Nitrogen 15.7 6.0 - 20.0 mg/dL    Creatinine 0.38 (L) 0.51 - 0.95 mg/dL    GFR Estimate >90 >60 mL/min/1.73m2    Calcium 8.7 8.6 - 10.0 mg/dL    Chloride 99 98 - 107 mmol/L    Glucose 104 (H) 70 - 99 mg/dL    Alkaline Phosphatase 67 40 - 150 U/L    AST 40 0 - 45 U/L    ALT 62 (H) 0 - 50 U/L    Protein Total 6.3 (L) 6.4 - 8.3 g/dL    Albumin 3.2 (L) 3.5 - 5.2 g/dL    Bilirubin Total 0.3 <=1.2 mg/dL   Magnesium   Result Value Ref Range    Magnesium 2.1 1.7 - 2.3 mg/dL   Uric acid   Result Value Ref Range    Uric Acid 3.6 2.4 - 5.7 mg/dL   Partial thromboplastin time   Result Value Ref Range    aPTT 27 22 - 38 Seconds   INR   Result Value Ref Range    INR 1.07 0.85 - 1.15   Fibrinogen activity   Result Value Ref Range    Fibrinogen Activity 329 170 - 490 mg/dL   CBC with platelets and differential   Result Value Ref Range    WBC Count 5.4 4.0 - 11.0 10e3/uL    RBC Count 3.67 (L) 3.80 - 5.20 10e6/uL    Hemoglobin 10.6 (L) 11.7 - 15.7 g/dL    Hematocrit 32.3 (L) 35.0 - 47.0 %    MCV 88 78 - 100 fL    MCH 28.9 26.5 - 33.0 pg    MCHC 32.8 31.5 - 36.5 g/dL    RDW 13.7 10.0 - 15.0 %    Platelet Count 177 150 - 450 10e3/uL    % Neutrophils 87 %    % Lymphocytes 4 %    % Monocytes 5 %    % Eosinophils 2 %    % Basophils 0 %    % Immature Granulocytes 2 %    NRBCs per 100 WBC 0 <1 /100    Absolute Neutrophils 4.7 1.6 - 8.3 10e3/uL    Absolute Lymphocytes 0.2 (L) 0.8 - 5.3 10e3/uL    Absolute Monocytes 0.3 0.0 - 1.3 10e3/uL    Absolute Eosinophils 0.1 0.0 - 0.7 10e3/uL    Absolute Basophils 0.0 0.0 - 0.2 10e3/uL    Absolute Immature Granulocytes 0.1 <=0.4 10e3/uL     Absolute NRBCs 0.0 10e3/uL   Glucose by meter   Result Value Ref Range    GLUCOSE BY METER POCT 96 70 - 99 mg/dL

## 2024-05-08 NOTE — PLAN OF CARE
Goal Outcome Evaluation:    0700-1930    VSS on RA. Pt not able to verbalize pain at this time but is not showing non-verbal signs of pain. Today she has not been following any commands. Diaphoretic- blood cultures have been drawn. Received potassium replacement this morning for potassium level of 3.3- re-draw level was 4.5, next draw tomorrow morning. Bowel sounds normoactive but has still not had a BM in 4 days- MOM ordered. Intermittent temperature change to extremities throughout the day. Right hand and left foot cold to the touch. Pulses palpable.

## 2024-05-08 NOTE — PLAN OF CARE
Goal Outcome Evaluation: 1900-0700      Plan of Care Reviewed With: patient    Overall Patient Progress: no change    Outcome Evaluation: VSS on RA. Opens eyes spontaneously. Does not speak or follow commands for neuro checks. Did resist opening eyes for writer to visualize pupils overnight. Moves LUE freely. On continuous TF, except paused for Tivicay admin. No BM, gave milk of mag. Roy in place w/ AUOP. Noticed pt has cough, suctioned mouth and gave robitussin. Repositioned Q2h. Q4 sugars, no insulin needed. Continue w/ POC.

## 2024-05-09 NOTE — PROGRESS NOTES
Hospitalist [facility] Note      Kamilah Goldberg MRN# [id number]   YOB: 1980 Age: 44 year old           Interval History:   Paged by RN that Plt have dropped from 450 to 177 and wondering if lovenox should be given.     Reviewed chart. Drop has been since starting chemotherapy, so I assume this is the cause (rather than HIT) but will hold the dose for tonight and let the day team decide to restart or not.       Russell Phillips MD   of Medicine  [facility] Hospitalist  Pager: [phone]  Cell: [phone]

## 2024-05-09 NOTE — PROGRESS NOTES
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 PmHx of dyslipidemia, polysubstance abuse, and r-sided empyema (s/p VATS and partial pleurectomy Jan 2021). Upon review of labs and imaging from with positive EBV PCR from CSF. LP at [facility] x4/26 with rare atypical cells. Based on findings started HD-Methotrexate + Rituximab 4/29/24. Course further complicated by COVID positive, acute decline in neurological status, HIV/AIDS     Today:  - D11 HD-Methotrexate and Rituximab. 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 She was found to have thrush and was diagnosed with HIV (see below). MRI was obtained d/t dizziness and concern for neurocandidiasis. MRI Brain 3/29/24 w/ area of restricted diffusion and T2 hyperintensity in left posterior pontomesencephalic junction, mild local mass effect. Likely representing ischemic infarct. LP 3/29/24 CSF analysis for infection was neg. Cytology added for possible lymphoma, but there were insufficient viable cells for analysis. She was discharged with plan of repeat MRI brain in 3 months. She then presented in April 2024 with right facial and arm paresthesia and RLE weakness. Workup concerning that the left midbrain/avel mass appeared to be increasing in size compared to MRI 3/20/23 concerning for CNS lymphoma. CT CAP w/o evidence of systemic disease. Unfortunately, location of the mass was deemed too high risk for biopsy to obtain tissue diagnosis. Repeat  high volume LP w/ CSF 4/18/24 obtained in hope to confirm malignancy but this remained non-diagnostic. She was transferred to [facility] for NSGY to pursue biopsy but this still remained too high risk. Repeat LP 4/26/24 with rare atypical cells on cytology but does not correlate with flow cytometry (rare polytypic B cells). LP also notable for positive EBV PCR, elevated protein (109), but negative CMV, meningitis/encephalitis panel. After extensive work up ruling out infectious processes and trying to obtain confirmation of CNS lymphoma diagnosis plan of care was discussed with patient's decission maker who agreed with proceeding treatment for CNS lymphoma. Proceeded with HD Methotrexate (C1D1=4/30/29), Rituxan added D4 and D7.   - Infectious studies at [facility] thus far negative for CMV IgM negative (IgG positive suggesting previous infection), Cryptococcus, CMV encephalitis, Tb meningitis, toxoplasmosis serology, toxoplasma PCR. Karius w/ E.Coli, CMV and human adenovirus; discussed with ID no need to treat E.Coli.   - Continue Dexamethasone taper currently on 4 mg BID (x5/9)   - PICC line placed x4/29   - Next due for C2D1 = 5/13; will plan to obtain repeat MR brain prior to proceeding to assess tumor response       Treatment Plan: HD- Methotrexate + Rituximab C1D1 = 4/30/24    - Methotrexate 3.5 g/m2 (6.405 g) - D1, D8    - Rituximab 375 mg/m2 (700 mg) - D3    - Leucovorin 50 mg D2 until methotrexate level < 0.10    - mIVF .45% NS w. Sodium bicarbonate @125 ml/hr    #Anemia  Likely due to underlying suspected malignancy  - Transfuse for plts < 10k and Hgb , 7.0     ID  #HIV/AIDS  Recently diagnosed with HIV during admission at [facility] on 3/28/24 with viral load of 1.24 million, 6.9 log,  and CD4 count of 2. She was not started on ART, given Nystatin x2 weeks and referred to ID clinic for follow up. Prior to this, she was admitted again with ID consult for broad-infectious workup. Started Biktarvy 4/17  but stopped due concern for rash, rechallenged on 4/20 and able to tolerate. Later transitioned to Dolutegravir and Truvada. SW reached out to health department 5/2 regarding patient's HIV status and if able to be reported properly. Upon further discussions and health department chart review, original diagnosis was in 2014 and patient was contacted over 14 times with no response.  Unable to confirm with patient if she is was aware of status as she is non-verbal. Upon further chart review, HIV testing was completed 8/27/24 at [facility] with office visit to follow, but encounter is not able to be reviewed and needing patient authorization; will discuss with son (Yuan) as he is her next of kin and medical decision maker. Of note, patient's aunt (Noemy), and son (Yuan) are aware of diagnosis.   - Upon further conversation with boyfriend (Otis) today, it does not appear he is aware of her HIV status. I asked about general medical history and any new/recent infections to which he did not mention HIV/AIDS status. ID had a similar conversation with a consistent consensus. Reached out to SW to discuss with family/son regarding if previous partners have been notified.  - Continue ART with Dolutegravir and Truvada   - Plan to repeat CD4 count and viral load circa x5/15     #COVID-19 positive  #MRSA pneumonia  Tested positive for COVID-19 4/18/24 during admission to [facility]. Patient remains asymptomatic. Will continue to monitor at this time. Previously on Linezolid. ID following; does not feel like MRSA pneumonia is likely and ok to stop Linezolid.   - Per infection prevent first day eligible for re-testing 5/9    # Prophylaxis  - Dapsone 100 mg daily for PJP ppx    # H/o Empyema s/p VATs w/ total decortication of R lung and partial pleurectomy (Jan 2023)     MISC  #Cotton wool spot, right eye  # Concern for CMV retinitis  Single CWS in right eye, most likely in setting of acute illness. Found on assessment by  ophthalmology x4/28. Given significant comorbidities, CD4 count of 2. Repeat ophtho exam 5/6 noted to have worsening cotton wool spots; etiologies include HIV retinitis, HTN retinitis or CMV retinitis.  - Currently on IV Ganciclovir     # Concern for hypoperfusion of RLE    Patient was noted to have a RLE that was cold to touch. No cyanosis noted on toes. Capillary refill intact. RLE arterial US 5/7 with patent vasculature.   - Will monitor     SOCIAL   #Medical decision maker  Confirmed with family that son (Yuan) will be patient's primary medical decision maker. Son Yuan and patient are okay with communication with aunt (Noemy) if Yuan is unavailable.     #H/o stimulant/amphetamine use   Noted on [facility] tox screen in 2016. Patient previously reported snorting and declined IVDU.     Fluids/Electrolytes/Nutrition   - IVF per chemotherapy regimen  - PRN lyte replacement per standing protocol  - Regular diet as tolerated     Lines: PICC    PPX  VTE: Lovenox  GI: Famotidine  Bowels: prn miralax and senna    Code: Full    Medically Ready for Discharge: Anticipated in 5+ Days    Disposition: Pending further workup, evaluation, tolerance of HD-methotrexate, and clinical course. Patient will require a long term care facility on discharge.     I spent >45 minutes face-to-face and/or coordinating or discussing care plan. Over 50% of our time on the unit was spent counseling the patient and/or coordinating care    Patient is seen and examined by Dr. Correia and I.  Assessment and plan are discussed and delivered to the patient.    Krystal Ochoa PA-C  Hematology/Oncology  Pager #[phone]    Interval History   Nursing notes reviewed. No acute events overnight. Kamilah not following commands when asked during my visit. Extremities warm to touch today, capillary refill intact.            Physical Exam   Temp: 97.7  F (36.5  C) Temp src: Oral BP: 118/78 Pulse: 77   Resp: 18 SpO2: 96 % O2 Device: None (Room air)    Vitals:     "05/07/24 1037 05/08/24 1000 05/09/24 0803   Weight: 68.4 kg (150 lb 12.7 oz) 68.3 kg (150 lb 9.2 oz) 69.5 kg (153 lb 3.5 oz)     Vital Signs with Ranges  Temp:  [97.7  F (36.5  C)-98.7  F (37.1  C)] 97.7  F (36.5  C)  Pulse:  [75-77] 77  Resp:  [16-20] 18  BP: (105-124)/(68-85) 118/78  SpO2:  [94 %-98 %] 96 %  I/O last 3 completed shifts:  In: 1816 [I.V.:225; NG/GT:1071]  Out: 1850 [Urine:1850]      Physical Exam:    Blood pressure 118/78, pulse 77, temperature 97.7  F (36.5  C), temperature source Oral, resp. rate 18, height 1.626 m (5' 4\"), weight 69.5 kg (153 lb 3.5 oz), SpO2 96%.    General: lying in bed. Nontoxic appearing. No acute distress.   HEENT: Normocephalic/ Sclera anicteric. Moist mucus membranes.   CV: RRR. Normal S1/S2. No m/r/g. No peripheral edema.   Resp: CTAB on anterior auscultation. No wheezing/crackles. Normal respiratory effort on room air. Breath sounds are equal throughout.   GI: Soft, non-tender, non-distended. Bowel sounds present and normoactive. No peritoneal signs. NGT in tact.   MSK: Cool to touch but capillary refill intact and no cyanosis noted. Normal tone.  Skin: Skin is clean, dry and intact. No rashes on limited exam. No jaundice.    Neuro: Non verbal, some appropriate blinking with yes/no questions       Medications   Current Facility-Administered Medications   Medication Dose Route Frequency Provider Last Rate Last Admin    dextrose 10% infusion   Intravenous Continuous PRN Russel Boothe DO         Current Facility-Administered Medications   Medication Dose Route Frequency Provider Last Rate Last Admin    atorvastatin (LIPITOR) tablet 40 mg  40 mg Oral or Feeding Tube Daily Russel Boothe DO   40 mg at 05/09/24 0909    dapsone suspension 100 mg  100 mg Oral or Feeding Tube Daily Kandi Lambert PA-C   100 mg at 05/09/24 0911    dexAMETHasone (DECADRON) injection 4 mg  4 mg Intravenous BID Kandi Lambert PA-C   4 mg at 05/09/24 0911    dolutegravir " (TIVICAY) tablet 50 mg  50 mg Oral or NG Tube Daily Ramesh Van MD   50 mg at 05/08/24 2211    emtricitabine-tenofovir (TRUVADA) 200-300 MG per tablet 1 tablet  1 tablet Oral or NG Tube Daily Ramesh Van MD   1 tablet at 05/08/24 2008    [Held by provider] enoxaparin ANTICOAGULANT (LOVENOX) injection 40 mg  40 mg Subcutaneous Q24H Russel Boothe DO   40 mg at 05/07/24 1939    famotidine (PEPCID) suspension 20 mg  20 mg Oral BID Kandi Lambert PA-C   20 mg at 05/09/24 0911    ganciclovir (CYTOVENE) 350 mg in D5W 100 mL intermittent infusion  5 mg/kg Intravenous Q12H Edilma Phelan PA-C 100 mL/hr at 05/09/24 0920 350 mg at 05/09/24 0920    heparin lock flush 10 UNIT/ML injection 5-20 mL  5-20 mL Intracatheter Q24H Tucker Jacob MD   5 mL at 05/09/24 1048    thiamine (B-1) tablet 100 mg  100 mg Oral or Feeding Tube Daily Russel Boothe DO   100 mg at 05/08/24 2211       Data   Results for orders placed or performed during the hospital encounter of 04/24/24 (from the past 24 hour(s))   Glucose by meter   Result Value Ref Range    GLUCOSE BY METER POCT 120 (H) 70 - 99 mg/dL   Potassium   Result Value Ref Range    Potassium 4.5 3.4 - 5.3 mmol/L   Blood Culture Line, venous    Specimen: Line, venous; Blood   Result Value Ref Range    Culture No growth after 12 hours    Blood Culture Arm, Right    Specimen: Arm, Right; Blood   Result Value Ref Range    Culture No growth after 12 hours    Glucose by meter   Result Value Ref Range    GLUCOSE BY METER POCT 127 (H) 70 - 99 mg/dL   CBC with Platelets & Differential    Narrative    The following orders were created for panel order CBC with Platelets & Differential.  Procedure                               Abnormality         Status                     ---------                               -----------         ------                     CBC with platelets and d...[[id number]]  Abnormal            Final result                 Please view results for  these tests on the individual orders.   Phosphorus   Result Value Ref Range    Phosphorus 3.6 2.5 - 4.5 mg/dL   Comprehensive metabolic panel   Result Value Ref Range    Sodium 139 135 - 145 mmol/L    Potassium 3.5 3.4 - 5.3 mmol/L    Carbon Dioxide (CO2) 29 22 - 29 mmol/L    Anion Gap 8 7 - 15 mmol/L    Urea Nitrogen 15.7 6.0 - 20.0 mg/dL    Creatinine 0.36 (L) 0.51 - 0.95 mg/dL    GFR Estimate >90 >60 mL/min/1.73m2    Calcium 8.6 8.6 - 10.0 mg/dL    Chloride 102 98 - 107 mmol/L    Glucose 114 (H) 70 - 99 mg/dL    Alkaline Phosphatase 71 40 - 150 U/L    AST 30 0 - 45 U/L    ALT 58 (H) 0 - 50 U/L    Protein Total 6.2 (L) 6.4 - 8.3 g/dL    Albumin 3.3 (L) 3.5 - 5.2 g/dL    Bilirubin Total 0.3 <=1.2 mg/dL   Magnesium   Result Value Ref Range    Magnesium 2.3 1.7 - 2.3 mg/dL   Uric acid   Result Value Ref Range    Uric Acid 3.5 2.4 - 5.7 mg/dL   Partial thromboplastin time   Result Value Ref Range    aPTT 23 22 - 38 Seconds   INR   Result Value Ref Range    INR 0.98 0.85 - 1.15   Fibrinogen activity   Result Value Ref Range    Fibrinogen Activity 312 170 - 490 mg/dL   Cryptococcus antigen    Specimen: Line, venous; Blood   Result Value Ref Range    Cryptococcal Antigen Negative Negative   CBC with platelets and differential   Result Value Ref Range    WBC Count 5.3 4.0 - 11.0 10e3/uL    RBC Count 3.61 (L) 3.80 - 5.20 10e6/uL    Hemoglobin 10.4 (L) 11.7 - 15.7 g/dL    Hematocrit 31.6 (L) 35.0 - 47.0 %    MCV 88 78 - 100 fL    MCH 28.8 26.5 - 33.0 pg    MCHC 32.9 31.5 - 36.5 g/dL    RDW 14.0 10.0 - 15.0 %    Platelet Count 185 150 - 450 10e3/uL    % Neutrophils 85 %    % Lymphocytes 6 %    % Monocytes 5 %    % Eosinophils 2 %    % Basophils 0 %    % Immature Granulocytes 2 %    NRBCs per 100 WBC 0 <1 /100    Absolute Neutrophils 4.6 1.6 - 8.3 10e3/uL    Absolute Lymphocytes 0.3 (L) 0.8 - 5.3 10e3/uL    Absolute Monocytes 0.3 0.0 - 1.3 10e3/uL    Absolute Eosinophils 0.1 0.0 - 0.7 10e3/uL    Absolute Basophils 0.0 0.0 -  0.2 10e3/uL    Absolute Immature Granulocytes 0.1 <=0.4 10e3/uL    Absolute NRBCs 0.0 10e3/uL   Glucose   Result Value Ref Range    Glucose 114 (H) 70 - 99 mg/dL   Glucose by meter   Result Value Ref Range    GLUCOSE BY METER POCT 98 70 - 99 mg/dL

## 2024-05-09 NOTE — PLAN OF CARE
Physical Therapy: Orders received. Chart reviewed and discussed with care team.? Physical Therapy not indicated due to patient with limited command following and participation with OT per discussion with OT. OT currently following and providing treatment as appropriate as cognition allows.  Defer discharge recommendations to OT and medical team.? Will complete orders. Please re-consult PT as appropriate.     Ernestine Etienne, PT

## 2024-05-09 NOTE — PLAN OF CARE
Goal Outcome Evaluation:    [id number]    VSS on RA. Pt has not been responding to verbal commands when assessing neuros. PERRLA except for accomodation. Received ganciclovir infusion this morning. BG checks as well as insulin has been discontinued today- pt has not been meeting insulin parameters.   Left extremities are cool to to the touch. As of note, the temperature of her extremities has been varying.   Pt received PRN suppository ducoloax d/t no BM in 5x days. Bowel sounds normoactive. Receiving TF at 65ml/hour and 90ml free water flush q 4 hours.   Pt re-swabbed PCR for covid- resulted positive, therefore remains on special precautions.     [id number]    Pt had a large soft BM. There is blanchable redness noted on her bilateral inner thighs- barrier cream applied. There is also a small red sore that is crusted over noted on her outer labia. Her perineal area has been cleansed & catheter care completed.

## 2024-05-09 NOTE — PLAN OF CARE
"Goal Outcome Evaluation:      Plan of Care Reviewed With: patient    Overall Patient Progress: no change Overall Patient Progress: no change    Reason for admission: Brain Mass/CNS Lymphoma     VS: /68 (BP Location: Right arm, Patient Position: Left side, Cuff Size: Adult Regular)   Pulse 77   Temp 97.7  F (36.5  C) (Axillary)   Resp 16   Ht 1.626 m (5' 4\")   Wt 68.3 kg (150 lb 9.2 oz)   SpO2 98%   BMI 25.85 kg/m     Pain/Nausea: rFLACC scoring 0. No nausea noted.   Neuro:  Alert to self, lethargic, not following commands this shift.   Cardiac: NSR  Resp: Room air  GI/: Thornton catheter in place. No BM since 5/4. Given MOM with no result.   Skin: Generalized skin exceptions. Clammy at times.   Diet:  TF at 65mL/hr 4186-9201. Held for TIVICAY administration.   Activity: Turn/reposition q2h.   LDA: PICC LUE,  thornton catheter, NG tube  Labs: Pending  Plan: Continue conversations about goals of care. Plan of care continued.            "

## 2024-05-10 NOTE — PROVIDER NOTIFICATION
Provider Notification    Re: Xray resulted, is it okay to hook her back up to the tube feeds? It states it is within the small bowel.    Action: Notified Janeth SAMANIEGO PA-C via [employer]    Response: [place] seems too far, contact the team who places them to adjust    **Contacted Resource RNPaul to re-adjust [place]

## 2024-05-10 NOTE — PROGRESS NOTES
Care Management Follow Up    Length of Stay (days): 16    Expected Discharge Date:       Concerns to be Addressed: discharge planning     Patient plan of care discussed at interdisciplinary rounds: Yes    Anticipated Discharge Disposition: Other (Comments) (TBD)     Anticipated Discharge Services: Other (see comment) (TBD)  Anticipated Discharge DME: Other (see comment) (TBD)    Patient/family educated on Medicare website which has current facility and service quality ratings: no  Education Provided on the Discharge Plan:    Patient/Family in Agreement with the Plan: unable to assess    Referrals Placed by CM/SW:    Private pay costs discussed: Not applicable    Additional Information:   spoke with SW and CHW about concerns that happened last evening with pt's boyfriend.  would like Sw to follow up with Colin Wyatt - Disease investigator if he had contacted pt boyfriend.     Colin Wyatt [place] [employer] Disease Investigator: [phone]  10:50 Am SW left message on Colin's phone asking for a call back.  12:19 AM: SW gave  Colin's number for further follow up due to pending investigation on patients S/O actions last night.     Per Josee DENNIS, there will be care conference with family on 5/13 @ 10 AM if MRI results are in. If results are not in CC will be rescheduled.     SW to follow and assist with any other discharge needs that may arise.  [facility]   5A Oncology beds:5220-40 & 5C  beds 5417-32 (non BMT )      Vocera:  Phone: [phone]  Pager: [phone]

## 2024-05-10 NOTE — PLAN OF CARE
Goal Outcome Evaluation:      Plan of Care Reviewed With: patient    Overall Patient Progress: no changeOverall Patient Progress: no change    Outcome Evaluation: See RD note.    Brittney Mederos, MPH, RDN, LD  6A + 5A  (Heme Onc & Heme Malignancy) RD or Bam [6A or 5A Clinical Dietitian]   Weekend/Holiday: Vocera - Weekend Clinical Dietitian

## 2024-05-10 NOTE — PROGRESS NOTES
[facility]    Hematology / Oncology Progress Note  Hospital Day # 16  Date of Service (when I saw the patient): 05/10/2024     Assessment & Plan   Kamilah Goldberg is a 44 year old female who was admitted on 4/24/2024 for L avel/midbrain lesion concerning for CNS lymphoma and new HIV/AIDS diagnosis (3/28/24). PmHx of dyslipidemia, polysubstance abuse, and r-sided empyema (s/p VATS and partial pleurectomy Jan 2021). Upon review of labs and imaging from with positive EBV PCR from CSF. LP at [facility] x4/26 with rare atypical cells. Based on findings started HD-Methotrexate + Rituximab 4/29/24. Course further complicated by COVID positive, acute decline in neurological status, HIV/AIDS     Today:  - D12 HD-Methotrexate and Rituximab. 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 She was found to have thrush and was diagnosed with HIV (see below). MRI was obtained d/t dizziness and concern for neurocandidiasis. MRI Brain 3/29/24 w/ area of restricted diffusion and T2 hyperintensity in left posterior pontomesencephalic junction, mild local mass effect. Likely representing ischemic infarct. LP 3/29/24 CSF analysis for infection was neg. Cytology added for possible lymphoma, but there were insufficient viable cells for analysis. She was discharged with plan of repeat MRI brain in 3 months. She then presented in April 2024 with right facial and arm paresthesia and RLE weakness. Workup concerning that the left midbrain/avel mass appeared to be increasing in size compared to MRI 3/20/23 concerning for CNS lymphoma. CT CAP w/o evidence of systemic disease. Unfortunately, location of the mass was deemed too high risk for biopsy to  obtain tissue diagnosis. Repeat high volume LP w/ CSF 4/18/24 obtained in hope to confirm malignancy but this remained non-diagnostic. She was transferred to [facility] for [facility] to pursue biopsy but this still remained too high risk. Repeat LP 4/26/24 with rare atypical cells on cytology but does not correlate with flow cytometry (rare polytypic B cells). LP also notable for positive EBV PCR, elevated protein (109), but negative CMV, meningitis/encephalitis panel. After extensive work up ruling out infectious processes and trying to obtain confirmation of CNS lymphoma diagnosis plan of care was discussed with patient's decission maker who agreed with proceeding treatment for CNS lymphoma. Proceeded with HD Methotrexate (C1D1=4/30/29), Rituxan added D4 and D7.   - Infectious studies at [facility] thus far negative for CMV IgM negative (IgG positive suggesting previous infection), Cryptococcus, CMV encephalitis, Tb meningitis, toxoplasmosis serology, toxoplasma PCR. Karius w/ E.Coli, CMV and human adenovirus; discussed with ID no need to treat E.Coli.   - Continue Dexamethasone taper currently on 4 mg BID (x5/9)   - PICC line placed x4/29   - Next due for C2D1 = 5/13; will plan to obtain repeat MR brain prior to proceeding to assess tumor response       Treatment Plan: HD- Methotrexate + Rituximab C1D1 = 4/30/24    - Methotrexate 3.5 g/m2 (6.405 g) - D1, D8    - Rituximab 375 mg/m2 (700 mg) - D3    - Leucovorin 50 mg D2 until methotrexate level < 0.10    - mIVF .45% NS w. Sodium bicarbonate @125 ml/hr    #Anemia  Likely due to underlying suspected malignancy  - Transfuse for plts < 10k and Hgb , 7.0     ID  #HIV/AIDS  Recently diagnosed with HIV during admission at [facility] on 3/28/24 with viral load of 1.24 million, 6.9 log,  and CD4 count of 2. She was not started on ART, given Nystatin x2 weeks and referred to ID clinic for follow up. Prior to this, she was admitted again with ID consult for  broad-infectious workup. Started Biktarvy 4/17 but stopped due concern for rash, rechallenged on 4/20 and able to tolerate. Later transitioned to Dolutegravir and Truvada. SW reached out to health department 5/2 regarding patient's HIV status and if able to be reported properly. Upon further discussions and health department chart review, original diagnosis was in 2014 and patient was contacted over 14 times with no response.  Unable to confirm with patient if she is was aware of status as she is non-verbal. Upon further chart review, HIV testing was completed 8/27/24 at [facility] with office visit to follow, but encounter is not able to be reviewed and needing patient authorization; will discuss with son (Yuan) as he is her next of kin and medical decision maker. Of note, patient's aunt (Noemy), and son (Yuan) are aware of diagnosis.   - Upon further conversation with boyfriend (Otis) today, it does not appear he is aware of her HIV status. I asked about general medical history and any new/recent infections to which he did not mention HIV/AIDS status. ID had a similar conversation with a consistent consensus. Reached out to SW to discuss with family/son regarding if previous partners have been notified.  - Continue ART with Dolutegravir and Truvada   - Plan to repeat CD4 count and viral load circa x5/15     #COVID-19 positive  #MRSA pneumonia  Tested positive for COVID-19 4/18/24 during admission to [facility]. Patient remains asymptomatic. Will continue to monitor at this time. Previously on Linezolid. ID following; does not feel like MRSA pneumonia is likely and ok to stop Linezolid.   - Per infection prevent first day eligible for re-testing 5/9 -- COVID test remains positive with CT 26.9. Per recommendations should remain in precautions until neg COVD test or CT >35. Consider rechecking 5/16.    # Prophylaxis  - Dapsone 100 mg daily for PJP ppx    # H/o Empyema s/p VATs w/ total decortication of R lung  and partial pleurectomy (Jan 2023)     MISC  #Cotton wool spot, right eye  # Concern for CMV retinitis  Single CWS in right eye, most likely in setting of acute illness. Found on assessment by ophthalmology x4/28. Given significant comorbidities, CD4 count of 2. Repeat ophtho exam 5/6 noted to have worsening cotton wool spots; etiologies include HIV retinitis, HTN retinitis or CMV retinitis.  - Currently on IV Ganciclovir     # Concern for hypoperfusion of RLE    Patient was noted to have a RLE that was cold to touch. No cyanosis noted on toes. Capillary refill intact. RLE arterial US 5/7 with patent vasculature.   - Will monitor     SOCIAL   #Medical decision maker  Confirmed with family that son (Yuan) will be patient's primary medical decision maker. Son Yuan and patient are okay with communication with aunt (Noemy) if Yuan is unavailable.     #H/o stimulant/amphetamine use   Noted on [facility] tox screen in 2016. Patient previously reported snorting and declined IVDU.     Fluids/Electrolytes/Nutrition   - IVF per chemotherapy regimen  - PRN lyte replacement per standing protocol  - Regular diet as tolerated     Lines: PICC    PPX  VTE: Lovenox  GI: Famotidine  Bowels: prn miralax and senna    Code: Full    Medically Ready for Discharge: Anticipated in 5+ Days    Disposition: Pending further workup, evaluation, tolerance of HD-methotrexate, and clinical course. Patient will require a long term care facility on discharge.     I spent >65 minutes face-to-face and/or coordinating or discussing care plan. Over 50% of our time on the unit was spent counseling the patient and/or coordinating care    Patient is seen and examined by Dr. Correia and RILEY.  Assessment and plan are discussed and delivered to the patient.    Krystal Ochoa PA-C  Hematology/Oncology  Pager #[phone]    Interval History   Nursing notes reviewed. NJ tube removed last evening. TF held overnight. NJ tube replaced this AM. No intentional movement  "or blinking during my visit.     Went back to see patient when son Yuan was in the room. Kamilah was able to move her head to the sound of Yuan's voice. Yuan reported that she squeezed his hand when he tried to leave. Provided update about plan for MRI and family meeting Monday. All questions answered at this time.           Physical Exam   Temp: 98.7  F (37.1  C) Temp src: Axillary BP: 123/83 Pulse: 77   Resp: 17 SpO2: 94 % O2 Device: None (Room air)    Vitals:    05/07/24 1037 05/08/24 1000 05/09/24 0803   Weight: 68.4 kg (150 lb 12.7 oz) 68.3 kg (150 lb 9.2 oz) 69.5 kg (153 lb 3.5 oz)     Vital Signs with Ranges  Temp:  [97.6  F (36.4  C)-98.7  F (37.1  C)] 98.7  F (37.1  C)  Pulse:  [] 77  Resp:  [17-22] 17  BP: (105-125)/(71-98) 123/83  SpO2:  [92 %-100 %] 94 %  I/O last 3 completed shifts:  In: 1460 [I.V.:345; NG/GT:270]  Out: 1615 [Urine:1615]      Physical Exam:    Blood pressure 123/83, pulse 77, temperature 98.7  F (37.1  C), temperature source Axillary, resp. rate 17, height 1.626 m (5' 4\"), weight 69.5 kg (153 lb 3.5 oz), SpO2 94%.    General: lying in bed. Nontoxic appearing. No acute distress.   HEENT: Normocephalic/ Sclera anicteric. Moist mucus membranes.   CV: RRR. Normal S1/S2. No m/r/g. No peripheral edema.   Resp: CTAB on anterior auscultation. No wheezing/crackles. Normal respiratory effort on room air. Breath sounds are equal throughout.   GI: Soft, non-tender, non-distended. Bowel sounds present and normoactive. No peritoneal signs. NGT intact.   Skin: Skin is clean, dry and intact. No rashes on limited exam. No jaundice.    Neuro: Non verbal, some appropriate blinking with yes/no questions       Medications   Current Facility-Administered Medications   Medication Dose Route Frequency Provider Last Rate Last Admin    dextrose 10% infusion   Intravenous Continuous PRN Russel Boothe,          Current Facility-Administered Medications   Medication Dose Route Frequency Provider Last " Rate Last Admin    atorvastatin (LIPITOR) tablet 40 mg  40 mg Oral or Feeding Tube Daily Russel Boothe    40 mg at 05/09/24 0909    dapsone suspension 100 mg  100 mg Oral or Feeding Tube Daily Kandi Lambert PA-C   100 mg at 05/09/24 0911    dexAMETHasone (DECADRON) injection 4 mg  4 mg Intravenous BID Krystal Ochoa PA-C   4 mg at 05/10/24 0939    Followed by    [START ON 5/12/2024] dexAMETHasone (DECADRON) tablet 4 mg  4 mg Oral Q12H PETAR (08/20) Krystal Ochoa PA-C        dolutegravir (TIVICAY) tablet 50 mg  50 mg Oral or NG Tube Daily Ramesh Van MD   50 mg at 05/09/24 2135    emtricitabine-tenofovir (TRUVADA) 200-300 MG per tablet 1 tablet  1 tablet Oral or NG Tube Daily Ramesh Van MD   1 tablet at 05/09/24 2036    enoxaparin ANTICOAGULANT (LOVENOX) injection 40 mg  40 mg Subcutaneous Q24H Krystal Ochoa PA-C   40 mg at 05/07/24 1939    famotidine (PEPCID) suspension 20 mg  20 mg Oral BID Kandi Lambert PA-C   20 mg at 05/09/24 2036    ganciclovir (CYTOVENE) 350 mg in D5W 100 mL intermittent infusion  5 mg/kg Intravenous Q12H Edilma Phelan PA-C 100 mL/hr at 05/10/24 0940 350 mg at 05/10/24 0940    heparin lock flush 10 UNIT/ML injection 5-20 mL  5-20 mL Intracatheter Q24H Tucker Jacob MD   5 mL at 05/09/24 1048    lidocaine (viscous) (XYLOCAINE) 2 % solution 5 mL  5 mL Topical Once Jazzy Santamaria MD           Data   Results for orders placed or performed during the hospital encounter of 04/24/24 (from the past 24 hour(s))   CBC with Platelets & Differential    Narrative    The following orders were created for panel order CBC with Platelets & Differential.  Procedure                               Abnormality         Status                     ---------                               -----------         ------                     CBC with platelets and d...[[id number]]  Abnormal            Final result                 Please view  results for these tests on the individual orders.   Phosphorus   Result Value Ref Range    Phosphorus 5.0 (H) 2.5 - 4.5 mg/dL   Comprehensive metabolic panel   Result Value Ref Range    Sodium 136 135 - 145 mmol/L    Potassium 4.1 3.4 - 5.3 mmol/L    Carbon Dioxide (CO2) 28 22 - 29 mmol/L    Anion Gap 7 7 - 15 mmol/L    Urea Nitrogen 15.1 6.0 - 20.0 mg/dL    Creatinine 0.36 (L) 0.51 - 0.95 mg/dL    GFR Estimate >90 >60 mL/min/1.73m2    Calcium 8.8 8.6 - 10.0 mg/dL    Chloride 101 98 - 107 mmol/L    Glucose 118 (H) 70 - 99 mg/dL    Alkaline Phosphatase 70 40 - 150 U/L    AST 23 0 - 45 U/L    ALT 49 0 - 50 U/L    Protein Total 6.2 (L) 6.4 - 8.3 g/dL    Albumin 3.3 (L) 3.5 - 5.2 g/dL    Bilirubin Total 0.4 <=1.2 mg/dL   Magnesium   Result Value Ref Range    Magnesium 2.2 1.7 - 2.3 mg/dL   Uric acid   Result Value Ref Range    Uric Acid 3.2 2.4 - 5.7 mg/dL   Partial thromboplastin time   Result Value Ref Range    aPTT 24 22 - 38 Seconds   INR   Result Value Ref Range    INR 0.98 0.85 - 1.15   Fibrinogen activity   Result Value Ref Range    Fibrinogen Activity 312 170 - 490 mg/dL   CBC with platelets and differential   Result Value Ref Range    WBC Count 6.3 4.0 - 11.0 10e3/uL    RBC Count 3.56 (L) 3.80 - 5.20 10e6/uL    Hemoglobin 10.2 (L) 11.7 - 15.7 g/dL    Hematocrit 31.2 (L) 35.0 - 47.0 %    MCV 88 78 - 100 fL    MCH 28.7 26.5 - 33.0 pg    MCHC 32.7 31.5 - 36.5 g/dL    RDW 14.3 10.0 - 15.0 %    Platelet Count 184 150 - 450 10e3/uL    % Neutrophils 88 %    % Lymphocytes 8 %    % Monocytes 3 %    % Eosinophils 0 %    % Basophils 0 %    % Immature Granulocytes 1 %    NRBCs per 100 WBC 0 <1 /100    Absolute Neutrophils 5.5 1.6 - 8.3 10e3/uL    Absolute Lymphocytes 0.5 (L) 0.8 - 5.3 10e3/uL    Absolute Monocytes 0.2 0.0 - 1.3 10e3/uL    Absolute Eosinophils 0.0 0.0 - 0.7 10e3/uL    Absolute Basophils 0.0 0.0 - 0.2 10e3/uL    Absolute Immature Granulocytes 0.1 <=0.4 10e3/uL    Absolute NRBCs 0.0 10e3/uL   Glucose by meter    Result Value Ref Range    GLUCOSE BY METER POCT 104 (H) 70 - 99 mg/dL   XR Abdomen Port 1 View    Narrative    EXAMINATION: XR ABDOMEN PORT 1 VIEW 5/10/2024 9:35 AM     COMPARISON: None.    HISTORY: feeding tube placement    TECHNIQUE: Frontal view of the abdomen.    FINDINGS: Enteric tube tip projects over the mid abdomen indicating it  is within the small bowel. No abnormally dilated loops of bowel. No  pneumatosis or portal venous gas. No definite pneumoperitoneum. Lung  bases are clear.      Impression    IMPRESSION: Enteric tube tip projects over the mid abdomen indicating  it is within the small bowel.    I have personally reviewed the examination and initial interpretation  and I agree with the findings.    TOÑA BERNABE MD         SYSTEM ID:  [id number]   Glucose by meter   Result Value Ref Range    GLUCOSE BY METER POCT 91 70 - 99 mg/dL

## 2024-05-10 NOTE — PLAN OF CARE
Goal Outcome Evaluation:      Plan of Care Reviewed With: patient, child    Overall Patient Progress: no change    Neuro: Nonverbal. Unable to respond to commands, will occasionally blink for response.  Cardiac: WDL  Respiratory: RA, persistent cough. Mild secretions  GI/: Bowel incontinence, 1 BM, (+) flatus. Roy w/ adequate UOP  Diet/appetite: TF held pending NJ verification. TF to restart @ 65mL/hr w/ q4 90mL free water flushes per dietitian.   Activity: Ax2, HOB >30 , q2 turns, q2 ROM. Unable to move extremities x4 (remains flaccid)    Pain: Non verbal indicators  Skin: blanchable redness in periarea/inner thighs, cleansed & baby powder applied. Lesion on labia  Lines:  L. DL PICC hep locked.    Shift update: q30 min nurse rounding checks. Ganciclovir given this AM. NJ tube replaced, awaiting verification. Sitter to start this evening to ensure line safety.

## 2024-05-10 NOTE — PROGRESS NOTES
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 However, unable to perform biopsy due to location in brainstem. Patient currently on high dose steroids. Ophthalmology was consulted to rule out any ocular involvement of lymphoma.     Patient alert and oriented to self only, unable to obtain history from patient. Per primary team and nursing team, patient with waxing and waning mental status.       Review of systems were otherwise negative except for that which has been stated above.      OCULAR/MEDICAL/SURGICAL HISTORIES:     Past Ocular History:  Last eye exam: Unknown  Prior eye surgery/laser: Unknown  Contact lens wear: Unknown  Glasses: Unknown  Eyedrops: Unknown    Family History:  Unable to obtain ocular FH    Social History:  Social History     Tobacco Use    Smoking status: Never    Smokeless tobacco: Never   Substance Use Topics    Alcohol use: Yes     Comment: occasional    Drug use: No         Past Medical History:   Diagnosis Date    Back pain     Depression since age 16    On Wellbutrin 100 mg BID--started taking meds at age 16, was on Paxil for several years and then switched to Wellbutrin in [id number]    UTI (lower urinary tract infection)     X 3 as of 12/8/09       Past Surgical History:   Procedure Laterality Date    BYPASS GASTRIC CARA-EN-Y, LIVER BIOPSY, COMBINED      CHOLECYSTECTOMY      CRYOCAUTERY OF CERVIX  01/01/2001 2001    DISCECTOMY LUMBAR MINIMALLY INVASIVE ONE LEVEL  05/24/2013    Procedure: DISCECTOMY LUMBAR MINIMALLY INVASIVE ONE LEVEL;  MIS Right side L5-S1 Gonzalo Lami Microdiscectomy, ;  Surgeon: Alba Anderson MD;  Location: [facility] OR    IR LUMBAR PUNCTURE  3/29/2024     IR LUMBAR PUNCTURE  4/18/2024    PICC INSERTION - DOUBLE LUMEN Left 04/29/2024    left basilic 5 fr dl power picc 42 cm    THORACOTOMY Right 01/20/2023    Procedure: RIGHT THORACOTOMY,  TOTAL DECORTICATION OF THE RIGHT LUNG AND PARIETAL PLEURECTOMY;  Surgeon: Tani Murcia MD;  Location: [facility] OR    TUBAL LIGATION      2012       EXAMINATION:     Base Eye Exam       Visual Acuity    Unable due to mental status             Tonometry (Tonopen, 11:07 AM)         Right Left    Pressure STP STP              Pupils         Pupils APD    Right PERRL None    Left PERRL None              Visual Fields    Unable to due to mental status              Extraocular Movement    Unable to due to mental status              Neuro/Psych       Mood/Affect: AO*0              Dilation       Both eyes: 2.5% Deepak Synephrine, 1.0% Mydriacyl @ 11:06 AM                  Slit Lamp and Fundus Exam       External Exam         Right Left    External Normal Normal              Slit Lamp Exam         Right Left    Lids/Lashes Normal Normal    Conjunctiva/Sclera White and quiet White and quiet    Cornea Clear Clear    Anterior Chamber Deep and quiet Deep and quiet    Iris Round and reactive -> Dilated Round and reactive -> Dilated    Lens Clear Clear    Anterior Vitreous Normal Normal              Fundus Exam         Right Left    Disc Normal Normal    C/D Ratio 0.4 0.5    Macula two cotton wool spots, around 3 diameters of venule each on superior and inferior arcade; additional cotton wool spot along the superior arcade vs enlargement of the existing cotton wool spot; all < 1/4 DD, Chorioretinal scar small faint CWS ~1 venule diameter in inferior macula    Vessels Normal, no angiitis or perivacular sheathing Normal    Periphery Normal; no hemorrhages or necrosis Normal                    Labs/Studies/Imaging Performed:  CD4 - 2  CMV IgG- positive  CMV IgM negative  Toxo - negative   CMV viral load - 1010 international unit(s)/ml    Fungal  blood culture (collected 4/26) - no growth    Bacterial blood culture (collected 4/26) - no growth       ASSESSMENT/PLAN:     Kamilah Goldberg is a 44 year old female who presents with     # Cotton wool spots, both eyes  # HIV microvasculopathy, right eye   - Single CWS along the inferior arcade in right eye seen 4/28; on repeat exam 5/6, second CWS along the superior arcade; no change in size or shape of the first CWS along the inferior arcade from week prior; both CWS ~ 3 venule diameters in size; on repeat exam 5/10, third CWS. No retinal hemorrhages or whitening. No retinal necrosis. Low suspicion for ARN or PORN at this time   - Differential for CWS include HIV microvasculopathy (most likely) vs early manifestation of CMV retinitis given mildly elevated viral load; CWS are < 1/3 DD in size, which argues against infectious retinitis but cannot be ruled out at this time   - Ophthalmology will repeat exam in 3-4 weeks    # Rule out ocular lymphoma  - No evidence of ocular lymphoma on bedside exam   - No obvious evidence of anterior chamber inflammation, no posterior synechiae, pupils round and reactive, no KP, no vitreous opacities, no subretinal lesions     RECOMMENDATIONS:  - No acute intervention at this time  - Continue CMV prophylaxis   - Ophthalmology will repeat dilated exam in 3 weeks; please page on-call resident with any questions or concerns     It is our pleasure to participate in this patient's care and treatment. Please contact us with any further questions or concerns.    Darlene Vazquez MD  Resident Physician, PGY-2  Department of Ophthalmology

## 2024-05-10 NOTE — PROGRESS NOTES
CLINICAL NUTRITION SERVICES - REASSESSMENT NOTE     Nutrition Prescription    RECOMMENDATIONS FOR MDs/PROVIDERS TO ORDER:  With ongoing constipation, consider scheduled bowel meds   Minimize TF interruptions as much as possible, pt only received 68% of ordered TF volume on average over the past 7-days      Malnutrition Status:    Moderate malnutrition in the context of acute illness/disease    Recommendations already ordered by Registered Dietitian (RD):  Once new post-pyloric tube location is verified, resume TF at goal as ordered:    Eggs Overnight Standard 1.4 (or equivalent) @ 65 mL/hr x 20 hrs on hold from 2000-0001 (1300 mL/day) to provide 1820 kcals (30 kcal/kg), 80 g protein (1.3 g/kg), 204 g CHO, 76 g fat, 20 g fiber, and 924 mL free water daily   Future/Additional Recommendations:  Phos trends, need to transition to renal formula   Continue to monitor nutrition-related findings and follow pt per protocol     EVALUATION OF THE PROGRESS TOWARD GOALS   Diet: NPO    Nutrition Support:   -Dosing weight: 60 kg  -EN access via NDT  -Regimen: Eggs Overnight Standard 1.4 (or equivalent) @ 65 mL/hr x 20 hrs on hold from 2000-0001 (1300 mL/day) to provide 1820 kcals (30 kcal/kg), 80 g protein (1.3 g/kg), 204 g CHO, 76 g fat, 20 g fiber, and 924 mL free water daily   -FWF 90 ml q4h (540 mL/day) +  924 mL from TF for total free water provision of 1464 mL/day    EN Intake: Average 7-day TF intakes: 878 mL formula =  1229 Kcals (20 Kcal/kg), and 54 g pro (0.9 g/kg). This is meeting 68% of low-end est Kcal needs, and 77% of low-end est protein needs.     NEW FINDINGS   FT inadvertently removed last night around 11pm -  replaced this morning at bedside by DAVIN Mike confirmation: Enteric tube tip projects over the mid abdomen indicating it is within the small bowel. Per RN, PA wants it retracted a bit as they feel its too far down.     Weights:  Most Recent Weight: 69.5 kg (153 lb 3.5 oz)  on 5/9/24 via Bed scale  Body mass  index is 26.3 kg/m .  -9.5 kg from admission, also noting -4.9 L since 4/26, wt loss at least partially confounded by fluid status     GI:  0-1 unmeasured BMs per day over past week, per I/O.   Last BM: 05/09/24 [previously x1 5/4, 4 days w/o BM in between documented BMs]   Constipation noted in RN documentation, PRN sennosides last given 5/7, PRN dulcolax suppository last given 5/9, PRN milk of magnesia last given 5/9       Medications:  Dapsone suspension, decadonr, tivicay, truvada, ganciclovir, thiamine 100 mg/d, D5 in 1/2NS 75 ml/h started this morning (d/t TF no longer infusing, lost enteral access) [provides 90 g dextrose and 304 kcal via dextrose/24h],      Labs:   Phos elevated this morning (x1 day)   Electrolytes  Potassium (mmol/L)   Date Value   05/10/2024 4.1   05/09/2024 3.5   05/08/2024 4.5   01/17/2023 2.9 (L)   01/16/2023 2.8 (L)   12/04/2022 4.0   04/10/2019 3.5   09/12/2014 3.8   01/16/2011 3.8     Phosphorus (mg/dL)   Date Value   05/10/2024 5.0 (H)   05/09/2024 3.6   05/08/2024 3.8   05/07/2024 3.0   05/06/2024 2.9    Blood Glucose  Glucose (mg/dL)   Date Value   05/10/2024 118 (H)   05/09/2024 114 (H)   05/09/2024 114 (H)   05/08/2024 104 (H)   01/16/2023 104 (H)   12/04/2022 104 (H)   02/24/2022 119 (H)   04/10/2019 83   09/12/2014 92   01/16/2011 78     GLUCOSE BY METER POCT (mg/dL)   Date Value   05/10/2024 104 (H)   05/09/2024 98   05/08/2024 127 (H)   05/08/2024 120 (H)   05/08/2024 96     Hemoglobin A1C (%)   Date Value   04/27/2024 5.7 (H)    Inflammatory Markers  WBC (10e9/L)   Date Value   04/10/2019 4.1   04/30/2012 7.9   01/16/2011 7.2     WBC Count (10e3/uL)   Date Value   05/10/2024 6.3   05/09/2024 5.3   05/08/2024 5.4     Albumin (g/dL)   Date Value   05/10/2024 3.3 (L)   05/09/2024 3.3 (L)   05/08/2024 3.2 (L)   01/16/2023 1.5 (L)   12/04/2022 2.9 (L)   02/24/2022 2.8 (L)   04/10/2019 3.1 (L)   01/16/2011 4.0      Magnesium (mg/dL)   Date Value   05/10/2024 2.2   05/09/2024 2.3    05/08/2024 2.1     Sodium (mmol/L)   Date Value   05/10/2024 136   05/09/2024 139   05/08/2024 135   04/10/2019 137   09/12/2014 136   01/16/2011 141    Renal  Urea Nitrogen (mg/dL)   Date Value   05/10/2024 15.1   05/09/2024 15.7   05/08/2024 15.7   01/16/2023 12   12/04/2022 12   02/24/2022 11   04/10/2019 6 (L)   09/12/2014 8   01/16/2011 6     Creatinine (mg/dL)   Date Value   05/10/2024 0.36 (L)   05/09/2024 0.36 (L)   05/08/2024 0.38 (L)   04/10/2019 0.47 (L)   09/12/2014 0.56   01/16/2011 0.55     Additional  Ketones Urine (mg/dL)   Date Value   01/16/2023 Negative   09/19/2018 Negative     Platelet Count   Date Value   05/10/2024 184 10e3/uL   04/10/2019 470 10e9/L (H)     aPTT (Seconds)   Date Value   05/10/2024 24     INR (no units)   Date Value   05/10/2024 0.98        RENAL  No h/o CKD, no acute concerns noted     RESPIRATORY  Room air     SKIN  No pressure injuries documented at this time     MALNUTRITION  % Intake: < 75% for > 7 days (moderate)  % Weight Loss: Unable to assess  -- likely confounded by fluid status  Subcutaneous Fat Loss: Facial region:  mild   Muscle Loss: Temporal:  moderate, Thoracic region (clavicle, acromium bone, deltoid, trapezius, pectoral):  moderate, and Upper arm (bicep, tricep):  moderate  Fluid Accumulation/Edema: Does not meet criteria  Malnutrition Diagnosis: Moderate malnutrition in the context of acute illness/disease    Previous Goals   Total avg nutritional intake to meet a minimum of 30 kcal/kg and 1.2 g PRO/kg daily (per dosing wt 61 kg).   Evaluation: Not met    Previous Nutrition Diagnosis  Inadequate oral intake related to NPO as evidenced by need for tube feeding.   Evaluation: No change    CURRENT NUTRITION DIAGNOSIS  Inadequate oral intake related to NPO as evidenced by need for tube feeding.     INTERVENTIONS  Implementation  Collaboration with other providers  Enteral Nutrition - resume via new enteral access once placement confirmed      Goals  Total avg  nutritional intake to meet a minimum of 30 kcal/kg and 1.2 g PRO/kg daily (per dosing wt 61 kg).     Monitoring/Evaluation  Progress toward goals will be monitored and evaluated per protocol.    Brittney Mederos, MPH, RDN, LD  6A + 5A  (Heme Onc & Heme Malignancy) RD or Bam [6A or 5A Clinical Dietitian]   Weekend/Holiday: Vocera - Weekend Clinical Dietitian

## 2024-05-10 NOTE — PROGRESS NOTES
Small Bowel Feeding Tube Placement Assessment  Reason for Feeding Tube Placement: tube feedings  Cortrak Start Time: 0836   Cortrak End Time: 0855  Medicine Delivered During Procedure: lubricating jelly  Placement Successful: yes, pending ABD x-ray confirmation    Procedure Complications: none   Final Placement Jayesh at exit of nare 104 cm  Face to Face time with patient: 25 min\    Bridle Placement:   Reason for bridle placement: secure feeding tube   Medicine delivered during procedure: lubricating jelly  Procedure: Successful-yes   Location of top of clip on FT: @ 105 cm marker   Condition of nose/skin at time of bridle placement: Unremarkable   Face to Face time with patient: 25 minutes.

## 2024-05-10 NOTE — PROGRESS NOTES
Cross cover    NG came out around 11pm, was receiving TF at 65 ml/hr    - replace in am (or now if could be placed at bedside)    3:45a: IVF D5 1/2NS 75 ml/hr ordered (was getting FWF 90 ml q4h)

## 2024-05-10 NOTE — PROGRESS NOTES
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 Not responsive to assessment questions  Cardiac: WDL  Respiratory: RA, no accessory muscle use  GI/: LBM 5/9. Voiding with adequate output via thornton.   Diet: NPO, continuous TF stopped at 2000.   Lines: DL PICC red infusing MIVF at 75ml/hr  Wounds: No new skin issues  Labs/imaging: Reviewed      New changes this shift: Pt's NJ came out, the patient's partner was in the room and holding the NJ tube trying to put it back  in. He said that it fell off. Provider notified, new NJ orders placed. Continuous dextrose on Nacl ordered.        Continue to monitor and follow POC    "

## 2024-05-11 NOTE — PROGRESS NOTES
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 PmHx of dyslipidemia, polysubstance abuse, and r-sided empyema (s/p VATS and partial pleurectomy Jan 2021). Upon review of labs and imaging from with positive EBV PCR from CSF. LP at [facility] x4/26 with rare atypical cells. Based on findings started HD-Methotrexate + Rituximab 4/29/24. Course further complicated by COVID positive, acute decline in neurological status, HIV/AIDS     Today:  - D13 HD-Methotrexate and Rituximab. 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 She was found to have thrush and was diagnosed with HIV (see below). MRI was obtained d/t dizziness and concern for neurocandidiasis. MRI Brain 3/29/24 w/ area of restricted diffusion and T2 hyperintensity in left posterior pontomesencephalic junction, mild local mass effect. Likely representing ischemic infarct. LP 3/29/24 CSF analysis for infection was neg. Cytology added for possible lymphoma, but there were insufficient viable cells for analysis. She was discharged with plan of repeat MRI brain in 3 months. She then presented in April 2024 with right facial and arm paresthesia and RLE weakness. Workup concerning that the left midbrain/avel mass appeared to be increasing in size compared to MRI 3/20/23 concerning for CNS lymphoma. CT CAP w/o evidence of systemic disease. Unfortunately, location of the mass was deemed too high risk for biopsy to  obtain tissue diagnosis. Repeat high volume LP w/ CSF 4/18/24 obtained in hope to confirm malignancy but this remained non-diagnostic. She was transferred to [facility] for [facility] to pursue biopsy but this still remained too high risk. Repeat LP 4/26/24 with rare atypical cells on cytology but does not correlate with flow cytometry (rare polytypic B cells). LP also notable for positive EBV PCR, elevated protein (109), but negative CMV, meningitis/encephalitis panel. After extensive work up ruling out infectious processes and trying to obtain confirmation of CNS lymphoma diagnosis plan of care was discussed with patient's decission maker who agreed with proceeding treatment for CNS lymphoma. Proceeded with HD Methotrexate (C1D1=4/30/29), Rituxan added D4 and D7.   - Infectious studies at [facility] thus far negative for CMV IgM negative (IgG positive suggesting previous infection), Cryptococcus, CMV encephalitis, Tb meningitis, toxoplasmosis serology, toxoplasma PCR. Karius w/ E.Coli, CMV and human adenovirus; discussed with ID no need to treat E.Coli.   - Continue Dexamethasone taper currently on 4 mg BID (x5/9)   - PICC line placed x4/29   - Next due for C2D1 = 5/13; will plan to obtain repeat MR brain prior to proceeding to assess tumor response       Treatment Plan: HD- Methotrexate + Rituximab C1D1 = 4/30/24    - Methotrexate 3.5 g/m2 (6.405 g) - D1, D8    - Rituximab 375 mg/m2 (700 mg) - D3    - Leucovorin 50 mg D2 until methotrexate level < 0.10    - mIVF .45% NS w. Sodium bicarbonate @125 ml/hr    #Anemia  Likely due to underlying suspected malignancy  - Transfuse for plts < 10k and Hgb , 7.0     ID  #HIV/AIDS  Recently diagnosed with HIV during admission at [facility] on 3/28/24 with viral load of 1.24 million, 6.9 log,  and CD4 count of 2. She was not started on ART, given Nystatin x2 weeks and referred to ID clinic for follow up. Prior to this, she was admitted again with ID consult for  broad-infectious workup. Started Biktarvy 4/17 but stopped due concern for rash, rechallenged on 4/20 and able to tolerate. Later transitioned to Dolutegravir and Truvada. SW reached out to health department 5/2 regarding patient's HIV status and if able to be reported properly. Upon further discussions and health department chart review, original diagnosis was in 2014 and patient was contacted over 14 times with no response.  Unable to confirm with patient if she is was aware of status as she is non-verbal. Upon further chart review, HIV testing was completed 8/27/24 at [facility] with office visit to follow, but encounter is not able to be reviewed and needing patient authorization; will discuss with son (Yuan) as he is her next of kin and medical decision maker. Of note, patient's aunt (Noemy), and son (Yuan) are aware of diagnosis.   - Upon further conversation with boyfriend (Otis) today, it does not appear he is aware of her HIV status. I asked about general medical history and any new/recent infections to which he did not mention HIV/AIDS status. ID had a similar conversation with a consistent consensus. Reached out to SW to discuss with family/son regarding if previous partners have been notified.  - Continue ART with Dolutegravir and Truvada   - Plan to repeat CD4 count and viral load circa x5/15     #COVID-19 positive  #MRSA pneumonia  Tested positive for COVID-19 4/18/24 during admission to [facility]. Patient remains asymptomatic. Will continue to monitor at this time. Previously on Linezolid. ID following; does not feel like MRSA pneumonia is likely and ok to stop Linezolid.   - Per infection prevent first day eligible for re-testing 5/9 -- COVID test remains positive with CT 26.9. Per recommendations should remain in precautions until neg COVD test or CT >35. Consider rechecking 5/16.    # Prophylaxis  - Dapsone 100 mg daily for PJP ppx    # H/o Empyema s/p VATs w/ total decortication of R lung  and partial pleurectomy (Jan 2023)     MISC  #Cotton wool spot, right eye  # Concern for CMV retinitis  Single CWS in right eye, most likely in setting of acute illness. Found on assessment by ophthalmology x4/28. Given significant comorbidities, CD4 count of 2. Repeat ophtho exam 5/6 noted to have worsening cotton wool spots; etiologies include HIV retinitis, HTN retinitis or CMV retinitis.  - Currently on IV Ganciclovir     # Concern for hypoperfusion of RLE    Patient was noted to have a RLE that was cold to touch. No cyanosis noted on toes. Capillary refill intact. RLE arterial US 5/7 with patent vasculature.   - Will monitor     SOCIAL   #Medical decision maker  Confirmed with family that son (Yuan) will be patient's primary medical decision maker. Son Yuan and patient are okay with communication with aunt (Noemy) if Yuan is unavailable.     #H/o stimulant/amphetamine use   Noted on [facility] tox screen in 2016. Patient previously reported snorting and declined IVDU.     Fluids/Electrolytes/Nutrition   - IVF per chemotherapy regimen  - PRN lyte replacement per standing protocol  - Regular diet as tolerated     Lines: PICC    PPX  VTE: Lovenox  GI: Famotidine  Bowels: prn miralax and senna    Code: Full    Medically Ready for Discharge: Anticipated in 5+ Days    Disposition: Pending further workup, evaluation, tolerance of HD-methotrexate, and clinical course. Patient will require a long term care facility on discharge.     I spent >35 minutes face-to-face and/or coordinating or discussing care plan. Over 50% of our time on the unit was spent counseling the patient and/or coordinating care    Patient is seen and examined by Dr. Correia and RILEY.  Assessment and plan are discussed and delivered to the patient.    Krystal Ochoa PA-C  Hematology/Oncology  Pager #[phone]    Interval History   Nursing notes reviewed. No acute events. Bedside sitter in place to monitor for line pulling. Kamilah not following commands  "during visit today.     Called Yuan to discuss plan for MRI Brain and lumbar puncture. He is in agreement with the plan. All questions answered at this time.           Physical Exam   Temp: 98.4  F (36.9  C) Temp src: Axillary BP: 117/77 Pulse: 97   Resp: 18 SpO2: 97 % O2 Device: None (Room air)    Vitals:    05/07/24 1037 05/08/24 1000 05/09/24 0803   Weight: 68.4 kg (150 lb 12.7 oz) 68.3 kg (150 lb 9.2 oz) 69.5 kg (153 lb 3.5 oz)     Vital Signs with Ranges  Temp:  [97.4  F (36.3  C)-98.4  F (36.9  C)] 98.4  F (36.9  C)  Pulse:  [87-97] 97  Resp:  [17-18] 18  BP: (104-123)/(70-84) 117/77  SpO2:  [94 %-98 %] 97 %  I/O last 3 completed shifts:  In: 722 [I.V.:120; NG/GT:310]  Out: 1200 [Urine:1200]      Physical Exam:    Blood pressure 117/77, pulse 97, temperature 98.4  F (36.9  C), temperature source Axillary, resp. rate 18, height 1.626 m (5' 4\"), weight 69.5 kg (153 lb 3.5 oz), SpO2 97%.    General: lying in bed. Nontoxic appearing. No acute distress.   HEENT: Normocephalic/ Sclera anicteric. Moist mucus membranes.   CV: RRR. Normal S1/S2. No m/r/g. No peripheral edema.   Resp: CTAB on anterior auscultation. No wheezing/crackles. Normal respiratory effort on room air. Breath sounds are equal throughout.   GI: Soft, non-tender, non-distended. Bowel sounds present and normoactive. No peritoneal signs. NGT intact.   Skin: Skin is clean, dry and intact. No rashes on limited exam. No jaundice.    Neuro: Non verbal, some appropriate blinking with yes/no questions       Medications   Current Facility-Administered Medications   Medication Dose Route Frequency Provider Last Rate Last Admin    dextrose 10% infusion   Intravenous Continuous PRN Russel Boothe DO         Current Facility-Administered Medications   Medication Dose Route Frequency Provider Last Rate Last Admin    atorvastatin (LIPITOR) tablet 40 mg  40 mg Oral or Feeding Tube Daily Russel Boothe DO   40 mg at 05/11/24 0755    dapsone suspension 100 mg  100 " mg Oral or Feeding Tube Daily Kandi Lambert PA-C   100 mg at 05/11/24 0755    dexAMETHasone (DECADRON) injection 4 mg  4 mg Intravenous BID Krystal Ochoa PA-C   4 mg at 05/11/24 0803    Followed by    [START ON 5/12/2024] dexAMETHasone (DECADRON) tablet 4 mg  4 mg Oral Q12H [facility] (08/20) Krystal Ochoa PA-C        dolutegravir (TIVICAY) tablet 50 mg  50 mg Oral or NG Tube Daily Ramesh Van MD   50 mg at 05/10/24 2138    emtricitabine-tenofovir (TRUVADA) 200-300 MG per tablet 1 tablet  1 tablet Oral or NG Tube Daily Ramesh Van MD   1 tablet at 05/10/24 2138    [Held by provider] enoxaparin ANTICOAGULANT (LOVENOX) injection 40 mg  40 mg Subcutaneous Q24H Krystal Ochoa PA-C   40 mg at 05/10/24 2139    famotidine (PEPCID) suspension 20 mg  20 mg Oral BID Kandi Lambert PA-C   20 mg at 05/11/24 0755    ganciclovir (CYTOVENE) 350 mg in D5W 100 mL intermittent infusion  5 mg/kg Intravenous Q12H Edilma Phelan PA-C 100 mL/hr at 05/11/24 0819 350 mg at 05/11/24 0819    heparin lock flush 10 UNIT/ML injection 5-20 mL  5-20 mL Intracatheter Q24H Tucker Jacob MD   5 mL at 05/10/24 1225    lidocaine (viscous) (XYLOCAINE) 2 % solution 5 mL  5 mL Topical Once Jazzy Santamaria MD           Data   Results for orders placed or performed during the hospital encounter of 04/24/24 (from the past 24 hour(s))   Glucose by meter   Result Value Ref Range    GLUCOSE BY METER POCT 105 (H) 70 - 99 mg/dL   XR Abdomen Port 1 View    Narrative    EXAMINATION: XR ABDOMEN PORT 1 VIEW 5/10/2024 2:00 PM     COMPARISON: 5/10/2024.    HISTORY: feeding tube placement.    FINDINGS: Frontal view of the abdomen. Feeding tube tip projects over  left lower abdomen, presumably within small bowel given patient has  had gastric bypass surgery. No abnormally dilated loops of bowel.  Bibasilar opacities.      Impression    IMPRESSION: Feeding tube tip projects over presumed small  bowel in  left lower quadrant. Nonobstructive bowel gas pattern.    I have personally reviewed the examination and initial interpretation  and I agree with the findings.    KAITLYNN BARTON MD         SYSTEM ID:  [id number]   Glucose by meter   Result Value Ref Range    GLUCOSE BY METER POCT 97 70 - 99 mg/dL   CBC with Platelets & Differential    Narrative    The following orders were created for panel order CBC with Platelets & Differential.  Procedure                               Abnormality         Status                     ---------                               -----------         ------                     CBC with platelets and d...[[id number]]  Abnormal            Final result                 Please view results for these tests on the individual orders.   Phosphorus   Result Value Ref Range    Phosphorus 3.5 2.5 - 4.5 mg/dL   Comprehensive metabolic panel   Result Value Ref Range    Sodium 137 135 - 145 mmol/L    Potassium 3.9 3.4 - 5.3 mmol/L    Carbon Dioxide (CO2) 28 22 - 29 mmol/L    Anion Gap 8 7 - 15 mmol/L    Urea Nitrogen 17.4 6.0 - 20.0 mg/dL    Creatinine 0.38 (L) 0.51 - 0.95 mg/dL    GFR Estimate >90 >60 mL/min/1.73m2    Calcium 8.4 (L) 8.6 - 10.0 mg/dL    Chloride 101 98 - 107 mmol/L    Glucose 131 (H) 70 - 99 mg/dL    Alkaline Phosphatase 69 40 - 150 U/L    AST 26 0 - 45 U/L    ALT 45 0 - 50 U/L    Protein Total 5.8 (L) 6.4 - 8.3 g/dL    Albumin 3.2 (L) 3.5 - 5.2 g/dL    Bilirubin Total 0.4 <=1.2 mg/dL   Magnesium   Result Value Ref Range    Magnesium 2.2 1.7 - 2.3 mg/dL   Uric acid   Result Value Ref Range    Uric Acid 3.7 2.4 - 5.7 mg/dL   Partial thromboplastin time   Result Value Ref Range    aPTT 27 22 - 38 Seconds   INR   Result Value Ref Range    INR 1.00 0.85 - 1.15   Fibrinogen activity   Result Value Ref Range    Fibrinogen Activity 291 170 - 490 mg/dL   CBC with platelets and differential   Result Value Ref Range    WBC Count 8.0 4.0 - 11.0 10e3/uL    RBC Count 3.36 (L) 3.80 - 5.20  10e6/uL    Hemoglobin 9.8 (L) 11.7 - 15.7 g/dL    Hematocrit 29.7 (L) 35.0 - 47.0 %    MCV 88 78 - 100 fL    MCH 29.2 26.5 - 33.0 pg    MCHC 33.0 31.5 - 36.5 g/dL    RDW 14.5 10.0 - 15.0 %    Platelet Count 198 150 - 450 10e3/uL    % Neutrophils 88 %    % Lymphocytes 6 %    % Monocytes 4 %    % Eosinophils 1 %    % Basophils 0 %    % Immature Granulocytes 1 %    NRBCs per 100 WBC 0 <1 /100    Absolute Neutrophils 7.2 1.6 - 8.3 10e3/uL    Absolute Lymphocytes 0.4 (L) 0.8 - 5.3 10e3/uL    Absolute Monocytes 0.3 0.0 - 1.3 10e3/uL    Absolute Eosinophils 0.0 0.0 - 0.7 10e3/uL    Absolute Basophils 0.0 0.0 - 0.2 10e3/uL    Absolute Immature Granulocytes 0.1 <=0.4 10e3/uL    Absolute NRBCs 0.0 10e3/uL

## 2024-05-11 NOTE — PROVIDER NOTIFICATION
Provider Notification @2073    Re: With patient coughing and suction not getting a lot out, would a Neb be helpful? Plan to give Robitussin too, but her 92 sats are now hovering around 92-94%    Action: Notified Janeth SAMANIEGO PA-C via [employer]    -------------------    Provider Notification @7849    Re: Patient has a thick congested cough, unable to suction anything. She is diaphoretic, O2 hovering around 92-93% since 1330. Would a xray be appropriate to rule out any aspiration issues?    Action: Notified Dr. Spencer via [employer] and in-person    Response: Dr. DELVALLE stated he would look into the patient, eventually No follow-up

## 2024-05-11 NOTE — PLAN OF CARE
Goal Outcome Evaluation:      Plan of Care Reviewed With: patient    Overall Patient Progress: no changeOverall Patient Progress: no change    Outcome Evaluation: [id number]    D12 HD-Methotrexate and Rituximab. AVSS. Pt continues to be non-verbal. Pt made some moaning sounds this evening, but this appeared to be related to her cough. Doesn't appear to be in pain. PRN robitussin given and PRN saline neb ordered and administered. Pt's cough seemed to be more productive after saline neb. Sitter remains at bedside. There were no visitors this evening. NJ placement confirmed to be okay to use. TF restarted. Roy in place with adequate output No BM. Not out of bed.     1720: Pt had some flexion to left arm with ROM activities. Didn't track my movement, but was able to make eye contact when asked. That was the only command she was able to follow.     1940 through rest of shift: Pt started to move her left arm around independently. There didn't seem to be any purpose to the movement, aside from repositioning for comfort. She didn't appear to be using her hand or fingers and was not grabbing at lines. Pt continued to be unable to follow commands like squeezing my fingers or do any tracking with her eyes.      Plan for care conference on Monday, with possible chemo and MRI.

## 2024-05-11 NOTE — PLAN OF CARE
Goal Outcome Evaluation:  VSS on RA. Pt continues to be non-verbal. Appears comfortable, no signs of pain/discomfort. Pt unable to follow commands such as squeezing fingers, moving against resistance, or track finger. Opens eyes spontaneously. Occasionally moves L arm above head. Unable to tell if this is purposeful or not.  Infrequent cough, nonproductive. Sitter at bedside. Roy in place with AUOP. Repositioned q2h. A2. Redness on inner thighs and labia noted. PICC HL. TF running 65mL/hr w/ q4h 90mL flushes. Care conference planned for 5/13. Continue with POC.

## 2024-05-12 NOTE — PLAN OF CARE
Goal Outcome Evaluation:  VSS on RA, afebrile. Turned Q2h. Roy in place with AUOP. No BM, LBM 5/10. Enteral feedings 65mL/hr with 90mL fluid bolus q4h. Congested cough, some phlegm/secretions when coughing. During overnight, pt was able to  RN's fingers in L hand but was unable to do it again when asked. Hard to tell if this is purposeful or a random muscle contraction when doing ROM exercises. Sitter at bedside. Continue with POC.

## 2024-05-12 NOTE — PROVIDER NOTIFICATION
Provider Notification @0805    Re: Dr. García is wondering if patient needs a PRN for any agitation during LP. Patient appears pretty calm, but just in case    Action: Notified Janeth SAMANIEGO PA-C via [employer]    Response: One-time PRN dose of PO Ativan placed, but try to avoid it if able. If patient is moving during the procedure, low threshold to stop.    **Informed Dr. García of primary teams requests    ------------------------------------    Provider Notification    Re: Thornton noted to have a VERY strong malodorous smell. Some mucous noted in the urine too.     Action: Notified Janeth SAMANIEGO PA-C via [employer]    Response: Order for a UA/UC, exchange thornton prior

## 2024-05-12 NOTE — PLAN OF CARE
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    -all PO meds via NJ tube  -unable to assess most neuros due to patient being nonverbal, and not following commands  -last repositioned at 22:15  -thornton with good urine output  -no BM today, last BM 05/10  -no sign of discomfort, appears without pain and nausea  -redness on inner thighs and labia   -congested with non-productive coughs  -EEG was removed at 21:00  -sitter at the bedside for possible line pulling    Plan: Restart TF at MN. Continue with plan of care.

## 2024-05-12 NOTE — PROCEDURES
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   Lorraine Hernandez and [place] ORO who combined were present during entire procedure  Procedure well tolerated, patient laid very still with only occasional coughing  See additional procedure details below.  The primary covering service should follow up and address any lab results as appropriate.    Timothy García MD  [facility]  Securely message with Vocera (more info)  Text page via [facility] Paging/Directory   See signed in provider for up to date coverage information      [facility]    Lumbar puncture    Date/Time: 5/12/2024 3:55 PM    Performed by: Timothy García MD  Authorized by: Timothy García MD  Indications: evaluation for infection and evaluation for altered mental status  Preparation: Patient was prepped and draped in the usual sterile fashion.      UNIVERSAL PROTOCOL   Site Marked: Yes  Prior Images Obtained and Reviewed:  Yes  Required items: Required blood products, implants, devices and special equipment available    Patient identity confirmed:  Hospital-assigned identification number, provided demographic data and arm band  NA - No sedation, light sedation, or local anesthesia  Confirmation Checklist:  Patient's identity using two indicators, relevant  allergies, procedure was appropriate and matched the consent or emergent situation and correct equipment/implants were available  Time out: Immediately prior to the procedure a time out was called    Universal Protocol: the Joint Commission Universal Protocol was followed    Preparation: Patient was prepped and draped in usual sterile fashion    ESBL (mL):  0     ANESTHESIA    Anesthesia:  Local infiltration  Local Anesthetic:  Lidocaine 1% without epinephrine  Anesthetic Total (mL):  5      SEDATION    Patient Sedated: No      PROCEDURE DETAILS  Lumbar space: L4-L5 interspace (L5-S1)  Needle gauge: 22  Needle type: spinal needle - Quincke tip  Needle length: 3.5 in  Number of attempts: 5 or more  Total volume: 0 ml  Post-procedure: adhesive bandage applied and site cleaned      PROCEDURE  Describe Procedure: Consult and Procedure Service Note    The risks and benefits of the procedure were explained to patient son who expressed understanding and opted to proceed. Consent was obtained and placed in the chart.   A time out was performed.    The patient was placed in the Right lateral decubitus and the paramedian L5-S1 innerspace identified, palpated, and marked. Subsequently 2 ml of 1% lidocaine was instilled at this site. A 3 inch 22 G needle was then inserted and firm resistance at needle tip and no  fluid obtained on the 1st -2nd attempt. Then the L4-L5 paramedian innerspace identified, palpated, and marked. Subsequently 2 ml of 1% lidocaine was instilled at this site. A 3 inch 22 G needle was then inserted and firm resistance at needle tip and no  fluid obtained on the 3rd-4th attempt. Then the L4-L5 midline innerspace identified, palpated, and marked. Subsequently 2 ml of 1% lidocaine was instilled at this site. A 3 inch 22 G needle was then inserted and firm resistance at needle tip and no  fluid obtained on the 5th thru 10th attempt.   After fluid collection, the stylet was replaced and the needle was  removed.  A total of  0ml was removed and a total of 10 attempts were made.    The specimen(s) left secured in lab bag with label at bedside to be sent for further analysis; RN and primary team were then updated.                 Patient Tolerance:  Patient tolerated the procedure well with no immediate complications  Length of time physician/provider present for 1:1 monitoring during sedation: 0   Thank you for consulting the CAPS team. Please contact the Consult and Procedure Service if any concerns or complications arise.        Consult and Procedure Service Note    The risks and benefits of the procedure were explained to patients wife and dgtr bedside, who expressed understanding and opted to proceed. Consent was obtained and placed in the chart.   A time out was performed.    The patient was placed in the Left lateral decubitus and the L5-S1 innerspace identified, palpated, and marked. Susequently 3 ml of 1% lidocaine was instilled at this site.  A 3 inch 22 G needle was then inserted firm resistance at needle tip and no fluid obtained;  L4-L5 innerspace identified, palpated, and marked. Susequently 2 ml of 1% lidocaine was instilled at this site; firm resistance at needle tip and no fluid obtained   pink then clear fluid obtained on the 4th attempt.  Opening pressure was 4.4 cm of H2O.   After fluid collection, the stylet was replaced and the needle was removed.    A total of 10ml was removed and a total of 4 attempts were made.  The specimen(s) left secured in lab bag with label at bedside to be sent for further analysis; RN and primary team were then updated.      firm resistance at needle tip and no fluid obtained             Comments: Thank you for consulting the CAPS team. Please contact the Consult and Procedure Service if any concerns or complications arise.        Consult and Procedure Service Note    The risks and benefits of the procedure were explained to patients wife and dgtr bedside, who  expressed understanding and opted to proceed. Consent was obtained and placed in the chart.   A time out was performed.    The patient was placed in the Left lateral decubitus and the L5-S1 innerspace identified, palpated, and marked. Susequently 3 ml of 1% lidocaine was instilled at this site.  A 3 inch 22 G needle was then inserted firm resistance at needle tip and no fluid obtained;  L4-L5 innerspace identified, palpated, and marked. Susequently 2 ml of 1% lidocaine was instilled at this site; firm resistance at needle tip and no fluid obtained   pink then clear fluid obtained on the 4th attempt.  Opening pressure was 4.4 cm of H2O.   After fluid collection, the stylet was replaced and the needle was removed.    A total of 10ml was removed and a total of 4 attempts were made.  The specimen(s) left secured in lab bag with label at bedside to be sent for further analysis; RN and primary team were then updated.      firm resistance at needle tip and no fluid obtained                       POC US Guidance Needle Placement     Impression  Limited abdominal ultrasound was performed and demonstrated an adequate window for paramedian lumbar puncture at the L5-S1 and the L4-L5 level, LP was subsequently attempted.

## 2024-05-12 NOTE — CODE/RAPID RESPONSE
Code Blue was called for Kamilah Goldberg. I evaluated the patient at the bedside for potential ECPR in collaboration with the on-call ECPR staff and standard guidelines.  This patient is not eligible for ECPR due to the following concerns:   - Code Blue called for desaturation, patient did not experience cardiac arrest  - Cardiology will remain available through the [place]-intubation period in case of hemodynamic collapse or cardiac arrest.    The patient was discussed with Dr. Devyn Arzate.    Neno Harrell MD [facility], PGY-5  Fellow, Cardiovascular Disease    May 12, 2024

## 2024-05-12 NOTE — PROVIDER NOTIFICATION
"   05/12/24 0800   Call Information   Date of Call 05/12/24   Time of Call 0759   Name of person requesting the team Leilani FLORES   Title of person requesting team RN   RRT Arrival time 0801   Time RRT ended 0900   Reason for call   Type of RRT Adult   Primary reason for call Respiratory   Respiratory O2sat less than 88% for greater than 5 minutes despite O2;Respiratory pattern change   Was patient transferred from the ED, ICU, or PACU within last 24 hours prior to RRT call? 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 PmHx of dyslipidemia, polysubstance abuse, and r-sided empyema (s/p VATS and partial pleurectomy Jan 2021). Upon review of labs and imaging from with positive EBV PCR from CSF. LP at [facility] x4/26 with rare atypical cells. Based on findings started HD-Methotrexate + Rituximab 4/29/24. Course further complicated by COVID positive, acute decline in neurological status, HIV/AIDS \"   Notable History/Conditions Cancer;Neurological   Assessment Unable to assess orientation, sating high 90s on 10L oxy mask. Appears diaphoretic. Slightly tachy in low 100s, BP WNL.   Interventions Meds;Neb treatment;O2 per N/C or mask;CXR   Patient Outcome   Patient Outcome Stabilized on unit;Code blue called  (Code blue called after RRT prior to RRT starting)   RRT Team   Attending/Primary/Covering Physician Heme onc   Date Attending Physician notified 05/12/24   Time Attending Physician notified 0759   Physician(s) MICU/Kellee stein team   Lead RN Syl EDGAR, Flyer team   RN Leilani FLORES, RN   RT Jayesh & Kelley, RT   Post RRT Intervention Assessment   Post RRT Assessment Stable/Improved   Date Follow Up Done 05/12/24   Time Follow Up Done 1300       "

## 2024-05-12 NOTE — PROGRESS NOTES
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 PmHx of dyslipidemia, polysubstance abuse, and r-sided empyema (s/p VATS and partial pleurectomy Jan 2021). Upon review of labs and imaging from with positive EBV PCR from CSF. LP at [facility] x4/26 with rare atypical cells. Based on findings started HD-Methotrexate + Rituximab 4/29/24. Course further complicated by COVID positivity, acute decline in neurological status, HIV/AIDS.     Today:  - D14 HD-Methotrexate and Rituximab. Next due for treatment 5/13   - Code blue called for desaturation likely related to mucus/thick secretions; patient stabilized and currently on minimal supplemental O2.  Mucomyst and physiotherapy ordered for thick secretions.  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 She was found to have thrush and was diagnosed with HIV (see below). MRI was obtained d/t dizziness and concern for neurocandidiasis. MRI Brain 3/29/24 w/ area of restricted diffusion and T2 hyperintensity in left posterior pontomesencephalic junction, mild local mass effect. Likely representing ischemic infarct. LP 3/29/24 CSF analysis for infection was neg. Cytology added for possible lymphoma, but there were insufficient viable cells for analysis. She was discharged with plan of repeat MRI brain in 3 months. She then presented in April 2024 with right facial and arm paresthesia and RLE weakness. Workup  concerning that the left midbrain/avel mass appeared to be increasing in size compared to MRI 3/20/23 concerning for CNS lymphoma. CT CAP w/o evidence of systemic disease. Unfortunately, location of the mass was deemed too high risk for biopsy to obtain tissue diagnosis. Repeat high volume LP w/ CSF 4/18/24 obtained in hope to confirm malignancy but this remained non-diagnostic. She was transferred to [facility] for NSGY to pursue biopsy but this still remained too high risk. Repeat LP 4/26/24 with rare atypical cells on cytology but does not correlate with flow cytometry (rare polytypic B cells). LP also notable for positive EBV PCR, elevated protein (109), but negative CMV, meningitis/encephalitis panel. After extensive work up ruling out infectious processes and trying to obtain confirmation of CNS lymphoma diagnosis plan of care was discussed with patient's decission maker who agreed with proceeding treatment for CNS lymphoma. Proceeded with HD Methotrexate (C1D1=4/30/29), Rituxan added D4 and D7.   - Infectious studies at [facility] thus far negative for CMV IgM negative (IgG positive suggesting previous infection), Cryptococcus, CMV encephalitis, Tb meningitis, toxoplasmosis serology, toxoplasma PCR. Karius w/ E.Coli, CMV and human adenovirus; discussed with ID no need to treat E.Coli.   - Continue Dexamethasone taper currently on 4 mg BID (x5/9)   - PICC line placed x4/29   - Next due for C2D1 = 5/13; will plan to obtain repeat MR brain prior to proceeding to assess tumor response   - Repeat high volume LP planned 5/12       Treatment Plan: HD- Methotrexate + Rituximab C1D1 = 4/30/24    - Methotrexate 3.5 g/m2 (6.405 g) - D1, D8    - Rituximab 375 mg/m2 (700 mg) - D3    - Leucovorin 50 mg D2 until methotrexate level < 0.10    - mIVF .45% NS w. Sodium bicarbonate @125 ml/hr    #Anemia  Likely due to underlying suspected malignancy  - Transfuse for plts < 10k and Hgb , 7.0     ID  #HIV/AIDS  Recently diagnosed with  HIV during admission at [facility] on 3/28/24 with viral load of 1.24 million, 6.9 log,  and CD4 count of 2. She was not started on ART, given Nystatin x2 weeks and referred to ID clinic for follow up. Prior to this, she was admitted again with ID consult for broad-infectious workup. Started Biktarvy 4/17 but stopped due concern for rash, rechallenged on 4/20 and able to tolerate. Later transitioned to Dolutegravir and Truvada. SW reached out to health department 5/2 regarding patient's HIV status and if able to be reported properly. Upon further discussions and health department chart review, original diagnosis was in 2014 and patient was contacted over 14 times with no response.  Unable to confirm with patient if she is was aware of status as she is non-verbal. Upon further chart review, HIV testing was completed 8/27/24 at [facility] with office visit to follow, but encounter is not able to be reviewed and needing patient authorization; will discuss with son (Yuan) as he is her next of kin and medical decision maker. Of note, patient's aunt (Noemy), and son (Yuan) are aware of diagnosis.   - Upon further conversation with boyfriend (Otis) today, it does not appear he is aware of her HIV status. I asked about general medical history and any new/recent infections to which he did not mention HIV/AIDS status. ID had a similar conversation with a consistent consensus. Reached out to SW to discuss with family/son regarding if previous partners have been notified.  - Continue ART with Dolutegravir and Truvada   - Plan to repeat CD4 count and viral load circa x5/15     #COVID-19 positive  #MRSA pneumonia  Tested positive for COVID-19 4/18/24 during admission to [facility]. Patient remains asymptomatic. Will continue to monitor at this time. Previously on Linezolid. ID following; does not feel like MRSA pneumonia is likely and ok to stop Linezolid.   - Per infection prevent first day eligible for  re-testing 5/9 -- COVID test remains positive with CT 26.9. Per recommendations should remain in precautions until neg COVD test or CT >35. Consider rechecking 5/16.    # Prophylaxis  - Dapsone 100 mg daily for PJP ppx    # H/o Empyema s/p VATs w/ total decortication of R lung and partial pleurectomy (Jan 2023)     MISC  #Cotton wool spot, right eye  # Concern for CMV retinitis  Single CWS in right eye, most likely in setting of acute illness. Found on assessment by ophthalmology x4/28. Given significant comorbidities, CD4 count of 2. Repeat ophtho exam 5/6 noted to have worsening cotton wool spots; etiologies include HIV retinitis, HTN retinitis or CMV retinitis.  - Currently on IV Ganciclovir     SOCIAL   #Medical decision maker  Confirmed with family that son (Yuan) will be patient's primary medical decision maker. Son Yuan and patient are okay with communication with aunt (Noemy) if Yuan is unavailable.     #H/o stimulant/amphetamine use   Noted [place] [facility] [place]. Patient previously reported snorting and declined IVDU.     Fluids/Electrolytes/Nutrition   - IVF per chemotherapy regimen  - PRN lyte replacement per standing protocol  - Regular diet as tolerated     Lines: PICC    PPX  VTE: Lovenox  GI: Famotidine  Bowels: prn miralax and senna    Code: Full    Medically Ready for Discharge: Anticipated in 5+ Days    Disposition: Pending further workup, evaluation, tolerance of HD-methotrexate, and clinical course. Patient will require a long term care facility on discharge.     I spent >75 minutes face-to-face and/or coordinating or discussing care plan. Over 50% of our time on the unit was spent counseling the patient and/or coordinating care    Patient is seen and examined by Dr. Correia and RILEY.  Assessment and plan are discussed and delivered to the patient.    Krystal Ochoa PA-C  Hematology/Oncology  Pager #[phone]    Interval History   Nursing notes reviewed. Notified by nursing early this AM  "that patient had desated to ~50% and a code blue was being called. Promptly went to the room to assist in the code, patient's airway was deemed patent and her O2 sats recovered with supplemental O2 via oxymask. Desaturation felt to be from mucus plug/thick secretions. Physiotherapy and mucomyst ordered to help with secretions.     Yuan stopped by later in the morning to visit with his mom and was provided an update about this mornings events. He voiced understanding and was focused on signing consents for the LP and getting to court by 10:30. Informed Yuan that we are taking measures to try and thin the secretions so Kamilah can cough them out herself but an event like this could happen again. He voiced understanding. Reviewed plan for LP and MRI today. All questions answered at this time.            Physical Exam   Temp: 98.2  F (36.8  C) Temp src: Oral BP: 129/86 Pulse: 94   Resp: 20 SpO2: 96 % O2 Device: Oxymask Oxygen Delivery: 3 LPM  Vitals:    05/09/24 0803 05/11/24 0800 05/12/24 0730   Weight: 69.5 kg (153 lb 3.5 oz) 71.4 kg (157 lb 6.5 oz) 71 kg (156 lb 8.4 oz)     Vital Signs with Ranges  Temp:  [98.1  F (36.7  C)-98.2  F (36.8  C)] 98.2  F (36.8  C)  Pulse:  [] 94  Resp:  [18-20] 20  BP: (111-129)/(72-86) 129/86  SpO2:  [60 %-100 %] 96 %  I/O last 3 completed shifts:  In: 1250 [I.V.:100; NG/GT:695]  Out: 475 [Urine:475]      Physical Exam:    Blood pressure 129/86, pulse 94, temperature 98.2  F (36.8  C), temperature source Oral, resp. rate 20, height 1.626 m (5' 4\"), weight 71 kg (156 lb 8.4 oz), SpO2 96%.    General: lying in bed. Nontoxic appearing. No acute distress.   HEENT: Normocephalic/ Sclera anicteric. Moist mucus membranes.   CV: RRR. Normal S1/S2. No m/r/g. No peripheral edema.   Resp: CTAB on anterior auscultation. No wheezing/crackles. Normal respiratory effort on 5L oxymask which was later titrated down to 2L.    GI: Soft, non-tender, non-distended. Bowel sounds present and " normoactive. No peritoneal signs. NGT intact.   Skin: Skin is clean, dry and intact. No rashes on limited exam. No jaundice.    Neuro: Non verbal, some appropriate blinking with yes/no questions       Medications   Current Facility-Administered Medications   Medication Dose Route Frequency Provider Last Rate Last Admin    dextrose 10% infusion   Intravenous Continuous PRN Russel Boothe DO         Current Facility-Administered Medications   Medication Dose Route Frequency Provider Last Rate Last Admin    acetylcysteine (MUCOMYST) 10 % nebulizer solution 4 mL  4 mL Nebulization Q4H Mejia Nelson MD        atorvastatin (LIPITOR) tablet 40 mg  40 mg Oral or Feeding Tube Daily Russel Boothe DO   40 mg at 05/12/24 0816    dapsone suspension 100 mg  100 mg Oral or Feeding Tube Daily Kandi Lambert PA-C   100 mg at 05/12/24 0816    dexAMETHasone (DECADRON) tablet 4 mg  4 mg Oral Q12H [facility] (08/20) Krystal Ochoa PA-C   4 mg at 05/12/24 0816    dolutegravir (TIVICAY) tablet 50 mg  50 mg Oral or NG Tube Daily Ramesh Van MD   50 mg at 05/11/24 2208    emtricitabine-tenofovir (TRUVADA) 200-300 MG per tablet 1 tablet  1 tablet Oral or NG Tube Daily Ramesh Van MD   1 tablet at 05/11/24 1952    [Held by provider] enoxaparin ANTICOAGULANT (LOVENOX) injection 40 mg  40 mg Subcutaneous Q24H Krystal Ochoa PA-C   40 mg at 05/10/24 2139    famotidine (PEPCID) suspension 20 mg  20 mg Oral BID Kandi Lambert PA-C   20 mg at 05/12/24 0816    ganciclovir (CYTOVENE) 350 mg in D5W 100 mL intermittent infusion  5 mg/kg Intravenous Q12H Edilma Phelan PA-C 100 mL/hr at 05/12/24 0843 350 mg at 05/12/24 0843    heparin lock flush 10 UNIT/ML injection 5-20 mL  5-20 mL Intracatheter Q24H Tucker Jacob MD   5 mL at 05/12/24 0346    Lidocaine (LIDOCARE) 4 % Patch 1 patch  1 patch Transdermal Q24H Pachpande, Kalani R, MD   1 patch at 05/12/24 1044    lidocaine (viscous) (XYLOCAINE) 2  % solution 5 mL  5 mL Topical Once Jazzy Santamaria MD           Data   Results for orders placed or performed during the hospital encounter of 24 (from the past 24 hour(s))   EEG Video 2-12 HRS Ummonitored    Narrative    EEG Video 2-12 HRS Ummonitored Result    VIDEO EEG DATE: 2024  VIDEO EEG [id number]  VIDEO EEG DAY#: 1  VIDEO EEG SOURCE FILE DURATION: 3 hours and 14 minutes    PATIENT INFORMATION: Kamilah Goldberg is a 44-year-old female with PMH of   HIV/AIDS and history of polysubstance abuse who presented to outside   hospital with right facial and arm paresthesias found to have left   avel/midbrain lesion 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  Multiple tests for infectious   agents have remained negative.  Karius testing was positive for CMV and E.   coli.  EEG is being done to evaluate for seizures and AMS.     MEDICATIONS:   No AEDs as of @1450     TECHNICAL SUMMARY: This is a video-EEG monitoring study. EEG was recorded   from 23 scalp electrodes placed according to the 10-20 international   system. Additional electrodes were utilized for referencing, artifact   detection, and recording from other cerebral regions. Qualified   technicians attached EEG electrodes, set up the study, monitored and   reviewed EEG recordings, and disconnected EEG electrodes when appropriate.   Video was continuously recorded. Video was reviewed for clinical   correlation and to assist with EEG interpretations.     BACKGROUND ACTIVITIES: During this EEG recording, there is moderate   generalized slowing of the background activities throughout the recording   with mostly theta activities. 6-7 hz posterior dominant rhythm was   observed. There are intermittent brief bursts of delta activities. Sleep   stages were recorded with poorly formed sleep architectures.   Hyperventilation and  photic stimulation were not performed.    EEG is reactive to external stimuli.     INTERICTAL ACTIVITIES: No epileptiform activities were observed during   this recording.     ICTAL ACTIVITIES: There are no clinical or electrographic seizures during   this recording.     Impression:  This is an abnormal EEG due to the presence of moderate   generalized slowing of the background activities. Findings are consistent   with moderate diffuse encephalopathy of which the etiology is   non-specific.  No epileptiform activities or seizures were observed.      Uma Major MD  EPILEPSY STAFF    CBC with Platelets & Differential    Narrative    The following orders were created for panel order CBC with Platelets & Differential.  Procedure                               Abnormality         Status                     ---------                               -----------         ------                     CBC with platelets and d...[[id number]]  Abnormal            Final result                 Please view results for these tests on the individual orders.   Phosphorus   Result Value Ref Range    Phosphorus 3.6 2.5 - 4.5 mg/dL   Comprehensive metabolic panel   Result Value Ref Range    Sodium 138 135 - 145 mmol/L    Potassium 3.8 3.4 - 5.3 mmol/L    Carbon Dioxide (CO2) 28 22 - 29 mmol/L    Anion Gap 8 7 - 15 mmol/L    Urea Nitrogen 15.6 6.0 - 20.0 mg/dL    Creatinine 0.42 (L) 0.51 - 0.95 mg/dL    GFR Estimate >90 >60 mL/min/1.73m2    Calcium 8.3 (L) 8.6 - 10.0 mg/dL    Chloride 102 98 - 107 mmol/L    Glucose 136 (H) 70 - 99 mg/dL    Alkaline Phosphatase 68 40 - 150 U/L    AST 22 0 - 45 U/L    ALT 41 0 - 50 U/L    Protein Total 5.7 (L) 6.4 - 8.3 g/dL    Albumin 3.2 (L) 3.5 - 5.2 g/dL    Bilirubin Total 0.3 <=1.2 mg/dL   Magnesium   Result Value Ref Range    Magnesium 2.1 1.7 - 2.3 mg/dL   Uric acid   Result Value Ref Range    Uric Acid 3.4 2.4 - 5.7 mg/dL   Partial thromboplastin time   Result Value Ref Range    aPTT 25 22 - 38 Seconds    INR   Result Value Ref Range    INR 1.06 0.85 - 1.15   Fibrinogen activity   Result Value Ref Range    Fibrinogen Activity 283 170 - 490 mg/dL   CBC with platelets and differential   Result Value Ref Range    WBC Count 6.9 4.0 - 11.0 10e3/uL    RBC Count 3.28 (L) 3.80 - 5.20 10e6/uL    Hemoglobin 9.4 (L) 11.7 - 15.7 g/dL    Hematocrit 29.8 (L) 35.0 - 47.0 %    MCV 91 78 - 100 fL    MCH 28.7 26.5 - 33.0 pg    MCHC 31.5 31.5 - 36.5 g/dL    RDW 14.7 10.0 - 15.0 %    Platelet Count 202 150 - 450 10e3/uL    % Neutrophils 90 %    % Lymphocytes 6 %    % Monocytes 3 %    % Eosinophils 0 %    % Basophils 0 %    % Immature Granulocytes 1 %    NRBCs per 100 WBC 0 <1 /100    Absolute Neutrophils 6.3 1.6 - 8.3 10e3/uL    Absolute Lymphocytes 0.4 (L) 0.8 - 5.3 10e3/uL    Absolute Monocytes 0.2 0.0 - 1.3 10e3/uL    Absolute Eosinophils 0.0 0.0 - 0.7 10e3/uL    Absolute Basophils 0.0 0.0 - 0.2 10e3/uL    Absolute Immature Granulocytes 0.1 <=0.4 10e3/uL    Absolute NRBCs 0.0 10e3/uL   Blood gas venous   Result Value Ref Range    pH Venous 7.46 (H) 7.32 - 7.43    pCO2 Venous 45 40 - 50 mm Hg    pO2 Venous 34 25 - 47 mm Hg    Bicarbonate Venous 32 (H) 21 - 28 mmol/L    Base Excess/Deficit Venous 7.1 (H) -3.0 - 3.0 mmol/L    FIO2 2     Oxyhemoglobin Venous 63 (L) 70 - 75 %    O2 Sat, Venous 64.8 (L) 70.0 - 75.0 %    Narrative    In healthy individuals, oxyhemoglobin (O2Hb) and oxygen saturation (SO2) are approximately equal. In the presence of dyshemoglobins, oxyhemoglobin can be considerably lower than oxygen saturation.   XR Chest Port 1 View    Narrative    EXAM: XR CHEST PORT 1 VIEW 5/12/2024 10:36 AM     HISTORY: AHRF       COMPARISON: CT 4/25/2024, radiographs 11/20/2023    FINDINGS: Left upper extremity PICC tip projects over the low SVC.  Feeding tube reaches the stomach and passes below the field-of-view.  Normal cardiomediastinal silhouette. Mild streaky perihilar and left  basilar opacities. No consolidation. No pleural  effusion or  pneumothorax.       Impression    IMPRESSION:  Mild perihilar and left basilar opacities. Differential includes  atelectasis or mild infection versus inflammation.    I have personally reviewed the examination and initial interpretation  and I agree with the findings.    KAITLYNN BARTON MD         SYSTEM ID:  [id number]

## 2024-05-13 NOTE — CARE PLAN
SLP: New orders received to evaluation patient. SLP has been following the patient since first orders received 4/24/2024. Last time patient was alert and appropriate for participation in dysphagia tx session was 4/25/2024. There have since been 9 cancels, including today, for treatment sessions with SLP d/t lethargy and inability to follow commands, which would put patient and significant aspiration risk if any PO intake was attempted to be administered to the patient. Patient would require VFSS before diet initiation given location of lesion, but patient must first be able to demonstrate ability to participate before instrumental swallow study could be completed. SLP will continue to hold at this time and assess for tx session appropriateness given current goals of care.

## 2024-05-13 NOTE — PROGRESS NOTES
Cross cover    UA was sent earlier today for some mucus strings and malodorous urine. UA in the setting of thornton showed WBC75, ++LE, positive nitrite.    - change thornton  - started ceftriaxone 1g iv q24h  - follow urine culture result

## 2024-05-13 NOTE — PLAN OF CARE
[id number]    At the start of shift, patient was repositioned with TF held. As her HOB was being elevated, she began to desat to 70s on room air. Appear more obtunded with a blank stare, breathing shallow. HOB increased more and Oxymask applied to flow. Her O2 appeared to improve for 15 seconds with it reaching into the 80s, RRT called. However, as seconds passed, she then steadily decreased down to mid 50s. Due to concern for airway protection, Code Blue called and PA notified simultaneously. This decrease in O2 lasted about 3 minutes, when the Code/Rapid Team arrived, her O2 improved to the high 90s on flow. She started to have a weak congested cough and appeared back to baseline neuro status. She was titrated down to 3L via Oxymask, sats maintaining. VBG and CXR obtained. Scheduled Nebs and Chest percussion ordered. LS coarse/crackled.     With her first RT treatment, her breathing appeared to ease. Not coughing as much, but it remains congested. Respiratory Therapy was able to deep suction her and stated a lot came out. O2 fluctuating between 3-5L.     Had a brain MRI and attempted LP today. Thornton noted to be outputting mucous like strings w/ malodorous urine, thornton exchanged and UA/UC obtained.     Afebrile. Left pupil remains sluggish. Trace tone in LUE, able to keep her arm from bending. Other extremities flaccid, but wiggles her toes. Unable to track finger or blink on command, but she does give eye contact when talking directly to her. TF intermittently paused for various procedures, but otherwise at goal. Turning Q2H. LBM 5/10, BS audible. Bed alarm on.

## 2024-05-13 NOTE — PROGRESS NOTES
"[facility]  Palliative Care Daily Progress Note       Recommendations & Counseling     GOALS OF CARE:   Life-prolonging without limits   Care conference today with Noemy (aunt), Yuan (son), Oncology and Palliative in Kamilah's room. Kamilah remains unable to participate due to mental status and inability to communicate.  Medical summary: Kamilah's neurologic condition continues to worsen, now with concern for ongoing aspiration and inability to protect her airway (had hypoxic event 5/12 with recovery). Unfortunately repeat MR brain does not show improvement, possible slight increase in size of brainstem mass. Oncology team shared concern that Kamilah is not responding to treatment and is unlikely to respond to further chemo.  Care pathways: Outlined options of (1) trying one more cycle of chemotherapy vs (2) transitioning to comfort-focused care. Explained that if family is interested in further chemo, we will also need to determine if Kamilah requires elective intubation for airway protection. Yuan requested time to think this over and for primary team to call him with any updates.    ADVANCE CARE PLANNING:  No health care directive on file. Per  informed consent policy, next of kin should be involved if patient becomes unable.  During palliative care initial consult on 4/26, Kamilah was able to speak and had capacity and stated that wants her Aunt Noemy to be her emergency contact and primary surrogate decision maker and her son, Yuan, can be involve in care discussions and decisions \"if he wants to\".  Code status: Full Code    PSYCHOSOCIAL/SPIRITUAL:  Family - supported by Noemy (aunt) and Yuan (son) . 13 year old son (lives in [place] & Kamilah doesn't have custody). Multiple recent family losses including Kamilah's mother.    Palliative Care will continue to follow.    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 At outside hospital when she had progressive mental status decline and she did get dose of IV decadron (treatment for CNS lymphoma) prior to transfer. Her course was also complicated by MRSA pneumonia and encephalopathy as well as tested positive for COVID 19 on 4/18/2024. Unsafe to complete brain biopsy; now s/p methotrexate and rituximab. Palliative consulted for support and assistance with goals of care conversations.    Today, the patient was seen for:  Brainstem lesion c/f CNS lymphoma  HIV with low CD4 (AIDS)  Hx of Substance Abuse Disorder (amphetamine)  Hx of MRSA pneumonia / Empyema  Support  Encounter for Palliative Care  Goals of care            Interval History:     Chart review/discussion with unit or clinical team members:   - Plan for care conference today. Discussed with Oncology    Per patient or family/caregivers today:  Kamilah is laying in bed during conference, eyes open and intermittently tracking, nonverbal.           Medications:     Reviewed           Physical Exam:   Vital Signs: Blood pressure 121/78, pulse 104, temperature 99.3  F (37.4  C), temperature source Oral, resp. rate 20, height 1.626 m (5' 4\"), weight 69.4 kg (153 lb), SpO2 92%.   GENERAL: laying in bed, NAD    SKIN: Warm and dry   HEENT: +dilated pupils (ophthalmology exam today)  LUNGS: +audible secretions  NEUROLOGIC: eyes open and tracking, EOMI, nonverbal             Data Reviewed:     Reviewed recent pertinent imaging:   MR brain 5/12  IMPRESSION:  1. No significant change in size of the brainstem mass compared " to  4/30/2024. Possible slight decrease in peripheral pattern of  enhancement and restricted diffusion may indicate subtle response to  treatment.    Reviewed recent labs:   CMP  Recent Labs   Lab 05/13/24 0439 05/12/24  0344    138   POTASSIUM 3.9 3.8   CHLORIDE 103 102   CO2 27 28   ANIONGAP 8 8   * 136*   BUN 16.1 15.6   CR 0.38* 0.42*   GFRESTIMATED >90 >90   MAUREEN 8.4* 8.3*   MAG 2.1 2.1   PHOS 4.6* 3.6   PROTTOTAL 5.9* 5.7*   ALBUMIN 3.3* 3.2*   BILITOTAL 0.3 0.3   ALKPHOS 72 68   AST 24 22   ALT 44 41     CBC  Recent Labs   Lab 05/13/24 0439 05/12/24  0344   WBC 7.5 6.9   RBC 3.30* 3.28*   HGB 9.6* 9.4*   HCT 29.9* 29.8*   MCV 91 91   MCH 29.1 28.7   MCHC 32.1 31.5   RDW 14.8 14.7    202          Medical Decision Making       MANAGEMENT DISCUSSED with the following over the past 24 hours: Oncology   NOTE(S)/MEDICAL RECORDS REVIEWED over the past 24 hours: Oncology, ID  Medical complexity over the past 24 hours:  - Treatment limited by SOCIAL DETERMINANTS OF HEALTH      Advance Care Planning Discussion 5/13/2024. Chantell LIN PA-C met with Patient and their family today at the hospital to discuss Advance Care Planning. Kamilah Goldberg does not have decisional capacity  and was present for this discussion.  Those present were informed of the voluntary nature of this discussion and wished to proceed.  The discussion included:  goals of care, prognosis, intubation . This discussion began at 10:05 and ended at 10:23 for a total of 18 minutes.

## 2024-05-13 NOTE — PLAN OF CARE
Goal Outcome Evaluation:  Pt's oxymask not staying on overnight, pt moves head away when attempting to replace mask. VSS on RA, afebrile overnight. Was able to squeeze fingers with L hand twice in a row when asked early in shift but unable to follow any other commands. Intermittent cough, nonproductive. R toes wiggle but seems like a muscle spasm than purposeful movement. Tube feed infusing, restarted at 0000. Appears comfortable, no signs of pain or discomfort. Roy in place with AUOP. No BM, LBM 5/10. PICC infusing TKO, Rocephin started q24h per UA results, given at 0000. Turning Q2H. Continue with POC.

## 2024-05-13 NOTE — PROGRESS NOTES
"[id number]    BP 96/67 (BP Location: Right arm)   Pulse 88   Temp 98.8  F (37.1  C) (Axillary)   Resp 16   Ht 1.626 m (5' 4\")   Wt 71 kg (156 lb 8.4 oz)   SpO2 96%   BMI 26.87 kg/m      Tube feeds held since 2000 for [place], please restart at 0000 to goal.     BP in 90s/60s while sleeping. Able to squeeze hand when writer was holding it, but was not able to follow command to do it again. Had a stronger tone in LUE and mild tone noted in LLE (Kamilah had her eyes closed when doing this), but not able to lift either extremity. Other neuros unchanged. Afebrile. Appears comfortable, no signs of discomfort. Roy with adequate output. No BM. Q2H repos.   "

## 2024-05-13 NOTE — PROGRESS NOTES
[facility]  Transplant & Immunocompromised Infectious Disease Progress Note   Patient: Kamilah Goldberg, Date of birth 1980, Medical record number [id number].      Recommendations:     Repeating CMV and HIV DNA PCR today to ensure trending down  Ok to complete 3 days of ceftriaxone for empirical UTI treatment, would not treat longer  Continue IV GCV 5mg/kg q12h  Continue dapsone 100 mg daily for PJP prophylaxis   Continue Dolutegravir and Truvada    ID will follow the CMV and HIV PCR in process to give guidance, before defining a longer term ID course for her    Case discussed with Transplant ID Staff.    Signed:  Gerson Stewart MD, 05/13/2024   Transplant Infectious Disease Fellow  Pager [phone] For more paging info see [facility]-> Infectious Disease [place] HSCT Service        Patient Summary:   44-year-old woman with recent complicated strep empyema and substance use/unstable housing situation who presented to an outside hospital with new diagnosis of AIDS.  In the outside hospital she was diagnosed with a CNS lesion and then started on ART around April 17.  She was transferred here for oncology and neurosurgical evaluation.        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   This level of CMV reactivation suspicious for IRIS , fevers masked by steroids    Given high viral load we are treating CMV with IV GCV 5mg/kg q12h. Will Repeat CMV DNA PCR today to assess response, Once undetectable or less than 150 x2 then can stop and monitor for recurrence, there is no need for secondary ppx given her CD-4 count may recover.    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 He now has rare atypical cells on the repeat LP here, as well as EBV.     As outlined most infectious studies thus far are negative and the preliminary additional studies done here are also not suggestive of infection. CSF WBC count 3 and normal glucose suggests against infection.   - Cryptococcus has essentially been ruled out with negative CSF CrAg and two negative serum CrAg. CSF not consistent.  - CMV encephalitis seems much less likely based negative CMV PCR   - Toxoplasmosis serology is negative, as is PCR  - PML has been ruled out with -ve RENEA virus PCR  - Tb meningitis: negative quant-gold. AFB cultures are pending. CSF not consistent.  - Endemic mycoses: again not a classic presentation and no other signs of disseminated disease (typically can see pancytopenia, elevated alk phos, splenomegaly, etc). Eval is negative so far.     The location, rapid progression, and her current neurological status is very concerning as far as her short-term prognosis.     Agree with high-dose steroids now that we have maximized diagnostics since this lesion is not amenable to brain biopsy given its location. Appreciate hematology consideration of further therapeutics including IT methotrexate. I would note that ART and immune reconstitution is a vital part treating CNS lymphoma as well.    She should be on PJP prophylaxis, prefer dapsone.    #Non-immune to hepatitis A or B. Hep B coreAb negative  #Homeless and and history of polysubstance use disorder  # STD discussion  Other harm reduction labs have been completed at [facility] notably hep B/C, Syphilis, and other STI with gonorrhea and chlamydia    Per patients son and next of kin she  told him she does NOT  want her partner kemi to know about the HIV. Appreciate Social work support in reporting and disclosure    #COVID-19+ on 4/18  Remains in Isolation although asymptomatic    Other Infectious Disease Issues  #MRSA in sputum from 4/19  - Reason to for additional endemic disease testing: No   - Bacterial prophylaxis: None  - Pneumocystis prophylaxis: planning dapsone vs atovaquone vs pentamidine  - Viral serostatus & prophylaxis: CMV+  - Fungal prophylaxis: none  - Immunization status: Hep B non infected, non immune, Hep A non immune, largely unvaccinated since 2009 will need to be addressed once further from chemo and has had some immune reconstitution  - Gamma globulin status:No lab results found.  - Isolation status: Good hand hygiene. Contact fro MRSA in lung at OSH, Special 2/2 recent covid, immunocompromised.       Selected Labs, Micro, Imaging Results:   [facility] Workup  4/20 - HLA  -ve  4/18 - LP EBV CSF +ve  4/16 - Toxo IgG -ve, CMV IgG+  3/31- HIV RNA+ 1.2 million copies 6.9 log, CD-4 count 2/mcl  3/31/24- Genotype- no resistance  3/30/24- Syphilis Ab -ve  3/29/24- CT/NG Urine negative    [facility] Workup  4/27 Karius pending, Blasto blood and Histo Urine pending  4/26 CSF- Crypto -ve, EBV+, Toxo pending (other ID studies negative; RENEA virus, CMV, Cultures)  4/29 Hep C Pending (Hep C Ab -ve 4/1/24)         Interval History and Subjective:     Had a mucus plug over the weekend. A urinalysis checked for unclear reasons was positive so she was started on ceftriaxone. MRI shows no major response of the lesion.    Today her son and aunt are in the room and are tearful, they request time alone with her. I offered to answer any questions they had but had none for me today.    Review of Symptoms:  Unable       ID HPI from 4/25/24     43 year old woman with recently diagnosed HIV (1 month ago)/AIDs (CD4 2) who presented to [facility] 4/15/2024 with generalized weakness found to have CNS lesion,  "highest concern for CNS lymphoma, transferred here for neurosurgical evaluation and possible biopsy     She was seen 1/2023 at [facility] for severe pneumococcal pneumonia/empyema requiring VATs decortication.      She was next seen 3/29/2024 at [facility] with oral candidiasis and then HIV was screened and positive. CD4 2. She was given nystatin for 2 weeks and referred to ID clinic for followup. Not started on ART. MRI brain at this time showed a 15mm T2 hyperintensity in the pontomesencephalic junction (initially dx as ischemic stroke).      She went back to [facility] 4/15/2024 for generalized weakness. She had a broad work-up after repeat brain MRI showed increase size of the lesion. She was started on Biktarvy ~4/17 but there were concerns for a rash though it was restarted. She was COVID+ 4/18 and grew \"light growth\" MRSA on sputum culture 4/19. She was started on linezolid.      On arrival here, she is afebrile with HR 73, BP normotensive, satting 100% on RA. WBC and PLT normal. Hgb 11.3 very slightly low. Cr 0.46. LFTs have not been done since 4/19 when they were relatively unremarkable - ALT 44, AST 56, Tbili 0.3 and most notably Alk Phos was normal at 79.      On interview today, she is relatively encephalopathic. She says she has two children, 24 and 14. Her son has a cat. She denies pain or discomfort. Says she is willing to take medicine for HIV       Physical Exam:     VITAL SIGNS:  Blood pressure 121/78, pulse 104, temperature 99.3  F (37.4  C), temperature source Oral, resp. rate 20, height 1.626 m (5' 4\"), weight 69.4 kg (153 lb), SpO2 92%.   GENERAL APPEARANCE: Not in acute distress    PHYSICAL EXAM:  Eyes:   closed  Mouth, Throat:   NG tube present  Cardiovascular: No Cyanosis, JVD not elevated  Respiratory:  Not in respiratory distress, Chest expansion symmetrical, No Audible wheeze/stridor, wet crackling cough present  Skin:  The exposed skin is warm and dry  Neurologic:     Higher Mental " Function: Nonverbal, unable to assess   Motor: No spontaneous movement  Psychiatric: Unable to assess       Other Data:     MEDICATIONS  Current Facility-Administered Medications   Medication Dose Route Frequency Provider Last Rate Last Admin    acetylcysteine (MUCOMYST) 10 % nebulizer solution 4 mL  4 mL Nebulization BID Yair Correia MD        atorvastatin (LIPITOR) tablet 40 mg  40 mg Oral or Feeding Tube Daily Russel Boothe DO   40 mg at 05/13/24 0900    cefTRIAXone (ROCEPHIN) 1 g vial to attach to  mL bag for ADULTS or NS 50 mL bag for PEDS  1 g Intravenous Q24H Jazzy Santamaria MD   1 g at 05/13/24 0023    dapsone suspension 100 mg  100 mg Oral or Feeding Tube Daily Kandi Lambert PA-C   100 mg at 05/13/24 0900    dexAMETHasone (DECADRON) tablet 4 mg  4 mg Oral Q12H [facility] (08/20) Krystal Ochoa PA-C   4 mg at 05/13/24 0900    dolutegravir (TIVICAY) tablet 50 mg  50 mg Oral or NG Tube Daily Ramesh Van MD   50 mg at 05/12/24 2221    emtricitabine-tenofovir (TRUVADA) 200-300 MG per tablet 1 tablet  1 tablet Oral or NG Tube Daily Ramesh Van MD   1 tablet at 05/12/24 2006    [Held by provider] enoxaparin ANTICOAGULANT (LOVENOX) injection 40 mg  40 mg Subcutaneous Q24H Krystal Ochoa PA-C   40 mg at 05/10/24 2139    famotidine (PEPCID) suspension 20 mg  20 mg Oral BID Kandi Lambert PA-C   20 mg at 05/13/24 0900    ganciclovir (CYTOVENE) 350 mg in D5W 100 mL intermittent infusion  5 mg/kg Intravenous Q12H Edilma Phelan PA-C 100 mL/hr at 05/13/24 0901 350 mg at 05/13/24 0901    heparin lock flush 10 UNIT/ML injection 5-20 mL  5-20 mL Intracatheter Q24H Tucker Jacob MD   5 mL at 05/13/24 0439    ipratropium - albuterol 0.5 mg/2.5 mg/3 mL (DUONEB) neb solution 3 mL  3 mL Nebulization 2 times daily Yair Correia MD        lidocaine (viscous) (XYLOCAINE) 2 % solution 5 mL  5 mL Topical Once Jazzy Santamaria MD            IMMUNOLOGY LABS  CD-4 Counts   Absolute CD4, Ruth T Cells   Date Value Ref Range Status   05/08/2024 15 (L) 441 - 2,156 cells/uL Final     Inflammatory Markers    Recent Labs   Lab Test 04/29/24  0819   G6PD 13.0     Immune Globulin Studies   No lab results found.    GENERAL LABS  Metabolic Studies       Recent Labs   Lab Test 05/13/24 0439 05/12/24 0344 04/27/24  0925 04/27/24  0724 01/26/23  0541 01/24/23 2118 01/17/23  1231 01/17/23  0641    138   < > 140   < >  --    < >  --    POTASSIUM 3.9 3.8   < > 3.5   < >  --    < > 2.9*   CHLORIDE 103 102   < > 108*   < >  --    < >  --    CO2 27 28   < > 20*   < >  --    < >  --    ANIONGAP 8 8   < > 12   < >  --    < >  --    BUN 16.1 15.6   < > 7.4   < >  --    < >  --    CR 0.38* 0.42*   < > 0.48*   < >  --    < >  --    GFRESTIMATED >90 >90   < > >90   < >  --    < >  --    * 136*   < > 178*   < >  --    < >  --    A1C  --   --   --  5.7*  --   --   --   --    MAUREEN 8.4* 8.3*   < > 9.1   < >  --    < >  --    PHOS 4.6* 3.6   < > 4.1   < >  --    < >  --    MAG 2.1 2.1   < > 1.9   < >  --    < >  --    LACT  --   --   --   --   --  1.4  --  0.8    < > = values in this interval not displayed.     Hepatic Studies    Recent Labs   Lab Test 05/13/24 0439 05/12/24 0344 05/11/24 0405 04/30/24  0405 04/29/24  0431 04/24/24 2050 01/17/23  0641   BILITOTAL 0.3 0.3 0.4   < > 0.3   < >  --    DBIL  --   --   --   --  <0.20   < >  --    ALKPHOS 72 68 69   < > 74   < >  --    PROTTOTAL 5.9* 5.7* 5.8*   < > 7.6   < > 6.3*   ALBUMIN 3.3* 3.2* 3.2*   < > 3.5   < >  --    AST 24 22 26   < > 23   < >  --    ALT 44 41 45   < > 17   < >  --    LDH  --   --   --   --  220  --  263*    < > = values in this interval not displayed.     Pancreatitis testing    Recent Labs   Lab Test 04/10/19  0003   LIPASE 162     Hematology Studies   Recent Labs   Lab Test 05/13/24  0439 05/12/24  0344 05/11/24  0405 05/10/24  0331 04/26/24  0615 04/24/24  2050 02/24/22  1458  "04/10/19  0003   WBC 7.5 6.9 8.0 6.3   < > 6.7  6.7   < > 4.1   ANEU  --   --   --   --   --  6.1  --  2.0   ANEUTAUTO 7.0 6.3 7.2 5.5   < >  --    < >  --    ALYM  --   --   --   --   --  0.5*  --  1.4   ALYMPAUTO 0.3* 0.4* 0.4* 0.5*   < >  --    < >  --    KYLE  --   --   --   --   --  0.0  --  0.5   AMONOAUTO 0.1 0.2 0.3 0.2   < >  --    < >  --    AEOS  --   --   --   --   --  0.0  --  0.2   AEOSAUTO 0.0 0.0 0.0 0.0   < >  --    < >  --    ABSBASO 0.0 0.0 0.0 0.0   < >  --    < >  --    HGB 9.6* 9.4* 9.8* 10.2*   < > 11.3*  11.3*   < > 7.7*   HCT 29.9* 29.8* 29.7* 31.2*   < > 34.2*  34.2*   < > 27.4*    202 198 184   < > 450  450   < > 470*    < > = values in this interval not displayed.     Urine Studies     Recent Labs   Lab Test 05/12/24  2007 05/02/24  0027 05/01/24  1613 05/01/24  0854 05/01/24  0131 04/29/24  2100 01/16/23  2132 09/19/18  2225   URINEPH 5.5 7.5 8.0 8.0 7.5*   < > 6.0 5.5   NITRITE Positive*  --   --   --   --   --  Negative Negative   LEUKEST Large*  --   --   --   --   --  Small* Trace*   WBCU 75*  --   --   --   --   --  <1 <1    < > = values in this interval not displayed.     CSF testing     Recent Labs   Lab Test 04/26/24  1116 04/26/24  1115   CWBC  --  3   CRBC  --  44*   CGLU 52  --    CTP  --  109.0*     Medication levels    Recent Labs   Lab Test 01/20/23  1230   VANCOMYCIN 7.6     Body fluid stats    Recent Labs   Lab Test 01/17/23  1500   FCOL Yellow   FAPR Hazy*   FWBC 5,760   FNEU 78   FLYM 16   FMONO 5   FBAS 1   FTP 5.6       MICROBIOLOGY  Fungal testing:  No lab results found.    Invalid input(s): \"HIFUN\", \"FUNGL\"  Last Culture results   Culture   Date Value Ref Range Status   05/12/2024 Culture in progress  Preliminary   05/09/2024 No growth after 4 days  Preliminary   05/08/2024 No growth after 4 days  Preliminary   05/08/2024 No growth after 4 days  Preliminary   04/26/2024 No anaerobic organisms isolated  Final   04/26/2024 No growth after 16 days  " "Preliminary   04/26/2024 No Growth  Final   04/26/2024 No growth after 17 days  Preliminary   01/24/2023 No Growth  Final   01/24/2023 No Growth  Final   01/20/2023 No anaerobic organisms isolated  Final   01/20/2023 No Growth  Final   01/20/2023 No Growth  Final   01/17/2023 2+ Streptococcus pneumoniae (A)  Final   01/17/2023 No Growth  Final   12/04/2022 No Growth  Final   12/04/2022 3+ Normal alo  Final   12/04/2022 No Growth  Final   02/24/2022 No Growth  Final     Culture Micro   Date Value Ref Range Status   04/30/2012 No growth  Final   06/30/2010 >100,000 colonies/mL Escherichia coli  Final   02/05/2010 No growth  Final   12/01/2009 No growth  Final       Last checks of Clostridioides difficile testing  No lab results found.  Infection Studies to assess Diarrhea No lab results found.    Invalid input(s): \"BIDRY\", \"BIDYD\"  Virology:  Coronavirus-19 testing    Recent Labs   Lab Test 05/09/24  1220 11/28/23  1451 01/16/23 2017 12/04/22 2036   [id number] Positive* Negative Negative Negative   CYCLETHRES 26.9  --   --   --      Respiratory virus (non-coronavirus-19) testing    Recent Labs   Lab Test 11/28/23  1451   INFZA Negative   INFZB Negative   IRSV Negative     CMV viral loads    Recent Labs   Lab Test 05/07/24  0332 05/01/24  0839   CMVRESINST 91,000* 1,010*   CMVLOG 5.0 3.0     CMV resistance testing:  No lab results found.  No results found for: \"H6RES\"  No results found for: \"EBVRESINST\", \"83430\", \"EBQTC\", \"EBRES\", \"63516\", \"57577\"  BK Virus Testing   No lab results found.  Parvovirus Testing  No lab results found.    Invalid input(s): \"PRVRES\"  Adenovirus Testing  No lab results found.    Invalid input(s): \"ADENAB\", \"ADENOVIRUS\", \"ADQT\"    IMAGING  Recent Results (from the past 48 hour(s))   POC US Guidance Needle Placement    Impression    Limited abdominal ultrasound was performed and demonstrated an adequate window for paramedian lumbar puncture at the L5-S1 and the L4-L5 level, LP was " subsequently attempted.        XR Chest Port 1 View    Narrative    EXAM: XR CHEST PORT 1 VIEW 5/12/2024 10:36 AM     HISTORY: AHRF       COMPARISON: CT 4/25/2024, radiographs 11/20/2023    FINDINGS: Left upper extremity PICC tip projects over the low SVC.  Feeding tube reaches the stomach and passes below the field-of-view.  Normal cardiomediastinal silhouette. Mild streaky perihilar and left  basilar opacities. No consolidation. No pleural effusion or  pneumothorax.       Impression    IMPRESSION:  Mild perihilar and left basilar opacities. Differential includes  atelectasis or mild infection versus inflammation.    I have personally reviewed the examination and initial interpretation  and I agree with the findings.    KAITLYNN BARTON MD         SYSTEM ID:  [id number]   MR Brain w/o & w Contrast    Narrative    EXAM: MR BRAIN W/O & W CONTRAST  5/12/2024 12:57 PM     HISTORY: 43 yo female pmh HIV, and presumed CNS lymphoma. Reassess  response of mass with initiation of treatment       COMPARISON: MRI 4/30/2024    TECHNIQUE: MR head: Multiplanar T1-weighted, axial FLAIR, and  susceptibility images were obtained without intravenous contrast.  Following intravenous gadolinium-based contrast administration, axial  T2-weighted, diffusion, and T1-weighted images (in multiple planes)  were obtained.    Contrast: 7.1 mL IV Gadavist     FINDINGS:  No significant change in size to the enhancing lesion in avel and  midbrain (2.6 x 2.2 x 3 cm, previously 2.5 x 2.2 x 2.8 cm when  measured in similar fashion on the prior). Unchanged pattern of  peripheral T2 hyperintense and central T2 isointense signal. Slightly  decreased thickness of the peripheral enhancement. Unchanged mass  effect on the fourth ventricle. The thickness of the peripheral  pattern of restricted diffusion appears to have decreased.    Stable size of the ventricles without evidence of hydronephrosis. No  evidence of new intracranial mass lesions or  intracranial hemorrhage.    Major intracranial vascular structures are within normal limits.    No suspicious abnormality of the skull marrow signal. Clear paranasal  sinuses. Mastoid air cells are clear. No focal abnormality of the  pituitary gland, sella, skull base and upper cervical spinal  structures on sagittal images. The orbits are normal.      Impression    IMPRESSION:  1. No significant change in size of the brainstem mass compared to  4/30/2024. Possible slight decrease in peripheral pattern of  enhancement and restricted diffusion may indicate subtle response to  treatment.    I have personally reviewed the examination and initial interpretation  and I agree with the findings.    NAVARRO ROSARIO MD         SYSTEM ID:  [id number]         ATTESTATION  I have reviewed labs/blood-work that are part of this patients present encounter in addition to historical and baseline values. The specific values are recorded in Epic and I have incorporated them and my interpretation as relevant into my assessment and plan as recorded.  I have reviewed radiology reports/EKGs/and other diagnostic studies that are part of this patients present encounter, in addition to historical and baseline results.  The specific reports are available in Epic and I have incorporated them into my assessment and plan as described above. Independently interpreted diagnostic study results are described above.  This dictation was prepared in part using Dragon recognition software.  As a result errors may occur. When identified these transcription errors have been corrected.  While every attempt is made to correct errors during dictation, errors may still exist.      Signature:     Gerson Stewart MD   PGY-6 Transplant Infectious Disease Fellow

## 2024-05-13 NOTE — PLAN OF CARE
Goal Outcome Evaluation:  Kamilah was unresponsive and was unable to do a neuro check or assess her swallowing. Suctuined orally a few times for a small amount of sputum. Family conference held today to discuss goals of care and family wanted to think about it. Turned every 2 hours.

## 2024-05-13 NOTE — PROGRESS NOTES
[facility]    Hematology / Oncology Progress Note    Patient: Kamilah Goldberg  MRN: [id number]  Admission Date: 4/24/2024  Date of Service (when I saw the patient): 05/13/2024  Hospital Day # 19     Assessment & Plan   Kamilah Goldberg is a 44 year old female with a past medical hsitory of dyslipidemia, polysubstance abuse, and R-sided empyema (s/p VATS and partial pleurectomy Jan 2021). She was admitted on 4/24/2024 for L avel/midbrain lesion mass and new HIV/AIDS diagnosis (3/28/24). On work up, was noted to have positive EBV PCR on CSF and rare atypical cells on CSF c/f CNS lymphoma. In the absence of tissue diagnosis, empirically started high-dose methotrexate and rituximab C1D1=4/29/24. Course further complicated by COVID positivity, acute decline in neurological status, HIV/AIDS.     Today:  - Today is day 15 of high-dose methotrexate and rituximab.   - Care conference this morning with son, aunt, primary team, and palliative care. Discussed the decision point of continuing with another cycle of chemo despite lack of radiographic or clinical improvement thus far. This decision is complicated by pt's worsening ability to manage secretions and concern for aspiration. After discussion with SLP and pulm, would anticipate elective intubation if goals remain restorative. Updated son, Yuan. He will consider tonight and come into hospital tomorrow to further discussion his decision.     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 She was found to have thrush and was diagnosed with HIV (see below). MRI was obtained d/t dizziness and concern for neurocandidiasis. MRI brain 3/29/24 with area of restricted diffusion and T2 hyperintensity in left posterior pontomesencephalic junction, mild local mass effect. Likely representing ischemic  infarct. LP 3/29/24 CSF analysis for infection was negative. Cytology added for possible lymphoma, but there were insufficient viable cells for analysis. She was discharged with plan of repeat MRI brain in 3 months. She then presented in April 2024 with right facial and arm paresthesia and RLE weakness. Workup concerning that the left midbrain/avel mass appeared to be increasing in size compared to MRI 3/20/23 concerning for CNS lymphoma. CT CAP w/o evidence of systemic disease. Unfortunately, location of the mass was deemed too high risk for biopsy to obtain tissue diagnosis. Repeat high volume LP 4/18/24 obtained in hope to confirm malignancy but this remained non-diagnostic. She was transferred to [facility] for NSGY to pursue biopsy but this still remained too high risk. Repeat LP 4/26/24 with rare atypical cells on cytology but does not correlate with flow cytometry (rare polytypic B cells). LP also notable for positive EBV PCR, elevated protein (109), but negative CMV, meningitis/encephalitis panel. After extensive work up ruling out infectious processes and trying to obtain confirmation of CNS lymphoma diagnosis, plan of care was discussed with patient's decission maker who agreed with proceeding treatment for CNS lymphoma. Proceeded with high dose methotrexate (C1D1=4/30/29), Rituxan added D4 and D7.   - Infectious studies at [facility] thus far negative for CMV IgM negative (IgG positive suggesting previous infection), Cryptococcus, CMV encephalitis, Tb meningitis, toxoplasmosis serology, toxoplasma PCR. Karius w/ E.Coli, CMV and human adenovirus; discussed with ID no need to treat E.Coli.   - Continue dexamethasone taper currently on 4 mg BID (x5/9)   - PICC line placed x4/29   - Repeat brain MRI 5/12 after cycle 1 showed no significant change in size of the brainstem mass compared to 4/30/2024. Clinically, patient has neurologically declined since beginning of treatment. Previously was able to follow commands,  now unable to follow commands.   - Discussed lack of response with family on 5/13. They will consider whether or not they wish to proceed with C2.   - Attempted to obtain another LP on 5/12, but was unable to obtain CSF                 Treatment Plan: High-dose methotrexate + rituximab (C1D1 = 4/30/24)  Methotrexate 3.5 g/m2 (6.405 g) - D1, D8  Rituximab 375 mg/m2 (700 mg) - Given 5/2 and 5/6  Leucovorin 50 mg D2 until methotrexate level <0.1  mIVF 0.45% NS with sodium bicarbonate @125 ml/hr     # Anemia  Likely due to underlying suspected malignancy and chemotherapy.   - Monitor CBC daily  - Transfuse if Hgb <7 and plt <10k     ID  # HIV/AIDS  Recently diagnosed with HIV during admission at [facility] on 3/28/24 with viral load of 1.24 million, 6.9 log,  and CD4 count of 2. She was not started on ART, given Nystatin x2 weeks and referred to ID clinic for follow up. Prior to this, she was admitted again with ID consult for broad-infectious workup. Started Biktarvy 4/17 but stopped due concern for rash, rechallenged on 4/20 and able to tolerate. Later transitioned to Dolutegravir and Truvada. SW reached out to health department 5/2 regarding patient's HIV status and if able to be reported properly. Upon further discussions and health department chart review, original diagnosis was in 2014 and patient was contacted over 14 times with no response.  Unable to confirm with patient if she is was aware of status as she is non-verbal. Upon further chart review, HIV testing was completed 8/27/24 at [facility] with office visit to follow, but encounter is not able to be reviewed and needing patient authorization; will discuss with son (Yuan) as he is her next of kin and medical decision maker. Of note, patient's aunt (Noemy), and son (Yuan) are aware of diagnosis.   - Upon further conversation with boyfriend (Otis), it does not appear he is aware of her HIV status. I asked about general medical  history and any new/recent infections to which he did not mention HIV/AIDS status. ID had a similar conversation with a consistent consensus. Reached out to SW to discuss with family/son regarding if previous partners have been notified.  - Continue ART with dolutegravir and Truvada   - Repeat CMV an HIV DNA PCRs pending  - Plan to repeat CD4 count and viral load circa x5/15     # Concern for UTI  On 5/12, was noted to have malodorous urine. Has Roy in place. UA showed positive nitrites, large LE, 75 WBC.   - Roy was replaced 5/13  - IV CTX x5/12. Plan for 3 days per ID.   - Follow urine culture    # COVID-19 positive  # MRSA pneumonia  Tested positive for COVID-19 4/18/24 during admission to [facility]. Asymptomatic from COVID. Per ID, does not feel like MRSA pneumonia is likely so linezolid was discontinued  - Per infection prevention, first day eligible for re-testing was 5/9, which showed a CT of 26.9. Per recommendations should remain in precautions until negative COVID test or CT >35. Consider repeat COVID test 5/16.     # Prophylaxis  - Dapsone 100 mg daily for PJP ppx     PULM  # Concern for aspiration  # Questionable ability to protect airway  On 5/12, patient had episode of rapid desaturation to 50%. Code blue was called, but pt was able to be stabilized with suction and supplement oxygen via high flow. No arrest. Suspect d/t mucus plugging. With worsening mental status, uncertain of ability to clear secretions and protect airway. Unable to be evaluated by SLP given inability to follow commands.   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 Found on assessment by ophthalmology x4/28. Repeat ophtho exam 5/6 noted to have  "worsening cotton wool spots; etiologies include HIV retinitis, HTN retinitis or CMV retinitis.  - Currently on IV ganciclovir      SOCIAL   # Medical decision maker  Confirmed with family that son (Yuan) will be patient's primary medical decision maker. Son Yuan and patient are okay with communication with aunt (Noemy) if Yuan is unavailable.      # H/o stimulant/amphetamine use   Noted on [facility] tox screen in 2016. Patient previously reported snorting and declined IVDU.     Clinically Significant Risk Factors          # Hypocalcemia: Lowest Ca = 8.3 mg/dL in last 2 days, will monitor and replace as appropriate     # Hypoalbuminemia: Lowest albumin = 3.1 g/dL at 5/3/2024  4:29 AM, will monitor as appropriate            # Overweight: Estimated body mass index is 26.26 kg/m  as calculated from the following:    Height as of this encounter: 1.626 m (5' 4\").    Weight as of this encounter: 69.4 kg (153 lb).   # Moderate Malnutrition: based on nutrition assessment           FEN  Diet: Regular Diet Adult   IVF: Bolus PRN   Lytes: Replete per protocol    PPX  VTE: Lovenox  Bowel: Senna/MiraLax PRN  GI/PUD: Pepcid    MISC  Code Status: Full Code   Lines/Drains: PICC  Medically Ready for Discharge: Anticipated in 5+ Days  Dispo: TBD pending GOC conversations    Patient was seen and plan of care was discussed with attending physician Dr. Correia.    I spent 90 minutes in the care of this patient today, which included time necessary for review of interval events, obtaining history and physical exam, ordering medications/tests/procedures as medically indicated, review of pertinent medical literature, counseling of the patient, coordination of care, and documentation time. Over 50% of time was spent counseling the patient and/or coordinating care.    Kira Gonzalez PA-C   Hematology/Oncology   Pager: [phone]  Desk phone: [phone]    Interval History   No acute events overnight. O2 requirements improved overnight, now on " room air. This morning on exam, patient is awake and alert but is not interactive. Eyes track to speaker but that is extent of her engagement on exam. Coarse breathing.     Care conference this morning with son, aunt, primary team, and palliative care. Discussed the decision point of continuing with another cycle of chemo despite lack of radiographic or clinical improvement thus far. This decision is complicated by pt's worsening ability to manage secretions and concern for aspiration. After discussion with SLP and pulm, would anticipate elective intubation if goals remain restorative. Updated son, Yuan. He will consider tonight and come into hospital tomorrow to further discussion his decision.     Vital Signs with Ranges  Temp:  [98  F (36.7  C)-99.3  F (37.4  C)] 99.3  F (37.4  C)  Pulse:  [] 104  Resp:  [16-20] 20  BP: ()/(67-82) 121/78  SpO2:  [92 %-96 %] 92 %  I/O last 3 completed shifts:  In: 1165 [I.V.:100; NG/GT:460]  Out: 2805 [Urine:2805]    Physical Exam   General: Sitting up in bed, alert, NAD. Not interactive. Non-verbal.   Skin: No concerning lesions, rash, jaundice, cyanosis, erythema, or ecchymoses on exposed surfaces.   HEENT: NCAT. Anicteric sclera. Dry mucus membranes.    Respiratory: Non-labored breathing on room air, good air exchange, coarse lung sounds but no focal abnormality.   Cardiovascular: RRR. No murmur or rub.   Gastrointestinal: Normoactive BS. Abdomen soft, ND, NT. No palpable masses.  Extremities: No LE edema.   Neurologic: A&O x 3, speech normal, no deficits grossly.    Medications   Current Facility-Administered Medications   Medication Dose Route Frequency Provider Last Rate Last Admin    dextrose 10% infusion   Intravenous Continuous PRN Russel Boothe DO         Current Facility-Administered Medications   Medication Dose Route Frequency Provider Last Rate Last Admin    acetylcysteine (MUCOMYST) 10 % nebulizer solution 4 mL  4 mL Nebulization BID Yair Correia  MD        atorvastatin (LIPITOR) tablet 40 mg  40 mg Oral or Feeding Tube Daily Moo BootheikDO   40 mg at 05/13/24 0900    cefTRIAXone (ROCEPHIN) 1 g vial to attach to  mL bag for ADULTS or NS 50 mL bag for PEDS  1 g Intravenous Q24H Jazzy Santamaria MD   1 g at 05/13/24 0023    dapsone suspension 100 mg  100 mg Oral or Feeding Tube Daily Kandi Lambert PA-C   100 mg at 05/13/24 0900    dexAMETHasone (DECADRON) tablet 4 mg  4 mg Oral Q12H [facility] (08/20) Krystal Ochoa PA-C   4 mg at 05/13/24 0900    dolutegravir (TIVICAY) tablet 50 mg  50 mg Oral or NG Tube Daily Ramesh Van MD   50 mg at 05/12/24 2221    emtricitabine-tenofovir (TRUVADA) 200-300 MG per tablet 1 tablet  1 tablet Oral or NG Tube Daily Ramesh Van MD   1 tablet at 05/12/24 2006    [Held by provider] enoxaparin ANTICOAGULANT (LOVENOX) injection 40 mg  40 mg Subcutaneous Q24H Krystal Ochoa PA-C   40 mg at 05/10/24 2139    famotidine (PEPCID) suspension 20 mg  20 mg Oral BID Kandi aLmbert PA-C   20 mg at 05/13/24 0900    ganciclovir (CYTOVENE) 350 mg in D5W 100 mL intermittent infusion  5 mg/kg Intravenous Q12H Edilma Phelan PA-C 100 mL/hr at 05/13/24 0901 350 mg at 05/13/24 0901    heparin lock flush 10 UNIT/ML injection 5-20 mL  5-20 mL Intracatheter Q24H Tucker Jacob MD   5 mL at 05/13/24 0439    ipratropium - albuterol 0.5 mg/2.5 mg/3 mL (DUONEB) neb solution 3 mL  3 mL Nebulization 2 times daily Yair Correia MD        lidocaine (viscous) (XYLOCAINE) 2 % solution 5 mL  5 mL Topical Once Jazzy Santamaria MD         Data   Results for orders placed or performed during the hospital encounter of 04/24/24 (from the past 24 hour(s))   Lumbar puncture    Narrative    Timothy García MD     5/12/2024  4:04 PM  [facility]    Lumbar puncture    Date/Time: 5/12/2024 3:55 PM    Performed by: Timothy García MD  Authorized  by: Timothy García MD  Indications: evaluation for infection   and evaluation for altered mental status  Preparation: Patient was prepped and draped in the usual sterile fashion.      UNIVERSAL PROTOCOL   Site Marked: Yes  Prior Images Obtained and Reviewed:  Yes  Required items: Required blood products, implants, devices and special   equipment available    Patient identity confirmed:  Hospital-assigned identification number,   provided demographic data and arm band  NA - No sedation, light sedation, or local anesthesia  Confirmation Checklist:  Patient's identity using two indicators, relevant   allergies, procedure was appropriate and matched the consent or emergent   situation and correct equipment/implants were available  Time out: Immediately prior to the procedure a time out was called    Universal Protocol: the Joint Commission Universal Protocol was followed    Preparation: Patient was prepped and draped in usual sterile fashion    ESBL (mL):  0     ANESTHESIA    Anesthesia:  Local infiltration  Local Anesthetic:  Lidocaine 1% without epinephrine  Anesthetic Total (mL):  5      SEDATION    Patient Sedated: No      PROCEDURE DETAILS  Lumbar space: L4-L5 interspace (L5-S1)  Needle gauge: 22  Needle type: spinal needle - Quincke tip  Needle length: 3.5 in  Number of attempts: 5 or more  Total volume: 0 ml  Post-procedure: adhesive bandage applied and site cleaned      PROCEDURE  Describe Procedure: Consult and Procedure Service Note    The risks and benefits of the procedure were explained to patient son who   expressed understanding and opted to proceed. Consent was obtained and   placed in the chart.   A time out was performed.    The patient was placed in the Right lateral decubitus and the paramedian   L5-S1 innerspace identified, palpated, and marked. Subsequently 2 ml of 1%   lidocaine was instilled at this site. A 3 inch 22 G needle was then   inserted and firm resistance at needle tip and no  fluid  obtained on the   1st -2nd attempt. Then the L4-L5 paramedian innerspace identified,   palpated, and marked. Subsequently 2 ml of 1% lidocaine was instilled at   this site. A 3 inch 22 G needle was then inserted and firm resistance at   needle tip and no  fluid obtained on the 3rd-4th attempt. Then the L4-L5   midline innerspace identified, palpated, and marked. Subsequently 2 ml of   1% lidocaine was instilled at this site. A 3 inch 22 G needle was then   inserted and firm resistance at needle tip and no  fluid obtained on the   5th thru 10th attempt.   After fluid collection, the stylet was replaced and the needle was   removed.  A total of  0ml was removed and a total of 10 attempts were made.    The specimen(s) left secured in lab bag with label at bedside to be sent   for further analysis; RN and primary team were then updated.                 Patient Tolerance:  Patient tolerated the procedure well with no immediate   complications  Length of time physician/provider present for 1:1 monitoring during   sedation: 0   Thank you for consulting the CAPS team. Please contact the Consult and   Procedure Service if any concerns or complications arise.        Consult and Procedure Service Note    The risks and benefits of the procedure were explained to patients wife   and dgtr bedside, who expressed understanding and opted to proceed.   Consent was obtained and placed in the chart.   A time out was performed.    The patient was placed in the Left lateral decubitus and the L5-S1   innerspace identified, palpated, and marked. Susequently 3 ml of 1%   lidocaine was instilled at this site.  A 3 inch 22 G needle was then inserted firm resistance at needle tip and   no fluid obtained;  L4-L5 innerspace identified, palpated, and marked.   Susequently 2 ml of 1% lidocaine was instilled at this site; firm   resistance at needle tip and no fluid obtained   pink then clear fluid obtained on the 4th attempt.  Opening pressure was  4.4 cm of H2O.   After fluid collection, the stylet was replaced and the needle was   removed.    A total of 10ml was removed and a total of 4 attempts were made.  The specimen(s) left secured in lab bag with label at bedside to be sent   for further analysis; RN and primary team were then updated.      firm resistance at needle tip and no fluid obtained             Comments: Thank you for consulting the CAPS team. Please contact the   Consult and Procedure Service if any concerns or complications arise.        Consult and Procedure Service Note    The risks and benefits of the procedure were explained to patients wife   and dgtr bedside, who expressed understanding and opted to proceed.   Consent was obtained and placed in the chart.   A time out was performed.    The patient was placed in the Left lateral decubitus and the L5-S1   innerspace identified, palpated, and marked. Susequently 3 ml of 1%   lidocaine was instilled at this site.  A 3 inch 22 G needle was then inserted firm resistance at needle tip and   no fluid obtained;  L4-L5 innerspace identified, palpated, and marked.   Susequently 2 ml of 1% lidocaine was instilled at this site; firm   resistance at needle tip and no fluid obtained   pink then clear fluid obtained on the 4th attempt.  Opening pressure was 4.4 cm of H2O.   After fluid collection, the stylet was replaced and the needle was   removed.    A total of 10ml was removed and a total of 4 attempts were made.  The specimen(s) left secured in lab bag with label at bedside to be sent   for further analysis; RN and primary team were then updated.      firm resistance at needle tip and no fluid obtained                  UA with Microscopic reflex to Culture    Specimen: Urine, Roy Catheter   Result Value Ref Range    Color Urine Yellow Colorless, Straw, Light Yellow, Yellow    Appearance Urine Slightly Cloudy (A) Clear    Glucose Urine Negative Negative mg/dL    Bilirubin Urine Negative Negative     Ketones Urine Negative Negative mg/dL    Specific Gravity Urine 1.026 1.003 - 1.035    Blood Urine Negative Negative    pH Urine 5.5 5.0 - 7.0    Protein Albumin Urine 20 (A) Negative mg/dL    Urobilinogen Urine Normal Normal, 2.0 mg/dL    Nitrite Urine Positive (A) Negative    Leukocyte Esterase Urine Large (A) Negative    Bacteria Urine Few (A) None Seen /HPF    Mucus Urine Present (A) None Seen /LPF    Calcium Oxalate Crystals Urine Few (A) None Seen /HPF    RBC Urine 5 (H) <=2 /HPF    WBC Urine 75 (H) <=5 /HPF    Narrative    Urine Culture ordered based on laboratory criteria   Urine Culture    Specimen: Urine, Roy Catheter   Result Value Ref Range    Culture Culture in progress    CBC with Platelets & Differential    Narrative    The following orders were created for panel order CBC with Platelets & Differential.  Procedure                               Abnormality         Status                     ---------                               -----------         ------                     CBC with platelets and d...[[id number]]  Abnormal            Final result                 Please view results for these tests on the individual orders.   Phosphorus   Result Value Ref Range    Phosphorus 4.6 (H) 2.5 - 4.5 mg/dL   Comprehensive metabolic panel   Result Value Ref Range    Sodium 138 135 - 145 mmol/L    Potassium 3.9 3.4 - 5.3 mmol/L    Carbon Dioxide (CO2) 27 22 - 29 mmol/L    Anion Gap 8 7 - 15 mmol/L    Urea Nitrogen 16.1 6.0 - 20.0 mg/dL    Creatinine 0.38 (L) 0.51 - 0.95 mg/dL    GFR Estimate >90 >60 mL/min/1.73m2    Calcium 8.4 (L) 8.6 - 10.0 mg/dL    Chloride 103 98 - 107 mmol/L    Glucose 127 (H) 70 - 99 mg/dL    Alkaline Phosphatase 72 40 - 150 U/L    AST 24 0 - 45 U/L    ALT 44 0 - 50 U/L    Protein Total 5.9 (L) 6.4 - 8.3 g/dL    Albumin 3.3 (L) 3.5 - 5.2 g/dL    Bilirubin Total 0.3 <=1.2 mg/dL   Magnesium   Result Value Ref Range    Magnesium 2.1 1.7 - 2.3 mg/dL   Uric acid   Result Value Ref Range     Uric Acid 3.5 2.4 - 5.7 mg/dL   Partial thromboplastin time   Result Value Ref Range    aPTT 25 22 - 38 Seconds   INR   Result Value Ref Range    INR 1.13 0.85 - 1.15   Fibrinogen activity   Result Value Ref Range    Fibrinogen Activity 304 170 - 490 mg/dL   CBC with platelets and differential   Result Value Ref Range    WBC Count 7.5 4.0 - 11.0 10e3/uL    RBC Count 3.30 (L) 3.80 - 5.20 10e6/uL    Hemoglobin 9.6 (L) 11.7 - 15.7 g/dL    Hematocrit 29.9 (L) 35.0 - 47.0 %    MCV 91 78 - 100 fL    MCH 29.1 26.5 - 33.0 pg    MCHC 32.1 31.5 - 36.5 g/dL    RDW 14.8 10.0 - 15.0 %    Platelet Count 203 150 - 450 10e3/uL    % Neutrophils 93 %    % Lymphocytes 3 %    % Monocytes 2 %    % Eosinophils 1 %    % Basophils 0 %    % Immature Granulocytes 1 %    NRBCs per 100 WBC 0 <1 /100    Absolute Neutrophils 7.0 1.6 - 8.3 10e3/uL    Absolute Lymphocytes 0.3 (L) 0.8 - 5.3 10e3/uL    Absolute Monocytes 0.1 0.0 - 1.3 10e3/uL    Absolute Eosinophils 0.0 0.0 - 0.7 10e3/uL    Absolute Basophils 0.0 0.0 - 0.2 10e3/uL    Absolute Immature Granulocytes 0.1 <=0.4 10e3/uL    Absolute NRBCs 0.0 10e3/uL

## 2024-05-13 NOTE — PROGRESS NOTES
PALLIATIVE CARE SOCIAL WORK Progress Note   Location: [facility]      PCSW participated in care conference held this morning with Yuan (son), Noemy (aunt), Heme/Onc, and Palliative Care. Family was given the update that Kamilah's scan looks a little worse than last week. Also the team has concerns that the episode of mucus plugging could occur again. Discussed that some assessment is needed and it may be recommended that she is intubated. Yuan wants the assessment done and wants more time to think about what he'd want done. He started crying when reflecting over how difficult the last few years have been for him. (He shared at the first care conference that many close family members have  recently).      Plan: PCSW will continue to be available for ongoing emotional support while Palliative Care Team is following.     Clinical Social Work Interventions:   Assessment of palliative specific issues    Attended/participated in care conference  Facilitation of processing of thoughts/feelings  Family communication facilitated    DEIRDRE Mercado  [facility], Palliative Care  Securely message with the [employer] Web Console (learn more here) or  Text page via [employer] Paging/Directory

## 2024-05-14 NOTE — PROGRESS NOTES
[facility]  Transplant & Immunocompromised Infectious Disease Progress Note   Patient: Kamilah Goldberg, Date of birth 1980, Medical record number [id number].      Recommendations:     Ok to complete 3 days of ceftriaxone for empirical UTI treatment, would not treat longer unless fevers or other issues.  Continue IV GCV 5mg/kg q12h  Repeat CMV DNA PCR weekly, next due 5/20/24, continued downtrend would be reassuring  Would be ok to transition to oral VGCV if level declines below 10,000  Would continue treatment until level is less than 150 twice [1 week apart]  Continue dapsone 100 mg daily for PJP prophylaxis   Continue Dolutegravir and Truvada  Repeat HIV Viral load 6/10/24    Transplant ID will not plan to actively follow at this juncture.  Please feel free to recontact us anytime for assistance with the CMV or to facilitate outpatient follow-up if it becomes necessary.    Case discussed with Transplant ID Staff.    Signed:  Gerson Stewart MD, 05/14/2024   Transplant Infectious Disease Fellow  Pager [phone] For more paging info see [facility]-> Infectious Disease [place] HSCT Service        Patient Summary:   44-year-old woman with recent complicated strep empyema and substance use/unstable housing situation who presented to an outside hospital with new diagnosis of AIDS.  In the outside hospital she was diagnosed with a CNS lesion and then started on ART around April 17.  She was transferred here for oncology and neurosurgical evaluation.        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   This level of CMV reactivation suspicious for IRIS , fevers masked by steroids    Given high viral load we are treating CMV with IV GCV 5mg/kg q12h. CMV DNA PCR downtrending slightly, anticipate further downtrend in the coming weeks.  Provided there is more than 1 log drop over the  next 1 week then this would be reassuring of ongoing treatment response.  If level drops to below 10,000 then would be okay switching to oral valganciclovir.  Once undetectable or less than 150 x2 then can stop and monitor for recurrence, there is no need for secondary ppx given her CD-4 count may recover.    #HIV/AIDS, ART-naive prior to presentation  VL log 6, CD4 2 3/31/24  Viral load 427 5/13/2024.  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 He now has rare atypical cells on the repeat LP here, as well as EBV.     As outlined most infectious studies thus far are negative and the preliminary additional studies done here are also not suggestive of infection. CSF WBC count 3 and normal glucose suggests against infection.   - Cryptococcus has essentially been ruled out with negative CSF CrAg and two negative serum CrAg. CSF not consistent.  - CMV encephalitis seems much less likely based negative CMV PCR   - Toxoplasmosis serology is negative, as is PCR  - PML has been ruled out with -ve RENEA virus PCR  - Tb meningitis: negative quant-gold. AFB cultures are pending. CSF not consistent.  - Endemic mycoses: again not a classic presentation and no other signs of disseminated disease (typically can see pancytopenia, elevated alk phos, splenomegaly, etc). Eval is negative so far.     The location, rapid progression, and her current neurological status is very concerning as far as her short-term prognosis.     Agree with high-dose steroids now that we have maximized diagnostics since this lesion  is not amenable to brain biopsy given its location. Appreciate hematology consideration of further therapeutics including IT methotrexate. I would note that ART and immune reconstitution is a vital part treating CNS lymphoma as well.    She should be on PJP prophylaxis, prefer dapsone.    5/14/2024:  Her viral load is responding to treatment.  We can continue monthly checks to ensure she becomes undetectable.    #Non-immune to hepatitis A or B. Hep B coreAb negative  #Homeless and and history of polysubstance use disorder  # STD discussion  Other harm reduction labs have been completed at [facility] notably hep B/C, Syphilis, and other STI with gonorrhea and chlamydia    Per patients son and next of kin she told him she does NOT  want her partner kemi to know about the HIV. Appreciate Social work support in reporting and disclosure    #COVID-19+ on 4/18  Remains in Isolation although asymptomatic    Other Infectious Disease Issues  #MRSA in sputum from 4/19  - Reason to for additional endemic disease testing: No   - Bacterial prophylaxis: None  - Pneumocystis prophylaxis: planning dapsone vs atovaquone vs pentamidine  - Viral serostatus & prophylaxis: CMV+  - Fungal prophylaxis: none  - Immunization status: Hep B non infected, non immune, Hep A non immune, largely unvaccinated since 2009 will need to be addressed once further from chemo and has had some immune reconstitution  - Gamma globulin status:No lab results found.  - Isolation status: Good hand hygiene. Contact fro MRSA in lung at OSH, Special 2/2 recent covid, immunocompromised.       Selected Labs, Micro, Imaging Results:   [facility] Workup  4/20 - HLA  -ve  4/18 - LP EBV CSF +ve  4/16 - Toxo IgG -ve, CMV IgG+  3/31- HIV RNA+ 1.2 million copies 6.9 log, CD-4 count 2/mcl  3/31/24- Genotype- no resistance  3/30/24- Syphilis Ab -ve  3/29/24- CT/NG Urine negative    [facility] Workup  4/27 Karius pending, Blasto blood and Histo Urine pending  4/26 CSF- Crypto -ve, EBV+,  "Toxo pending (other ID studies negative; RENEA virus, CMV, Cultures)  4/29 Hep C Pending (Hep C Ab -ve 4/1/24)         Interval History and Subjective:     Goals of care conversations are ongoing.  She is minimally to unresponsive today.  She has no suprapubic tenderness today.    Review of Symptoms:  Unable       ID HPI from 4/25/24     43 year old woman with recently diagnosed HIV (1 month ago)/AIDs (CD4 2) who presented to [facility] 4/15/2024 with generalized weakness found to have CNS lesion, highest concern for CNS lymphoma, transferred here for neurosurgical evaluation and possible biopsy     She was seen 1/2023 at [facility] for severe pneumococcal pneumonia/empyema requiring VATs decortication.      She was next seen 3/29/2024 at [facility] with oral candidiasis and then HIV was screened and positive. CD4 2. She was given nystatin for 2 weeks and referred to ID clinic for followup. Not started on ART. MRI brain at this time showed a 15mm T2 hyperintensity in the pontomesencephalic junction (initially dx as ischemic stroke).      She went back to [facility] 4/15/2024 for generalized weakness. She had a broad work-up after repeat brain MRI showed increase size of the lesion. She was started on Biktarvy ~4/17 but there were concerns for a rash though it was restarted. She was COVID+ 4/18 and grew \"light growth\" MRSA on sputum culture 4/19. She was started on linezolid.      On arrival here, she is afebrile with HR 73, BP normotensive, satting 100% on RA. WBC and PLT normal. Hgb 11.3 very slightly low. Cr 0.46. LFTs have not been done since 4/19 when they were relatively unremarkable - ALT 44, AST 56, Tbili 0.3 and most notably Alk Phos was normal at 79.      On interview today, she is relatively encephalopathic. She says she has two children, 24 and 14. Her son has a cat. She denies pain or discomfort. Says she is willing to take medicine for HIV       Physical Exam:     VITAL SIGNS:  Blood pressure 124/84, " "pulse 111, temperature 99.9  F (37.7  C), temperature source Axillary, resp. rate 16, height 1.626 m (5' 4\"), weight 64.7 kg (142 lb 10.2 oz), SpO2 94%.   GENERAL APPEARANCE: Not in acute distress    PHYSICAL EXAM:  Eyes:   closed  Mouth, Throat:   NG tube present  Cardiovascular: No Cyanosis, JVD not elevated  Respiratory:  Not in respiratory distress, Chest expansion symmetrical, No Audible wheeze/stridor, wet crackling cough present  Skin:  The exposed skin is warm and dry  Neurologic:     Higher Mental Function: Nonverbal, unable to assess   Motor: No spontaneous movement  Psychiatric: Unable to assess  Abdominal: No suprapubic tenderness.       Other Data:     MEDICATIONS  Current Facility-Administered Medications   Medication Dose Route Frequency Provider Last Rate Last Admin    atorvastatin (LIPITOR) tablet 40 mg  40 mg Oral or Feeding Tube Daily Russel Boothe DO   40 mg at 05/14/24 0845    cefTRIAXone (ROCEPHIN) 1 g vial to attach to  mL bag for ADULTS or NS 50 mL bag for PEDS  1 g Intravenous Q24H Jazzy Santamaria MD   1 g at 05/13/24 2219    dapsone suspension 100 mg  100 mg Oral or Feeding Tube Daily Kandi Lambert PA-C   100 mg at 05/14/24 0845    dolutegravir (TIVICAY) tablet 50 mg  50 mg Oral or NG Tube Daily Ramesh Van MD   50 mg at 05/13/24 2216    emtricitabine-tenofovir (TRUVADA) 200-300 MG per tablet 1 tablet  1 tablet Oral or NG Tube Daily Ramesh Van MD   1 tablet at 05/13/24 1938    enoxaparin ANTICOAGULANT (LOVENOX) injection 40 mg  40 mg Subcutaneous Q24H [place] Vista[place]ira Evans PA-C   40 mg at 05/13/24 1938    famotidine (PEPCID) suspension 20 mg  20 mg Oral BID Kandi Lambert PA-C   20 mg at 05/14/24 1213    ganciclovir (CYTOVENE) 350 mg in D5W 100 mL intermittent infusion  5 mg/kg Intravenous Q12H Edilma Phelan PA-C 100 mL/hr at 05/14/24 0845 350 mg at 05/14/24 0845    heparin lock flush 10 UNIT/ML injection 5-20 mL  5-20 mL " Intracatheter Q24H Tucker Jacob MD   5 mL at 05/14/24 0635    lidocaine (viscous) (XYLOCAINE) 2 % solution 5 mL  5 mL Topical Once Jazzy Santamaria MD           IMMUNOLOGY LABS  CD-4 Counts   Absolute CD4, [place] T Cells   Date Value Ref Range Status   05/08/2024 15 (L) 441 - 2,156 cells/uL Final     Inflammatory Markers    Recent Labs   Lab Test 04/29/24  0819   G6PD 13.0     Immune Globulin Studies   No lab results found.    GENERAL LABS  Metabolic Studies       Recent Labs   Lab Test 05/14/24  0607 05/13/24  0439 04/27/24  0925 04/27/24  0724 01/26/23  0541 01/24/23 2118 01/17/23  1231 01/17/23  0641    138   < > 140   < >  --    < >  --    POTASSIUM 3.5 3.9   < > 3.5   < >  --    < > 2.9*   CHLORIDE 103 103   < > 108*   < >  --    < >  --    CO2 26 27   < > 20*   < >  --    < >  --    ANIONGAP 9 8   < > 12   < >  --    < >  --    BUN 15.7 16.1   < > 7.4   < >  --    < >  --    CR 0.34* 0.38*   < > 0.48*   < >  --    < >  --    GFRESTIMATED >90 >90   < > >90   < >  --    < >  --    * 127*   < > 178*   < >  --    < >  --    A1C  --   --   --  5.7*  --   --   --   --    MAUREEN 8.2* 8.4*   < > 9.1   < >  --    < >  --    PHOS 3.6 4.6*   < > 4.1   < >  --    < >  --    MAG 2.3 2.1   < > 1.9   < >  --    < >  --    LACT  --   --   --   --   --  1.4  --  0.8    < > = values in this interval not displayed.     Hepatic Studies    Recent Labs   Lab Test 05/14/24  0607 05/13/24  0439 05/12/24  0344 04/30/24  0405 04/29/24  0431 04/24/24 2050 01/17/23  0641   BILITOTAL 0.4 0.3 0.3   < > 0.3   < >  --    DBIL  --   --   --   --  <0.20   < >  --    ALKPHOS 69 72 68   < > 74   < >  --    PROTTOTAL 5.8* 5.9* 5.7*   < > 7.6   < > 6.3*   ALBUMIN 3.1* 3.3* 3.2*   < > 3.5   < >  --    AST 24 24 22   < > 23   < >  --    ALT 35 44 41   < > 17   < >  --    LDH  --   --   --   --  220  --  263*    < > = values in this interval not displayed.     Pancreatitis testing    Recent Labs   Lab Test 04/10/19  0003  "  LIPASE 162     Hematology Studies   Recent Labs   Lab Test 05/14/24  0607 05/13/24  0439 05/12/24  0344 05/11/24  0405 04/26/24  0615 04/24/24  2050 02/24/22  1458 04/10/19  0003   WBC 5.0 7.5 6.9 8.0   < > 6.7  6.7   < > 4.1   ANEU  --   --   --   --   --  6.1  --  2.0   ANEUTAUTO 4.6 7.0 6.3 7.2   < >  --    < >  --    ALYM  --   --   --   --   --  0.5*  --  1.4   ALYMPAUTO 0.3* 0.3* 0.4* 0.4*   < >  --    < >  --    KYLE  --   --   --   --   --  0.0  --  0.5   AMONOAUTO 0.1 0.1 0.2 0.3   < >  --    < >  --    AEOS  --   --   --   --   --  0.0  --  0.2   AEOSAUTO 0.0 0.0 0.0 0.0   < >  --    < >  --    ABSBASO 0.0 0.0 0.0 0.0   < >  --    < >  --    HGB 9.5* 9.6* 9.4* 9.8*   < > 11.3*  11.3*   < > 7.7*   HCT 29.6* 29.9* 29.8* 29.7*   < > 34.2*  34.2*   < > 27.4*    203 202 198   < > 450  450   < > 470*    < > = values in this interval not displayed.     Urine Studies     Recent Labs   Lab Test 05/12/24  2007 05/02/24  0027 05/01/24  1613 05/01/24  0854 05/01/24  0131 04/29/24  2100 01/16/23  2132 09/19/18  2225   URINEPH 5.5 7.5 8.0 8.0 7.5*   < > 6.0 5.5   NITRITE Positive*  --   --   --   --   --  Negative Negative   LEUKEST Large*  --   --   --   --   --  Small* Trace*   WBCU 75*  --   --   --   --   --  <1 <1    < > = values in this interval not displayed.     CSF testing     Recent Labs   Lab Test 04/26/24  1116 04/26/24  1115   CWBC  --  3   CRBC  --  44*   CGLU 52  --    CTP  --  109.0*     Medication levels    Recent Labs   Lab Test 01/20/23  1230   VANCOMYCIN 7.6     Body fluid stats    Recent Labs   Lab Test 01/17/23  1500   FCOL Yellow   FAPR Hazy*   FWBC 5,760   FNEU 78   FLYM 16   FMONO 5   FBAS 1   FTP 5.6       MICROBIOLOGY  Fungal testing:  No lab results found.    Invalid input(s): \"HIFUN\", \"FUNGL\"  Last Culture results   Culture   Date Value Ref Range Status   05/12/2024 >100,000 CFU/mL Gram negative bacilli (A)  Preliminary   05/12/2024 50,000-100,000 CFU/mL Gram negative bacilli " "(A)  Preliminary   05/09/2024 No growth after 5 days  Preliminary   05/08/2024 No Growth  Final   05/08/2024 No Growth  Final   04/26/2024 No anaerobic organisms isolated  Final   04/26/2024 No growth after 17 days  Preliminary   04/26/2024 No Growth  Final   04/26/2024 No growth after 18 days  Preliminary   01/24/2023 No Growth  Final   01/24/2023 No Growth  Final   01/20/2023 No anaerobic organisms isolated  Final   01/20/2023 No Growth  Final   01/20/2023 No Growth  Final   01/17/2023 2+ Streptococcus pneumoniae (A)  Final   01/17/2023 No Growth  Final   12/04/2022 No Growth  Final   12/04/2022 3+ Normal alo  Final   12/04/2022 No Growth  Final   02/24/2022 No Growth  Final     Culture Micro   Date Value Ref Range Status   04/30/2012 No growth  Final   06/30/2010 >100,000 colonies/mL Escherichia coli  Final   02/05/2010 No growth  Final   12/01/2009 No growth  Final       Last checks of Clostridioides difficile testing  No lab results found.  Infection Studies to assess Diarrhea No lab results found.    Invalid input(s): \"BIDRY\", \"BIDYD\"  Virology:  Coronavirus-19 testing    Recent Labs   Lab Test 05/09/24  1220 11/28/23  1451 01/16/23 2017 12/04/22 2036   [id number] Positive* Negative Negative Negative   CYCLETHRES 26.9  --   --   --      Respiratory virus (non-coronavirus-19) testing    Recent Labs   Lab Test 11/28/23  1451   INFZA Negative   INFZB Negative   IRSV Negative     CMV viral loads    Recent Labs   Lab Test 05/13/24  1347 05/07/24  0332 05/01/24  0839   CMVRESINST 62,300* 91,000* 1,010*   CMVLOG 4.8 5.0 3.0     CMV resistance testing:  No lab results found.  No results found for: \"H6RES\"  No results found for: \"EBVRESINST\", \"48151\", \"EBQTC\", \"EBRES\", \"59760\", \"90046\"  BK Virus Testing   No lab results found.  Parvovirus Testing  No lab results found.    Invalid input(s): \"PRVRES\"  Adenovirus Testing  No lab results found.    Invalid input(s): \"ADENAB\", \"ADENOVIRUS\", \"ADQT\"    IMAGING  Recent " Results (from the past 48 hour(s))   MR Brain w/o & w Contrast    Narrative    EXAM: MR BRAIN W/O & W CONTRAST  5/12/2024 12:57 PM     HISTORY: 45 yo female pmh HIV, and presumed CNS lymphoma. Reassess  response of mass with initiation of treatment       COMPARISON: MRI 4/30/2024    TECHNIQUE: MR head: Multiplanar T1-weighted, axial FLAIR, and  susceptibility images were obtained without intravenous contrast.  Following intravenous gadolinium-based contrast administration, axial  T2-weighted, diffusion, and T1-weighted images (in multiple planes)  were obtained.    Contrast: 7.1 mL IV Gadavist     FINDINGS:  No significant change in size to the enhancing lesion in avel and  midbrain (2.6 x 2.2 x 3 cm, previously 2.5 x 2.2 x 2.8 cm when  measured in similar fashion on the prior). Unchanged pattern of  peripheral T2 hyperintense and central T2 isointense signal. Slightly  decreased thickness of the peripheral enhancement. Unchanged mass  effect on the fourth ventricle. The thickness of the peripheral  pattern of restricted diffusion appears to have decreased.    Stable size of the ventricles without evidence of hydronephrosis. No  evidence of new intracranial mass lesions or intracranial hemorrhage.    Major intracranial vascular structures are within normal limits.    No suspicious abnormality of the skull marrow signal. Clear paranasal  sinuses. Mastoid air cells are clear. No focal abnormality of the  pituitary gland, sella, skull base and upper cervical spinal  structures on sagittal images. The orbits are normal.      Impression    IMPRESSION:  1. No significant change in size of the brainstem mass compared to  4/30/2024. Possible slight decrease in peripheral pattern of  enhancement and restricted diffusion may indicate subtle response to  treatment.    I have personally reviewed the examination and initial interpretation  and I agree with the findings.    NAVARRO ROSARIO MD         SYSTEM ID:  [id number]          ATTESTATION  I have reviewed labs/blood-work that are part of this patients present encounter in addition to historical and baseline values. The specific values are recorded in Epic and I have incorporated them and my interpretation as relevant into my assessment and plan as recorded.  I have reviewed radiology reports/EKGs/and other diagnostic studies that are part of this patients present encounter, in addition to historical and baseline results.  The specific reports are available in Epic and I have incorporated them into my assessment and plan as described above. Independently interpreted diagnostic study results are described above.  This dictation was prepared in part using Dragon recognition software.  As a result errors may occur. When identified these transcription errors have been corrected.  While every attempt is made to correct errors during dictation, errors may still exist.      Signature:     Gerson Stewart MD   PGY-6 Transplant Infectious Disease Fellow

## 2024-05-14 NOTE — PROGRESS NOTES
Care Management Follow Up    Length of Stay (days): 20    Expected Discharge Date:  unknown     Concerns to be Addressed: discharge planning     Patient plan of care discussed at interdisciplinary rounds: Yes    Anticipated Discharge Disposition: Other (Comments) (TBD)     Anticipated Discharge Services: Other (see comment) (TBD)  Anticipated Discharge DME: Other (see comment) (TBD)    Patient/family educated on Medicare website which has current facility and service quality ratings: no  Education Provided on the Discharge Plan:    Patient/Family in Agreement with the Plan: unable to assess    Referrals Placed by CM/SW:    Private pay costs discussed: Not applicable    Additional Information:  SW reviewed notes and attended rounds. Per AP, Lisa, the MRI showed no improvements. They met with pt's son and Aunt and discussed findings. The family asked for time to think about continuing with chemo. AP said they are meeting with son this am.  SW reviewed Palliative and AP notes for today. Family has chosen to discontinue with chemo. Tube feeding and codes status was brought up. Pt's son asked for more time to make decision.     Per AP, during rounds,  if chemo is discontinued pt will most likely go on hospice. AP feels pt would need GIP hospice due to continuous monitoring and turning. SW will continue to follow for any changes and care management needs     SW to follow and assist with any other discharge needs that may arise.  NAKIA Winn   5A Oncology beds:5220-40 & 5C  beds 5417-32 (non BMT )      Vocera:  Phone: [phone]  Pager: [phone]

## 2024-05-14 NOTE — PROGRESS NOTES
[facility]    Hematology / Oncology Progress Note    Patient: Kamilah Goldberg  MRN: [id number]  Admission Date: 4/24/2024  Date of Service (when I saw the patient): 05/14/2024  Hospital Day # 20     Assessment & Plan   Kamilah Goldberg is a 44 year old female with a past medical hsitory of dyslipidemia, polysubstance abuse, and R-sided empyema (s/p VATS and partial pleurectomy Jan 2021). She was admitted on 4/24/2024 for L avel/midbrain lesion mass and new HIV/AIDS diagnosis (3/28/24). On work up, was noted to have positive EBV PCR on CSF and rare atypical cells on CSF c/f CNS lymphoma. In the absence of tissue diagnosis, empirically started high-dose methotrexate and rituximab C1D1=4/29/24. Course further complicated by COVID positivity, acute decline in neurological status, HIV/AIDS.     Today:  - After care conference yesterday, family has decide to not pursue further chemotherapy. Ongoing discussions regarding code status, transition to comfort cares/hospice.     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 She was found to have thrush and was diagnosed with HIV (see below). MRI was obtained d/t dizziness and concern for neurocandidiasis. MRI brain 3/29/24 with area of restricted diffusion and T2 hyperintensity in left posterior pontomesencephalic junction, mild local mass effect. Likely representing ischemic infarct. LP 3/29/24 CSF analysis for infection was negative. Cytology added for possible lymphoma, but there were insufficient viable cells for analysis. She was discharged with plan of repeat MRI brain in 3 months. She then presented in April 2024 with right facial and arm paresthesia and RLE weakness. Workup concerning that the left midbrain/avel mass appeared to be increasing in size compared to MRI 3/20/23 concerning for CNS  lymphoma. CT CAP w/o evidence of systemic disease. Unfortunately, location of the mass was deemed too high risk for biopsy to obtain tissue diagnosis. Repeat high volume LP 4/18/24 obtained in hope to confirm malignancy but this remained non-diagnostic. She was transferred to [facility] for NSGY to pursue biopsy but this still remained too high risk. Repeat LP 4/26/24 with rare atypical cells on cytology but does not correlate with flow cytometry (rare polytypic B cells). LP also notable for positive EBV PCR, elevated protein (109), but negative CMV, meningitis/encephalitis panel. After extensive work up ruling out infectious processes and trying to obtain confirmation of CNS lymphoma diagnosis, plan of care was discussed with patient's decission maker who agreed with proceeding treatment for CNS lymphoma. Proceeded with high dose methotrexate (C1D1=4/30/29), Rituxan added D4 and D7.   - Infectious studies at [facility] thus far negative for CMV IgM negative (IgG positive suggesting previous infection), Cryptococcus, CMV encephalitis, Tb meningitis, toxoplasmosis serology, toxoplasma PCR. Damian w/ E.Coli, CMV and human adenovirus; discussed with ID no need to treat E.Coli.   - Continue dexamethasone taper currently on 4 mg BID (x5/9)   - PICC line placed x4/29   - Attempted to obtain another LP on 5/12, but was unable to obtain CSF  - Repeat brain MRI 5/12 after cycle 1 showed no significant change in size of the brainstem mass compared to 4/30/2024. Clinically, patient has neurologically declined since beginning of treatment. Previously was able to follow commands, now unable to follow commands.   - Discussed lack of response with family on 5/13. No plan to proceed with more chemotherapy.                  Treatment Plan: High-dose methotrexate + rituximab (C1D1 = 4/30/24)  Methotrexate 3.5 g/m2 (6.405 g) - D1, D8  Rituximab 375 mg/m2 (700 mg) - Given 5/2 and 5/6  Leucovorin 50 mg D2 until methotrexate level <0.1  mIVF  0.45% NS with sodium bicarbonate @125 ml/hr     # Anemia  Likely due to underlying suspected malignancy and chemotherapy.   - Monitor CBC daily  - Transfuse if Hgb <7 and plt <10k     ID  # HIV/AIDS  Recently diagnosed with HIV during admission at [facility] on 3/28/24 with viral load of 1.24 million, 6.9 log,  and CD4 count of 2. She was not started on ART, given Nystatin x2 weeks and referred to ID clinic for follow up. Prior to this, she was admitted again with ID consult for broad-infectious workup. Started Biktarvy 4/17 but stopped due concern for rash, rechallenged on 4/20 and able to tolerate. Later transitioned to Dolutegravir and Truvada. SW reached out to health department 5/2 regarding patient's HIV status and if able to be reported properly. Upon further discussions and health department chart review, original diagnosis was in 2014 and patient was contacted over 14 times with no response.  Unable to confirm with patient if she is was aware of status as she is non-verbal. Upon further chart review, HIV testing was completed 8/27/24 at [facility] with office visit to follow, but encounter is not able to be reviewed and needing patient authorization; will discuss with son (Yuan) as he is her next of kin and medical decision maker. Of note, patient's aunt (Noemy), and son (Yuan) are aware of diagnosis.   - Upon further conversation with boyfriend (Otis), it does not appear he is aware of her HIV status. I asked about general medical history and any new/recent infections to which he did not mention HIV/AIDS status. ID had a similar conversation with a consistent consensus. Reached out to SW to discuss with family/son regarding if previous partners have been notified.  - Continue ART with dolutegravir and Truvada   - Repeat CMV an HIV DNA PCRs pending  - Plan to repeat CD4 count and viral load circa x5/15     # Concern for UTI  On 5/12, was noted to have malodorous urine. Has Roy  in place. UA showed positive nitrites, large LE, 75 WBC.   - Roy was replaced 5/13  - IV CTX x5/12. Plan for 3 days per ID.   - Follow urine culture    # COVID-19 positive  # MRSA pneumonia  Tested positive for COVID-19 4/18/24 during admission to [facility]. Asymptomatic from COVID. Per ID, does not feel like MRSA pneumonia is likely so linezolid was discontinued  - Per infection prevention, first day eligible for re-testing was 5/9, which showed a CT of 26.9. Per recommendations should remain in precautions until negative COVID test or CT >35. Consider repeat COVID test 5/16.     # Prophylaxis  - Dapsone 100 mg daily for PJP ppx     PULM  # Concern for aspiration  # Questionable ability to protect airway  On 5/12, patient had episode of rapid desaturation to 50%. Code blue was called, but pt was able to be stabilized with suction and supplement oxygen via high flow. No arrest. Suspect d/t mucus plugging. With worsening mental status, uncertain of ability to clear secretions and protect airway. Unable to be evaluated by SLP given inability to follow commands.   - Mucomyst nebs and chest physiotherapy  - Ongoing discussions regarding code status    # H/o empyema s/p VATs with total decortication of R lung and partial pleurectomy (Jan 2023)      MISC  # Cotton wool spot of right eye  # Concern for CMV retinitis  Single CWS in right eye. Found on assessment by ophthalmology x4/28. Repeat ophtho exam 5/6 noted to have worsening cotton wool spots; etiologies include HIV retinitis, HTN retinitis or CMV retinitis.  - Currently on IV ganciclovir      SOCIAL   # Medical decision maker  Confirmed with family that son (Yuan) will be patient's primary medical decision maker. Son Yuan and patient are okay with communication with aunt (Noemy) if Yuan is unavailable.      # H/o stimulant/amphetamine use   Not[place] [facility] [place]. Patient previously reported snorting and declined IVDU.     Clinically Significant  Risk Factors              # Hypoalbuminemia: Lowest albumin = 3.1 g/dL at 5/14/2024  6:07 AM, will monitor as appropriate             # Moderate Malnutrition: based on nutrition assessment           FEN  Diet: Regular Diet Adult   IVF: Bolus PRN   Lytes: Replete per protocol    PPX  VTE: Lovenox  Bowel: Senna/MiraLax PRN  GI/PUD: Pepcid    MISC  Code Status: Full Code   Lines/Drains: PICC  Medically Ready for Discharge: Anticipated in 5+ Days  Dispo: TBD pending GOC conversations    Patient was seen and plan of care was discussed with attending physician Dr. Correia.    I spent 60 minutes in the care of this patient today, which included time necessary for review of interval events, obtaining history and physical exam, ordering medications/tests/procedures as medically indicated, review of pertinent medical literature, counseling of the patient, coordination of care, and documentation time. Over 50% of time was spent counseling the patient and/or coordinating care.    Kira Gonzalez PA-C   Hematology/Oncology   Pager: [phone]  Desk phone: *[phone]    Interval History   No acute events overnight. Patient appears comfortable and is satting well on RA. Remains non-verbal and not interactive. Stable neuro exam. Breathing sounds less coarse today.     Talked with son, Yuan. They have decided to not pursue another cycle of chemo. He thinks this is what she would want, and Noemy agrees. He is overwhelmed by this decision, and was unable to further discuss transition to comfort cares, removal of TF, code status, etc.     Vital Signs with Ranges  Temp:  [97.8  F (36.6  C)-99.5  F (37.5  C)] 99.5  F (37.5  C)  Pulse:  [] 92  Resp:  [15-22] 16  BP: ()/(65-80) 116/78  SpO2:  [92 %-98 %] 94 %  I/O last 3 completed shifts:  In: 1605 [I.V.:100; NG/GT:660]  Out: 1300 [Urine:1300]    Physical Exam   General: Sitting up in bed, alert, NAD. Not interactive. Non-verbal.   Skin: No concerning lesions, rash, jaundice,  cyanosis, erythema, or ecchymoses on exposed surfaces.   HEENT: NCAT. Anicteric sclera. Moist mucus membranes.    Respiratory: Non-labored breathing on room air, good air exchange, lungs CTAB.  Cardiovascular: RRR. No murmur or rub.   Gastrointestinal: Normoactive BS. Abdomen soft, ND, NT. No palpable masses.  Extremities: No LE edema.   Neurologic: Alert, not oriented or interactive. Non-verbal. Pupils PERRL but R pupil constricts to light but then starts to dilate again while light is still on it. Unable to assess strength/sensation.     Medications   Current Facility-Administered Medications   Medication Dose Route Frequency Provider Last Rate Last Admin    dextrose 10% infusion   Intravenous Continuous PRN Russel Boothe DO         Current Facility-Administered Medications   Medication Dose Route Frequency Provider Last Rate Last Admin    atorvastatin (LIPITOR) tablet 40 mg  40 mg Oral or Feeding Tube Daily Russel Boothe DO   40 mg at 05/14/24 0845    cefTRIAXone (ROCEPHIN) 1 g vial to attach to  mL bag for ADULTS or NS 50 mL bag for PEDS  1 g Intravenous Q24H Jazzy Santamaria MD   1 g at 05/13/24 2219    dapsone suspension 100 mg  100 mg Oral or Feeding Tube Daily Kandi Lambert PA-C   100 mg at 05/14/24 0845    dolutegravir (TIVICAY) tablet 50 mg  50 mg Oral or NG Tube Daily Ramesh Van MD   50 mg at 05/13/24 2216    emtricitabine-tenofovir (TRUVADA) 200-300 MG per tablet 1 tablet  1 tablet Oral or NG Tube Daily Ramesh Van MD   1 tablet at 05/13/24 1938    enoxaparin ANTICOAGULANT (LOVENOX) injection 40 mg  40 mg Subcutaneous Q24H Kira Gonzalez PA-C   40 mg at 05/13/24 1938    famotidine (PEPCID) suspension 20 mg  20 mg Oral BID Kandi Lambert PA-C   20 mg at 05/13/24 1939    ganciclovir (CYTOVENE) 350 mg in D5W 100 mL intermittent infusion  5 mg/kg Intravenous Q12H Edilma Phelan PA-C 100 mL/hr at 05/14/24 0845 350 mg at 05/14/24 0845    heparin  lock flush 10 UNIT/ML injection 5-20 mL  5-20 mL Intracatheter Q24H Tucker Jacob MD   5 mL at 05/14/24 0635    lidocaine (viscous) (XYLOCAINE) 2 % solution 5 mL  5 mL Topical Once Jazzy Santamaria MD         Data   Results for orders placed or performed during the hospital encounter of 04/24/24 (from the past 24 hour(s))   CBC with Platelets & Differential    Narrative    The following orders were created for panel order CBC with Platelets & Differential.  Procedure                               Abnormality         Status                     ---------                               -----------         ------                     CBC with platelets and d...[[id number]]  Abnormal            Final result                 Please view results for these tests on the individual orders.   Phosphorus   Result Value Ref Range    Phosphorus 3.6 2.5 - 4.5 mg/dL   Comprehensive metabolic panel   Result Value Ref Range    Sodium 138 135 - 145 mmol/L    Potassium 3.5 3.4 - 5.3 mmol/L    Carbon Dioxide (CO2) 26 22 - 29 mmol/L    Anion Gap 9 7 - 15 mmol/L    Urea Nitrogen 15.7 6.0 - 20.0 mg/dL    Creatinine 0.34 (L) 0.51 - 0.95 mg/dL    GFR Estimate >90 >60 mL/min/1.73m2    Calcium 8.2 (L) 8.6 - 10.0 mg/dL    Chloride 103 98 - 107 mmol/L    Glucose 116 (H) 70 - 99 mg/dL    Alkaline Phosphatase 69 40 - 150 U/L    AST 24 0 - 45 U/L    ALT 35 0 - 50 U/L    Protein Total 5.8 (L) 6.4 - 8.3 g/dL    Albumin 3.1 (L) 3.5 - 5.2 g/dL    Bilirubin Total 0.4 <=1.2 mg/dL   Magnesium   Result Value Ref Range    Magnesium 2.3 1.7 - 2.3 mg/dL   Uric acid   Result Value Ref Range    Uric Acid 3.3 2.4 - 5.7 mg/dL   Partial thromboplastin time   Result Value Ref Range    aPTT 27 22 - 38 Seconds   INR   Result Value Ref Range    INR 1.12 0.85 - 1.15   Fibrinogen activity   Result Value Ref Range    Fibrinogen Activity 302 170 - 490 mg/dL   CBC with platelets and differential   Result Value Ref Range    WBC Count 5.0 4.0 - 11.0 10e3/uL    RBC  Count 3.27 (L) 3.80 - 5.20 10e6/uL    Hemoglobin 9.5 (L) 11.7 - 15.7 g/dL    Hematocrit 29.6 (L) 35.0 - 47.0 %    MCV 91 78 - 100 fL    MCH 29.1 26.5 - 33.0 pg    MCHC 32.1 31.5 - 36.5 g/dL    RDW 15.3 (H) 10.0 - 15.0 %    Platelet Count 217 150 - 450 10e3/uL    % Neutrophils 91 %    % Lymphocytes 5 %    % Monocytes 2 %    % Eosinophils 1 %    % Basophils 0 %    % Immature Granulocytes 1 %    NRBCs per 100 WBC 0 <1 /100    Absolute Neutrophils 4.6 1.6 - 8.3 10e3/uL    Absolute Lymphocytes 0.3 (L) 0.8 - 5.3 10e3/uL    Absolute Monocytes 0.1 0.0 - 1.3 10e3/uL    Absolute Eosinophils 0.0 0.0 - 0.7 10e3/uL    Absolute Basophils 0.0 0.0 - 0.2 10e3/uL    Absolute Immature Granulocytes 0.0 <=0.4 10e3/uL    Absolute NRBCs 0.0 10e3/uL

## 2024-05-14 NOTE — PLAN OF CARE
Goal Outcome Evaluation:  Kamilah was none responsive to touch or verbal stimuli. Turned every 2 hours. Oral suctioned for a small amount of sputum. She had a weak cough.

## 2024-05-14 NOTE — PLAN OF CARE
"Goal Outcome Evaluation:      Plan of Care Reviewed With: patient    Overall Patient Progress: no changeOverall Patient Progress: no change   /77 (BP Location: Right arm, Cuff Size: Adult Regular)   Pulse 86   Temp 98.7  F (37.1  C) (Axillary)   Resp 16   Ht 1.626 m (5' 4\")   Wt 69.4 kg (153 lb)   SpO2 95%   BMI 26.26 kg/m   Left eye droopy and slightly sluggish and no changes with neuros RA. afebrile overnight. Was able to move left arm with L hand twice in a row  but unable to follow any other commands. Intermittent cough, nonproductive but has upper airway congestion and clears with cough.Nurse used yanker as pt coughed it up. R toes wiggle but seems like a muscle spasm than purposeful movement. Tube feed infusing, restarted at 0000. Appears comfortable, no signs of pain or discomfort. Roy in place with urine out put of 300 ml at start of shift. No BM, LBM 5/10. PICC infusing TKO, Rocphin started q24h per UA  yesterday .Turning Q2H. Continue with POC and waiting for family to decide on hospice or  treatment.                 "

## 2024-05-14 NOTE — PLAN OF CARE
Occupational Therapy Discharge Summary    Reason for therapy discharge:    No further expectations of functional progress.  Change in medical status.    Progress towards therapy goal(s). See goals on Care Plan in [facility] electronic health record for goal details.  Pt. Dep. With all BADLs and mob.     Therapy recommendation(s):    No further therapy is recommended.

## 2024-05-14 NOTE — PROGRESS NOTES
"[facility]  Palliative Care Daily Progress Note       Recommendations & Counseling     GOALS OF CARE:   Life-prolonging with limits (no further chemo, contemplating no escalation of care and code status change)  Met with Yuan (son) and Oncology team outside Kamilah's room this morning. Yuan shared they have decided not to pursue further chemo. He attempted to involve Kamilah in this decision by asking her to blink in response to questions and believes she indicated she does not want further treatment. Explained that we do not know how much Kamilah is able to understand and participate in these decisions, but opting against further chemotherapy is a very reasonable choice based on our discussion yesterday. Yuan reports Noemy is also supportive of this.  Yuan asked what happens next. Explained that we expect Kamilah to continue to decline and we can transition to focusing on her comfort. This would include aggressive symptom management and discontinuing life-prolonging care including tube feeds which are unlikely to provide further benefit. Yuan worried about \"starving her to death.\" Explained natural dying process and ways in which artificial nutrition does not support comfort at end of life. Agreed to give Yuan more time to process and think this over.  Briefly addressed code status and recommended we avoid escalation of care (including intubation and CPR) if/when Kamilah's condition worsens. Yuan is not prepared to make this decision but agreed to think it over.    ADVANCE CARE PLANNING:  No health care directive on file. Per  informed consent policy, next of kin should be involved if patient becomes unable.  During palliative care initial consult on 4/26, Kamilah was able to speak and had capacity and stated that wants her Aunt Noemy to be her emergency contact and primary surrogate decision maker and her son, Yuan, can be involved in care discussions and decisions \"if he wants " "to\".  Code status: Full Code    PSYCHOSOCIAL/SPIRITUAL:  Family - supported by Noemy (aunt) and Yuan (son) . 13 year old son (lives in [place] & Kamilah doesn't have custody). Multiple recent family losses including Kamilah's mother.    Palliative Care will continue to follow.    Chantell Horne PA-C  Securely message with appCREAR (more info)  Text page via [facility] Paging/Directory       Assessments          Kamilah Goldberg is a 43 year old female with PMHx of recent HIV diagnosis (dx at OSH end of March) and avel lesion thought to be an ischemic stroke (3/2024) who presented to OSH 4/12 with weakness, diplopia, dysarthria admitted for failure to thrive found to have increasing size of lesions in left midbrain and avel lesion transferred to [facility] for consideration of brain biopsy for possible CNS lymphoma. At outside hospital when she had progressive mental status decline and she did get dose of IV decadron (treatment for CNS lymphoma) prior to transfer. Her course was also complicated by MRSA pneumonia and encephalopathy as well as tested positive for COVID 19 on 4/18/2024. Unsafe to complete brain biopsy; now s/p methotrexate and rituximab. Palliative consulted for support and assistance with goals of care conversations.    Today, the patient was seen for:  Brainstem lesion c/f CNS lymphoma  HIV with low CD4 (AIDS)  Hx of Substance Abuse Disorder (amphetamine)  Hx of MRSA pneumonia / Empyema  Support  Encounter for Palliative Care  Goals of care            Interval History:     Chart review/discussion with unit or clinical team members:   - Per Oncology notes, discussed possible elective intubation with dejon Bolden yesterday. He plans to return this morning.    Per patient or family/caregivers today:  Kamilah is laying in bed with eyes closed, seen from threshold.          Medications:     Reviewed           Physical Exam:   Vital Signs: Blood pressure 116/78, pulse 92, temperature 99.5  F (37.5  C), temperature " "source Axillary, resp. rate 16, height 1.626 m (5' 4\"), weight 64.7 kg (142 lb 10.2 oz), SpO2 94%.   GENERAL: laying in bed, NAD               Data Reviewed:     Reviewed recent pertinent imaging:   No new imaging    Reviewed recent labs:   CMP  Recent Labs   Lab 05/14/24  0607 05/13/24  0439    138   POTASSIUM 3.5 3.9   CHLORIDE 103 103   CO2 26 27   ANIONGAP 9 8   * 127*   BUN 15.7 16.1   CR 0.34* 0.38*   GFRESTIMATED >90 >90   MAUREEN 8.2* 8.4*   MAG 2.3 2.1   PHOS 3.6 4.6*   PROTTOTAL 5.8* 5.9*   ALBUMIN 3.1* 3.3*   BILITOTAL 0.4 0.3   ALKPHOS 69 72   AST 24 24   ALT 35 44     CBC  Recent Labs   Lab 05/14/24  0607 05/13/24  0439   WBC 5.0 7.5   RBC 3.27* 3.30*   HGB 9.5* 9.6*   HCT 29.6* 29.9*   MCV 91 91   MCH 29.1 29.1   MCHC 32.1 32.1   RDW 15.3* 14.8    203          Medical Decision Making       MANAGEMENT DISCUSSED with the following over the past 24 hours: Oncology   NOTE(S)/MEDICAL RECORDS REVIEWED over the past 24 hours: Oncology  Medical complexity over the past 24 hours:  - Decision to DE-ESCALATE CARE based on prognosis      "

## 2024-05-14 NOTE — PLAN OF CARE
Goal Outcome Evaluation:  Kamilah continues to be unresponsive to voice and touch. Son Yuan was here today and decided that he doesn't want her to have anymore chemotherapy. Code status was dicussed and he will think about it. Turned every 2 hours.

## 2024-05-15 NOTE — PROGRESS NOTES
[facility]    Hematology / Oncology Progress Note    Patient: Kamilah Goldberg  MRN: [id number]  Admission Date: 4/24/2024  Date of Service (when I saw the patient): 05/15/2024  Hospital Day # 21     Assessment & Plan   Kamilah Goldberg is a 44 year old female with a past medical hsitory of dyslipidemia, polysubstance abuse, and R-sided empyema (s/p VATS and partial pleurectomy Jan 2021). She was admitted on 4/24/2024 for L avel/midbrain lesion mass and new HIV/AIDS diagnosis (3/28/24). On work up, was noted to have positive EBV PCR on CSF and rare atypical cells on CSF c/f CNS lymphoma. In the absence of tissue diagnosis, empirically started high-dose methotrexate and rituximab C1D1=4/29/24. Course further complicated by COVID positivity, decline in neurological status, HIV/AIDS. MRI brain after first cycle of chemo showed no treatment response. In the setting of neurologic decline, transitioned to comfort cares on 5/15.     Today:  - Discussed with sonYuan. Transitioned to comfort cares. DNR/DNI. GIP hospice consulted.     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 She was found to have thrush and was diagnosed with HIV (see below). MRI was obtained d/t dizziness and concern for neurocandidiasis. MRI brain 3/29/24 with area of restricted diffusion and T2 hyperintensity in left posterior pontomesencephalic junction, mild local mass effect. Likely representing ischemic infarct. LP 3/29/24 CSF analysis for infection was negative. Cytology added for possible lymphoma, but there were insufficient viable cells for analysis. She was discharged with plan of repeat MRI brain in 3 months. She then presented in April 2024 with right facial and arm paresthesia and RLE weakness. Workup concerning that the left midbrain/avel mass appeared to be  increasing in size compared to MRI 3/20/23 concerning for CNS lymphoma. CT CAP w/o evidence of systemic disease. Unfortunately, location of the mass was deemed too high risk for biopsy to obtain tissue diagnosis. Repeat high volume LP 4/18/24 obtained in hope to confirm malignancy but this remained non-diagnostic. She was transferred to [facility] for NSGY to pursue biopsy but this still remained too high risk. Repeat LP 4/26/24 with rare atypical cells on cytology but does not correlate with flow cytometry (rare polytypic B cells). LP also notable for positive EBV PCR, elevated protein (109), but negative CMV, meningitis/encephalitis panel. After extensive work up ruling out infectious processes and trying to obtain confirmation of CNS lymphoma diagnosis, plan of care was discussed with patient's decission maker who agreed with proceeding treatment for CNS lymphoma. Proceeded with high dose methotrexate (C1D1=4/30/29), Rituxan added D4 and D7.   - Infectious studies at [facility] thus far negative for CMV IgM negative (IgG positive suggesting previous infection), Cryptococcus, CMV encephalitis, Tb meningitis, toxoplasmosis serology, toxoplasma PCR. Damian w/ E.Coli, CMV and human adenovirus; discussed with ID no need to treat E.Coli.   - Continue dexamethasone taper currently on 4 mg BID (x5/9)   - PICC line placed x4/29   - Attempted to obtain another LP on 5/12, but was unable to obtain CSF  - Repeat brain MRI 5/12 after cycle 1 showed no significant change in size of the brainstem mass compared to 4/30/2024. Clinically, patient has neurologically declined since beginning of treatment. Previously was able to follow commands, now unable to follow commands.   - Discussed lack of response with family on 5/13. No plan to proceed with more chemotherapy. Transitioned to comfort cares on 5/15.      # Anemia  Likely due to underlying suspected malignancy and chemotherapy.   - Discontinued labs and transfusions     ID  #  HIV/AIDS  Recently diagnosed with HIV during admission at [facility] on 3/28/24 with viral load of 1.24 million, 6.9 log,  and CD4 count of 2. She was not started on ART, given Nystatin x2 weeks and referred to ID clinic for follow up. Prior to this, she was admitted again with ID consult for broad-infectious workup. Started Biktarvy 4/17 but stopped due concern for rash, rechallenged on 4/20 and able to tolerate. Later transitioned to Dolutegravir and Truvada. SW reached out to health department 5/2 regarding patient's HIV status and if able to be reported properly. Upon further discussions and health department chart review, original diagnosis was in 2014 and patient was contacted over 14 times with no response.  Unable to confirm with patient if she is was aware of status as she is non-verbal. Upon further chart review, HIV testing was completed 8/27/24 at [facility] with office visit to follow, but encounter is not able to be reviewed and needing patient authorization; will discuss with son (Yuan) as he is her next of kin and medical decision maker. Of note, patient's aunt (Noemy), and son (Yuan) are aware of diagnosis.   - Upon further conversation with boyfriend (Otis), it does not appear he is aware of her HIV status. I asked about general medical history and any new/recent infections to which he did not mention HIV/AIDS status. ID had a similar conversation with a consistent consensus. Reached out to SW to discuss with family/son regarding if previous partners have been notified.  - Discontinued ART with dolutegravir and Truvada      # E. coli UTI  On 5/12, was noted to have malodorous urine. Has Roy in place. UA showed positive nitrites, large LE, 75 WBC. UCx showed E. coli.  - Roy was replaced 5/13  - IV CTX 5/12-5/15    # COVID-19 positive  # MRSA pneumonia  Tested positive for COVID-19 4/18/24 during admission to [facility]. Asymptomatic from COVID. Per ID, does not feel like  MRSA pneumonia is likely so linezolid was discontinued  - Per infection prevention, first day eligible for re-testing was 5/9, which showed a CT of 26.9. Per recommendations should remain in precautions until negative COVID test or CT >35. Could repeat COVID test 5/16.     PULM  # Concern for aspiration  # Questionable ability to protect airway  On 5/12, patient had episode of rapid desaturation to 50%. Code blue was called, but pt was able to be stabilized with suction and supplement oxygen via high flow. No arrest. Suspect d/t mucus plugging. With worsening mental status, uncertain of ability to clear secretions and protect airway. Unable to be evaluated by SLP given inability to follow commands.   - Discontinued Mucomyst nebs and chest physiotherapy    # H/o empyema s/p VATs with total decortication of R lung and partial pleurectomy (Jan 2023)      MISC  # Cotton wool spot of right eye  # Concern for CMV retinitis  Single CWS in right eye. Found on assessment by ophthalmology x4/28. Repeat ophtho exam 5/6 noted to have worsening cotton wool spots; etiologies include HIV retinitis, HTN retinitis or CMV retinitis.  - Discontinued IV ganciclovir      SOCIAL   # Medical decision maker  Confirmed with family that son (Yuan) will be patient's primary medical decision maker. Son Yuan and patient are okay with communication with aunt (Noemy) if Yuan is unavailable.      # H/o stimulant/amphetamine use   Noted on [facility] tox screen in 2016. Patient previously reported snorting and declined IVDU.     Clinically Significant Risk Factors              # Hypoalbuminemia: Lowest albumin = 3 g/dL at 5/15/2024  2:25 AM, will monitor as appropriate  # Coagulation Defect: INR = 1.18 (Ref range: 0.85 - 1.15) and/or PTT = 29 Seconds (Ref range: 22 - 38 Seconds), will monitor for bleeding           # Overweight: Estimated body mass index is 25.09 kg/m  as calculated from the following:    Height as of this encounter: 1.626 m  "(5' 4\").    Weight as of this encounter: 66.3 kg (146 lb 2.6 oz).   # Moderate Malnutrition: based on nutrition assessment           FEN  Diet: Regular Diet Adult   IVF: Bolus PRN     MISC  Code Status: DNR/DNI  Lines/Drains: PICC  Medically Ready for Discharge: Anticipated in 5+ Days  Dispo: TBD pending GOC conversations    Patient was seen and plan of care was discussed with attending physician Dr. Correia.    I spent 60 minutes in the care of this patient today, which included time necessary for review of interval events, obtaining history and physical exam, ordering medications/tests/procedures as medically indicated, review of pertinent medical literature, counseling of the patient, coordination of care, and documentation time. Over 50% of time was spent counseling the patient and/or coordinating care.    Kira Gonzalez PA-C   Hematology/Oncology   Pager: [phone]  Desk phone: [phone]    Interval History   Pulled out own NG overnight. Febrile this morning. R lung coarse on exam - suspect possible aspiration pneumonia as source of fever. Discussed with son. He is in agreement with no escalation of cares, transition to comfort cares, and referral to [facility] hospice. He will update Noemy.     Vital Signs with Ranges  Temp:  [97.7  F (36.5  C)-100.6  F (38.1  C)] 100.6  F (38.1  C)  Pulse:  [] 95  Resp:  [14-20] 18  BP: ()/(69-74) 109/74  SpO2:  [93 %-98 %] 95 %  I/O last 3 completed shifts:  In: 860 [I.V.:200; NG/GT:270]  Out: 650 [Urine:650]    Physical Exam   General: Lying in bed, alert, NAD. Not interactive. Non-verbal.   Skin: No concerning lesions, rash, jaundice, cyanosis, erythema, or ecchymoses on exposed surfaces.   HEENT: NCAT. Anicteric sclera. Moist mucus membranes.    Respiratory: Non-labored breathing on room air, good air exchange, LL clear, R lung coarse on anterior auscultation.  Cardiovascular: RRR. No murmur or rub.   Gastrointestinal: Normoactive BS  Extremities: No LE edema. "   Neurologic: Alert, not oriented or interactive. Non-verbal. Pupils PERRL but R pupil constricts to light but then starts to dilate again while light is still on it. Unable to assess strength/sensation. Does not follow any commands.     Medications   Current Facility-Administered Medications   Medication Dose Route Frequency Provider Last Rate Last Admin     Current Facility-Administered Medications   Medication Dose Route Frequency Provider Last Rate Last Admin    cefTRIAXone (ROCEPHIN) 1 g vial to attach to  mL bag for ADULTS or NS 50 mL bag for PEDS  1 g Intravenous Q24H Kira Gonzalez PA-C        heparin lock flush 10 UNIT/ML injection 5-20 mL  5-20 mL Intracatheter Q24H Tucker Jacob MD   5 mL at 05/14/24 0635     Data   Results for orders placed or performed during the hospital encounter of 04/24/24 (from the past 24 hour(s))   CBC with Platelets & Differential    Narrative    The following orders were created for panel order CBC with Platelets & Differential.  Procedure                               Abnormality         Status                     ---------                               -----------         ------                     CBC with platelets and d...[[id number]]  Abnormal            Final result                 Please view results for these tests on the individual orders.   Phosphorus   Result Value Ref Range    Phosphorus 3.7 2.5 - 4.5 mg/dL   Comprehensive metabolic panel   Result Value Ref Range    Sodium 137 135 - 145 mmol/L    Potassium 3.5 3.4 - 5.3 mmol/L    Carbon Dioxide (CO2) 27 22 - 29 mmol/L    Anion Gap 8 7 - 15 mmol/L    Urea Nitrogen 12.9 6.0 - 20.0 mg/dL    Creatinine 0.39 (L) 0.51 - 0.95 mg/dL    GFR Estimate >90 >60 mL/min/1.73m2    Calcium 8.0 (L) 8.6 - 10.0 mg/dL    Chloride 102 98 - 107 mmol/L    Glucose 86 70 - 99 mg/dL    Alkaline Phosphatase 66 40 - 150 U/L    AST 27 0 - 45 U/L    ALT 41 0 - 50 U/L    Protein Total 5.3 (L) 6.4 - 8.3 g/dL    Albumin 3.0 (L) 3.5 -  5.2 g/dL    Bilirubin Total 0.4 <=1.2 mg/dL   Magnesium   Result Value Ref Range    Magnesium 2.2 1.7 - 2.3 mg/dL   Uric acid   Result Value Ref Range    Uric Acid 3.2 2.4 - 5.7 mg/dL   Partial thromboplastin time   Result Value Ref Range    aPTT 29 22 - 38 Seconds   INR   Result Value Ref Range    INR 1.18 (H) 0.85 - 1.15   Fibrinogen activity   Result Value Ref Range    Fibrinogen Activity 272 170 - 490 mg/dL   CBC with platelets and differential   Result Value Ref Range    WBC Count 3.4 (L) 4.0 - 11.0 10e3/uL    RBC Count 3.25 (L) 3.80 - 5.20 10e6/uL    Hemoglobin 9.4 (L) 11.7 - 15.7 g/dL    Hematocrit 29.4 (L) 35.0 - 47.0 %    MCV 91 78 - 100 fL    MCH 28.9 26.5 - 33.0 pg    MCHC 32.0 31.5 - 36.5 g/dL    RDW 15.3 (H) 10.0 - 15.0 %    Platelet Count 191 150 - 450 10e3/uL    % Neutrophils 85 %    % Lymphocytes 6 %    % Monocytes 2 %    % Eosinophils 5 %    % Basophils 1 %    % Immature Granulocytes 1 %    NRBCs per 100 WBC 0 <1 /100    Absolute Neutrophils 2.9 1.6 - 8.3 10e3/uL    Absolute Lymphocytes 0.2 (L) 0.8 - 5.3 10e3/uL    Absolute Monocytes 0.1 0.0 - 1.3 10e3/uL    Absolute Eosinophils 0.2 0.0 - 0.7 10e3/uL    Absolute Basophils 0.0 0.0 - 0.2 10e3/uL    Absolute Immature Granulocytes 0.0 <=0.4 10e3/uL    Absolute NRBCs 0.0 10e3/uL

## 2024-05-15 NOTE — PROGRESS NOTES
[facility]  _________________________________________________________________     [facility] 24/7 Contact Number: [phone]    - Providers: Please contact [facility] with changes in orders or clinical plan of care   - Nursing: Please contact [facility] with significant changes in patient condition     Hospice will notify the care team (including the hospitalist) to confirm date of inpatient hospice (GIP) admission.    New Epic encounter will not be created until hospice completes admission.     RECEIVED. THANK YOU FOR THE REFERRAL. WE WILL WORK ON SENDING SOMEONE TO MEET WITH PT/FAMILY

## 2024-05-15 NOTE — PROGRESS NOTES
Brief Progress Note:    ~0030 contacted by bedside RN that when walked into room patient's NJ was pulled out. Chart reviewed. Appears that goals of care conversations ongoing. Code status adjusted to DNR/DNI on 5/14, no additional chemotherapy will be given, and palliative care is involved and teams are discussing comfort care options with family.     Patient is receiving TFs via NJ, and also medications as she is minimally responsive. Meds reviewed, no meds due overnight, no am meds scheduled except for Atorvastatin and Famotidine, ok for those medications to be missed in am. HIV meds and Dapsone not due until later in the day. Is not receiving insulin and risk of hypoglycemia off of TF is low.    Given these things, will not replace tube overnight as need for replacement depends on ongoing GOC conversations. Bedside RN in agreement with this plan.    Elly Murdock MD  [facility] Hospitalist  Text Page

## 2024-05-15 NOTE — PLAN OF CARE
Goal Outcome Evaluation:  0700-1930  Kamilah continues to be non responsive. She had a fever of 100.6 (0) and was given a Tylenol suppository. It was decided by her family to switch to comfort care and an Inpatient Hospice consult was ordered. Turned very 2 hours. Appeared comfortable.

## 2024-05-15 NOTE — PROVIDER NOTIFICATION
0039 Elly Murdock MD pg regarding pts NJ being pulled out. Nurse walked into room and NJ was out and wrapped around her left arm.    Response: MD reviewed notes and didn't feel strongly about replacing NJ overnight if okay with nurse. NG could be replaced if meds were needed

## 2024-05-15 NOTE — PROGRESS NOTES
Care Management Follow Up    Length of Stay (days): 21    Expected Discharge Date:       Concerns to be Addressed: comfort care  Patient plan of care discussed at interdisciplinary rounds: Yes    Anticipated Discharge Disposition: n/a     Anticipated Discharge Services: n/a  Anticipated Discharge DME: n/a    Patient/family educated on Medicare website which has current facility and service quality ratings: no  Education Provided on the Discharge Plan:    Patient/Family in Agreement with the Plan: unable to assess    Referrals Placed by CM/SW:    Private pay costs discussed: Not applicable    Additional Information:  SW received a message from Kira COLUNGA pt's son has agreed to comfort care and asked if we could find out if pt qualifies for GI. SW asked her to put in a GIP consult and SW will follow up with [facility] about reaching out to the son.      called [facility].   was waiting on hold but he transferred her and the phone was disconnected.   covering on 5/16 please follow-up with [facility] to discuss with patient's son    SW to follow and assist with any other discharge needs that may arise.  NAKIA Winn   5A Oncology beds:5220-40 & 5C  beds 5417-32 (non BMT )      Vocera:  Phone: [phone]  Pager: [phone]

## 2024-05-15 NOTE — PLAN OF CARE
[id number] VSS on RA. No physical signs of pain observed. Repositioned Q2 hrs. Writer went into patient's room around 0030 and NJ was out, see provider notification note. Pt has been moving L arm up behind her head, when writer tried to unbend her arm for labs, her arm would not come out of flexion. Pt opened her eyes two times for me when I was repositioning her, otherwise not responsive to voice ro touch. Pupils not reactive. Pt did have productive cough several time during shift. PICC heparin locked. Roy w/ adequate urine output. Currently pt does not have NJ or NG tube, no tube feeds running.

## 2024-05-15 NOTE — PLAN OF CARE
Goal Outcome Evaluation:    Afebrile. VSS on RA. No signs or nonverbal indicators of pain or discomfort present. LUE mobile, pt moves it independently. Pt unable to follow other commands. Pt nonverbal. Intermittent cough, pt able to cough up secretions independently, suction provided to clear secretions. TF infusing @65ml/hr, stopped at 2000. Repositioned q2h, linen change d/t sweating. Oral cares performed q2h. Roy patent. No BM this shift. PICC infusing tko. After discussing with doctor this evening, son decided to change code status to DNR/DNI for pt. Continue poc.

## 2024-05-15 NOTE — PLAN OF CARE
Speech Language Therapy Discharge Summary    Reason for therapy discharge:    Change in medical status.    Progress towards therapy goal(s). See goals on Care Plan in [facility] electronic health record for goal details.  Change in status, transition to comfort cares.    Therapy recommendation(s):    Defer diet management to MD, no SLP services indicated.

## 2024-05-15 NOTE — PROGRESS NOTES
[facility]  Transplant & Immunocompromised Infectious Disease Progress Note -- Sign Off   Patient: Kamilah Goldberg, Date of birth 1980, Medical record number [id number].      Recommendations:     Finish today a three day course of ceftriaxone for empiric UTI treatment.    With the decision to transition her to [facility] this afternoon, all antiretroviral and other antimicrobial therapies below have been discontinued.    Continue IV GCV 5mg/kg q12h  Repeat CMV DNA PCR weekly, next due 5/20/24 -- a continued downtrend would be reassuring  Transition to oral valganciclovir if level declines below 10,000  Continue (ron)ganciclovir treatment until the plasma CMV PCR viral load level is less than 150 twice [measured 1 week apart]  Continue dapsone 100 mg daily for PJP prophylaxis   Continue dolutegravir and Truvada  Repeat plasma HIV viral load on 6/10/24    In light of her transition to [facility], Transplant ID will now sign off.  Thanks for allowing us to participate in the care of this unfortunate woman.  Please page if additional ID questions or concerns arise.    Justin Syed MD  Pager [phone]        Patient Summary:   A 44-year-old woman with recent complicated strep empyema and substance use/unstable housing situation who presented to an outside hospital with new diagnosis of AIDS.  In the outside hospital she was diagnosed with a CNS lesion and then started on ART around April 17.  She was transferred here for oncology and neurosurgical evaluation.  Today, 5/15/24, due to ongoing neurological deterioration and intractable malignancy, she has been transitioned to [facility] and her antimicrobial therapies have been discontinued.        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   This level of CMV reactivation suspicious for IRIS , fevers masked by  steroids    Given high viral load we are treating CMV with IV GCV 5mg/kg q12h. CMV DNA PCR downtrending slightly, anticipate further downtrend in the coming weeks.  Provided there is more than 1 log drop over the next 1 week then this would be reassuring of ongoing treatment response.  If level drops to below 10,000 then would be okay switching to oral valganciclovir.  Once undetectable or less than 150 x2 then can stop and monitor for recurrence, there is no need for secondary ppx given her CD-4 count may recover.    #HIV/AIDS, ART-naive prior to presentation  VL log 6, CD4 2 3/31/24  Viral load 427 5/13/2024.  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 He now has rare atypical cells on the repeat LP here, as well as EBV.     As outlined most infectious studies thus far are negative and the preliminary additional studies done here are also not suggestive of infection. CSF WBC count 3 and normal glucose suggests against infection.   - Cryptococcus has essentially been ruled out with negative CSF CrAg and two negative serum CrAg. CSF not consistent.  - CMV encephalitis seems much less likely based negative CMV PCR   - Toxoplasmosis serology is negative, as is PCR  - PML has been ruled out with -ve RENEA virus PCR  - Tb meningitis: negative quant-gold. AFB cultures are pending. CSF not consistent.  - Endemic mycoses: again not a classic presentation and no other signs of disseminated disease (typically can see pancytopenia, elevated alk phos, splenomegaly, etc). Eval is negative so far.      The location, rapid progression, and her current neurological status is very concerning as far as her short-term prognosis.     Agree with high-dose steroids now that we have maximized diagnostics since this lesion is not amenable to brain biopsy given its location. Appreciate hematology consideration of further therapeutics including IT methotrexate. I would note that ART and immune reconstitution is a vital part treating CNS lymphoma as well.    She should be on PJP prophylaxis, prefer dapsone.    5/14/2024:  Her viral load is responding to treatment.  We can continue monthly checks to ensure she becomes undetectable.    #Non-immune to hepatitis A or B. Hep B coreAb negative  #Homeless and and history of polysubstance use disorder  # STD discussion  Other harm reduction labs have been completed at [facility] notably hep B/C, Syphilis, and other STI with gonorrhea and chlamydia    Per patients son and next of kin she told him she does NOT  want her partner kemi to know about the HIV. Appreciate Social work support in reporting and disclosure    #COVID-19+ on 4/18  Remains in Isolation although asymptomatic    Other Infectious Disease Issues  #MRSA in sputum from 4/19  - Reason to for additional endemic disease testing: No   - Bacterial prophylaxis: None  - Pneumocystis prophylaxis: planning dapsone vs atovaquone vs pentamidine  - Viral serostatus & prophylaxis: CMV+  - Fungal prophylaxis: none  - Immunization status: Hep B non infected, non immune, Hep A non immune, largely unvaccinated since 2009 will need to be addressed once further from chemo and has had some immune reconstitution  - Gamma globulin status:No lab results found.  - Isolation status: Good hand hygiene. Contact fro MRSA in lung at OSH, Special 2/2 recent covid, immunocompromised.       Selected Labs, Micro, Imaging Results:   [facility] Workup  4/20 - HLA  -ve  4/18 - LP EBV CSF +ve  4/16 - Toxo IgG -ve, CMV IgG+  3/31- HIV RNA+ 1.2 million copies 6.9  log, CD-4 count 2/mcl  3/31/24- Genotype- no resistance  3/30/24- Syphilis Ab -ve  3/29/24- CT/NG Urine negative    [facility] Workup  4/27 Karius pending, Blasto blood and Histo Urine pending  4/26 CSF- Crypto -ve, EBV+, Toxo pending (other ID studies negative; RENEA virus, CMV, Cultures)  4/29 Hep C Pending (Hep C Ab -ve 4/1/24)         Interval History and Subjective:   Ms. Goldberg was newly slightly febrile at T max 100.6 degrees F this midday despite finishing today a three day course of empiric ceftriaxone for a possible UTI.  She also remains on antiretroviral therapy with dolutegravir and Descovy, IV ganciclovir, and dapsone PJP prophylaxis.  She remains verbally non-interactive and lost her NG tube last night.  Goals of care conversations with her family are continuing today.  The 5/13/24 plasma CMV PCR viral load was down to 62,300 international units/ml. There is no other new Microbiology in the past couple of days.    Addendum:  This afternoon, her the primary team spoke with her son and the decision has been made to transition her to [facility] and inpatient hospice.  Her antimicrobial medications have been discontinued.    Review of Symptoms:  Unable to be obtained.       ID HPI from 4/25/24     43 year old woman with recently diagnosed HIV (1 month ago)/AIDs (CD4 2) who presented to [facility] 4/15/2024 with generalized weakness found to have CNS lesion, highest concern for CNS lymphoma, transferred here for neurosurgical evaluation and possible biopsy     She was seen 1/2023 at [facility] for severe pneumococcal pneumonia/empyema requiring VATs decortication.      She was next seen 3/29/2024 at [facility] with oral candidiasis and then HIV was screened and positive. CD4 2. She was given nystatin for 2 weeks and referred to ID clinic for followup. Not started on ART. MRI brain at this time showed a 15mm T2 hyperintensity in the pontomesencephalic junction (initially dx as ischemic stroke).     "  She went back to [facility] 4/15/2024 for generalized weakness. She had a broad work-up after repeat brain MRI showed increase size of the lesion. She was started on Biktarvy ~4/17 but there were concerns for a rash though it was restarted. She was COVID+ 4/18 and grew \"light growth\" MRSA on sputum culture 4/19. She was started on linezolid.      On arrival here, she is afebrile with HR 73, BP normotensive, satting 100% on RA. WBC and PLT normal. Hgb 11.3 very slightly low. Cr 0.46. LFTs have not been done since 4/19 when they were relatively unremarkable - ALT 44, AST 56, Tbili 0.3 and most notably Alk Phos was normal at 79.      On interview today, she is relatively encephalopathic. She says she has two children, 24 and 14. Her son has a cat. She denies pain or discomfort. Says she is willing to take medicine for HIV       Physical Exam:     VITAL SIGNS:  Blood pressure 109/74, pulse 95, temperature (!) 100.6  F (38.1  C), temperature source Axillary, resp. rate 18, height 1.626 m (5' 4\"), weight 66.3 kg (146 lb 2.6 oz), SpO2 95%.     Intake/Output Summary (Last 24 hours) at 5/15/2024 1442  Last data filed at 5/15/2024 0557  Gross per 24 hour   Intake 860 ml   Output 650 ml   Net 210 ml     General:  Not in acute distress  Eyes:   closed  Mouth, Throat:   NG tube present  Cardiovascular: No Cyanosis, JVD not elevated  Respiratory:  Not in respiratory distress, Chest expansion symmetrical, No Audible wheeze/stridor, wet crackling cough present  Abdominal: No suprapubic tenderness.  Skin:  The exposed skin is warm and dry  Neurologic:     Higher Mental Function: Nonverbal, unable to assess   Motor: No spontaneous movement  Psychiatric: Unable to assess       Other Data:     MEDICATIONS  Current Facility-Administered Medications   Medication Dose Route Frequency Provider Last Rate Last Admin    atorvastatin (LIPITOR) tablet 40 mg  40 mg Oral or Feeding Tube Daily Russel Boothe DO   40 mg at 05/14/24 0845    cefTRIAXone " (ROCEPHIN) 1 g vial to attach to  mL bag for ADULTS or NS 50 mL bag for PEDS  1 g Intravenous Q24H Kira Gonzalez PA-C        dapsone suspension 100 mg  100 mg Oral or Feeding Tube Daily Kandi Lambert PA-C   100 mg at 05/14/24 0845    dolutegravir (TIVICAY) tablet 50 mg  50 mg Oral or NG Tube Daily Ramesh Van MD   50 mg at 05/14/24 2222    emtricitabine-tenofovir (TRUVADA) 200-300 MG per tablet 1 tablet  1 tablet Oral or NG Tube Daily Ramesh Van MD   1 tablet at 05/14/24 2009    enoxaparin ANTICOAGULANT (LOVENOX) injection 40 mg  40 mg Subcutaneous Q24H Kira Gonzalez PA-C   40 mg at 05/14/24 2012    famotidine (PEPCID) suspension 20 mg  20 mg Oral BID Kandi Lambert PA-C   20 mg at 05/14/24 2012    ganciclovir (CYTOVENE) 350 mg in D5W 100 mL intermittent infusion  5 mg/kg Intravenous Q12H Edilma Phelan PA-C 100 mL/hr at 05/15/24 0937 350 mg at 05/15/24 0937    heparin lock flush 10 UNIT/ML injection 5-20 mL  5-20 mL Intracatheter Q24H Tucker Jacob MD   5 mL at 05/14/24 0635    lidocaine (viscous) (XYLOCAINE) 2 % solution 5 mL  5 mL Topical Once Jazzy Santamaria MD         IMMUNOLOGY LABS  CD-4 Counts   Absolute CD4, Austin T Cells   Date Value Ref Range Status   05/08/2024 15 (L) 441 - 2,156 cells/uL Final     Inflammatory Markers    Recent Labs   Lab Test 04/29/24  0819   G6PD 13.0     Immune Globulin Studies   No lab results found.    GENERAL LABS  Metabolic Studies       Recent Labs   Lab Test 05/15/24  0225 05/14/24  0607 04/27/24  0925 04/27/24  0724 01/26/23  0541 01/24/23  2118 01/17/23  1231 01/17/23  0641    138   < > 140   < >  --    < >  --    POTASSIUM 3.5 3.5   < > 3.5   < >  --    < > 2.9*   CHLORIDE 102 103   < > 108*   < >  --    < >  --    CO2 27 26   < > 20*   < >  --    < >  --    ANIONGAP 8 9   < > 12   < >  --    < >  --    BUN 12.9 15.7   < > 7.4   < >  --    < >  --    CR 0.39* 0.34*   < > 0.48*   < >  --    <  >  --    GFRESTIMATED >90 >90   < > >90   < >  --    < >  --    GLC 86 116*   < > 178*   < >  --    < >  --    A1C  --   --   --  5.7*  --   --   --   --    MAUREEN 8.0* 8.2*   < > 9.1   < >  --    < >  --    PHOS 3.7 3.6   < > 4.1   < >  --    < >  --    MAG 2.2 2.3   < > 1.9   < >  --    < >  --    LACT  --   --   --   --   --  1.4  --  0.8    < > = values in this interval not displayed.     Hepatic Studies    Recent Labs   Lab Test 05/15/24  0225 05/14/24  0607 05/13/24  0439 04/30/24  0405 04/29/24 0431 04/24/24 2050 01/17/23  0641   BILITOTAL 0.4 0.4 0.3   < > 0.3   < >  --    DBIL  --   --   --   --  <0.20   < >  --    ALKPHOS 66 69 72   < > 74   < >  --    PROTTOTAL 5.3* 5.8* 5.9*   < > 7.6   < > 6.3*   ALBUMIN 3.0* 3.1* 3.3*   < > 3.5   < >  --    AST 27 24 24   < > 23   < >  --    ALT 41 35 44   < > 17   < >  --    LDH  --   --   --   --  220  --  263*    < > = values in this interval not displayed.     Pancreatitis testing    Recent Labs   Lab Test 04/10/19  0003   LIPASE 162     Hematology Studies   Recent Labs   Lab Test 05/15/24  0225 05/14/24  0607 05/13/24  0439 05/12/24  0344 04/26/24  0615 04/24/24  2050 02/24/22  1458 04/10/19  0003   WBC 3.4* 5.0 7.5 6.9   < > 6.7  6.7   < > 4.1   ANEU  --   --   --   --   --  6.1  --  2.0   ANEUTAUTO 2.9 4.6 7.0 6.3   < >  --    < >  --    ALYM  --   --   --   --   --  0.5*  --  1.4   ALYMPAUTO 0.2* 0.3* 0.3* 0.4*   < >  --    < >  --    KYLE  --   --   --   --   --  0.0  --  0.5   AMONOAUTO 0.1 0.1 0.1 0.2   < >  --    < >  --    AEOS  --   --   --   --   --  0.0  --  0.2   AEOSAUTO 0.2 0.0 0.0 0.0   < >  --    < >  --    ABSBASO 0.0 0.0 0.0 0.0   < >  --    < >  --    HGB 9.4* 9.5* 9.6* 9.4*   < > 11.3*  11.3*   < > 7.7*   HCT 29.4* 29.6* 29.9* 29.8*   < > 34.2*  34.2*   < > 27.4*    217 203 202   < > 450  450   < > 470*    < > = values in this interval not displayed.     Urine Studies     Recent Labs   Lab Test 05/12/24 2007 05/02/24  0027  "05/01/24  1613 05/01/24  0854 05/01/24  0131 04/29/24  2100 01/16/23  2132 09/19/18  2225   URINEPH 5.5 7.5 8.0 8.0 7.5*   < > 6.0 5.5   NITRITE Positive*  --   --   --   --   --  Negative Negative   LEUKEST Large*  --   --   --   --   --  Small* Trace*   WBCU 75*  --   --   --   --   --  <1 <1    < > = values in this interval not displayed.     CSF testing     Recent Labs   Lab Test 04/26/24  1116 04/26/24  1115   CWBC  --  3   CRBC  --  44*   CGLU 52  --    CTP  --  109.0*     Medication levels    Recent Labs   Lab Test 01/20/23  1230   VANCOMYCIN 7.6     Body fluid stats    Recent Labs   Lab Test 01/17/23  1500   FCOL Yellow   FAPR Hazy*   FWBC 5,760   FNEU 78   FLYM 16   FMONO 5   FBAS 1   FTP 5.6       MICROBIOLOGY  Fungal testing:  No lab results found.    Invalid input(s): \"HIFUN\", \"FUNGL\"  Last Culture results   Culture   Date Value Ref Range Status   05/12/2024 >100,000 CFU/mL Escherichia coli (A)  Final   05/12/2024 50,000-100,000 CFU/mL Escherichia coli (A)  Final   05/09/2024 No growth after 6 days  Preliminary   05/08/2024 No Growth  Final   05/08/2024 No Growth  Final   04/26/2024 No anaerobic organisms isolated  Final   04/26/2024 No growth after 18 days  Preliminary   04/26/2024 No Growth  Final   04/26/2024 No growth after 19 days  Preliminary   01/24/2023 No Growth  Final   01/24/2023 No Growth  Final   01/20/2023 No anaerobic organisms isolated  Final   01/20/2023 No Growth  Final   01/20/2023 No Growth  Final   01/17/2023 2+ Streptococcus pneumoniae (A)  Final   01/17/2023 No Growth  Final   12/04/2022 No Growth  Final   12/04/2022 3+ Normal alo  Final   12/04/2022 No Growth  Final   02/24/2022 No Growth  Final     Culture Micro   Date Value Ref Range Status   04/30/2012 No growth  Final   06/30/2010 >100,000 colonies/mL Escherichia coli  Final   02/05/2010 No growth  Final   12/01/2009 No growth  Final       Last checks of Clostridioides difficile testing  No lab results found.  Infection " "Studies to assess Diarrhea No lab results found.    Invalid input(s): \"BIDRY\", \"BIDYD\"  Virology:  Coronavirus-19 testing    Recent Labs   Lab Test 05/09/24  1220 11/28/23  1451 01/16/23 2017 12/04/22  2036   [id number] Positive* Negative Negative Negative   CYCLETHRES 26.9  --   --   --      Respiratory virus (non-coronavirus-19) testing    Recent Labs   Lab Test 11/28/23  1451   INFZA Negative   INFZB Negative   IRSV Negative     CMV viral loads    Recent Labs   Lab Test 05/13/24  1347 05/07/24  0332 05/01/24  0839   CMVRESINST 62,300* 91,000* 1,010*   CMVLOG 4.8 5.0 3.0     CMV resistance testing:  No lab results found.  No results found for: \"H6RES\"  No results found for: \"EBVRESINST\", \"08954\", \"EBQTC\", \"EBRES\", \"95176\", \"00944\"  BK Virus Testing   No lab results found.  Parvovirus Testing  No lab results found.    Invalid input(s): \"PRVRES\"  Adenovirus Testing  No lab results found.    Invalid input(s): \"ADENAB\", \"ADENOVIRUS\", \"ADQT\"    IMAGING  No results found for this or any previous visit (from the past 48 hour(s)).  "

## 2024-05-16 NOTE — PLAN OF CARE
Goal Outcome Evaluation:  Kamilah appeared comfortable most of the shift and needed Morphine times one for slight restlessness. Son came to visit and said she no longer confidential. Turned every 2 hours.

## 2024-05-16 NOTE — PROGRESS NOTES
Care Management Follow Up    Length of Stay (days): 22    Expected Discharge Date:  Unknown     Concerns to be Addressed: other (see comments) (hospice/GIP)       Patient plan of care discussed at interdisciplinary rounds: Yes    Anticipated Discharge Disposition: GIP    Anticipated Discharge Services: Hospice    Anticipated Discharge DME: None    Patient/family educated on Medicare website which has current facility and service quality ratings: no    Education Provided on the Discharge Plan: Yes    Patient/Family in Agreement with the Plan: yes    Referrals Placed by CM/SW:     [facility]  Intake phone: [phone]    Private pay costs discussed: Not applicable    Additional Information: SHAD called and spoke with Ruben at [facility]. He reported they are aware tat Kamilah was referred to [facility]. They are attempted to reach her son, Yuan, and have left him a couple of voicemails. They are waiting for a call back. SHAD provided the direct dial for [facility] in the event that they need to reach us.    VIRGIL Boles  [facility]   Covering for Yvette Peacock - Phone: [phone]

## 2024-05-16 NOTE — PROGRESS NOTES
Left 2 voicemails for pt son regarding GIP on 5.15 - we will reach out to him this morning to set up a time to meet                       Thank you,  Betty Melchor   [facility] Admissions Coordinator

## 2024-05-16 NOTE — PROGRESS NOTES
[facility]    Hematology / Oncology Progress Note    Patient: Kamilah Goldberg  MRN: [id number]  Admission Date: 4/24/2024  Date of Service (when I saw the patient): 05/16/2024  Hospital Day # 22     Assessment & Plan   Kamilah Goldberg is a 44 year old female with a past medical hsitory of dyslipidemia, polysubstance abuse, and R-sided empyema (s/p VATS and partial pleurectomy Jan 2021). She was admitted on 4/24/2024 for L avel/midbrain lesion mass and new HIV/AIDS diagnosis (3/28/24). On work up, was noted to have positive EBV PCR on CSF and rare atypical cells on CSF c/f CNS lymphoma. In the absence of tissue diagnosis, empirically started high-dose methotrexate and rituximab C1D1=4/29/24. Course further complicated by COVID positivity, decline in neurological status, HIV/AIDS. MRI brain after first cycle of chemo showed no treatment response. In the setting of neurologic decline, transitioned to comfort cares on 5/15.     Today:  - Continue comfort cares. DNR/DNI.  - GIP hospice consulted.     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 She was found to have thrush and was diagnosed with HIV (see below). MRI was obtained d/t dizziness and concern for neurocandidiasis. MRI brain 3/29/24 with area of restricted diffusion and T2 hyperintensity in left posterior pontomesencephalic junction, mild local mass effect. Likely representing ischemic infarct. LP 3/29/24 CSF analysis for infection was negative. Cytology added for possible lymphoma, but there were insufficient viable cells for analysis. She was discharged with plan of repeat MRI brain in 3 months. She then presented in April 2024 with right facial and arm paresthesia and RLE weakness. Workup concerning that the left midbrain/avel mass appeared to be increasing in size compared to MRI  3/20/23 concerning for CNS lymphoma. CT CAP w/o evidence of systemic disease. Unfortunately, location of the mass was deemed too high risk for biopsy to obtain tissue diagnosis. Repeat high volume LP 4/18/24 obtained in hope to confirm malignancy but this remained non-diagnostic. She was transferred to [facility] for NSGY to pursue biopsy but this still remained too high risk. Repeat LP 4/26/24 with rare atypical cells on cytology but does not correlate with flow cytometry (rare polytypic B cells). LP also notable for positive EBV PCR, elevated protein (109), but negative CMV, meningitis/encephalitis panel. After extensive work up ruling out infectious processes and trying to obtain confirmation of CNS lymphoma diagnosis, plan of care was discussed with patient's decission maker who agreed with proceeding treatment for CNS lymphoma. Proceeded with high dose methotrexate (C1D1=4/30/29), Rituxan added D4 and D7.   - Infectious studies at [facility] thus far negative for CMV IgM negative (IgG positive suggesting previous infection), Cryptococcus, CMV encephalitis, Tb meningitis, toxoplasmosis serology, toxoplasma PCR. Damian w/ E.Coli, CMV and human adenovirus; discussed with ID no need to treat E.Coli.   - Continue dexamethasone taper currently on 4 mg BID (x5/9)   - PICC line placed x4/29   - Attempted to obtain another LP on 5/12, but was unable to obtain CSF  - Repeat brain MRI 5/12 after cycle 1 showed no significant change in size of the brainstem mass compared to 4/30/2024. Clinically, patient has neurologically declined since beginning of treatment. Previously was able to follow commands, now unable to follow commands.   - Discussed lack of response with family on 5/13. No plan to proceed with more chemotherapy. Transitioned to comfort cares on 5/15.      # Anemia  Likely due to underlying suspected malignancy and chemotherapy.   - Discontinued labs and transfusions     ID  # HIV/AIDS  Recently diagnosed with HIV  during admission at [facility] on 3/28/24 with viral load of 1.24 million, 6.9 log,  and CD4 count of 2. She was not started on ART, given Nystatin x2 weeks and referred to ID clinic for follow up. Prior to this, she was admitted again with ID consult for broad-infectious workup. Started Biktarvy 4/17 but stopped due concern for rash, rechallenged on 4/20 and able to tolerate. Later transitioned to Dolutegravir and Truvada. SW reached out to health department 5/2 regarding patient's HIV status and if able to be reported properly. Upon further discussions and health department chart review, original diagnosis was in 2014 and patient was contacted over 14 times with no response.  Unable to confirm with patient if she is was aware of status as she is non-verbal. Upon further chart review, HIV testing was completed 8/27/24 at [facility] with office visit to follow, but encounter is not able to be reviewed and needing patient authorization; will discuss with son (Yuan) as he is her next of kin and medical decision maker. Of note, patient's aunt (Noemy), and son (Yuan) are aware of diagnosis.   - Upon further conversation with boyfriend (Otis), it does not appear he is aware of her HIV status. I asked about general medical history and any new/recent infections to which he did not mention HIV/AIDS status. ID had a similar conversation with a consistent consensus. Reached out to SW to discuss with family/son regarding if previous partners have been notified.  - Discontinued ART with dolutegravir and Truvada      # E. coli UTI  On 5/12, was noted to have malodorous urine. Has Roy in place. UA showed positive nitrites, large LE, 75 WBC. UCx showed E. coli.  - Roy was replaced 5/13  - IV CTX 5/12-5/15    # COVID-19 positive  # MRSA pneumonia  Tested positive for COVID-19 4/18/24 during admission to [facility]. Asymptomatic from COVID. Per ID, does not feel like MRSA pneumonia is likely so linezolid was  discontinued  - Per infection prevention, first day eligible for re-testing was 5/9, which showed a CT of 26.9. Per recommendations should remain in precautions until negative COVID test or CT >35. Could repeat COVID test 5/16.     # Fever  Fever noted 5/14. Possible due to UTI as above or aspiration pneumonia. Have not obtained CXR but has recent h/o aspiration and coarse lung sounds on R side. Discussed with family who agreed to not escalate cares with infection work up or antibiotics at this time.   - Tylenol suppository PRN    PULM  # Concern for aspiration  # Questionable ability to protect airway  On 5/12, patient had episode of rapid desaturation to 50%. Code blue was called, but pt was able to be stabilized with suction and supplement oxygen via high flow. No arrest. Suspect d/t mucus plugging. With worsening mental status, uncertain of ability to clear secretions and protect airway. Unable to be evaluated by SLP given inability to follow commands.   - Discontinued Mucomyst nebs and chest physiotherapy  - Morphine PRN for air hunger    # H/o empyema s/p VATs with total decortication of R lung and partial pleurectomy (Jan 2023)      MISC  # Cotton wool spot of right eye  # Concern for CMV retinitis  Single CWS in right eye. Found on assessment by ophthalmology x4/28. Repeat ophtho exam 5/6 noted to have worsening cotton wool spots; etiologies include HIV retinitis, HTN retinitis or CMV retinitis.  - Discontinued IV ganciclovir      SOCIAL   # Medical decision maker  Confirmed with family that son (Yuan) will be patient's primary medical decision maker. Son Yuan and patient are okay with communication with aunt (Noemy) if Yuan is unavailable.      # H/o stimulant/amphetamine use   [place] [facility] [place] Patient previously reported snorting and declined IVDU.     Clinically Significant Risk Factors              # Hypoalbuminemia: Lowest albumin = 3 g/dL at 5/15/2024  2:25 AM, will monitor  "as appropriate  # Coagulation Defect: INR = 1.18 (Ref range: 0.85 - 1.15) and/or PTT = 29 Seconds (Ref range: 22 - 38 Seconds), will monitor for bleeding           # Overweight: Estimated body mass index is 25.09 kg/m  as calculated from the following:    Height as of this encounter: 1.626 m (5' 4\").    Weight as of this encounter: 66.3 kg (146 lb 2.6 oz).   # Moderate Malnutrition: based on nutrition assessment           FEN  Diet: Regular Diet Adult   IVF: Bolus PRN     MISC  Code Status: DNR/DNI  Lines/Drains: PICC  Medically Ready for Discharge: Anticipated in 5+ Days  Dispo: Comfort cares, awaiting GIP hospice consult    Patient was seen and plan of care was discussed with attending physician Dr. Das.    I spent 25 minutes in the care of this patient today, which included time necessary for review of interval events, obtaining history and physical exam, ordering medications/tests/procedures as medically indicated, review of pertinent medical literature, counseling of the patient, coordination of care, and documentation time. Over 50% of time was spent counseling the patient and/or coordinating care.    Kira Gonzalez PA-C   Hematology/Oncology   Pager: [phone]  Desk phone: [phone]    Interval History   No acute events overnight. Patient appears slightly distressed this morning with furrowed brow and more rapid breathing. RN to suction and give morphine. Continues to be alert but not interactive.     Vital Signs with Ranges  Temp:  [97.8  F (36.6  C)-100.6  F (38.1  C)] 98.6  F (37  C)  Pulse:  [77-95] 93  Resp:  [18] 18  BP: (104-109)/(73-77) 104/77  SpO2:  [95 %-98 %] 97 %  I/O last 3 completed shifts:  In: 50 [P.O.:50]  Out: 650 [Urine:650]    Physical Exam   General: Lying in bed, alert, slightly distressed. Not interactive. Non-verbal.   Skin: No concerning lesions, rash, jaundice, cyanosis, erythema, or ecchymoses on exposed surfaces.   HEENT: NCAT. Anicteric sclera. Moist mucus membranes.  "   Respiratory: Slightly irregular/rapid breathing  Neurologic: Alert, not oriented or interactive. Non-verbal.     Medications   Current Facility-Administered Medications   Medication Dose Route Frequency Provider Last Rate Last Admin     Current Facility-Administered Medications   Medication Dose Route Frequency Provider Last Rate Last Admin    heparin lock flush 10 UNIT/ML injection 5-20 mL  5-20 mL Intracatheter Q24H Tucker Jacob MD   5 mL at 05/16/24 0502     Data   No results found for this or any previous visit (from the past 24 hour(s)).

## 2024-05-16 NOTE — PROGRESS NOTES
Brief Palliative Care Note    Chart reviewed and events noted from preceding days. Discussed with Oncology 5/15. Kamilah is now on comfort measures only and appears comfortable per nursing notes and on my assessment from threshold this afternoon. GIP hospice consult placed.     Palliative will continue to follow peripherally for any symptom and/or support needs.    Chantell Horne PA-C  [facility], Palliative Care  Securely message with the [employer] Web Console (learn more here) or  Text page via [employer] Paging/Directory

## 2024-05-16 NOTE — PLAN OF CARE
Goal Outcome Evaluation:                      Nursing note    Pain/Comfort: Patient scored 0-1 using nonverbal indicators, on rFLACC scale.    Primary problem: Comfort cares  Assessments/Progress: Patient remains afebrile and DNR/DNI overnight, breathing on room air. Offered ice chips with HOB elevated with supervision to prevent patient aspiration. Patient was able to chew ice chips and swallow. Patient observed drooling. Roy catheter cares done with 200mL urine output so far. Smear BM overnight with incontienence cares and incontinence pad to protect skin. Patient repositioned every 3 hours with range of motion.     Patient aroused to voice, made incoherent sounds to communicate. Affirmation and spiritual support given to patient at bedside. Patient dignity was maintained. Familiar objects presented to patient with soft personal objects in reach. Active ROM when gravity is eliminated was identified in BLE. Active ROM against gravitity was identified in BUE. Posterior and pedal pulses weak bilaterally. Radial pulses normal and intact bilaterally.  BL extremities were cool to touch, while BU extremties warm and hot. Warm blanket applied for thermoregulation.    Priority nursing care for next shift: comfort cares  Discharge plan/ barriers to discharge: pending plan of care

## 2024-05-17 PROBLEM — Z51.5 HOSPICE CARE: Status: ACTIVE | Noted: 2024-01-01

## 2024-05-17 NOTE — CONSULTS
[facility]    Consult Note - [facility]  _________________________________________________________________    [facility]  Contact Number: [phone]    - Providers: Please contact [facility] with changes in orders or clinical plan of care   - Nursing: Please contact [facility] with significant changes in patient condition    Hospice will notify the care team (including the hospitalist) to confirm date of inpatient hospice (GIP) admission.    New Epic encounter will not be created until hospice completes admission.   ______________________________________________________________________        Hospice Diagnosis: Neoplasm of Uncertain Behavior     Indication for Inpatient Hospice: Pain and secretion management     Goals for Hospital Discharge: Pain management as evidence by no facial grimacing. Secretion management as evidence by no audible gargling.     Plan of Care Discussed with the Following:   - Nurse: Heavenly Helton RN  - Hospitalist/Rounding Provider: Kira Gonzalez PA-C   - Kamilah's Family/Preferred Contact: Yusuf Bolden - dejon - [phone]  - Hospice Provider: Bry Flores DO    Summary of Visit (includes assessment, medications and any new orders):   Saw pt today. Son Yuan present and signed consents. Yuan told me that this is his 3rd time planning a  for a close family member in the past year. He said that he was not close with his mother, but for some reason this is hitting him harder than the previous deaths. He said this is all a lot, but he has been coping in the best way he can. He said he would like renetta services, but his mother would appreciate it. Yuan is very young and seemed very educated on all that is going on with his mother. He stated he is appreciative that hospice is signing on because his biggest concern is pain management. Pt received x3 Morphine PRN's in the last 24hs. Updated  team, chart flip process started.     Plan:   Continue to assess pt for air hunger and facial grimacing. If more PRN's are needed, we will recommend to schedule Morphine.       Jasen Lee RN

## 2024-05-17 NOTE — PROGRESS NOTES
[facility]    Hematology / Oncology Progress Note    Patient: Kamilah Goldberg  MRN: [id number]  Admission Date: 4/24/2024  Date of Service (when I saw the patient): 05/17/2024  Hospital Day # 23     Assessment & Plan   Kamilah Goldberg is a 44 year old female with a past medical hsitory of dyslipidemia, polysubstance abuse, and R-sided empyema (s/p VATS and partial pleurectomy Jan 2021). She was admitted on 4/24/2024 for L avel/midbrain lesion mass and new HIV/AIDS diagnosis (3/28/24). On work up, was noted to have positive EBV PCR on CSF and rare atypical cells on CSF c/f CNS lymphoma. In the absence of tissue diagnosis, empirically started high-dose methotrexate and rituximab C1D1=4/29/24. Course further complicated by COVID positivity, decline in neurological status, HIV/AIDS. MRI brain after first cycle of chemo showed no treatment response. In the setting of neurologic decline, transitioned to comfort cares on 5/15.     Today:  - Continue comfort cares. DNR/DNI. Son met with hospice team today. Working on hospice enrollment.   - Repeated COVID PCR to assess if removal of precautions would be possible. PCR positive. Discussed adding cycle threshold with ID lab.     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 She was found to have thrush and was diagnosed with HIV (see below). MRI was obtained d/t dizziness and concern for neurocandidiasis. MRI brain 3/29/24 with area of restricted diffusion and T2 hyperintensity in left posterior pontomesencephalic junction, mild local mass effect. Likely representing ischemic infarct. LP 3/29/24 CSF analysis for infection was negative. Cytology added for possible lymphoma, but there were insufficient viable cells for analysis. She was discharged with plan of repeat MRI brain in 3 months. She then  presented in April 2024 with right facial and arm paresthesia and RLE weakness. Workup concerning that the left midbrain/avel mass appeared to be increasing in size compared to MRI 3/20/23 concerning for CNS lymphoma. CT CAP w/o evidence of systemic disease. Unfortunately, location of the mass was deemed too high risk for biopsy to obtain tissue diagnosis. Repeat high volume LP 4/18/24 obtained in hope to confirm malignancy but this remained non-diagnostic. She was transferred to [facility] for NSGY to pursue biopsy but this still remained too high risk. Repeat LP 4/26/24 with rare atypical cells on cytology but does not correlate with flow cytometry (rare polytypic B cells). LP also notable for positive EBV PCR, elevated protein (109), but negative CMV, meningitis/encephalitis panel. After extensive work up ruling out infectious processes and trying to obtain confirmation of CNS lymphoma diagnosis, plan of care was discussed with patient's decission maker who agreed with proceeding treatment for CNS lymphoma. Proceeded with high dose methotrexate (C1D1=4/30/29), Rituxan added D4 and D7.   - Infectious studies at [facility] thus far negative for CMV IgM negative (IgG positive suggesting previous infection), Cryptococcus, CMV encephalitis, Tb meningitis, toxoplasmosis serology, toxoplasma PCR. Karius w/ E.Coli, CMV and human adenovirus; discussed with ID no need to treat E.Coli.   - Continue dexamethasone taper currently on 4 mg BID (x5/9)   - PICC line placed x4/29   - Attempted to obtain another LP on 5/12, but was unable to obtain CSF  - Repeat brain MRI 5/12 after cycle 1 showed no significant change in size of the brainstem mass compared to 4/30/2024. Clinically, patient has neurologically declined since beginning of treatment. Previously was able to follow commands, now unable to follow commands.   - Discussed lack of response with family on 5/13. No plan to proceed with more chemotherapy. Transitioned to comfort  cares on 5/15.      # Anemia  Likely due to underlying suspected malignancy and chemotherapy.   - Discontinued labs and transfusions     ID  # HIV/AIDS  Recently diagnosed with HIV during admission at [facility] on 3/28/24 with viral load of 1.24 million, 6.9 log,  and CD4 count of 2. She was not started on ART, given Nystatin x2 weeks and referred to ID clinic for follow up. Prior to this, she was admitted again with ID consult for broad-infectious workup. Started Biktarvy 4/17 but stopped due concern for rash, rechallenged on 4/20 and able to tolerate. Later transitioned to Dolutegravir and Truvada. SW reached out to health department 5/2 regarding patient's HIV status and if able to be reported properly. Upon further discussions and health department chart review, original diagnosis was in 2014 and patient was contacted over 14 times with no response.  Unable to confirm with patient if she is was aware of status as she is non-verbal. Upon further chart review, HIV testing was completed 8/27/24 at [facility] with office visit to follow, but encounter is not able to be reviewed and needing patient authorization; will discuss with son (Yuan) as he is her next of kin and medical decision maker. Of note, patient's aunt (Noemy), and son (Yuan) are aware of diagnosis.   - Upon further conversation with boyfriend (Otis), it does not appear he is aware of her HIV status. I asked about general medical history and any new/recent infections to which he did not mention HIV/AIDS status. ID had a similar conversation with a consistent consensus. Reached out to SW to discuss with family/son regarding if previous partners have been notified.  - Discontinued ART with dolutegravir and Truvada      # E. coli UTI  On 5/12, was noted to have malodorous urine. Has Roy in place. UA showed positive nitrites, large LE, 75 WBC. UCx showed E. coli.  - Roy was replaced 5/13  - IV CTX 5/12-5/15    # COVID-19  positive  # MRSA pneumonia  Tested positive for COVID-19 4/18/24 during admission to [facility]. Asymptomatic from COVID. Per ID, does not feel like MRSA pneumonia is likely so linezolid was discontinued  - Per infection prevention, first day eligible for re-testing was 5/9, which showed a CT of 26.9. Per recommendations should remain in precautions until negative COVID test or CT >35. Repeat PCR 5/17 positive, awaiting cycle threshold.      # Fever  Fever noted 5/14. Possible due to UTI as above or aspiration pneumonia. Have not obtained CXR but has recent h/o aspiration and coarse lung sounds on R side. Discussed with family who agreed to not escalate cares with infection work up or antibiotics at this time.   - Tylenol suppository PRN    PULM  # Concern for aspiration  # Questionable ability to protect airway  On 5/12, patient had episode of rapid desaturation to 50%. Code blue was called, but pt was able to be stabilized with suction and supplement oxygen via high flow. No arrest. Suspect d/t mucus plugging. With worsening mental status, uncertain of ability to clear secretions and protect airway. Unable to be evaluated by SLP given inability to follow commands.   - Discontinued Mucomyst nebs and chest physiotherapy  - Morphine PRN for air hunger    # H/o empyema s/p VATs with total decortication of R lung and partial pleurectomy (Jan 2023)      MISC  # Cotton wool spot of right eye  # Concern for CMV retinitis  Single CWS in right eye. Found on assessment by ophthalmology x4/28. Repeat ophtho exam 5/6 noted to have worsening cotton wool spots; etiologies include HIV retinitis, HTN retinitis or CMV retinitis.  - Discontinued IV ganciclovir      SOCIAL   # Medical decision maker  Confirmed with family that son (Yuan) will be patient's primary medical decision maker. Son Yuan and patient are okay with communication with aunt (Noemy) if Yuan is unavailable.      # H/o stimulant/amphetamine use   Noted on [facility]  "tox screen in 2016. Patient previously reported snorting and declined IVDU.     Clinically Significant Risk Factors              # Hypoalbuminemia: Lowest albumin = 3 g/dL at 5/15/2024  2:25 AM, will monitor as appropriate  # Coagulation Defect: INR = 1.18 (Ref range: 0.85 - 1.15) and/or PTT = 29 Seconds (Ref range: 22 - 38 Seconds), will monitor for bleeding           # Overweight: Estimated body mass index is 25.09 kg/m  as calculated from the following:    Height as of this encounter: 1.626 m (5' 4\").    Weight as of this encounter: 66.3 kg (146 lb 2.6 oz).   # Moderate Malnutrition: based on nutrition assessment           FEN  Diet: Regular Diet Adult   IVF: Bolus PRN     MISC  Code Status: DNR/DNI  Lines/Drains: PICC  Medically Ready for Discharge: Anticipated in 5+ Days  Dispo: Comfort cares, awaiting hospice consult    Patient was seen and plan of care was discussed with attending physician Dr. Das.    I spent 30 minutes in the care of this patient today, which included time necessary for review of interval events, obtaining history and physical exam, ordering medications/tests/procedures as medically indicated, review of pertinent medical literature, counseling of the patient, coordination of care, and documentation time. Over 50% of time was spent counseling the patient and/or coordinating care.    Kira Gonzalez PA-C   Hematology/Oncology   Pager: [phone]  Desk phone: *[phone]    Interval History   No acute events overnight. Patient appears comfortable this morning. Eyes are open but pt is not verbal or interactive. Family at beside. Providing comfort cares.     Vital Signs with Ranges     I/O last 3 completed shifts:  In: -   Out: 600 [Urine:600]    Physical Exam   General: Lying in bed, NAD. Not interactive. Non-verbal.   Skin: No concerning lesions, rash, jaundice, cyanosis, erythema, or ecchymoses on exposed surfaces.   HEENT: NCAT. Anicteric sclera. Moist mucus membranes.    Respiratory: " Comfortable but coarse respirations.   Neurologic: Alert, not oriented or interactive. Non-verbal.     Medications   Current Facility-Administered Medications   Medication Dose Route Frequency Provider Last Rate Last Admin     Current Facility-Administered Medications   Medication Dose Route Frequency Provider Last Rate Last Admin    heparin lock flush 10 UNIT/ML injection 5-20 mL  5-20 mL Intracatheter Q24H Tucker Jacob MD   10 mL at 05/17/24 0606     Data   Results for orders placed or performed during the hospital encounter of 04/24/24 (from the past 24 hour(s))   Asymptomatic COVID-19 Virus (Coronavirus) by PCR Nose    Specimen: Nose; Swab   Result Value Ref Range    SARS CoV2 PCR Positive (A) Negative    Narrative    Testing was performed using the [employer] Xpress SARS-CoV-2 Assay on the Cepheid Gene-Xpert Instrument Systems. Additional information about this Emergency Use Authorization (EUA) assay can be found via the Lab Guide. This test should be ordered for the detection of SARS-CoV-2 in individuals who meet SARS-CoV-2 clinical and/or epidemiological criteria as well as from individuals without symptoms or other reasons to suspect COVID-19. Test performance for asymptomatic patients has only been established in anterior nasal swab specimens. This test is for in vitro diagnostic use under the FDA EUA for laboratories certified under CLIA to perform high complexity testing. This test has not been FDA cleared or approved. A negative result does not rule out the presence of PCR inhibitors in the specimen or target RNA concentration below the limit of detection for the assay. The possibility of a false negative should be considered if the patient's recent exposure or clinical presentation suggests COVID-19. This test was validated by [facility]. These Laboratories are certified under the Clinical Laboratory Improvement Amendments (CLIA) as qualified to perform high complexity testing.

## 2024-05-17 NOTE — PROGRESS NOTES
CLINICAL NUTRITION SERVICES - BRIEF NOTE    NEW FINDINGS   Pt has transitioned to comfort cares. RD signing off. Please send new consult should Dietetic services be needed.     Faiza Quiroga RD, LD   Available on [employer]  No longer available by pager

## 2024-05-17 NOTE — H&P
[facility]    History and Physical - Hospitalist Service       Date of Admission:  5/17/2024    Assessment & Plan   Kamilah Goldberg is a 44 year old female who is being transitioned to inpatient hospice on 5/17/2024. Please see the discharge summary from the same date for more details of her hospital course.    Hospice care  Transitioned to hospice care on 5/17.  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 No plan to proceed with more chemotherapy. Transitioned to comfort cares on 5/15.      # Anemia  Likely due to underlying suspected malignancy and chemotherapy.   - Discontinued labs and transfusions     # HIV/AIDS  - Discontinued ART with dolutegravir and Truvada      # E. coli UTI  On 5/12, was noted to have malodorous urine. Has Roy in place. UA showed positive nitrites, large LE, 75 WBC. UCx showed E. coli.  - Roy was replaced 5/13, remain for comfort care  - IV CTX 5/12-5/15     # COVID-19 positive  # MRSA pneumonia  Tested positive for COVID-19 4/18/24 during admission to [facility]. Asymptomatic from COVID. Per ID, does not feel like MRSA pneumonia is likely so linezolid was discontinued  - Per infection prevention, first day eligible for re-testing was 5/9, which showed a CT of 26.9. Per recommendations should remain in precautions until negative COVID test or CT >35. Repeat PCR 5/17 positive, awaiting cycle threshold.      # Concern for aspiration  # Questionable ability to protect airway  - Morphine PRN for air hunger     # H/o empyema s/p VATs with total decortication of R lung and partial pleurectomy (Jan 2023)      MISC  # Cotton wool spot of right eye  # Concern for CMV retinitis  Single CWS in right eye. Found on assessment by ophthalmology x4/28. Repeat ophtho exam 5/6  noted to have worsening cotton wool spots; etiologies include HIV retinitis, HTN retinitis or CMV retinitis.  - Discontinued IV ganciclovir on comfort cares     SOCIAL   # Medical decision maker  Confirmed with family that son (Yuan) will be patient's primary medical decision maker. Son Yuan and patient are okay with communication with aunt (Noemy) if Yuan is unavailable.      # H/o stimulant/amphetamine use   Noted on [facility] tox screen in 2016. Patient previously reported snorting and declined IVDU.          Diet:  NPO  Roy Catheter: PRESENT, indication:    Lines: PRESENT             Code Status:  DNR/DNI  Expected Discharge: working on discharge with hospice    Taco Armas (Nancy Chua MD  Internal Medicine/Pediatrics  Hospitalist      Hospitalist Service  [facility]  Securely message with [employer] (more info)  Text page via [facility] Paging/Directory     ______________________________________________________________________    Chief Complaint   Transitioning to inpatient hospice    History of Present Illness   Kamilah Goldberg is a 44 year old female who is being transitioned to inpatient hospice.  Please see the discharge summary from the same date for more details; a brief summary of her current symptoms is included here.    Currently not conversant or responsive. Friend at bedside. Appears comfortable.     Physical Exam   Vital Signs:                    Weight: 0 lbs 0 oz    General: not alert or awake, in no acute distress    Medical Decision Making       45 MINUTES SPENT BY ME on the date of service doing chart review, history, exam, documentation & further activities per the note.

## 2024-05-17 NOTE — PLAN OF CARE
Goal Outcome Evaluation:    Kamilah appeared comfortable this shift. Two family members present at bedside; not her son; family members encouraged to ask son to be in touch hospice MD's. Turned every 2 hours.

## 2024-05-17 NOTE — PHARMACY-ADMISSION MEDICATION HISTORY
Admission medication history completed at [facility] prior to transition to [facility]. Please see Pharmacist Admission Medication History note from 4/25/2024.

## 2024-05-17 NOTE — PLAN OF CARE
Goal Outcome Evaluation:    [id number]:  Continue comfort cares.  DNR/DNI.  Given prn morphine x 1 for dyspnea, prn lorazepam x 1 for slight agitation and prn glycopyrrolate x 1 for secretions.  Suctioned mouth x 2 with oral cares with none to minimal secretions.  Roy intact with minimal urine output.  No BM this shift.  Repositioned ~2 hours and prn.  Family and friends visited.  Son met with hospice team today.  Repeated COVID PCR positive.

## 2024-05-17 NOTE — PLAN OF CARE
[id number]  Goal Outcome Evaluation:  Largely restful and appearing comfortable. PRN morphine x1 for increased discomfort, muscle tension, and restlessness early AM. Suctioned mouth x2 for oral cares with thick secretions. Pt having intermittent spontaneous movements in upper arms, otherwise continuing Q2hr repositioning and ROM. Roy in place with minimal output. No BM this shift. Partner at bedside overnight.

## 2024-05-17 NOTE — PROGRESS NOTES
Care Management Follow Up    Length of Stay (days): 23    Expected Discharge Date:       Concerns to be Addressed: other (see comments) (hospice/GIP)     Patient plan of care discussed at interdisciplinary rounds: Yes    Anticipated Discharge Disposition: Hospice (comfort cares)  Disposition Comments: n/a  Anticipated Discharge Services: None  Anticipated Discharge DME: None    Patient/family educated on Medicare website which has current facility and service quality ratings: no  Education Provided on the Discharge Plan: Yes  Patient/Family in Agreement with the Plan: yes    Referrals Placed by CM/SW: Hospice  [facility]  Intake phone: [phone]    Private pay costs discussed: Not applicable    Additional Information:  12:00 PM SW called  [facility] to follow up on consult. SW talked with intake and was asked her name and number so the [facility]  to call me back.   12:45 PM [facility]  talked with SW'er . She is sending in a request for pt to be accepted to [facility]. Her MD is reviewing pt's chart.  She said that pt's family would like information on cremation. Pt's son says that he can't afford cremation due to having already paid for 3 others in the past year.   SW will talk to pt's son about reaching out to the county pt lived in for burial support.    SW to follow and assist with any other discharge needs that may arise.  [facility]   5A Oncology beds:5220-40 & 5C  beds 5417-32 (non BMT )      Vocera:  Phone: [phone]  Pager: [phone]

## 2024-05-18 NOTE — DISCHARGE SUMMARY
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 She was admitted on 4/24/2024 for L avel/midbrain lesion mass and new HIV/AIDS diagnosis (3/28/24). On work up, was noted to have positive EBV PCR on CSF and rare atypical cells on CSF c/f CNS lymphoma. In the absence of tissue diagnosis, empirically started high-dose methotrexate and rituximab C1D1=4/29/24. Course further complicated by COVID positivity, decline in neurological status, HIV/AIDS. MRI brain after first cycle of chemo showed no treatment response. In the setting of clinical neurologic decline, transitioned to comfort cares on 5/15. Enrolled in [facility] hospice and transitioned to medicine primary on 5/17.      Hospital Course  Kamilah Goldberg was admitted on 5/17/2024.  The following problems were addressed during her hospitalization:     HEME  # Concern for HIV-associated CNS lymphoma  # H/o ischemic pontine stroke   Patient initially presented to [facility] in March 2024 with mucositis and cough. She was found to have thrush and was diagnosed with HIV (see below). MRI was obtained d/t dizziness and concern for neurocandidiasis. MRI brain 3/29/24 with area of restricted diffusion and T2 hyperintensity in left posterior pontomesencephalic junction, mild local mass effect. Likely representing ischemic infarct. LP 3/29/24 CSF analysis for infection was negative.  Cytology added for possible lymphoma, but there were insufficient viable cells for analysis. She was discharged with plan of repeat MRI brain in 3 months. She then presented in April 2024 with right facial and arm paresthesia and RLE weakness. Workup concerning that the left midbrain/avel mass appeared to be increasing in size compared to MRI 3/20/23 concerning for CNS lymphoma. CT CAP w/o evidence of systemic disease. Unfortunately, location of the mass was deemed too high risk for biopsy to obtain tissue diagnosis. Repeat high volume LP 4/18/24 obtained in hope to confirm malignancy but this remained non-diagnostic. She was transferred to [facility] for NSGY to pursue biopsy but this still remained too high risk. Repeat LP 4/26/24 with rare atypical cells on cytology but does not correlate with flow cytometry (rare polytypic B cells). LP also notable for positive EBV PCR, elevated protein (109), but negative CMV, meningitis/encephalitis panel. After extensive work up ruling out infectious processes and trying to obtain confirmation of CNS lymphoma diagnosis, plan of care was discussed with patient's decission maker who agreed with proceeding treatment for CNS lymphoma. Proceeded with high dose methotrexate (C1D1=4/30/29), Rituxan added D4 and D7.   - Infectious studies at [facility] thus far negative for CMV IgM negative (IgG positive suggesting previous infection), Cryptococcus, CMV encephalitis, Tb meningitis, toxoplasmosis serology, toxoplasma PCR. Karius w/ E.Coli, CMV and human adenovirus; discussed with ID no need to treat E.Coli.   - Continue dexamethasone taper currently on 4 mg BID (x5/9)   - PICC line placed x4/29   - Attempted to obtain another LP on 5/12, but was unable to obtain CSF  - Repeat brain MRI 5/12 after cycle 1 showed no significant change in size of the brainstem mass compared to 4/30/2024. Clinically, patient has neurologically declined since beginning of treatment. Previously was able to follow  commands, now unable to follow commands.   - Discussed lack of response with family on 5/13. No plan to proceed with more chemotherapy. Transitioned to comfort cares on 5/15.      # Anemia  Likely due to underlying suspected malignancy and chemotherapy.   - Discontinued labs and transfusions     ID  # HIV/AIDS  Recently diagnosed with HIV during admission at [facility] on 3/28/24 with viral load of 1.24 million, 6.9 log,  and CD4 count of 2. She was not started on ART, given Nystatin x2 weeks and referred to ID clinic for follow up. Prior to this, she was admitted again with ID consult for broad-infectious workup. Started Biktarvy 4/17 but stopped due concern for rash, rechallenged on 4/20 and able to tolerate. Later transitioned to Dolutegravir and Truvada. SW reached out to health department 5/2 regarding patient's HIV status and if able to be reported properly. Upon further discussions and health department chart review, original diagnosis was in 2014 and patient was contacted over 14 times with no response.  Unable to confirm with patient if she is was aware of status as she is non-verbal. Upon further chart review, HIV testing was completed 8/27/24 at [facility] with office visit to follow, but encounter is not able to be reviewed and needing patient authorization; will discuss with son (Yuan) as he is her next of kin and medical decision maker. Of note, patient's aunt (Noemy), and son (Yuan) are aware of diagnosis.   - Upon further conversation with boyfriend (Otis), it does not appear he is aware of her HIV status. I asked about general medical history and any new/recent infections to which he did not mention HIV/AIDS status. ID had a similar conversation with a consistent consensus. Reached out to SW to discuss with family/son regarding if previous partners have been notified.  - Discontinued ART with dolutegravir and Truvada      # E. coli UTI, resolved  On 5/12, was noted to have  malodorous urine. Has Roy in place. UA showed positive nitrites, large LE, 75 WBC. UCx showed E. coli.  - Roy was replaced 5/13  - IV CTX 5/12-5/15     # COVID-19 positive  # MRSA pneumonia, resolved  Tested positive for COVID-19 4/18/24 during admission to [facility]. Asymptomatic from COVID. Per ID, does not feel like MRSA pneumonia is likely so linezolid was discontinued  - Per infection prevention, first day eligible for re-testing was 5/9, which showed a CT of 26.9. Per recommendations should remain in precautions until negative COVID test or CT >35. Repeat PCR 5/17 positive, cycle threshold 32.2 so must continue COVID precautions.      # Fever  Fever noted 5/14. Possible due to UTI as above or aspiration pneumonia. Have not obtained CXR but has recent h/o aspiration and coarse lung sounds on R side. Discussed with family who agreed to not escalate cares with infection work up or antibiotics at this time.   - Tylenol suppository PRN     PULM  # Concern for aspiration  # Questionable ability to protect airway  On 5/12, patient had episode of rapid desaturation to 50%. Code blue was called, but pt was able to be stabilized with suction and supplement oxygen via high flow. No arrest. Suspect d/t mucus plugging. With worsening mental status, uncertain of ability to clear secretions and protect airway. Unable to be evaluated by SLP given inability to follow commands.   - Discontinued Mucomyst nebs and chest physiotherapy  - Morphine PRN for air hunger     # H/o empyema s/p VATs with total decortication of R lung and partial pleurectomy (Jan 2023)      MISC  # Cotton wool spot of right eye  # Concern for CMV retinitis  Single CWS in right eye. Found on assessment by ophthalmology x4/28. Repeat ophtho exam 5/6 noted to have worsening cotton wool spots; etiologies include HIV retinitis, HTN retinitis or CMV retinitis.  - Discontinued IV ganciclovir      SOCIAL   # Medical decision maker  Confirmed with family that son  "(Yuan) will be patient's primary medical decision maker. Son Yuan and patient are okay with communication with aunt (Noemy) if Yuan is unavailable.      # H/o stimulant/amphetamine use   Noted [place] [facility] tox screen in [place]. Patient previously reported snorting and declined IVDU.        Clinically Significant Risk Factors              # Hypoalbuminemia: Lowest albumin = 3 g/dL at 5/15/2024  2:25 AM, will monitor as appropriate            # Overweight: Estimated body mass index is 25.09 kg/m  as calculated from the following:    Height as of this encounter: 1.626 m (5' 4\").    Weight as of this encounter: 66.3 kg (146 lb 2.6 oz).   # Moderate Malnutrition: based on nutrition assessment           Patient was seen and plan of care was discussed with attending physician Dr. Cotto.     I spent 30 minutes in the care of this patient today, which included time necessary for review of interval events, obtaining history and physical exam, ordering medications/tests/procedures as medically indicated, review of pertinent medical literature, counseling of the patient, coordination of care, and documentation time. Over 50% of time was spent counseling the patient and/or coordinating care.     Kira Gonzalez PA-C   Hematology/Oncology   Pager: [phone]  Desk phone: [phone]    Pending Results   N/A    Code Status   DNR / DNI    Time Spent on this Encounter   IKira PA-C, personally saw the patient today and spent less than or equal to 30 minutes discharging this patient.    Discharge Disposition   Transferred to [facility] hospice/medicine  Condition at discharge: Terminal    Consultations This Hospital Stay   NURSING TO CONSULT FOR VASCULAR ACCESS CARE IP CONSULT  SPEECH LANGUAGE PATH ADULT IP CONSULT  PHYSICAL THERAPY ADULT IP CONSULT  OCCUPATIONAL THERAPY ADULT IP CONSULT  ONCOLOGY ADULT IP CONSULT  PHARMACY IP CONSULT  INTERNAL MEDICINE ADULT IP CONSULT FOR [place]  NEUROLOGY GENERAL ADULT IP " CONSULT  OPHTHALMOLOGY IP CONSULT  CARE MANAGEMENT / SOCIAL WORK IP CONSULT  INFECTIOUS DISEASE GENERAL ADULT IP CONSULT  PALLIATIVE CARE ADULT IP CONSULT  NURSING TO CONSULT FOR VASCULAR ACCESS CARE IP CONSULT  NURSING TO CONSULT FOR VASCULAR ACCESS CARE IP CONSULT  NUTRITION SERVICES ADULT IP CONSULT  PHARMACY IP CONSULT  OPHTHALMOLOGY IP CONSULT  VASCULAR ACCESS FOR PICC PLACEMENT ADULT IP CONSULT  RESPIRATORY CARE IP CONSULT  ETHICS IP CONSULT  NUTRITION SERVICES ADULT IP CONSULT  INTERNAL MEDICINE PROCEDURE TEAM ADULT IP CONSULT [facility] - LUMBAR PUNCTURE  SPEECH LANGUAGE PATH ADULT IP CONSULT  [facility] INPATIENT HOS[facility]ICE ADULT CONSULT  PHARMACY IP CONSULT    Discharge Orders      Infusion Appointment Request     Discharge Medications   Discharge Medication List as of 5/17/2024  5:42 PM        CONTINUE these medications which have NOT CHANGED    Details   atorvastatin (LIPITOR) 40 MG tablet Take 40 mg by mouth daily, Historical      bictegravir-emtricitabine-tenofovir (BIKTARVY) -25 MG per tablet Take 1 tablet by mouth daily, Historical      gabapentin (NEURONTIN) 100 MG capsule Take 100 mg by mouth 2 times daily, Historical      sulfamethoxazole-trimethoprim (BACTRIM) 400-80 MG tablet Take 1 tablet by mouth daily, Historical           STOP taking these medications       linezolid (ZYVOX) 600 MG tablet Comments:   Reason for Stopping:             Allergies   Allergies   Allergen Reactions    Blood Transfusion Related (Informational Only) Other (See Comments)     Patient has a history of a clinically significant antibody against RBC antigens.  A delay in compatible RBCs may occur.     Naproxen Hives    Codeine Sulfate GI Disturbance    Hydrocodone Nausea and Vomiting     Data   Most Recent 3 CBC's:  Recent Labs   Lab Test 05/15/24  0225 05/14/24  0607 05/13/24  0439   WBC 3.4* 5.0 7.5   HGB 9.4* 9.5* 9.6*   MCV 91 91 91    217 203      Most Recent 3 BMP's:  Recent Labs   Lab Test 05/15/24  0225  05/14/24  0607 05/13/24  0439    138 138   POTASSIUM 3.5 3.5 3.9   CHLORIDE 102 103 103   CO2 27 26 27   BUN 12.9 15.7 16.1   CR 0.39* 0.34* 0.38*   ANIONGAP 8 9 8   MAUREEN 8.0* 8.2* 8.4*   GLC 86 116* 127*     Most Recent 2 LFT's:  Recent Labs   Lab Test 05/15/24  0225 05/14/24  0607   AST 27 24   ALT 41 35   ALKPHOS 66 69   BILITOTAL 0.4 0.4     Most Recent INR's and Anticoagulation Dosing History:  Anticoagulation Dose History  More data exists         Latest Ref Rng & Units 5/9/2024 5/10/2024 5/11/2024 5/12/2024 5/13/2024 5/14/2024 5/15/2024   Recent Dosing and Labs   INR 0.85 - 1.15 0.98  0.98  1.00  1.06  1.13  1.12  1.18      Most Recent 3 Troponin's:No lab results found.  Most Recent Cholesterol Panel:No lab results found.  Most Recent 6 Bacteria Isolates From Any Culture (See EPIC Reports for Culture Details):No lab results found.  Most Recent TSH, T4 and A1c Labs:  Recent Labs   Lab Test 04/27/24  0724   A1C 5.7*       Results for orders placed or performed during the hospital encounter of 04/24/24   CT Soft Tissue Neck w Contrast    Narrative    EXAM: CT SOFT TISSUE NECK W CONTRAST  4/25/2024 11:06 PM     HISTORY: Patient with HIV, and new brain lesion ? CNS lymphoma- assess  other lesions         COMPARISON: None     TECHNIQUE: Following intravenous administration of nonionic iodinated  contrast medium, thin section helical CT images were obtained from the  skull base down to the level of the aortic arch.  Axial, coronal and  sagittal reformations were performed with 2-3 mm slice thickness  reconstruction. Images were reviewed in soft tissue, lung and bone  windows.    CONTRAST: 107ml isovue 370    FINDINGS:   No focal oral cavity, nasopharyngeal, oropharyngeal, hypopharyngeal,  or glottic mucosal space abnormality. Normal tongue base. Salivary  glands are within normal limits. Multiple dental caries.    Several bilateral nonenlarged cervical lymph nodes.    2.8 x 2.2 cm enhancing pontine  mass.    Normal thyroid gland.    Patent cervical vasculature; no high grade arterial stenosis.    No suspicious osseus lesion. No high grade spinal canal stenosis.    Scattered mucosal thickening of the paranasal sinuses. Clear mastoid  air cells. No periapical dental lucency. The imaged skull base,  intracranial and orbital structures are within normal limits.    Partially visualized right upper lobe dependent consolidative  opacities..      Impression    IMPRESSION: No mass or abnormally enlarged cervical lymph nodes in the  neck. Multiple dental caries. 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    I have personally reviewed the examination and initial interpretation  and I agree with the findings.    CLARY PHAM MD         SYSTEM ID:  [id number]   CT Chest/Abdomen/Pelvis w Contrast     Value    Radiologist flags Hepatic hypodensity, right lung lower lobe    Narrative    EXAMINATION: CT CHEST/ABDOMEN/PELVIS W CONTRAST, 4/25/2024 11:07 PM    TECHNIQUE:  Helical CT images from the thoracic inlet through the  symphysis pubis were obtained with contrast.     CONTRAST: 107ml isovue 370    COMPARISON: Chest CT 1/16/2023; CT abdomen/pelvis 4/10/2019    HISTORY: Patient with HIV, and new brain lesion ? CNS lymphoma- assess  other lesions    FINDINGS:    Chest:     Mild motion related artifacts.    Mediastinum: Visualized thyroid gland is unremarkable. Heart size is  within normal limits. No pericardial effusion. No central pulmonary  thromboembolism on this nondedicated study. Prominent bilateral  axillary lymph nodes, decreased in size from prior CT from 1/16/2023.  Small hiatal hernia. Enlarged precarinal lymph node measuring 15 x 13  mm (3/49). Subcentimeter right hilar lymph node (3/57). Prominent  right subpectoral node measuring 6 mm, previously 8 mm (3/33).    Lungs: The trachea and central airways are patent. Patchy  consolidative density seen in the posterior right upper lobe abutting  the superior oblique fissure  (9/88), pleural-based confluent density  in the right lower lobe measuring 1 cm (9/185). Confluent  consolidative density in the left lung base. Mild patchy groundglass  densities in the superior left lower lobe (9/152). No pneumothorax or  pleural effusion.    Abdomen and pelvis:    Mild motion related artifacts.    Liver: Too small to characterize hypodensity in the right lobe of  liver (segment 7) measuring 7 mm, not conspicuous on prior CT from  2019 (3/100). Several are too small to visualize hepatic hypodensity  in the hepatic dome (3/99). No intrahepatic biliary ductal dilatation.  Adrenal glands are within normal limits. No focal pancreatic mass or  pancreatic ductal dilatation. Spleen is not enlarged. No focal splenic  lesion.    Postsurgical changes of the stomach. No biliary ductal dilatation.  Gallbladder is surgically absent. Symmetric enhancement of the  kidneys. No focal suspicious renal lesion. Prominent parametrial  vessels. No adnexal mass. Few subcentimeter bilateral inguinal lymph  nodes. Urinary bladder is partially distended and grossly  unremarkable. Mild scoliotic curvature of the spine. Asymmetric  bulkiness of the right obturator internus with apparent increased  enhancement (3/271 and 7/64). Appendix is within normal limits. Mild  colonic diverticulosis. No high-grade bowel obstruction. No  pneumoperitoneum or significant free fluid. No bulky new pelvic or  retroperitoneal lymphadenopathy. Subcentimeter right external iliac  lymph node (5/519), similar to prior.     Bones and soft tissues: No acute hydrocephalus osseous abnormality.  Tiny fat-containing umbilical hernia.      Impression    IMPRESSION:     1. Mildly enlarged precarinal lymph node. Subcentimeter axillary lymph  nodes decreased in size compared to prior CT from 1/16/2023.  Subcentimeter right hilar lymph node. Consider attention on follow-up    2. Right upper lobe and left lower lobe confluent densities, which may  represent  atelectasis; however, superimposed infection cannot be  excluded. Right lower lobe pleural-based nodular centimeter size  density indeterminate. Recommend attention on follow-up.    3. Too small to characterize subcentimeter hypodensity in segment 7 of  the liver, indeterminate, new compared to prior CT from 4/10/2019.  Recommend attention on short-term follow-up     4. Asymmetric bulky appearing right obturator internus muscle with  apparent increased internal enhancement, indeterminate, underlying  lesion/inflammation not excluded; recommend attention on short-term  follow-up or contrast enhanced MRI pelvis as clinical desired.    5. Few prominent inguinal lymph nodes, possibly reactive; otherwise no  significantly enlarged retroperitoneal or pelvic lymph node.        [Recommend Follow Up: Hepatic hypodensity, right lung lower lobe  pleural-based nodule, asymmetric bulky right obturator internus.]    This report will be copied to the [facility] to ensure a  provider acknowledges the finding. Access Center is available Monday  through Friday 8am-3:30 pm.        I have personally reviewed the examination and initial interpretation  and I agree with the findings.    TONI PALACIOS MD         SYSTEM ID:  [id number]   IR Lumbar Puncture    Narrative    PROCEDURE: Lumbar Puncture using Fluoroscopy, 4/26/2024 10:40 AM    HISTORY:  brainstem lesion    COMPARISON: none    STAFF NEURORADIOLOGIST:  Moo Butt MD     FELLOW PHYSICIAN: Dr. Sherry Mcclain, Dr. Octavio Titus    SEDATION: The patient was placed on continuous monitoring. No  intravenous sedation was administered. Vital signs and sedation  monitored by nursing staff under Interventional Radiologists  supervision. The patient remained stable throughout the procedure.    SEDATION TIME: n/a    MEDICATIONS:  1. Lidocaine 1% 10 ml intradermal    TECHNIQUE: Verbal and written consent for lumbar puncture was obtained  from the patient's aunt, and benefits  and risk of the procedure were  explained, including but not limited to worsening headache,  hemorrhage, infection, lower extremity pain, or nerve root injury. The  patient was sterilely prepped and draped with the patient in the prone  position, over the lower back. Under fluoroscopic guidance, the  interlaminar spaces were noted. 1% lidocaine was administered for  local anesthetic over the L3-4 interlaminar space, and a 22 gauge 3.5  inch needle was advanced into the thecal sac under fluoroscopic  guidance.      There was initial show of clear CSF. Opening pressure was not measured  but there was a slow drip. Approximately 20 cc of CSF were collected.    The needle was removed with the stylet in place. There was no  immediate complication associated with the procedure. Samples were  sent for the requested laboratory testing.      FLUORO TIME: 2.6 low-dose pulsed.    DOSE: 36.4 uGym2      Impression    IMPRESSION: Successful lumbar puncture without immediate complication.    PLAN: Patient discharged to patient care unit in stable condition for  monitoring. Orders placed for 4 hour of bed rest with patient laying  on the back and the head of the bed flat.    Dr. Scherer was present for the entire procedure.    I have personally reviewed the examination and initial interpretation  and I agree with the findings.    IGOR SCHERER MD         SYSTEM ID:  [id number]   XR Abdomen Port 1 View    Narrative    EXAMINATION:  XR ABDOMEN PORT 1 VIEW 4/26/2024 5:09 PM     COMPARISON: CT 4/25/2024.    HISTORY: feeding tube placement.    TECHNIQUE: Frontal view of the abdomen.    FINDINGS: Feeding tube tip projects over the proximal jejunum in  patient with history of gastric bypass seen on prior CT. No abnormally  dilated loops of bowel.  No pneumatosis or portal venous gas.        Impression    IMPRESSION: Feeding tube tip projects over the expected location of  proximal jejunum in this patient with history of gastric  bypass.    I have personally reviewed the examination and initial interpretation  and I agree with the findings.    TONI PALACIOS MD         SYSTEM ID:  [id number]   MR Brain w/o & w Contrast    Narrative     MR BRAIN W/O & W CONTRAST 4/30/2024 12:09 PM    Provided History: 44 YOF new concern of CNS lymphoma. Previous imgaes  showing increasing lesion of L midbrain and avel. Please assess for  interval changes.    ICD-10:    Comparison: Outside MRI 4/15/2024, 3/29/2024 and head CT 4/30/2024    Technique: Multiplanar T1-weighted, axial FLAIR, and susceptibility  images were obtained without intravenous contrast. Following  intravenous gadolinium-based contrast administration, axial  T2-weighted, diffusion, and T1-weighted images (in multiple planes)  were obtained.    Contrast dose: gadobutrol (GADAVIST) injection 7.5 mL    Findings: Continued progression of brainstem mass centered in the left  dorsal medulla with extension to the avel, left middle cerebellar  peduncle and left cerebral peduncle, increased in size compared with  4/15/2024 and 3/29/2024 exams, they measuring 2.5 x 2.2 x 3.3,  previously 2.1 x 1.5 x 1.9 cm on 4/15/2024. The lesion shows marked  diffusion restriction with diffuse contrast enhancement and tiny  punctate microhemorrhage internally.   There is no midline shift, or evidence of intracranial hemorrhage. The  ventricles are proportionate to the cerebral sulci.    No definite abnormality of the skull marrow signal is noted. The major  vascular intracranial flow-voids are present. The visualized portions  of paranasal sinuses, and mastoid air cells are relatively clear. The  orbits are grossly unremarkable.      Impression    Impression: Increased size of known left brain stem mass compared with  4/15/2024 and 3/29/2024 MRI exams,, imaging findings are most  concerning for central nervous system lymphoma although primary glial  neoplasm or metastatic lesion cannot be excluded.    CAN OZUTEMIZ,  MD         SYSTEM ID:  [id number]   CT Head w/o Contrast    Narrative    CT HEAD W/O CONTRAST 4/30/2024 10:13 AM    History: 44 yr F with L pontine lesion, now with worsening exam with  bradycardia, R hemiplegia, new R CN6 palsy. Evaluate for impending  herniation, edema, raised ICP signs     Comparison: MRI brain 4/15/2024 from outside institution and outside  neck CT from 4/25/2024    Technique: Using multidetector thin collimation helical acquisition  technique, axial, coronal and sagittal CT images from the skull base  to the vertex were obtained without intravenous contrast.   (topogram) image(s) also obtained and reviewed.    Findings: Redemonstration of 2.8 x 2.5 cm relatively hyperattenuating  lesion in the posterior lateral left avel that effaces the  quadrigeminal and partially the left ambient cistern. The most recent  MR, the lesion measures 2.1 x 1.6 cm and does not appear to efface the  cisterns to this extent. The fourth ventricle is partially involved  but is patent. The ventricular size is not enlarged, stable from MR  4/15/2024. There is no intracranial hemorrhage, mass effect, or  midline shift. Gray/white matter differentiation in both cerebral  hemispheres is preserved. The basal cisterns are otherwise clear.    The bony calvaria and the bones of the skull base are normal. Polypoid  left maxillary sinus mucosal thickening with scattered paranasal  fluid, greatest in the left sphenoid sinus. The mastoid air cells are  clear. Partially visualized right-sided nasoenteric tube.      Impression    Impression:  1. Redemonstration of left posterior lateral pontine lesion measuring  up to 2.8 cm with effacement of the adjacent cisterns. There is no  evidence of obstructive hydrocephalus. This appears similar to neck CT  from 4/25/2024, appears relatively enlarged compared to MRI from  4/15/2024, although could be due to technical differences.  2. Nonspecific left maxillary sinus polypoid mucosal  thickening and  scattered paranasal sinus fluid.  3. Overall no new acute finding.    I have personally reviewed the examination and initial interpretation  and I agree with the findings.    CLARY PHAM MD         SYSTEM ID:  [id number]   CT Head w/o Contrast    Narrative    EXAM: CT HEAD W/O CONTRAST  5/4/2024 5:27 AM     HISTORY: eval for herniation       COMPARISON: MRI brain 4/30/2024, CT head 4/30/2024    TECHNIQUE: Using multidetector thin collimation helical acquisition  technique, axial, coronal and sagittal CT images from the skull base  to the vertex were obtained without intravenous contrast.   (topogram) image(s) also obtained and reviewed.    FINDINGS:  Redemonstration of a hyperdense left brainstem mass measuring  approximately 2.8 x 2.3 cm as measured on series 3 image 23, not  significantly changed from CT 4/30/2024. Persistent mass effect on the  fourth ventricle. Stable mild enlargement of the ventricles. No  herniation.    No acute intracranial hemorrhage. No acute loss of gray-white matter  differentiation in the cerebral hemispheres.    The bony calvaria and the bones of the skull base are normal. Small  volume dependent fluid in the visualized portion of the left maxillary  sinus; the remainder of the paranasal sinuses are relatively clear.  Clear mastoid air cells. Grossly normal orbits.       Impression    IMPRESSION: Unchanged left brainstem mass, better characterized on MRI  4/30/2024. Differential considerations remain unchanged; favor  lymphoma versus primary glial neoplasm. No herniation as questioned.  Stable mild ventricular dilatation.    I have personally reviewed the examination and initial interpretation  and I agree with the findings.    MIGEL SANDERS MD         SYSTEM ID:  [id number]   XR Abdomen Port 1 View    Narrative    EXAMINATION: XR ABDOMEN PORT 1 VIEW 5/6/2024 11:25 AM     COMPARISON: Abdominal radiograph 4/26/2024.    HISTORY: Assess NG tube placement.    FINDINGS:  Frontal view of the abdomen. Feeding tube tip projects over  left midabdomen, presumably within the proximal jejunum in this  patient with history of gastric bypass. There is some gaseous  distention of the colon with transverse measuring approximately 7 cm  in caliber. No abnormally dilated loops of small bowel.      Impression    IMPRESSION: Feeding tube tip projects over left midabdomen, presumably  within the proximal jejunum in this patient with history of gastric  bypass. Possible colonic ileus.    I have personally reviewed the examination and initial interpretation  and I agree with the findings.    KAITLYNN BARTON MD         SYSTEM ID:  [id number]   US Lower Extremity Arterial Duplex Right    Narrative    Exam: Duplex ultrasound of right lower extremity arteries dated  5/7/2024 11:35 AM     Clinical information: Cold RLE; assess for vasculature etiology     Comparison: None    Technique: Grayscale (B-mode), color Doppler, and duplex spectral  Doppler ultrasound of the right lower extremity arteries. Velocity  measurements obtained with angle correction of 60 degrees or less.    Ordering provider: Krystal Ochoa PA-C    Findings:     Right lower extremity:     Common femoral artery: Velocity: 107/0 cm/sec. Waveforms: Triphasic  Profunda femoral artery: Velocity: 78/5 cm/sec. Waveforms: Triphasic  Proximal SFA: Velocity: 78/0 cm/sec. Waveforms: Triphasic  Mid SFA:Velocity:  98/0 cm/sec. Waveforms: Triphasic  Distal SFA: Velocity: 92/0 cm/sec. Waveforms: Triphasic    Popliteal artery, proximal: Velocity: 64/0 cm/sec. Waveforms:  Triphasic    PTA ankle: Velocity: 62/0 cm/sec. Waveforms: Triphasic  NAHED ankle: Velocity: 57/0 cm/sec. Waveforms: Triphasic      Impression    Impression:   Patent native arteries of the right lower extremity without  hemodynamically significant stenosis.    Guidelines:    [facility] duplex criteria for lower limb arterial  occlusive disease    Percent stenosis:      Normal (1-19%): Peak systolic velocity (cm/s): <150, End-diastolic  velocity (cm/s): <40, Velocity ratio (Vr): <1.5, Distal arterial  waveform: Triphasic    20-49%: Peak systolic velocity (cm/s): 150-200, End-diastolic velocity  (cm/s): <40, Velocity ratio (Vr): 1.5-2.0, Distal arterial waveform:  Triphasic    50-75%: Peak systolic velocity (cm/s): 200-300, End-diastolic velocity  (cm/s): <90, Velocity ratio (Vr): 2.0-3.9, Distal arterial waveform:  Poststenotic turbulence distal to stenosis, monophasic distal waveform    >75%: Peak systolic velocity (cm/s): >300, End-diastolic velocity  (cm/s): <90, Velocity ratio (Vr): >4.0, Distal arterial waveform:  Dampened distal waveform and low PSV/EDV* in the stenosis    Occlusion: Absent flow by color Doppler/pulsed Doppler spectral  analysis; length of occlusion estimated from distance between exit and  reentry collateral arteries    *PSV = peak systolic velocity, EDV = end-diastolic velocity  http://link.li.com/chapter/10.1007/[email or web address]_23/fulltext  html    I have personally reviewed the examination and initial interpretation  and I agree with the findings.    DARVIN SON         SYSTEM ID:  [id number]   XR Abdomen Port 1 View    Narrative    EXAMINATION: XR ABDOMEN PORT 1 VIEW 5/10/2024 9:35 AM     COMPARISON: None.    HISTORY: feeding tube placement    TECHNIQUE: Frontal view of the abdomen.    FINDINGS: Enteric tube tip projects over the mid abdomen indicating it  is within the small bowel. No abnormally dilated loops of bowel. No  pneumatosis or portal venous gas. No definite pneumoperitoneum. Lung  bases are clear.      Impression    IMPRESSION: Enteric tube tip projects over the mid abdomen indicating  it is within the small bowel.    I have personally reviewed the examination and initial interpretation  and I agree with the findings.    TOÑA BERNABE MD         SYSTEM ID:  [id number]   XR Abdomen Port 1 View    Narrative    EXAMINATION: XR  ABDOMEN PORT 1 VIEW 5/10/2024 2:00 PM     COMPARISON: 5/10/2024.    HISTORY: feeding tube placement.    FINDINGS: Frontal view of the abdomen. Feeding tube tip projects over  left lower abdomen, presumably within small bowel given patient has  had gastric bypass surgery. No abnormally dilated loops of bowel.  Bibasilar opacities.      Impression    IMPRESSION: Feeding tube tip projects over presumed small bowel in  left lower quadrant. Nonobstructive bowel gas pattern.    I have personally reviewed the examination and initial interpretation  and I agree with the findings.    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 Reassess  response of mass with initiation of treatment       COMPARISON: MRI 4/30/2024    TECHNIQUE: MR head: Multiplanar T1-weighted, axial FLAIR, and  susceptibility images were obtained without intravenous contrast.  Following intravenous gadolinium-based contrast administration, axial  T2-weighted, diffusion, and T1-weighted images (in multiple planes)  were obtained.    Contrast: 7.1 mL IV Gadavist     FINDINGS:  No significant change in size to the enhancing lesion in avel and  midbrain (2.6 x 2.2 x 3 cm, previously 2.5 x 2.2 x 2.8 cm when  measured in similar fashion on the prior). Unchanged pattern of  peripheral T2 hyperintense and central T2 isointense signal. Slightly  decreased thickness of the peripheral enhancement. Unchanged mass  effect on the fourth ventricle. The thickness of the peripheral  pattern of restricted diffusion appears to have decreased.    Stable size of the ventricles without evidence of hydronephrosis. No  evidence of new intracranial mass lesions or intracranial hemorrhage.    Major intracranial vascular structures are within normal limits.    No suspicious abnormality of the skull marrow signal. Clear  paranasal  sinuses. Mastoid air cells are clear. No focal abnormality of the  pituitary gland, sella, skull base and upper cervical spinal  structures on sagittal images. The orbits are normal.      Impression    IMPRESSION:  1. No significant change in size of the brainstem mass compared to  2024. Possible slight decrease in peripheral pattern of  enhancement and restricted diffusion may indicate subtle response to  treatment.    I have personally reviewed the examination and initial interpretation  and I agree with the findings.    NAVARRO ROSARIO MD         SYSTEM ID:  [id number]   XR Chest Port 1 View    Narrative    EXAM: XR CHEST PORT 1 VIEW 2024 10:36 AM     HISTORY: [facility]       COMPARISON: CT 2024, radiographs 2023    FINDINGS: Left upper extremity PICC tip projects over the low SVC.  Feeding tube reaches the stomach and passes below the field-of-view.  Normal cardiomediastinal silhouette. Mild streaky perihilar and left  basilar opacities. No consolidation. No pleural effusion or  pneumothorax.       Impression    IMPRESSION:  Mild perihilar and left basilar opacities. Differential includes  atelectasis or mild infection versus inflammation.    I have personally reviewed the examination and initial interpretation  and I agree with the findings.    KAITLYNN BARTON MD         SYSTEM ID:  [id number]   POC US Guidance Needle Placement    Impression    Limited abdominal ultrasound was performed and demonstrated an adequate window for paramedian lumbar puncture at the L5-S1 and the L4-L5 level, LP was subsequently attempted.        Echo Complete     Value    LVEF  70%    Narrative    [id number]  [id number]  [id number]  [id number]^DULCE MARIA^RE     [facility],[place]  Echocardiography Laboratory  [address] [place] [id number]     Name: NATASHA CASEY  MRN: [id number]  : 1980  Study Date: 2024 01:46 PM  Age: 44 yrs  Gender: Female  Patient  Location: [facility]  Reason For Study: Cardiomyopathy  Ordering Physician: RE العراقي  Referring Physician: ABHINAV MEDEROS  Performed By: Tere Mccartney     BSA: 1.8 m2  Height: 64 in  Weight: 156 lb  ______________________________________________________________________________  Procedure  Complete Portable Echo Adult. Echocardiogram with two-dimensional, color and  spectral Doppler performed.  ______________________________________________________________________________  Interpretation Summary  Global and regional left ventricular function is hyperkinetic with an EF  >70%.There is cavity obliteration and with LV-Ao gradient of 18 mmHg.  Global right ventricular function is normal.  No significant valvular abnormalities present.  Estimated mean right atrial pressure is normal.  No pericardial effusion is present.  ______________________________________________________________________________  Left Ventricle  Left ventricular size is normal. Global and regional left ventricular function  is hyperkinetic with an EF >70%. Mild concentric wall thickening consistent  with left ventricular hypertrophy is present. Cavity obliteration and mid-  ventricular gradient are present. There is cavity obliteration and with LV-Ao  gradient of 18 mmHg.     Right Ventricle  The right ventricle is normal size. Global right ventricular function is  normal.     Atria  Both atria appear normal.     Mitral Valve  The mitral valve is normal.     Aortic Valve  Aortic valve is normal in structure and function.     Tricuspid Valve  The tricuspid valve is normal. Trace to mild tricuspid insufficiency is  present. The peak velocity of the tricuspid regurgitant jet is not obtainable.  Pulmonary artery systolic pressure cannot be assessed.     Pulmonic Valve  The pulmonic valve is normal.     Vessels  The aorta root is normal. The inferior vena cava is normal. Estimated mean  right atrial pressure is normal.      Pericardium  No pericardial effusion is present.     Miscellaneous  No significant valvular abnormalities present.     Compared to Previous Study  Previous study not available for comparison.  ______________________________________________________________________________  MMode/2D Measurements & Calculations  IVSd: 1.3 cm  LVIDd: 3.5 cm  LVIDs: 2.0 cm  LVPWd: 1.4 cm  FS: 42.8 %  LV mass(C)d: 160.8 grams  LV mass(C)dI: 91.4 grams/m2  Ao root diam: 3.1 cm  asc Aorta Diam: 3.1 cm  LVOT diam: 2.1 cm  LVOT area: 3.3 cm2  Ao root diam index Ht(cm/m): 1.9  Ao root diam index BSA (cm/m2): 1.7  Asc Ao diam index BSA (cm/m2): 1.8  Asc Ao diam index Ht(cm/m): 1.9  RWT: 0.82     TAPSE: 2.1 cm     Doppler Measurements & Calculations  MV E max johnie: 64.5 cm/sec  MV A max johnie: 62.4 cm/sec  MV E/A: 1.0  MV dec time: 0.22 sec  Ao V2 max: 160.0 cm/sec  Ao max PG: 10.2 mmHg  Ao V2 mean: 109.0 cm/sec  Ao mean P.0 mmHg  Ao V2 VTI: 26.1 cm  BHAKTI(I,D): 2.9 cm2  BHAKTI(V,D): 3.0 cm2  LV V1 max P.4 mmHg  LV V1 max: 145.0 cm/sec  LV V1 VTI: 22.8 cm  SV(LVOT): 76.1 ml  SI(LVOT): 43.2 ml/m2  AV Johnie Ratio (DI): 0.91  BHAKTI Index (cm2/m2): 1.7  E/E' av.9  Lateral E/e': 5.2  Medial E/e': 6.6  RV S Johnie: 24.2 cm/sec     ______________________________________________________________________________  Report approved by: Niall Thorpe 2024 02:25 PM

## 2024-05-18 NOTE — PLAN OF CARE
Goal Outcome Evaluation:      Plan of Care Reviewed With: patient    Overall Patient Progress: no changeOverall Patient Progress: no change    [id number]:  Transitioned to inpatient hospice on 05/17/2024.  Continues on comfort cares.  Diaphoretic this AM, repositioned, applied fan and cool washcloth.  Given prn morphine x 1, oral cares done and suctioned secretions/sputum cough.  Repositioned prn.  Pt appears comfortable. Boyfriend slept over night, visited .  Continue with plan of care.      Addendum:  Change of shift report given to Leilani BURT charge RN.  Pt resting, appears comfortable.

## 2024-05-18 NOTE — PLAN OF CARE
Goal Outcome Evaluation:    [id number]  Comfort cares. No PRN's given overnight. Appears comfortable and managing secretions well. Boyfriend at the bedside overnight, states pt appears comfortable to him. Continue with POC.

## 2024-05-18 NOTE — PLAN OF CARE
Goal Outcome Evaluation:    Resting comfotably, family at bedside. Patient turned q 2 hours. Now inpatient hospice. Roy intact with some output.

## 2024-05-18 NOTE — PROGRESS NOTES
[facility]    Consult Note - [facility]  _________________________________________________________________    [facility] 24/7 Contact Number: [phone]    - Providers: Please contact [facility] with changes in orders or clinical plan of care   - Nursing: Please contact [facility] with significant changes in patient condition    Hospice will notify the care team (including the hospitalist) to confirm date of inpatient hospice (GIP) admission.    New Epic encounter will not be created until hospice completes admission.   ______________________________________________________________________    Hospice Diagnosis: Neoplasm of Uncertain Behavior      Indication for Inpatient Hospice: Pain and secretion management      Goals for Hospital Discharge: Pain management as evidence by no facial grimacing. Secretion management as evidence by no audible gargling.      Plan of Care Discussed with the Following:   - Nurse: Daren YADAV  - Hospitalist/Rounding Provider: Dr TAMANNA Rodríguez   - Kamilah's Family/Preferred Contact: Yusuf Bolden - dejon - [phone]  - Hospice Provider: Bry Flores DO        Summary of Visit (includes assessment, medications and any new orders):     Met with pt. BRADY Berg, and VICENTA Mercedes.  VSS  100.8 Temp, 93% O2 sats, 87P, 108/79BP.    Pt unresponsive to voice.   Eyes appeared glazed over, reddened.  No s/sx of pain at present.  Secretions lessening with PRN Atropine    Daily reassessment while GIP Hospice.    Selwyn Valdez RN

## 2024-05-18 NOTE — PROGRESS NOTES
[facility]    Medicine Progress Note - Hospitalist Service, GOLD TEAM 8    Date of Admission:  5/17/2024    Assessment & Plan   Kamilah oGldberg is a 44 year old female who transitioned to inpatient hospice on 5/17/2024. Please see the discharge summary from the same date for more details of her hospital course.     Hospice care  Transitioned to hospice care on 5/17.  - comfort cares order in place and being followed.  Verified with nursing that patient is comfortable and no concerns.  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 No plan to proceed with more chemotherapy. Transitioned to comfort cares on 5/15.      # Anemia  Due to underlying malignancy and chemotherapy.   - No further monitoring or intervention -- focus is comfort     # HIV/AIDS  - Discontinued ART with dolutegravir and Truvada      # E. coli UTI  On 5/12, was noted to have malodorous urine. Has Roy in place. UA showed positive nitrites, large LE, 75 WBC. UCx showed E. coli.  - Roy was replaced 5/13, remain for comfort care  - IV CTX 5/12-5/15     # COVID-19 positive  # MRSA pneumonia  Tested positive for COVID-19 4/18/24 during admission to [facility]. Asymptomatic from COVID. Per ID, does not feel like MRSA pneumonia is likely so linezolid was discontinued  - Per infection prevention, first day eligible for re-testing was 5/9, which showed a CT of 26.9. Per recommendations should remain in precautions until negative COVID test or CT >35. Repeat PCR 5/17 positive, cycle threshold. 32 - remains on isolation     # Concern for aspiration  # Questionable ability to protect airway  - Morphine PRN for air hunger       MISC  # Cotton wool spot of right eye  # Concern for CMV retinitis  Single CWS in right eye. Found on assessment by ophthalmology x4/28.  "Repeat ophtho exam 5/6 noted to have worsening cotton wool spots; etiologies include HIV retinitis, HTN retinitis or CMV retinitis.  - Discontinued IV ganciclovir on comfort cares     SOCIAL   # Medical decision maker  Confirmed with family that son (Yuan) will be patient's primary medical decision maker. Son Yuan and patient are okay with communication with aunt (Noemy) if Yuan is unavailable.      # H/o stimulant/amphetamine use   Noted on [facility] tox screen in 2016. Patient previously reported snorting and declined IVDU.              Diet: NPO for Medical/Clinical Reasons Except for: Ice Chips, Meds, Other; Specify: PO for comfort ok if alert, at risk for aspiration    DVT Prophylaxis: On Hospice  Roy Catheter: PRESENT, indication:    Lines: PRESENT      PICC 04/29/24 Double Lumen Left Basilic ok to use PICC-Site Assessment: WDL      Cardiac Monitoring: None  Code Status: No CPR- Do NOT Intubate      Clinically Significant Risk Factors Present on Admission                       # Overweight: Estimated body mass index is 25.09 kg/m  as calculated from the following:    Height as of 4/24/24: 1.626 m (5' 4\").    Weight as of 5/15/24: 66.3 kg (146 lb 2.6 oz).              Disposition Plan     Medically Ready for Discharge:              Gianluca Rodríguez MD  Hospitalist Service, [address]  Securely message with [employer] (more info)  Text page via [facility] Paging/Directory   See signed in provider for up to date coverage information  ______________________________________________________________________    Interval History   Patient transferred to medicine IP hospice yesterday.  Comfortable overnight per nursing and BF (in room). Patients eyes are open, but she is otherwise non responsive to me- appears comfortable.      Physical Exam   Vital Signs:                    Weight: 0 lbs 0 oz  Opens eyes to voice.  No other exam     Medical Decision Making "       30 MINUTES SPENT BY ME on the date of service doing chart review, history, exam, documentation & further activities per the note.      Data         Imaging results reviewed over the past 24 hrs:   No results found for this or any previous visit (from the past 24 hour(s)).

## 2024-05-18 NOTE — DISCHARGE SUMMARY
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 She was admitted on 4/24/2024 for L avel/midbrain lesion mass and new HIV/AIDS diagnosis (3/28/24). On work up, was noted to have positive EBV PCR on CSF and rare atypical cells on CSF c/f CNS lymphoma. In the absence of tissue diagnosis, empirically started high-dose methotrexate and rituximab C1D1=4/29/24. Course further complicated by COVID positivity, decline in neurological status, HIV/AIDS. MRI brain after first cycle of chemo showed no treatment response. In the setting of clinical neurologic decline, transitioned to comfort cares on 5/15. Enrolled in [facility] hospice and transitioned to medicine primary on 5/17.     Hospital Course  Kamilah Goldberg was admitted on 5/17/2024.  The following problems were addressed during her hospitalization:    HEME  # Concern for HIV-associated CNS lymphoma  # H/o ischemic pontine stroke   Patient initially presented to [facility] in March 2024 with mucositis and cough. She was found to have thrush and was diagnosed with HIV (see below). MRI was obtained d/t dizziness and concern for neurocandidiasis. MRI brain 3/29/24 with area of restricted diffusion and T2 hyperintensity in left posterior pontomesencephalic junction, mild local mass effect. Likely representing ischemic infarct. LP 3/29/24 CSF analysis for infection was negative.  Cytology added for possible lymphoma, but there were insufficient viable cells for analysis. She was discharged with plan of repeat MRI brain in 3 months. She then presented in April 2024 with right facial and arm paresthesia and RLE weakness. Workup concerning that the left midbrain/avel mass appeared to be increasing in size compared to MRI 3/20/23 concerning for CNS lymphoma. CT CAP w/o evidence of systemic disease. Unfortunately, location of the mass was deemed too high risk for biopsy to obtain tissue diagnosis. Repeat high volume LP 4/18/24 obtained in hope to confirm malignancy but this remained non-diagnostic. She was transferred to [facility] for NSGY to pursue biopsy but this still remained too high risk. Repeat LP 4/26/24 with rare atypical cells on cytology but does not correlate with flow cytometry (rare polytypic B cells). LP also notable for positive EBV PCR, elevated protein (109), but negative CMV, meningitis/encephalitis panel. After extensive work up ruling out infectious processes and trying to obtain confirmation of CNS lymphoma diagnosis, plan of care was discussed with patient's decission maker who agreed with proceeding treatment for CNS lymphoma. Proceeded with high dose methotrexate (C1D1=4/30/29), Rituxan added D4 and D7.   - Infectious studies at [facility] thus far negative for CMV IgM negative (IgG positive suggesting previous infection), Cryptococcus, CMV encephalitis, Tb meningitis, toxoplasmosis serology, toxoplasma PCR. Karius w/ E.Coli, CMV and human adenovirus; discussed with ID no need to treat E.Coli.   - Continue dexamethasone taper currently on 4 mg BID (x5/9)   - PICC line placed x4/29   - Attempted to obtain another LP on 5/12, but was unable to obtain CSF  - Repeat brain MRI 5/12 after cycle 1 showed no significant change in size of the brainstem mass compared to 4/30/2024. Clinically, patient has neurologically declined since beginning of treatment. Previously was able to follow  commands, now unable to follow commands.   - Discussed lack of response with family on 5/13. No plan to proceed with more chemotherapy. Transitioned to comfort cares on 5/15.      # Anemia  Likely due to underlying suspected malignancy and chemotherapy.   - Discontinued labs and transfusions     ID  # HIV/AIDS  Recently diagnosed with HIV during admission at [facility] on 3/28/24 with viral load of 1.24 million, 6.9 log,  and CD4 count of 2. She was not started on ART, given Nystatin x2 weeks and referred to ID clinic for follow up. Prior to this, she was admitted again with ID consult for broad-infectious workup. Started Biktarvy 4/17 but stopped due concern for rash, rechallenged on 4/20 and able to tolerate. Later transitioned to Dolutegravir and Truvada. SW reached out to health department 5/2 regarding patient's HIV status and if able to be reported properly. Upon further discussions and health department chart review, original diagnosis was in 2014 and patient was contacted over 14 times with no response.  Unable to confirm with patient if she is was aware of status as she is non-verbal. Upon further chart review, HIV testing was completed 8/27/24 at [facility] with office visit to follow, but encounter is not able to be reviewed and needing patient authorization; will discuss with son (Yuan) as he is her next of kin and medical decision maker. Of note, patient's aunt (Noemy), and son (Yuan) are aware of diagnosis.   - Upon further conversation with boyfriend (Otis), it does not appear he is aware of her HIV status. I asked about general medical history and any new/recent infections to which he did not mention HIV/AIDS status. ID had a similar conversation with a consistent consensus. Reached out to SW to discuss with family/son regarding if previous partners have been notified.  - Discontinued ART with dolutegravir and Truvada      # E. coli UTI, resolved  On 5/12, was noted to have  malodorous urine. Has Roy in place. UA showed positive nitrites, large LE, 75 WBC. UCx showed E. coli.  - Roy was replaced 5/13  - IV CTX 5/12-5/15     # COVID-19 positive  # MRSA pneumonia, resolved  Tested positive for COVID-19 4/18/24 during admission to [facility]. Asymptomatic from COVID. Per ID, does not feel like MRSA pneumonia is likely so linezolid was discontinued  - Per infection prevention, first day eligible for re-testing was 5/9, which showed a CT of 26.9. Per recommendations should remain in precautions until negative COVID test or CT >35. Repeat PCR 5/17 positive, cycle threshold 32.2 so must continue COVID precautions.      # Fever  Fever noted 5/14. Possible due to UTI as above or aspiration pneumonia. Have not obtained CXR but has recent h/o aspiration and coarse lung sounds on R side. Discussed with family who agreed to not escalate cares with infection work up or antibiotics at this time.   - Tylenol suppository PRN     PULM  # Concern for aspiration  # Questionable ability to protect airway  On 5/12, patient had episode of rapid desaturation to 50%. Code blue was called, but pt was able to be stabilized with suction and supplement oxygen via high flow. No arrest. Suspect d/t mucus plugging. With worsening mental status, uncertain of ability to clear secretions and protect airway. Unable to be evaluated by SLP given inability to follow commands.   - Discontinued Mucomyst nebs and chest physiotherapy  - Morphine PRN for air hunger     # H/o empyema s/p VATs with total decortication of R lung and partial pleurectomy (Jan 2023)      MISC  # Cotton wool spot of right eye  # Concern for CMV retinitis  Single CWS in right eye. Found on assessment by ophthalmology x4/28. Repeat ophtho exam 5/6 noted to have worsening cotton wool spots; etiologies include HIV retinitis, HTN retinitis or CMV retinitis.  - Discontinued IV ganciclovir      SOCIAL   # Medical decision maker  Confirmed with family that son  "(Yuan) will be patient's primary medical decision maker. Son Yuan and patient are okay with communication with aunt (Noemy) if Yuan is unavailable.      # H/o stimulant/amphetamine use   Noted on [facility] tox screen in 2016. Patient previously reported snorting and declined IVDU.     Clinically Significant Risk Factors Present on Admission                       # Overweight: Estimated body mass index is 25.09 kg/m  as calculated from the following:    Height as of 4/24/24: 1.626 m (5' 4\").    Weight as of 5/15/24: 66.3 kg (146 lb 2.6 oz).              Patient was seen and plan of care was discussed with attending physician Dr. Cotto.    I spent 30 minutes in the care of this patient today, which included time necessary for review of interval events, obtaining history and physical exam, ordering medications/tests/procedures as medically indicated, review of pertinent medical literature, counseling of the patient, coordination of care, and documentation time. Over 50% of time was spent counseling the patient and/or coordinating care.    Kira Gonzalez PA-C   Hematology/Oncology   Pager: [phone]  Desk phone: [phone]    Pending Results   N/A    Code Status   DNR / DNI    Time Spent on this Encounter   I, Kira Gonzalez PA-C, personally saw the patient today and spent less than or equal to 30 minutes discharging this patient.    Discharge Disposition   Transferred to medicine/[facility] hospice  Condition at discharge: Terminal    Consultations This Hospital Stay   None    Discharge Orders   No discharge procedures on file.  Discharge Medications   Current Discharge Medication List        CONTINUE these medications which have NOT CHANGED    Details   atorvastatin (LIPITOR) 40 MG tablet Take 40 mg by mouth daily      bictegravir-emtricitabine-tenofovir (BIKTARVY) -25 MG per tablet Take 1 tablet by mouth daily      gabapentin (NEURONTIN) 100 MG capsule Take 100 mg by mouth 2 times daily    "   sulfamethoxazole-trimethoprim (BACTRIM) 400-80 MG tablet Take 1 tablet by mouth daily           Allergies   Allergies   Allergen Reactions    Blood Transfusion Related (Informational Only) Other (See Comments)     Patient has a history of a clinically significant antibody against RBC antigens.  A delay in compatible RBCs may occur.     Naproxen Hives    Codeine Sulfate GI Disturbance    Hydrocodone Nausea and Vomiting     Data   Most Recent 3 CBC's:  Recent Labs   Lab Test 05/15/24  0225 05/14/24  0607 05/13/24  0439   WBC 3.4* 5.0 7.5   HGB 9.4* 9.5* 9.6*   MCV 91 91 91    217 203      Most Recent 3 BMP's:  Recent Labs   Lab Test 05/15/24  0225 05/14/24  0607 05/13/24  0439    138 138   POTASSIUM 3.5 3.5 3.9   CHLORIDE 102 103 103   CO2 27 26 27   BUN 12.9 15.7 16.1   CR 0.39* 0.34* 0.38*   ANIONGAP 8 9 8   MAUREEN 8.0* 8.2* 8.4*   GLC 86 116* 127*     Most Recent 2 LFT's:  Recent Labs   Lab Test 05/15/24  0225 05/14/24  0607   AST 27 24   ALT 41 35   ALKPHOS 66 69   BILITOTAL 0.4 0.4     Most Recent INR's and Anticoagulation Dosing History:  Anticoagulation Dose History  More data exists         Latest Ref Rng & Units 5/9/2024 5/10/2024 5/11/2024 5/12/2024 5/13/2024 5/14/2024 5/15/2024   Recent Dosing and Labs   INR 0.85 - 1.15 0.98  0.98  1.00  1.06  1.13  1.12  1.18      Most Recent 3 Troponin's:No lab results found.  Most Recent Cholesterol Panel:No lab results found.  Most Recent 6 Bacteria Isolates From Any Culture (See EPIC Reports for Culture Details):No lab results found.  Most Recent TSH, T4 and A1c Labs:  Recent Labs   Lab Test 04/27/24  0724   A1C 5.7*       Results for orders placed or performed during the hospital encounter of 04/24/24   CT Soft Tissue Neck w Contrast    Narrative    EXAM: CT SOFT TISSUE NECK W CONTRAST  4/25/2024 11:06 PM     HISTORY: Patient with HIV, and new brain lesion ? CNS lymphoma- assess  other lesions         COMPARISON: None     TECHNIQUE: Following intravenous  administration of nonionic iodinated  contrast medium, thin section helical CT images were obtained from the  skull base down to the level of the aortic arch.  Axial, coronal and  sagittal reformations were performed with 2-3 mm slice thickness  reconstruction. Images were reviewed in soft tissue, lung and bone  windows.    CONTRAST: 107ml isovue 370    FINDINGS:   No focal oral cavity, nasopharyngeal, oropharyngeal, hypopharyngeal,  or glottic mucosal space abnormality. Normal tongue base. Salivary  glands are within normal limits. Multiple dental caries.    Several bilateral nonenlarged cervical lymph nodes.    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    Normal thyroid gland.    Patent cervical vasculature; no high grade arterial stenosis.    No suspicious osseus lesion. No high grade spinal canal stenosis.    Scattered mucosal thickening of the paranasal sinuses. Clear mastoid  air cells. No periapical dental lucency. The imaged skull base,  intracranial and orbital structures are within normal limits.    Partially visualized right upper lobe dependent consolidative  opacities..      Impression    IMPRESSION: No mass or abnormally enlarged cervical lymph nodes in the  neck. Multiple dental caries. 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    I have personally reviewed the examination and initial interpretation  and I agree with the findings.    CLARY PHAM MD         SYSTEM ID:  [id number]   CT Chest/Abdomen/Pelvis w Contrast     Value    Radiologist flags Hepatic hypodensity, right lung lower lobe    Narrative    EXAMINATION: CT CHEST/ABDOMEN/PELVIS W CONTRAST, 4/25/2024 11:07 PM    TECHNIQUE:  Helical CT images from the thoracic inlet through the  symphysis pubis were obtained with contrast.     CONTRAST: 107ml isovue 370    COMPARISON: Chest CT 1/16/2023; CT abdomen/pelvis 4/10/2019    HISTORY: Patient with HIV, and new brain lesion ? CNS lymphoma- assess  other lesions    FINDINGS:    Chest:     Mild motion related  artifacts.    Mediastinum: Visualized thyroid gland is unremarkable. Heart size is  within normal limits. No pericardial effusion. No central pulmonary  thromboembolism on this nondedicated study. Prominent bilateral  axillary lymph nodes, decreased in size from prior CT from 1/16/2023.  Small hiatal hernia. Enlarged precarinal lymph node measuring 15 x 13  mm (3/49). Subcentimeter right hilar lymph node (3/57). Prominent  right subpectoral node measuring 6 mm, previously 8 mm (3/33).    Lungs: The trachea and central airways are patent. Patchy  consolidative density seen in the posterior right upper lobe abutting  the superior oblique fissure (9/88), pleural-based confluent density  in the right lower lobe measuring 1 cm (9/185). Confluent  consolidative density in the left lung base. Mild patchy groundglass  densities in the superior left lower lobe (9/152). No pneumothorax or  pleural effusion.    Abdomen and pelvis:    Mild motion related artifacts.    Liver: Too small to characterize hypodensity in the right lobe of  liver (segment 7) measuring 7 mm, not conspicuous on prior CT from  2019 (3/100). Several are too small to visualize hepatic hypodensity  in the hepatic dome (3/99). No intrahepatic biliary ductal dilatation.  Adrenal glands are within normal limits. No focal pancreatic mass or  pancreatic ductal dilatation. Spleen is not enlarged. No focal splenic  lesion.    Postsurgical changes of the stomach. No biliary ductal dilatation.  Gallbladder is surgically absent. Symmetric enhancement of the  kidneys. No focal suspicious renal lesion. Prominent parametrial  vessels. No adnexal mass. Few subcentimeter bilateral inguinal lymph  nodes. Urinary bladder is partially distended and grossly  unremarkable. Mild scoliotic curvature of the spine. Asymmetric  bulkiness of the right obturator internus with apparent increased  enhancement (3/271 and 7/64). Appendix is within normal limits. Mild  colonic  diverticulosis. No high-grade bowel obstruction. No  pneumoperitoneum or significant free fluid. No bulky new pelvic or  retroperitoneal lymphadenopathy. Subcentimeter right external iliac  lymph node (5/519), similar to prior.     Bones and soft tissues: No acute hydrocephalus osseous abnormality.  Tiny fat-containing umbilical hernia.      Impression    IMPRESSION:     1. Mildly enlarged precarinal lymph node. Subcentimeter axillary lymph  nodes decreased in size compared to prior CT from 1/16/2023.  Subcentimeter right hilar lymph node. Consider attention on follow-up    2. Right upper lobe and left lower lobe confluent densities, which may  represent atelectasis; however, superimposed infection cannot be  excluded. Right lower lobe pleural-based nodular centimeter size  density indeterminate. Recommend attention on follow-up.    3. Too small to characterize subcentimeter hypodensity in segment 7 of  the liver, indeterminate, new compared to prior CT from 4/10/2019.  Recommend attention on short-term follow-up     4. Asymmetric bulky appearing right obturator internus muscle with  apparent increased internal enhancement, indeterminate, underlying  lesion/inflammation not excluded; recommend attention on short-term  follow-up or contrast enhanced MRI pelvis as clinical desired.    5. Few prominent inguinal lymph nodes, possibly reactive; otherwise no  significantly enlarged retroperitoneal or pelvic lymph node.        [Recommend Follow Up: Hepatic hypodensity, right lung lower lobe  pleural-based nodule, asymmetric bulky right obturator internus.]    This report will be copied to the [facility] to ensure a  provider acknowledges the finding. [facility] Center is available Monday  through Friday 8am-3:30 pm.        I have personally reviewed the examination and initial interpretation  and I agree with the findings.    TONI PALACIOS MD         SYSTEM ID:  [id number]   IR Lumbar Puncture    Narrative     PROCEDURE: Lumbar Puncture using Fluoroscopy, 4/26/2024 10:40 AM    HISTORY:  brainstem lesion    COMPARISON: none    STAFF NEURORADIOLOGIST:  Moo Butt MD     FELLOW PHYSICIAN: Dr. Sherry Mcclain, Dr. Octavio Titus    SEDATION: The patient was placed on continuous monitoring. No  intravenous sedation was administered. Vital signs and sedation  monitored by nursing staff under Interventional Radiologists  supervision. The patient remained stable throughout the procedure.    SEDATION TIME: n/a    MEDICATIONS:  1. Lidocaine 1% 10 ml intradermal    TECHNIQUE: Verbal and written consent for lumbar puncture was obtained  from the patient's aunt, and benefits and risk of the procedure were  explained, including but not limited to worsening headache,  hemorrhage, infection, lower extremity pain, or nerve root injury. The  patient was sterilely prepped and draped with the patient in the prone  position, over the lower back. Under fluoroscopic guidance, the  interlaminar spaces were noted. 1% lidocaine was administered for  local anesthetic over the L3-4 interlaminar space, and a 22 gauge 3.5  inch needle was advanced into the thecal sac under fluoroscopic  guidance.      There was initial show of clear CSF. Opening pressure was not measured  but there was a slow drip. Approximately 20 cc of CSF were collected.    The needle was removed with the stylet in place. There was no  immediate complication associated with the procedure. Samples were  sent for the requested laboratory testing.      FLUORO TIME: 2.6 low-dose pulsed.    DOSE: 36.4 uGym2      Impression    IMPRESSION: Successful lumbar puncture without immediate complication.    PLAN: Patient discharged to patient care unit in stable condition for  monitoring. Orders placed for 4 hour of bed rest with patient laying  on the back and the head of the bed flat.    Dr. Butt was present for the entire procedure.    I have personally reviewed the examination and  initial interpretation  and I agree with the findings.    IGOR SCHERER MD         SYSTEM ID:  [id number]   XR Abdomen Port 1 View    Narrative    EXAMINATION:  XR ABDOMEN PORT 1 VIEW 4/26/2024 5:09 PM     COMPARISON: CT 4/25/2024.    HISTORY: feeding tube placement.    TECHNIQUE: Frontal view of the abdomen.    FINDINGS: Feeding tube tip projects over the proximal jejunum in  patient with history of gastric bypass seen on prior CT. No abnormally  dilated loops of bowel.  No pneumatosis or portal venous gas.        Impression    IMPRESSION: Feeding tube tip projects over the expected location of  proximal jejunum in this patient with history of gastric bypass.    I have personally reviewed the examination and initial interpretation  and I agree with the findings.    TONI PALACIOS MD         SYSTEM ID:  [id number]   MR Brain w/o & w Contrast    Narrative     MR BRAIN W/O & W CONTRAST 4/30/2024 12:09 PM    Provided History: 44 YOF new concern of CNS lymphoma. Previous imgaes  showing increasing lesion of L midbrain and avel. Please assess for  interval changes.    ICD-10:    Comparison: Outside MRI 4/15/2024, 3/29/2024 and head CT 4/30/2024    Technique: Multiplanar T1-weighted, axial FLAIR, and susceptibility  images were obtained without intravenous contrast. Following  intravenous gadolinium-based contrast administration, axial  T2-weighted, diffusion, and T1-weighted images (in multiple planes)  were obtained.    Contrast dose: gadobutrol (GADAVIST) injection 7.5 mL    Findings: Continued progression of brainstem mass centered in the left  dorsal medulla with extension to the avel, left middle cerebellar  peduncle and left cerebral peduncle, increased in size compared with  4/15/2024 and 3/29/2024 exams, they measuring 2.5 x 2.2 x 3.3,  previously 2.1 x 1.5 x 1.9 cm on 4/15/2024. The lesion shows marked  diffusion restriction with diffuse contrast enhancement and tiny  punctate microhemorrhage internally.    There is no midline shift, or evidence of intracranial hemorrhage. The  ventricles are proportionate to the cerebral sulci.    No definite abnormality of the skull marrow signal is noted. The major  vascular intracranial flow-voids are present. The visualized portions  of paranasal sinuses, and mastoid air cells are relatively clear. The  orbits are grossly unremarkable.      Impression    Impression: Increased size of known left brain stem mass compared with  4/15/2024 and 3/29/2024 MRI exams,, imaging findings are most  concerning for central nervous system lymphoma although primary glial  neoplasm or metastatic lesion cannot be excluded.    CLARY PHAM MD         SYSTEM ID:  [id number]   CT Head w/o Contrast    Narrative    CT HEAD W/O CONTRAST 4/30/2024 10:13 AM    History: 44 yr F with L pontine lesion, now with worsening exam with  bradycardia, R hemiplegia, new R CN6 palsy. Evaluate for impending  herniation, edema, raised ICP signs     Comparison: MRI brain 4/15/2024 from outside institution and outside  neck CT from 4/25/2024    Technique: Using multidetector thin collimation helical acquisition  technique, axial, coronal and sagittal CT images from the skull base  to the vertex were obtained without intravenous contrast.   (topogram) image(s) also obtained and reviewed.    Findings: Redemonstration of 2.8 x 2.5 cm relatively hyperattenuating  lesion in the posterior lateral left avel that effaces the  quadrigeminal and partially the left ambient cistern. The most recent  MR, the lesion measures 2.1 x 1.6 cm and does not appear to efface the  cisterns to this extent. The fourth ventricle is partially involved  but is patent. The ventricular size is not enlarged, stable from MR  4/15/2024. There is no intracranial hemorrhage, mass effect, or  midline shift. Gray/white matter differentiation in both cerebral  hemispheres is preserved. The basal cisterns are otherwise clear.    The bony calvaria and the  bones of the skull base are normal. Polypoid  left maxillary sinus mucosal thickening with scattered paranasal  fluid, greatest in the left sphenoid sinus. The mastoid air cells are  clear. Partially visualized right-sided nasoenteric tube.      Impression    Impression:  1. Redemonstration of left posterior lateral pontine lesion measuring  up to 2.8 cm with effacement of the adjacent cisterns. There is no  evidence of obstructive hydrocephalus. This appears similar to neck CT  from 4/25/2024, appears relatively enlarged compared to MRI from  4/15/2024, although could be due to technical differences.  2. Nonspecific left maxillary sinus polypoid mucosal thickening and  scattered paranasal sinus fluid.  3. Overall no new acute finding.    I have personally reviewed the examination and initial interpretation  and I agree with the findings.    CLARY PHAM MD         SYSTEM ID:  [id number]   CT Head w/o Contrast    Narrative    EXAM: CT HEAD W/O CONTRAST  5/4/2024 5:27 AM     HISTORY: eval for herniation       COMPARISON: MRI brain 4/30/2024, CT head 4/30/2024    TECHNIQUE: Using multidetector thin collimation helical acquisition  technique, axial, coronal and sagittal CT images from the skull base  to the vertex were obtained without intravenous contrast.   (topogram) image(s) also obtained and reviewed.    FINDINGS:  Redemonstration of a hyperdense left brainstem mass measuring  approximately 2.8 x 2.3 cm as measured on series 3 image 23, not  significantly changed from CT 4/30/2024. Persistent mass effect on the  fourth ventricle. Stable mild enlargement of the ventricles. No  herniation.    No acute intracranial hemorrhage. No acute loss of gray-white matter  differentiation in the cerebral hemispheres.    The bony calvaria and the bones of the skull base are normal. Small  volume dependent fluid in the visualized portion of the left maxillary  sinus; the remainder of the paranasal sinuses are relatively  clear.  Clear mastoid air cells. Grossly normal orbits.       Impression    IMPRESSION: Unchanged left brainstem mass, better characterized on MRI  4/30/2024. Differential considerations remain unchanged; favor  lymphoma versus primary glial neoplasm. No herniation as questioned.  Stable mild ventricular dilatation.    I have personally reviewed the examination and initial interpretation  and I agree with the findings.    MIGEL SANDERS MD         SYSTEM ID:  [id number]   XR Abdomen Port 1 View    Narrative    EXAMINATION: XR ABDOMEN PORT 1 VIEW 5/6/2024 11:25 AM     COMPARISON: Abdominal radiograph 4/26/2024.    HISTORY: Assess NG tube placement.    FINDINGS: Frontal view of the abdomen. Feeding tube tip projects over  left midabdomen, presumably within the proximal jejunum in this  patient with history of gastric bypass. There is some gaseous  distention of the colon with transverse measuring approximately 7 cm  in caliber. No abnormally dilated loops of small bowel.      Impression    IMPRESSION: Feeding tube tip projects over left midabdomen, presumably  within the proximal jejunum in this patient with history of gastric  bypass. Possible colonic ileus.    I have personally reviewed the examination and initial interpretation  and I agree with the findings.    KAITLYNN BARTON MD         SYSTEM ID:  [id number]   US Lower Extremity Arterial Duplex Right    Narrative    Exam: Duplex ultrasound of right lower extremity arteries dated  5/7/2024 11:35 AM     Clinical information: Cold RLE; assess for vasculature etiology     Comparison: None    Technique: Grayscale (B-mode), color Doppler, and duplex spectral  Doppler ultrasound of the right lower extremity arteries. Velocity  measurements obtained with angle correction of 60 degrees or less.    Ordering provider: Krystal Ochoa PA-C    Findings:     Right lower extremity:     Common femoral artery: Velocity: 107/0 cm/sec. Waveforms: Triphasic  Profunda femoral artery:  Velocity: 78/5 cm/sec. Waveforms: Triphasic  Proximal SFA: Velocity: 78/0 cm/sec. Waveforms: Triphasic  Mid SFA:Velocity:  98/0 cm/sec. Waveforms: Triphasic  Distal SFA: Velocity: 92/0 cm/sec. Waveforms: Triphasic    Popliteal artery, proximal: Velocity: 64/0 cm/sec. Waveforms:  Triphasic    PTA ankle: Velocity: 62/0 cm/sec. Waveforms: Triphasic  NAHED ankle: Velocity: 57/0 cm/sec. Waveforms: Triphasic      Impression    Impression:   Patent native arteries of the right lower extremity without  hemodynamically significant stenosis.    Guidelines:    [facility] duplex criteria for lower limb arterial  occlusive disease    Percent stenosis:     Normal (1-19%): Peak systolic velocity (cm/s): <150, End-diastolic  velocity (cm/s): <40, Velocity ratio (Vr): <1.5, Distal arterial  waveform: Triphasic    20-49%: Peak systolic velocity (cm/s): 150-200, End-diastolic velocity  (cm/s): <40, Velocity ratio (Vr): 1.5-2.0, Distal arterial waveform:  Triphasic    50-75%: Peak systolic velocity (cm/s): 200-300, End-diastolic velocity  (cm/s): <90, Velocity ratio (Vr): 2.0-3.9, Distal arterial waveform:  Poststenotic turbulence distal to stenosis, monophasic distal waveform    >75%: Peak systolic velocity (cm/s): >300, End-diastolic velocity  (cm/s): <90, Velocity ratio (Vr): >4.0, Distal arterial waveform:  Dampened distal waveform and low PSV/EDV* in the stenosis    Occlusion: Absent flow by color Doppler/pulsed Doppler spectral  analysis; length of occlusion estimated from distance between exit and  reentry collateral arteries    *PSV = peak systolic velocity, EDV = end-diastolic velocity  http://link.li.com/chapter/10.1007/[email or web address]_23/fulltext  html    I have personally reviewed the examination and initial interpretation  and I agree with the findings.    DARVIN SON         SYSTEM ID:  [id number]   XR Abdomen Port 1 View    Narrative    EXAMINATION: XR ABDOMEN PORT 1 VIEW 5/10/2024 9:35 AM      COMPARISON: None.    HISTORY: feeding tube placement    TECHNIQUE: Frontal view of the abdomen.    FINDINGS: Enteric tube tip projects over the mid abdomen indicating it  is within the small bowel. No abnormally dilated loops of bowel. No  pneumatosis or portal venous gas. No definite pneumoperitoneum. Lung  bases are clear.      Impression    IMPRESSION: Enteric tube tip projects over the mid abdomen indicating  it is within the small bowel.    I have personally reviewed the examination and initial interpretation  and I agree with the findings.    TOÑA BERNABE MD         SYSTEM ID:  [id number]   XR Abdomen Port 1 View    Narrative    EXAMINATION: XR ABDOMEN PORT 1 VIEW 5/10/2024 2:00 PM     COMPARISON: 5/10/2024.    HISTORY: feeding tube placement.    FINDINGS: Frontal view of the abdomen. Feeding tube tip projects over  left lower abdomen, presumably within small bowel given patient has  had gastric bypass surgery. No abnormally dilated loops of bowel.  Bibasilar opacities.      Impression    IMPRESSION: Feeding tube tip projects over presumed small bowel in  left lower quadrant. Nonobstructive bowel gas pattern.    I have personally reviewed the examination and initial interpretation  and I agree with the findings.    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 Reassess  response of mass with initiation of treatment       COMPARISON: MRI 4/30/2024    TECHNIQUE: MR head: Multiplanar T1-weighted, axial FLAIR, and  susceptibility images were obtained without intravenous contrast.  Following intravenous gadolinium-based contrast administration, axial  T2-weighted, diffusion, and T1-weighted images (in multiple planes)  were obtained.    Contrast: 7.1 mL IV Gadavist     FINDINGS:  No significant change in size to the enhancing lesion in avel  and  midbrain (2.6 x 2.2 x 3 cm, previously 2.5 x 2.2 x 2.8 cm when  measured in similar fashion on the prior). Unchanged pattern of  peripheral T2 hyperintense and central T2 isointense signal. Slightly  decreased thickness of the peripheral enhancement. Unchanged mass  effect on the fourth ventricle. The thickness of the peripheral  pattern of restricted diffusion appears to have decreased.    Stable size of the ventricles without evidence of hydronephrosis. No  evidence of new intracranial mass lesions or intracranial hemorrhage.    Major intracranial vascular structures are within normal limits.    No suspicious abnormality of the skull marrow signal. Clear paranasal  sinuses. Mastoid air cells are clear. No focal abnormality of the  pituitary gland, sella, skull base and upper cervical spinal  structures on sagittal images. The orbits are normal.      Impression    IMPRESSION:  1. No significant change in size of the brainstem mass compared to  4/30/2024. Possible slight decrease in peripheral pattern of  enhancement and restricted diffusion may indicate subtle response to  treatment.    I have personally reviewed the examination and initial interpretation  and I agree with the findings.    NAVARRO ROSARIO MD         SYSTEM ID:  [id number]   XR Chest Port 1 View    Narrative    EXAM: XR CHEST PORT 1 VIEW 5/12/2024 10:36 AM     HISTORY: [facility]       COMPARISON: CT 4/25/2024, radiographs 11/20/2023    FINDINGS: Left upper extremity PICC tip projects over the low SVC.  Feeding tube reaches the stomach and passes below the field-of-view.  Normal cardiomediastinal silhouette. Mild streaky perihilar and left  basilar opacities. No consolidation. No pleural effusion or  pneumothorax.       Impression    IMPRESSION:  Mild perihilar and left basilar opacities. Differential includes  atelectasis or mild infection versus inflammation.    I have personally reviewed the examination and initial interpretation  and I agree  with the findings.    KAITLYNN BARTON MD         SYSTEM ID:  [id number]   POC US Guidance Needle Placement    Impression    Limited abdominal ultrasound was performed and demonstrated an adequate window for paramedian lumbar puncture at the L5-S1 and the L4-L5 level, LP was subsequently attempted.        Echo Complete     Value    LVEF  70%    Narrative    [id number]  [id number]  [id number]  [id number]^DULCE MARIA^RE     [facility],[place]  Echocardiography Laboratory  [address] [place] [id number]     Name: NATASHA CASEY  MRN: [id number]  : 1980  Study Date: 2024 01:46 PM  Age: 44 yrs  Gender: Female  Patient Location: [facility]  Reason For Study: Cardiomyopathy  Ordering Physician: RE العراقي  Referring Physician: ABHINAV MEDEROS  Performed By: Tere Mccartney     BSA: 1.8 m2  Height: 64 in  Weight: 156 lb  ______________________________________________________________________________  Procedure  Complete Portable Echo Adult. Echocardiogram with two-dimensional, color and  spectral Doppler performed.  ______________________________________________________________________________  Interpretation Summary  Global and regional left ventricular function is hyperkinetic with an EF  >70%.There is cavity obliteration and with LV-Ao gradient of 18 mmHg.  Global right ventricular function is normal.  No significant valvular abnormalities present.  Estimated mean right atrial pressure is normal.  No pericardial effusion is present.  ______________________________________________________________________________  Left Ventricle  Left ventricular size is normal. Global and regional left ventricular function  is hyperkinetic with an EF >70%. Mild concentric wall thickening consistent  with left ventricular hypertrophy is present. Cavity obliteration and mid-  ventricular gradient are present. There is cavity obliteration and with LV-Ao  gradient of 18 mmHg.      Right Ventricle  The right ventricle is normal size. Global right ventricular function is  normal.     Atria  Both atria appear normal.     Mitral Valve  The mitral valve is normal.     Aortic Valve  Aortic valve is normal in structure and function.     Tricuspid Valve  The tricuspid valve is normal. Trace to mild tricuspid insufficiency is  present. The peak velocity of the tricuspid regurgitant jet is not obtainable.  Pulmonary artery systolic pressure cannot be assessed.     Pulmonic Valve  The pulmonic valve is normal.     Vessels  The aorta root is normal. The inferior vena cava is normal. Estimated mean  right atrial pressure is normal.     Pericardium  No pericardial effusion is present.     Miscellaneous  No significant valvular abnormalities present.     Compared to Previous Study  Previous study not available for comparison.  ______________________________________________________________________________  MMode/2D Measurements & Calculations  IVSd: 1.3 cm  LVIDd: 3.5 cm  LVIDs: 2.0 cm  LVPWd: 1.4 cm  FS: 42.8 %  LV mass(C)d: 160.8 grams  LV mass(C)dI: 91.4 grams/m2  Ao root diam: 3.1 cm  asc Aorta Diam: 3.1 cm  LVOT diam: 2.1 cm  LVOT area: 3.3 cm2  Ao root diam index Ht(cm/m): 1.9  Ao root diam index BSA (cm/m2): 1.7  Asc Ao diam index BSA (cm/m2): 1.8  Asc Ao diam index Ht(cm/m): 1.9  RWT: 0.82     TAPSE: 2.1 cm     Doppler Measurements & Calculations  MV E max johnie: 64.5 cm/sec  MV A max johnie: 62.4 cm/sec  MV E/A: 1.0  MV dec time: 0.22 sec  Ao V2 max: 160.0 cm/sec  Ao max PG: 10.2 mmHg  Ao V2 mean: 109.0 cm/sec  Ao mean P.0 mmHg  Ao V2 VTI: 26.1 cm  BHAKTI(I,D): 2.9 cm2  BHAKTI(V,D): 3.0 cm2  LV V1 max P.4 mmHg  LV V1 max: 145.0 cm/sec  LV V1 VTI: 22.8 cm  SV(LVOT): 76.1 ml  SI(LVOT): 43.2 ml/m2  AV Johnie Ratio (DI): 0.91  BHAKTI Index (cm2/m2): 1.7  E/E' av.9  Lateral E/e': 5.2  Medial E/e': 6.6  RV S Johnie: 24.2 cm/sec      ______________________________________________________________________________  Report approved by: Niall Thorpe 05/05/2024 02:25 PM

## 2024-05-19 NOTE — PLAN OF CARE
Goal Outcome Evaluation:    Prn sublingual morphine x1, prn atropine x1.   Continues on comfort cares.   Repositioned q 2-3 hrs.  Significant other at bedside .  Continue with plan of care.

## 2024-05-19 NOTE — PLAN OF CARE
Goal Outcome Evaluation:      Plan of Care Reviewed With: patient    Overall Patient Progress: no changeOverall Patient Progress: no change    [id number]:  Continues on comfort cares.  Given prn sublingual morphine x1 and prn IV Robinul x 1.  Repositioned ~q2-3 hours.  Pt appears comfortable at this time.  Boyfriend slept overnight.  Continue with plan of care.

## 2024-05-19 NOTE — PROGRESS NOTES
[facility]    Medicine Progress Note - Hospitalist Service, GOLD TEAM 8    Date of Admission:  5/17/2024    Assessment & Plan   Kamilah Goldberg is a 44 year old female who transitioned to inpatient hospice on 5/17/2024. Please see the discharge summary from the same date for more details of her hospital course.     Hospice care  Transitioned to hospice care on 5/17.  - comfort cares order in place and being followed.  Verified with nursing that patient is comfortable and no concerns.  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 No plan to proceed with more chemotherapy. Transitioned to comfort cares on 5/15.      # Anemia  Due to underlying malignancy and chemotherapy.   - No further monitoring or intervention -- focus is comfort     # HIV/AIDS  - Discontinued ART with dolutegravir and Truvada      # E. coli UTI  On 5/12, was noted to have malodorous urine. Has Roy in place. UA showed positive nitrites, large LE, 75 WBC. UCx showed E. coli.  - Roy was replaced 5/13, remain for comfort care  - IV CTX 5/12-5/15     # COVID-19 positive  # MRSA pneumonia  Tested positive for COVID-19 4/18/24 during admission to [facility]. Asymptomatic from COVID. Per ID, does not feel like MRSA pneumonia is likely so linezolid was discontinued  - Per infection prevention, first day eligible for re-testing was 5/9, which showed a CT of 26.9. Per recommendations should remain in precautions until negative COVID test or CT >35. Repeat PCR 5/17 positive, cycle threshold. 32 - remains on isolation     # Concern for aspiration  # Questionable ability to protect airway  - Morphine PRN for air hunger       MISC  # Cotton wool spot of right eye  # Concern for CMV retinitis  Single CWS in right eye. Found on assessment by  "ophthalmology x4/28. Repeat ophtho exam 5/6 noted to have worsening cotton wool spots; etiologies include HIV retinitis, HTN retinitis or CMV retinitis.  - Discontinued IV ganciclovir on comfort cares     SOCIAL   # Medical decision maker  Confirmed with family that son (Yuan) will be patient's primary medical decision maker. Son Yuan and patient are okay with communication with aunt (Noemy) if Yuan is unavailable.      # H/o stimulant/amphetamine use   Noted on [facility] tox screen in 2016. Patient previously reported snorting and declined IVDU.              Diet: NPO for Medical/Clinical Reasons Except for: Ice Chips, Meds, Other; Specify: PO for comfort ok if alert, at risk for aspiration    DVT Prophylaxis: Hospice  Roy Catheter: PRESENT, indication: End of life  Lines: PRESENT      PICC 04/29/24 Double Lumen Left Basilic ok to use PICC-Site Assessment: WDL      Cardiac Monitoring: None  Code Status: No CPR- Do NOT Intubate      Clinically Significant Risk Factors                         # Overweight: Estimated body mass index is 25.09 kg/m  as calculated from the following:    Height as of 4/24/24: 1.626 m (5' 4\").    Weight as of 5/15/24: 66.3 kg (146 lb 2.6 oz)., PRESENT ON ADMISSION            Disposition Plan     Medically Ready for Discharge: Anticipated in 2-4 Days             Gianluca Rodríguez MD  Hospitalist Service, GOLD TEAM [address] [place] [address]  Securely message with [employer] (more info)  Text page via [facility] Paging/Directory   See signed in provider for up to date coverage information  ______________________________________________________________________    Interval History   Boyfriend at bedside.  No concerns.  Seems comfortable    Physical Exam   Vital Signs:                    Weight: 0 lbs 0 oz    Comfortable appearing, Eyes open, nonresponsive    Medical Decision Making       30 MINUTES SPENT BY ME on the date of service doing chart review, history, " exam, documentation & further activities per the note.      Data

## 2024-05-19 NOTE — PROGRESS NOTES
[facility]    Consult Note - [facility]  _________________________________________________________________    [facility] 24/7 Contact Number: [phone]    - Providers: Please contact [facility] with changes in orders or clinical plan of care   - Nursing: Please contact [facility] with significant changes in patient condition    Hospice will notify the care team (including the hospitalist) to confirm date of inpatient hospice (GIP) admission.    New Epic encounter will not be created until hospice completes admission.   ______________________________________________________________________        Hospice Diagnosis: Neoplasm of Uncertain Behavior      Indication for Inpatient Hospice: Pain and secretion management      Goals for Hospital Discharge: Pain management as evidence by no facial grimacing. Secretion management as evidence by no audible gargling.      Plan of Care Discussed with the Following:   - Nurse: Heavenly YADAV  - Hospitalist/Rounding Provider: Dr TAMANNA Rodríguez   - Kamilah's Family/Preferred Contact: Yusuf Bolden - son - [phone]  - Hospice Provider: Bry Flores DO      Summary of Visit (includes assessment, medications and any new orders):     97% o2, 97.1 temp, 103/79  Was able to clear throat ,  irregular breathing              Selwyn Vladez RN

## 2024-05-19 NOTE — PLAN OF CARE
Goal Outcome Evaluation:    [id number]  Comfort cares. No PRN's given overnight. Appears comfortable and managing secretions well. Boyfriend at the bedside. Continue with POC.

## 2024-05-20 NOTE — PROGRESS NOTES
[facility]    Consult Note - [facility]  _________________________________________________________________    [facility] Contact Number: [phone]    - Providers: Please contact [facility] with changes in orders or clinical plan of care   - Nursing: Please contact [facility] with significant changes in patient condition    Hospice will notify the care team (including the hospitalist) to confirm date of inpatient hospice (GIP) admission.    New Epic encounter will not be created until hospice completes admission.   ______________________________________________________________________         Hospice Diagnosis: Neoplasm of Uncertain Behavior      Indication for Inpatient Hospice: Pain and secretion management      Goals for Hospital Discharge: Pain management as evidence by no facial grimacing. Secretion management as evidence by no audible gargling.      Plan of Care Discussed with the Following:   - Nurse: Heavenly YADAV  - Hospitalist/Rounding Provider: Gianluca Rodríguez MD  - Kamilah's Family/Preferred Contact: Yusuf Bolden - dejon - [phone]  - Hospice Provider: Bry Flores DO    Summary of Visit (includes assessment, medications and any new orders):   Saw pt today. Significant other at bedside but was in the restroom the entire assessment and did not come out. Pt was warm to touch with fan at HOB. /64, 02 90%, RR 19. Updated staff RN Heavenly. Spoke with DO Rut the following are recommendations from hospice team.     Recommendation:   Morphine IV 3mg Q6 scheduled   Morphine IV 3mg Q2 PRN   Robinul IV 0.2mg Q6 scheduled      All recommendations sent to Gianluca Rodríguez MD via [employer]. Chart requires to be flipped to Hospice IP, passed this message on to Gianluca Rodríguez MD.     Jasen Lee, RN

## 2024-05-20 NOTE — PLAN OF CARE
Goal Outcome Evaluation:    Comfort cares. No PRN's given. Appears comfortable and managing secretions well. Family member Adria at the bedside. Continue with POC.

## 2024-05-20 NOTE — PLAN OF CARE
Goal Outcome Evaluation:    [id number]  Remains comfort cares. No PRN's given overnight. Appears comfortable and managing secretions well. Boyfriend at the bedside overnight, states pt appears comfortable to him. Continue with POC.

## 2024-05-20 NOTE — PROGRESS NOTES
[facility]    Medicine Progress Note - Hospitalist Service, GOLD TEAM 8    Date of Admission:  5/17/2024    Assessment & Plan   Kamilah Goldberg is a 44 year old female who transitioned to inpatient hospice on 5/17/2024. Please see the discharge summary from the same date for more details of her hospital course.     Hospice care  Transitioned to hospice care on 5/17.  - comfort cares order in place and being followed.  Verified with nursing that patient is comfortable and no concerns.  - all other medications stopped  Per hospice nurse will stop SL morphine today and start   Morphine IV 3mg IV q 6 sheculed and Morphine 3mg q2 prn  Change robinul to 0.2mg q6 hr scheduled.      Hospice nurse concerned since patient is on isolation it may be hard to know when Kamilah is uncomfortable and that this will work better to keep her comfortable.       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 No plan to proceed with more chemotherapy. Transitioned to comfort cares on 5/15.      # Anemia  Due to underlying malignancy and chemotherapy.   - No further monitoring or intervention -- focus is comfort     # HIV/AIDS  - Discontinued ART with dolutegravir and Truvada      # E. coli UTI  On 5/12, was noted to have malodorous urine. Has Roy in place. UA showed positive nitrites, large LE, 75 WBC. UCx showed E. coli.  - Roy was replaced 5/13, remain for comfort care  - IV CTX 5/12-5/15     # COVID-19 positive  # MRSA pneumonia  Tested positive for COVID-19 4/18/24 during admission to [facility]. Asymptomatic from COVID. Per ID, does not feel like MRSA pneumonia is likely so linezolid was discontinued  - Per infection prevention, first day eligible for re-testing was 5/9, which showed a CT of 26.9. Per recommendations should remain in precautions until negative COVID test  "or CT >35. Repeat PCR 5/17 positive, cycle threshold. 32 - remains on isolation     # Concern for aspiration  # Questionable ability to protect airway  - Morphine PRN for air hunger       MISC  # Cotton wool spot of right eye  # Concern for CMV retinitis  Single CWS in right eye. Found on assessment by ophthalmology x4/28. Repeat ophtho exam 5/6 noted to have worsening cotton wool spots; etiologies include HIV retinitis, HTN retinitis or CMV retinitis.  - Discontinued IV ganciclovir on comfort cares     SOCIAL   # Medical decision maker  Confirmed with family that son (Yuan) will be patient's primary medical decision maker. Son Yuan and patient are okay with communication with aunt (Noemy) if Yuan is unavailable.      # H/o stimulant/amphetamine use   Noted on [facility] tox screen in 2016. Patient previously reported snorting and declined IVDU.              Diet: NPO for Medical/Clinical Reasons Except for: Ice Chips, Meds, Other; Specify: PO for comfort ok if alert, at risk for aspiration    DVT Prophylaxis: On hospice  Roy Catheter: PRESENT, indication: End of life  Lines: PRESENT      PICC 04/29/24 Double Lumen Left Basilic ok to use PICC-Site Assessment: WDL      Cardiac Monitoring: None  Code Status: No CPR- Do NOT Intubate      Clinically Significant Risk Factors                         # Overweight: Estimated body mass index is 25.09 kg/m  as calculated from the following:    Height as of 4/24/24: 1.626 m (5' 4\").    Weight as of 5/15/24: 66.3 kg (146 lb 2.6 oz)., PRESENT ON ADMISSION            Disposition Plan     Medically Ready for Discharge: Anticipated in 2-4 Days             Gianluca Rodríguez MD  Hospitalist Service, GOLD TEAM [address] [place] [address]  Securely message with [employer] (more info)  Text page via [facility] Paging/Directory   See signed in provider for up to date coverage " information  ______________________________________________________________________    Interval History   Comfortable appearing.  Opens eyes to voice.  No grimace, breathing comfortably    Physical Exam   Vital Signs:                    Weight: 0 lbs 0 oz    General Appearance: Opens eyes to voice, appears comfortable    Medical Decision Making       30 MINUTES SPENT BY ME on the date of service doing chart review, history, exam, documentation & further activities per the note.      Data

## 2024-05-20 NOTE — PLAN OF CARE
Goal Outcome Evaluation:      Plan of Care Reviewed With: patient    Overall Patient Progress: no changeOverall Patient Progress: no change    [id number]:  Comfort cares.  Appeared uncomfortable, distressed, given 10 mg prn sublingual morphine x1, 1 mg prn IV ativan and 0.2 mg prn IV glycopyrrolate for secretions.  Seen by RN with [facility].  Pt now on scheduled IV morphine and IV Robinul and prn IV morphine is available (see EMAR).  Repositioned and oral cares done.  Pt appears comfortable at this time.  Continue with plan of care.

## 2024-05-21 NOTE — PROGRESS NOTES
[facility]    Social Work Progress Note - [facility]    ______________________________________________________________________    [facility]  Contact Number: [phone]    - Providers: Please contact [facility] with changes in orders or clinical plan of care   - Nursing: Please contact [facility] with significant changes in patient condition  - Social Work: Please contact [facility] for discharge phanning/updates  ______________________________________________________________________        Summary of Visit (includes psychosocial assessment/updates, acceptance of palliative care/hospice philosophy, discharge plans):   SW met with pt; pt was lying in bed and had eyes open, however did not respond to SW's introduction. No response from pt when name was called. Pt fell asleep as SW was in room.     Interventions and response to Interventions (short-term counseling, family conference, education/resources offered to the family):  SW had attempted to meet with pt on  as well, however pt was resting. SHAD placed call to pt's son, Yuan on  to offer support. SHAD provided validation and active listening. SHAD informed Yuan of bereavement support to follow. SHAD provided [facility] and [facility] as resources for /cremation services. Yuan confirmed he had [facility]'s contact information and no other resource is needed at this time.   SHAD checked in with unit SW on .     Plan of Care Discussed with the Following:   - Nurse: Daren, RN  - Hospitalist/Rounding Provider: Augie Ortega MD    - Kamilah's Family/Preferred Contact: dejon Bolden  - Hospice Provider: DO Harish Puckett-VIRGIL White

## 2024-05-21 NOTE — PROGRESS NOTES
[facility]    Consult Note - [facility]  _________________________________________________________________    [facility] 24/7 Contact Number: [phone]    - Providers: Please contact [facility] with changes in orders or clinical plan of care   - Nursing: Please contact [facility] with significant changes in patient condition    Hospice will notify the care team (including the hospitalist) to confirm date of inpatient hospice (GIP) admission.    New Epic encounter will not be created until hospice completes admission.   ______________________________________________________________________         Hospice Diagnosis: Neoplasm of Uncertain Behavior      Indication for Inpatient Hospice: Pain and secretion management      Goals for Hospital Discharge: Pain management as evidence by no facial grimacing. Secretion management as evidence by no audible gargling.      Plan of Care Discussed with the Following:   - Nurse: Daren Denise RN  - Hospitalist/Rounding Provider: Augie Ortega MD  - Kamilah's Family/Preferred Contact: Yusuf Bolden - son - [phone]  - Hospice Provider: Bry Flores DO    Summary of Visit (includes assessment, medications and any new orders):   Saw pt today, looks comfortable, no PRN's given in the last 24hrs, significant other at bedside, updated Augie Ortega MD, Daren, RN and Yuan (pt son did not answer call). No recommendations from hospice. Spoke goals are met, spoke with SW to begin discharge planning.       Jasen Lee, VICENTA

## 2024-05-21 NOTE — PROGRESS NOTES
"Care Management Follow Up    Length of Stay (days): 3    Expected Discharge Date:  unknown     Concerns to be Addressed:     Discharging \"hospice\"  Patient plan of care discussed at interdisciplinary rounds: Yes    Anticipated Discharge Disposition:  hospice     Anticipated Discharge Services:  stretcher transport  Anticipated Discharge DME:  n/a    Patient/family educated on Medicare website which has current facility and service quality ratings:  n/a  Education Provided on the Discharge Plan:  n/a  Patient/Family in Agreement with the Plan:  na/    Referrals Placed by CM/SW:  n/a  Private pay costs discussed: Not applicable    Additional Information:  SW was updated that pt may no longer qualify for GIP and was asked to find a discharge plan for pt.   SW reached out to Abel Medina , asking if pt needs to be on IV morphine or if she can be on orals and if there is a length of time that pt may have left. He will review and get back to SW    Barriers to discharge: are IV morphine, COVID precautions, Infection:MRSA and unknown length of time pt may have left. SNF's, [facility], and or home hospice will not take pt on IV's. OL also would need an idea of length of time and pt cannot be on COVID precautions.    Per MD Augie Ortega. Pt was transitioned to oral pain med's. Pt was noticeably distressed and put back on IV before the end of the day 5/20/24.  Dr. Ortega is unsure of length of time pt has left. Today is his first day on.He will keep  updated        Social work will continue to follow and provide assistance to ensure a safe and timely discharge   [facility]   5A Oncology beds:5220-40 & 5C  beds 5417-32 (non BMT )      Vocera:  Phone: [phone]  Pager: [phone]        "

## 2024-05-21 NOTE — PLAN OF CARE
Goal Outcome Evaluation:      Plan of Care Reviewed With: patient    Overall Patient Progress: no change    Comfort cares. Patient nonverbal. Q2 turns. Scheduled morphine & robinul given. NPO. Ryo intact. PICC hep locked. Boyfriend supposed to stay the night-- ok per son.

## 2024-05-21 NOTE — PLAN OF CARE
[id number]. Comfort cares. DNI/DNR. On scheduled IV morphine. No PRNs administered this shift as pt looked comfortable throughout the shift.Unable tp assess pain level since pt does not communicate.Turned and repositioned Q2hrs. Roy patent- urine dark colored. PICC hep locked between IV medication. NPO. Continue POC.

## 2024-05-21 NOTE — PLAN OF CARE
Goal Outcome Evaluation:    Comfort cares. Pt turned every few hours. Did not give scheduled morphine as pt appeared comfortable (discussed this with hospice RN). Did give scheduled secretions med. PICC HL. Roy intact.

## 2024-05-21 NOTE — PROGRESS NOTES
[facility]    Medicine Progress Note - Hospitalist Service, GOLD TEAM 8    Date of Admission:  5/17/2024    Assessment & Plan   Kamilah Goldberg is a 44 year old female who transitioned to inpatient hospice on 5/17/2024. Please see the discharge summary from the same date for more details of her hospital course.     Hospice care  Transitioned to hospice care on 5/17.  - comfort cares order in place and being followed.  Verified with nursing that patient is comfortable and no concerns.  - all other medications stopped  Switched from SL morphine to Morphine IV 3mg IV q 6 sheculed and Morphine 3mg q2 prn yesterday due to increased discomfort on orals  Continue robinul to 0.2mg q6 hr scheduled.      Discussed with Hospice nurse.  Will work towards hospice discharge.     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 No plan to proceed with more chemotherapy. Transitioned to comfort cares on 5/15.      # Anemia  Due to underlying malignancy and chemotherapy.   - No further monitoring or intervention -- focus is comfort     # HIV/AIDS  - Discontinued ART with dolutegravir and Truvada      # E. coli UTI  On 5/12, was noted to have malodorous urine. Has Roy in place. UA showed positive nitrites, large LE, 75 WBC. UCx showed E. coli.  - Roy was replaced 5/13, remain for comfort care  - IV CTX 5/12-5/15     # COVID-19 positive  # MRSA pneumonia  Tested positive for COVID-19 4/18/24 during admission to [facility]. Asymptomatic from COVID. Per ID, does not feel like MRSA pneumonia is likely so linezolid was discontinued  - Per infection prevention, first day eligible for re-testing was 5/9, which showed a CT of 26.9. Per recommendations should remain in precautions until negative COVID test or CT >35. Repeat PCR 5/17 positive, cycle threshold. 32 - remains on  "isolation     # Concern for aspiration  # Questionable ability to protect airway  - Morphine PRN for air hunger       MISC  # Cotton wool spot of right eye  # Concern for CMV retinitis  Single CWS in right eye. Found on assessment by ophthalmology x4/28. Repeat ophtho exam 5/6 noted to have worsening cotton wool spots; etiologies include HIV retinitis, HTN retinitis or CMV retinitis.  - Discontinued IV ganciclovir on comfort cares     SOCIAL   # Medical decision maker  Confirmed with family that son (Yuan) will be patient's primary medical decision maker. Son Yuan and patient are okay with communication with aunt (Noemy) if Yuan is unavailable.      # H/o stimulant/amphetamine use   Noted on [facility] tox screen in 2016. Patient previously reported snorting and declined IVDU.              Diet: NPO for Medical/Clinical Reasons Except for: Ice Chips, Meds, Other; Specify: PO for comfort ok if alert, at risk for aspiration    DVT Prophylaxis: On hospice  Roy Catheter: PRESENT, indication: End of life  Lines: PRESENT      PICC 04/29/24 Double Lumen Left Basilic ok to use PICC-Site Assessment: WDL      Cardiac Monitoring: None  Code Status: No CPR- Do NOT Intubate      Clinically Significant Risk Factors                         # Overweight: Estimated body mass index is 25.09 kg/m  as calculated from the following:    Height as of 4/24/24: 1.626 m (5' 4\").    Weight as of 5/15/24: 66.3 kg (146 lb 2.6 oz)., PRESENT ON ADMISSION            Disposition Plan     Medically Ready for Discharge: Anticipated in 2-4 Days             Augie Ortega MD  Hospitalist Service, GOLD TEAM [address] [place] [address]  Securely message with [employer] (more info)  Text page via [facility] Paging/Directory   See signed in provider for up to date coverage information  ______________________________________________________________________    Interval History   Comfortable appearing.  Opens eyes to voice.  No " grimace, breathing comfortably    Physical Exam   Vital Signs:                    Weight: 0 lbs 0 oz    General Appearance: Opens eyes to voice, appears comfortable  Neuro/Psych:  Non-verbal, did not respond to questions    Medical Decision Making       30 MINUTES SPENT BY ME on the date of service doing chart review, history, exam, documentation & further activities per the note.      Data

## 2024-05-21 NOTE — PLAN OF CARE
Goal Outcome Evaluation:      Plan of Care Reviewed With: patient, significant other    Overall Patient Progress: no changeOverall Patient Progress: no change         Nursing note    Pain/Comfort: Patient did not have any pain per rFLACC assessments, Pain was managed with scheduled morphine 3mg every 6 hours via PICC.     Primary problem: Brain mass, hospice cares  Assessments/Progress: Patient dignity was upheld with cares. Patient was able to intake single ice chips orally.  did not have BM overnight, urine output was oliguric tea colored urine. Lower extremities elevated to promote circulation. Patient was turned and repositioned every 2.5 hours, padded bony promininces to protect skin.     Pt's boyfriend Otis was present at bedside overnight.     Priority nursing care for next shift: hospice cares  Discharge plan/ barriers to discharge: pending

## 2024-05-22 NOTE — PLAN OF CARE
Goal Outcome Evaluation: Pt remains on comfort cares. Pt is nonverbal with responses, but does open eyes when spoken to. Pt turned q 2hr tonight, with mouth cares done. Pt is cooperative with cares, and appears comfortable with turns. Pt med with morphine q 6 hr via picc line. Secretions are under control with robinol q 6hr as well. Roy patent with scant uo. No stools noted. Family and friend at bs through the night. Cont to provide comfort cares and maintain pain control.

## 2024-05-22 NOTE — PROGRESS NOTES
[facility]    Medicine Progress Note - Hospitalist Service, GOLD TEAM 8    Date of Admission:  5/17/2024    Assessment & Plan   Kamilah Goldberg is a 44 year old female who transitioned to inpatient hospice on 5/17/2024. Please see the discharge summary from the same date for more details of her hospital course.     Hospice care  Transitioned to hospice care on 5/17.  Morphine high concentration SL 5mg q 4  Switch from IV Robinul to Atropine PO     Discussed with Hospice nurse.  Will work towards hospice discharge.     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 No plan to proceed with more chemotherapy. Transitioned to comfort cares on 5/15.      # Anemia  Due to underlying malignancy and chemotherapy.   - No further monitoring or intervention -- focus is comfort     # HIV/AIDS  - Discontinued ART with dolutegravir and Truvada      # E. coli UTI  On 5/12, was noted to have malodorous urine. Has Roy in place. UA showed positive nitrites, large LE, 75 WBC. UCx showed E. coli.  - Roy was replaced 5/13, remain for comfort care  - IV CTX 5/12-5/15     # COVID-19 positive  # MRSA pneumonia  Tested positive for COVID-19 4/18/24 during admission to [facility]. Asymptomatic from COVID. Per ID, does not feel like MRSA pneumonia is likely so linezolid was discontinued  - Per infection prevention, first day eligible for re-testing was 5/9, which showed a CT of 26.9. Per recommendations should remain in precautions until negative COVID test or CT >35. Repeat PCR 5/17 positive, cycle threshold. 32 - remains on isolation     # Concern for aspiration  # Questionable ability to protect airway  - Morphine PRN for air hunger       MISC  # Cotton wool spot of right eye  # Concern for CMV retinitis  Single CWS in right eye. Found on assessment by ophthalmology x4/28.  "Repeat ophtho exam 5/6 noted to have worsening cotton wool spots; etiologies include HIV retinitis, HTN retinitis or CMV retinitis.  - Discontinued IV ganciclovir on comfort cares     SOCIAL   # Medical decision maker  Confirmed with family that son (Yuan) will be patient's primary medical decision maker. Son Yuan and patient are okay with communication with aunt (Noemy) if Yuan is unavailable.      # H/o stimulant/amphetamine use   Noted on [facility] tox screen in 2016. Patient previously reported snorting and declined IVDU.              Diet: NPO for Medical/Clinical Reasons Except for: Ice Chips, Meds, Other; Specify: PO for comfort ok if alert, at risk for aspiration    DVT Prophylaxis: On hospice  Roy Catheter: PRESENT, indication: End of life  Lines: PRESENT      PICC 04/29/24 Double Lumen Left Basilic ok to use PICC-Site Assessment: WDL      Cardiac Monitoring: None  Code Status: No CPR- Do NOT Intubate      Clinically Significant Risk Factors                         # Overweight: Estimated body mass index is 25.09 kg/m  as calculated from the following:    Height as of 4/24/24: 1.626 m (5' 4\").    Weight as of 5/15/24: 66.3 kg (146 lb 2.6 oz).             Disposition Plan     Medically Ready for Discharge: Anticipated in 2-4 Days             Augie Ortega MD  Hospitalist Service, [address]  Securely message with [employer] (more info)  Text page via [facility] Paging/Directory   See signed in provider for up to date coverage information  ______________________________________________________________________    Interval History   Comfortable appearing.  Opens eyes to voice.  No grimace, breathing comfortably    Physical Exam   Vital Signs:                    Weight: 0 lbs 0 oz    General Appearance: Opens eyes to voice, appears comfortable  Neuro/Psych:  Non-verbal, did not respond to questions    Medical Decision Making       25 MINUTES SPENT BY ME " on the date of service doing chart review, history, exam, documentation & further activities per the note.      Data

## 2024-05-22 NOTE — PROGRESS NOTES
[facility]    Consult Note - [facility]  _________________________________________________________________    [facility] 24/7 Contact Number: [phone]    - Providers: Please contact [facility] with changes in orders or clinical plan of care   - Nursing: Please contact [facility] with significant changes in patient condition    Hospice will notify the care team (including the hospitalist) to confirm date of inpatient hospice (GIP) admission.    New Epic encounter will not be created until hospice completes admission.   ______________________________________________________________________        Hospice Diagnosis: Neoplasm of Uncertain Behavior      Indication for Inpatient Hospice: Pain and secretion management      Goals for Hospital Discharge: Pain management as evidence by no facial grimacing. Secretion management as evidence by no audible gargling.      Plan of Care Discussed with the Following:   - Nurse: Daren Denise RN  - Hospitalist/Rounding Provider: Augie Ortega MD  - Kamilah's Family/Preferred Contact: Yusuf Bolden - dejon - [phone]  - Hospice Provider: Bry Flores DO    Summary of Visit (includes assessment, medications and any new orders):   Saw pt today. Vitals are stable BP 99/57, O2 93% RA, HR 88, RR 18. Pt looked comfortable. Discharge planning started, but is complicated by isolation. IV medications would require to be switched to PO for discharge. Significant other at bedside. Called pt dejon Bolden and updated him. He said he will meet me tomorrow at 10am at bedside. He informed me that he is in the process of applying for assistance with [facility] for crePixsta.     I spoke with . The following are Recommendations:  Morphine high concentration SL 5mg q 4  Switch from IV Robinul to Atropine PO     All recommendations sent to Augie Ortega MD via [employer].           Jasen Lee RN

## 2024-05-22 NOTE — PLAN OF CARE
Goal Outcome Evaluation:    Pt appears comfortable. Roy intact 300 out this shift. PICC HL. Scheduled robinul given; morphine held as pt appeared comfortable.

## 2024-05-22 NOTE — PLAN OF CARE
Goal Outcome Evaluation:      Plan of Care Reviewed With: patient    Overall Patient Progress: no change    Comfort cares. Patient nonverbal. Q2 turns. No acute distress, resting comfortably, scheduled morphine given. Robinul discontinued. NPO. Roy intact. PICC hep locked. Luis Carlosfrjcd, Otis, to stay overnight at bedside.

## 2024-05-23 NOTE — PROGRESS NOTES
[facility]    Medicine Progress Note - Hospitalist Service, GOLD TEAM 8    Date of Admission:  5/17/2024    Assessment & Plan   Kamilah Goldberg is a 44 year old female who transitioned to inpatient hospice on 5/17/2024. Please see the discharge summary from the same date for more details of her hospital course.     Hospice care  Transitioned to hospice care on 5/17.  Morphine high concentration SL 10mg q6  Switch from IAtropine PO Q4    Discussed with Hospice nurse.  Will work towards hospice discharge.     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 No plan to proceed with more chemotherapy. Transitioned to comfort cares on 5/15.      # Anemia  Due to underlying malignancy and chemotherapy.   - No further monitoring or intervention -- focus is comfort     # HIV/AIDS  - Discontinued ART with dolutegravir and Truvada      # E. coli UTI  On 5/12, was noted to have malodorous urine. Has Roy in place. UA showed positive nitrites, large LE, 75 WBC. UCx showed E. coli.  - Roy was replaced 5/13, remain for comfort care  - IV CTX 5/12-5/15     # COVID-19 positive  # MRSA pneumonia  Tested positive for COVID-19 4/18/24 during admission to [facility]. Asymptomatic from COVID. Per ID, does not feel like MRSA pneumonia is likely so linezolid was discontinued  - Per infection prevention, first day eligible for re-testing was 5/9, which showed a CT of 26.9. Per recommendations should remain in precautions until negative COVID test or CT >35. Repeat PCR 5/17 positive, cycle threshold. 32 - remains on isolation     # Concern for aspiration  # Questionable ability to protect airway  - Morphine PRN for air hunger       MISC  # Cotton wool spot of right eye  # Concern for CMV retinitis  Single CWS in right eye. Found on assessment by ophthalmology x4/28. Repeat ophtho  "exam 5/6 noted to have worsening cotton wool spots; etiologies include HIV retinitis, HTN retinitis or CMV retinitis.  - Discontinued IV ganciclovir on comfort cares     SOCIAL   # Medical decision maker  Confirmed with family that son (Yuan) will be patient's primary medical decision maker. Son Yuan and patient are okay with communication with aunt (Noemy) if Yuan is unavailable.      # H/o stimulant/amphetamine use   Noted on [facility] tox screen in 2016. Patient previously reported snorting and declined IVDU.              Diet: NPO for Medical/Clinical Reasons Except for: Ice Chips, Meds, Other; Specify: PO for comfort ok if alert, at risk for aspiration    DVT Prophylaxis: On hospice  Roy Catheter: PRESENT, indication: End of life  Lines: PRESENT      PICC 04/29/24 Double Lumen Left Basilic ok to use PICC-Site Assessment: WDL      Cardiac Monitoring: None  Code Status: No CPR- Do NOT Intubate      Clinically Significant Risk Factors                         # Overweight: Estimated body mass index is 25.09 kg/m  as calculated from the following:    Height as of 4/24/24: 1.626 m (5' 4\").    Weight as of 5/15/24: 66.3 kg (146 lb 2.6 oz).             Disposition Plan     Medically Ready for Discharge: Anticipated in 2-4 Days             Augie Ortega MD  Hospitalist Service, [address]  Securely message with [employer] (more info)  Text page via [facility] Paging/Directory   See signed in provider for up to date coverage information  ______________________________________________________________________    Interval History   Comfortable appearing.  Opens eyes to voice.  No grimace, breathing comfortably    Physical Exam   Vital Signs:                    Weight: 0 lbs 0 oz    General Appearance: Opens eyes to voice, appears comfortable  Neuro/Psych:  Non-verbal, did not respond to questions    Medical Decision Making       30 MINUTES SPENT BY ME on the date of " service doing chart review, history, exam, documentation & further activities per the note.      Data

## 2024-05-23 NOTE — PLAN OF CARE
Goal Outcome Evaluation:      Plan of Care Reviewed With: patient    Overall Patient Progress: no change    Comfort cares. Patient nonverbal. NPO. Turn and reposition as needed. No acute distress, resting comfortably. PRN SL morphine and atropine given x1 for comfort. Continue POC.

## 2024-05-23 NOTE — PLAN OF CARE
Goal Outcome Evaluation:  Comfort cares. Nonverbal, resting comfortably, no signs of distress. Q2H turns. Roy in place, low UOP overnight. PICC HL. Boyfriend at bedside overnight. Continue with cares.

## 2024-05-23 NOTE — PROGRESS NOTES
[facility]    Consult Note - [facility] Inpatient Hospice  _________________________________________________________________    [facility] 24/7 Contact Number: [phone]    - Providers: Please contact [facility] with changes in orders or clinical plan of care   - Nursing: Please contact [facility] with significant changes in patient condition    Hospice will notify the care team (including the hospitalist) to confirm date of inpatient hospice (GIP) admission.    New Epic encounter will not be created until hospice completes admission.   ______________________________________________________________________         Hospice Diagnosis: Neoplasm of Uncertain Behavior      Indication for Inpatient Hospice: Pain and secretion management      Goals for Hospital Discharge: Pain management as evidence by no facial grimacing. Secretion management as evidence by no audible gargling.      Plan of Care Discussed with the Following:   - Nurse: Lety Mcnulty RN  - Hospitalist/Rounding Provider: Augie Ortega MD  - Kamilah's Family/Preferred Contact: Yusuf Bolden - dejon - [phone]  - Hospice Provider: Bry Flores DO    Summary of Visit (includes assessment, medications and any new orders):   Saw pt today. Family at bedside including son Yuan. Yuan informed me that he is working on completing cremation assistance paperwork. Pt looked uncomfortable and moaning today so RN gave PRN Morphine. Secretions noted so PRN Atropine administered. Discharge planning continues, but is complicated by isolations/infections. Vitals stable. For discharge, IV meds need to be PO so the following are hospice recommendations from DO Rut all sent to Augei Ortega MD via [employer].     Recommendations:   Schedule Morphine high concentration SL 10mg Q6   Schedule Atropine drops for secretions Q4   Discontinue scheduled IV Morphine and IV Heparin         Sumeyo O.  VICENTA Lee

## 2024-05-23 NOTE — PROGRESS NOTES
"Care Management Follow Up    Length of Stay (days): 5    Expected Discharge Date:       Concerns to be Addressed:     Discharging \"hospice\"  Patient plan of care discussed at interdisciplinary rounds: Yes     Anticipated Discharge Disposition:  hospice     Anticipated Discharge Services:  stretcher transport  Anticipated Discharge DME:  n/a     Patient/family educated on Medicare website which has current facility and service quality ratings:  n/a  Education Provided on the Discharge Plan:  n/a  Patient/Family in Agreement with the Plan:  na/     Referrals Placed by CM/SW:   [facility]  [address] [place] [address]  Admissions ([place]) direct: [phone]  RN to RN report: [phone] (ask for RN)  Fax: [phone]  5.23: declined - do not have private room for isolation needs, requested to reach out again if pt comes off covid isolation.    [facility]  [address] [place]  [address]  P: [phone]  F: [phone]  5.23: denied - isolation needs    Private pay costs discussed: Not applicable    Additional Information:    Care management leadership requested referrals be sent to [facility] and [facility] at the suggestion of [facility] staff. Referrals sent.    Barriers to discharge: are IV morphine (now oral), COVID precautions, Infection:MRSA and unknown length of time pt may have left. SNF's, [facility], and or home hospice will not take pt on IV's. OL also would need an idea of length of time and pt cannot be on COVID precautions.     12:00 - call from [facility] that they do not have a private room available to meet isolation needs at this time and need to decline the referral. Requested SW inform if pt comes off covid isolation.    NAKIA Hale BSW  Float   Covering 5A/C: [phone]      "

## 2024-05-23 NOTE — PLAN OF CARE
Goal Outcome Evaluation:  Comfort cares,nonverbal, opens eyes spontaneously ,intermittent moaning noted ,morphine 3 mg IV given x 2,which was effective,atropine drops given SL x 1 for secretion,oral suctioning done x 1.Roy in place with low urinary output,mottling noted on her fingers/hands and her toes.Boyfriend ,son and aunt at bedside, they left around 1300. Bed bath,oral care and linnean changed  for comfort.Will continue with comfort care.

## 2024-05-24 NOTE — PROGRESS NOTES
[facility]    Social Work Progress Note - [facility] Inpatient Hospice    ______________________________________________________________________    [facility]  Contact Number: [phone]    - Providers: Please contact [facility] with changes in orders or clinical plan of care   - Nursing: Please contact [facility] with significant changes in patient condition  - Social Work: Please contact [facility] for discharge phanning/updates  ______________________________________________________________________        Summary of Visit (includes psychosocial assessment/updates, acceptance of palliative care/hospice philosophy, discharge plans):   SW checked on pt however did not enter room due to precautions. SW checked in with unit SW.     Interventions and response to Interventions (short-term counseling, family conference, education/resources offered to the family):  SW placed call to pt's son to provide support. SW provided verbal counseling and encouragement to reach out for grief support as his is limited. Pt's son has complete the application for cremation/ assistance with [facility] and reported he is waiting to hear back from them. SW offered counseling referral however he declined and reported he doesn't like to talk much. SW educated pt's son on exacerbated depression signs to look for and encouraged him again to seek grief support for self care.   SW received update from [facility] Director to update unit SW to send discharge referral to [facility] for LTC placement. SW sent secure chat to unit SW.     Plan of Care Discussed with the Following:   - Nurse: DANIEL  - Hospitalist/Rounding Provider: Augie Ortega MD    - Kamilah's Family/Preferred Contact: Yuan, son  - Hospice Provider: DO Harish Puckett-VIRGIL White

## 2024-05-24 NOTE — CONSULTS
[facility]    Consult Note - [facility] Inpatient Hosp[facility]  _________________________________________________________________    [facility] 24/7 Contact Number: [phone]    - Providers: Please contact [facility] with changes in orders or clinical plan of care   - Nursing: Please contact [facility] with significant changes in patient condition    Hospice will notify the care team (including the hospitalist) to confirm date of inpatient hospice (GIP) admission.    New Epic encounter will not be created until hospice completes admission.   ______________________________________________________________________        Hospice Diagnosis: Neoplasm of uncertain behavior    Indication for Inpatient Hospice: Pain, Respiratory Distress, and Respiratory Secretions    Goals for Hospital Discharge: Management of pain as evidenced by no facial grimace/furrowed brow/tense tone/clinching fists for 24 hours in a 48-hour period; Management of respiratory distress as evidenced by RR < 20/min and non-labored for 24 hours in a 48-hour period; Management of respiratory secretions as evidenced by no audible gurgle or terminal congestion noted for 24 hours in a 48-hour period.    Plan of Care Discussed with the Following:   - Nurse: VICENTA Hyatt  - Charge Nurse: N/A  - Hospitalist/Rounding Provider: Augie Ortega MD   - Kamilah's Family/Preferred Contact: Yuan (son)  - Hospice Provider: Mark Londono DO    Summary of Visit (includes assessment, medications and any new orders):     Evaluated patient with hospital RN at bedside. No family present at time of visit. Patient unresponsive to verbal or tactile stimuli. RR 40 breaths/min and labored. HR 40 beats/min and weak. Lung sounds clear in upper lobes, bilaterally, and absent in lower lobes, bilaterally. Peripheral pulses (radial and pedal) absent. Mottling noted on hands and feet. Skin is cold to the touch and cyanotic. Non-pitting  edema noted to all 4 extremities. Bowel sounds absent in all 4 quadrants. Abdomen soft and non-distended. Facial grimace noted as well as tense tone and temporal wasting.    Referral for placement at time of discharge sent to [facility] by Emerita KULKARNI, at request of [facility] hospice. However, discharge not likely based on current condition. Patient will likely pass away at hospital.    This writer updated Olena Warner, VICENTA, and patient's son, Yuan.    [facility] hospice will continue to evaluate/assess patient daily.     Patient's son, Yuan, is in process of completing paperwork for [employer] [facility] assistance with cremation/burial.    Please call with any questions, concerns, or at time of death. During business hours, we can be reached via [employer]. For after hours, please call 24/7 contact phone listed above.      Torrie Cruz RN, CHPN

## 2024-05-24 NOTE — PROGRESS NOTES
"Care Management Follow Up    Length of Stay (days): 6    Expected Discharge Date:       Concerns to be Addressed:     Discharging \"hospice\"  Patient plan of care discussed at interdisciplinary rounds: Yes     Anticipated Discharge Disposition:  hospice     Anticipated Discharge Services:  stretcher transport  Anticipated Discharge DME:  n/a     Patient/family educated on Medicare website which has current facility and service quality ratings:  n/a  Education Provided on the Discharge Plan:  n/a  Patient/Family in Agreement with the Plan:  na/     Referrals Placed by CM/SW:   Pending:  [facility]  [address] [place]  [address]  P: [phone]  Admissions: [phone]  F: [phone]    Declined:  [facility]  [address] [place] [address]  Admissions ([place]) direct: [phone]  RN to RN report: [phone] (ask for RN)  Fax: [phone]  5.23: declined - do not have private room for isolation needs, requested to reach out again if pt comes off covid isolation.     [facility]  [address] [facility] [place]  [address]  P: [phone]  F: [phone]  5.23: denied - isolation needs     Private pay costs discussed: Not applicable    Additional Information:    Additional referral sent to Kalyanmatthias Blanchards at request from [facility].    NAKIA Hale BSW  Float   Covering 5A/C: [phone]      "

## 2024-05-24 NOTE — PLAN OF CARE
Goal Outcome Evaluation:  Plan of Care Reviewed With: patient    Overall Patient Progress: no changeOverall Patient Progress: no change  Comfort cares. Pt appears comfortable overnight. Roy intact. PICC HL. Turn and reposition as needed. Luis CarlosfrOtis gaona, visited overnight and remains at bedside. Continue with POC.

## 2024-05-24 NOTE — PROGRESS NOTES
[facility]    Medicine Progress Note - Hospitalist Service, GOLD TEAM 8    Date of Admission:  5/17/2024    Assessment & Plan   Kamilah Goldberg is a 44 year old female who transitioned to inpatient hospice on 5/17/2024. Please see the discharge summary from the same date for more details of her hospital course.     Hospice care  Transitioned to hospice care on 5/17.  Morphine high concentration SL 10mg q6  Atropine PO Q4    Discussed with Hospice nurse.  Will work towards hospice discharge.    Patient showing decreased awareness, agonal breathing and peripheral mottling suggesting she may be entering in the final stages of dying.     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 No plan to proceed with more chemotherapy. Transitioned to comfort cares on 5/15.      # Anemia  Due to underlying malignancy and chemotherapy.   - No further monitoring or intervention -- focus is comfort     # HIV/AIDS  - Discontinued ART with dolutegravir and Truvada      # E. coli UTI  On 5/12, was noted to have malodorous urine. Has Roy in place. UA showed positive nitrites, large LE, 75 WBC. UCx showed E. coli.  - Roy was replaced 5/13, remain for comfort care  - IV CTX 5/12-5/15     # COVID-19 positive  # MRSA pneumonia  Tested positive for COVID-19 4/18/24 during admission to [facility]. Asymptomatic from COVID. Per ID, does not feel like MRSA pneumonia is likely so linezolid was discontinued  - Per infection prevention, first day eligible for re-testing was 5/9, which showed a CT of 26.9. Per recommendations should remain in precautions until negative COVID test or CT >35. Repeat PCR 5/17 positive, cycle threshold. 32 - remains on isolation     # Concern for aspiration  # Questionable ability to protect airway  - Morphine PRN for air hunger       MISC  # Cotton wool  "spot of right eye  # Concern for CMV retinitis  Single CWS in right eye. Found on assessment by ophthalmology x4/28. Repeat ophtho exam 5/6 noted to have worsening cotton wool spots; etiologies include HIV retinitis, HTN retinitis or CMV retinitis.  - Discontinued IV ganciclovir on comfort cares     SOCIAL   # Medical decision maker  Confirmed with family that son (Yuan) will be patient's primary medical decision maker. Son Yuan and patient are okay with communication with aunt (Noemy) if Yuan is unavailable.      # H/o stimulant/amphetamine use   Noted on [facility] tox screen in 2016. Patient previously reported snorting and declined IVDU.              Diet: NPO for Medical/Clinical Reasons Except for: Ice Chips, Meds, Other; Specify: PO for comfort ok if alert, at risk for aspiration    DVT Prophylaxis: On hospice  Roy Catheter: PRESENT, indication: End of life  Lines: PRESENT             Cardiac Monitoring: None  Code Status: No CPR- Do NOT Intubate      Clinically Significant Risk Factors                         # Overweight: Estimated body mass index is 25.09 kg/m  as calculated from the following:    Height as of 4/24/24: 1.626 m (5' 4\").    Weight as of 5/15/24: 66.3 kg (146 lb 2.6 oz).             Disposition Plan     Medically Ready for Discharge: Anticipated in 2-4 Days             Augie Ortega MD  Hospitalist Service, [address]  Securely message with [employer] (more info)  Text page via [facility] Paging/Directory   See signed in provider for up to date coverage information  ______________________________________________________________________    Interval History   Less responsive today.  Did not awake with voice.    Physical Exam   Vital Signs:                    Weight: 0 lbs 0 oz    General Appearance:   Neuro/Psych:  Non-verbal, did not respond to questions  Respiratory: Agonal breathing  Ext:  purple blotches and mottling of " hands    Medical Decision Making       30 MINUTES SPENT BY ME on the date of service doing chart review, history, exam, documentation & further activities per the note.      Data

## 2024-05-25 NOTE — DISCHARGE SUMMARY
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 She was admitted on 4/24/2024 for L avel/midbrain lesion mass and new HIV/AIDS diagnosis (3/28/24). On work up, was noted to have positive EBV PCR on CSF and rare atypical cells on CSF c/f CNS lymphoma. In the absence of tissue diagnosis, empirically started high-dose methotrexate and rituximab C1D1=4/29/24. Course further complicated by COVID positivity, decline in neurological status, HIV/AIDS. MRI brain after first cycle of chemo showed no treatment response. In the setting of clinical neurologic decline, transitioned to comfort cares on 5/15. Enrolled in [facility] hospice and transitioned to medicine primary on 5/17. Passed the evening of 5/24 with family present.     Called to room in the evening to examine    Auscultated heart x1 minute and no heart sounds  Auscultated lungs and looked for chest rise and fall x1 minute - absent  No carotid pulse - palpated x1 minute  Pupils fixed and dilated bilaterally, unresponsive and no corneal reflex  No response to painful nailbed pressure    Pronounced time of death 1830    DO OLIVER [facility]  ______________________________________________________________________      Consultations This Hospital Stay   SPIRITUAL HEALTH SERVICES IP CONSULT    Primary Care Physician   Physician No Ref-Primary    Time  Spent on this Encounter   I, Daren Fermin DO, personally saw the patient today and spent less than or equal to 30 minutes discharging this patient.

## 2024-05-25 NOTE — PROGRESS NOTES
[id number] Comfort cares. Prn morphine and atropine utilized for comfort. Family had called out at 1750 about patient passing. MD was notified and came at the bedside to call time of death.  was called and came at the bedside to comfort family. Life [employer] was contacted.

## 2024-06-07 LAB — BACTERIA BLD CULT: NO GROWTH

## 2024-06-20 LAB — BACTERIA BLD CULT: NO GROWTH

## 2024-06-21 LAB
ACID FAST STAIN (ARUP): NORMAL

## (undated) DEVICE — PREP CHLORAPREP 26ML TINTED HI-LITE ORANGE 930815

## (undated) DEVICE — DRAPE IOBAN INCISE 23X17" 6650EZ

## (undated) DEVICE — SU PROLENE 3-0 V-7DA 36" 8976H

## (undated) DEVICE — DRAIN CHEST TUBE RIGHT ANGLED 28FR 8128

## (undated) DEVICE — ESU PENCIL W/HOLSTER E2350H

## (undated) DEVICE — SYR BULB IRRIG 50ML LATEX FREE 0035280

## (undated) DEVICE — SU PROLENE 4-0 RB-1DA 36" 8557H

## (undated) DEVICE — ESU ELEC BLADE 6" COATED E1450-6

## (undated) DEVICE — ESU GROUND PAD UNIVERSAL W/O CORD

## (undated) DEVICE — TUBING SUCTION MEDI-VAC SOFT 3/16"X20' N520A

## (undated) DEVICE — SU SILK 2-0 TIE 24" SA75H

## (undated) DEVICE — DRSG STERI STRIP 1/2X4" R1547

## (undated) DEVICE — BLADE KNIFE SURG 15 371115

## (undated) DEVICE — SPONGE RAY-TEC 4X8" 7318

## (undated) DEVICE — TUBING SUCTION 12"X1/4" N612

## (undated) DEVICE — DRAIN CHEST TUBE 28FR STR 8028

## (undated) DEVICE — SUCTION CANISTER MEDIVAC LINER 3000ML W/LID 65651-530

## (undated) DEVICE — CLIP APPLIER 11" MED LIGACLIP MCM20

## (undated) DEVICE — BLADE KNIFE SURG 10 371110

## (undated) DEVICE — SU NDL CUT REV MED 3/8 209014

## (undated) DEVICE — SPONGE LAP 18X18" X8435

## (undated) DEVICE — TAPE DRSG UNIVERSAL CLOTH 3" WHITE LATEX 881-3

## (undated) DEVICE — SU VICRYL 2-0 CT-2 27" J333H

## (undated) DEVICE — DRAIN PENROSE 0.75"X18" LATEX FREE GR205

## (undated) DEVICE — SU VICRYL 4-0 PS-2 18" UND J496H

## (undated) DEVICE — SU VICRYL 1 CT-2 27" J335H

## (undated) DEVICE — DRAPE POUCH INSTRUMENT 1018

## (undated) DEVICE — GOWN IMPERVIOUS ZONED LG

## (undated) DEVICE — CATH ON-Q PAIN SILVER SKR 2.5" PM010-A

## (undated) DEVICE — NDL 22GA 1.5"

## (undated) DEVICE — ESU ELEC BLADE 6" COATED/INSULATED E1455-6

## (undated) DEVICE — SUCTION DRY CHEST DRAIN OASIS 3600-100

## (undated) DEVICE — SU VICRYL 2-0 CT-1 27" J339H

## (undated) DEVICE — SOL NACL 0.9% IRRIG 1000ML BOTTLE 2F7124

## (undated) DEVICE — SU PROLENE 4-0 V-7DA 36" 8975H

## (undated) DEVICE — LINEN TOWEL PACK X5 5464

## (undated) DEVICE — SU SILK 2 REEL 60" SA8H

## (undated) DEVICE — SYSTEM LAPAROVUE VISIBILITY LAPVUE10

## (undated) DEVICE — DRSG GAUZE 4X4" 3033

## (undated) DEVICE — SOL WATER IRRIG 1000ML BOTTLE 2F7114

## (undated) DEVICE — GLOVE BIOGEL PI ULTRATOUCH G SZ 6.5 42165

## (undated) DEVICE — GLOVE BIOGEL PI ULTRATOUCH G SZ 7.5 42175

## (undated) DEVICE — DRAPE BREAST/CHEST 29420

## (undated) DEVICE — ANTIFOG SOLUTION W/FOAM PAD 31142527

## (undated) DEVICE — SU VICRYL 1 CT 36" J959H

## (undated) DEVICE — SURGICEL HEMOSTAT 3X4" NUKNIT 1943

## (undated) DEVICE — PACK MINOR SBA15MIFSE

## (undated) DEVICE — STPL SKIN SUBCUTICULAR INSORB  2030

## (undated) DEVICE — SU VICRYL 1 CTX CR 8X18" J765D

## (undated) DEVICE — DRSG KERLIX 4 1/2"X4YDS ROLL 6715

## (undated) RX ORDER — CEFTRIAXONE 2 G/1
INJECTION, POWDER, FOR SOLUTION INTRAMUSCULAR; INTRAVENOUS
Status: DISPENSED
Start: 2023-01-20

## (undated) RX ORDER — ONDANSETRON 2 MG/ML
INJECTION INTRAMUSCULAR; INTRAVENOUS
Status: DISPENSED
Start: 2023-01-20

## (undated) RX ORDER — HYDROMORPHONE HYDROCHLORIDE 1 MG/ML
INJECTION, SOLUTION INTRAMUSCULAR; INTRAVENOUS; SUBCUTANEOUS
Status: DISPENSED
Start: 2023-01-20

## (undated) RX ORDER — FENTANYL CITRATE 50 UG/ML
INJECTION, SOLUTION INTRAMUSCULAR; INTRAVENOUS
Status: DISPENSED
Start: 2023-01-20

## (undated) RX ORDER — BUPIVACAINE HYDROCHLORIDE 5 MG/ML
INJECTION, SOLUTION EPIDURAL; INTRACAUDAL
Status: DISPENSED
Start: 2023-01-20

## (undated) RX ORDER — LIDOCAINE HYDROCHLORIDE 10 MG/ML
INJECTION, SOLUTION EPIDURAL; INFILTRATION; INTRACAUDAL; PERINEURAL
Status: DISPENSED
Start: 2024-01-01

## (undated) RX ORDER — PROPOFOL 10 MG/ML
INJECTION, EMULSION INTRAVENOUS
Status: DISPENSED
Start: 2023-01-20

## (undated) RX ORDER — DEXAMETHASONE SODIUM PHOSPHATE 4 MG/ML
INJECTION, SOLUTION INTRA-ARTICULAR; INTRALESIONAL; INTRAMUSCULAR; INTRAVENOUS; SOFT TISSUE
Status: DISPENSED
Start: 2023-01-20

## (undated) RX ORDER — LIDOCAINE HYDROCHLORIDE 10 MG/ML
INJECTION, SOLUTION EPIDURAL; INFILTRATION; INTRACAUDAL; PERINEURAL
Status: DISPENSED
Start: 2023-01-20